# Patient Record
Sex: FEMALE | Race: BLACK OR AFRICAN AMERICAN | NOT HISPANIC OR LATINO | Employment: OTHER | URBAN - METROPOLITAN AREA
[De-identification: names, ages, dates, MRNs, and addresses within clinical notes are randomized per-mention and may not be internally consistent; named-entity substitution may affect disease eponyms.]

---

## 2017-05-05 ENCOUNTER — OFFICE VISIT (OUTPATIENT)
Dept: OPHTHALMOLOGY | Facility: CLINIC | Age: 62
End: 2017-05-05
Payer: MEDICARE

## 2017-05-05 DIAGNOSIS — E11.3592 TYPE 2 DIABETES MELLITUS WITH LEFT EYE AFFECTED BY PROLIFERATIVE RETINOPATHY WITHOUT MACULAR EDEMA, WITH LONG-TERM CURRENT USE OF INSULIN: ICD-10-CM

## 2017-05-05 DIAGNOSIS — E11.3391 TYPE 2 DIABETES MELLITUS WITH RIGHT EYE AFFECTED BY MODERATE NONPROLIFERATIVE RETINOPATHY WITHOUT MACULAR EDEMA, WITH LONG-TERM CURRENT USE OF INSULIN: Primary | ICD-10-CM

## 2017-05-05 DIAGNOSIS — Z79.4 TYPE 2 DIABETES MELLITUS WITH LEFT EYE AFFECTED BY PROLIFERATIVE RETINOPATHY WITHOUT MACULAR EDEMA, WITH LONG-TERM CURRENT USE OF INSULIN: ICD-10-CM

## 2017-05-05 DIAGNOSIS — Z79.4 TYPE 2 DIABETES MELLITUS WITH RIGHT EYE AFFECTED BY MODERATE NONPROLIFERATIVE RETINOPATHY WITHOUT MACULAR EDEMA, WITH LONG-TERM CURRENT USE OF INSULIN: Primary | ICD-10-CM

## 2017-05-05 DIAGNOSIS — H25.813 MIXED TYPE AGE-RELATED CATARACT, BOTH EYES: ICD-10-CM

## 2017-05-05 PROCEDURE — 92134 CPTRZ OPH DX IMG PST SGM RTA: CPT | Mod: PBBFAC | Performed by: OPHTHALMOLOGY

## 2017-05-05 PROCEDURE — 99212 OFFICE O/P EST SF 10 MIN: CPT | Mod: PBBFAC,25 | Performed by: OPHTHALMOLOGY

## 2017-05-05 PROCEDURE — 92226 PR SPECIAL EYE EXAM, SUBSEQUENT: CPT | Mod: 50,S$PBB,, | Performed by: OPHTHALMOLOGY

## 2017-05-05 PROCEDURE — 92226 PR SPECIAL EYE EXAM, SUBSEQUENT: CPT | Mod: 50,PBBFAC | Performed by: OPHTHALMOLOGY

## 2017-05-05 PROCEDURE — 92014 COMPRE OPH EXAM EST PT 1/>: CPT | Mod: S$PBB,,, | Performed by: OPHTHALMOLOGY

## 2017-05-05 PROCEDURE — 99999 PR PBB SHADOW E&M-EST. PATIENT-LVL II: CPT | Mod: PBBFAC,,, | Performed by: OPHTHALMOLOGY

## 2017-05-05 RX ORDER — FUROSEMIDE 20 MG/1
20 TABLET ORAL DAILY
Status: ON HOLD | COMMUNITY
End: 2018-09-11 | Stop reason: HOSPADM

## 2017-05-05 RX ORDER — ALBUTEROL SULFATE 90 UG/1
2 AEROSOL, METERED RESPIRATORY (INHALATION) EVERY 6 HOURS PRN
Status: ON HOLD | COMMUNITY
End: 2018-09-11

## 2017-05-05 NOTE — PROGRESS NOTES
[place] SDOCT:   OD: good quality, good contour, no ME  OS: good quality, good contour, no ME    Type 2 IDDM  Mod/sev NPDR OD  PDR OS s/p PRP  No ME OU  VS Cats OU- pt would like eval    Diabetic Retinopathy discussed in detail, all questions answered  Stressed importance of good BS/BP/Chol Control  RTC 4 months, sooner PRN    RTC 4 months or in post op period for reeval of DRMarsha  .

## 2017-05-05 NOTE — MR AVS SNAPSHOT
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 edema, with long-term current use of insulin    -  Primary     Type 2 diabetes mellitus with left eye affected by proliferative retinopathy without macular edema, with long-term current use of insulin         Mixed type age-related cataract, both eyes                To Do List           Future Appointments        Provider Department Dept Phone    2017 9:30 AM Wei Daugherty MD [facility] [phone]    2017 8:30 AM Katie Moe MD [facility] [phone]    2017 8:30 AM Wei Daugherty MD [facility] [phone]      Goals (5 Years of Data)     None      Follow-Up and Disposition     Return in about 4 months (around 2017), or if symptoms worsen or fail to improve, for OCT Mac.    Follow-up and Disposition History      [facility]     [facility] -  Assistance  Unless otherwise directed by your provider, please contact [facility], our nurse care line that is available for  assistance.     Registered nurses in the [facility] provide: appointment scheduling, clinical advisement, health education, and other advisory services.  Call: [phone] (toll free)               Medications           Message regarding Medications     Verify the changes and/or additions to your medication regime listed below are the same as discussed with your clinician today.  If any of these changes or additions are incorrect,  please notify your healthcare provider.             Verify that the below list of medications is an accurate representation of the medications you are currently taking.  If none reported, the list may be blank. If incorrect, please contact your healthcare provider. Carry this list with you in case of emergency.           Current Medications     albuterol (VENTOLIN HFA) 90 mcg/actuation inhaler Inhale 2 puffs into the lungs every 6 (six) hours as needed for Wheezing. Rescue    amlodipine (NORVASC) 10 MG tablet Take 10 mg by mouth once daily.    doxazosin (CARDURA XL) 8 mg 24 hr tablet Take 8 mg by mouth daily with breakfast.    furosemide (LASIX) 20 MG tablet Take 20 mg by mouth once daily.    insulin degludec 200 unit/mL (3 mL) InPn Inject 120 Units into the skin once daily.    nebivolol (BYSTOLIC) 10 MG Tab Take 10 mg by mouth once daily.    omeprazole (PRILOSEC) 20 MG capsule Take 20 mg by mouth once daily.    UMECLIDINIUM BRM/VILANTEROL TR (ANORO ELLIPTA INHL) Inhale 62.5 mcg into the lungs once daily at 6am.           Clinical Reference Information           Allergies as of 5/5/2017     No Known Allergies      Immunizations Administered on Date of Encounter - 5/5/2017     None      Orders Placed During Today's Visit      Normal Orders This Visit    OCT- Retina       Language Assistance Services     ATTENTION: Language assistance services are available, free of charge. Please call [phone].      ATENCIÓN: Si habla sujey, tiene a dumont disposición servicios gratuitos de asistencia lingüística. Llame al [phone].     [facility] Ý: N?u b?n nói Ti?ng Vi?t, có các d?ch v? h? tr? ngôn ng? mi?n phí dành cho b?n. G?i s? [phone].         [facility] complies with applicable Federal civil rights laws and does not discriminate on the basis of race, color, national origin, age, disability, or sex.

## 2017-05-24 ENCOUNTER — OFFICE VISIT (OUTPATIENT)
Dept: OPHTHALMOLOGY | Facility: CLINIC | Age: 62
End: 2017-05-24
Payer: MEDICARE

## 2017-05-24 DIAGNOSIS — H25.13 NUCLEAR SCLEROSIS, BILATERAL: Primary | ICD-10-CM

## 2017-05-24 DIAGNOSIS — E11.3593 PROLIFERATIVE DIABETIC RETINOPATHY OF BOTH EYES WITHOUT MACULAR EDEMA ASSOCIATED WITH TYPE 2 DIABETES MELLITUS: ICD-10-CM

## 2017-05-24 PROCEDURE — 99999 PR PBB SHADOW E&M-EST. PATIENT-LVL II: CPT | Mod: PBBFAC,,, | Performed by: OPHTHALMOLOGY

## 2017-05-24 PROCEDURE — 99212 OFFICE O/P EST SF 10 MIN: CPT | Mod: PBBFAC | Performed by: OPHTHALMOLOGY

## 2017-05-24 PROCEDURE — 92014 COMPRE OPH EXAM EST PT 1/>: CPT | Mod: S$PBB,,, | Performed by: OPHTHALMOLOGY

## 2017-05-24 PROCEDURE — 92136 OPHTHALMIC BIOMETRY: CPT | Mod: 50,PBBFAC | Performed by: OPHTHALMOLOGY

## 2017-05-24 RX ORDER — LIDOCAINE HYDROCHLORIDE 10 MG/ML
1 INJECTION, SOLUTION EPIDURAL; INFILTRATION; INTRACAUDAL; PERINEURAL ONCE
Status: CANCELLED | OUTPATIENT
Start: 2017-05-24 | End: 2017-05-24

## 2017-05-24 RX ORDER — MOXIFLOXACIN 5 MG/ML
1 SOLUTION/ DROPS OPHTHALMIC
Status: CANCELLED | OUTPATIENT
Start: 2017-05-24

## 2017-05-24 RX ORDER — TROPICAMIDE 10 MG/ML
1 SOLUTION/ DROPS OPHTHALMIC
Status: CANCELLED | OUTPATIENT
Start: 2017-05-24

## 2017-05-24 RX ORDER — PHENYLEPHRINE HYDROCHLORIDE 25 MG/ML
1 SOLUTION/ DROPS OPHTHALMIC
Status: CANCELLED | OUTPATIENT
Start: 2017-05-24

## 2017-05-24 RX ORDER — TETRACAINE HYDROCHLORIDE 5 MG/ML
1 SOLUTION OPHTHALMIC
Status: CANCELLED | OUTPATIENT
Start: 2017-05-24

## 2017-05-24 NOTE — PROGRESS NOTES
HPI     Referred by Dr. Daugherty    Patient here for a cataract evaluation.     Last edited by Michelle Meehan on 5/24/2017  8:29 AM. (History)            Assessment /Plan     For exam results, see Encounter Report.    Nuclear sclerosis, bilateral  -     IOL Master - OU - Both Eyes    Proliferative diabetic retinopathy of both eyes without macular edema associated with type 2 diabetes mellitus      Visually Significant Cataract: Patient reports decreased vision consistent with the clinical amount of lenticular opacity, which reaches the level of visual significance and affects activities of daily living. Risks, benefits, and alternatives to cataract surgery were discussed and the consent reviewed. IOL options were discussed, including ATIOLs and the associated side effects and additional patient cost associated with them.   IOL Selections:   Right eye  IOL: pcboo 23.5     Left eye  IOL: pcboo 23.0    Pt wishes to have right eye done first.  Hx PDR/CSME OU

## 2017-06-14 ENCOUNTER — TELEPHONE (OUTPATIENT)
Dept: OPHTHALMOLOGY | Facility: CLINIC | Age: 62
End: 2017-06-14

## 2017-06-14 DIAGNOSIS — H25.11 NUCLEAR SCLEROTIC CATARACT OF RIGHT EYE: Primary | ICD-10-CM

## 2017-08-10 ENCOUNTER — TELEPHONE (OUTPATIENT)
Dept: OPTOMETRY | Facility: CLINIC | Age: 62
End: 2017-08-10

## 2017-08-14 ENCOUNTER — HOSPITAL ENCOUNTER (OUTPATIENT)
Facility: OTHER | Age: 62
Discharge: HOME | End: 2017-08-14
Attending: OPHTHALMOLOGY | Admitting: OPHTHALMOLOGY
Payer: MEDICARE

## 2017-08-14 ENCOUNTER — SURGERY (OUTPATIENT)
Age: 62
End: 2017-08-14

## 2017-08-14 ENCOUNTER — TELEPHONE (OUTPATIENT)
Dept: OPHTHALMOLOGY | Facility: CLINIC | Age: 62
End: 2017-08-14

## 2017-08-14 ENCOUNTER — ANESTHESIA EVENT (OUTPATIENT)
Dept: SURGERY | Facility: OTHER | Age: 62
End: 2017-08-14
Payer: MEDICARE

## 2017-08-14 ENCOUNTER — ANESTHESIA (OUTPATIENT)
Dept: SURGERY | Facility: OTHER | Age: 62
End: 2017-08-14
Payer: MEDICARE

## 2017-08-14 VITALS
OXYGEN SATURATION: 100 % | RESPIRATION RATE: 18 BRPM | HEART RATE: 62 BPM | TEMPERATURE: 98 F | WEIGHT: 218 LBS | BODY MASS INDEX: 40.12 KG/M2 | SYSTOLIC BLOOD PRESSURE: 183 MMHG | HEIGHT: 62 IN | DIASTOLIC BLOOD PRESSURE: 74 MMHG

## 2017-08-14 DIAGNOSIS — H25.13 NUCLEAR SCLEROSIS, BILATERAL: ICD-10-CM

## 2017-08-14 DIAGNOSIS — H25.11 NUCLEAR SCLEROSIS, RIGHT: Primary | ICD-10-CM

## 2017-08-14 PROBLEM — H25.10 NUCLEAR SCLEROSIS: Status: ACTIVE | Noted: 2017-08-14

## 2017-08-14 LAB — POCT GLUCOSE: 155 MG/DL (ref 70–110)

## 2017-08-14 PROCEDURE — 63600175 PHARM REV CODE 636 W HCPCS: Performed by: NURSE ANESTHETIST, CERTIFIED REGISTERED

## 2017-08-14 PROCEDURE — 37000008 HC ANESTHESIA 1ST 15 MINUTES: Performed by: OPHTHALMOLOGY

## 2017-08-14 PROCEDURE — 66984 XCAPSL CTRC RMVL W/O ECP: CPT | Mod: RT,,, | Performed by: OPHTHALMOLOGY

## 2017-08-14 PROCEDURE — 25000003 PHARM REV CODE 250: Performed by: OPHTHALMOLOGY

## 2017-08-14 PROCEDURE — 36000706: Performed by: OPHTHALMOLOGY

## 2017-08-14 PROCEDURE — 37000009 HC ANESTHESIA EA ADD 15 MINS: Performed by: OPHTHALMOLOGY

## 2017-08-14 PROCEDURE — 82962 GLUCOSE BLOOD TEST: CPT | Performed by: OPHTHALMOLOGY

## 2017-08-14 PROCEDURE — 36000707: Performed by: OPHTHALMOLOGY

## 2017-08-14 PROCEDURE — 71000015 HC POSTOP RECOV 1ST HR: Performed by: OPHTHALMOLOGY

## 2017-08-14 PROCEDURE — V2632 POST CHMBR INTRAOCULAR LENS: HCPCS | Performed by: OPHTHALMOLOGY

## 2017-08-14 DEVICE — IMPLANTABLE DEVICE: Type: IMPLANTABLE DEVICE | Site: EYE | Status: FUNCTIONAL

## 2017-08-14 RX ORDER — LIDOCAINE HYDROCHLORIDE 10 MG/ML
1 INJECTION, SOLUTION EPIDURAL; INFILTRATION; INTRACAUDAL; PERINEURAL ONCE
Status: DISCONTINUED | OUTPATIENT
Start: 2017-08-14 | End: 2017-08-14 | Stop reason: HOSPADM

## 2017-08-14 RX ORDER — PHENYLEPHRINE HYDROCHLORIDE 25 MG/ML
1 SOLUTION/ DROPS OPHTHALMIC
Status: COMPLETED | OUTPATIENT
Start: 2017-08-14 | End: 2017-08-14

## 2017-08-14 RX ORDER — TETRACAINE HYDROCHLORIDE 5 MG/ML
1 SOLUTION OPHTHALMIC
Status: COMPLETED | OUTPATIENT
Start: 2017-08-14 | End: 2017-08-14

## 2017-08-14 RX ORDER — LIDOCAINE HYDROCHLORIDE 40 MG/ML
INJECTION, SOLUTION RETROBULBAR
Status: DISCONTINUED | OUTPATIENT
Start: 2017-08-14 | End: 2017-08-14 | Stop reason: HOSPADM

## 2017-08-14 RX ORDER — MIDAZOLAM HYDROCHLORIDE 1 MG/ML
INJECTION INTRAMUSCULAR; INTRAVENOUS
Status: DISCONTINUED | OUTPATIENT
Start: 2017-08-14 | End: 2017-08-14

## 2017-08-14 RX ORDER — MOXIFLOXACIN 5 MG/ML
SOLUTION/ DROPS OPHTHALMIC
Status: DISCONTINUED | OUTPATIENT
Start: 2017-08-14 | End: 2017-08-14 | Stop reason: HOSPADM

## 2017-08-14 RX ORDER — ACETAMINOPHEN 325 MG/1
650 TABLET ORAL EVERY 4 HOURS PRN
Status: DISCONTINUED | OUTPATIENT
Start: 2017-08-14 | End: 2017-08-14 | Stop reason: HOSPADM

## 2017-08-14 RX ORDER — TROPICAMIDE 10 MG/ML
1 SOLUTION/ DROPS OPHTHALMIC
Status: COMPLETED | OUTPATIENT
Start: 2017-08-14 | End: 2017-08-14

## 2017-08-14 RX ORDER — MOXIFLOXACIN 5 MG/ML
1 SOLUTION/ DROPS OPHTHALMIC
Status: COMPLETED | OUTPATIENT
Start: 2017-08-14 | End: 2017-08-14

## 2017-08-14 RX ORDER — TETRACAINE HYDROCHLORIDE 5 MG/ML
SOLUTION OPHTHALMIC
Status: DISCONTINUED | OUTPATIENT
Start: 2017-08-14 | End: 2017-08-14 | Stop reason: HOSPADM

## 2017-08-14 RX ORDER — PROPARACAINE HYDROCHLORIDE 5 MG/ML
1 SOLUTION/ DROPS OPHTHALMIC
Status: DISCONTINUED | OUTPATIENT
Start: 2017-08-14 | End: 2017-08-14 | Stop reason: HOSPADM

## 2017-08-14 RX ADMIN — MOXIFLOXACIN HYDROCHLORIDE 1 DROP: 5 SOLUTION/ DROPS OPHTHALMIC at 10:08

## 2017-08-14 RX ADMIN — MIDAZOLAM HYDROCHLORIDE 2 MG: 1 INJECTION, SOLUTION INTRAMUSCULAR; INTRAVENOUS at 11:08

## 2017-08-14 RX ADMIN — PHENYLEPHRINE HYDROCHLORIDE 1 DROP: 25 SOLUTION/ DROPS OPHTHALMIC at 09:08

## 2017-08-14 RX ADMIN — TROPICAMIDE 1 DROP: 10 SOLUTION/ DROPS OPHTHALMIC at 09:08

## 2017-08-14 RX ADMIN — MOXIFLOXACIN HYDROCHLORIDE 1 DROP: 5 SOLUTION/ DROPS OPHTHALMIC at 11:08

## 2017-08-14 RX ADMIN — TETRACAINE HYDROCHLORIDE 1 DROP: 5 SOLUTION OPHTHALMIC at 09:08

## 2017-08-14 RX ADMIN — MOXIFLOXACIN HYDROCHLORIDE 1 DROP: 5 SOLUTION/ DROPS OPHTHALMIC at 09:08

## 2017-08-14 RX ADMIN — BALANCED SALT SOLUTION ENRICHED WITH BICARBONATE, DEXTROSE, AND GLUTATHIONE 515 ML: KIT at 10:08

## 2017-08-14 RX ADMIN — TETRACAINE HYDROCHLORIDE 2 DROP: 5 SOLUTION OPHTHALMIC at 10:08

## 2017-08-14 RX ADMIN — SODIUM CHONDROITIN SULFATE / SODIUM HYALURONATE 2 ML: 0.55-0.5 INJECTION INTRAOCULAR at 10:08

## 2017-08-14 RX ADMIN — LIDOCAINE HYDROCHLORIDE 4 DROP: 40 INJECTION, SOLUTION RETROBULBAR; TOPICAL at 10:08

## 2017-08-14 NOTE — TELEPHONE ENCOUNTER
----- Message from Hussain Adame sent at 8/14/2017  3:27 PM CDT -----  Contact: Malaika Paige [[id number]]  Pt states she cannot make it to [facility] for 1 day post op,pt wants to be seen at main campus,please call back at [phone],thanks

## 2017-08-14 NOTE — DISCHARGE INSTRUCTIONS
Katie Moe MD  [facility]  Department of Ophthalmology      AFTER: Cataract Surgery:  Relax at home and DO NOT exert yourself for the rest of the day.  Plan to see Dr. Moe tomorrow at the eye clinic:   [facility]  [place] [address]    Refer to attached eye drop instruction sheet     Precautions:  DO NOT rub your eye.  You may resume moderate activity the day after surgery.  Wear protective sunglasses during the day and a shield at night for 1(one) week.  If you have pain, redness and decreased vision, call Dr. Moe (or the on-call doctor after hours) @[phone].            Home Care Instructions for Eye Surgeries    1. ACTIVITY:  Limit your activity today. Relax at home and DO NOT exert yourself for the rest of the day. Increase activity gradually. You may return to work or school as directed by your physician.    2. DIET:  Drink plenty of fluids. Resume your normal diet unless instructed otherwise.    3. PAIN:  Expect a moderate amount of pain. If a prescription for pain is not sent home with you, you may take your commonly used pain reliever as directed. If this is not sufficient, call your physician. You may resume any other prescription medication unless otherwise directed by your physician.     Discuss any problem with your physician as soon as it arises. Do not Delay.      EMERGENCY- If you are unable to contact your physician, please go to the nearest Emergency Room.       Anesthesia: Monitored Anesthesia Care (MAC)    Anesthesia Safety  · Have an adult family member or friend drive you home after the procedure.  · For the first 24 hours after your surgery:  · Do not drive or use heavy equipment.  · Do not make important decisions or sign documents.  · Avoid alcohol.  · Have someone stay with you, if possible. They can watch for problems and help keep you safe.

## 2017-08-14 NOTE — H&P
CC: Painless, progressive loss of vision  Present Illness: Nuclear sclerotic cataract  Allergies/Current Meds: see meds  Mental Status: A&O x3  Pertinent Medical History: n/a     Physical Exam  General: NAD  HEENT: Eye white/quiet  Lungs: Adequate respirations  Heart: + pulses  Abdomen: soft  Rectal/GI/: deferred     Impression: Cataract  Plan: Phacoemulsification with lens implant

## 2017-08-14 NOTE — TELEPHONE ENCOUNTER
Patient insisted there is no way she can make her I day post op appt. She states the person who brought her today will be out of town until the end of the month. She can only get a ride to  on Wednesday. RS 1 day post op

## 2017-08-14 NOTE — ANESTHESIA POSTPROCEDURE EVALUATION
"Anesthesia Post Evaluation    Patient: Malaika Paige    Procedure(s) Performed: Procedure(s) (LRB):  PHACOEMULSIFICATION-ASPIRATION-CATARACT (Right)  INSERTION-INTRAOCULAR LENS (IOL) (Right)    Final Anesthesia Type: MAC  Patient location during evaluation: [facility]  Patient participation: Yes- Able to Participate  Level of consciousness: awake and alert and oriented  Post-procedure vital signs: reviewed and stable  Pain management: adequate  Airway patency: patent  PONV status at discharge: No PONV  Anesthetic complications: no      Cardiovascular status: hemodynamically stable  Respiratory status: unassisted, spontaneous ventilation and room air  Hydration status: euvolemic  Follow-up not needed.        Visit Vitals  BP (!) 197/84 (BP Location: Left arm, Patient Position: Lying)   Pulse 66   Temp 36.6 °C (97.9 °F) (Oral)   Resp 16   Ht 5' 2" (1.575 m)   Wt 98.9 kg (218 lb)   SpO2 100%   Breastfeeding? No   BMI 39.87 kg/m²       Pain/Matt Score: Pain Assessment Performed: Yes (8/14/2017  9:16 AM)  Presence of Pain: denies (8/14/2017  9:16 AM)      "

## 2017-08-14 NOTE — ANESTHESIA PREPROCEDURE EVALUATION
08/14/2017  Malaika Paige is a 61 y.o., female.    Anesthesia Evaluation    I have reviewed the Patient Summary Reports.    I have reviewed the Nursing Notes.   I have reviewed the Medications.     Review of Systems  Anesthesia Hx:  Denies Family Hx of Anesthesia complications.   Denies Personal Hx of Anesthesia complications.   Social:  Non-Smoker    Hematology/Oncology:  Hematology Normal   Oncology Normal     EENT/Dental:EENT/Dental Normal   Cardiovascular:   Hypertension ADAN    Pulmonary:  Pulmonary Normal    Renal/:   Chronic Renal Disease, CRI    Hepatic/GI:  Hepatic/GI Normal    Musculoskeletal:  Musculoskeletal Normal    Neurological:  Neurology Normal    Endocrine:   Diabetes, type 2, using insulin    Dermatological:  Skin Normal    Psych:  Psychiatric Normal           Physical Exam  General:  Morbid Obesity      Dental:  Dental Findings: Edentulous        Mental Status:  Mental Status Findings:  Cooperative, Alert and Oriented         Anesthesia Plan  Type of Anesthesia, risks & benefits discussed:  Anesthesia Type:  MAC  Patient's Preference:   Intra-op Monitoring Plan: standard ASA monitors  Intra-op Monitoring Plan Comments:   Post Op Pain Control Plan:   Post Op Pain Control Plan Comments:   Induction:    Beta Blocker:         Informed Consent: Patient understands risks and agrees with Anesthesia plan.  Questions answered. Anesthesia consent signed with patient.  ASA Score: 3     Day of Surgery Review of History & Physical:    H&P update referred to the surgeon.         Ready For Surgery From Anesthesia Perspective.

## 2017-08-14 NOTE — PLAN OF CARE
Malaika Paige has met all discharge criteria from Phase II. Vital Signs are stable, ambulating  without difficulty. Discharge instructions given, patient verbalized understanding. Discharged from facility via wheelchair in stable condition.     Pt and family waiting in lobby for ride to arrive

## 2017-08-14 NOTE — OP NOTE
SURGEON:  Katie Moe M.D.    PREOPERATIVE DIAGNOSIS:    Nuclear Sclerotic Cataract Right Eye    POSTOPERATIVE DIAGNOSIS:    Nuclear Sclerotic Cataract Right Eye    PROCEDURES:    Phacoemulsification with  intraocular lens, Right eye (92252)    DATE OF SURGERY: 08/14/2017    IMPLANT: pcboo 23.5    ANESTHESIA:  MAC with topical Lidocaine    COMPLICATIONS:  None    ESTIMATED BLOOD LOSS: None    SPECIMENS: None    INDICATIONS:    The patient has a history of painless progressive visual loss and  difficulty with activities of daily living secondary to cataract formation.  After a thorough discussion of the risks, benefits, and alternatives to cataract surgery, including, but not limited to, the rare risks of infection, retinal detachment, hemorrhage, need for additional surgery, loss of vision, and even loss of the eye, the patient voices understanding and desires to proceed.    DESCRIPTION OF PROCEDURE:    The patients IOL calculations were reviewed, and the lens selection confirmed.   After verification and marking of the proper eye in the preop holding area, the patient was brought to the operating room in supine position where the eye was prepped and draped in standard sterile fashion with 5% Betadine and a lid speculum placed in the eye.   Topical 4% Lidocaine was used in addition to the preoperative anesthesia and the procedure was begun by the creation of a paracentesis incision through which viscoelastic was used to fill the anterior chamber.  Next, a keratome blade was used to create a triplanar temporal clear corneal incision and a cystotome and Utrata forceps used to fashion a continuous curvilinear capsulorrhexis.  Hydrodissection was carried out using the Berman hydrodissection cannula and the nucleus was found to be mobile.  Phacoemulsification of the nucleus was carried out using a quick chop technique, and all remaining epinuclear and cortical material was removed.  The eye was then reformed with  Viscoelastic and the  intraocular lens was implanted into the capsular bag.  All remaining viscoelastics were removed from the eye and at the end of the case the pupil was round, the lens was well-centered within the capsular bag and all wounds were found to be water tight.  Drops of Vigamox and Pred Forte were instilled and a shield was placed over the eye. The patient will follow up with Dr. Moe in the morning.

## 2017-08-14 NOTE — PLAN OF CARE
Patient prefers to have sister Lynnette present for discharge teaching. Please contact them @[phone].

## 2017-08-14 NOTE — DISCHARGE SUMMARY
Outcome: Successful outpatient ophthalmic surgical procedure  Preprinted Instructions given to patient.  Regular diet.  Activity: No restrictions  Meds: see Med Rec  Condition: stable  Follow up: 1 day with Dr Moe  Disposition: Home  Diagnosis: s/p eye surgery

## 2017-08-16 ENCOUNTER — OFFICE VISIT (OUTPATIENT)
Dept: OPHTHALMOLOGY | Facility: CLINIC | Age: 62
End: 2017-08-16
Attending: OPHTHALMOLOGY
Payer: MEDICARE

## 2017-08-16 DIAGNOSIS — Z98.890 POST-OPERATIVE STATE: Primary | ICD-10-CM

## 2017-08-16 DIAGNOSIS — H25.11 NUCLEAR SCLEROTIC CATARACT OF RIGHT EYE: ICD-10-CM

## 2017-08-16 PROCEDURE — 99212 OFFICE O/P EST SF 10 MIN: CPT | Mod: PBBFAC | Performed by: OPHTHALMOLOGY

## 2017-08-16 PROCEDURE — 99024 POSTOP FOLLOW-UP VISIT: CPT | Mod: ,,, | Performed by: OPHTHALMOLOGY

## 2017-08-16 PROCEDURE — 99999 PR PBB SHADOW E&M-EST. PATIENT-LVL II: CPT | Mod: PBBFAC,,, | Performed by: OPHTHALMOLOGY

## 2017-08-16 NOTE — PROGRESS NOTES
HPI     Post-op Evaluation    Additional comments: POD 2 Phaco OD           Comments   POD 2 Phaco w/IOL OD August 14, 2017    MEDS:    Pred/Gati/Nepaf TID OD    Patient states she is doing well with no complaints.       Last edited by Michelle Meehan on 8/16/2017  9:19 AM. (History)            Assessment /Plan     For exam results, see Encounter Report.    Post-operative state    Nuclear sclerotic cataract of right eye      Slit lamp exam:  L/L: nl  K: clear, wound sealed  AC: 1+ cell  Lens: IOL centered and stable    POD1 s/p Phaco/IOL  Appropriate precautions and post op medications reviewed.  Patient instructed to call or come in if symptoms of redness, decreased vision, or pain are experienced.

## 2017-08-25 ENCOUNTER — TELEPHONE (OUTPATIENT)
Dept: OPHTHALMOLOGY | Facility: CLINIC | Age: 62
End: 2017-08-25

## 2017-08-25 NOTE — TELEPHONE ENCOUNTER
----- Message from Mary Ann Hernandez sent at 8/25/2017  8:12 AM CDT -----  Contact: Malaika  Pt unable to come in for post op on today and would like to reschedule.  I have nothing until 09/06/17.  She would like to know if she can go to the [facility] office for this visit and if so who she can see.   She can be reached at [phone]

## 2018-01-09 ENCOUNTER — OFFICE VISIT (OUTPATIENT)
Dept: OPHTHALMOLOGY | Facility: CLINIC | Age: 63
End: 2018-01-09
Payer: MEDICARE

## 2018-01-09 DIAGNOSIS — H25.12 NS (NUCLEAR SCLEROSIS), LEFT: ICD-10-CM

## 2018-01-09 DIAGNOSIS — H35.411 LATTICE DEGENERATION OF RIGHT RETINA: ICD-10-CM

## 2018-01-09 PROCEDURE — 92014 COMPRE OPH EXAM EST PT 1/>: CPT | Mod: S$PBB,,, | Performed by: OPHTHALMOLOGY

## 2018-01-09 PROCEDURE — 92225 PR SPECIAL EYE EXAM, INITIAL: CPT | Mod: 50,S$PBB,, | Performed by: OPHTHALMOLOGY

## 2018-01-09 PROCEDURE — 99213 OFFICE O/P EST LOW 20 MIN: CPT | Mod: PBBFAC,25 | Performed by: OPHTHALMOLOGY

## 2018-01-09 PROCEDURE — 92134 CPTRZ OPH DX IMG PST SGM RTA: CPT | Mod: PBBFAC | Performed by: OPHTHALMOLOGY

## 2018-01-09 PROCEDURE — 99999 PR PBB SHADOW E&M-EST. PATIENT-LVL III: CPT | Mod: PBBFAC,,, | Performed by: OPHTHALMOLOGY

## 2018-01-09 PROCEDURE — 92225 PR SPECIAL EYE EXAM, INITIAL: CPT | Mod: 50,PBBFAC | Performed by: OPHTHALMOLOGY

## 2018-01-09 RX ORDER — INSULIN DEGLUDEC 200 U/ML
60 INJECTION, SOLUTION SUBCUTANEOUS DAILY
COMMUNITY

## 2018-01-09 RX ORDER — LEVOTHYROXINE SODIUM 88 UG/1
75 CAPSULE ORAL
COMMUNITY

## 2018-01-09 RX ORDER — FAMOTIDINE 20 MG/1
20 TABLET, FILM COATED ORAL 2 TIMES DAILY
Status: ON HOLD | COMMUNITY
End: 2018-09-11

## 2018-01-09 RX ORDER — ASPIRIN 81 MG/1
81 TABLET ORAL DAILY
COMMUNITY
End: 2023-11-16

## 2018-01-09 NOTE — PROGRESS NOTES
HPI     Eye Problem    Additional comments: overdue check           Comments   DLS 5/5/17 [facility]- Vision OD improved since cataract sx. She states her   BS is OK    FIU=053's yesterday      OCT - OD - paracentral ME - minimal  OS - No ME      A/P    1. DM - T2 uncontrolled on insulin  OD - Mod NPDR with ME  OS PDR s/p PRP no ME    DME OD - minimal - monitor at this point    2. PCIOL OD  Not seen since POD #1  NS OS - pt wants to see Dr. Youssef    3. Lattice OD  ASx monitor      3 months OCT/FA OD

## 2018-02-19 ENCOUNTER — INITIAL CONSULT (OUTPATIENT)
Dept: OPHTHALMOLOGY | Facility: CLINIC | Age: 63
End: 2018-02-19
Payer: MEDICARE

## 2018-02-19 DIAGNOSIS — Z98.41 STATUS POST CATARACT EXTRACTION AND INSERTION OF INTRAOCULAR LENS OF RIGHT EYE: ICD-10-CM

## 2018-02-19 DIAGNOSIS — H25.12 NS (NUCLEAR SCLEROSIS), LEFT: Primary | ICD-10-CM

## 2018-02-19 DIAGNOSIS — H35.411 LATTICE DEGENERATION OF RIGHT RETINA: ICD-10-CM

## 2018-02-19 DIAGNOSIS — Z96.1 STATUS POST CATARACT EXTRACTION AND INSERTION OF INTRAOCULAR LENS OF RIGHT EYE: ICD-10-CM

## 2018-02-19 PROCEDURE — 99212 OFFICE O/P EST SF 10 MIN: CPT | Mod: PBBFAC,25 | Performed by: OPHTHALMOLOGY

## 2018-02-19 PROCEDURE — 92136 OPHTHALMIC BIOMETRY: CPT | Mod: PBBFAC,LT | Performed by: OPHTHALMOLOGY

## 2018-02-19 PROCEDURE — 92020 GONIOSCOPY: CPT | Mod: S$PBB,,, | Performed by: OPHTHALMOLOGY

## 2018-02-19 PROCEDURE — 92020 GONIOSCOPY: CPT | Mod: PBBFAC | Performed by: OPHTHALMOLOGY

## 2018-02-19 PROCEDURE — 99999 PR PBB SHADOW E&M-EST. PATIENT-LVL II: CPT | Mod: PBBFAC,,, | Performed by: OPHTHALMOLOGY

## 2018-02-19 PROCEDURE — 92014 COMPRE OPH EXAM EST PT 1/>: CPT | Mod: S$PBB,,, | Performed by: OPHTHALMOLOGY

## 2018-02-19 NOTE — PROGRESS NOTES
HPI     DLS: 01/09/2018  Ref by Dr. Daugherty   Evaluate for cataract sx left eye-pt's sister is a pt of Dr. Youssef  PC IOL OD-Moe  Type 2 diabetes-uncontrolled  PRP OS  Lattice OD    Last edited by Cecy Kumar on 2/19/2018 10:45 AM. (History)            Assessment /Plan     For exam results, see Encounter Report.    NS (nuclear sclerosis), left  -     IOL Master - MOD 26 - OS - Left eye    Uncontrolled type 2 diabetes mellitus with right eye affected by moderate nonproliferative retinopathy and macular edema, with long-term current use of insulin    Uncontrolled type 2 diabetes mellitus with left eye affected by proliferative retinopathy without macular edema, with long-term current use of insulin    Lattice degeneration of right retina    Status post cataract extraction and insertion of intraocular lens of right eye      Lynnette Vazquez is sister and with patient      1. DM - T2 uncontrolled on insulin  OD - Mod NPDR with ME  OS PDR s/p PRP no ME    DME OD - minimal - monitor at this point    2. PCIOL OD  Not seen since POD #1  NS OS - pt wants to see Dr. Youssef    3. Lattice OD  ASx monitor      Cataract left Eyes: Discussed options including observation, glasses & surgery with patient with good understanding. Patient wishes to proceed with:  observation Glasses Surgery     Cataract Surgery Consent: Patient with a visually significant cataract with difficulties of ADLs,  Glare and care of post pole OS 2/2 poor view.  Discussed with patient options, risks and benefits, expectations of cataract surgery with questions and answers to facilitate discussion.  Discussed lens options and patient understands that glasses may be required for optimal vision for distance and/or near vision after cataract surgery.  The patient  voice good understanding and patient wishes to proceed with surgery.  The patient will likely benefit from surgery and patient signed consent for Left Eye.    IOL choice:       AL   23.64 OD        23.88 OS     PCB00  119.3          22.5 -0.23      Discussed need for fu & transportation after sx    Triple drops    Plan  RTC for CE IOL OS  FU with Dr Reed after CE IOL OS -->  OCT/FA OD  RTC sooner PRN with good understanding

## 2018-02-19 NOTE — LETTER
February 19, 2018      CHARLY Reed MD  [address]  [facility] [address]           [facility] - Ophthalmology  [address]  [facility] [address]  Phone: [phone]  Fax: [phone]          Patient: Malaika Paige   MR Number: [id number]   YOB: 1955   Date of Visit: 2/19/2018       Dear Dr. CHARLY Rede:    Thank you for referring Malaika Paige to me for evaluation. Attached you will find relevant portions of my assessment and plan of care.    If you have questions, please do not hesitate to call me. I look forward to following Malaika Paige along with you.    Sincerely,    Braxton Youssef MD    Enclosure  CC:  No Recipients    If you would like to receive this communication electronically, please contact [email or web address] or [phone] to request more information on [facility] access.    For providers and/or their staff who would like to refer a patient to [facility], please contact us through our one-stop-shop provider referral line, [facility], at [phone].    If you feel you have received this communication in error or would no longer like to receive these types of communications, please e-mail [email or web address]

## 2018-03-15 ENCOUNTER — TELEPHONE (OUTPATIENT)
Dept: OPHTHALMOLOGY | Facility: CLINIC | Age: 63
End: 2018-03-15

## 2018-03-15 DIAGNOSIS — H25.12 NUCLEAR SCLEROTIC CATARACT OF LEFT EYE: Primary | ICD-10-CM

## 2018-04-03 ENCOUNTER — TELEPHONE (OUTPATIENT)
Dept: OPHTHALMOLOGY | Facility: CLINIC | Age: 63
End: 2018-04-03

## 2018-04-03 NOTE — TELEPHONE ENCOUNTER
----- Message from Mary Ann Hernandez sent at 4/3/2018 11:56 AM CDT -----  Contact: Ember (niece)  Pt niece calling to postpone surgery because her aunt has to get a stint put in and will have another surgery.  She can be reached at [phone]  Left a message for her niece to call. [facility]

## 2018-07-23 ENCOUNTER — OFFICE VISIT (OUTPATIENT)
Dept: OPHTHALMOLOGY | Facility: CLINIC | Age: 63
End: 2018-07-23
Payer: MEDICARE

## 2018-07-23 VITALS — HEART RATE: 78 BPM | SYSTOLIC BLOOD PRESSURE: 132 MMHG | DIASTOLIC BLOOD PRESSURE: 66 MMHG

## 2018-07-23 PROCEDURE — 92226 PR SPECIAL EYE EXAM, SUBSEQUENT: CPT | Mod: PBBFAC | Performed by: OPHTHALMOLOGY

## 2018-07-23 PROCEDURE — 92014 COMPRE OPH EXAM EST PT 1/>: CPT | Mod: S$PBB,,, | Performed by: OPHTHALMOLOGY

## 2018-07-23 PROCEDURE — 92134 CPTRZ OPH DX IMG PST SGM RTA: CPT | Mod: PBBFAC | Performed by: OPHTHALMOLOGY

## 2018-07-23 PROCEDURE — 99999 PR PBB SHADOW E&M-EST. PATIENT-LVL III: CPT | Mod: PBBFAC,,, | Performed by: OPHTHALMOLOGY

## 2018-07-23 PROCEDURE — 92235 FLUORESCEIN ANGRPH MLTIFRAME: CPT | Mod: 50,PBBFAC | Performed by: OPHTHALMOLOGY

## 2018-07-23 PROCEDURE — 92226 PR SPECIAL EYE EXAM, SUBSEQUENT: CPT | Mod: 50,S$PBB,, | Performed by: OPHTHALMOLOGY

## 2018-07-23 PROCEDURE — 99213 OFFICE O/P EST LOW 20 MIN: CPT | Mod: PBBFAC,25 | Performed by: OPHTHALMOLOGY

## 2018-07-23 NOTE — PATIENT INSTRUCTIONS
Fluorescein Angiography  Fluorescein angiography is an eye test. It is done to look at the back of your eye, including:  · The blood vessels in your eye  · The layer of tissue at the back of your eye (the retina)  · The center of your retina (the macula)  · The optic nerve  This test can diagnose diseases found in these areas. It can also diagnose other conditions that affect these areas. To do this test, a dye called fluorescein is shot (injected) into your arm. The dye goes into your bloodstream and up into the blood vessels in your eyes. A special camera is then used to take images (angiograms) of your eyes.     A fluorescein angiogram of the retina   Getting ready for your test  Tell your healthcare provider if you:  · Are pregnant or think you may be pregnant  · Are breastfeeding  · Have a history of severe allergic reactions, including to X-ray dye or other medicines  · Have kidney problems  Tell your provider about any medicines you are taking. You may need to stop taking all or some of these before the test. This includes:  · All prescription medicines  · Over-the-counter medicines such as aspirin or ibuprofen  · Street drugs  · Herbs, vitamins, and other supplements  You should arrange for an adult family member or friend to drive you home after your test. Your vision will be blurry for up to 12 hours.  Follow any other instructions from your healthcare provider.  During your test  · You are given eye drops to enlarge (dilate) your pupils.  · You then sit in front of a special camera. You place your chin on the chin rest and look into the camera.  · Images are taken of your eyes, one eye at a time.  · Fluorescein dye is then injected into your arm. The lights in the room are turned off. You may have mild nausea. You may have a warm feeling in your arm or upper body. Tell your provider if your skin feels itchy or if you are having trouble breathing. If so, you could be having an allergic reaction to the  dye.  · More pictures of your eyes are taken over 15 to 30 minutes. The camera shines a bright light into your eyes. Try to keep your head still and your eyes open.  · When enough images have been taken, the test is over.  After your test  Your vision will be blurry for up to 4 to 12 hours. This is because of your dilated pupils. Your eye will be more sensitive to light for up to 12 hours. You may want to wear sunglasses during this time. Do not drive if your vision is very blurry. You may also find it uncomfortable to read. Your skin may look yellow for a few hours. This is from the dye. Your urine will be bright yellow or orange for 24 to 48 hours after the test.     Risks and possible complications  All procedures have some risks. Possible risks of fluorescein angiography include:  · Upset stomach (nausea) and vomiting  · Leaking dye around the injection site that causes pain and swelling  · Metallic taste in your mouth  · Infection at injection site  · Allergic reaction to the dye  · Dry mouth or too much saliva  · Faster heart rate  · Sweating  · Lower back pain

## 2018-07-23 NOTE — PROGRESS NOTES
HPI     DLS: 01/09/2018  Dr. Reed    Pt states that she is here for exam of eyes eyes still the same no changes. No pain, flashes,floaters or double vision.  Bright sunlight have a hard time seeing everything foggy and feel like falling.    Eye Med(s):  None    PC IOL OD-Moe  Type 2 diabetes-uncontrolled  PRP OS  Lattice OD    Last edited by Jessica Arias MA on 7/23/2018 10:04 AM. (History)        HPI     Eye Problem    Additional comments: overdue check           Comments   DLS 5/5/17 [facility]- Vision OD improved since cataract sx. She states her   BS is OK    GAG=559's yesterday      OCT - OD - paracentral ME - minimal  OS - No ME    FA - NV OD  OS - regressed NV      A/P    1. DM - T2 uncontrolled on insulin  OD - PDR OD  PLan PRP OD  OS PDR s/p PRP no ME    DME OD - minimal - monitor at this point    2. PCIOL OD  Not seen since POD #1  NS OS - ok for CE when ready    3. Lattice OD  ASx monitor      2-3 weeks PRP OD

## 2018-08-14 ENCOUNTER — OFFICE VISIT (OUTPATIENT)
Dept: OPHTHALMOLOGY | Facility: CLINIC | Age: 63
End: 2018-08-14
Payer: MEDICARE

## 2018-08-14 VITALS — SYSTOLIC BLOOD PRESSURE: 142 MMHG | DIASTOLIC BLOOD PRESSURE: 66 MMHG | HEART RATE: 71 BPM

## 2018-08-14 PROCEDURE — 99213 OFFICE O/P EST LOW 20 MIN: CPT | Mod: PBBFAC | Performed by: OPHTHALMOLOGY

## 2018-08-14 PROCEDURE — 99499 UNLISTED E&M SERVICE: CPT | Mod: S$PBB,,, | Performed by: OPHTHALMOLOGY

## 2018-08-14 PROCEDURE — 67228 TREATMENT X10SV RETINOPATHY: CPT | Mod: PBBFAC,RT | Performed by: OPHTHALMOLOGY

## 2018-08-14 PROCEDURE — 67228 TREATMENT X10SV RETINOPATHY: CPT | Mod: S$PBB,79,RT, | Performed by: OPHTHALMOLOGY

## 2018-08-14 PROCEDURE — 99999 PR PBB SHADOW E&M-EST. PATIENT-LVL III: CPT | Mod: PBBFAC,,, | Performed by: OPHTHALMOLOGY

## 2018-08-14 NOTE — PROGRESS NOTES
HPI     Eye Problem      Additional comments: laser              Comments     DLS 7/23/18- Patient here for PRP laser. No novel complaints.     A/P    1. DM - T2 uncontrolled on insulin  OD - PDR OD   PRP OD today    OS PDR s/p PRP no ME    DME OD - minimal - monitor at this point    2. PCIOL OD  Not seen since POD #1  NS OS - ok for CE when ready    3. Lattice OD  ASx monitor      2-3 weeks PRP OD #2    Risks, benefits, and alternatives to treatment discussed in detail with the patient.  The patient voiced understanding and wished to proceed with the procedure    Laser Procedure Note  Dx: PDR OD  Laser: PRP OD  Argon  Spot: 200  Power: 100-130  Dur: 0.100  #:  [id number]  Complications: None  F/U as above

## 2018-09-09 ENCOUNTER — HOSPITAL ENCOUNTER (INPATIENT)
Facility: HOSPITAL | Age: 63
LOS: 2 days | Discharge: HOME HEALTH | DRG: 291 | End: 2018-09-11
Attending: EMERGENCY MEDICINE | Admitting: EMERGENCY MEDICINE
Payer: MEDICARE

## 2018-09-09 DIAGNOSIS — N18.6 ESRD (END STAGE RENAL DISEASE): ICD-10-CM

## 2018-09-09 DIAGNOSIS — N19 RENAL FAILURE, UNSPECIFIED CHRONICITY: Primary | ICD-10-CM

## 2018-09-09 DIAGNOSIS — R06.02 SHORTNESS OF BREATH: ICD-10-CM

## 2018-09-09 DIAGNOSIS — R06.02 SOB (SHORTNESS OF BREATH): ICD-10-CM

## 2018-09-09 DIAGNOSIS — Z99.2 STAGE 5 CHRONIC KIDNEY DISEASE ON CHRONIC DIALYSIS: ICD-10-CM

## 2018-09-09 DIAGNOSIS — E87.5 HYPERKALEMIA: ICD-10-CM

## 2018-09-09 DIAGNOSIS — N18.6 STAGE 5 CHRONIC KIDNEY DISEASE ON CHRONIC DIALYSIS: ICD-10-CM

## 2018-09-09 PROBLEM — I16.1 HYPERTENSIVE EMERGENCY: Status: ACTIVE | Noted: 2018-09-09

## 2018-09-09 PROBLEM — D50.9 MICROCYTIC ANEMIA: Status: ACTIVE | Noted: 2018-09-09

## 2018-09-09 PROBLEM — E11.9 TYPE 2 DIABETES MELLITUS, WITH LONG-TERM CURRENT USE OF INSULIN: Status: ACTIVE | Noted: 2018-01-09

## 2018-09-09 PROBLEM — E87.20 METABOLIC ACIDOSIS: Status: ACTIVE | Noted: 2018-09-09

## 2018-09-09 PROBLEM — Z79.4 TYPE 2 DIABETES MELLITUS, WITH LONG-TERM CURRENT USE OF INSULIN: Status: ACTIVE | Noted: 2018-01-09

## 2018-09-09 PROBLEM — J44.9 COPD (CHRONIC OBSTRUCTIVE PULMONARY DISEASE): Status: ACTIVE | Noted: 2018-09-09

## 2018-09-09 LAB
ALBUMIN SERPL BCP-MCNC: 2.9 G/DL
ALLENS TEST: ABNORMAL
ALLENS TEST: ABNORMAL
ALP SERPL-CCNC: 69 U/L
ALT SERPL W/O P-5'-P-CCNC: 18 U/L
ANION GAP SERPL CALC-SCNC: 9 MMOL/L
AST SERPL-CCNC: 20 U/L
BASOPHILS # BLD AUTO: 0.03 K/UL
BASOPHILS NFR BLD: 0.4 %
BILIRUB SERPL-MCNC: 0.3 MG/DL
BNP SERPL-MCNC: 260 PG/ML
BUN SERPL-MCNC: 57 MG/DL
CALCIUM SERPL-MCNC: 8 MG/DL
CHLORIDE SERPL-SCNC: 118 MMOL/L
CO2 SERPL-SCNC: 11 MMOL/L
CREAT SERPL-MCNC: 4.4 MG/DL
DELSYS: ABNORMAL
DIFFERENTIAL METHOD: ABNORMAL
EOSINOPHIL # BLD AUTO: 0.2 K/UL
EOSINOPHIL NFR BLD: 3.2 %
ERYTHROCYTE [DISTWIDTH] IN BLOOD BY AUTOMATED COUNT: 22.5 %
EST. GFR  (AFRICAN AMERICAN): 11.5 ML/MIN/1.73 M^2
EST. GFR  (NON AFRICAN AMERICAN): 10 ML/MIN/1.73 M^2
ESTIMATED AVG GLUCOSE: 114 MG/DL
FERRITIN SERPL-MCNC: 58 NG/ML
FIO2: 21
FOLATE SERPL-MCNC: 6.2 NG/ML
GLUCOSE SERPL-MCNC: 199 MG/DL
HBA1C MFR BLD HPLC: 5.6 %
HCO3 UR-SCNC: 13.2 MMOL/L (ref 24–28)
HCO3 UR-SCNC: 14.1 MMOL/L (ref 24–28)
HCT VFR BLD AUTO: 24.6 %
HGB BLD-MCNC: 7.8 G/DL
IMM GRANULOCYTES # BLD AUTO: 0.13 K/UL
IMM GRANULOCYTES NFR BLD AUTO: 1.7 %
INR PPP: 1
IRON SERPL-MCNC: 43 UG/DL
LACTATE SERPL-SCNC: 1.1 MMOL/L
LYMPHOCYTES # BLD AUTO: 1.5 K/UL
LYMPHOCYTES NFR BLD: 19.6 %
MCH RBC QN AUTO: 25.3 PG
MCHC RBC AUTO-ENTMCNC: 31.7 G/DL
MCV RBC AUTO: 80 FL
MODE: ABNORMAL
MONOCYTES # BLD AUTO: 0.7 K/UL
MONOCYTES NFR BLD: 8.8 %
NEUTROPHILS # BLD AUTO: 4.9 K/UL
NEUTROPHILS NFR BLD: 66.3 %
NRBC BLD-RTO: 0 /100 WBC
PCO2 BLDA: 32.2 MMHG (ref 35–45)
PCO2 BLDA: 32.4 MMHG (ref 35–45)
PH SMN: 7.22 [PH] (ref 7.35–7.45)
PH SMN: 7.25 [PH] (ref 7.35–7.45)
PLATELET # BLD AUTO: 244 K/UL
PMV BLD AUTO: 10.1 FL
PO2 BLDA: 45 MMHG (ref 40–60)
PO2 BLDA: 54 MMHG (ref 40–60)
POC BE: -13 MMOL/L
POC BE: -15 MMOL/L
POC SATURATED O2: 73 % (ref 95–100)
POC SATURATED O2: 83 % (ref 95–100)
POC TCO2: 14 MMOL/L (ref 24–29)
POC TCO2: 15 MMOL/L (ref 24–29)
POCT GLUCOSE: 300 MG/DL (ref 70–110)
POTASSIUM SERPL-SCNC: 5.5 MMOL/L
PROT SERPL-MCNC: 8.3 G/DL
PROTHROMBIN TIME: 10.8 SEC
RBC # BLD AUTO: 3.08 M/UL
SAMPLE: ABNORMAL
SAMPLE: ABNORMAL
SATURATED IRON: 14 %
SITE: ABNORMAL
SITE: ABNORMAL
SODIUM SERPL-SCNC: 138 MMOL/L
SP02: 98
TOTAL IRON BINDING CAPACITY: 300 UG/DL
TRANSFERRIN SERPL-MCNC: 203 MG/DL
TROPONIN I SERPL DL<=0.01 NG/ML-MCNC: 0.01 NG/ML
VIT B12 SERPL-MCNC: 459 PG/ML
WBC # BLD AUTO: 7.46 K/UL

## 2018-09-09 PROCEDURE — 99900035 HC TECH TIME PER 15 MIN (STAT)

## 2018-09-09 PROCEDURE — 51702 INSERT TEMP BLADDER CATH: CPT

## 2018-09-09 PROCEDURE — 94761 N-INVAS EAR/PLS OXIMETRY MLT: CPT

## 2018-09-09 PROCEDURE — 63600175 PHARM REV CODE 636 W HCPCS: Performed by: STUDENT IN AN ORGANIZED HEALTH CARE EDUCATION/TRAINING PROGRAM

## 2018-09-09 PROCEDURE — 85610 PROTHROMBIN TIME: CPT

## 2018-09-09 PROCEDURE — 99285 EMERGENCY DEPT VISIT HI MDM: CPT | Mod: 25

## 2018-09-09 PROCEDURE — 82728 ASSAY OF FERRITIN: CPT

## 2018-09-09 PROCEDURE — 94640 AIRWAY INHALATION TREATMENT: CPT

## 2018-09-09 PROCEDURE — 83540 ASSAY OF IRON: CPT

## 2018-09-09 PROCEDURE — 83036 HEMOGLOBIN GLYCOSYLATED A1C: CPT

## 2018-09-09 PROCEDURE — 96375 TX/PRO/DX INJ NEW DRUG ADDON: CPT

## 2018-09-09 PROCEDURE — 84484 ASSAY OF TROPONIN QUANT: CPT

## 2018-09-09 PROCEDURE — 25000242 PHARM REV CODE 250 ALT 637 W/ HCPCS: Performed by: STUDENT IN AN ORGANIZED HEALTH CARE EDUCATION/TRAINING PROGRAM

## 2018-09-09 PROCEDURE — 82803 BLOOD GASES ANY COMBINATION: CPT

## 2018-09-09 PROCEDURE — 25000242 PHARM REV CODE 250 ALT 637 W/ HCPCS: Performed by: EMERGENCY MEDICINE

## 2018-09-09 PROCEDURE — 11000001 HC ACUTE MED/SURG PRIVATE ROOM

## 2018-09-09 PROCEDURE — 85025 COMPLETE CBC W/AUTO DIFF WBC: CPT

## 2018-09-09 PROCEDURE — 99285 EMERGENCY DEPT VISIT HI MDM: CPT | Mod: ,,, | Performed by: EMERGENCY MEDICINE

## 2018-09-09 PROCEDURE — 80053 COMPREHEN METABOLIC PANEL: CPT

## 2018-09-09 PROCEDURE — 25000003 PHARM REV CODE 250: Performed by: STUDENT IN AN ORGANIZED HEALTH CARE EDUCATION/TRAINING PROGRAM

## 2018-09-09 PROCEDURE — 96374 THER/PROPH/DIAG INJ IV PUSH: CPT

## 2018-09-09 PROCEDURE — 82607 VITAMIN B-12: CPT

## 2018-09-09 PROCEDURE — 96376 TX/PRO/DX INJ SAME DRUG ADON: CPT

## 2018-09-09 PROCEDURE — 82746 ASSAY OF FOLIC ACID SERUM: CPT

## 2018-09-09 PROCEDURE — 93010 ELECTROCARDIOGRAM REPORT: CPT | Mod: ,,, | Performed by: INTERNAL MEDICINE

## 2018-09-09 PROCEDURE — 63600175 PHARM REV CODE 636 W HCPCS: Performed by: EMERGENCY MEDICINE

## 2018-09-09 PROCEDURE — 99223 1ST HOSP IP/OBS HIGH 75: CPT | Mod: ,,, | Performed by: SURGERY

## 2018-09-09 PROCEDURE — 25000003 PHARM REV CODE 250: Performed by: EMERGENCY MEDICINE

## 2018-09-09 PROCEDURE — 83605 ASSAY OF LACTIC ACID: CPT

## 2018-09-09 PROCEDURE — 25000242 PHARM REV CODE 250 ALT 637 W/ HCPCS

## 2018-09-09 PROCEDURE — 83880 ASSAY OF NATRIURETIC PEPTIDE: CPT

## 2018-09-09 RX ORDER — FUROSEMIDE 10 MG/ML
160 INJECTION INTRAMUSCULAR; INTRAVENOUS ONCE
Status: COMPLETED | OUTPATIENT
Start: 2018-09-09 | End: 2018-09-09

## 2018-09-09 RX ORDER — NEBIVOLOL 10 MG/1
10 TABLET ORAL DAILY
Status: DISCONTINUED | OUTPATIENT
Start: 2018-09-10 | End: 2018-09-11

## 2018-09-09 RX ORDER — LEVOTHYROXINE SODIUM 75 UG/1
75 TABLET ORAL
Status: DISCONTINUED | OUTPATIENT
Start: 2018-09-10 | End: 2018-09-11 | Stop reason: HOSPADM

## 2018-09-09 RX ORDER — HYDRALAZINE HYDROCHLORIDE 25 MG/1
25 TABLET, FILM COATED ORAL EVERY 8 HOURS PRN
Status: DISCONTINUED | OUTPATIENT
Start: 2018-09-09 | End: 2018-09-11 | Stop reason: HOSPADM

## 2018-09-09 RX ORDER — SODIUM BICARBONATE 42 MG/ML
50 INJECTION, SOLUTION INTRAVENOUS ONCE
Status: DISCONTINUED | OUTPATIENT
Start: 2018-09-09 | End: 2018-09-09

## 2018-09-09 RX ORDER — BUMETANIDE 1 MG/1
1 TABLET ORAL DAILY
Status: ON HOLD | COMMUNITY
End: 2018-09-11 | Stop reason: SDUPTHER

## 2018-09-09 RX ORDER — DOXAZOSIN 2 MG/1
8 TABLET ORAL DAILY
Status: DISCONTINUED | OUTPATIENT
Start: 2018-09-10 | End: 2018-09-09

## 2018-09-09 RX ORDER — NEBIVOLOL 10 MG/1
10 TABLET ORAL ONCE
Status: DISCONTINUED | OUTPATIENT
Start: 2018-09-09 | End: 2018-09-09

## 2018-09-09 RX ORDER — AMLODIPINE BESYLATE 10 MG/1
10 TABLET ORAL DAILY
Status: DISCONTINUED | OUTPATIENT
Start: 2018-09-10 | End: 2018-09-11 | Stop reason: HOSPADM

## 2018-09-09 RX ORDER — AMOXICILLIN 250 MG
1 CAPSULE ORAL 2 TIMES DAILY PRN
Status: DISCONTINUED | OUTPATIENT
Start: 2018-09-09 | End: 2018-09-11 | Stop reason: HOSPADM

## 2018-09-09 RX ORDER — IPRATROPIUM BROMIDE AND ALBUTEROL SULFATE 2.5; .5 MG/3ML; MG/3ML
SOLUTION RESPIRATORY (INHALATION)
Status: COMPLETED
Start: 2018-09-09 | End: 2018-09-09

## 2018-09-09 RX ORDER — METHYLPREDNISOLONE SOD SUCC 125 MG
125 VIAL (EA) INJECTION
Status: COMPLETED | OUTPATIENT
Start: 2018-09-09 | End: 2018-09-09

## 2018-09-09 RX ORDER — IPRATROPIUM BROMIDE AND ALBUTEROL SULFATE 2.5; .5 MG/3ML; MG/3ML
3 SOLUTION RESPIRATORY (INHALATION)
Status: COMPLETED | OUTPATIENT
Start: 2018-09-09 | End: 2018-09-09

## 2018-09-09 RX ORDER — INSULIN ASPART 100 [IU]/ML
0-5 INJECTION, SOLUTION INTRAVENOUS; SUBCUTANEOUS
Status: DISCONTINUED | OUTPATIENT
Start: 2018-09-09 | End: 2018-09-11 | Stop reason: HOSPADM

## 2018-09-09 RX ORDER — RAMELTEON 8 MG/1
8 TABLET ORAL NIGHTLY PRN
Status: DISCONTINUED | OUTPATIENT
Start: 2018-09-09 | End: 2018-09-11 | Stop reason: HOSPADM

## 2018-09-09 RX ORDER — FLUTICASONE FUROATE AND VILANTEROL 100; 25 UG/1; UG/1
1 POWDER RESPIRATORY (INHALATION) DAILY
Status: DISCONTINUED | OUTPATIENT
Start: 2018-09-10 | End: 2018-09-11 | Stop reason: HOSPADM

## 2018-09-09 RX ORDER — ACETAMINOPHEN 325 MG/1
650 TABLET ORAL EVERY 4 HOURS PRN
Status: DISCONTINUED | OUTPATIENT
Start: 2018-09-09 | End: 2018-09-11 | Stop reason: HOSPADM

## 2018-09-09 RX ORDER — DOXAZOSIN 2 MG/1
8 TABLET ORAL NIGHTLY
Status: DISCONTINUED | OUTPATIENT
Start: 2018-09-09 | End: 2018-09-11

## 2018-09-09 RX ORDER — BUDESONIDE AND FORMOTEROL FUMARATE DIHYDRATE 160; 4.5 UG/1; UG/1
2 AEROSOL RESPIRATORY (INHALATION) EVERY 12 HOURS
COMMUNITY
End: 2022-05-04

## 2018-09-09 RX ORDER — GLUCAGON 1 MG
1 KIT INJECTION
Status: DISCONTINUED | OUTPATIENT
Start: 2018-09-09 | End: 2018-09-11 | Stop reason: HOSPADM

## 2018-09-09 RX ORDER — SODIUM BICARBONATE 42 MG/ML
100 INJECTION, SOLUTION INTRAVENOUS ONCE
Status: DISCONTINUED | OUTPATIENT
Start: 2018-09-09 | End: 2018-09-09

## 2018-09-09 RX ORDER — IBUPROFEN 200 MG
24 TABLET ORAL
Status: DISCONTINUED | OUTPATIENT
Start: 2018-09-09 | End: 2018-09-11 | Stop reason: HOSPADM

## 2018-09-09 RX ORDER — ISOSORBIDE MONONITRATE 60 MG/1
60 TABLET, EXTENDED RELEASE ORAL DAILY
COMMUNITY
End: 2022-05-04

## 2018-09-09 RX ORDER — ISOSORBIDE MONONITRATE 60 MG/1
60 TABLET, EXTENDED RELEASE ORAL ONCE
Status: COMPLETED | OUTPATIENT
Start: 2018-09-09 | End: 2018-09-09

## 2018-09-09 RX ORDER — FAMOTIDINE 20 MG/1
20 TABLET, FILM COATED ORAL 2 TIMES DAILY
Status: DISCONTINUED | OUTPATIENT
Start: 2018-09-09 | End: 2018-09-10

## 2018-09-09 RX ORDER — LEVOTHYROXINE SODIUM 50 UG/1
50 TABLET ORAL DAILY
Status: DISCONTINUED | OUTPATIENT
Start: 2018-09-10 | End: 2018-09-09

## 2018-09-09 RX ORDER — SODIUM CHLORIDE 0.9 % (FLUSH) 0.9 %
5 SYRINGE (ML) INJECTION
Status: DISCONTINUED | OUTPATIENT
Start: 2018-09-09 | End: 2018-09-11 | Stop reason: HOSPADM

## 2018-09-09 RX ORDER — PREDNISONE 20 MG/1
40 TABLET ORAL DAILY
Status: DISCONTINUED | OUTPATIENT
Start: 2018-09-10 | End: 2018-09-11

## 2018-09-09 RX ORDER — ISOSORBIDE MONONITRATE 30 MG/1
60 TABLET, EXTENDED RELEASE ORAL DAILY
Status: DISCONTINUED | OUTPATIENT
Start: 2018-09-10 | End: 2018-09-11 | Stop reason: HOSPADM

## 2018-09-09 RX ORDER — HYDROCODONE BITARTRATE AND ACETAMINOPHEN 5; 325 MG/1; MG/1
1 TABLET ORAL EVERY 6 HOURS PRN
COMMUNITY
End: 2022-05-04

## 2018-09-09 RX ORDER — FUROSEMIDE 10 MG/ML
80 INJECTION INTRAMUSCULAR; INTRAVENOUS
Status: COMPLETED | OUTPATIENT
Start: 2018-09-09 | End: 2018-09-09

## 2018-09-09 RX ORDER — IBUPROFEN 200 MG
16 TABLET ORAL
Status: DISCONTINUED | OUTPATIENT
Start: 2018-09-09 | End: 2018-09-11 | Stop reason: HOSPADM

## 2018-09-09 RX ORDER — SODIUM BICARBONATE 1 MEQ/ML
100 SYRINGE (ML) INTRAVENOUS ONCE
Status: DISCONTINUED | OUTPATIENT
Start: 2018-09-09 | End: 2018-09-10

## 2018-09-09 RX ORDER — IPRATROPIUM BROMIDE AND ALBUTEROL SULFATE 2.5; .5 MG/3ML; MG/3ML
3 SOLUTION RESPIRATORY (INHALATION) EVERY 4 HOURS
Status: COMPLETED | OUTPATIENT
Start: 2018-09-09 | End: 2018-09-10

## 2018-09-09 RX ADMIN — INSULIN DETEMIR 55 UNITS: 100 INJECTION, SOLUTION SUBCUTANEOUS at 10:09

## 2018-09-09 RX ADMIN — IPRATROPIUM BROMIDE AND ALBUTEROL SULFATE 3 ML: .5; 3 SOLUTION RESPIRATORY (INHALATION) at 05:09

## 2018-09-09 RX ADMIN — RAMELTEON 8 MG: 8 TABLET, FILM COATED ORAL at 10:09

## 2018-09-09 RX ADMIN — FUROSEMIDE 160 MG: 10 INJECTION, SOLUTION INTRAMUSCULAR; INTRAVENOUS at 08:09

## 2018-09-09 RX ADMIN — METHYLPREDNISOLONE SODIUM SUCCINATE 125 MG: 125 INJECTION, POWDER, FOR SOLUTION INTRAMUSCULAR; INTRAVENOUS at 05:09

## 2018-09-09 RX ADMIN — FUROSEMIDE 80 MG: 10 INJECTION, SOLUTION INTRAMUSCULAR; INTRAVENOUS at 05:09

## 2018-09-09 RX ADMIN — FAMOTIDINE 20 MG: 20 TABLET ORAL at 10:09

## 2018-09-09 RX ADMIN — IPRATROPIUM BROMIDE AND ALBUTEROL SULFATE 3 ML: .5; 3 SOLUTION RESPIRATORY (INHALATION) at 08:09

## 2018-09-09 RX ADMIN — DOXAZOSIN MESYLATE 8 MG: 2 TABLET ORAL at 08:09

## 2018-09-09 RX ADMIN — INSULIN ASPART 2 UNITS: 100 INJECTION, SOLUTION INTRAVENOUS; SUBCUTANEOUS at 10:09

## 2018-09-09 RX ADMIN — HYDRALAZINE HYDROCHLORIDE 25 MG: 25 TABLET ORAL at 07:09

## 2018-09-09 RX ADMIN — ISOSORBIDE MONONITRATE 60 MG: 60 TABLET, EXTENDED RELEASE ORAL at 06:09

## 2018-09-09 NOTE — ED NOTES
Patient resting comfortably at this time. Family at bedside. Cardiac and O2 monitor in place. No distress at this time. Will continue to monitor. Side rail up, bed in lowest position. Call light within reach

## 2018-09-09 NOTE — ED PROVIDER NOTES
Encounter Date: 9/9/2018    SCRIBE #1 NOTE: I, Majo Guzmán, am scribing for, and in the presence of,  Dr. Barnard. I have scribed the entire note.       History     Chief Complaint   Patient presents with    Shortness of Breath     hx copd and chf     Time patient was seen by the provider: 4:48 PM      The patient is a 62 y.o. female with co-morbidities including: COPD, CHF, HTN, and Diabetes Mellitus who presents to the ED with a complaint of SOB. The pt states that her SOB has been on and off but got severe  last night and she can barely walk short distances. She states that the feeling is not like COPD or CHF. The pt has been taking her medications as prescribed. The pt states she was last admitted for SOB around July and was given IV medication. She denies any N/V, HA, and abdominal pain. The patient has leg swelling and CP.         The history is provided by the patient and a relative.     Review of patient's allergies indicates:  No Known Allergies  Past Medical History:   Diagnosis Date    Cataract     Cataract     CHF (congestive heart failure)     COPD (chronic obstructive pulmonary disease)     Diabetes mellitus     Diabetic retinopathy     Hypertension      Past Surgical History:   Procedure Laterality Date    CATARACT EXTRACTION W/  INTRAOCULAR LENS IMPLANT Right 08/14/2017    Dr. Moe    INSERTION-INTRAOCULAR LENS (IOL) Right 8/14/2017    [facility] OR    PHACOEMULSIFICATION-ASPIRATION-CATARACT Right 8/14/2017    [facility] at [facility] OR     Family History   Problem Relation Age of Onset    Diabetes Sister     Cancer Brother     Amblyopia Neg Hx     Blindness Neg Hx     Cataracts Neg Hx     Glaucoma Neg Hx     Hypertension Neg Hx     Macular degeneration Neg Hx     Retinal detachment Neg Hx     Strabismus Neg Hx     Stroke Neg Hx     Thyroid disease Neg Hx      Social History     Tobacco Use    Smoking status: Never Smoker     Smokeless tobacco: Never Used   Substance Use Topics    Alcohol use: No    Drug use: Not on file     Review of Systems   Constitutional: Negative for activity change, appetite change, chills, diaphoresis and fever.   HENT: Negative for congestion, drooling, ear pain, mouth sores, rhinorrhea, sinus pain, sore throat and trouble swallowing.    Eyes: Negative for pain and discharge.   Respiratory: Positive for shortness of breath and wheezing. Negative for cough, chest tightness and stridor.    Cardiovascular: Positive for chest pain and leg swelling. Negative for palpitations.   Gastrointestinal: Negative for abdominal distention, abdominal pain, blood in stool, constipation, diarrhea, nausea and vomiting.   Genitourinary: Negative for difficulty urinating, dysuria, flank pain, frequency, hematuria and urgency.   Musculoskeletal: Negative for arthralgias, back pain and myalgias.   Skin: Negative for pallor, rash and wound.   Neurological: Negative for dizziness, syncope, weakness, light-headedness and numbness.   Hematological: Does not bruise/bleed easily.   All other systems reviewed and are negative.      Physical Exam     Initial Vitals [09/09/18 1616]   BP Pulse Resp Temp SpO2   (!) 166/67 76 20 98.1 °F (36.7 °C) 95 %      MAP       --         Physical Exam    Nursing note and vitals reviewed.  Constitutional: She appears well-developed and well-nourished. She is not diaphoretic. No distress.   HENT:   Head: Normocephalic and atraumatic.   Mouth/Throat: Oropharynx is clear and moist.   Eyes: Conjunctivae and EOM are normal.   Neck: Normal range of motion. Neck supple.   Cardiovascular: Normal rate, regular rhythm and normal heart sounds. Exam reveals no gallop and no friction rub.    No murmur heard.  Pulmonary/Chest: She is in respiratory distress (mild). She has wheezes (diffuse expiratory wheezing). She has no rhonchi. She has no rales.   Abdominal: Soft. She exhibits no mass. There is no tenderness. There  is no rebound and no guarding.   Musculoskeletal: Normal range of motion. She exhibits edema. She exhibits no tenderness.   Lower extremities 2+ edema bilaterally    Lymphadenopathy:     She has no cervical adenopathy.   Neurological: She is alert and oriented to person, place, and time. She has normal strength.   Skin: Skin is warm and dry. No rash and no abscess noted.   Psychiatric: She has a normal mood and affect.         ED Course   Procedures  Labs Reviewed   CBC W/ AUTO DIFFERENTIAL - Abnormal; Notable for the following components:       Result Value    RBC 3.08 (*)     Hemoglobin 7.8 (*)     Hematocrit 24.6 (*)     MCV 80 (*)     MCH 25.3 (*)     MCHC 31.7 (*)     RDW 22.5 (*)     Immature Granulocytes 1.7 (*)     Immature Grans (Abs) 0.13 (*)     All other components within normal limits   COMPREHENSIVE METABOLIC PANEL - Abnormal; Notable for the following components:    Potassium 5.5 (*)     Chloride 118 (*)     CO2 11 (*)     Glucose 199 (*)     BUN, Bld 57 (*)     Creatinine 4.4 (*)     Calcium 8.0 (*)     Albumin 2.9 (*)     eGFR if  11.5 (*)     eGFR if non  10.0 (*)     All other components within normal limits   B-TYPE NATRIURETIC PEPTIDE - Abnormal; Notable for the following components:     (*)     All other components within normal limits   ISTAT PROCEDURE - Abnormal; Notable for the following components:    POC PH 7.218 (*)     POC PCO2 32.4 (*)     POC HCO3 13.2 (*)     POC SATURATED O2 73 (*)     POC TCO2 14 (*)     All other components within normal limits   ISTAT PROCEDURE - Abnormal; Notable for the following components:    POC PH 7.250 (*)     POC PCO2 32.2 (*)     POC HCO3 14.1 (*)     POC SATURATED O2 83 (*)     POC TCO2 15 (*)     All other components within normal limits   TROPONIN I   PROTIME-INR   LACTIC ACID, PLASMA   HEMOGLOBIN A1C   HEMOGLOBIN A1C    Narrative:     ADD ON A1C [id number] PER DR. HAYES 09/09/2018  19:46    FERRITIN   VITAMIN B12    FOLATE   IRON AND TIBC   POCT GLUCOSE MONITORING CONTINUOUS          Imaging Results          X-Ray Chest AP Portable (Final result)  Result time 09/09/18 17:19:25    Final result by Arnav Fowler MD (09/09/18 17:19:25)                 Impression:      1. Hypoventilatory exam, may account for interstitial findings, differential would include congestive change with early edema.  There is mildly prominent vasculature at the hilar regions bilaterally.      Electronically signed by: Arnav Fowler MD  Date:    09/09/2018  Time:    17:19             Narrative:    EXAMINATION:  XR CHEST AP PORTABLE    CLINICAL HISTORY:  CHF;    TECHNIQUE:  Single frontal view of the chest was performed.    COMPARISON:  12/28/2014    FINDINGS:  Examination is limited by habitus.  The cardiomediastinal silhouette is prominent, magnified by technique..  There is no pleural effusion.  The trachea is midline.  The lungs are symmetrically expanded bilaterally with increased interstitial and parenchymal attenuation in a perihilar distribution..  No large focal consolidation seen.  There is no pneumothorax.  The osseous structures are remarkable for degenerative change..                                 Medical Decision Making:   History:   Old Medical Records: I decided to obtain old medical records.  Initial Assessment:   63 yo f, h/o CHF, COPD though all care at  [facility] so no records available in our ED, reporting baseline SOB, worse since last night, increased LE edema.  + wheezing, no relief w inhaler.  Unsure if this feels like COPD or CHF    On exam, mild tachypnea.  Lungs with diffuse wheezing  Differential Diagnosis:   CHF vs COPD exacerbation  Less likely pneumonia or PE  Clinical Tests:   Lab Tests: Ordered and Reviewed  Radiological Study: Ordered and Reviewed  ED Management:  Labs  Nebs x 3 and steroids  Lasix 80 mg IV  Reassess    6:26 PM  Labs with multiple abnormalities: anemia, renal failure with mild hyperkalemia and  acidosis on VBG, BNP 200s, CXR with mild pulmonary edema  Discussed with pt - has known CKD and just had AV shunt placed in L forearm in anticipation of HD though hasn't started yet  BP elevated - due for isosorbide dinitrite 60 mg  Will admit to medicine                      Clinical Impression:   The primary encounter diagnosis was Renal failure, unspecified chronicity. Diagnoses of SOB (shortness of breath), Shortness of breath, Stage 5 chronic kidney disease on chronic dialysis, Hyperkalemia, and ESRD (end stage renal disease) were also pertinent to this visit.         I, Dr. Idalia Barnard, personally performed the services described in this documentation. All medical record entries made by the scribe were at my direction and in my presence.  I have reviewed the chart and agree that the record reflects my personal performance and is accurate and complete. Idalia Barnard MD.  5:33 PM 09/11/2018                        Idalia Barnard MD  09/11/18 0740

## 2018-09-09 NOTE — ED TRIAGE NOTES
Patient reports to the ED today with complaints of SOB onset yesterday evening. Patients states cant walk to the bathroom and back without being out of breath. Leg swelling noted.. Hx of CHF and COPD. Patient compliant with medications. Patient reports chest pain. Patient states no HA, abdominal pain, N/V.

## 2018-09-10 PROBLEM — E83.9 CHRONIC KIDNEY DISEASE-MINERAL AND BONE DISORDER: Status: ACTIVE | Noted: 2018-09-10

## 2018-09-10 PROBLEM — D63.1 ANEMIA ASSOCIATED WITH CHRONIC RENAL FAILURE: Status: ACTIVE | Noted: 2018-09-10

## 2018-09-10 PROBLEM — N18.5 CKD (CHRONIC KIDNEY DISEASE) STAGE 5, GFR LESS THAN 15 ML/MIN: Status: ACTIVE | Noted: 2018-09-10

## 2018-09-10 PROBLEM — N18.9 CHRONIC KIDNEY DISEASE-MINERAL AND BONE DISORDER: Status: ACTIVE | Noted: 2018-09-10

## 2018-09-10 PROBLEM — M89.9 CHRONIC KIDNEY DISEASE-MINERAL AND BONE DISORDER: Status: ACTIVE | Noted: 2018-09-10

## 2018-09-10 LAB
ALBUMIN SERPL BCP-MCNC: 2.7 G/DL
ALBUMIN SERPL BCP-MCNC: 2.7 G/DL
ALBUMIN SERPL BCP-MCNC: 2.8 G/DL
ALLENS TEST: ABNORMAL
ALLENS TEST: ABNORMAL
ALP SERPL-CCNC: 70 U/L
ALT SERPL W/O P-5'-P-CCNC: 17 U/L
ANION GAP SERPL CALC-SCNC: 4 MMOL/L
ANION GAP SERPL CALC-SCNC: 7 MMOL/L
ANION GAP SERPL CALC-SCNC: 7 MMOL/L
ANION GAP SERPL CALC-SCNC: 9 MMOL/L
AST SERPL-CCNC: 12 U/L
BASOPHILS # BLD AUTO: 0.01 K/UL
BASOPHILS NFR BLD: 0.1 %
BILIRUB DIRECT SERPL-MCNC: 0.1 MG/DL
BILIRUB SERPL-MCNC: 0.3 MG/DL
BUN SERPL-MCNC: 60 MG/DL
BUN SERPL-MCNC: 61 MG/DL
BUN SERPL-MCNC: 61 MG/DL
BUN SERPL-MCNC: 63 MG/DL
CALCIUM SERPL-MCNC: 8 MG/DL
CALCIUM SERPL-MCNC: 8.1 MG/DL
CALCIUM SERPL-MCNC: 8.3 MG/DL
CALCIUM SERPL-MCNC: 8.5 MG/DL
CHLORIDE SERPL-SCNC: 113 MMOL/L
CHLORIDE SERPL-SCNC: 113 MMOL/L
CHLORIDE SERPL-SCNC: 114 MMOL/L
CHLORIDE SERPL-SCNC: 115 MMOL/L
CO2 SERPL-SCNC: 13 MMOL/L
CO2 SERPL-SCNC: 14 MMOL/L
CO2 SERPL-SCNC: 17 MMOL/L
CO2 SERPL-SCNC: 20 MMOL/L
CREAT SERPL-MCNC: 4.2 MG/DL
CREAT SERPL-MCNC: 4.3 MG/DL
CREAT SERPL-MCNC: 4.3 MG/DL
CREAT SERPL-MCNC: 4.5 MG/DL
DELSYS: ABNORMAL
DELSYS: ABNORMAL
DIASTOLIC DYSFUNCTION: YES
DIFFERENTIAL METHOD: ABNORMAL
EOSINOPHIL # BLD AUTO: 0 K/UL
EOSINOPHIL NFR BLD: 0 %
ERYTHROCYTE [DISTWIDTH] IN BLOOD BY AUTOMATED COUNT: 22.7 %
ERYTHROCYTE [SEDIMENTATION RATE] IN BLOOD BY WESTERGREN METHOD: 20 MM/H
EST. GFR  (AFRICAN AMERICAN): 11.2 ML/MIN/1.73 M^2
EST. GFR  (AFRICAN AMERICAN): 11.8 ML/MIN/1.73 M^2
EST. GFR  (AFRICAN AMERICAN): 11.8 ML/MIN/1.73 M^2
EST. GFR  (AFRICAN AMERICAN): 12.1 ML/MIN/1.73 M^2
EST. GFR  (NON AFRICAN AMERICAN): 10.2 ML/MIN/1.73 M^2
EST. GFR  (NON AFRICAN AMERICAN): 10.2 ML/MIN/1.73 M^2
EST. GFR  (NON AFRICAN AMERICAN): 10.5 ML/MIN/1.73 M^2
EST. GFR  (NON AFRICAN AMERICAN): 9.7 ML/MIN/1.73 M^2
ESTIMATED PA SYSTOLIC PRESSURE: 34.36
FIO2: 21
GLOBAL PERICARDIAL EFFUSION: ABNORMAL
GLUCOSE SERPL-MCNC: 126 MG/DL
GLUCOSE SERPL-MCNC: 216 MG/DL
GLUCOSE SERPL-MCNC: 277 MG/DL
GLUCOSE SERPL-MCNC: 316 MG/DL
HCO3 UR-SCNC: 16.3 MMOL/L (ref 24–28)
HCO3 UR-SCNC: 17 MMOL/L (ref 24–28)
HCT VFR BLD AUTO: 24.3 %
HGB BLD-MCNC: 7.4 G/DL
IMM GRANULOCYTES # BLD AUTO: 0.27 K/UL
IMM GRANULOCYTES NFR BLD AUTO: 3.9 %
LYMPHOCYTES # BLD AUTO: 0.4 K/UL
LYMPHOCYTES NFR BLD: 5.4 %
MCH RBC QN AUTO: 24.3 PG
MCHC RBC AUTO-ENTMCNC: 30.5 G/DL
MCV RBC AUTO: 80 FL
MODE: ABNORMAL
MONOCYTES # BLD AUTO: 0.1 K/UL
MONOCYTES NFR BLD: 1.2 %
NEUTROPHILS # BLD AUTO: 6.1 K/UL
NEUTROPHILS NFR BLD: 89.4 %
NRBC BLD-RTO: 0 /100 WBC
PCO2 BLDA: 22.1 MMHG (ref 35–45)
PCO2 BLDA: 28.6 MMHG (ref 35–45)
PH SMN: 7.36 [PH] (ref 7.35–7.45)
PH SMN: 7.5 [PH] (ref 7.35–7.45)
PHOSPHATE SERPL-MCNC: 3.6 MG/DL
PHOSPHATE SERPL-MCNC: 3.8 MG/DL
PHOSPHATE SERPL-MCNC: 4.2 MG/DL
PHOSPHATE SERPL-MCNC: 4.2 MG/DL
PLATELET # BLD AUTO: 261 K/UL
PMV BLD AUTO: 10 FL
PO2 BLDA: 164 MMHG (ref 40–60)
PO2 BLDA: 60 MMHG (ref 40–60)
POC BE: -6 MMOL/L
POC BE: -9 MMOL/L
POC SATURATED O2: 100 % (ref 95–100)
POC SATURATED O2: 90 % (ref 95–100)
POC TCO2: 17 MMOL/L (ref 24–29)
POC TCO2: 18 MMOL/L (ref 24–29)
POCT GLUCOSE: 116 MG/DL (ref 70–110)
POCT GLUCOSE: 223 MG/DL (ref 70–110)
POCT GLUCOSE: 231 MG/DL (ref 70–110)
POCT GLUCOSE: 235 MG/DL (ref 70–110)
POTASSIUM SERPL-SCNC: 5 MMOL/L
POTASSIUM SERPL-SCNC: 5.2 MMOL/L
PROT SERPL-MCNC: 8 G/DL
RBC # BLD AUTO: 3.05 M/UL
RETIRED EF AND QEF - SEE NOTES: 60 (ref 55–65)
SAMPLE: ABNORMAL
SAMPLE: ABNORMAL
SITE: ABNORMAL
SITE: ABNORMAL
SODIUM SERPL-SCNC: 135 MMOL/L
SODIUM SERPL-SCNC: 137 MMOL/L
SP02: 97
TRICUSPID VALVE REGURGITATION: ABNORMAL
WBC # BLD AUTO: 6.85 K/UL

## 2018-09-10 PROCEDURE — 25000003 PHARM REV CODE 250: Performed by: INTERNAL MEDICINE

## 2018-09-10 PROCEDURE — 80069 RENAL FUNCTION PANEL: CPT | Mod: 91

## 2018-09-10 PROCEDURE — 85025 COMPLETE CBC W/AUTO DIFF WBC: CPT

## 2018-09-10 PROCEDURE — 25000242 PHARM REV CODE 250 ALT 637 W/ HCPCS: Performed by: STUDENT IN AN ORGANIZED HEALTH CARE EDUCATION/TRAINING PROGRAM

## 2018-09-10 PROCEDURE — 85347 COAGULATION TIME ACTIVATED: CPT

## 2018-09-10 PROCEDURE — 25000003 PHARM REV CODE 250: Performed by: HOSPITALIST

## 2018-09-10 PROCEDURE — 63600175 PHARM REV CODE 636 W HCPCS: Performed by: STUDENT IN AN ORGANIZED HEALTH CARE EDUCATION/TRAINING PROGRAM

## 2018-09-10 PROCEDURE — 94640 AIRWAY INHALATION TREATMENT: CPT

## 2018-09-10 PROCEDURE — 36415 COLL VENOUS BLD VENIPUNCTURE: CPT

## 2018-09-10 PROCEDURE — G8979 MOBILITY GOAL STATUS: HCPCS | Mod: CI

## 2018-09-10 PROCEDURE — 94761 N-INVAS EAR/PLS OXIMETRY MLT: CPT

## 2018-09-10 PROCEDURE — 99223 1ST HOSP IP/OBS HIGH 75: CPT | Mod: ,,, | Performed by: INTERNAL MEDICINE

## 2018-09-10 PROCEDURE — G8978 MOBILITY CURRENT STATUS: HCPCS | Mod: CJ

## 2018-09-10 PROCEDURE — 11000001 HC ACUTE MED/SURG PRIVATE ROOM

## 2018-09-10 PROCEDURE — 99223 1ST HOSP IP/OBS HIGH 75: CPT | Mod: ,,, | Performed by: HOSPITALIST

## 2018-09-10 PROCEDURE — 82800 BLOOD PH: CPT

## 2018-09-10 PROCEDURE — 97161 PT EVAL LOW COMPLEX 20 MIN: CPT

## 2018-09-10 PROCEDURE — 99232 SBSQ HOSP IP/OBS MODERATE 35: CPT | Mod: ,,, | Performed by: SURGERY

## 2018-09-10 PROCEDURE — 80076 HEPATIC FUNCTION PANEL: CPT

## 2018-09-10 PROCEDURE — 93306 TTE W/DOPPLER COMPLETE: CPT | Mod: 26,,, | Performed by: INTERNAL MEDICINE

## 2018-09-10 PROCEDURE — 93990 DOPPLER FLOW TESTING: CPT | Performed by: SURGERY

## 2018-09-10 PROCEDURE — 99900035 HC TECH TIME PER 15 MIN (STAT)

## 2018-09-10 PROCEDURE — 25000003 PHARM REV CODE 250: Performed by: STUDENT IN AN ORGANIZED HEALTH CARE EDUCATION/TRAINING PROGRAM

## 2018-09-10 PROCEDURE — 80069 RENAL FUNCTION PANEL: CPT

## 2018-09-10 PROCEDURE — 93306 TTE W/DOPPLER COMPLETE: CPT

## 2018-09-10 RX ORDER — HEPARIN SODIUM 5000 [USP'U]/ML
5000 INJECTION, SOLUTION INTRAVENOUS; SUBCUTANEOUS EVERY 8 HOURS
Status: DISCONTINUED | OUTPATIENT
Start: 2018-09-10 | End: 2018-09-11 | Stop reason: HOSPADM

## 2018-09-10 RX ORDER — TIOTROPIUM BROMIDE 18 UG/1
1 CAPSULE ORAL; RESPIRATORY (INHALATION) DAILY
Status: CANCELLED | OUTPATIENT
Start: 2018-09-10

## 2018-09-10 RX ORDER — SODIUM BICARBONATE 1 MEQ/ML
100 SYRINGE (ML) INTRAVENOUS ONCE
Status: COMPLETED | OUTPATIENT
Start: 2018-09-10 | End: 2018-09-10

## 2018-09-10 RX ORDER — SODIUM BICARBONATE 650 MG/1
1300 TABLET ORAL 2 TIMES DAILY
Status: DISCONTINUED | OUTPATIENT
Start: 2018-09-10 | End: 2018-09-11 | Stop reason: HOSPADM

## 2018-09-10 RX ORDER — FAMOTIDINE 20 MG/1
20 TABLET, FILM COATED ORAL DAILY
Status: DISCONTINUED | OUTPATIENT
Start: 2018-09-11 | End: 2018-09-11 | Stop reason: HOSPADM

## 2018-09-10 RX ADMIN — HEPARIN SODIUM 5000 UNITS: 5000 INJECTION, SOLUTION INTRAVENOUS; SUBCUTANEOUS at 09:09

## 2018-09-10 RX ADMIN — PREDNISONE 40 MG: 20 TABLET ORAL at 09:09

## 2018-09-10 RX ADMIN — IPRATROPIUM BROMIDE AND ALBUTEROL SULFATE 3 ML: .5; 3 SOLUTION RESPIRATORY (INHALATION) at 12:09

## 2018-09-10 RX ADMIN — INSULIN ASPART 1 UNITS: 100 INJECTION, SOLUTION INTRAVENOUS; SUBCUTANEOUS at 09:09

## 2018-09-10 RX ADMIN — INSULIN DETEMIR 55 UNITS: 100 INJECTION, SOLUTION SUBCUTANEOUS at 09:09

## 2018-09-10 RX ADMIN — IPRATROPIUM BROMIDE AND ALBUTEROL SULFATE 3 ML: .5; 3 SOLUTION RESPIRATORY (INHALATION) at 04:09

## 2018-09-10 RX ADMIN — INSULIN ASPART 2 UNITS: 100 INJECTION, SOLUTION INTRAVENOUS; SUBCUTANEOUS at 06:09

## 2018-09-10 RX ADMIN — INSULIN ASPART 2 UNITS: 100 INJECTION, SOLUTION INTRAVENOUS; SUBCUTANEOUS at 09:09

## 2018-09-10 RX ADMIN — FAMOTIDINE 20 MG: 20 TABLET ORAL at 09:09

## 2018-09-10 RX ADMIN — HEPARIN SODIUM 5000 UNITS: 5000 INJECTION, SOLUTION INTRAVENOUS; SUBCUTANEOUS at 05:09

## 2018-09-10 RX ADMIN — NEBIVOLOL HYDROCHLORIDE 10 MG: 10 TABLET ORAL at 09:09

## 2018-09-10 RX ADMIN — ISOSORBIDE MONONITRATE 60 MG: 30 TABLET, EXTENDED RELEASE ORAL at 09:09

## 2018-09-10 RX ADMIN — FLUTICASONE FUROATE AND VILANTEROL TRIFENATATE 1 PUFF: 100; 25 POWDER RESPIRATORY (INHALATION) at 05:09

## 2018-09-10 RX ADMIN — SODIUM BICARBONATE 650 MG TABLET 1300 MG: at 12:09

## 2018-09-10 RX ADMIN — HYDRALAZINE HYDROCHLORIDE 25 MG: 25 TABLET ORAL at 12:09

## 2018-09-10 RX ADMIN — AMLODIPINE BESYLATE 10 MG: 10 TABLET ORAL at 09:09

## 2018-09-10 RX ADMIN — SODIUM BICARBONATE 650 MG TABLET 1300 MG: at 09:09

## 2018-09-10 RX ADMIN — SODIUM BICARBONATE 100 MEQ: 84 INJECTION INTRAVENOUS at 02:09

## 2018-09-10 RX ADMIN — LEVOTHYROXINE SODIUM 75 MCG: 75 TABLET ORAL at 05:09

## 2018-09-10 RX ADMIN — DOXAZOSIN MESYLATE 8 MG: 2 TABLET ORAL at 09:09

## 2018-09-10 NOTE — HPI
Grecia Dai is a 62 year old woman with CKD stage V  Which has been followed by [facility]ayan, who had a fistula placed on July 2 2018 but has not yet started dialysis. Also has HTN, T2DM insulin dependent, and COPD. She was admitted on 9/9 from the ED for renal failure. Arrived to ED for evaluation of shortness of breath, which has been ongoing for several years but has worsened over the past few days. She reports significant dyspnea on exertion,an on occasions has been with chest discomfort. Denies any sputum production, fever/chills, nausea, vomiting diarrhea, dysuria or hematuria. On arrival presented with hypertension. Labs with hyperkalemia 5.5 which she received insulin + glucose and Lasix x 2(120 mg and 80 mg), UOP has been 4.7 L yesterday. Nephrology was consulted for probable need of start dialysis.

## 2018-09-10 NOTE — ASSESSMENT & PLAN NOTE
61 yo F with multiple comorbidities including CKD who presents with acute on chronic renal failure and volume overload. Patient is s/p Left Anders RC AVF on 7/2/18 and will may need HD this hospital stay. Vascular surgery asked to assess if AVF is usable at this time. AVF has a strong palpable thrill.    - Will obtain VAS HD access doppler to assess adequacy of vessel caliber and flow rates.  - Will follow up with recommendations based on these results.   - D/w Vascular Fellow.

## 2018-09-10 NOTE — H&P
[facility] Hospital Medicine  History & Physical    Patient Name: Malaika Paige  MRN: [id number]  Admission Date: 9/9/2018  Attending Physician: Marleny Kessler MD   Primary Care Provider: Eze Greenberg MD    [facility] Medicine Team: [facility] HOSP MED 1 Lynnette Navarrete DO     Patient information was obtained from patient, past medical records and ER records.     Subjective:     Principal Problem:Shortness of breath    Chief Complaint:   Chief Complaint   Patient presents with    Shortness of Breath     hx copd and chf        HPI: Ms.Verlean Paige is a 62 year old female with CKD stage V who had a fistula placed on July 2 2018 but has not yet started dialysis, who also has HTN, DM 2 on insulin, and COPD. She was admitted on 9/9 from the ED for renal failure.     Ms. Paige presented to the ED for evaluation of shortness of breath, which has been ongoing for several years but has worsened over the past few days. She reports significant dyspnea on exertion, reporting dyspnea after walking down the gold. She also reports orthopnea and cannot lie flat to sleep. She has had a dry cough over the past month, but denies any sputum production, fever/chills, or nausea. No chest pain. She has had worsening bilateral lower extremity edema. Upon initial evaluation in the ED, Ms. Paige was found to have pH of 7.2 and bicarb of 11. She was given breathing treatments for wheezing but did not require supplemental oxygen.     Patient lives in [place] with her family. She denies any tobacco or alcohol use.     Past Medical History:   Diagnosis Date    Cataract     Cataract     CHF (congestive heart failure)     COPD (chronic obstructive pulmonary disease)     Diabetes mellitus     Diabetic retinopathy     Hypertension        Past Surgical History:   Procedure Laterality Date    CATARACT EXTRACTION W/  INTRAOCULAR LENS IMPLANT Right 08/14/2017    Dr. Moe    INSERTION-INTRAOCULAR LENS (IOL) Right  8/14/2017    [facility]formed by [facility] at [facility] OR    PHACOEMULSIFICATION-ASPIRATION-CATARACT Right 8/14/2017    Performed by [facility] at [facility] OR       Review of patient's allergies indicates:  No Known Allergies    No current facility-administered medications on file prior to encounter.      Current Outpatient Medications on File Prior to Encounter   Medication Sig    budesonide-formoterol 160-4.5 mcg (SYMBICORT) 160-4.5 mcg/actuation HFAA Inhale 2 puffs into the lungs every 12 (twelve) hours. Controller    bumetanide (BUMEX) 1 MG tablet Take 1 mg by mouth once daily.    HYDROcodone-acetaminophen (NORCO) 5-325 mg per tablet Take 1 tablet by mouth every 6 (six) hours as needed for Pain.    isosorbide mononitrate (IMDUR) 60 MG 24 hr tablet Take 60 mg by mouth once daily.    albuterol (VENTOLIN HFA) 90 mcg/actuation inhaler Inhale 2 puffs into the lungs every 6 (six) hours as needed for Wheezing. Rescue    amlodipine (NORVASC) 10 MG tablet Take 10 mg by mouth once daily.    aspirin 325 MG tablet Take 325 mg by mouth once daily.    doxazosin (CARDURA XL) 8 mg 24 hr tablet Take 8 mg by mouth daily with breakfast.    famotidine (PEPCID) 20 MG tablet Take 20 mg by mouth 2 (two) times daily.    furosemide (LASIX) 20 MG tablet Take 20 mg by mouth once daily.    insulin degludec (TRESIBA FLEXTOUCH U-200) 200 unit/mL (3 mL) InPn Inject into the skin.    insulin degludec 200 unit/mL (3 mL) InPn Inject 120 Units into the skin once daily.    levothyroxine (SYNTHROID) 50 MCG tablet Take 50 mcg by mouth once daily.    nebivolol (BYSTOLIC) 10 MG Tab Take 10 mg by mouth once daily.    omeprazole (PRILOSEC) 20 MG capsule Take 20 mg by mouth once daily.    UMECLIDINIUM BRM/VILANTEROL TR (ANORO ELLIPTA INHL) Inhale 62.5 mcg into the lungs once daily at 6am.     Family History     Problem Relation (Age of Onset)    Cancer Brother    Diabetes Sister        Tobacco Use    Smoking status: Never Smoker     Smokeless tobacco: Never Used   Substance and Sexual Activity    Alcohol use: No    Drug use: Not on file    Sexual activity: Not on file     Review of Systems   Constitutional: Negative for chills and fever.   HENT: Negative for rhinorrhea and sore throat.    Eyes: Negative for pain and redness.   Respiratory: Positive for cough and shortness of breath.    Cardiovascular: Positive for leg swelling. Negative for chest pain and palpitations.   Gastrointestinal: Negative for abdominal pain, constipation, diarrhea, nausea and vomiting.   Endocrine: Negative for polydipsia and polyuria.   Genitourinary: Negative for dysuria and hematuria.   Musculoskeletal: Negative for back pain and neck pain.   Skin: Negative for color change and rash.   Neurological: Negative for syncope, light-headedness and headaches.   Hematological: Negative for adenopathy. Does not bruise/bleed easily.   Psychiatric/Behavioral: Negative for confusion. The patient is not nervous/anxious.      Objective:     Vital Signs (Most Recent):  Temp: 98.1 °F (36.7 °C) (09/09/18 1616)  Pulse: 81 (09/09/18 1930)  Resp: 20 (09/09/18 1930)  BP: (!) 213/81(med team 1 made aware. plan to given prn bp meds. ) (09/09/18 1930)  SpO2: (!) 94 % (09/09/18 1930) Vital Signs (24h Range):  Temp:  [98.1 °F (36.7 °C)] 98.1 °F (36.7 °C)  Pulse:  [74-84] 81  Resp:  [16-22] 20  SpO2:  [94 %-100 %] 94 %  BP: (166-222)/(67-98) 213/81     Weight: 106.6 kg (235 lb)  Body mass index is 42.98 kg/m².    Physical Exam   Constitutional: She is oriented to person, place, and time. She appears well-developed and well-nourished. No distress.   HENT:   Head: Normocephalic and atraumatic.   Mouth/Throat: Oropharynx is clear and moist.   Eyes: Conjunctivae and EOM are normal. No scleral icterus.   Neck: Normal range of motion. Neck supple.   Cardiovascular: Normal rate, regular rhythm and normal heart sounds. Exam reveals no gallop and no friction rub.   No murmur  heard.  Pulmonary/Chest: Effort normal. No respiratory distress. She has wheezes (throughout). She has rales (bibasilar).   Abdominal: Soft. Bowel sounds are normal. She exhibits no distension. There is no tenderness. There is no guarding.   Musculoskeletal: Normal range of motion. She exhibits edema (2+ BLE). She exhibits no tenderness.   Neurological: She is alert and oriented to person, place, and time.   Skin: Skin is warm and dry. No rash noted. She is not diaphoretic.   Psychiatric: She has a normal mood and affect. Her behavior is normal.         CRANIAL NERVES     CN III, IV, VI   Extraocular motions are normal.        Significant Labs:   CBC:   Recent Labs   Lab  09/09/18   1726   WBC  7.46   HGB  7.8*   HCT  24.6*   PLT  244     CMP:   Recent Labs   Lab  09/09/18   1726   NA  138   K  5.5*   CL  118*   CO2  11*   GLU  199*   BUN  57*   CREATININE  4.4*   CALCIUM  8.0*   PROT  8.3   ALBUMIN  2.9*   BILITOT  0.3   ALKPHOS  69   AST  20   ALT  18   ANIONGAP  9   EGFRNONAA  10.0*       Significant Imaging: I have reviewed all pertinent imaging results/findings within the past 24 hours.    Assessment/Plan:     * Shortness of breath    ABG: pH 7.2, pCO2 32, bicarb 13  CXR: mild pulmonary edema    Suspect patient's shortness of breath is primarily secondary to worsened renal failure, however COPD exacerbation considered and treatment was initiated by ED. Patient does not have hypercapnia on ABG but does have wheezes on exam (suspect this is due to pulmonary edema and not bronchoconstriction). CHF also a possibility- no echo on file. No evidence of ACS with normal troponin and unremarkable EKG.     - 2D echo ordered  - continue home COPD medications  - duonebs q4h for now  - given 125 methylprednisolone in ED, will continue with prednisone 40mg daily  - will not start antibiotics at this time as patient's presentation is not consistent with COPD exacerbation as primary problem            Renal failure     HYPERKALEMIA  NON ANION GAP METABOLIC ACIDOSIS    Patient with baseline CKD 5 with recent placement of fistula, but has not actually been dialyzed yet.    - Nephrology consulted, appreciate their recommendations. I discussed the patient's case with the nephrology fellow on call. He recommends treating patient with 2 amps bicarb and following labs q6h overnight. No urgent indication for dialysis, but will likely dialyze in the morning.   - Vascular surgery consulted to evaluate if fistula can be used for dialysis in the morning, WILL NEED TO CALL IN THE A.M.  - will give lasix 160mg now (as recommended by nephrology fellow)  - [place] catheter overnight to closely monitor UOP (as recommended by nephrology fellow)  - renal function panel q6h  - VBG q6h  - expect hyperkalemia should resolve with lasix administration (patient already with good UOP after 60 IV lasix)  - renal diet  - strict I/O                Hypertensive emergency    HYPERTENSION    Home meds: isosorbide mononitrate 60mg qhs, amlodipine 10mg daily, doxazosin 8mg qhs, nebivolol 10mg qhs    - suspect worsened HTN due to renal failure and fluid overload  - continue home meds  - hydralazine 25mg q8h prn SBP >180          Type 2 diabetes mellitus, with long-term current use of insulin    Home meds: insulin degludec 80 units qhs    - ordered at 70% home dose- insulin detemir 55 units qhs  - SSI with POCT glucose checks  - diabetic/renal diet          COPD (chronic obstructive pulmonary disease)    Home meds: budesonide/formeterol, albuterol inhaler    - continue home meds  - duonebs q4h  - prednisone 40mg daily, may elect to d/c if patient improves after dialysis          Microcytic anemia    Hgb 7.8 with MCV 80. Suspect contributing cause of anemia of chronic renal disease but will evaluate for other factors    - iron, TIBC, ferritin ordered  - B12 and folate ordered  - would arrange for colonoscopy on discharge            VTE Risk Mitigation (From admission,  onward)        Ordered     IP VTE HIGH RISK PATIENT  Once      09/09/18 1939     Place sequential compression device  Until discontinued      09/09/18 1939             Lynnette Navarrete DO  Department of Hospital Medicine   [facility]                    09/10/2018                             STAFF PHYSICIAN NOTE                                   Attending Attestation for Rounds with Resident  I have reviewed and concur with the resident's history, physical, assessment, and plan.  I have personally interviewed and examined the patient at bedside and agree with the resident's findings.                                  ________________________________________                                     REASON FOR ADMISSION:     Patient is 62 y.o.female    Body mass index is 40.65 kg/m².,  Shortness of breath

## 2018-09-10 NOTE — ASSESSMENT & PLAN NOTE
HYPERKALEMIA  NON ANION GAP METABOLIC ACIDOSIS    Patient with baseline CKD 5 with recent placement of fistula, but has not actually been dialyzed yet.    - Nephrology consulted, appreciate their recommendations. I discussed the patient's case with the nephrology fellow on call. He recommends treating patient with 2 amps bicarb and following labs q6h overnight. No urgent indication for dialysis, but will likely dialyze in the morning.   - Vascular surgery consulted to evaluate if fistula can be used for dialysis in the morning, WILL NEED TO CALL IN THE A.M.  - will give lasix 160mg now (as recommended by nephrology fellow)  - [place] catheter overnight to closely monitor UOP (as recommended by nephrology fellow)  - renal function panel q6h  - VBG q6h  - expect hyperkalemia should resolve with lasix administration (patient already with good UOP after 60 IV lasix)  - renal diet  - strict I/O

## 2018-09-10 NOTE — ASSESSMENT & PLAN NOTE
Hgb 7.8 with MCV 80. Suspect contributing cause of anemia of chronic renal disease but will evaluate for other factors    - iron, TIBC, ferritin ordered  - B12 and folate ordered  - would arrange for colonoscopy on discharge

## 2018-09-10 NOTE — ASSESSMENT & PLAN NOTE
HYPERTENSION    Home meds: isosorbide mononitrate 60mg qhs, amlodipine 10mg daily, doxazosin 8mg qhs, nebivolol 10mg qhs    - suspect worsened HTN due to renal failure and fluid overload  - continue home meds  - hydralazine 25mg q8h prn SBP >180

## 2018-09-10 NOTE — ASSESSMENT & PLAN NOTE
ABG: pH 7.2, pCO2 32, bicarb 13  CXR: mild pulmonary edema    Suspect patient's shortness of breath is primarily secondary to worsened renal failure, however COPD exacerbation considered and treatment was initiated by ED. Patient does not have hypercapnia on ABG but does have wheezes on exam (suspect this is due to pulmonary edema and not bronchoconstriction). CHF also a possibility- no echo on file. No evidence of ACS with normal troponin and unremarkable EKG.     - 2D echo ordered  - continue home COPD medications  - duonebs q4h for now  - given 125 methylprednisolone in ED, will continue with prednisone 40mg daily  - will not start antibiotics at this time as patient's presentation is not consistent with COPD exacerbation as primary problem

## 2018-09-10 NOTE — PLAN OF CARE
Extended Emergency Contact Information  Primary Emergency Contact: Lynnette Vazquez  Address: [address] [place] [address] [place]  Home Phone: [phone]  Relation: Sister    Eez Greenberg MD  [address] [facility] / [place] [address]    Future Appointments   Date Time Provider Department Center   9/11/2018  2:00 PM CHARLY Reed MD [facility]     Payor: MEDICARE / Plan: MEDICARE PART A & B / Product Type: Government /       [facility] - [place], [place] - [address]  [address]  [place] [address]  Phone: [phone] Fax: [phone]       09/10/18 1505   Discharge Assessment   Assessment Type Discharge Planning Assessment   Assessment information obtained from? Patient;Medical Record   Expected Length of Stay (days) 3   Communicated expected length of stay with patient/caregiver yes   Prior to hospitilization cognitive status: Alert/Oriented   Prior to hospitalization functional status: Independent   Current cognitive status: Alert/Oriented   Current Functional Status: Independent   Lives With other relative(s)  (neice)   Able to Return to Prior Arrangements yes   Is patient able to care for self after discharge? Yes   Patient's perception of discharge disposition home or selfcare   Readmission Within The Last 30 Days no previous admission in last 30 days   Patient currently being followed by outpatient case management? No   Patient currently receives any other outside agency services? No   Equipment Currently Used at Home none   Do you have any problems affording any of your prescribed medications? No   Is the patient taking medications as prescribed? yes   Does the patient have transportation home? Yes   Transportation Available family or friend will provide   Does the patient receive services at the [facility]? No   Discharge Plan A Home with family   Discharge Plan B Home with family;Home Health   Patient/Family In Agreement With Plan  yes

## 2018-09-10 NOTE — ED NOTES
Med team 1 resident at , plans to place bp meds for sbp of 214. On cardiac and o2 monitor, call light within reach, will continue to monitor.

## 2018-09-10 NOTE — HPI
Ms.Verlean Paige is a 62 year old female with CKD stage V who had a fistula placed on July 2 2018 but has not yet started dialysis, who also has HTN, DM 2 on insulin, and COPD. She was admitted on 9/9 from the ED for renal failure.     Ms. Paige presented to the ED for evaluation of shortness of breath, which has been ongoing for several years but has worsened over the past few days. She reports significant dyspnea on exertion, reporting dyspnea after walking down the gold. She also reports orthopnea and cannot lie flat to sleep. She has had a dry cough over the past month, but denies any sputum production, fever/chills, or nausea. No chest pain. She has had worsening bilateral lower extremity edema. Upon initial evaluation in the ED, Ms. Paige was found to have pH of 7.2 and bicarb of 11. She was given breathing treatments for wheezing but did not require supplemental oxygen.     Patient lives in [place] with her family. She denies any tobacco or alcohol use.

## 2018-09-10 NOTE — ASSESSMENT & PLAN NOTE
Home meds: insulin degludec 80 units qhs    - ordered at 70% home dose- insulin detemir 55 units qhs  - SSI with POCT glucose checks  - diabetic/renal diet

## 2018-09-10 NOTE — ASSESSMENT & PLAN NOTE
Home meds: budesonide/formeterol, albuterol inhaler    - continue home meds  - duonebs q4h  - prednisone 40mg daily, may elect to d/c if patient improves after dialysis

## 2018-09-10 NOTE — PLAN OF CARE
Problem: Patient Care Overview  Goal: Plan of Care Review  Outcome: Ongoing (interventions implemented as appropriate)  POC reviewed with pt. AAO x4.  accu-checks, telemetry monitoring. PRN hydralazine given x 1 today. Pt remained free from falls. Questions and concerns addressed. Pt progressing towards goals. Will continue to monitor. See flow sheets for full assessment and VS

## 2018-09-10 NOTE — PLAN OF CARE
Problem: Physical Therapy Goal  Goal: Physical Therapy Goal  Outcome: Outcome(s) achieved Date Met: 09/10/18  Patient at this time is at their functional baseline and does not require skilled acute PT services at this time. Please re consult PT if pt has change in functional status.   Jimmie Contreras PT, DPT  9/10/2018  Pager: [phone]

## 2018-09-10 NOTE — PT/OT/SLP EVAL
Physical Therapy Evaluation and Discharge Note    Patient Name:  Malaika Paige   MRN:  [id number]    Recommendations:     Discharge Recommendations:  ( PT)   Discharge Equipment Recommendations: none   Barriers to discharge: None    Assessment:     Malaika Paige is a 62 y.o. female admitted with a medical diagnosis of Shortness of breath. .  At this time, patient is functioning at their prior level of function and does not require further acute PT services.     Recent Surgery: * No surgery found *      Plan:     During this hospitalization, patient does not require further acute PT services.  Please re-consult if situation changes.      Subjective     Chief Complaint: none; pt pleasant  Patient/Family Comments/goals: to go home  Pain/Comfort:  · Pain Rating 1: 0/10  · Pain Rating Post-Intervention 1: 0/10    Patients cultural, spiritual, Temple conflicts given the current situation: none stated    Living Environment:  Pt lives in a 1st floor apt with her niece with walk in shower.   Prior to admission, patients level of function was (I ) with all ADLs and ambulation. Pt primarily home ambulator but was able to walk in the community with increased rest breaks. Pt has son who drives her to appointments and to get groceries.  Equipment used at home: none.  DME owned (not currently used): none.  Upon discharge, patient will have assistance from niece and son ( does not have 24/7 support however ).    Objective:     Communicated with RN prior to session.  Patient found supine upon PT entry to room found with: (all lines intact)     General Precautions: Standard, (fall)   Orthopedic Precautions:N/A   Braces: N/A     Exams:  · Cognitive Exam:  Patient is oriented to Person, Place, Time and Situation  · Fine Motor Coordination: -       Intact  · Gross Motor Coordination:  WFL  · Postural Exam:  Patient presented with the following abnormalities: -       Rounded shoulders  · -       Forward head  · -        Posterior pelvic tilt  · Sensation: -       Intact  · -       Impaired  numbness/tingling in B feet ( pt states she has chronic neuropathy )  · Skin Integrity/Edema:  -       Skin integrity: Thin and Dry  · RLE ROM: WFL  · RLE Strength: WFL  · LLE ROM: WFL  · LLE Strength: WFL    Functional Mobility:  · Bed Mobility:  Rolling Right: modified independence  · Scooting: modified independence  · Supine to Sit: modified independence  · Transfers:  Sit to Stand:  stand by assistance with no AD  · Bed to Chair: stand by assistance with  no AD  using  Step Transfer  · Gait: x 160 feet with no AD; CGA   · Balance: dynamic balance SBA with no increase in sway or LOB    AM-PAC 6 CLICK MOBILITY  Total Score:21       Therapeutic Activities and Exercises:  · All of patients questions were answered within the scope of PT    Patient education  · Patient educated on the role of PT and POC  · Patient educated on importance  activity while in the hosptial per tolerance for improved endurance and to limit deconditioning   · Patient educated on safe transfers with nursing as appropriate  · Patient educated on energy conservation, pursed lip breathing  · Patient educated on proper transfer mechanics and safety      AM-PAC 6 CLICK MOBILITY  Total Score:21     Patient left up in chair with all lines intact, call button in reach and RN notified.    GOALS:   Multidisciplinary Problems     Physical Therapy Goals     Not on file          Multidisciplinary Problems (Resolved)        Problem: Physical Therapy Goal    Goal Priority Disciplines Outcome Goal Variances Interventions   Physical Therapy Goal   (Resolved)     PT, PT/OT Outcome(s) achieved                     History:     Past Medical History:   Diagnosis Date    Cataract     Cataract     CHF (congestive heart failure)     COPD (chronic obstructive pulmonary disease)     Diabetes mellitus     Diabetic retinopathy     Hypertension        Past Surgical History:   Procedure  Laterality Date    CATARACT EXTRACTION W/  INTRAOCULAR LENS IMPLANT Right 08/14/2017    Dr. Moe    INSERTION-INTRAOCULAR LENS (IOL) Right 8/14/2017    Performed by Katie [facility]oe [facility] at [facility] [place]    PHACOEMULSIFICATION-ASPIRATION-CATARACT Right 8/14/2017    Performed by Katie Moe MD at [facility] [place]       Clinical Decision Making:     History  Co-morbidities and personal factors that may impact the plan of care Examination  Body Structures and Functions, activity limitations and participation restrictions that may impact the plan of care Clinical Presentation   Decision Making/ Complexity Score   Co-morbidities:   [] Time since onset of injury / illness / exacerbation  [] Status of current condition  []Patient's cognitive status and safety concerns    [] Multiple Medical Problems (see med hx)  Personal Factors:   [] Patient's age  [] Prior Level of function   [] Patient's home situation (environment and family support)  [] Patient's level of motivation  [] Expected progression of patient      HISTORY:(criteria)    [] 71498 - no personal factors/history    [x] 37466 - has 1-2 personal factor/comorbidity     [] 12299 - has >3 personal factor/comorbidity     Body Regions:  [] Objective examination findings  [] Head     []  Neck  [] Trunk   [] Upper Extremity  [x] Lower Extremity    Body Systems:  [] For communication ability, affect, cognition, language, and learning style: the assessment of the ability to make needs known, consciousness, orientation (person, place, and time), expected emotional /behavioral responses, and learning preferences (eg, learning barriers, education  needs)  [] For the neuromuscular system: a general assessment of gross coordinated movement (eg, balance, gait, locomotion, transfers, and transitions) and motor function  (motor control and motor learning)  [x] For the musculoskeletal system: the assessment of gross symmetry, gross range of motion, gross strength, height, and weight  [] For the  integumentary system: the assessment of pliability(texture), presence of scar formation, skin color, and skin integrity  [] For cardiovascular/pulmonary system: the assessment of heart rate, respiratory rate, blood pressure, and edema     Activity limitations:    [] Patient's cognitive status and saf ety concerns          [x] Status of current condition      [] Weight bearing restriction  [] Cardiopulmunary Restriction    Participation Restrictions:   [] Goals and goal agreement with the patient     [] Rehab potential (prognosis) and probable outcome      Examination of Body System: (criteria)    [] 52262 - addressing 1-2 elements    [x] 50713 - addressing a total of 3 or more elements     [] 31763 -  Addressing a total of 4 or more elements         Clinical Presentation: (criteria)  Stable - 33674     On examination of body system using standardized tests and measures patient presents with 1-2 elements from any of the following: body structures and functions, activity limitations, and/or participation restrictions.  Leading to a clinical presentation that is considered stable and/or uncomplicated                              Clinical Decision Making  (Eval Complexity):  Low- 32348     Time Tracking:     PT Received On: 09/10/18  PT Start Time: 1528     PT Stop Time: 1545  PT Total Time (min): 17 min     Billable Minutes: Evaluation 15 min      Jimmie Contreras, PT  09/10/2018

## 2018-09-10 NOTE — ASSESSMENT & PLAN NOTE
Malaika Paige is a CKD stage V which most likely secondary to diabetes and blood pressure. Nephrology was consulted if probable need for dialysis inpatient.     Plan:  - serum creatinine baseline is unknown. Creatinine had decrease from 4.5-->4.  - Has a s/p Left Anders RC AVF on 7/2/18  Which pending VAS HD access doppler to assess adequacy of vessel caliber and flow rates per vascular   - Blood pressures with hypertension. Currently being treated with amlodipine. May consider starting on hydralazine or carvedilol    - Acid/base: with acidosis in which will be started on sodium bicarbonate 1,300 mg q BID   - Continue to monitor intake and output. UOP 4.7 L after lasix  - Echo with preserved EF and grade 2 diastolic dysfunction , P-HTN   - Avoid nephrotoxic medications   - Renal diet (low on sodium, low on potassium)  - No need for started on RRT

## 2018-09-10 NOTE — SUBJECTIVE & OBJECTIVE
Past Medical History:   Diagnosis Date    Cataract     Cataract     CHF (congestive heart failure)     COPD (chronic obstructive pulmonary disease)     Diabetes mellitus     Diabetic retinopathy     Hypertension        Past Surgical History:   Procedure Laterality Date    CATARACT EXTRACTION W/  INTRAOCULAR LENS IMPLANT Right 08/14/2017    Dr. Moe    INSERTION-INTRAOCULAR LENS (IOL) Right 8/14/2017    Performed by [facility] at [facility]    PHACOEMULSIFICATION-ASPIRATION-CATARACT Right 8/14/2017    Performed by [facility] at [facility]       Review of patient's allergies indicates:  No Known Allergies    No current facility-administered medications on file prior to encounter.      Current Outpatient Medications on File Prior to Encounter   Medication Sig    budesonide-formoterol 160-4.5 mcg (SYMBICORT) 160-4.5 mcg/actuation HFAA Inhale 2 puffs into the lungs every 12 (twelve) hours. Controller    bumetanide (BUMEX) 1 MG tablet Take 1 mg by mouth once daily.    HYDROcodone-acetaminophen (NORCO) 5-325 mg per tablet Take 1 tablet by mouth every 6 (six) hours as needed for Pain.    isosorbide mononitrate (IMDUR) 60 MG 24 hr tablet Take 60 mg by mouth once daily.    albuterol (VENTOLIN HFA) 90 mcg/actuation inhaler Inhale 2 puffs into the lungs every 6 (six) hours as needed for Wheezing. Rescue    amlodipine (NORVASC) 10 MG tablet Take 10 mg by mouth once daily.    aspirin 325 MG tablet Take 325 mg by mouth once daily.    doxazosin (CARDURA XL) 8 mg 24 hr tablet Take 8 mg by mouth daily with breakfast.    famotidine (PEPCID) 20 MG tablet Take 20 mg by mouth 2 (two) times daily.    furosemide (LASIX) 20 MG tablet Take 20 mg by mouth once daily.    insulin degludec (TRESIBA FLEXTOUCH U-200) 200 unit/mL (3 mL) InPn Inject into the skin.    insulin degludec 200 unit/mL (3 mL) InPn Inject 120 Units into the skin once daily.    levothyroxine (SYNTHROID) 50 MCG tablet Take 50 mcg by mouth once  daily.    nebivolol (BYSTOLIC) 10 MG Tab Take 10 mg by mouth once daily.    omeprazole (PRILOSEC) 20 MG capsule Take 20 mg by mouth once daily.    UMECLIDINIUM BRM/VILANTEROL TR (ANORO ELLIPTA INHL) Inhale 62.5 mcg into the lungs once daily at 6am.     Family History     Problem Relation (Age of Onset)    Cancer Brother    Diabetes Sister        Tobacco Use    Smoking status: Never Smoker    Smokeless tobacco: Never Used   Substance and Sexual Activity    Alcohol use: No    Drug use: Not on file    Sexual activity: Not on file     Review of Systems   Constitutional: Negative for chills and fever.   HENT: Negative for rhinorrhea and sore throat.    Eyes: Negative for pain and redness.   Respiratory: Positive for cough and shortness of breath.    Cardiovascular: Positive for leg swelling. Negative for chest pain and palpitations.   Gastrointestinal: Negative for abdominal pain, constipation, diarrhea, nausea and vomiting.   Endocrine: Negative for polydipsia and polyuria.   Genitourinary: Negative for dysuria and hematuria.   Musculoskeletal: Negative for back pain and neck pain.   Skin: Negative for color change and rash.   Neurological: Negative for syncope, light-headedness and headaches.   Hematological: Negative for adenopathy. Does not bruise/bleed easily.   Psychiatric/Behavioral: Negative for confusion. The patient is not nervous/anxious.      Objective:     Vital Signs (Most Recent):  Temp: 98.1 °F (36.7 °C) (09/09/18 1616)  Pulse: 81 (09/09/18 1930)  Resp: 20 (09/09/18 1930)  BP: (!) 213/81(med team 1 made aware. plan to given prn bp meds. ) (09/09/18 1930)  SpO2: (!) 94 % (09/09/18 1930) Vital Signs (24h Range):  Temp:  [98.1 °F (36.7 °C)] 98.1 °F (36.7 °C)  Pulse:  [74-84] 81  Resp:  [16-22] 20  SpO2:  [94 %-100 %] 94 %  BP: (166-222)/(67-98) 213/81     Weight: 106.6 kg (235 lb)  Body mass index is 42.98 kg/m².    Physical Exam   Constitutional: She is oriented to person, place, and time. She  appears well-developed and well-nourished. No distress.   HENT:   Head: Normocephalic and atraumatic.   Mouth/Throat: Oropharynx is clear and moist.   Eyes: Conjunctivae and EOM are normal. No scleral icterus.   Neck: Normal range of motion. Neck supple.   Cardiovascular: Normal rate, regular rhythm and normal heart sounds. Exam reveals no gallop and no friction rub.   No murmur heard.  Pulmonary/Chest: Effort normal. No respiratory distress. She has wheezes (throughout). She has rales (bibasilar).   Abdominal: Soft. Bowel sounds are normal. She exhibits no distension. There is no tenderness. There is no guarding.   Musculoskeletal: Normal range of motion. She exhibits edema (2+ BLE). She exhibits no tenderness.   Neurological: She is alert and oriented to person, place, and time.   Skin: Skin is warm and dry. No rash noted. She is not diaphoretic.   Psychiatric: She has a normal mood and affect. Her behavior is normal.         CRANIAL NERVES     CN III, IV, VI   Extraocular motions are normal.        Significant Labs:   CBC:   Recent Labs   Lab  09/09/18   1726   WBC  7.46   HGB  7.8*   HCT  24.6*   PLT  244     CMP:   Recent Labs   Lab  09/09/18   1726   NA  138   K  5.5*   CL  118*   CO2  11*   GLU  199*   BUN  57*   CREATININE  4.4*   CALCIUM  8.0*   PROT  8.3   ALBUMIN  2.9*   BILITOT  0.3   ALKPHOS  69   AST  20   ALT  18   ANIONGAP  9   EGFRNONAA  10.0*       Significant Imaging: I have reviewed all pertinent imaging results/findings within the past 24 hours.

## 2018-09-10 NOTE — SUBJECTIVE & OBJECTIVE
Past Medical History:   Diagnosis Date    Cataract     Cataract     CHF (congestive heart failure)     COPD (chronic obstructive pulmonary disease)     Diabetes mellitus     Diabetic retinopathy     Hypertension        Past Surgical History:   Procedure Laterality Date    CATARACT EXTRACTION W/  INTRAOCULAR LENS IMPLANT Right 08/14/2017    Dr. Moe    INSERTION-INTRAOCULAR LENS (IOL) Right 8/14/2017    [facility]formed by [facility] at [facility]    PHACOEMULSIFICATION-ASPIRATION-CATARACT Right 8/14/2017    Performed by Katie Moe MD at [facility] OR       Review of patient's allergies indicates:  No Known Allergies  Current Facility-Administered Medications   Medication Frequency    acetaminophen tablet 650 mg Q4H PRN    amLODIPine tablet 10 mg Daily    dextrose 50% injection 12.5 g PRN    dextrose 50% injection 25 g PRN    doxazosin tablet 8 mg QHS    famotidine tablet 20 mg BID    fluticasone-vilanterol 100-25 mcg/dose diskus inhaler 1 puff Daily    glucagon (human recombinant) injection 1 mg PRN    glucose chewable tablet 16 g PRN    glucose chewable tablet 24 g PRN    hydrALAZINE tablet 25 mg Q8H PRN    insulin aspart U-100 pen 0-5 Units QID (AC + HS) PRN    insulin detemir U-100 pen 55 Units QHS    isosorbide mononitrate 24 hr tablet 60 mg Daily    levothyroxine tablet 75 mcg Before breakfast    nebivolol tablet 10 mg Daily    predniSONE tablet 40 mg Daily    ramelteon tablet 8 mg Nightly PRN    senna-docusate 8.6-50 mg per tablet 1 tablet BID PRN    sodium bicarbonate tablet 1,300 mg BID    sodium chloride 0.9% flush 5 mL PRN     Family History     Problem Relation (Age of Onset)    Cancer Brother    Diabetes Sister        Tobacco Use    Smoking status: Never Smoker    Smokeless tobacco: Never Used   Substance and Sexual Activity    Alcohol use: No    Drug use: Not on file    Sexual activity: Not on file     Review of Systems   Constitutional: Negative for fatigue.   HENT:  Negative for congestion, ear pain and sore throat.    Eyes: Negative for pain.   Respiratory: Positive for cough and shortness of breath. Negative for chest tightness and stridor.    Cardiovascular: Positive for chest pain and leg swelling. Negative for palpitations.   Gastrointestinal: Negative for abdominal distention, abdominal pain, diarrhea, nausea and vomiting.   Endocrine: Negative for polydipsia and polyuria.   Genitourinary: Negative for decreased urine volume, difficulty urinating, dysuria, enuresis, hematuria and pelvic pain.   Musculoskeletal: Negative for back pain, joint swelling and neck stiffness.   Skin: Negative for color change, pallor and rash.   Neurological: Negative for syncope, weakness and light-headedness.   Psychiatric/Behavioral: Negative for agitation, confusion and hallucinations.     Objective:     Vital Signs (Most Recent):  Temp: 96.5 °F (35.8 °C) (09/10/18 1230)  Pulse: 81 (09/10/18 1230)  Resp: 20 (09/10/18 1230)  BP: (!) 181/76 (09/10/18 1230)  SpO2: 98 % (09/10/18 1230)  O2 Device (Oxygen Therapy): room air (09/10/18 1230) Vital Signs (24h Range):  Temp:  [96.5 °F (35.8 °C)-99 °F (37.2 °C)] 96.5 °F (35.8 °C)  Pulse:  [73-86] 81  Resp:  [16-22] 20  SpO2:  [91 %-100 %] 98 %  BP: (148-222)/(67-98) 181/76     Weight: 100.8 kg (222 lb 3.6 oz) (09/09/18 2200)  Body mass index is 40.65 kg/m².  Body surface area is 2.1 meters squared.    I/O last 3 completed shifts:  In: 400 [P.O.:400]  Out: 4700 [Urine:4700]    Physical Exam   Constitutional: She is oriented to person, place, and time. No distress.   HENT:   Head: Normocephalic and atraumatic.   Right Ear: External ear normal.   Left Ear: External ear normal.   Eyes: Right eye exhibits no discharge. Left eye exhibits no discharge.   Cardiovascular: Normal rate.   Bilateral lower extremity edema  LLA AVF    Pulmonary/Chest: Effort normal. She has rales.   Abdominal: Soft.   Neurological: She is alert and oriented to person, place, and  time.   Skin: Skin is warm.   Psychiatric: She has a normal mood and affect. Judgment normal.       Significant Labs:  ABGs:   Recent Labs   Lab  09/10/18   0600   PH  7.364   PCO2  28.6*   HCO3  16.3*   POCSATURATED  90*   BE  -9     BMP:   Recent Labs   Lab  09/10/18   0356   GLU  277*   CL  114*   CO2  14*   BUN  61*   CREATININE  4.2*   CALCIUM  8.1*     CBC:   Recent Labs   Lab  09/10/18   0356   WBC  6.85   RBC  3.05*   HGB  7.4*   HCT  24.3*   PLT  261   MCV  80*   MCH  24.3*   MCHC  30.5*     CMP:   Recent Labs   Lab  09/10/18   0356   GLU  277*   CALCIUM  8.1*   ALBUMIN  2.7*  2.7*   PROT  8.0   NA  137   K  5.0   CO2  14*   CL  114*   BUN  61*   CREATININE  4.2*   ALKPHOS  70   ALT  17   AST  12   BILITOT  0.3     PTH: No results for input(s): PTH in the last 168 hours.  No results for input(s): COLORU, CLARITYU, SPECGRAV, PHUR, PROTEINUA, GLUCOSEU, BILIRUBINCON, BLOODU, WBCU, RBCU, BACTERIA, MUCUS, NITRITE, LEUKOCYTESUR, UROBILINOGEN, HYALINECASTS in the last 168 hours.  All labs within the past 24 hours have been reviewed.

## 2018-09-10 NOTE — ED NOTES
Patient placed on tele box 18209. 1500mL emptied from [place]. Patient transported to floor. No distress noted.

## 2018-09-10 NOTE — CONSULTS
[facility]  Nephrology  Consult Note    Patient Name: Malaika Paige  MRN: [id number]  Admission Date: 9/9/2018  Hospital Length of Stay: 1 days  Attending Provider: Marleny Kessler MD   Primary Care Physician: Eze Greenberg MD  Principal Problem:Shortness of breath    Inpatient consult to Nephrology  Consult performed by: Abhi Coughlin MD  Consult ordered by: Lynnette Navarrete DO  Reason for consult: CKD stage V        Subjective:     HPI: Grecia Dai is a 62 year old woman with CKD stage V  Which has been followed by Narayan, who had a fistula placed on July 2 2018 but has not yet started dialysis. Also has HTN, T2DM insulin dependent, and COPD. She was admitted on 9/9 from the ED for renal failure. Arrived to ED for evaluation of shortness of breath, which has been ongoing for several years but has worsened over the past few days. She reports significant dyspnea on exertion,an on occasions has been with chest discomfort. Denies any sputum production, fever/chills, nausea, vomiting diarrhea, dysuria or hematuria. On arrival presented with hypertension. Labs with hyperkalemia 5.5 which she received insulin + glucose and Lasix x 2(120 mg and 80 mg), UOP has been 4.7 L yesterday. Nephrology was consulted for probable need of start dialysis.         Past Medical History:   Diagnosis Date    Cataract     Cataract     CHF (congestive heart failure)     COPD (chronic obstructive pulmonary disease)     Diabetes mellitus     Diabetic retinopathy     Hypertension        Past Surgical History:   Procedure Laterality Date    CATARACT EXTRACTION W/  INTRAOCULAR LENS IMPLANT Right 08/14/2017    Dr. Moe    INSERTION-INTRAOCULAR LENS (IOL) Right 8/14/2017    Performed by [facility] at [facility] OR    PHACOEMULSIFICATION-ASPIRATION-CATARACT Right 8/14/2017    Performed by [facility]oe MD at [facility] OR       Review of patient's allergies indicates:  No Known Allergies  Current  Facility-Administered Medications   Medication Frequency    acetaminophen tablet 650 mg Q4H PRN    amLODIPine tablet 10 mg Daily    dextrose 50% injection 12.5 g PRN    dextrose 50% injection 25 g PRN    doxazosin tablet 8 mg QHS    famotidine tablet 20 mg BID    fluticasone-vilanterol 100-25 mcg/dose diskus inhaler 1 puff Daily    glucagon (human recombinant) injection 1 mg PRN    glucose chewable tablet 16 g PRN    glucose chewable tablet 24 g PRN    hydrALAZINE tablet 25 mg Q8H PRN    insulin aspart U-100 pen 0-5 Units QID (AC + HS) PRN    insulin detemir U-100 pen 55 Units QHS    isosorbide mononitrate 24 hr tablet 60 mg Daily    levothyroxine tablet 75 mcg Before breakfast    nebivolol tablet 10 mg Daily    predniSONE tablet 40 mg Daily    ramelteon tablet 8 mg Nightly PRN    senna-docusate 8.6-50 mg per tablet 1 tablet BID PRN    sodium bicarbonate tablet 1,300 mg BID    sodium chloride 0.9% flush 5 mL PRN     Family History     Problem Relation (Age of Onset)    Cancer Brother    Diabetes Sister        Tobacco Use    Smoking status: Never Smoker    Smokeless tobacco: Never Used   Substance and Sexual Activity    Alcohol use: No    Drug use: Not on file    Sexual activity: Not on file     Review of Systems   Constitutional: Negative for fatigue.   HENT: Negative for congestion, ear pain and sore throat.    Eyes: Negative for pain.   Respiratory: Positive for cough and shortness of breath. Negative for chest tightness and stridor.    Cardiovascular: Positive for chest pain and leg swelling. Negative for palpitations.   Gastrointestinal: Negative for abdominal distention, abdominal pain, diarrhea, nausea and vomiting.   Endocrine: Negative for polydipsia and polyuria.   Genitourinary: Negative for decreased urine volume, difficulty urinating, dysuria, enuresis, hematuria and pelvic pain.   Musculoskeletal: Negative for back pain, joint swelling and neck stiffness.   Skin: Negative for  color change, pallor and rash.   Neurological: Negative for syncope, weakness and light-headedness.   Psychiatric/Behavioral: Negative for agitation, confusion and hallucinations.     Objective:     Vital Signs (Most Recent):  Temp: 96.5 °F (35.8 °C) (09/10/18 1230)  Pulse: 81 (09/10/18 1230)  Resp: 20 (09/10/18 1230)  BP: (!) 181/76 (09/10/18 1230)  SpO2: 98 % (09/10/18 1230)  O2 Device (Oxygen Therapy): room air (09/10/18 1230) Vital Signs (24h Range):  Temp:  [96.5 °F (35.8 °C)-99 °F (37.2 °C)] 96.5 °F (35.8 °C)  Pulse:  [73-86] 81  Resp:  [16-22] 20  SpO2:  [91 %-100 %] 98 %  BP: (148-222)/(67-98) 181/76     Weight: 100.8 kg (222 lb 3.6 oz) (09/09/18 2200)  Body mass index is 40.65 kg/m².  Body surface area is 2.1 meters squared.    I/O last 3 completed shifts:  In: 400 [P.O.:400]  Out: 4700 [Urine:4700]    Physical Exam   Constitutional: She is oriented to person, place, and time. No distress.   HENT:   Head: Normocephalic and atraumatic.   Right Ear: External ear normal.   Left Ear: External ear normal.   Eyes: Right eye exhibits no discharge. Left eye exhibits no discharge.   Cardiovascular: Normal rate.   Bilateral lower extremity edema  LLA AVF    Pulmonary/Chest: Effort normal. She has rales.   Abdominal: Soft.   Neurological: She is alert and oriented to person, place, and time.   Skin: Skin is warm.   Psychiatric: She has a normal mood and affect. Judgment normal.       Significant Labs:  ABGs:   Recent Labs   Lab  09/10/18   0600   PH  7.364   PCO2  28.6*   HCO3  16.3*   POCSATURATED  90*   BE  -9     BMP:   Recent Labs   Lab  09/10/18   0356   GLU  277*   CL  114*   CO2  14*   BUN  61*   CREATININE  4.2*   CALCIUM  8.1*     CBC:   Recent Labs   Lab  09/10/18   0356   WBC  6.85   RBC  3.05*   HGB  7.4*   HCT  24.3*   PLT  261   MCV  80*   MCH  24.3*   MCHC  30.5*     CMP:   Recent Labs   Lab  09/10/18   0356   GLU  277*   CALCIUM  8.1*   ALBUMIN  2.7*  2.7*   PROT  8.0   NA  137   K  5.0   CO2  14*    CL  114*   BUN  61*   CREATININE  4.2*   ALKPHOS  70   ALT  17   AST  12   BILITOT  0.3     PTH: No results for input(s): PTH in the last 168 hours.  No results for input(s): COLORU, CLARITYU, SPECGRAV, PHUR, PROTEINUA, GLUCOSEU, BILIRUBINCON, BLOODU, WBCU, RBCU, BACTERIA, MUCUS, NITRITE, LEUKOCYTESUR, UROBILINOGEN, HYALINECASTS in the last 168 hours.  All labs within the past 24 hours have been reviewed.      Assessment/Plan:     Anemia associated with chronic renal failure    - Hgb 7.4 (goal 10-11)  - Would benefit from IV iron infusion         Chronic kidney disease-mineral and bone disorder    - PO 4 levels at normal range no need for binders   - Ca 8.1         CKD (chronic kidney disease) stage 5, GFR less than 15 ml/min    Malaika Paige is a CKD stage V which most likely secondary to diabetes and blood pressure. Nephrology was consulted if probable need for dialysis inpatient.     Plan:  - serum creatinine baseline is unknown. Creatinine had decrease from 4.5-->4.  - Has a s/p Left Anders RC AVF on 7/2/18  Which pending VAS HD access doppler to assess adequacy of vessel caliber and flow rates per vascular   - Blood pressures with hypertension. Currently being treated with amlodipine. May consider starting on hydralazine or carvedilol    - Acid/base: with acidosis in which will be started on sodium bicarbonate 1,300 mg q BID   - Continue to monitor intake and output. UOP 4.7 L after lasix  - Echo with preserved EF and grade 2 diastolic dysfunction , P-HTN   - Avoid nephrotoxic medications   - Renal diet (low on sodium, low on potassium)  - No need for started on RRT         Hyperkalemia    - Arrived with a 5.5 which has decreased to 5.0  - Need to be on strict low potassium diet         Metabolic acidosis    - See CKD           Abhi Simmons  Nephrology  Fellow  [facility]    Pager [phone]

## 2018-09-10 NOTE — HPI
62 year old female with CKD stage V who had a fistula placed on July 2 2018 but has not yet started dialysis, also h/o HTN, DM 2 on insulin, and COPD. She was admitted on 9/9 from the ED for renal failure.     Ms. Paige presented to the ED with shortness of breath, which is a chronic issue, but has worsened over the past few days. She reports significant ADAN. She has had a dry cough over the past month; denies fever, chills, nausea. No chest pain. She reports worsening bilateral lower extremity edema.     Vascular Surgery consulted to evaluate adequacy of Left RC AVF for potential usage. Patient has not followed up with her Vascular Surgeon in about ~1 month; at that time she says she was tole the AVF was not ready.

## 2018-09-10 NOTE — SUBJECTIVE & OBJECTIVE
Medications Prior to Admission   Medication Sig Dispense Refill Last Dose    budesonide-formoterol 160-4.5 mcg (SYMBICORT) 160-4.5 mcg/actuation HFAA Inhale 2 puffs into the lungs every 12 (twelve) hours. Controller       bumetanide (BUMEX) 1 MG tablet Take 1 mg by mouth once daily.       HYDROcodone-acetaminophen (NORCO) 5-325 mg per tablet Take 1 tablet by mouth every 6 (six) hours as needed for Pain.       isosorbide mononitrate (IMDUR) 60 MG 24 hr tablet Take 60 mg by mouth once daily.       albuterol (VENTOLIN HFA) 90 mcg/actuation inhaler Inhale 2 puffs into the lungs every 6 (six) hours as needed for Wheezing. Rescue   Taking    amlodipine (NORVASC) 10 MG tablet Take 10 mg by mouth once daily.   Taking    aspirin 325 MG tablet Take 325 mg by mouth once daily.   Taking    doxazosin (CARDURA XL) 8 mg 24 hr tablet Take 8 mg by mouth daily with breakfast.   Taking    famotidine (PEPCID) 20 MG tablet Take 20 mg by mouth 2 (two) times daily.   Taking    furosemide (LASIX) 20 MG tablet Take 20 mg by mouth once daily.   Taking    insulin degludec (TRESIBA FLEXTOUCH U-200) 200 unit/mL (3 mL) InPn Inject into the skin.   Taking    insulin degludec 200 unit/mL (3 mL) InPn Inject 120 Units into the skin once daily.   Taking    levothyroxine (SYNTHROID) 50 MCG tablet Take 50 mcg by mouth once daily.   Taking    nebivolol (BYSTOLIC) 10 MG Tab Take 10 mg by mouth once daily.   Taking    omeprazole (PRILOSEC) 20 MG capsule Take 20 mg by mouth once daily.   Taking    UMECLIDINIUM BRM/VILANTEROL TR (ANORO ELLIPTA INHL) Inhale 62.5 mcg into the lungs once daily at 6am.   Taking       Review of patient's allergies indicates:  No Known Allergies    Past Medical History:   Diagnosis Date    Cataract     Cataract     CHF (congestive heart failure)     COPD (chronic obstructive pulmonary disease)     Diabetes mellitus     Diabetic retinopathy     Hypertension      Past Surgical History:   Procedure Laterality  Date    CATARACT EXTRACTION W/  INTRAOCULAR LENS IMPLANT Right 08/14/2017    Dr. Moe    INSERTION-INTRAOCULAR LENS (IOL) Right 8/14/2017    Per[facility] by [facility] OR    PHACOEMULSIFICATION-ASPIRATION-CATARACT Right 8/14/2017    Performed by Katie Moe MD at [facility] OR     Family History     Problem Relation (Age of Onset)    Cancer Brother    Diabetes Sister        Tobacco Use    Smoking status: Never Smoker    Smokeless tobacco: Never Used   Substance and Sexual Activity    Alcohol use: No    Drug use: Not on file    Sexual activity: Not on file     Review of Systems   A 10-point ROS was negative, except as outlined in the HPI.     Objective:     Vital Signs (Most Recent):  Temp: 99 °F (37.2 °C) (09/10/18 0740)  Pulse: 81 (09/10/18 0740)  Resp: 20 (09/10/18 0740)  BP: (!) 148/69 (09/10/18 0740)  SpO2: (!) 91 % (09/10/18 0740) Vital Signs (24h Range):  Temp:  [97.5 °F (36.4 °C)-99 °F (37.2 °C)] 99 °F (37.2 °C)  Pulse:  [74-86] 81  Resp:  [16-22] 20  SpO2:  [91 %-100 %] 91 %  BP: (148-222)/(67-98) 148/69     Weight: 100.8 kg (222 lb 3.6 oz)  Body mass index is 40.65 kg/m².    Physical Exam   Constitutional: She is oriented to person, place, and time. She appears well-developed and well-nourished. No distress.   HENT:   Head: Normocephalic and atraumatic.   Eyes: Conjunctivae and EOM are normal.   Neck: Normal range of motion. Neck supple.   Cardiovascular: Normal rate and regular rhythm.   Pulmonary/Chest: No respiratory distress. She has wheezes. She has no rales.   Increased WOB  Sating in high 90s on RA   Abdominal: Soft. She exhibits no distension. There is no tenderness.   Musculoskeletal:   Left Anders fistula in place with strong palpable thrill  L radial 2+  R radial 2+   Neurological: She is alert and oriented to person, place, and time.   Skin: Skin is warm and dry.   Psychiatric: She has a normal mood and affect. Her behavior is normal.       Significant Labs:  BMP:   Recent Labs    Lab  09/10/18   0356   GLU  277*   NA  137   K  5.0   CL  114*   CO2  14*   BUN  61*   CREATININE  4.2*   CALCIUM  8.1*     Cardiac markers:   Recent Labs   Lab  09/09/18   1726   TROPONINI  0.014     CBC:   Recent Labs   Lab  09/10/18   0356   WBC  6.85   RBC  3.05*   HGB  7.4*   HCT  24.3*   PLT  261   MCV  80*   MCH  24.3*   MCHC  30.5*     Coagulation:   Recent Labs   Lab  09/09/18 1726   LABPROT  10.8   INR  1.0       Significant Diagnostics:  N/A

## 2018-09-10 NOTE — NURSING
Pt arrived on unit to room 929B via stretcher form ED, pt oriented to room. Pt walked to bed with standby assist. Pt denies any chest pain of other discomfort. VS per flow sheet.

## 2018-09-11 ENCOUNTER — TELEPHONE (OUTPATIENT)
Dept: OPHTHALMOLOGY | Facility: CLINIC | Age: 63
End: 2018-09-11

## 2018-09-11 VITALS
TEMPERATURE: 98 F | RESPIRATION RATE: 18 BRPM | WEIGHT: 206.88 LBS | HEIGHT: 62 IN | SYSTOLIC BLOOD PRESSURE: 143 MMHG | DIASTOLIC BLOOD PRESSURE: 65 MMHG | BODY MASS INDEX: 38.07 KG/M2 | HEART RATE: 58 BPM | OXYGEN SATURATION: 99 %

## 2018-09-11 PROBLEM — I51.89 DIASTOLIC DYSFUNCTION: Status: ACTIVE | Noted: 2018-09-11

## 2018-09-11 LAB
ALBUMIN SERPL BCP-MCNC: 2.7 G/DL
ALLENS TEST: ABNORMAL
ALP SERPL-CCNC: 63 U/L
ALT SERPL W/O P-5'-P-CCNC: 14 U/L
ANION GAP SERPL CALC-SCNC: 7 MMOL/L
ANION GAP SERPL CALC-SCNC: 8 MMOL/L
AST SERPL-CCNC: 9 U/L
BASOPHILS # BLD AUTO: 0.01 K/UL
BASOPHILS NFR BLD: 0.1 %
BILIRUB DIRECT SERPL-MCNC: 0.1 MG/DL
BILIRUB SERPL-MCNC: 0.2 MG/DL
BUN SERPL-MCNC: 70 MG/DL
BUN SERPL-MCNC: 72 MG/DL
CALCIUM SERPL-MCNC: 8 MG/DL
CALCIUM SERPL-MCNC: 8.1 MG/DL
CHLORIDE SERPL-SCNC: 112 MMOL/L
CHLORIDE SERPL-SCNC: 116 MMOL/L
CO2 SERPL-SCNC: 16 MMOL/L
CO2 SERPL-SCNC: 17 MMOL/L
CREAT SERPL-MCNC: 4.5 MG/DL
CREAT SERPL-MCNC: 4.8 MG/DL
DELSYS: ABNORMAL
DIFFERENTIAL METHOD: ABNORMAL
EOSINOPHIL # BLD AUTO: 0 K/UL
EOSINOPHIL NFR BLD: 0 %
ERYTHROCYTE [DISTWIDTH] IN BLOOD BY AUTOMATED COUNT: 22.9 %
ERYTHROCYTE [SEDIMENTATION RATE] IN BLOOD BY WESTERGREN METHOD: 16 MM/H
EST. GFR  (AFRICAN AMERICAN): 10.3 ML/MIN/1.73 M^2
EST. GFR  (AFRICAN AMERICAN): 11.2 ML/MIN/1.73 M^2
EST. GFR  (NON AFRICAN AMERICAN): 9 ML/MIN/1.73 M^2
EST. GFR  (NON AFRICAN AMERICAN): 9.7 ML/MIN/1.73 M^2
FIO2: 21
GLUCOSE SERPL-MCNC: 186 MG/DL
GLUCOSE SERPL-MCNC: 267 MG/DL
HCO3 UR-SCNC: 17.7 MMOL/L (ref 24–28)
HCT VFR BLD AUTO: 25.9 %
HGB BLD-MCNC: 8 G/DL
IMM GRANULOCYTES # BLD AUTO: 0.26 K/UL
IMM GRANULOCYTES NFR BLD AUTO: 2 %
LYMPHOCYTES # BLD AUTO: 1.5 K/UL
LYMPHOCYTES NFR BLD: 11.4 %
MCH RBC QN AUTO: 24.6 PG
MCHC RBC AUTO-ENTMCNC: 30.9 G/DL
MCV RBC AUTO: 80 FL
MODE: ABNORMAL
MONOCYTES # BLD AUTO: 1 K/UL
MONOCYTES NFR BLD: 7.8 %
NEUTROPHILS # BLD AUTO: 10.2 K/UL
NEUTROPHILS NFR BLD: 78.7 %
NRBC BLD-RTO: 0 /100 WBC
PCO2 BLDA: 36.2 MMHG (ref 35–45)
PH SMN: 7.3 [PH] (ref 7.35–7.45)
PHOSPHATE SERPL-MCNC: 4.4 MG/DL
PHOSPHATE SERPL-MCNC: 4.6 MG/DL
PLATELET # BLD AUTO: 283 K/UL
PMV BLD AUTO: 10.4 FL
PO2 BLDA: 49 MMHG (ref 40–60)
POC BE: -9 MMOL/L
POC SATURATED O2: 81 % (ref 95–100)
POC TCO2: 19 MMOL/L (ref 24–29)
POCT GLUCOSE: 128 MG/DL (ref 70–110)
POCT GLUCOSE: 93 MG/DL (ref 70–110)
POTASSIUM SERPL-SCNC: 4.7 MMOL/L
POTASSIUM SERPL-SCNC: 5.3 MMOL/L
PROT SERPL-MCNC: 7.7 G/DL
RBC # BLD AUTO: 3.25 M/UL
SAMPLE: ABNORMAL
SITE: ABNORMAL
SODIUM SERPL-SCNC: 136 MMOL/L
SODIUM SERPL-SCNC: 140 MMOL/L
SP02: 96
WBC # BLD AUTO: 12.98 K/UL

## 2018-09-11 PROCEDURE — G8988 SELF CARE GOAL STATUS: HCPCS | Mod: CJ

## 2018-09-11 PROCEDURE — 80069 RENAL FUNCTION PANEL: CPT

## 2018-09-11 PROCEDURE — 25000242 PHARM REV CODE 250 ALT 637 W/ HCPCS: Performed by: STUDENT IN AN ORGANIZED HEALTH CARE EDUCATION/TRAINING PROGRAM

## 2018-09-11 PROCEDURE — 25000003 PHARM REV CODE 250: Performed by: STUDENT IN AN ORGANIZED HEALTH CARE EDUCATION/TRAINING PROGRAM

## 2018-09-11 PROCEDURE — 80069 RENAL FUNCTION PANEL: CPT | Mod: 91

## 2018-09-11 PROCEDURE — 94640 AIRWAY INHALATION TREATMENT: CPT

## 2018-09-11 PROCEDURE — 85025 COMPLETE CBC W/AUTO DIFF WBC: CPT

## 2018-09-11 PROCEDURE — G8987 SELF CARE CURRENT STATUS: HCPCS | Mod: CJ

## 2018-09-11 PROCEDURE — 99239 HOSP IP/OBS DSCHRG MGMT >30: CPT | Mod: GC,,, | Performed by: HOSPITALIST

## 2018-09-11 PROCEDURE — 99900035 HC TECH TIME PER 15 MIN (STAT)

## 2018-09-11 PROCEDURE — 80076 HEPATIC FUNCTION PANEL: CPT

## 2018-09-11 PROCEDURE — 99232 SBSQ HOSP IP/OBS MODERATE 35: CPT | Mod: ,,, | Performed by: SURGERY

## 2018-09-11 PROCEDURE — 97165 OT EVAL LOW COMPLEX 30 MIN: CPT

## 2018-09-11 PROCEDURE — 25000003 PHARM REV CODE 250: Performed by: INTERNAL MEDICINE

## 2018-09-11 PROCEDURE — G8989 SELF CARE D/C STATUS: HCPCS | Mod: CJ

## 2018-09-11 PROCEDURE — 63600175 PHARM REV CODE 636 W HCPCS: Performed by: STUDENT IN AN ORGANIZED HEALTH CARE EDUCATION/TRAINING PROGRAM

## 2018-09-11 PROCEDURE — 99232 SBSQ HOSP IP/OBS MODERATE 35: CPT | Mod: ,,, | Performed by: INTERNAL MEDICINE

## 2018-09-11 PROCEDURE — 36415 COLL VENOUS BLD VENIPUNCTURE: CPT

## 2018-09-11 RX ORDER — SODIUM BICARBONATE 650 MG/1
1300 TABLET ORAL 3 TIMES DAILY
Status: CANCELLED | OUTPATIENT
Start: 2018-09-11

## 2018-09-11 RX ORDER — NEBIVOLOL 10 MG/1
20 TABLET ORAL DAILY
Qty: 40 TABLET | Refills: 3 | Status: SHIPPED | OUTPATIENT
Start: 2018-09-11 | End: 2022-05-04

## 2018-09-11 RX ORDER — BUMETANIDE 1 MG/1
2 TABLET ORAL 2 TIMES DAILY
Qty: 60 TABLET | Refills: 3 | Status: SHIPPED | OUTPATIENT
Start: 2018-09-11 | End: 2022-05-04

## 2018-09-11 RX ORDER — IPRATROPIUM BROMIDE AND ALBUTEROL SULFATE 2.5; .5 MG/3ML; MG/3ML
3 SOLUTION RESPIRATORY (INHALATION) ONCE
Status: COMPLETED | OUTPATIENT
Start: 2018-09-11 | End: 2018-09-11

## 2018-09-11 RX ORDER — SODIUM BICARBONATE 650 MG/1
1300 TABLET ORAL 2 TIMES DAILY
Qty: 120 TABLET | Refills: 0 | COMMUNITY
Start: 2018-09-11 | End: 2019-02-05

## 2018-09-11 RX ORDER — LOPERAMIDE HYDROCHLORIDE 2 MG/1
2 CAPSULE ORAL 4 TIMES DAILY PRN
COMMUNITY
End: 2022-05-04

## 2018-09-11 RX ORDER — NEBIVOLOL 10 MG/1
20 TABLET ORAL DAILY
Status: DISCONTINUED | OUTPATIENT
Start: 2018-09-11 | End: 2018-09-11 | Stop reason: HOSPADM

## 2018-09-11 RX ADMIN — NEBIVOLOL HYDROCHLORIDE 20 MG: 10 TABLET ORAL at 09:09

## 2018-09-11 RX ADMIN — SODIUM BICARBONATE 650 MG TABLET 1300 MG: at 09:09

## 2018-09-11 RX ADMIN — AMLODIPINE BESYLATE 10 MG: 10 TABLET ORAL at 09:09

## 2018-09-11 RX ADMIN — HEPARIN SODIUM 5000 UNITS: 5000 INJECTION, SOLUTION INTRAVENOUS; SUBCUTANEOUS at 05:09

## 2018-09-11 RX ADMIN — FAMOTIDINE 20 MG: 20 TABLET ORAL at 09:09

## 2018-09-11 RX ADMIN — HYDRALAZINE HYDROCHLORIDE 25 MG: 25 TABLET ORAL at 05:09

## 2018-09-11 RX ADMIN — IPRATROPIUM BROMIDE AND ALBUTEROL SULFATE 3 ML: .5; 3 SOLUTION RESPIRATORY (INHALATION) at 03:09

## 2018-09-11 RX ADMIN — LEVOTHYROXINE SODIUM 75 MCG: 75 TABLET ORAL at 05:09

## 2018-09-11 RX ADMIN — ISOSORBIDE MONONITRATE 60 MG: 30 TABLET, EXTENDED RELEASE ORAL at 09:09

## 2018-09-11 NOTE — DISCHARGE INSTRUCTIONS
Please follow up with your primary Nephrologist and Vascular Surgeon; make an appointment within the next 2 weeks.

## 2018-09-11 NOTE — SUBJECTIVE & OBJECTIVE
Medications:  Continuous Infusions:  Scheduled Meds:   amLODIPine  10 mg Oral Daily    doxazosin  8 mg Oral QHS    famotidine  20 mg Oral Daily    fluticasone-vilanterol  1 puff Inhalation Daily    heparin (porcine)  5,000 Units Subcutaneous Q8H    insulin detemir U-100  55 Units Subcutaneous QHS    isosorbide mononitrate  60 mg Oral Daily    levothyroxine  75 mcg Oral Before breakfast    nebivolol  10 mg Oral Daily    predniSONE  40 mg Oral Daily    sodium bicarbonate  1,300 mg Oral BID     PRN Meds:acetaminophen, dextrose 50%, dextrose 50%, glucagon (human recombinant), glucose, glucose, hydrALAZINE, insulin aspart U-100, ramelteon, senna-docusate 8.6-50 mg, sodium chloride 0.9%     Objective:     Vital Signs (Most Recent):  Temp: 97 °F (36.1 °C) (09/11/18 0439)  Pulse: 69 (09/11/18 0708)  Resp: 20 (09/11/18 0603)  BP: (!) 170/82 (09/11/18 0623)  SpO2: 97 % (09/11/18 0439) Vital Signs (24h Range):  Temp:  [96.1 °F (35.6 °C)-99 °F (37.2 °C)] 97 °F (36.1 °C)  Pulse:  [69-82] 69  Resp:  [18-20] 20  SpO2:  [90 %-99 %] 97 %  BP: (129-189)/(59-82) 170/82          Physical Exam   Constitutional: She is oriented to person, place, and time. She appears well-developed and well-nourished. No distress.   HENT:   Head: Normocephalic and atraumatic.   Eyes: EOM are normal.   Cardiovascular: Normal rate and regular rhythm.   Pulmonary/Chest: Effort normal. No respiratory distress.   Neurological: She is alert and oriented to person, place, and time.   Psychiatric: She has a normal mood and affect. Her behavior is normal. Judgment and thought content normal.   Nursing note and vitals reviewed.      Significant Labs:  All pertinent labs from the last 24 hours have been reviewed.    Significant Diagnostics:  I have reviewed all pertinent imaging results/findings within the past 24 hours.

## 2018-09-11 NOTE — PLAN OF CARE
Problem: Patient Care Overview  Goal: Plan of Care Review  Outcome: Ongoing (interventions implemented as appropriate)  Patient AAOx4, with some confusion at night.  Patient able to change position in bed independently.  Safety precautions taken.  Vital signs stable (see flowsheets).  No major events this shift.  Will continue to monitor.

## 2018-09-11 NOTE — PT/OT/SLP EVAL
Occupational Therapy   Evaluation and Discharge Note    Name: Malaika Paige  MRN: [id number]  Admitting Diagnosis:  Shortness of breath      Recommendations:     Discharge Recommendations: home with home health  Discharge Equipment Recommendations:  none  Barriers to discharge:  None    History:     Occupational Profile:  Living Environment: Pt lives with family in a house  Previous level of function: (I)  Equipment Owned:  none  Assistance upon Discharge: available as needed    Past Medical History:   Diagnosis Date    Cataract     Cataract     CHF (congestive heart failure)     COPD (chronic obstructive pulmonary disease)     Diabetes mellitus     Diabetic retinopathy     Hypertension        Past Surgical History:   Procedure Laterality Date    CATARACT EXTRACTION W/  INTRAOCULAR LENS IMPLANT Right 08/14/2017    Dr. Moe    INSERTION-INTRAOCULAR LENS (IOL) Right 8/14/2017    Performed by [facility] at [facility] OR    PHACOEMULSIFICATION-ASPIRATION-CATARACT Right 8/14/2017    Performed by Katie Moe MD at [facility] OR       Subjective     Chief Complaint: none  Patient/Family stated goals: get better and go home  Communicated with: RN prior to session.  Pain/Comfort:  · Pain Rating 1: 0/10  · Pain Rating Post-Intervention 1: 0/10    Patients cultural, spiritual, Syn[employer] conflicts given the current situation: none stated    Objective:     Patient found with: (none)    General Precautions: Standard, fall   Orthopedic Precautions:N/A   Braces: N/A     Occupational Performance:    Bed Mobility:    · NT, found and left UIC    Functional Mobility/Transfers:  · Patient completed Sit <> Stand Transfer with independence  with  no assistive device   · Patient completed Toilet Transfer Stand Pivot technique with independence with  no AD  · Functional Mobility: (I) with no AD    Activities of Daily Living:  · Toileting: independence at toilet    Cognitive/Visual Perceptual:  Cognitive/Psychosocial Skills:     -   "     Oriented to: Person, Place, Time and Situation   -       Follows Commands/attention:Follows multistep  commands  -       Communication: clear/fluent  -       Memory: No Deficits noted  -       Safety awareness/insight to disability: intact   -       Mood/Affect/Coping skills/emotional control: Appropriate to situation  Visual/Perceptual:      -Intact      Physical Exam:  Balance:    -       Good/Fair  Postural examination/scapula alignment:    -       No postural abnormalities identified  Dominant hand:    -       R  Upper Extremity Range of Motion:     -       Right Upper Extremity: WFL    -       Left Upper Extremity: WFL      Upper Extremity Strength:    -       Right Upper Extremity: WFL  -       Left Upper Extremity: WFL     Strength:    -       Right Upper Extremity: WFL    -       Left Upper Extremity: WFL    Fine Motor Coordination:    -       Intact  Gross motor coordination:   WFL    Patient left up in chair with all lines intact and call button in reach    [facility] 6 Click:  [facility] Total Score: 22    Treatment & Education:  Pt ed re OT role and POC. Pt ed re safety.  Education:    Assessment:     Malaika Paige is a 62 y.o. female with a medical diagnosis of Shortness of breath. At this time, patient is functioning near their prior level of function and does not require further acute OT services. Pt would benefit from HHOT to ensure full return of independence and safety.    Clinical Decision Makin.  OT Low:  "Pt evaluation falls under low complexity for evaluation coding due to performance deficits noted in 1-3 areas as stated above and 0 co-morbities affecting current functional status. Data obtained from problem focused assessments. No modifications or assistance was required for completion of evaluation. Only brief occupational profile and history review completed."     Plan:     During this hospitalization, patient does not require further acute OT services.  Please re-consult if " situation changes.    · Plan of Care Reviewed with: patient    This Plan of care has been discussed with the patient who was involved in its development and understands and is in agreement with the identified goals and treatment plan    GOALS:   Multidisciplinary Problems     Occupational Therapy Goals     Not on file          Multidisciplinary Problems (Resolved)        Problem: Occupational Therapy Goal    Goal Priority Disciplines Outcome Interventions   Occupational Therapy Goal   (Resolved)     OT, PT/OT Outcome(s) achieved                    Time Tracking:     OT Date of Treatment: 09/11/18  OT Start Time: 1027  OT Stop Time: 1035  OT Total Time (min): 8 min    Billable Minutes:Evaluation 8 minutes    ROMAN aSmpson  9/11/2018  Pager: [phone]

## 2018-09-11 NOTE — DISCHARGE SUMMARY
[facility] Hospital Medicine  Discharge Summary      Patient Name: Malaika Paige  MRN: [id number]  Admission Date: 9/9/2018  Hospital Length of Stay: 2 days  Discharge Date and Time:  09/11/2018 3:39 PM  Attending Physician: Jacob Pruett, *   Discharging Provider: Carlos Diaz MD  Primary Care Provider: Eze Greenberg MD  Hospital Medicine Team: [facility] 1 Carlos Diaz MD    HPI:   Ms.Verlean Paige is a 62 year old female with CKD stage V who had a fistula placed on July 2 2018 but has not yet started dialysis, who also has HTN, DM 2 on insulin, and COPD. She was admitted on 9/9 from the ED for renal failure.     Ms. Paige presented to the ED for evaluation of shortness of breath, which has been ongoing for several years but has worsened over the past few days. She reports significant dyspnea on exertion, reporting dyspnea after walking down the gold. She also reports orthopnea and cannot lie flat to sleep. She has had a dry cough over the past month, but denies any sputum production, fever/chills, or nausea. No chest pain. She has had worsening bilateral lower extremity edema. Upon initial evaluation in the ED, Ms. Paige was found to have pH of 7.2 and bicarb of 11. She was given breathing treatments for wheezing but did not require supplemental oxygen.     Patient lives in [place] with her family. She denies any tobacco or alcohol use.     * No surgery found *      Hospital Course:   Admitted for worsening exertional dyspnea for several days. Found to be volume overloaded on exam with CHRIS, Nephrology consulted, given IV lasix with good diuresis and resolution of symptoms. 2D Echo showed LVEF 65%, grade II diastolic dysfunction. Vascular Surgery consulted to assess AVF in case of need for RRT; advised that AVF not yet ready but Nephrology stated no need for dialysis given her good response to diuretics.    Discharged 09/11 with bumetanide 2 mg BID from 1 mg daily with f/u with  her Nephrologist and Vascular Surgeon in [place].     Interval History: Feels well today - denies orthopnea, lying comfortably in bed, at baseline.     Review of Systems   Constitutional: Negative for fatigue.   HENT: Negative for congestion, ear pain and sore throat.    Eyes: Negative for pain.   Respiratory: Positive for cough. Negative for chest tightness, shortness of breath and stridor.    Cardiovascular: Negative for chest pain, palpitations and leg swelling.   Gastrointestinal: Negative for abdominal distention, abdominal pain, diarrhea, nausea and vomiting.   Endocrine: Negative for polydipsia and polyuria.   Genitourinary: Negative for decreased urine volume, difficulty urinating, dysuria, enuresis, hematuria and pelvic pain.   Musculoskeletal: Negative for back pain, joint swelling and neck stiffness.   Skin: Negative for color change, pallor and rash.   Neurological: Negative for syncope, weakness and light-headedness.   Psychiatric/Behavioral: Negative for agitation, confusion and hallucinations.      Objective:      Vital Signs (Most Recent):  Temp: 97.8 °F (36.6 °C) (09/11/18 1302)  Pulse: 61 (09/11/18 1302)  Resp: 20 (09/11/18 1302)  BP: (!) 143/65 (09/11/18 1302)  SpO2: 97 % (09/11/18 1302) Vital Signs (24h Range):  Temp:  [96.1 °F (35.6 °C)-97.9 °F (36.6 °C)] 97.8 °F (36.6 °C)  Pulse:  [61-81] 61  Resp:  [14-20] 20  SpO2:  [90 %-99 %] 97 %  BP: (116-189)/(51-82) 143/65      Weight: 93.8 kg (206 lb 14.4 oz)  Body mass index is 37.84 kg/m².     Intake/Output Summary (Last 24 hours) at 9/11/2018 1525  Last data filed at 9/11/2018 0500      Gross per 24 hour   Intake 600 ml   Output 1650 ml   Net -1050 ml      Physical Exam   Constitutional: She is oriented to person, place, and time. No distress.   HENT:   Head: Normocephalic and atraumatic.   Right Ear: External ear normal.   Left Ear: External ear normal.   Eyes: Right eye exhibits no discharge. Left eye exhibits no discharge.   Cardiovascular:  Normal rate.   Bilateral lower extremity edema  LLA AVF    Pulmonary/Chest: Effort normal. She has no rales.   Abdominal: Soft.   Neurological: She is alert and oriented to person, place, and time.   Skin: Skin is warm.   Psychiatric: She has a normal mood and affect. Judgment normal.         Significant Labs:   CBC:         Recent Labs   Lab  09/09/18   1726  09/10/18   0356  09/11/18   0434   WBC  7.46  6.85  12.98*   HGB  7.8*  7.4*  8.0*   HCT  24.6*  24.3*  25.9*   PLT  244  261  283      CMP:   Recent Labs   Lab  09/09/18   1726    09/10/18   0356    09/10/18   1734  09/11/18   0007  09/11/18   0434   NA  138   < >  137   < >  137  136  140   K  5.5*   < >  5.0   < >  5.2*  5.3*  4.7   CL  118*   < >  114*   < >  113*  112*  116*   CO2  11*   < >  14*   < >  17*  17*  16*   GLU  199*   < >  277*   < >  216*  267*  186*   BUN  57*   < >  61*   < >  63*  70*  72*   CREATININE  4.4*   < >  4.2*   < >  4.5*  4.8*  4.5*   CALCIUM  8.0*   < >  8.1*   < >  8.3*  8.0*  8.1*   PROT  8.3   --   8.0   --    --    --   7.7   ALBUMIN  2.9*   < >  2.7*  2.7*   < >  2.8*  2.7*  2.7*  2.7*   BILITOT  0.3   --   0.3   --    --    --   0.2   ALKPHOS  69   --   70   --    --    --   63   AST  20   --   12   --    --    --   9*   ALT  18   --   17   --    --    --   14   ANIONGAP  9   < >  9   < >  7*  7*  8   EGFRNONAA  10.0*   < >  10.5*   < >  9.7*  9.0*  9.7*    < > = values in this interval not displayed.         Significant Imaging: I have reviewed all pertinent imaging results/findings within the past 24 hours.      Consults:   Consults (From admission, onward)        Status Ordering Provider     Inpatient consult to Nephrology  Once     Provider:  (Not yet assigned)    Completed PACHECO BISWAS     Inpatient consult to Vascular Surgery  Once     Provider:  (Not yet assigned)    Completed ABHI GUTIERREZ          * Shortness of breath    Improved with diuretics - suspect cardiorenal syndrome (LVEF 65%; grade  II diastolid dysfunction) as pt had copious UO and resolution of dyspnea with diuresis. In the setting of worsening renal disease this more likely represents progression presenting as ADCHF    Discharged with increased dose of home bumetanide (2 mg BID from 1 mg daily); will f/u with Nephrologist, Vascular Surgery          Diastolic dysfunction    LVEF preserved, suspect pt went into heart failure in the setting of worsening renal disease. Discharged on higher dose of bumetanide (2 mg BID from 1 mg daily) and increased nebivolol          Chronic kidney disease-mineral and bone disorder              CKD (chronic kidney disease) stage 5, GFR less than 15 ml/min              COPD (chronic obstructive pulmonary disease)    Home meds: budesonide/formeterol, wrote for albuterol inhaler    - continue home meds  - duonebs q4h            Hypertensive emergency    HYPERTENSION    Home meds: isosorbide mononitrate 60mg qhs, amlodipine 10mg daily, doxazosin 8mg qhs, nebivolol 10mg qhs  Discharge meds: isosorbide mononitrate 60 mg qHS, amlodipine 10 mg daily, nebivolol 20 mg qHS - increased beta blocker in the setting of HFpEF            Final Active Diagnoses:    Diagnosis Date Noted POA    PRINCIPAL PROBLEM:  Shortness of breath [R06.02] 09/09/2018 Yes    Diastolic dysfunction [I51.9] 09/11/2018 Yes    CKD (chronic kidney disease) stage 5, GFR less than 15 ml/min [N18.5] 09/10/2018 Yes    Chronic kidney disease-mineral and bone disorder [N18.9, E83.9, M89.9] 09/10/2018 Yes    Anemia associated with chronic renal failure [D63.1] 09/10/2018 Yes    ESRD (end stage renal disease) [N18.6] 09/09/2018 No    Hypertensive emergency [I16.1] 09/09/2018 Yes    COPD (chronic obstructive pulmonary disease) [J44.9] 09/09/2018 Yes    Microcytic anemia [D50.9] 09/09/2018 Yes    Metabolic acidosis [E87.2] 09/09/2018 Yes    Hyperkalemia [E87.5] 09/09/2018 Yes    Type 2 diabetes mellitus, with long-term current use of insulin [E11.9,  Z79.4] 01/09/2018 Not Applicable      Problems Resolved During this Admission:       Discharged Condition: good    Disposition: Home-Health Care [facility]    Follow Up:  Follow-up Information     Dav Maravilla MD.    Specialty:  Nephrology  Why:  Hospital Follow-up with Nephologist  Contact information:  [address]  [employer]  [facility] [address] [phone]             [facility].    Why:  Home Health  Contact information:  [address]  [address]  [facility] [address]  [phone]             Eze Greenberg MD.    Specialty:  Internal Medicine  Contact information:  [address] [facility] [address]  [phone]                 Patient Instructions:   No discharge procedures on file.    Significant Diagnostic Studies: Labs: All labs within the past 24 hours have been reviewed    Pending Diagnostic Studies:     None         Medications:  Reconciled Home Medications:      Medication List      START taking these medications    albuterol sulfate 90 mcg/actuation Aepb  Inhale 180 mcg into the lungs every 4 (four) hours. Rescue     sodium bicarbonate 650 MG tablet  Take 2 tablets (1,300 mg total) by mouth 2 (two) times daily.        CHANGE how you take these medications    bumetanide 1 MG tablet  Commonly known as:  BUMEX  Take 2 tablets (2 mg total) by mouth 2 (two) times daily.  What changed:    · how much to take  · when to take this     nebivolol 10 MG Tab  Commonly known as:  BYSTOLIC  Take 2 tablets (20 mg total) by mouth once daily.  What changed:  how much to take        CONTINUE taking these medications    amLODIPine 10 MG tablet  Commonly known as:  NORVASC  Take 10 mg by mouth once daily.     aspirin 81 MG EC tablet  Commonly known as:  ECOTRIN  Take 81 mg by mouth once daily.     budesonide-formoterol 160-4.5 mcg 160-4.5 mcg/actuation Hfaa  Commonly known as:  SYMBICORT  Inhale 2 puffs into the lungs every 12 (twelve) hours. Controller      HYDROcodone-acetaminophen 5-325 mg per tablet  Commonly known as:  NORCO  Take 1 tablet by mouth every 6 (six) hours as needed for Pain.     isosorbide mononitrate 60 MG 24 hr tablet  Commonly known as:  IMDUR  Take 60 mg by mouth once daily.     levothyroxine 75 MCG tablet  Commonly known as:  SYNTHROID  Take 75 mcg by mouth once daily.     loperamide 2 mg capsule  Commonly known as:  IMODIUM  Take 2 mg by mouth 4 (four) times daily as needed for Diarrhea.     omeprazole 20 MG capsule  Commonly known as:  PRILOSEC  Take 20 mg by mouth once daily.     TRESIBA FLEXTOUCH U-200 200 unit/mL (3 mL) Inpn  Generic drug:  insulin degludec  Inject 80 Units into the skin once daily.        STOP taking these medications    JENNIFER-SELTZER HEARTBURN ORAL     doxazosin 8 mg 24 hr tablet  Commonly known as:  CARDURA XL     furosemide 20 MG tablet  Commonly known as:  LASIX            Indwelling Lines/Drains at time of discharge:   Lines/Drains/Airways     Airway                 Airway - Non-Surgical 08/14/17 1104 Nasal Cannula 393 days                Time spent on the discharge of patient: 60 minutes  Patient was seen and examined on the date of discharge and determined to be suitable for discharge.         Carlos Diaz MD  Department of Hospital Medicine  [facility]

## 2018-09-11 NOTE — ASSESSMENT & PLAN NOTE
61 yo F with multiple comorbidities including CKD who presents with acute on chronic renal failure and volume overload. Patient is s/p Left Anders RC AVF on 7/2/18 and will may need HD this hospital stay. Vascular surgery asked to assess if AVF is usable at this time. AVF has a strong palpable thrill.    - Based on results from VAS HD access doppler 9/10, patient's AV fistula is not mature enough for hemodialysis access at this time  - If urgent need for dialysis, consider central venous access  - Urged patient to follow-up with her primary vascular surgeon in [place] upon discharge  - Contact Vascular Surgery for any further questions or concerns

## 2018-09-11 NOTE — PROGRESS NOTES
[facility]  Nephrology  Progress Note    Patient Name: Malaika Paige  MRN: [id number]  Admission Date: 9/9/2018  Hospital Length of Stay: 2 days  Attending Provider: Jacob Pruett, *   Primary Care Physician: Eze Greenberg MD  Principal Problem:Shortness of breath    Subjective:     HPI: Grecia Dai is a 62 year old woman with CKD stage V  Which has been followed by Narayan, who had a fistula placed on July 2 2018 but has not yet started dialysis. Also has HTN, T2DM insulin dependent, and COPD. She was admitted on 9/9 from the ED for renal failure. Arrived to ED for evaluation of shortness of breath, which has been ongoing for several years but has worsened over the past few days. She reports significant dyspnea on exertion,an on occasions has been with chest discomfort. Denies any sputum production, fever/chills, nausea, vomiting diarrhea, dysuria or hematuria. On arrival presented with hypertension. Labs with hyperkalemia 5.5 which she received insulin + glucose and Lasix x 2(120 mg and 80 mg), UOP has been 4.7 L yesterday. Nephrology was consulted for probable need of start dialysis.         Interval History:   Patient evaluated at bedside, no significant event overnight. Has been making plenty of UOP 2.9 L.     Review of patient's allergies indicates:  No Known Allergies  Current Facility-Administered Medications   Medication Frequency    acetaminophen tablet 650 mg Q4H PRN    amLODIPine tablet 10 mg Daily    dextrose 50% injection 12.5 g PRN    dextrose 50% injection 25 g PRN    famotidine tablet 20 mg Daily    fluticasone-vilanterol 100-25 mcg/dose diskus inhaler 1 puff Daily    glucagon (human recombinant) injection 1 mg PRN    glucose chewable tablet 16 g PRN    glucose chewable tablet 24 g PRN    heparin (porcine) injection 5,000 Units Q8H    hydrALAZINE tablet 25 mg Q8H PRN    insulin aspart U-100 pen 0-5 Units QID (AC + HS) PRN    insulin detemir U-100 pen 55  Units QHS    isosorbide mononitrate 24 hr tablet 60 mg Daily    levothyroxine tablet 75 mcg Before breakfast    nebivolol tablet 20 mg Daily    ramelteon tablet 8 mg Nightly PRN    senna-docusate 8.6-50 mg per tablet 1 tablet BID PRN    sodium bicarbonate tablet 1,300 mg BID    sodium chloride 0.9% flush 5 mL PRN       Objective:     Vital Signs (Most Recent):  Temp: 97.8 °F (36.6 °C) (09/11/18 1302)  Pulse: 61 (09/11/18 1302)  Resp: 20 (09/11/18 1302)  BP: (!) 143/65 (09/11/18 1302)  SpO2: 97 % (09/11/18 1302)  O2 Device (Oxygen Therapy): room air (09/11/18 1302) Vital Signs (24h Range):  Temp:  [96.1 °F (35.6 °C)-97.9 °F (36.6 °C)] 97.8 °F (36.6 °C)  Pulse:  [61-81] 61  Resp:  [14-20] 20  SpO2:  [90 %-99 %] 97 %  BP: (116-189)/(51-82) 143/65     Weight: 93.8 kg (206 lb 14.4 oz) (09/11/18 0603)  Body mass index is 37.84 kg/m².  Body surface area is 2.03 meters squared.    I/O last 3 completed shifts:  In: 1000 [P.O.:1000]  Out: 7665 [Urine:7665]    Physical Exam   Constitutional: She is oriented to person, place, and time. No distress.   HENT:   Head: Normocephalic and atraumatic.   Right Ear: External ear normal.   Left Ear: External ear normal.   Eyes: Right eye exhibits no discharge. Left eye exhibits no discharge.   Cardiovascular: Normal rate.   Bilateral lower extremity edema  LLA AVF    Pulmonary/Chest: Effort normal. She has rales.   Abdominal: Soft.   Neurological: She is alert and oriented to person, place, and time.   Skin: Skin is warm.   Psychiatric: She has a normal mood and affect. Judgment normal.       Significant Labs:  ABGs:   Recent Labs   Lab  09/11/18   0016   PH  7.298*   PCO2  36.2   HCO3  17.7*   POCSATURATED  81*   BE  -9     BMP:   Recent Labs   Lab  09/11/18   0434   GLU  186*   CL  116*   CO2  16*   BUN  72*   CREATININE  4.5*   CALCIUM  8.1*     CBC:   Recent Labs   Lab  09/11/18 0434   WBC  12.98*   RBC  3.25*   HGB  8.0*   HCT  25.9*   PLT  283   MCV  80*   MCH  24.6*   MCHC   30.9*     CMP:   Recent Labs   Lab  09/11/18   0434   GLU  186*   CALCIUM  8.1*   ALBUMIN  2.7*  2.7*   PROT  7.7   NA  140   K  4.7   CO2  16*   CL  116*   BUN  72*   CREATININE  4.5*   ALKPHOS  63   ALT  14   AST  9*   BILITOT  0.2     All labs within the past 24 hours have been reviewed.       Assessment/Plan:     Anemia associated with chronic renal failure    - Hgb 8 (goal 10-11)  - Would benefit from IV iron infusion         Chronic kidney disease-mineral and bone disorder    - PO 4 levels at normal range no need for binders         CKD (chronic kidney disease) stage 5, GFR less than 15 ml/min    Malaika Paige is a CKD stage V which most likely secondary to diabetes and blood pressure. Nephrology was consulted if probable need for dialysis inpatient.     Plan:  - serum creatinine has been around 4-4.5 (baseline is unknown)  - Has a s/p Left Anders RC AVF on 7/2/18 , Per vascular still is not mature  - Blood pressures better controlled   - Acid/base: with acidosis in which will be started on sodium bicarbonate 1,300 mg q TID  - Continue to monitor intake and output. UOP 2.9 L after lasix  - For diuretics may stay with Lasix 80 mg q BID   - Echo with preserved EF and grade 2 diastolic dysfunction , P-HTN   - Avoid nephrotoxic medications   - Renal diet (low on sodium, low on potassium)  - No need for started on RRT         Hyperkalemia    - Resolved, currently with a potassium 4.7   - Need to be on strict low potassium diet         Metabolic acidosis    - See CKD             Abhi Simmons  Nephrology  Fellow  [facility]    Pager [phone]

## 2018-09-11 NOTE — SUBJECTIVE & OBJECTIVE
Interval History: Feels well today - denies orthopnea, lying comfortably in bed, at baseline.    Review of Systems   Constitutional: Negative for fatigue.   HENT: Negative for congestion, ear pain and sore throat.    Eyes: Negative for pain.   Respiratory: Positive for cough. Negative for chest tightness, shortness of breath and stridor.    Cardiovascular: Negative for chest pain, palpitations and leg swelling.   Gastrointestinal: Negative for abdominal distention, abdominal pain, diarrhea, nausea and vomiting.   Endocrine: Negative for polydipsia and polyuria.   Genitourinary: Negative for decreased urine volume, difficulty urinating, dysuria, enuresis, hematuria and pelvic pain.   Musculoskeletal: Negative for back pain, joint swelling and neck stiffness.   Skin: Negative for color change, pallor and rash.   Neurological: Negative for syncope, weakness and light-headedness.   Psychiatric/Behavioral: Negative for agitation, confusion and hallucinations.     Objective:     Vital Signs (Most Recent):  Temp: 97.8 °F (36.6 °C) (09/11/18 1302)  Pulse: 61 (09/11/18 1302)  Resp: 20 (09/11/18 1302)  BP: (!) 143/65 (09/11/18 1302)  SpO2: 97 % (09/11/18 1302) Vital Signs (24h Range):  Temp:  [96.1 °F (35.6 °C)-97.9 °F (36.6 °C)] 97.8 °F (36.6 °C)  Pulse:  [61-81] 61  Resp:  [14-20] 20  SpO2:  [90 %-99 %] 97 %  BP: (116-189)/(51-82) 143/65     Weight: 93.8 kg (206 lb 14.4 oz)  Body mass index is 37.84 kg/m².    Intake/Output Summary (Last 24 hours) at 9/11/2018 1525  Last data filed at 9/11/2018 0500  Gross per 24 hour   Intake 600 ml   Output 1650 ml   Net -1050 ml      Physical Exam   Constitutional: She is oriented to person, place, and time. No distress.   HENT:   Head: Normocephalic and atraumatic.   Right Ear: External ear normal.   Left Ear: External ear normal.   Eyes: Right eye exhibits no discharge. Left eye exhibits no discharge.   Cardiovascular: Normal rate.   Bilateral lower extremity edema  LLA AVF     Pulmonary/Chest: Effort normal. She has no rales.   Abdominal: Soft.   Neurological: She is alert and oriented to person, place, and time.   Skin: Skin is warm.   Psychiatric: She has a normal mood and affect. Judgment normal.       Significant Labs:   CBC:   Recent Labs   Lab  09/09/18 1726  09/10/18   0356  09/11/18   0434   WBC  7.46  6.85  12.98*   HGB  7.8*  7.4*  8.0*   HCT  24.6*  24.3*  25.9*   PLT  244  261  283     CMP:   Recent Labs   Lab  09/09/18   1726   09/10/18   0356   09/10/18   1734  09/11/18   0007  09/11/18   0434   NA  138   < >  137   < >  137  136  140   K  5.5*   < >  5.0   < >  5.2*  5.3*  4.7   CL  118*   < >  114*   < >  113*  112*  116*   CO2  11*   < >  14*   < >  17*  17*  16*   GLU  199*   < >  277*   < >  216*  267*  186*   BUN  57*   < >  61*   < >  63*  70*  72*   CREATININE  4.4*   < >  4.2*   < >  4.5*  4.8*  4.5*   CALCIUM  8.0*   < >  8.1*   < >  8.3*  8.0*  8.1*   PROT  8.3   --   8.0   --    --    --   7.7   ALBUMIN  2.9*   < >  2.7*  2.7*   < >  2.8*  2.7*  2.7*  2.7*   BILITOT  0.3   --   0.3   --    --    --   0.2   ALKPHOS  69   --   70   --    --    --   63   AST  20   --   12   --    --    --   9*   ALT  18   --   17   --    --    --   14   ANIONGAP  9   < >  9   < >  7*  7*  8   EGFRNONAA  10.0*   < >  10.5*   < >  9.7*  9.0*  9.7*    < > = values in this interval not displayed.       Significant Imaging: I have reviewed all pertinent imaging results/findings within the past 24 hours.

## 2018-09-11 NOTE — PHARMACY MED REC
"Admission Medication Reconciliation - Pharmacy Consult Note    The home medication history was taken by Sophia Perez, Pharmacy Technician.  Based on information gathered and subsequent review by the clinical pharmacist, the items below may need attention.     You may go to "Admission" then "Reconcile Home Medications" tabs to review and/or act upon these items.     Potentially problematic discrepancies with current MAR  o Patient IS taking the following which was not ordered upon admit  o Aspirin EC 81 mg PO daily   o Patient is taking a DIFFERENT DRUG than that ordered upon admit  o Omeprazole 20 mg PO daily (home regimen); inpatient order famotidine 20 mg PO daily     Potential issues to be addressed PRIOR TO DISCHARGE  o Therapeutic duplication: furosemide 20 mg PO daily and bumetanide 1 mg PO daily     Please address this information as you see fit.  Feel free to contact us if you have any questions or require assistance.    Melida Hill, PharmD, BCPS  [id number]     PTA Medications   Medication Sig    amlodipine (NORVASC) 10 MG tablet Take 10 mg by mouth once daily.    aspirin (ECOTRIN) 81 MG EC tablet Take 81 mg by mouth once daily.     budesonide-formoterol 160-4.5 mcg (SYMBICORT) 160-4.5 mcg/actuation HFAA Inhale 2 puffs into the lungs every 12 (twelve) hours. Controller    HYDROcodone-acetaminophen (NORCO) 5-325 mg per tablet Take 1 tablet by mouth every 6 (six) hours as needed for Pain.    insulin degludec (TRESIBA FLEXTOUCH U-200) 200 unit/mL (3 mL) InPn Inject 80 Units into the skin once daily.     isosorbide mononitrate (IMDUR) 60 MG 24 hr tablet Take 60 mg by mouth once daily.    levothyroxine (SYNTHROID) 75 MCG tablet Take 75 mcg by mouth once daily.     loperamide (IMODIUM) 2 mg capsule Take 2 mg by mouth 4 (four) times daily as needed for Diarrhea.    omeprazole (PRILOSEC) 20 MG capsule Take 20 mg by mouth once daily.    doxazosin (CARDURA XL) 8 mg 24 hr tablet Take 8 mg by mouth daily with " breakfast.    furosemide (LASIX) 20 MG tablet Take 20 mg by mouth once daily. May take additional dose if needed.    sodium bicarbonate/sod citrat (JENNIFER-JAE HEARTBURN ORAL) Take 1 tablet by mouth daily as needed (heartburn/indigestion).     .    .

## 2018-09-11 NOTE — PLAN OF CARE
[facility]    HOME HEALTH ORDERS  FACE TO FACE ENCOUNTER    Patient Name: Malaika Paige  YOB: 1955    PCP: Eze Greenberg MD   PCP Address: [address] [place] [id number]  PCP Phone Number: [phone]  PCP Fax: [phone]    Encounter Date: 09/11/2018    Admit to Home Health    Diagnoses:  Active Hospital Problems    Diagnosis  POA    *Shortness of breath [R06.02]  Unknown    CKD (chronic kidney disease) stage 5, GFR less than 15 ml/min [N18.5]  Yes    Chronic kidney disease-mineral and bone disorder [N18.9, E83.9, M89.9]  Yes    Anemia associated with chronic renal failure [D63.1]  Yes    ESRD (end stage renal disease) [N18.6]  No    Hypertensive emergency [I16.1]  Unknown    COPD (chronic obstructive pulmonary disease) [J44.9]  Unknown    Microcytic anemia [D50.9]  Unknown    Metabolic acidosis [E87.2]  Unknown    Hyperkalemia [E87.5]  Unknown    Type 2 diabetes mellitus, with long-term current use of insulin [E11.9, Z79.4]  Not Applicable      Resolved Hospital Problems   No resolved problems to display.       No future appointments.        I have seen and examined this patient face to face today. My clinical findings that support the need for the home health skilled services and home bound status are the following:  Weakness/numbness causing balance and gait disturbance due to Heart Failure and COPD Exacerbation making it taxing to leave home.  Requiring assistive device to leave home due to unsteady gait caused by  Heart Failure and COPD Exacerbation.    Allergies:Review of patient's allergies indicates:  No Known Allergies    Diet: diabetic diet: 2000 calorie    Activities: activity as tolerated    Nursing:   SN to complete comprehensive assessment including routine vital signs. Instruct on disease process and s/s of complications to report to MD. Review/verify medication list sent home with the patient at time of discharge  and instruct  patient/caregiver as needed. Frequency may be adjusted depending on start of care date.    Notify MD if SBP > 160 or < 90; DBP > 90 or < 50; HR > 120 or < 50; Temp > 101      CONSULTS:    Physical Therapy to evaluate and treat. Evaluate for home safety and equipment needs; Establish/upgrade home exercise program. Perform / instruct on therapeutic exercises, gait training, transfer training, and Range of Motion.  Occupational Therapy to evaluate and treat. Evaluate home environment for safety and equipment needs. Perform/Instruct on transfers, ADL training, ROM, and therapeutic exercises.  Aide to provide assistance with personal care, ADLs, and vital signs.    Medications: Review discharge medications with patient and family and provide education.      Current Discharge Medication List      CONTINUE these medications which have CHANGED    Details   bumetanide (BUMEX) 1 MG tablet Take 2 tablets (2 mg total) by mouth 2 (two) times daily.  Qty: 60 tablet, Refills: 3      nebivolol (BYSTOLIC) 10 MG Tab Take 2 tablets (20 mg total) by mouth once daily.  Qty: 40 tablet, Refills: 3         CONTINUE these medications which have NOT CHANGED    Details   amlodipine (NORVASC) 10 MG tablet Take 10 mg by mouth once daily.      aspirin (ECOTRIN) 81 MG EC tablet Take 81 mg by mouth once daily.       budesonide-formoterol 160-4.5 mcg (SYMBICORT) 160-4.5 mcg/actuation HFAA Inhale 2 puffs into the lungs every 12 (twelve) hours. Controller      HYDROcodone-acetaminophen (NORCO) 5-325 mg per tablet Take 1 tablet by mouth every 6 (six) hours as needed for Pain.      insulin degludec (TRESIBA FLEXTOUCH U-200) 200 unit/mL (3 mL) InPn Inject 80 Units into the skin once daily.       isosorbide mononitrate (IMDUR) 60 MG 24 hr tablet Take 60 mg by mouth once daily.      levothyroxine (SYNTHROID) 75 MCG tablet Take 75 mcg by mouth once daily.       loperamide (IMODIUM) 2 mg capsule Take 2 mg by mouth 4 (four) times daily as needed for  Diarrhea.      omeprazole (PRILOSEC) 20 MG capsule Take 20 mg by mouth once daily.      sodium bicarbonate/sod citrat (JENNIFER-SELTZER HEARTBURN ORAL) Take 1 tablet by mouth daily as needed (heartburn/indigestion).         STOP taking these medications       doxazosin (CARDURA XL) 8 mg 24 hr tablet Comments:   Reason for Stopping:         furosemide (LASIX) 20 MG tablet Comments:   Reason for Stopping:         famotidine (PEPCID) 20 MG tablet Comments:   Reason for Stopping:               I certify that this patient is confined to her home and needs intermittent skilled nursing care, physical therapy and occupational therapy.

## 2018-09-11 NOTE — SUBJECTIVE & OBJECTIVE
Interval History:   Patient evaluated at bedside, no significant event overnight. Has been making plenty of UOP 2.9 L.     Review of patient's allergies indicates:  No Known Allergies  Current Facility-Administered Medications   Medication Frequency    acetaminophen tablet 650 mg Q4H PRN    amLODIPine tablet 10 mg Daily    dextrose 50% injection 12.5 g PRN    dextrose 50% injection 25 g PRN    famotidine tablet 20 mg Daily    fluticasone-vilanterol 100-25 mcg/dose diskus inhaler 1 puff Daily    glucagon (human recombinant) injection 1 mg PRN    glucose chewable tablet 16 g PRN    glucose chewable tablet 24 g PRN    heparin (porcine) injection 5,000 Units Q8H    hydrALAZINE tablet 25 mg Q8H PRN    insulin aspart U-100 pen 0-5 Units QID (AC + HS) PRN    insulin detemir U-100 pen 55 Units QHS    isosorbide mononitrate 24 hr tablet 60 mg Daily    levothyroxine tablet 75 mcg Before breakfast    nebivolol tablet 20 mg Daily    ramelteon tablet 8 mg Nightly PRN    senna-docusate 8.6-50 mg per tablet 1 tablet BID PRN    sodium bicarbonate tablet 1,300 mg BID    sodium chloride 0.9% flush 5 mL PRN       Objective:     Vital Signs (Most Recent):  Temp: 97.8 °F (36.6 °C) (09/11/18 1302)  Pulse: 61 (09/11/18 1302)  Resp: 20 (09/11/18 1302)  BP: (!) 143/65 (09/11/18 1302)  SpO2: 97 % (09/11/18 1302)  O2 Device (Oxygen Therapy): room air (09/11/18 1302) Vital Signs (24h Range):  Temp:  [96.1 °F (35.6 °C)-97.9 °F (36.6 °C)] 97.8 °F (36.6 °C)  Pulse:  [61-81] 61  Resp:  [14-20] 20  SpO2:  [90 %-99 %] 97 %  BP: (116-189)/(51-82) 143/65     Weight: 93.8 kg (206 lb 14.4 oz) (09/11/18 0603)  Body mass index is 37.84 kg/m².  Body surface area is 2.03 meters squared.    I/O last 3 completed shifts:  In: 1000 [P.O.:1000]  Out: 7665 [Urine:7665]    Physical Exam   Constitutional: She is oriented to person, place, and time. No distress.   HENT:   Head: Normocephalic and atraumatic.   Right Ear: External ear normal.    Left Ear: External ear normal.   Eyes: Right eye exhibits no discharge. Left eye exhibits no discharge.   Cardiovascular: Normal rate.   Bilateral lower extremity edema  LLA AVF    Pulmonary/Chest: Effort normal. She has rales.   Abdominal: Soft.   Neurological: She is alert and oriented to person, place, and time.   Skin: Skin is warm.   Psychiatric: She has a normal mood and affect. Judgment normal.       Significant Labs:  ABGs:   Recent Labs   Lab  09/11/18   0016   PH  7.298*   PCO2  36.2   HCO3  17.7*   POCSATURATED  81*   BE  -9     BMP:   Recent Labs   Lab  09/11/18   0434   GLU  186*   CL  116*   CO2  16*   BUN  72*   CREATININE  4.5*   CALCIUM  8.1*     CBC:   Recent Labs   Lab  09/11/18 0434   WBC  12.98*   RBC  3.25*   HGB  8.0*   HCT  25.9*   PLT  283   MCV  80*   MCH  24.6*   MCHC  30.9*     CMP:   Recent Labs   Lab  09/11/18 0434   GLU  186*   CALCIUM  8.1*   ALBUMIN  2.7*  2.7*   PROT  7.7   NA  140   K  4.7   CO2  16*   CL  116*   BUN  72*   CREATININE  4.5*   ALKPHOS  63   ALT  14   AST  9*   BILITOT  0.2     All labs within the past 24 hours have been reviewed.

## 2018-09-11 NOTE — HOSPITAL COURSE
Admitted for worsening exertional dyspnea for several days. Found to be volume overloaded on exam with CHRIS, Nephrology consulted, given IV lasix with good diuresis and resolution of symptoms. 2D Echo showed LVEF 65%, grade II diastolic dysfunction. Vascular Surgery consulted to assess AVF in case of need for RRT; advised that AVF not yet ready but Nephrology stated no need for dialysis given her good response to diuretics.    Discharged 09/11 with bumetanide 2 mg BID from 1 mg daily with f/u with her Nephrologist and Vascular Surgeon in [place].

## 2018-09-11 NOTE — ASSESSMENT & PLAN NOTE
HYPERTENSION    Home meds: isosorbide mononitrate 60mg qhs, amlodipine 10mg daily, doxazosin 8mg qhs, nebivolol 10mg qhs  Discharge meds: isosorbide mononitrate 60 mg qHS, amlodipine 10 mg daily, nebivolol 20 mg qHS - increased beta blocker in the setting of HFpEF

## 2018-09-11 NOTE — ASSESSMENT & PLAN NOTE
LVEF preserved, suspect pt went into heart failure in the setting of worsening renal disease. Discharged on higher dose of bumetanide (2 mg BID from 1 mg daily) and increased nebivolol

## 2018-09-11 NOTE — ASSESSMENT & PLAN NOTE
Improved with diuretics - suspect cardiorenal syndrome (LVEF 65%; grade II diastolid dysfunction) as pt had copious UO and resolution of dyspnea with diuresis. In the setting of worsening renal disease this more likely represents progression presenting as ADCHF    Discharged with increased dose of home bumetanide (2 mg BID from 1 mg daily); will f/u with Nephrologist, Vascular Surgery

## 2018-09-11 NOTE — ASSESSMENT & PLAN NOTE
Home meds: budesonide/formeterol, wrote for albuterol inhaler    - continue home meds  - [place] q4h

## 2018-09-11 NOTE — NURSING
Discharge instructions given to patient and family.  Patient verbalized understanding and had no further questions.  Patient's Iv removed and vital signs within normal limits.  Patient awaiting [place] catheter removal and breathing treatment before discharge.  Will continue to follow up.

## 2018-09-11 NOTE — PLAN OF CARE
Problem: Occupational Therapy Goal  Goal: Occupational Therapy Goal  Outcome: Outcome(s) achieved Date Met: 09/11/18  Pt is (I). OT to d/c.    ROMAN Sampson  9/11/2018  Pager: [phone]

## 2018-09-11 NOTE — PROGRESS NOTES
[facility]  Vascular Surgery  Progress Note    Patient Name: Malaika Paige  MRN: [id number]  Admission Date: 9/9/2018  Primary Care Provider: Eze Greenberg MD    Subjective:     Interval History: No acute events overnight. Excellent urine output overnight.    Post-Op Info:  * No surgery found *           Medications:  Continuous Infusions:  Scheduled Meds:   amLODIPine  10 mg Oral Daily    doxazosin  8 mg Oral QHS    famotidine  20 mg Oral Daily    fluticasone-vilanterol  1 puff Inhalation Daily    heparin (porcine)  5,000 Units Subcutaneous Q8H    insulin detemir U-100  55 Units Subcutaneous QHS    isosorbide mononitrate  60 mg Oral Daily    levothyroxine  75 mcg Oral Before breakfast    nebivolol  10 mg Oral Daily    predniSONE  40 mg Oral Daily    sodium bicarbonate  1,300 mg Oral BID     PRN Meds:acetaminophen, dextrose 50%, dextrose 50%, glucagon (human recombinant), glucose, glucose, hydrALAZINE, insulin aspart U-100, ramelteon, senna-docusate 8.6-50 mg, sodium chloride 0.9%     Objective:     Vital Signs (Most Recent):  Temp: 97 °F (36.1 °C) (09/11/18 0439)  Pulse: 69 (09/11/18 0708)  Resp: 20 (09/11/18 0603)  BP: (!) 170/82 (09/11/18 0623)  SpO2: 97 % (09/11/18 0439) Vital Signs (24h Range):  Temp:  [96.1 °F (35.6 °C)-99 °F (37.2 °C)] 97 °F (36.1 °C)  Pulse:  [69-82] 69  Resp:  [18-20] 20  SpO2:  [90 %-99 %] 97 %  BP: (129-189)/(59-82) 170/82          Physical Exam   Constitutional: She is oriented to person, place, and time. She appears well-developed and well-nourished. No distress.   HENT:   Head: Normocephalic and atraumatic.   Eyes: EOM are normal.   Cardiovascular: Normal rate and regular rhythm.   Pulmonary/Chest: Effort normal. No respiratory distress.   Neurological: She is alert and oriented to person, place, and time.   Psychiatric: She has a normal mood and affect. Her behavior is normal. Judgment and thought content normal.   Nursing note and vitals  reviewed.      Significant Labs:  All pertinent labs from the last 24 hours have been reviewed.    Significant Diagnostics:  I have reviewed all pertinent imaging results/findings within the past 24 hours.    Assessment/Plan:     ESRD (end stage renal disease)    61 yo F with multiple comorbidities including CKD who presents with acute on chronic renal failure and volume overload. Patient is s/p Left Anders RC AVF on 7/2/18 and will may need HD this hospital stay. Vascular surgery asked to assess if AVF is usable at this time. AVF has a strong palpable thrill.    - Based on results from VAS HD access doppler 9/10, patient's AV fistula is not mature enough for hemodialysis access at this time  - If urgent need for dialysis, consider central venous access  - Urged patient to follow-up with her primary vascular surgeon in [place] upon discharge  - Contact Vascular Surgery for any further questions or concerns            PAUL Slater MD  Vascular Surgery  [facility]    Vascular Attending  Agree with above    Rhett Núñez MD FACS

## 2018-09-11 NOTE — ASSESSMENT & PLAN NOTE
Malaika Paige is a CKD stage V which most likely secondary to diabetes and blood pressure. Nephrology was consulted if probable need for dialysis inpatient.     Plan:  - serum creatinine has been around 4-4.5 (baseline is unknown)  - Has a s/p Left Anders RC AVF on 7/2/18 , Per vascular still is not mature  - Blood pressures better controlled   - Acid/base: with acidosis in which will be started on sodium bicarbonate 1,300 mg q TID  - Continue to monitor intake and output. UOP 2.9 L after lasix  - For diuretics may stay with Lasix 80 mg q BID   - Echo with preserved EF and grade 2 diastolic dysfunction , P-HTN   - Avoid nephrotoxic medications   - Renal diet (low on sodium, low on potassium)  - No need for started on RRT

## 2019-02-05 ENCOUNTER — OFFICE VISIT (OUTPATIENT)
Dept: OPHTHALMOLOGY | Facility: CLINIC | Age: 64
End: 2019-02-05
Payer: MEDICARE

## 2019-02-05 PROCEDURE — 67228 PR TREATMENT EXTENSIVE RETINOPATHY, PHOTOCOAGULATION: ICD-10-PCS | Mod: S$PBB,RT,, | Performed by: OPHTHALMOLOGY

## 2019-02-05 PROCEDURE — 99999 PR PBB SHADOW E&M-EST. PATIENT-LVL III: ICD-10-PCS | Mod: PBBFAC,,, | Performed by: OPHTHALMOLOGY

## 2019-02-05 PROCEDURE — 99499 UNLISTED E&M SERVICE: CPT | Mod: S$PBB,,, | Performed by: OPHTHALMOLOGY

## 2019-02-05 PROCEDURE — 67228 TREATMENT X10SV RETINOPATHY: CPT | Mod: S$PBB,RT,, | Performed by: OPHTHALMOLOGY

## 2019-02-05 PROCEDURE — 67228 TREATMENT X10SV RETINOPATHY: CPT | Mod: PBBFAC,RT | Performed by: OPHTHALMOLOGY

## 2019-02-05 PROCEDURE — 99499 NO LOS: ICD-10-PCS | Mod: S$PBB,,, | Performed by: OPHTHALMOLOGY

## 2019-02-05 PROCEDURE — 99213 OFFICE O/P EST LOW 20 MIN: CPT | Mod: PBBFAC,25 | Performed by: OPHTHALMOLOGY

## 2019-02-05 PROCEDURE — 99999 PR PBB SHADOW E&M-EST. PATIENT-LVL III: CPT | Mod: PBBFAC,,, | Performed by: OPHTHALMOLOGY

## 2019-02-05 RX ORDER — METOLAZONE 2.5 MG/1
2.5 TABLET ORAL DAILY
COMMUNITY
End: 2022-05-04

## 2019-02-05 RX ORDER — DOXAZOSIN 4 MG/1
8 TABLET ORAL NIGHTLY
COMMUNITY
End: 2022-05-04

## 2019-02-05 RX ORDER — CALCITRIOL 0.25 UG/1
0.25 CAPSULE ORAL DAILY
COMMUNITY
End: 2022-05-04

## 2019-02-05 NOTE — PROGRESS NOTES
HPI     PRP OD [place]  DLS-08/14/2018 Dr. Reed    Pt sts vision about the same since last visit   Denies pain   No gtt use         A/P    1. DM - T2 uncontrolled on insulin  OD - PDR OD   Complete PRP OD today    OS PDR s/p PRP no ME    DME OD - minimal - monitor at this point    2. PCIOL OD  Not seen since POD #1  NS OS - ok for CE when ready    3. Lattice OD  ASx monitor      3 months OCT    Risks, benefits, and alternatives to treatment discussed in detail with the patient.  The patient voiced understanding and wished to proceed with the procedure    Laser Procedure Note  Dx: PDR OD  Laser: PRP OD  Argon  Spot: 200  Power: 100-130  Dur: 0.100  #:  [id number]  Complications: None  F/U as above

## 2019-05-06 ENCOUNTER — OFFICE VISIT (OUTPATIENT)
Dept: OPHTHALMOLOGY | Facility: CLINIC | Age: 64
End: 2019-05-06
Payer: MEDICARE

## 2019-05-06 ENCOUNTER — TELEPHONE (OUTPATIENT)
Dept: OPHTHALMOLOGY | Facility: CLINIC | Age: 64
End: 2019-05-06

## 2019-05-06 DIAGNOSIS — Z98.41 STATUS POST CATARACT EXTRACTION AND INSERTION OF INTRAOCULAR LENS OF RIGHT EYE: ICD-10-CM

## 2019-05-06 DIAGNOSIS — Z96.1 STATUS POST CATARACT EXTRACTION AND INSERTION OF INTRAOCULAR LENS OF RIGHT EYE: ICD-10-CM

## 2019-05-06 DIAGNOSIS — E11.3593 PROLIFERATIVE DIABETIC RETINOPATHY OF BOTH EYES WITHOUT MACULAR EDEMA ASSOCIATED WITH TYPE 2 DIABETES MELLITUS: ICD-10-CM

## 2019-05-06 DIAGNOSIS — H25.12 NS (NUCLEAR SCLEROSIS), LEFT: Primary | ICD-10-CM

## 2019-05-06 PROCEDURE — 92020 GONIOSCOPY: CPT | Mod: PBBFAC | Performed by: OPHTHALMOLOGY

## 2019-05-06 PROCEDURE — 92134 CPTRZ OPH DX IMG PST SGM RTA: CPT | Mod: PBBFAC | Performed by: OPHTHALMOLOGY

## 2019-05-06 PROCEDURE — 92020 PR SPECIAL EYE EVAL,GONIOSCOPY: ICD-10-PCS | Mod: S$PBB,,, | Performed by: OPHTHALMOLOGY

## 2019-05-06 PROCEDURE — 92020 GONIOSCOPY: CPT | Mod: S$PBB,,, | Performed by: OPHTHALMOLOGY

## 2019-05-06 PROCEDURE — 99999 PR PBB SHADOW E&M-EST. PATIENT-LVL II: ICD-10-PCS | Mod: PBBFAC,,, | Performed by: OPHTHALMOLOGY

## 2019-05-06 PROCEDURE — 92014 COMPRE OPH EXAM EST PT 1/>: CPT | Mod: S$PBB,,, | Performed by: OPHTHALMOLOGY

## 2019-05-06 PROCEDURE — 92134 POSTERIOR SEGMENT OCT RETINA (OCULAR COHERENCE TOMOGRAPHY)-BOTH EYES: ICD-10-PCS | Mod: 26,S$PBB,, | Performed by: OPHTHALMOLOGY

## 2019-05-06 PROCEDURE — 92014 PR EYE EXAM, EST PATIENT,COMPREHESV: ICD-10-PCS | Mod: S$PBB,,, | Performed by: OPHTHALMOLOGY

## 2019-05-06 PROCEDURE — 99212 OFFICE O/P EST SF 10 MIN: CPT | Mod: PBBFAC,25 | Performed by: OPHTHALMOLOGY

## 2019-05-06 PROCEDURE — 99999 PR PBB SHADOW E&M-EST. PATIENT-LVL II: CPT | Mod: PBBFAC,,, | Performed by: OPHTHALMOLOGY

## 2019-05-06 NOTE — PROGRESS NOTES
Assessment /Plan     For exam results, see Encounter Report.    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 PCIOL OD  Not seen since POD #1  NS OS - pt wants to see Dr. Youssef    3. Lattice OD  ASx monitor      Cataract left Eyes: Discussed options including observation, glasses & surgery with patient with good understanding. Patient wishes to proceed with:  observation Glasses Surgery     Cataract Surgery Re-Consent: Patient with a visually significant cataract with difficulties of ADLs,  Glare and care of post pole OS 2/2 poor view.  Discussed with patient options, risks and benefits, expectations of cataract surgery with questions and answers to facilitate discussion.  Discussed lens options and patient understands that glasses may be required for optimal vision for distance and/or near vision after cataract surgery.  The patient  voice good understanding and patient wishes to proceed with surgery.  The patient will likely benefit from surgery and patient signed consent for Left Eye.    IOL choice:       AL   23.64 OD       23.88 OS     PCB00  119.3          22.5 -0.23    Dense cataract OS  Consider Trypan Blue      Re-Discussed need for fu & transportation after sx  --> going to [place] --> will do CE IOL OS on return    Triple drops      Plan  RTC for CE IOL OS p Return from [place]  FU with Dr Reed after CE IOL OS  RTC sooner PRN with good understanding

## 2019-05-07 ENCOUNTER — OFFICE VISIT (OUTPATIENT)
Dept: OPHTHALMOLOGY | Facility: CLINIC | Age: 64
End: 2019-05-07
Payer: MEDICARE

## 2019-05-07 VITALS — DIASTOLIC BLOOD PRESSURE: 81 MMHG | SYSTOLIC BLOOD PRESSURE: 159 MMHG

## 2019-05-07 DIAGNOSIS — H25.12 NS (NUCLEAR SCLEROSIS), LEFT: ICD-10-CM

## 2019-05-07 PROCEDURE — 92226 PR SPECIAL EYE EXAM, SUBSEQUENT: CPT | Mod: 50,PBBFAC | Performed by: OPHTHALMOLOGY

## 2019-05-07 PROCEDURE — 92014 COMPRE OPH EXAM EST PT 1/>: CPT | Mod: S$PBB,,, | Performed by: OPHTHALMOLOGY

## 2019-05-07 PROCEDURE — 92226 PR SPECIAL EYE EXAM, SUBSEQUENT: ICD-10-PCS | Mod: 50,S$PBB,, | Performed by: OPHTHALMOLOGY

## 2019-05-07 PROCEDURE — 99999 PR PBB SHADOW E&M-EST. PATIENT-LVL III: ICD-10-PCS | Mod: PBBFAC,,, | Performed by: OPHTHALMOLOGY

## 2019-05-07 PROCEDURE — 92134 POSTERIOR SEGMENT OCT RETINA (OCULAR COHERENCE TOMOGRAPHY)-BOTH EYES: ICD-10-PCS | Mod: 26,S$PBB,, | Performed by: OPHTHALMOLOGY

## 2019-05-07 PROCEDURE — 92014 PR EYE EXAM, EST PATIENT,COMPREHESV: ICD-10-PCS | Mod: S$PBB,,, | Performed by: OPHTHALMOLOGY

## 2019-05-07 PROCEDURE — 92134 CPTRZ OPH DX IMG PST SGM RTA: CPT | Mod: PBBFAC | Performed by: OPHTHALMOLOGY

## 2019-05-07 PROCEDURE — 99999 PR PBB SHADOW E&M-EST. PATIENT-LVL III: CPT | Mod: PBBFAC,,, | Performed by: OPHTHALMOLOGY

## 2019-05-07 PROCEDURE — 92226 PR SPECIAL EYE EXAM, SUBSEQUENT: CPT | Mod: 50,S$PBB,, | Performed by: OPHTHALMOLOGY

## 2019-05-07 PROCEDURE — 99213 OFFICE O/P EST LOW 20 MIN: CPT | Mod: PBBFAC,25 | Performed by: OPHTHALMOLOGY

## 2019-05-07 NOTE — PROGRESS NOTES
HPI     3 month follow up  Pt here for Diabetic eye exam.  Pt feels vision in OD has improved since last visit, No changes in OS.    Last edited by Ignacio Moctezuma on 5/7/2019 10:41 AM. (History)        OCT - non central DME OU  VMA component      A/P    1. DM - T2 uncontrolled on insulin  PDR OU  S/p PRP OU    DME OD - minimal - monitor at this point    2. PCIOL OD  Not seen since POD #1  NS OS - ok for CE when ready    3. Lattice OD  ASx monitor      6 months OCT

## 2019-10-20 ENCOUNTER — HOSPITAL ENCOUNTER (EMERGENCY)
Facility: HOSPITAL | Age: 64
Discharge: HOME | End: 2019-10-20
Attending: FAMILY MEDICINE
Payer: MEDICARE

## 2019-10-20 VITALS
RESPIRATION RATE: 24 BRPM | HEIGHT: 62 IN | DIASTOLIC BLOOD PRESSURE: 89 MMHG | OXYGEN SATURATION: 95 % | HEART RATE: 80 BPM | BODY MASS INDEX: 43.27 KG/M2 | SYSTOLIC BLOOD PRESSURE: 203 MMHG | WEIGHT: 235.13 LBS

## 2019-10-20 DIAGNOSIS — N18.30 STAGE 3 CHRONIC KIDNEY DISEASE: ICD-10-CM

## 2019-10-20 DIAGNOSIS — R06.02 SOB (SHORTNESS OF BREATH): Primary | ICD-10-CM

## 2019-10-20 LAB
ACANTHOCYTES BLD QL SMEAR: PRESENT
ALBUMIN SERPL BCP-MCNC: 2.9 G/DL (ref 3.5–5.2)
ALP SERPL-CCNC: 50 U/L (ref 55–135)
ALT SERPL W/O P-5'-P-CCNC: 16 U/L (ref 10–44)
ANION GAP SERPL CALC-SCNC: 7 MMOL/L (ref 8–16)
ANISOCYTOSIS BLD QL SMEAR: SLIGHT
AST SERPL-CCNC: 20 U/L (ref 10–40)
BACTERIA #/AREA URNS HPF: ABNORMAL /HPF
BASOPHILS # BLD AUTO: 0.02 K/UL (ref 0–0.2)
BASOPHILS NFR BLD: 0.3 % (ref 0–1.9)
BILIRUB SERPL-MCNC: 0.3 MG/DL (ref 0.1–1)
BILIRUB UR QL STRIP: NEGATIVE
BNP SERPL-MCNC: 104 PG/ML (ref 0–99)
BUN SERPL-MCNC: 58 MG/DL (ref 8–23)
CALCIUM SERPL-MCNC: 7.7 MG/DL (ref 8.7–10.5)
CHLORIDE SERPL-SCNC: 116 MMOL/L (ref 95–110)
CLARITY UR: CLEAR
CO2 SERPL-SCNC: 15 MMOL/L (ref 23–29)
COLOR UR: YELLOW
CREAT SERPL-MCNC: 5 MG/DL (ref 0.5–1.4)
DACRYOCYTES BLD QL SMEAR: ABNORMAL
DIFFERENTIAL METHOD: ABNORMAL
EOSINOPHIL # BLD AUTO: 0.2 K/UL (ref 0–0.5)
EOSINOPHIL NFR BLD: 2.9 % (ref 0–8)
ERYTHROCYTE [DISTWIDTH] IN BLOOD BY AUTOMATED COUNT: 19.8 % (ref 11.5–14.5)
EST. GFR  (AFRICAN AMERICAN): 10 ML/MIN/1.73 M^2
EST. GFR  (NON AFRICAN AMERICAN): 8 ML/MIN/1.73 M^2
GLUCOSE SERPL-MCNC: 133 MG/DL (ref 70–110)
GLUCOSE UR QL STRIP: ABNORMAL
HCT VFR BLD AUTO: 23.9 % (ref 37–48.5)
HCV AB SERPL QL IA: NEGATIVE
HGB BLD-MCNC: 7.5 G/DL (ref 12–16)
HGB UR QL STRIP: ABNORMAL
HYALINE CASTS #/AREA URNS LPF: 6 /LPF
HYPOCHROMIA BLD QL SMEAR: ABNORMAL
IMM GRANULOCYTES # BLD AUTO: 0.12 K/UL (ref 0–0.04)
IMM GRANULOCYTES NFR BLD AUTO: 2 % (ref 0–0.5)
KETONES UR QL STRIP: NEGATIVE
LEUKOCYTE ESTERASE UR QL STRIP: NEGATIVE
LYMPHOCYTES # BLD AUTO: 1.3 K/UL (ref 1–4.8)
LYMPHOCYTES NFR BLD: 21.8 % (ref 18–48)
MCH RBC QN AUTO: 27 PG (ref 27–31)
MCHC RBC AUTO-ENTMCNC: 31.4 G/DL (ref 32–36)
MCV RBC AUTO: 86 FL (ref 82–98)
MICROSCOPIC COMMENT: ABNORMAL
MONOCYTES # BLD AUTO: 0.6 K/UL (ref 0.3–1)
MONOCYTES NFR BLD: 9 % (ref 4–15)
NEUTROPHILS # BLD AUTO: 3.9 K/UL (ref 1.8–7.7)
NEUTROPHILS NFR BLD: 64 % (ref 38–73)
NITRITE UR QL STRIP: NEGATIVE
NRBC BLD-RTO: 0 /100 WBC
OVALOCYTES BLD QL SMEAR: ABNORMAL
PH UR STRIP: 6 [PH] (ref 5–8)
PLATELET # BLD AUTO: 228 K/UL (ref 150–350)
PLATELET BLD QL SMEAR: ABNORMAL
PMV BLD AUTO: 10.1 FL (ref 9.2–12.9)
POIKILOCYTOSIS BLD QL SMEAR: SLIGHT
POTASSIUM SERPL-SCNC: 4.7 MMOL/L (ref 3.5–5.1)
POTASSIUM SERPL-SCNC: 5.7 MMOL/L (ref 3.5–5.1)
PROT SERPL-MCNC: 8 G/DL (ref 6–8.4)
PROT UR QL STRIP: ABNORMAL
RBC # BLD AUTO: 2.78 M/UL (ref 4–5.4)
RBC #/AREA URNS HPF: 3 /HPF (ref 0–4)
SCHISTOCYTES BLD QL SMEAR: PRESENT
SODIUM SERPL-SCNC: 138 MMOL/L (ref 136–145)
SP GR UR STRIP: 1.02 (ref 1–1.03)
TARGETS BLD QL SMEAR: ABNORMAL
TROPONIN I SERPL DL<=0.01 NG/ML-MCNC: 0.02 NG/ML (ref 0–0.03)
URN SPEC COLLECT METH UR: ABNORMAL
UROBILINOGEN UR STRIP-ACNC: NEGATIVE EU/DL
WBC # BLD AUTO: 6.11 K/UL (ref 3.9–12.7)
WBC #/AREA URNS HPF: 0 /HPF (ref 0–5)

## 2019-10-20 PROCEDURE — 25000003 PHARM REV CODE 250: Performed by: FAMILY MEDICINE

## 2019-10-20 PROCEDURE — 84132 ASSAY OF SERUM POTASSIUM: CPT

## 2019-10-20 PROCEDURE — 25000003 PHARM REV CODE 250: Performed by: EMERGENCY MEDICINE

## 2019-10-20 PROCEDURE — 85025 COMPLETE CBC W/AUTO DIFF WBC: CPT

## 2019-10-20 PROCEDURE — 80053 COMPREHEN METABOLIC PANEL: CPT

## 2019-10-20 PROCEDURE — 81000 URINALYSIS NONAUTO W/SCOPE: CPT

## 2019-10-20 PROCEDURE — 94640 AIRWAY INHALATION TREATMENT: CPT

## 2019-10-20 PROCEDURE — 93005 ELECTROCARDIOGRAM TRACING: CPT

## 2019-10-20 PROCEDURE — 63600175 PHARM REV CODE 636 W HCPCS: Performed by: FAMILY MEDICINE

## 2019-10-20 PROCEDURE — 83880 ASSAY OF NATRIURETIC PEPTIDE: CPT

## 2019-10-20 PROCEDURE — 99285 EMERGENCY DEPT VISIT HI MDM: CPT | Mod: 25

## 2019-10-20 PROCEDURE — 25000242 PHARM REV CODE 250 ALT 637 W/ HCPCS: Performed by: FAMILY MEDICINE

## 2019-10-20 PROCEDURE — 86803 HEPATITIS C AB TEST: CPT

## 2019-10-20 PROCEDURE — 93010 EKG 12-LEAD: ICD-10-PCS | Mod: ,,, | Performed by: INTERNAL MEDICINE

## 2019-10-20 PROCEDURE — 93010 ELECTROCARDIOGRAM REPORT: CPT | Mod: ,,, | Performed by: INTERNAL MEDICINE

## 2019-10-20 PROCEDURE — 84484 ASSAY OF TROPONIN QUANT: CPT

## 2019-10-20 PROCEDURE — 96374 THER/PROPH/DIAG INJ IV PUSH: CPT

## 2019-10-20 RX ORDER — CLONIDINE HYDROCHLORIDE 0.1 MG/1
0.1 TABLET ORAL
Status: COMPLETED | OUTPATIENT
Start: 2019-10-20 | End: 2019-10-20

## 2019-10-20 RX ORDER — IPRATROPIUM BROMIDE AND ALBUTEROL SULFATE 2.5; .5 MG/3ML; MG/3ML
3 SOLUTION RESPIRATORY (INHALATION) ONCE
Status: COMPLETED | OUTPATIENT
Start: 2019-10-20 | End: 2019-10-20

## 2019-10-20 RX ORDER — SODIUM BICARBONATE 650 MG/1
1300 TABLET ORAL ONCE
Status: COMPLETED | OUTPATIENT
Start: 2019-10-20 | End: 2019-10-20

## 2019-10-20 RX ORDER — IPRATROPIUM BROMIDE AND ALBUTEROL SULFATE 2.5; .5 MG/3ML; MG/3ML
3 SOLUTION RESPIRATORY (INHALATION)
Status: COMPLETED | OUTPATIENT
Start: 2019-10-20 | End: 2019-10-20

## 2019-10-20 RX ORDER — SODIUM BICARBONATE 650 MG/1
650 TABLET ORAL 3 TIMES DAILY
Qty: 90 TABLET | Refills: 11 | Status: SHIPPED | OUTPATIENT
Start: 2019-10-20 | End: 2022-05-04

## 2019-10-20 RX ORDER — ALBUTEROL SULFATE 90 UG/1
1-2 AEROSOL, METERED RESPIRATORY (INHALATION) EVERY 6 HOURS PRN
Qty: 1 INHALER | Refills: 0 | Status: SHIPPED | OUTPATIENT
Start: 2019-10-20 | End: 2023-02-10

## 2019-10-20 RX ORDER — FLUDROCORTISONE ACETATE 0.1 MG/1
200 TABLET ORAL DAILY
Status: DISCONTINUED | OUTPATIENT
Start: 2019-10-20 | End: 2019-10-20 | Stop reason: HOSPADM

## 2019-10-20 RX ORDER — FUROSEMIDE 10 MG/ML
80 INJECTION INTRAMUSCULAR; INTRAVENOUS
Status: COMPLETED | OUTPATIENT
Start: 2019-10-20 | End: 2019-10-20

## 2019-10-20 RX ADMIN — IPRATROPIUM BROMIDE AND ALBUTEROL SULFATE 3 ML: .5; 3 SOLUTION RESPIRATORY (INHALATION) at 05:10

## 2019-10-20 RX ADMIN — FUROSEMIDE 80 MG: 10 INJECTION, SOLUTION INTRAMUSCULAR; INTRAVENOUS at 06:10

## 2019-10-20 RX ADMIN — FLUDROCORTISONE ACETATE 200 MCG: 0.1 TABLET ORAL at 06:10

## 2019-10-20 RX ADMIN — IPRATROPIUM BROMIDE AND ALBUTEROL SULFATE 3 ML: .5; 3 SOLUTION RESPIRATORY (INHALATION) at 04:10

## 2019-10-20 RX ADMIN — SODIUM BICARBONATE 650 MG TABLET 1300 MG: at 06:10

## 2019-10-20 RX ADMIN — CLONIDINE HYDROCHLORIDE 0.1 MG: 0.1 TABLET ORAL at 08:10

## 2019-10-20 NOTE — ED NOTES
Patient identifiers verified and correct for Verlean Cage Veal.    LOC: The patient is awake, alert and aware of environment with an appropriate affect, the patient is oriented x 3 and speaking appropriately.  APPEARANCE: Patient resting comfortably and in no acute distress, patient is clean and well groomed, patient's clothing is properly fastened.  SKIN: The skin is warm and dry, color consistent with ethnicity, patient has normal skin turgor and moist mucus membranes, skin intact, no breakdown or bruising noted.  MUSCULOSKELETAL: Patient moving all extremities spontaneously, no obvious swelling or deformities noted.  RESPIRATORY: Airway is open and patent, respirations are spontaneous, patient has a normal effort and rate, no accessory muscle use noted, bilateral breath sounds B wheezes. Pt reports SOB with exertion  CARDIAC: Patient has a normal rate and regular rhythm,+2 BLEperiphreal edema noted, capillary refill < 3 seconds.  ABDOMEN: Soft and non tender to palpation, no distention noted, normoactive bowel sounds present in all four quadrants.  NEUROLOGIC: PERRL, **mm bilaterally, eyes open spontaneously, behavior appropriate to situation, follows commands, facial expression symmetrical, bilateral hand grasp equal and even, purposeful motor response noted, normal sensation in all extremities when touched with a finger.

## 2020-02-07 ENCOUNTER — OFFICE VISIT (OUTPATIENT)
Dept: OPHTHALMOLOGY | Facility: CLINIC | Age: 65
End: 2020-02-07
Payer: MEDICARE

## 2020-02-07 DIAGNOSIS — H25.12 NS (NUCLEAR SCLEROSIS), LEFT: ICD-10-CM

## 2020-02-07 DIAGNOSIS — H35.411 LATTICE DEGENERATION OF RIGHT RETINA: ICD-10-CM

## 2020-02-07 PROCEDURE — 92014 PR EYE EXAM, EST PATIENT,COMPREHESV: ICD-10-PCS | Mod: S$PBB,,, | Performed by: OPHTHALMOLOGY

## 2020-02-07 PROCEDURE — 92134 CPTRZ OPH DX IMG PST SGM RTA: CPT | Mod: PBBFAC | Performed by: OPHTHALMOLOGY

## 2020-02-07 PROCEDURE — 92014 COMPRE OPH EXAM EST PT 1/>: CPT | Mod: S$PBB,,, | Performed by: OPHTHALMOLOGY

## 2020-02-07 PROCEDURE — 99999 PR PBB SHADOW E&M-EST. PATIENT-LVL III: CPT | Mod: PBBFAC,,, | Performed by: OPHTHALMOLOGY

## 2020-02-07 PROCEDURE — 92202 PR OPHTHALMOSCOPY, EXT, W/DRAW OPTIC NERVE/MACULA, I&R, UNI/BI: ICD-10-PCS | Mod: ,,, | Performed by: OPHTHALMOLOGY

## 2020-02-07 PROCEDURE — 92202 OPSCPY EXTND ON/MAC DRAW: CPT | Mod: ,,, | Performed by: OPHTHALMOLOGY

## 2020-02-07 PROCEDURE — 99213 OFFICE O/P EST LOW 20 MIN: CPT | Mod: PBBFAC | Performed by: OPHTHALMOLOGY

## 2020-02-07 PROCEDURE — 92134 POSTERIOR SEGMENT OCT RETINA (OCULAR COHERENCE TOMOGRAPHY)-BOTH EYES: ICD-10-PCS | Mod: 26,S$PBB,, | Performed by: OPHTHALMOLOGY

## 2020-02-07 PROCEDURE — 99999 PR PBB SHADOW E&M-EST. PATIENT-LVL III: ICD-10-PCS | Mod: PBBFAC,,, | Performed by: OPHTHALMOLOGY

## 2020-02-07 NOTE — PROGRESS NOTES
HPI     6 mo OCT  DLS-05/07/2019 Dr. Reed    Pt sts no change in va noticed just tearing OU   (-)Flashes (-)Floaters  (-)Photophobia  (-)Glare    No gtts            OCT - non central DME OU  VMA component dehisced OD      A/P    1. DM - T2 uncontrolled on insulin  PDR OU  S/p PRP OU    DME OD - minimal - monitor at this point    2. PCIOL OD  Not seen since POD #1  NS OS - ok for CE when ready    3. Lattice OD  ASx monitor      12 months OCT

## 2020-02-07 NOTE — LETTER
February 7, 2020      Eze Greenberg MD  [address] [facility] [address] [facility] [address]           [facility]  [address]  [place] [address]  Phone: [phone]  Fax: [phone]          Patient: Malaika Paige   MR Number: [id number]   YOB: 1955   Date of Visit: 2/7/2020       Dear Dr. Eze Greenberg:    Thank you for referring Malaika Paige to me for evaluation. Attached you will find relevant portions of my assessment and plan of care.    If you have questions, please do not hesitate to call me. I look forward to following Malaika Paige along with you.    Sincerely,    CHARLY Reed MD    Enclosure  CC:  No Recipients    If you would like to receive this communication electronically, please contact [email or web address] or [phone] to request more information on [employer] access.    For providers and/or their staff who would like to refer a patient to [facility], please contact us through our one-stop-shop provider referral line, [facility], at [phone].    If you feel you have received this communication in error or would no longer like to receive these types of communications, please e-mail [email or web address]

## 2020-05-18 NOTE — ED PROVIDER NOTES
SCRIBE #1 NOTE: I, Elizabeth Avila, am scribing for, and in the presence of, Lianne Douglass MD. I have scribed the entire note.      History      Chief Complaint   Patient presents with    Shortness of Breath     SOB since this morning. Previous smoker.        Review of patient's allergies indicates:  No Known Allergies     HPI   HPI    10/20/2019, 3:58 PM   History obtained from the patient      History of Present Illness: Malaika Paige is a 64 y.o. female patient with PMHx of CHF, COPD, DM and HTN who presents to the Emergency Department for SOB which onset gradually a couple days ago. Symptoms are constant and moderate in severity. No mitigating or exacerbating factors reported. Associated sxs include dry cough, wheezing, and BLE edema. Patient denies any fever, chills, diaphoresis, chest tightness, choking, stridor, CP, abd pain, n/v/d, dysuria, dizziness, HA, lightheadedness, and extremity weakness/numbness, and all other sxs at this time. No prior txs reported. Pt reports she uses an inhaler daily. Pt states she is waiting to be placed on dialysis. Pt is not on home O2. No further complaints or concerns at this time.         Arrival mode: Personal vehicle     PCP: Eze Greenberg MD       Past Medical History:  Past Medical History:   Diagnosis Date    Cataract     Cataract     CHF (congestive heart failure)     COPD (chronic obstructive pulmonary disease)     Diabetes mellitus     Diabetic retinopathy     Hypertension        Past Surgical History:  Past Surgical History:   Procedure Laterality Date    CATARACT EXTRACTION W/  INTRAOCULAR LENS IMPLANT Right 08/14/2017    Dr. Moe         Family History:  Family History   Problem Relation Age of Onset    Diabetes Sister     Cancer Brother     Amblyopia Neg Hx     Blindness Neg Hx     Cataracts Neg Hx     Glaucoma Neg Hx     Hypertension Neg Hx     Macular degeneration Neg Hx     Retinal detachment Neg Hx     Strabismus Neg Hx      Stroke Neg Hx     Thyroid disease Neg Hx        Social History:  Social History     Tobacco Use    Smoking status: Never Smoker    Smokeless tobacco: Never Used   Substance and Sexual Activity    Alcohol use: No    Drug use: Unknown    Sexual activity: Unknown       ROS   Review of Systems   Constitutional: Negative for chills, diaphoresis and fever.   HENT: Negative for sore throat.    Respiratory: Positive for cough (dry), shortness of breath and wheezing. Negative for choking, chest tightness and stridor.    Cardiovascular: Positive for leg swelling (BLE). Negative for chest pain and palpitations.   Gastrointestinal: Negative for abdominal pain, diarrhea, nausea and vomiting.   Genitourinary: Negative for dysuria.   Musculoskeletal: Negative for back pain.   Skin: Negative for rash.   Neurological: Negative for dizziness, weakness, light-headedness, numbness and headaches.   Hematological: Does not bruise/bleed easily.   All other systems reviewed and are negative.    Physical Exam      Initial Vitals [10/20/19 1543]   BP Pulse Resp Temp SpO2   (!) 209/70 79 (!) 23 -- 99 %      MAP       --          Physical Exam  Nursing Notes and Vital Signs Reviewed.  Constitutional: Patient is in no acute distress. Well-developed and well-nourished.  Head: Atraumatic. Normocephalic.  Eyes: PERRL. EOM intact. Conjunctivae are not pale. No scleral icterus.  ENT: Mucous membranes are moist. Oropharynx is clear and symmetric.    Neck: Supple. Full ROM. No lymphadenopathy.  Cardiovascular: Regular rate. Regular rhythm. No murmurs, rubs, or gallops. Distal pulses are 2+ and symmetric. Thrill in L arm AV shunt.  Pulmonary/Chest: No respiratory distress. Inspiratory and expiratory wheezing at bases of bilateral lungs. No wheezing or rales.  Abdominal: Soft and non-distended.  There is no tenderness.  No rebound, guarding, or rigidity. Good bowel sounds.  Genitourinary: No CVA tenderness  Musculoskeletal: Moves all extremities.  "No obvious deformities. 2+ edema to the BLE. No calf tenderness.  Skin: Warm and dry.  Neurological:  Alert, awake, and appropriate.  Normal speech.  No acute focal neurological deficits are appreciated.  Psychiatric: Normal affect. Good eye contact. Appropriate in content.    ED Course    Procedures  ED Vital Signs:  Vitals:    10/20/19 1541 10/20/19 1543 10/20/19 1557 10/20/19 1608   BP:  (!) 209/70 (!) 144/90    Pulse:  79 75 69   Resp:  (!) 23 18 19   TempSrc:  Oral     SpO2:  99% 97% 98%   Weight: 106.7 kg (235 lb 1.9 oz)      Height: 5' 2" (1.575 m)       10/20/19 1631 10/20/19 1640 10/20/19 1647 10/20/19 1726   BP: (!) 198/82      Pulse: 67 66 71 79   Resp: 18 20 (!) 21 (!) 23   TempSrc:       SpO2: 96% 98% 99% 97%   Weight:       Height:        10/20/19 1802 10/20/19 2027   BP: (!) 185/74 (!) 203/89   Pulse: 88 80   Resp: 16 (!) 24   TempSrc:     SpO2: 97% 95%   Weight:     Height:         Abnormal Lab Results:  Labs Reviewed   CBC W/ AUTO DIFFERENTIAL - Abnormal; Notable for the following components:       Result Value    RBC 2.78 (*)     Hemoglobin 7.5 (*)     Hematocrit 23.9 (*)     Mean Corpuscular Hemoglobin Conc 31.4 (*)     RDW 19.8 (*)     Immature Granulocytes 2.0 (*)     Immature Grans (Abs) 0.12 (*)     All other components within normal limits   COMPREHENSIVE METABOLIC PANEL - Abnormal; Notable for the following components:    Potassium 5.7 (*)     Chloride 116 (*)     CO2 15 (*)     Glucose 133 (*)     BUN, Bld 58 (*)     Creatinine 5.0 (*)     Calcium 7.7 (*)     Albumin 2.9 (*)     Alkaline Phosphatase 50 (*)     Anion Gap 7 (*)     eGFR if  10 (*)     eGFR if non  8 (*)     All other components within normal limits   URINALYSIS - Abnormal; Notable for the following components:    Protein, UA 2+ (*)     Glucose, UA 1+ (*)     Occult Blood UA 2+ (*)     All other components within normal limits   B-TYPE NATRIURETIC PEPTIDE - Abnormal; Notable for the following " components:     (*)     All other components within normal limits   URINALYSIS MICROSCOPIC - Abnormal; Notable for the following components:    Hyaline Casts, UA 6 (*)     All other components within normal limits   HEPATITIS C ANTIBODY   TROPONIN I   POTASSIUM        All Lab Results:  Results for orders placed or performed during the hospital encounter of 10/20/19   Hepatitis C antibody   Result Value Ref Range    Hepatitis C Ab Negative Negative   CBC auto differential   Result Value Ref Range    WBC 6.11 3.90 - 12.70 K/uL    RBC 2.78 (L) 4.00 - 5.40 M/uL    Hemoglobin 7.5 (L) 12.0 - 16.0 g/dL    Hematocrit 23.9 (L) 37.0 - 48.5 %    Mean Corpuscular Volume 86 82 - 98 fL    Mean Corpuscular Hemoglobin 27.0 27.0 - 31.0 pg    Mean Corpuscular Hemoglobin Conc 31.4 (L) 32.0 - 36.0 g/dL    RDW 19.8 (H) 11.5 - 14.5 %    Platelets 228 150 - 350 K/uL    MPV 10.1 9.2 - 12.9 fL    Immature Granulocytes 2.0 (H) 0.0 - 0.5 %    Gran # (ANC) 3.9 1.8 - 7.7 K/uL    Immature Grans (Abs) 0.12 (H) 0.00 - 0.04 K/uL    Lymph # 1.3 1.0 - 4.8 K/uL    Mono # 0.6 0.3 - 1.0 K/uL    Eos # 0.2 0.0 - 0.5 K/uL    Baso # 0.02 0.00 - 0.20 K/uL    nRBC 0 0 /100 WBC    Gran% 64.0 38.0 - 73.0 %    Lymph% 21.8 18.0 - 48.0 %    Mono% 9.0 4.0 - 15.0 %    Eosinophil% 2.9 0.0 - 8.0 %    Basophil% 0.3 0.0 - 1.9 %    Platelet Estimate Appears normal     Aniso Slight     Poik Slight     Hypo Occasional     Ovalocytes Occasional     Target Cells Occasional     Tear Drop Cells Occasional     Acanthocytes Present     Schistocytes Present     Differential Method Automated    Comprehensive metabolic panel   Result Value Ref Range    Sodium 138 136 - 145 mmol/L    Potassium 5.7 (H) 3.5 - 5.1 mmol/L    Chloride 116 (H) 95 - 110 mmol/L    CO2 15 (L) 23 - 29 mmol/L    Glucose 133 (H) 70 - 110 mg/dL    BUN, Bld 58 (H) 8 - 23 mg/dL    Creatinine 5.0 (H) 0.5 - 1.4 mg/dL    Calcium 7.7 (L) 8.7 - 10.5 mg/dL    Total Protein 8.0 6.0 - 8.4 g/dL    Albumin 2.9 (L)  3.5 - 5.2 g/dL    Total Bilirubin 0.3 0.1 - 1.0 mg/dL    Alkaline Phosphatase 50 (L) 55 - 135 U/L    AST 20 10 - 40 U/L    ALT 16 10 - 44 U/L    Anion Gap 7 (L) 8 - 16 mmol/L    eGFR if African American 10 (A) >60 mL/min/1.73 m^2    eGFR if non African American 8 (A) >60 mL/min/1.73 m^2   Urinalysis - Clean Catch   Result Value Ref Range    Specimen UA Urine, Clean Catch     Color, UA Yellow Yellow, Straw, Michelle    Appearance, UA Clear Clear    pH, UA 6.0 5.0 - 8.0    Specific Gravity, UA 1.020 1.005 - 1.030    Protein, UA 2+ (A) Negative    Glucose, UA 1+ (A) Negative    Ketones, UA Negative Negative    Bilirubin (UA) Negative Negative    Occult Blood UA 2+ (A) Negative    Nitrite, UA Negative Negative    Urobilinogen, UA Negative <2.0 EU/dL    Leukocytes, UA Negative Negative   Troponin I   Result Value Ref Range    Troponin I 0.024 0.000 - 0.026 ng/mL   B-Type natriuretic peptide (BNP)   Result Value Ref Range     (H) 0 - 99 pg/mL   Urinalysis Microscopic   Result Value Ref Range    RBC, UA 3 0 - 4 /hpf    WBC, UA 0 0 - 5 /hpf    Bacteria Occasional None-Occ /hpf    Hyaline Casts, UA 6 (A) 0-1/lpf /lpf    Microscopic Comment SEE COMMENT    Potassium   Result Value Ref Range    Potassium 4.7 3.5 - 5.1 mmol/L         Imaging Results:  Imaging Results          X-Ray Chest AP Portable (Final result)  Result time 10/20/19 16:43:24    Final result by Manjit Strong MD (10/20/19 16:43:24)                 Impression:      Cardiomegaly.      Electronically signed by: Manjit Strong MD  Date:    10/20/2019  Time:    16:43             Narrative:    EXAMINATION:  XR CHEST AP PORTABLE    CLINICAL HISTORY:  Acute chest pain, Chest Pain;    COMPARISON:  09/09/2018    FINDINGS:  Mild cardiac silhouette enlargement with mild aortic atherosclerosis.  Central vasculature is mildly prominent.  However no overt failure is present.    No evidence of pleural effusion.                                    The EKG was  ordered, reviewed, and independently interpreted by the ED provider.  Interpretation time: 1545  Rate: 73 BPM  Rhythm: normal sinus rhythm  Interpretation: Incomplete RBBB. Left anterior fascicular block. Minimal voltage criteria for LVH, may be normal variant. Septal infarct. No STEMI.      The Emergency Provider reviewed the vital signs and test results, which are outlined above.    ED Discussion       6:01 PM: Discussed pt's case with Dr. Wise (Nephrology) who recommends  Giving the pt 80 mg of lasix IV, starting sodium bicarbonate tablets 1300 mg tid and fludrocortisone 200 mg oral.    7:30 PM: Re-evaluated pt. Pt has urinated and states breathing is better. Will recheck potassium.    7:35 PM: Reassessed pt at this time. Discussed with pt all pertinent ED information and results. Discussed pt dx and plan of tx. Gave pt all f/u and return to the ED instructions. All questions and concerns were addressed at this time. Pt expresses understanding of information and instructions, and is comfortable with plan to discharge. Pt is stable for discharge.      I discussed with patient and/or family/caretaker that evaluation in the ED does not suggest any emergent or life threatening medical conditions requiring immediate intervention beyond what was provided in the ED, and I believe patient is safe for discharge.  Regardless, an unremarkable evaluation in the ED does not preclude the development or presence of a serious of life threatening condition. As such, patient was instructed to return immediately for any worsening or change in current symptoms.      ED Medication(s):  Medications   albuterol-ipratropium 2.5 mg-0.5 mg/3 mL nebulizer solution 3 mL (3 mLs Nebulization Given 10/20/19 1640)   albuterol-ipratropium 2.5 mg-0.5 mg/3 mL nebulizer solution 3 mL (3 mLs Nebulization Given 10/20/19 1647)   albuterol-ipratropium 2.5 mg-0.5 mg/3 mL nebulizer solution 3 mL (3 mLs Nebulization Given 10/20/19 1726)   furosemide  injection 80 mg (80 mg Intravenous Given 10/20/19 1807)   sodium bicarbonate tablet 1,300 mg (1,300 mg Oral Given 10/20/19 1808)   cloNIDine tablet 0.1 mg (0.1 mg Oral Given 10/20/19 2032)       Follow-up Information     Dr. Maravilla [facility]. Schedule an appointment as soon as possible for a visit in 1 day.    Contact information:  [facility]                Discharge Medication List as of 10/20/2019  7:54 PM      START taking these medications    Details   albuterol (PROVENTIL/VENTOLIN HFA) 90 mcg/actuation inhaler Inhale 1-2 puffs into the lungs every 6 (six) hours as needed for Wheezing. Rescue, Starting Sun 10/20/2019, Normal      sodium bicarbonate 650 MG tablet Take 1 tablet (650 mg total) by mouth 3 (three) times daily., Starting Sun 10/20/2019, Until Mon 10/19/2020, Print                 Medical Decision Making    Medical Decision Making:   Clinical Tests:   Lab Tests: Ordered and Reviewed  Radiological Study: Ordered and Reviewed  Medical Tests: Ordered and Reviewed           Scribe Attestation:   Scribe #1: I performed the above scribed service and the documentation accurately describes the services I performed. I attest to the accuracy of the note.    Attending:   Physician Attestation Statement for Scribe #1: I, Lianne Douglass MD, personally performed the services described in this documentation, as scribed by Elizabeth Avila, in my presence, and it is both accurate and complete.          Clinical Impression       ICD-10-CM ICD-9-CM   1. SOB (shortness of breath) R06.02 786.05   2. Stage 3 chronic kidney disease N18.3 585.3       Disposition:   Disposition: Discharged  Condition: Stable         Lianne Douglass MD  10/22/19 0646     Prednisone Pregnancy And Lactation Text: This medication is Pregnancy Category C and it isn't know if it is safe during pregnancy. This medication is excreted in breast milk.

## 2021-09-22 ENCOUNTER — OFFICE VISIT (OUTPATIENT)
Dept: PODIATRY | Facility: CLINIC | Age: 66
End: 2021-09-22
Payer: MEDICARE

## 2021-09-22 VITALS — WEIGHT: 235.25 LBS | BODY MASS INDEX: 43.29 KG/M2 | HEIGHT: 62 IN

## 2021-09-22 DIAGNOSIS — B35.1 DERMATOPHYTOSIS OF NAIL: ICD-10-CM

## 2021-09-22 DIAGNOSIS — E11.42 DM TYPE 2 WITH DIABETIC PERIPHERAL NEUROPATHY: Primary | ICD-10-CM

## 2021-09-22 PROCEDURE — 99999 PR PBB SHADOW E&M-EST. PATIENT-LVL III: CPT | Mod: PBBFAC,,, | Performed by: PODIATRIST

## 2021-09-22 PROCEDURE — 11721 DEBRIDE NAIL 6 OR MORE: CPT | Mod: Q9,PBBFAC | Performed by: PODIATRIST

## 2021-09-22 PROCEDURE — 11721 DEBRIDE NAIL 6 OR MORE: CPT | Mod: Q9,S$GLB,, | Performed by: PODIATRIST

## 2021-09-22 PROCEDURE — 99203 OFFICE O/P NEW LOW 30 MIN: CPT | Mod: 25,S$GLB,, | Performed by: PODIATRIST

## 2021-09-22 PROCEDURE — 99213 OFFICE O/P EST LOW 20 MIN: CPT | Mod: PBBFAC | Performed by: PODIATRIST

## 2021-09-22 PROCEDURE — 11721 PR DEBRIDEMENT OF NAILS, 6 OR MORE: ICD-10-PCS | Mod: Q9,S$GLB,, | Performed by: PODIATRIST

## 2021-09-22 PROCEDURE — 99203 PR OFFICE/OUTPT VISIT, NEW, LEVL III, 30-44 MIN: ICD-10-PCS | Mod: 25,S$GLB,, | Performed by: PODIATRIST

## 2021-09-22 PROCEDURE — 99999 PR PBB SHADOW E&M-EST. PATIENT-LVL III: ICD-10-PCS | Mod: PBBFAC,,, | Performed by: PODIATRIST

## 2022-05-02 ENCOUNTER — TELEPHONE (OUTPATIENT)
Dept: OBSTETRICS AND GYNECOLOGY | Facility: CLINIC | Age: 67
End: 2022-05-02
Payer: MEDICARE

## 2022-05-02 NOTE — TELEPHONE ENCOUNTER
Spoke to patient's niece, Radhika Paige, and advised no mammo available on Wednesday.  After breast exam advised niece provider may order mammo or other testing.  Niece verbalized understanding.

## 2022-05-02 NOTE — TELEPHONE ENCOUNTER
----- Message from Catherinedanielle Howard sent at 5/2/2022  1:51 PM CDT -----  She has an appt on Wednesday. She would like to schedule a mammogram also. States she has a lump in her LT breast. Please call pt [phone]. Thank you

## 2022-05-04 ENCOUNTER — OFFICE VISIT (OUTPATIENT)
Dept: OBSTETRICS AND GYNECOLOGY | Facility: CLINIC | Age: 67
End: 2022-05-04
Payer: MEDICARE

## 2022-05-04 VITALS
BODY MASS INDEX: 37.53 KG/M2 | SYSTOLIC BLOOD PRESSURE: 120 MMHG | WEIGHT: 203.94 LBS | HEIGHT: 62 IN | DIASTOLIC BLOOD PRESSURE: 70 MMHG

## 2022-05-04 DIAGNOSIS — N63.21 UNSPECIFIED LUMP IN THE LEFT BREAST, UPPER OUTER QUADRANT: ICD-10-CM

## 2022-05-04 DIAGNOSIS — Z12.31 ENCOUNTER FOR SCREENING MAMMOGRAM FOR MALIGNANT NEOPLASM OF BREAST: ICD-10-CM

## 2022-05-04 DIAGNOSIS — N63.20 LEFT BREAST MASS: Primary | ICD-10-CM

## 2022-05-04 PROCEDURE — 3008F PR BODY MASS INDEX (BMI) DOCUMENTED: ICD-10-PCS | Mod: CPTII,S$GLB,, | Performed by: NURSE PRACTITIONER

## 2022-05-04 PROCEDURE — 99203 OFFICE O/P NEW LOW 30 MIN: CPT | Mod: S$GLB,,, | Performed by: NURSE PRACTITIONER

## 2022-05-04 PROCEDURE — 99999 PR PBB SHADOW E&M-EST. PATIENT-LVL III: CPT | Mod: PBBFAC,,, | Performed by: NURSE PRACTITIONER

## 2022-05-04 PROCEDURE — 1159F PR MEDICATION LIST DOCUMENTED IN MEDICAL RECORD: ICD-10-PCS | Mod: CPTII,S$GLB,, | Performed by: NURSE PRACTITIONER

## 2022-05-04 PROCEDURE — 1160F PR REVIEW ALL MEDS BY PRESCRIBER/CLIN PHARMACIST DOCUMENTED: ICD-10-PCS | Mod: CPTII,S$GLB,, | Performed by: NURSE PRACTITIONER

## 2022-05-04 PROCEDURE — 3074F SYST BP LT 130 MM HG: CPT | Mod: CPTII,S$GLB,, | Performed by: NURSE PRACTITIONER

## 2022-05-04 PROCEDURE — 3288F FALL RISK ASSESSMENT DOCD: CPT | Mod: CPTII,S$GLB,, | Performed by: NURSE PRACTITIONER

## 2022-05-04 PROCEDURE — 1159F MED LIST DOCD IN RCRD: CPT | Mod: CPTII,S$GLB,, | Performed by: NURSE PRACTITIONER

## 2022-05-04 PROCEDURE — 4010F PR ACE/ARB THEARPY RXD/TAKEN: ICD-10-PCS | Mod: CPTII,S$GLB,, | Performed by: NURSE PRACTITIONER

## 2022-05-04 PROCEDURE — 3078F PR MOST RECENT DIASTOLIC BLOOD PRESSURE < 80 MM HG: ICD-10-PCS | Mod: CPTII,S$GLB,, | Performed by: NURSE PRACTITIONER

## 2022-05-04 PROCEDURE — 1126F AMNT PAIN NOTED NONE PRSNT: CPT | Mod: CPTII,S$GLB,, | Performed by: NURSE PRACTITIONER

## 2022-05-04 PROCEDURE — 3078F DIAST BP <80 MM HG: CPT | Mod: CPTII,S$GLB,, | Performed by: NURSE PRACTITIONER

## 2022-05-04 PROCEDURE — 99999 PR PBB SHADOW E&M-EST. PATIENT-LVL III: ICD-10-PCS | Mod: PBBFAC,,, | Performed by: NURSE PRACTITIONER

## 2022-05-04 PROCEDURE — 3288F PR FALLS RISK ASSESSMENT DOCUMENTED: ICD-10-PCS | Mod: CPTII,S$GLB,, | Performed by: NURSE PRACTITIONER

## 2022-05-04 PROCEDURE — 1160F RVW MEDS BY RX/DR IN RCRD: CPT | Mod: CPTII,S$GLB,, | Performed by: NURSE PRACTITIONER

## 2022-05-04 PROCEDURE — 1101F PT FALLS ASSESS-DOCD LE1/YR: CPT | Mod: CPTII,S$GLB,, | Performed by: NURSE PRACTITIONER

## 2022-05-04 PROCEDURE — 3008F BODY MASS INDEX DOCD: CPT | Mod: CPTII,S$GLB,, | Performed by: NURSE PRACTITIONER

## 2022-05-04 PROCEDURE — 3074F PR MOST RECENT SYSTOLIC BLOOD PRESSURE < 130 MM HG: ICD-10-PCS | Mod: CPTII,S$GLB,, | Performed by: NURSE PRACTITIONER

## 2022-05-04 PROCEDURE — 4010F ACE/ARB THERAPY RXD/TAKEN: CPT | Mod: CPTII,S$GLB,, | Performed by: NURSE PRACTITIONER

## 2022-05-04 PROCEDURE — 99203 PR OFFICE/OUTPT VISIT, NEW, LEVL III, 30-44 MIN: ICD-10-PCS | Mod: S$GLB,,, | Performed by: NURSE PRACTITIONER

## 2022-05-04 PROCEDURE — 1101F PR PT FALLS ASSESS DOC 0-1 FALLS W/OUT INJ PAST YR: ICD-10-PCS | Mod: CPTII,S$GLB,, | Performed by: NURSE PRACTITIONER

## 2022-05-04 PROCEDURE — 1126F PR PAIN SEVERITY QUANTIFIED, NO PAIN PRESENT: ICD-10-PCS | Mod: CPTII,S$GLB,, | Performed by: NURSE PRACTITIONER

## 2022-05-04 RX ORDER — SEVELAMER CARBONATE 800 MG/1
TABLET, FILM COATED ORAL
COMMUNITY
Start: 2022-02-08 | End: 2023-02-10

## 2022-05-04 RX ORDER — AMIODARONE HYDROCHLORIDE 100 MG/1
100 TABLET ORAL DAILY
Status: ON HOLD | COMMUNITY
End: 2023-03-02 | Stop reason: HOSPADM

## 2022-05-04 RX ORDER — ACETAMINOPHEN 325 MG/1
325 TABLET ORAL EVERY 6 HOURS PRN
COMMUNITY
End: 2022-12-10 | Stop reason: SDUPTHER

## 2022-05-04 RX ORDER — LOSARTAN POTASSIUM 100 MG/1
100 TABLET ORAL DAILY
COMMUNITY
Start: 2022-04-21 | End: 2023-02-10

## 2022-05-04 NOTE — PROGRESS NOTES
Patient Malaika Paige, MRN [id number], was dependent on dialysis (ICD10 Z99.2) at the time of this visit on 5/4/22. This addendum is made to the medical record on 05/04/2022.

## 2022-05-04 NOTE — PROGRESS NOTES
Chief Complaint   Patient presents with    Annual Exam        Malaika Paige is a 66 y.o. female No obstetric history on file.   Presents today for left breast lump times one month       Past Medical History:   Diagnosis Date    Cataract     Cataract     CHF (congestive heart failure)     COPD (chronic obstructive pulmonary disease)     Diabetes mellitus     Diabetic retinopathy     Hypertension      Past Surgical History:   Procedure Laterality Date    CATARACT EXTRACTION W/  INTRAOCULAR LENS IMPLANT Right 08/14/2017    Dr. Moe     Social History     Tobacco Use    Smoking status: Never Smoker    Smokeless tobacco: Never Used   Substance Use Topics    Alcohol use: No     Family History   Problem Relation Age of Onset    Diabetes Sister     Cancer Brother     Amblyopia Neg Hx     Blindness Neg Hx     Cataracts Neg Hx     Glaucoma Neg Hx     Hypertension Neg Hx     Macular degeneration Neg Hx     Retinal detachment Neg Hx     Strabismus Neg Hx     Stroke Neg Hx     Thyroid disease Neg Hx      OB History   No obstetric history on file.       Medication List with Changes/Refills   Current Medications    ACETAMINOPHEN (TYLENOL) 325 MG TABLET    Take 325 mg by mouth every 6 (six) hours as needed for Pain.    ALBUTEROL (PROVENTIL/VENTOLIN HFA) 90 MCG/ACTUATION INHALER    Inhale 1-2 puffs into the lungs every 6 (six) hours as needed for Wheezing. Rescue    ALBUTEROL SULFATE (PROAIR RESPICLICK) 90 MCG/ACTUATION AEPB    Inhale 180 mcg into the lungs every 4 (four) hours. Rescue    AMIODARONE (PACERONE) 100 MG TAB    Take 100 mg by mouth once daily.    AMLODIPINE (NORVASC) 10 MG TABLET    Take 10 mg by mouth once daily.    APIXABAN (ELIQUIS) 2.5 MG TAB    Take 2.5 mg by mouth 2 (two) times daily.    ASPIRIN (ECOTRIN) 81 MG EC TABLET    Take 81 mg by mouth once daily.     BUDESONIDE-FORMOTEROL 160-4.5 MCG (SYMBICORT) 160-4.5 MCG/ACTUATION HFAA    Inhale 2 puffs into the lungs every 12 (twelve)  "hours. Controller    BUMETANIDE (BUMEX) 1 MG TABLET    Take 2 tablets (2 mg total) by mouth 2 (two) times daily.    CALCITRIOL (ROCALTROL) 0.25 MCG CAP    Take 0.25 mcg by mouth once daily.    DOXAZOSIN (CARDURA) 4 MG TABLET    Take 8 mg by mouth every evening.    HYDROCODONE-ACETAMINOPHEN (NORCO) 5-325 MG PER TABLET    Take 1 tablet by mouth every 6 (six) hours as needed for Pain.    INSULIN DEGLUDEC (TRESIBA FLEXTOUCH U-200) 200 UNIT/ML (3 ML) INSULIN PEN    Inject 80 Units into the skin once daily.     INV METOPROLOL TARTRATE 25 MG ORAL TABLET (LOPRESSOR) 25 MG TAB    Take by mouth 2 (two) times daily. FOR INVESTIGATIONAL USE ONLY    ISOSORBIDE MONONITRATE (IMDUR) 60 MG 24 HR TABLET    Take 60 mg by mouth once daily.    LEVOTHYROXINE (SYNTHROID) 75 MCG TABLET    Take 75 mcg by mouth once daily.     LOPERAMIDE (IMODIUM) 2 MG CAPSULE    Take 2 mg by mouth 4 (four) times daily as needed for Diarrhea.    LOSARTAN (COZAAR) 100 MG TABLET    Take 100 mg by mouth once daily.    METOLAZONE (ZAROXOLYN) 2.5 MG TABLET    Take 2.5 mg by mouth once daily.    NEBIVOLOL (BYSTOLIC) 10 MG TAB    Take 2 tablets (20 mg total) by mouth once daily.    OMEPRAZOLE (PRILOSEC) 20 MG CAPSULE    Take 20 mg by mouth once daily.    RENVELA 800 MG TAB    SMARTSI Tablet(s) By Mouth 5 Times Daily    SODIUM BICARBONATE 650 MG TABLET    Take 1 tablet (650 mg total) by mouth 3 (three) times daily.      /70   Ht 5' 2" (1.575 m)   Wt 92.5 kg (203 lb 14.8 oz)   LMP  (LMP Unknown)   BMI 37.30 kg/m²     ROS:  Review of Systems      PHYSICAL EXAM:  Physical Exam  Chest:                 ASSESSMENT and PLAN:    ICD-10-CM ICD-9-CM    1. Left breast mass  N63.20 611.72 Mammo Digital Diagnostic Left with Alfredo      US Breast Left Complete   2. Encounter for screening mammogram for malignant neoplasm of breast  Z12.31 V76.12 Mammo Digital Screening Bilat w/ Alfredo   3. Unspecified lump in the left breast, upper outer quadrant   N63.21 611.72 Mammo " Digital Diagnostic Left with Alfredo Tavera, NP

## 2022-05-06 ENCOUNTER — TELEPHONE (OUTPATIENT)
Dept: OBSTETRICS AND GYNECOLOGY | Facility: CLINIC | Age: 67
End: 2022-05-06
Payer: MEDICARE

## 2022-05-06 NOTE — TELEPHONE ENCOUNTER
----- Message from Bunny Dominique sent at 5/6/2022 10:53 AM CDT -----  Contact: kendrick Pa is calling in regards to cold sweats that pt has in the middle of the night. Please call her back at [phone].          Thanks  DD

## 2022-05-12 ENCOUNTER — HOSPITAL ENCOUNTER (OUTPATIENT)
Dept: RADIOLOGY | Facility: HOSPITAL | Age: 67
Discharge: HOME | End: 2022-05-12
Attending: NURSE PRACTITIONER
Payer: MEDICARE

## 2022-05-12 DIAGNOSIS — N63.21 UNSPECIFIED LUMP IN THE LEFT BREAST, UPPER OUTER QUADRANT: ICD-10-CM

## 2022-05-12 DIAGNOSIS — N63.20 LEFT BREAST MASS: ICD-10-CM

## 2022-05-12 PROCEDURE — 77062 BREAST TOMOSYNTHESIS BI: CPT | Mod: TC

## 2022-05-12 PROCEDURE — 77066 DX MAMMO INCL CAD BI: CPT | Mod: 26,,, | Performed by: RADIOLOGY

## 2022-05-12 PROCEDURE — 76642 US BREAST LEFT LIMITED: ICD-10-PCS | Mod: 26,LT,, | Performed by: RADIOLOGY

## 2022-05-12 PROCEDURE — 77066 MAMMO DIGITAL DIAGNOSTIC BILAT WITH TOMO: ICD-10-PCS | Mod: 26,,, | Performed by: RADIOLOGY

## 2022-05-12 PROCEDURE — 76642 ULTRASOUND BREAST LIMITED: CPT | Mod: TC,LT

## 2022-05-12 PROCEDURE — 77062 BREAST TOMOSYNTHESIS BI: CPT | Mod: 26,,, | Performed by: RADIOLOGY

## 2022-05-12 PROCEDURE — 77062 MAMMO DIGITAL DIAGNOSTIC BILAT WITH TOMO: ICD-10-PCS | Mod: 26,,, | Performed by: RADIOLOGY

## 2022-05-12 PROCEDURE — 76642 ULTRASOUND BREAST LIMITED: CPT | Mod: 26,LT,, | Performed by: RADIOLOGY

## 2022-05-13 ENCOUNTER — OFFICE VISIT (OUTPATIENT)
Dept: PODIATRY | Facility: CLINIC | Age: 67
End: 2022-05-13
Payer: MEDICARE

## 2022-05-13 ENCOUNTER — TELEPHONE (OUTPATIENT)
Dept: RADIOLOGY | Facility: HOSPITAL | Age: 67
End: 2022-05-13
Payer: MEDICARE

## 2022-05-13 VITALS
WEIGHT: 203.94 LBS | DIASTOLIC BLOOD PRESSURE: 85 MMHG | BODY MASS INDEX: 37.53 KG/M2 | HEIGHT: 62 IN | HEART RATE: 58 BPM | SYSTOLIC BLOOD PRESSURE: 156 MMHG

## 2022-05-13 DIAGNOSIS — E11.42 DM TYPE 2 WITH DIABETIC PERIPHERAL NEUROPATHY: Primary | ICD-10-CM

## 2022-05-13 DIAGNOSIS — B35.1 DERMATOPHYTOSIS OF NAIL: ICD-10-CM

## 2022-05-13 PROCEDURE — 3008F BODY MASS INDEX DOCD: CPT | Mod: CPTII,S$GLB,, | Performed by: PODIATRIST

## 2022-05-13 PROCEDURE — 1159F MED LIST DOCD IN RCRD: CPT | Mod: CPTII,S$GLB,, | Performed by: PODIATRIST

## 2022-05-13 PROCEDURE — 11721 PR DEBRIDEMENT OF NAILS, 6 OR MORE: ICD-10-PCS | Mod: Q9,S$GLB,, | Performed by: PODIATRIST

## 2022-05-13 PROCEDURE — 11721 DEBRIDE NAIL 6 OR MORE: CPT | Mod: Q9,S$GLB,, | Performed by: PODIATRIST

## 2022-05-13 PROCEDURE — 1159F PR MEDICATION LIST DOCUMENTED IN MEDICAL RECORD: ICD-10-PCS | Mod: CPTII,S$GLB,, | Performed by: PODIATRIST

## 2022-05-13 PROCEDURE — 3079F DIAST BP 80-89 MM HG: CPT | Mod: CPTII,S$GLB,, | Performed by: PODIATRIST

## 2022-05-13 PROCEDURE — 3077F PR MOST RECENT SYSTOLIC BLOOD PRESSURE >= 140 MM HG: ICD-10-PCS | Mod: CPTII,S$GLB,, | Performed by: PODIATRIST

## 2022-05-13 PROCEDURE — 3079F PR MOST RECENT DIASTOLIC BLOOD PRESSURE 80-89 MM HG: ICD-10-PCS | Mod: CPTII,S$GLB,, | Performed by: PODIATRIST

## 2022-05-13 PROCEDURE — 1160F PR REVIEW ALL MEDS BY PRESCRIBER/CLIN PHARMACIST DOCUMENTED: ICD-10-PCS | Mod: CPTII,S$GLB,, | Performed by: PODIATRIST

## 2022-05-13 PROCEDURE — 99999 PR PBB SHADOW E&M-EST. PATIENT-LVL III: CPT | Mod: PBBFAC,,, | Performed by: PODIATRIST

## 2022-05-13 PROCEDURE — 4010F ACE/ARB THERAPY RXD/TAKEN: CPT | Mod: CPTII,S$GLB,, | Performed by: PODIATRIST

## 2022-05-13 PROCEDURE — 3008F PR BODY MASS INDEX (BMI) DOCUMENTED: ICD-10-PCS | Mod: CPTII,S$GLB,, | Performed by: PODIATRIST

## 2022-05-13 PROCEDURE — 1160F RVW MEDS BY RX/DR IN RCRD: CPT | Mod: CPTII,S$GLB,, | Performed by: PODIATRIST

## 2022-05-13 PROCEDURE — 99213 OFFICE O/P EST LOW 20 MIN: CPT | Mod: 25,S$GLB,, | Performed by: PODIATRIST

## 2022-05-13 PROCEDURE — 3288F PR FALLS RISK ASSESSMENT DOCUMENTED: ICD-10-PCS | Mod: CPTII,S$GLB,, | Performed by: PODIATRIST

## 2022-05-13 PROCEDURE — 3077F SYST BP >= 140 MM HG: CPT | Mod: CPTII,S$GLB,, | Performed by: PODIATRIST

## 2022-05-13 PROCEDURE — 99213 PR OFFICE/OUTPT VISIT, EST, LEVL III, 20-29 MIN: ICD-10-PCS | Mod: 25,S$GLB,, | Performed by: PODIATRIST

## 2022-05-13 PROCEDURE — 4010F PR ACE/ARB THEARPY RXD/TAKEN: ICD-10-PCS | Mod: CPTII,S$GLB,, | Performed by: PODIATRIST

## 2022-05-13 PROCEDURE — 3288F FALL RISK ASSESSMENT DOCD: CPT | Mod: CPTII,S$GLB,, | Performed by: PODIATRIST

## 2022-05-13 PROCEDURE — 99213 OFFICE O/P EST LOW 20 MIN: CPT | Mod: PBBFAC | Performed by: PODIATRIST

## 2022-05-13 PROCEDURE — 1101F PR PT FALLS ASSESS DOC 0-1 FALLS W/OUT INJ PAST YR: ICD-10-PCS | Mod: CPTII,S$GLB,, | Performed by: PODIATRIST

## 2022-05-13 PROCEDURE — 99999 PR PBB SHADOW E&M-EST. PATIENT-LVL III: ICD-10-PCS | Mod: PBBFAC,,, | Performed by: PODIATRIST

## 2022-05-13 PROCEDURE — 1101F PT FALLS ASSESS-DOCD LE1/YR: CPT | Mod: CPTII,S$GLB,, | Performed by: PODIATRIST

## 2022-05-13 NOTE — PROGRESS NOTES
Subjective:     Patient ID: Malaika Paige is a 66 y.o. female.    Chief Complaint: Diabetic Foot Exam (Pt c/o left side of foot callous, Diabetic pt wears tennis shoes w/ socks, PCP Dr. Scales last seen 3/2022), Nail Care, and Callouses    Malaika is a 66 y.o. female who presents to the clinic for evaluation and treatment of high risk feet. Malaika has a past medical history of Cataract, Cataract, CHF (congestive heart failure), COPD (chronic obstructive pulmonary disease), Diabetes mellitus, Diabetic retinopathy, and Hypertension. The patient's chief complaint is painful left foot callus. This patient has documented high risk feet requiring routine maintenance secondary to diabetes mellitis and those secondary complications of diabetes, as mentioned..    PCP: Travis Scales MD    Date Last Seen by PCP: 03/03/2022    Current shoe gear:  Affected Foot: Tennis shoes     Unaffected Foot: Tennis shoes    Hemoglobin A1C   Date Value Ref Range Status   09/09/2018 5.6 4.0 - 5.6 % Final     Comment:     ADA Screening Guidelines:  5.7-6.4%  Consistent with prediabetes  >or=6.5%  Consistent with diabetes  High levels of fetal hemoglobin interfere with the HbA1C  assay. Heterozygous hemoglobin variants (HbS, HgC, etc)do  not significantly interfere with this assay.   However, presence of multiple variants may affect accuracy.         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    Lattice degeneration of right retina    Uncontrolled diabetes mellitus with both eyes affected by proliferative retinopathy and macular edema, with long-term current use of insulin    NS (nuclear sclerosis), left    Type 2 diabetes mellitus, with long-term current use of insulin    ESRD (end stage renal disease)    Hypertensive emergency    COPD (chronic obstructive pulmonary disease)    Shortness of breath     Microcytic anemia    Metabolic acidosis    Hyperkalemia    CKD (chronic kidney disease) stage 5, GFR less than 15 ml/min    Chronic kidney disease-mineral and bone disorder    Anemia associated with chronic renal failure    Diastolic dysfunction    Status post cataract extraction and insertion of intraocular lens of right eye       Medication List with Changes/Refills   Current Medications    ACETAMINOPHEN (TYLENOL) 325 MG TABLET    Take 325 mg by mouth every 6 (six) hours as needed for Pain.    ALBUTEROL (PROVENTIL/VENTOLIN HFA) 90 MCG/ACTUATION INHALER    Inhale 1-2 puffs into the lungs every 6 (six) hours as needed for Wheezing. Rescue    ALBUTEROL SULFATE (PROAIR RESPICLICK) 90 MCG/ACTUATION AEPB    Inhale 180 mcg into the lungs every 4 (four) hours. Rescue    AMIODARONE (PACERONE) 100 MG TAB    Take 100 mg by mouth once daily.    APIXABAN (ELIQUIS) 2.5 MG TAB    Take 2.5 mg by mouth 2 (two) times daily.    ASPIRIN (ECOTRIN) 81 MG EC TABLET    Take 81 mg by mouth once daily.     INSULIN DEGLUDEC (TRESIBA FLEXTOUCH U-200) 200 UNIT/ML (3 ML) INSULIN PEN    Inject 80 Units into the skin once daily.     INV METOPROLOL TARTRATE 25 MG ORAL TABLET (LOPRESSOR) 25 MG TAB    Take by mouth 2 (two) times daily. FOR INVESTIGATIONAL USE ONLY    LEVOTHYROXINE (SYNTHROID) 75 MCG TABLET    Take 75 mcg by mouth once daily.     LOSARTAN (COZAAR) 100 MG TABLET    Take 100 mg by mouth once daily.    OMEPRAZOLE (PRILOSEC) 20 MG CAPSULE    Take 20 mg by mouth once daily.    RENVELA 800 MG TAB    SMARTSI Tablet(s) By Mouth 5 Times Daily       Review of patient's allergies indicates:  No Known Allergies    Past Surgical History:   Procedure Laterality Date    BREAST BIOPSY Right     benign    CATARACT EXTRACTION W/  INTRAOCULAR LENS IMPLANT Right 2017    Dr. Moe       Family History   Problem Relation Age of Onset    Breast cancer Sister     Diabetes Sister     Cancer Brother     Amblyopia Neg Hx     Blindness  Neg Hx     Cataracts Neg Hx     Glaucoma Neg Hx     Hypertension Neg Hx     Macular degeneration Neg Hx     Retinal detachment Neg Hx     Strabismus Neg Hx     Stroke Neg Hx     Thyroid disease Neg Hx        Social History     Socioeconomic History    Marital status:    Tobacco Use    Smoking status: Never Smoker    Smokeless tobacco: Never Used   Substance and Sexual Activity    Alcohol use: No       There were no vitals filed for this visit.    Hemoglobin A1C   Date Value Ref Range Status   09/09/2018 5.6 4.0 - 5.6 % Final     Comment:     ADA Screening Guidelines:  5.7-6.4%  Consistent with prediabetes  >or=6.5%  Consistent with diabetes  High levels of fetal hemoglobin interfere with the HbA1C  assay. Heterozygous hemoglobin variants (HbS, HgC, etc)do  not significantly interfere with this assay.   However, presence of multiple variants may affect accuracy.         Review of Systems   Constitutional: Negative for chills and fever.   Respiratory: Negative for shortness of breath.    Cardiovascular: Negative for chest pain, palpitations, orthopnea, claudication and leg swelling.   Gastrointestinal: Negative for diarrhea, nausea and vomiting.   Musculoskeletal: Negative for joint pain.   Skin: Negative for rash.   Neurological: Positive for tingling and sensory change.   Psychiatric/Behavioral: Negative.              Objective:      PHYSICAL EXAM: Apperance: Alert and orient in no distress,well developed, and with good attention to grooming and body habits  Patient presents ambulating in tennis shoes.   LOWER EXTREMITY EXAM:  VASCULAR: Dorsalis pedis pulses 2/4 bilateral and Posterior Tibial pulses 2/4 bilateral. Capillary fill time <4 seconds bilateral. Mild edema observed bilateral. Varicosities absent bilateral. Skin temperature of the lower extremities is warm to warm, proximal to distal. Hair growth dim bilateral.  DERMATOLOGICAL: No skin rashes, subcutaneous nodules, lesions, or ulcers  observed bilateral. Nails 1,2,3,4,5 bilateral elongated, thickened, and discolored with subungual debris. Webspaces 1,2,3,4 bilateral clean, dry and without evidence of break in skin integrity.   NEUROLOGICAL: Light touch, sharp-dull, proprioception all present and equal bilaterally.  Vibratory sensation diminished at bilateral hallux and navicular. Protective sensation absent at 6/10 sites as tested with a [place] 5.07 monofilament.   MUSCULOSKELETAL: Muscle strength is 5/5 for foot inverters, everters, plantarflexors, and dorsiflexors. Muscle tone is normal.         Assessment:       ICD-10-CM ICD-9-CM   1. DM type 2 with diabetic peripheral neuropathy  E11.42 250.60     357.2   2. Dermatophytosis of nail  B35.1 110.1       Plan:   DM type 2 with diabetic peripheral neuropathy    Dermatophytosis of nail      I counseled the patient on her conditions, regarding findings of my examination, my impressions, and usual treatment plan.   Greater than 50% of this visit spent on counseling and coordination of care.  Greater than 15 minutes of a 20 minute appointment spent on education about the diabetic foot, neuropathy, and prevention of limb loss.  Shoe inspection. Diabetic Foot Education. Patient reminded of the importance of good nutrition and blood sugar control to help prevent podiatric complications of diabetes. Patient instructed on proper foot hygeine. We discussed wearing proper shoe gear, daily foot inspections, never walking without protective shoe gear, never putting sharp instruments to feet.    With patient's permission, nails 1-5 bilateral were debrided/trimmed in length and thickness aggressively to their soft tissue attachment mechanically and with electric , removing all offending nail and debris. Patient relates relief following the procedure.  Patient  will continue to monitor the areas daily, inspect feet, wear protective shoe gear when ambulatory, moisturizer to maintain skin  integrity. Patient reminded of the importance of good nutrition and blood sugar control to help prevent podiatric complications of diabetes.  Patient to return 6 months or sooner if needed.                Winnie Helm DPM  [facility]

## 2022-05-16 ENCOUNTER — OFFICE VISIT (OUTPATIENT)
Dept: SURGERY | Facility: CLINIC | Age: 67
End: 2022-05-16
Payer: MEDICARE

## 2022-05-16 VITALS
BODY MASS INDEX: 38.06 KG/M2 | WEIGHT: 208.13 LBS | SYSTOLIC BLOOD PRESSURE: 168 MMHG | DIASTOLIC BLOOD PRESSURE: 66 MMHG | TEMPERATURE: 98 F | HEART RATE: 51 BPM

## 2022-05-16 DIAGNOSIS — R92.8 ABNORMAL MAMMOGRAM OF LEFT BREAST: Primary | ICD-10-CM

## 2022-05-16 PROCEDURE — 4010F ACE/ARB THERAPY RXD/TAKEN: CPT | Mod: CPTII,S$GLB,, | Performed by: SURGERY

## 2022-05-16 PROCEDURE — 1160F PR REVIEW ALL MEDS BY PRESCRIBER/CLIN PHARMACIST DOCUMENTED: ICD-10-PCS | Mod: CPTII,S$GLB,, | Performed by: SURGERY

## 2022-05-16 PROCEDURE — 1126F AMNT PAIN NOTED NONE PRSNT: CPT | Mod: CPTII,S$GLB,, | Performed by: SURGERY

## 2022-05-16 PROCEDURE — 3008F PR BODY MASS INDEX (BMI) DOCUMENTED: ICD-10-PCS | Mod: CPTII,S$GLB,, | Performed by: SURGERY

## 2022-05-16 PROCEDURE — 3078F DIAST BP <80 MM HG: CPT | Mod: CPTII,S$GLB,, | Performed by: SURGERY

## 2022-05-16 PROCEDURE — 3077F PR MOST RECENT SYSTOLIC BLOOD PRESSURE >= 140 MM HG: ICD-10-PCS | Mod: CPTII,S$GLB,, | Performed by: SURGERY

## 2022-05-16 PROCEDURE — 99204 PR OFFICE/OUTPT VISIT, NEW, LEVL IV, 45-59 MIN: ICD-10-PCS | Mod: S$GLB,,, | Performed by: SURGERY

## 2022-05-16 PROCEDURE — 1160F RVW MEDS BY RX/DR IN RCRD: CPT | Mod: CPTII,S$GLB,, | Performed by: SURGERY

## 2022-05-16 PROCEDURE — 99999 PR PBB SHADOW E&M-EST. PATIENT-LVL V: CPT | Mod: PBBFAC,,, | Performed by: SURGERY

## 2022-05-16 PROCEDURE — 1159F PR MEDICATION LIST DOCUMENTED IN MEDICAL RECORD: ICD-10-PCS | Mod: CPTII,S$GLB,, | Performed by: SURGERY

## 2022-05-16 PROCEDURE — 1126F PR PAIN SEVERITY QUANTIFIED, NO PAIN PRESENT: ICD-10-PCS | Mod: CPTII,S$GLB,, | Performed by: SURGERY

## 2022-05-16 PROCEDURE — 99999 PR PBB SHADOW E&M-EST. PATIENT-LVL V: ICD-10-PCS | Mod: PBBFAC,,, | Performed by: SURGERY

## 2022-05-16 PROCEDURE — 3008F BODY MASS INDEX DOCD: CPT | Mod: CPTII,S$GLB,, | Performed by: SURGERY

## 2022-05-16 PROCEDURE — 99204 OFFICE O/P NEW MOD 45 MIN: CPT | Mod: S$GLB,,, | Performed by: SURGERY

## 2022-05-16 PROCEDURE — 4010F PR ACE/ARB THEARPY RXD/TAKEN: ICD-10-PCS | Mod: CPTII,S$GLB,, | Performed by: SURGERY

## 2022-05-16 PROCEDURE — 1159F MED LIST DOCD IN RCRD: CPT | Mod: CPTII,S$GLB,, | Performed by: SURGERY

## 2022-05-16 PROCEDURE — 3077F SYST BP >= 140 MM HG: CPT | Mod: CPTII,S$GLB,, | Performed by: SURGERY

## 2022-05-16 PROCEDURE — 3078F PR MOST RECENT DIASTOLIC BLOOD PRESSURE < 80 MM HG: ICD-10-PCS | Mod: CPTII,S$GLB,, | Performed by: SURGERY

## 2022-05-21 NOTE — PROGRESS NOTES
History & Physical    SUBJECTIVE:     History of Present Illness:  Patient is a 66 y.o. female referred for abnormal mammogram of the left breast. She denies any breast mass, nipple discharge, breast pain or other changes. No prior breast surgery. Prior right breast biopsy benign. Family history of breast cancer in her sister. Menarche age 12,  1st at 16, menopause at 48. No HRT.     Chief Complaint   Patient presents with    Consult     Abnormal mammogram       Review of patient's allergies indicates:  No Known Allergies    Current Outpatient Medications   Medication Sig Dispense Refill    acetaminophen (TYLENOL) 325 MG tablet Take 325 mg by mouth every 6 (six) hours as needed for Pain.      albuterol (PROVENTIL/VENTOLIN HFA) 90 mcg/actuation inhaler Inhale 1-2 puffs into the lungs every 6 (six) hours as needed for Wheezing. Rescue 1 Inhaler 0    albuterol sulfate (PROAIR RESPICLICK) 90 mcg/actuation AePB Inhale 180 mcg into the lungs every 4 (four) hours. Rescue 1 each 3    amiodarone (PACERONE) 100 MG Tab Take 100 mg by mouth once daily.      apixaban (ELIQUIS) 2.5 mg Tab Take 2.5 mg by mouth 2 (two) times daily.      aspirin (ECOTRIN) 81 MG EC tablet Take 81 mg by mouth once daily.       insulin degludec (TRESIBA FLEXTOUCH U-200) 200 unit/mL (3 mL) insulin pen Inject 80 Units into the skin once daily.       INV metoprolol tartrate 25 mg oral tablet (LOPRESSOR) 25 MG Tab Take by mouth 2 (two) times daily. FOR INVESTIGATIONAL USE ONLY      levothyroxine (SYNTHROID) 75 MCG tablet Take 75 mcg by mouth once daily.       losartan (COZAAR) 100 MG tablet Take 100 mg by mouth once daily.      omeprazole (PRILOSEC) 20 MG capsule Take 20 mg by mouth once daily.      RENVELA 800 mg Tab SMARTSI Tablet(s) By Mouth 5 Times Daily       No current facility-administered medications for this visit.       Past Medical History:   Diagnosis Date    Cataract     Cataract     CHF (congestive heart failure)      COPD (chronic obstructive pulmonary disease)     Diabetes mellitus     Diabetic retinopathy     Hypertension      Past Surgical History:   Procedure Laterality Date    BREAST BIOPSY Right     benign    CATARACT EXTRACTION W/  INTRAOCULAR LENS IMPLANT Right 08/14/2017    Dr. Moe     Family History   Problem Relation Age of Onset    Breast cancer Sister     Diabetes Sister     Cancer Brother     Amblyopia Neg Hx     Blindness Neg Hx     Cataracts Neg Hx     Glaucoma Neg Hx     Hypertension Neg Hx     Macular degeneration Neg Hx     Retinal detachment Neg Hx     Strabismus Neg Hx     Stroke Neg Hx     Thyroid disease Neg Hx      Social History     Tobacco Use    Smoking status: Never Smoker    Smokeless tobacco: Never Used   Substance Use Topics    Alcohol use: No        Review of Systems:  Review of Systems   Constitutional: Negative for activity change, appetite change, chills, diaphoresis, fatigue, fever and unexpected weight change.   HENT: Negative for congestion, dental problem, hearing loss, rhinorrhea, sore throat and trouble swallowing.    Eyes: Positive for visual disturbance. Negative for discharge.   Respiratory: Positive for wheezing. Negative for cough, chest tightness and shortness of breath.    Cardiovascular: Positive for palpitations. Negative for chest pain and leg swelling.   Gastrointestinal: Positive for diarrhea. Negative for abdominal distention, abdominal pain, blood in stool, constipation, nausea and vomiting.   Endocrine: Negative for cold intolerance, heat intolerance, polydipsia, polyphagia and polyuria.   Genitourinary: Negative for difficulty urinating, dysuria, frequency, hematuria, menstrual problem and urgency.   Musculoskeletal: Positive for arthralgias. Negative for gait problem, joint swelling, myalgias and neck pain.   Skin: Negative for color change, pallor, rash and wound.   Neurological: Positive for weakness. Negative for dizziness, syncope,  light-headedness, numbness and headaches.   Hematological: Negative for adenopathy. Does not bruise/bleed easily.   Psychiatric/Behavioral: Negative for confusion, decreased concentration, dysphoric mood and sleep disturbance. The patient is not nervous/anxious.        OBJECTIVE:     Vital Signs (Most Recent)  Temp: 97.7 °F (36.5 °C) (05/16/22 0938)  Pulse: (!) 51 (05/16/22 0938)  BP: (!) 168/66 (05/16/22 0938)     94.4 kg (208 lb 1.8 oz)     Physical Exam:  Physical Exam  Vitals reviewed.   Constitutional:       General: She is not in acute distress.     Appearance: She is well-developed. She is not diaphoretic.   HENT:      Head: Normocephalic and atraumatic.      Right Ear: External ear normal.      Left Ear: External ear normal.   Eyes:      General: No scleral icterus.     Conjunctiva/sclera: Conjunctivae normal.      Pupils: Pupils are equal, round, and reactive to light.   Neck:      Thyroid: No thyromegaly.      Trachea: No tracheal deviation.   Cardiovascular:      Rate and Rhythm: Normal rate and regular rhythm.      Heart sounds: Normal heart sounds. No murmur heard.    No friction rub. No gallop.   Pulmonary:      Effort: Pulmonary effort is normal. No respiratory distress.      Breath sounds: Normal breath sounds. No wheezing or rales.   Chest:      Chest wall: No tenderness.   Breasts:      Right: No inverted nipple, mass, nipple discharge, skin change or tenderness.      Left: Mass present. No inverted nipple, nipple discharge, skin change or tenderness.         Abdominal:      General: Bowel sounds are normal. There is no distension.      Palpations: Abdomen is soft.      Tenderness: There is no abdominal tenderness.      Hernia: No hernia is present.   Musculoskeletal:         General: No tenderness or deformity. Normal range of motion.      Cervical back: Normal range of motion and neck supple.   Lymphadenopathy:      Cervical: No cervical adenopathy.   Skin:     General: Skin is warm and dry.       Coloration: Skin is not pale.      Findings: No erythema or rash.   Neurological:      Mental Status: She is alert and oriented to person, place, and time.   Psychiatric:         Behavior: Behavior normal.         Thought Content: Thought content normal.         Judgment: Judgment normal.         Diagnostic Results:  Result:   Mammo Digital Diagnostic Bilat with Alfredo  US Breast Left Limited     History:  Patient is 66 y.o. and is seen for diagnostic imaging.     Films Compared:  Prior images (if available) were compared.     Findings:  This procedure was performed using tomosynthesis. Computer-aided detection was utilized in the interpretation of this examination.  The breasts have scattered areas of fibroglandular density.      Mammo Digital Diagnostic Bilat with Alfredo  Left  Mass: There is a 4.9 cm irregularly shaped mass with spiculated margins seen in the upper outer quadrant of the left breast in the anterior depth. The mass correlates with the palpable mass reported by the patient. Associated features include skin retraction. There are also associated calcifications.   Lymph Node: There are multiple similar lymph nodes seen in the left axilla.      Right  There is no evidence of suspicious masses, calcifications, or other abnormal findings in the right breast.     US Breast Left Limited  Left  Mass: There is a 5.6 cm x 4 cm x 4.3 cm irregularly shaped, non-parallel, hypoechoic mass with spiculated margins seen in the left breast at 2 o'clock, 3 cm from the nipple.   Lymph Node: There are multiple similar lymph nodes seen in the left axilla. Largest node is 3.4 cm x 1.6 cm x 2.0 cm.      Impression:  Left  Mass: Left breast 5.6 cm x 4 cm x 4.3 cm mass at the 2 o'clock position. Assessment: 5 - Highly suggestive of malignancy. Ultrasound-guided biopsy is recommended.   Lymph Node: Left axilla lymph node (multiple findings). Assessment: 5 - Highly suggestive of malignancy. Ultrasound-guided biopsy is recommended.       Right  There is no mammographic or sonographic evidence of malignancy in the right breast.     BI-RADS Category:   Overall: 5 - Highly Suggestive of Malignancy     Recommendation:  Ultrasound-guided biopsy is recommended.  Ultrasound-guided biopsy is recommended.     Your estimated lifetime risk of breast cancer (to age 85) based on Tyrer-Cuzick risk assessment model is Tyrer-Cuzick: 6.49 %. According to the American Cancer Society, patients with a lifetime breast cancer risk of 20% or higher might benefit from supplemental screening tests.    ASSESSMENT/PLAN:     65 y/o female with abnormal mammogram left breast/mass and axilla (birads 5)    PLAN:Plan     U/S guided core biopsy left breast and axilla  Follow up in clinic to discuss biopsy results

## 2022-06-07 ENCOUNTER — HOSPITAL ENCOUNTER (OUTPATIENT)
Dept: RADIOLOGY | Facility: HOSPITAL | Age: 67
Discharge: HOME | End: 2022-06-07
Attending: SURGERY
Payer: MEDICARE

## 2022-06-07 DIAGNOSIS — R92.8 ABNORMAL MAMMOGRAM OF LEFT BREAST: ICD-10-CM

## 2022-06-07 PROCEDURE — 88341 PR IHC OR ICC EACH ADD'L SINGLE ANTIBODY  STAINPR: ICD-10-PCS | Mod: 26,,, | Performed by: STUDENT IN AN ORGANIZED HEALTH CARE EDUCATION/TRAINING PROGRAM

## 2022-06-07 PROCEDURE — 38505 NEEDLE BIOPSY LYMPH NODES: CPT | Mod: LT

## 2022-06-07 PROCEDURE — 88305 TISSUE EXAM BY PATHOLOGIST: CPT | Mod: 26,,, | Performed by: STUDENT IN AN ORGANIZED HEALTH CARE EDUCATION/TRAINING PROGRAM

## 2022-06-07 PROCEDURE — 38505 NEEDLE BIOPSY LYMPH NODES: CPT | Mod: 59,LT,, | Performed by: RADIOLOGY

## 2022-06-07 PROCEDURE — A4550 SURGICAL TRAYS: HCPCS

## 2022-06-07 PROCEDURE — 88305 TISSUE EXAM BY PATHOLOGIST: ICD-10-PCS | Mod: 26,,, | Performed by: STUDENT IN AN ORGANIZED HEALTH CARE EDUCATION/TRAINING PROGRAM

## 2022-06-07 PROCEDURE — 88341 IMHCHEM/IMCYTCHM EA ADD ANTB: CPT | Performed by: STUDENT IN AN ORGANIZED HEALTH CARE EDUCATION/TRAINING PROGRAM

## 2022-06-07 PROCEDURE — 88377 M/PHMTRC ALYS ISHQUANT/SEMIQ: CPT | Performed by: STUDENT IN AN ORGANIZED HEALTH CARE EDUCATION/TRAINING PROGRAM

## 2022-06-07 PROCEDURE — 77065 DX MAMMO INCL CAD UNI: CPT | Mod: 26,LT,, | Performed by: RADIOLOGY

## 2022-06-07 PROCEDURE — 19083 BX BREAST 1ST LESION US IMAG: CPT | Mod: LT,,, | Performed by: RADIOLOGY

## 2022-06-07 PROCEDURE — 77065 DX MAMMO INCL CAD UNI: CPT | Mod: TC,LT

## 2022-06-07 PROCEDURE — 88305 TISSUE EXAM BY PATHOLOGIST: CPT | Performed by: STUDENT IN AN ORGANIZED HEALTH CARE EDUCATION/TRAINING PROGRAM

## 2022-06-07 PROCEDURE — 88342 IMHCHEM/IMCYTCHM 1ST ANTB: CPT | Mod: 26,,, | Performed by: STUDENT IN AN ORGANIZED HEALTH CARE EDUCATION/TRAINING PROGRAM

## 2022-06-07 PROCEDURE — 19083 US BREAST BIOPSY WITH IMAGING 1ST SITE LEFT: ICD-10-PCS | Mod: LT,,, | Performed by: RADIOLOGY

## 2022-06-07 PROCEDURE — 38505 US BIOPSY LYMPH NODE AXILLA: ICD-10-PCS | Mod: 59,LT,, | Performed by: RADIOLOGY

## 2022-06-07 PROCEDURE — 77065 MAMMO DIGITAL DIAGNOSTIC LEFT: ICD-10-PCS | Mod: 26,LT,, | Performed by: RADIOLOGY

## 2022-06-07 PROCEDURE — 88342 CHG IMMUNOCYTOCHEMISTRY: ICD-10-PCS | Mod: 26,,, | Performed by: STUDENT IN AN ORGANIZED HEALTH CARE EDUCATION/TRAINING PROGRAM

## 2022-06-07 PROCEDURE — 88342 IMHCHEM/IMCYTCHM 1ST ANTB: CPT | Performed by: STUDENT IN AN ORGANIZED HEALTH CARE EDUCATION/TRAINING PROGRAM

## 2022-06-07 PROCEDURE — 88341 IMHCHEM/IMCYTCHM EA ADD ANTB: CPT | Mod: 26,,, | Performed by: STUDENT IN AN ORGANIZED HEALTH CARE EDUCATION/TRAINING PROGRAM

## 2022-06-07 NOTE — NURSING
Pressure held on left breast and left axilla biopsy site for 10 mins, hemostasis was achieved, steri strips were applied, and wound was covered with 4x4 guaze and a tegaderm.  Dressing clean, dry and intact with no drainage noted.  Discharge instructions given verbally and in writing, patient voiced understandings.  Patient discharged and accompanied by family member.

## 2022-06-13 ENCOUNTER — TELEPHONE (OUTPATIENT)
Dept: HEMATOLOGY/ONCOLOGY | Facility: CLINIC | Age: 67
End: 2022-06-13
Payer: MEDICARE

## 2022-06-13 ENCOUNTER — PATIENT MESSAGE (OUTPATIENT)
Dept: SURGERY | Facility: CLINIC | Age: 67
End: 2022-06-13
Payer: MEDICARE

## 2022-06-13 DIAGNOSIS — Z17.0 MALIGNANT NEOPLASM OF LEFT BREAST IN FEMALE, ESTROGEN RECEPTOR POSITIVE, UNSPECIFIED SITE OF BREAST: Primary | ICD-10-CM

## 2022-06-13 DIAGNOSIS — C50.912 MALIGNANT NEOPLASM OF LEFT BREAST IN FEMALE, ESTROGEN RECEPTOR POSITIVE, UNSPECIFIED SITE OF BREAST: Primary | ICD-10-CM

## 2022-06-13 NOTE — TELEPHONE ENCOUNTER
Received referral from Dr. Arana to have pt set up for Multi D breast appts.  I have spoken to the pt niece Ember today over the phone. She is who takes care of pt appts.  Together we reviewed the multi D approach to the pt care and set appts for pt to see Julio العلي and Dr. Moran on 6/21/22 at the [facility] and Dr. Arana on 6/22 at the [facility]. Pt niece in agreement with appt details. She has my direct contact number in case she needs anything further in the meantime.   A mult D zoom meeting has been set for 6/20/22 and all pertinent parties have been invited.

## 2022-06-13 NOTE — TELEPHONE ENCOUNTER
Results discussed with patient and family:  Final Pathologic Diagnosis Part 1   Left axilla, ultrasound-guided core biopsy:   Positive for metastatic carcinoma   Metastasis measures 10 mm in greatest dimension   Part 2   Left breast, 2:00, ultrasound-guided core biopsy:   Invasive ductal carcinoma   Bubba grade 2:  Tubule formation -3, nuclear pleomorphism-2 and mitotic   count-2   Invasive carcinoma measures 17 mm in greatest dimension   No carcinoma in Situ or lymphovascular invasion identified   Tumor biomarkers   Estrogen receptor (ER):  Positive, greater than 95%, strong   Progesterone receptor (PGR):  Positive, 80%, strong   HER2 (IHC):  Equivocal, 2+   Ki-67:  60%   Additional IHC:   P63:  Negative throughout, supporting the diagnosis of invasive carcinoma   This case was reviewed by Dr. GABBY Cannon who concurs with the above diagnosis.      Keep scheduled follow up in clinic and referrals for heme/onc and rad onc placed

## 2022-06-20 ENCOUNTER — TUMOR BOARD CONFERENCE (OUTPATIENT)
Dept: HEMATOLOGY/ONCOLOGY | Facility: CLINIC | Age: 67
End: 2022-06-20
Payer: MEDICARE

## 2022-06-20 DIAGNOSIS — Z17.0 MALIGNANT NEOPLASM OF UPPER-OUTER QUADRANT OF LEFT BREAST IN FEMALE, ESTROGEN RECEPTOR POSITIVE: ICD-10-CM

## 2022-06-20 DIAGNOSIS — C50.412 MALIGNANT NEOPLASM OF UPPER-OUTER QUADRANT OF LEFT BREAST IN FEMALE, ESTROGEN RECEPTOR POSITIVE: ICD-10-CM

## 2022-06-20 DIAGNOSIS — C50.612 MALIGNANT NEOPLASM OF AXILLARY TAIL OF LEFT BREAST IN FEMALE, ESTROGEN RECEPTOR POSITIVE: Primary | ICD-10-CM

## 2022-06-20 DIAGNOSIS — Z17.0 MALIGNANT NEOPLASM OF AXILLARY TAIL OF LEFT BREAST IN FEMALE, ESTROGEN RECEPTOR POSITIVE: Primary | ICD-10-CM

## 2022-06-20 NOTE — PROGRESS NOTES
Interdisciplinary Breast Cancer Conference    Malaika Paige    Female    Date Presented to Tumor Board: 06/20/22    Presenting Hospital / Clinic: [facility]    Tumor Laterality: Left         Presenter: Dr. Jayjay Arana    Reason for Consultation: Initial Presentation    Specialties Present: Medical Oncology;Hematology;Radiation Oncology;Surgical Oncology;Navigation;Genetics    Patient Status: a current patient    Treatment to Date: None         ER: Positive    ID: Positive         Cancer Staging  Malignant neoplasm of upper-outer quadrant of left breast in female, estrogen receptor positive  Staging form: Breast, AJCC 8th Edition  - Clinical stage from 6/20/2022: Stage IIIA (cT3, cN2a, cM0, G3, ER+, ID+, HER2: Equivocal) - Signed by Toney العلي MD on 6/20/2022      Recommended Plan: Imaging;Radiation;Surgery    Await FISH results/Obtain PET to r/o metastatic disease/Obtain genetic testing/Lumpectomy vs Mastectomy pending Her2 and PET results/Offer full moni XRT    Presentation at Cancer Conference: Prospective

## 2022-06-21 ENCOUNTER — HOSPITAL ENCOUNTER (OUTPATIENT)
Dept: RADIOLOGY | Facility: HOSPITAL | Age: 67
Discharge: HOME | End: 2022-06-21
Attending: INTERNAL MEDICINE
Payer: MEDICARE

## 2022-06-21 ENCOUNTER — OFFICE VISIT (OUTPATIENT)
Dept: HEMATOLOGY/ONCOLOGY | Facility: CLINIC | Age: 67
End: 2022-06-21
Payer: MEDICARE

## 2022-06-21 ENCOUNTER — OFFICE VISIT (OUTPATIENT)
Dept: RADIATION ONCOLOGY | Facility: CLINIC | Age: 67
End: 2022-06-21
Payer: MEDICARE

## 2022-06-21 VITALS
WEIGHT: 206.38 LBS | HEIGHT: 62 IN | SYSTOLIC BLOOD PRESSURE: 130 MMHG | DIASTOLIC BLOOD PRESSURE: 67 MMHG | OXYGEN SATURATION: 98 % | HEART RATE: 67 BPM | BODY MASS INDEX: 37.98 KG/M2 | TEMPERATURE: 97 F

## 2022-06-21 VITALS
DIASTOLIC BLOOD PRESSURE: 67 MMHG | OXYGEN SATURATION: 98 % | SYSTOLIC BLOOD PRESSURE: 130 MMHG | HEIGHT: 62 IN | RESPIRATION RATE: 18 BRPM | WEIGHT: 206.38 LBS | HEART RATE: 67 BPM | TEMPERATURE: 97 F | BODY MASS INDEX: 37.98 KG/M2

## 2022-06-21 DIAGNOSIS — Z17.0 MALIGNANT NEOPLASM OF LEFT BREAST IN FEMALE, ESTROGEN RECEPTOR POSITIVE, UNSPECIFIED SITE OF BREAST: Primary | ICD-10-CM

## 2022-06-21 DIAGNOSIS — C50.912 MALIGNANT NEOPLASM OF LEFT BREAST IN FEMALE, ESTROGEN RECEPTOR POSITIVE, UNSPECIFIED SITE OF BREAST: Primary | ICD-10-CM

## 2022-06-21 DIAGNOSIS — E66.01 SEVERE OBESITY (BMI 35.0-39.9) WITH COMORBIDITY: ICD-10-CM

## 2022-06-21 DIAGNOSIS — R94.6 ABNORMAL RESULTS OF THYROID FUNCTION STUDIES: ICD-10-CM

## 2022-06-21 DIAGNOSIS — N18.5 CKD (CHRONIC KIDNEY DISEASE) STAGE 5, GFR LESS THAN 15 ML/MIN: ICD-10-CM

## 2022-06-21 DIAGNOSIS — J43.1 PANLOBULAR EMPHYSEMA: ICD-10-CM

## 2022-06-21 LAB
FINAL PATHOLOGIC DIAGNOSIS: NORMAL
GROSS: NORMAL
Lab: NORMAL
SUPPLEMENTAL DIAGNOSIS: NORMAL

## 2022-06-21 PROCEDURE — 99205 PR OFFICE/OUTPT VISIT, NEW, LEVL V, 60-74 MIN: ICD-10-PCS | Mod: S$GLB,,, | Performed by: INTERNAL MEDICINE

## 2022-06-21 PROCEDURE — 99999 PR PBB SHADOW E&M-EST. PATIENT-LVL V: ICD-10-PCS | Mod: PBBFAC,,, | Performed by: INTERNAL MEDICINE

## 2022-06-21 PROCEDURE — 4010F ACE/ARB THERAPY RXD/TAKEN: CPT | Mod: CPTII,S$GLB,, | Performed by: INTERNAL MEDICINE

## 2022-06-21 PROCEDURE — 99999 PR PBB SHADOW E&M-EST. PATIENT-LVL IV: ICD-10-PCS | Mod: PBBFAC,,, | Performed by: RADIOLOGY

## 2022-06-21 PROCEDURE — 1159F PR MEDICATION LIST DOCUMENTED IN MEDICAL RECORD: ICD-10-PCS | Mod: CPTII,S$GLB,, | Performed by: INTERNAL MEDICINE

## 2022-06-21 PROCEDURE — 3078F DIAST BP <80 MM HG: CPT | Mod: CPTII,S$GLB,, | Performed by: RADIOLOGY

## 2022-06-21 PROCEDURE — 3288F PR FALLS RISK ASSESSMENT DOCUMENTED: ICD-10-PCS | Mod: CPTII,S$GLB,, | Performed by: INTERNAL MEDICINE

## 2022-06-21 PROCEDURE — 1100F PTFALLS ASSESS-DOCD GE2>/YR: CPT | Mod: CPTII,S$GLB,, | Performed by: RADIOLOGY

## 2022-06-21 PROCEDURE — 1100F PTFALLS ASSESS-DOCD GE2>/YR: CPT | Mod: CPTII,S$GLB,, | Performed by: INTERNAL MEDICINE

## 2022-06-21 PROCEDURE — 3075F SYST BP GE 130 - 139MM HG: CPT | Mod: CPTII,S$GLB,, | Performed by: RADIOLOGY

## 2022-06-21 PROCEDURE — 99205 OFFICE O/P NEW HI 60 MIN: CPT | Mod: S$GLB,,, | Performed by: RADIOLOGY

## 2022-06-21 PROCEDURE — 3075F SYST BP GE 130 - 139MM HG: CPT | Mod: CPTII,S$GLB,, | Performed by: INTERNAL MEDICINE

## 2022-06-21 PROCEDURE — 4010F PR ACE/ARB THEARPY RXD/TAKEN: ICD-10-PCS | Mod: CPTII,S$GLB,, | Performed by: RADIOLOGY

## 2022-06-21 PROCEDURE — 3078F DIAST BP <80 MM HG: CPT | Mod: CPTII,S$GLB,, | Performed by: INTERNAL MEDICINE

## 2022-06-21 PROCEDURE — 71046 X-RAY EXAM CHEST 2 VIEWS: CPT | Mod: TC

## 2022-06-21 PROCEDURE — 99999 PR PBB SHADOW E&M-EST. PATIENT-LVL V: CPT | Mod: PBBFAC,,, | Performed by: INTERNAL MEDICINE

## 2022-06-21 PROCEDURE — 1159F PR MEDICATION LIST DOCUMENTED IN MEDICAL RECORD: ICD-10-PCS | Mod: CPTII,S$GLB,, | Performed by: RADIOLOGY

## 2022-06-21 PROCEDURE — 4010F PR ACE/ARB THEARPY RXD/TAKEN: ICD-10-PCS | Mod: CPTII,S$GLB,, | Performed by: INTERNAL MEDICINE

## 2022-06-21 PROCEDURE — 3008F BODY MASS INDEX DOCD: CPT | Mod: CPTII,S$GLB,, | Performed by: RADIOLOGY

## 2022-06-21 PROCEDURE — 3078F PR MOST RECENT DIASTOLIC BLOOD PRESSURE < 80 MM HG: ICD-10-PCS | Mod: CPTII,S$GLB,, | Performed by: RADIOLOGY

## 2022-06-21 PROCEDURE — 4010F ACE/ARB THERAPY RXD/TAKEN: CPT | Mod: CPTII,S$GLB,, | Performed by: RADIOLOGY

## 2022-06-21 PROCEDURE — 1126F AMNT PAIN NOTED NONE PRSNT: CPT | Mod: CPTII,S$GLB,, | Performed by: RADIOLOGY

## 2022-06-21 PROCEDURE — 1160F PR REVIEW ALL MEDS BY PRESCRIBER/CLIN PHARMACIST DOCUMENTED: ICD-10-PCS | Mod: CPTII,S$GLB,, | Performed by: INTERNAL MEDICINE

## 2022-06-21 PROCEDURE — 3075F PR MOST RECENT SYSTOLIC BLOOD PRESS GE 130-139MM HG: ICD-10-PCS | Mod: CPTII,S$GLB,, | Performed by: RADIOLOGY

## 2022-06-21 PROCEDURE — 99999 PR PBB SHADOW E&M-EST. PATIENT-LVL IV: CPT | Mod: PBBFAC,,, | Performed by: RADIOLOGY

## 2022-06-21 PROCEDURE — 3008F BODY MASS INDEX DOCD: CPT | Mod: CPTII,S$GLB,, | Performed by: INTERNAL MEDICINE

## 2022-06-21 PROCEDURE — 1126F PR PAIN SEVERITY QUANTIFIED, NO PAIN PRESENT: ICD-10-PCS | Mod: CPTII,S$GLB,, | Performed by: RADIOLOGY

## 2022-06-21 PROCEDURE — 99205 OFFICE O/P NEW HI 60 MIN: CPT | Mod: S$GLB,,, | Performed by: INTERNAL MEDICINE

## 2022-06-21 PROCEDURE — 3288F FALL RISK ASSESSMENT DOCD: CPT | Mod: CPTII,S$GLB,, | Performed by: RADIOLOGY

## 2022-06-21 PROCEDURE — 3075F PR MOST RECENT SYSTOLIC BLOOD PRESS GE 130-139MM HG: ICD-10-PCS | Mod: CPTII,S$GLB,, | Performed by: INTERNAL MEDICINE

## 2022-06-21 PROCEDURE — 99205 PR OFFICE/OUTPT VISIT, NEW, LEVL V, 60-74 MIN: ICD-10-PCS | Mod: S$GLB,,, | Performed by: RADIOLOGY

## 2022-06-21 PROCEDURE — 3008F PR BODY MASS INDEX (BMI) DOCUMENTED: ICD-10-PCS | Mod: CPTII,S$GLB,, | Performed by: INTERNAL MEDICINE

## 2022-06-21 PROCEDURE — 1126F PR PAIN SEVERITY QUANTIFIED, NO PAIN PRESENT: ICD-10-PCS | Mod: CPTII,S$GLB,, | Performed by: INTERNAL MEDICINE

## 2022-06-21 PROCEDURE — 1100F PR PT FALLS ASSESS DOC 2+ FALLS/FALL W/INJURY/YR: ICD-10-PCS | Mod: CPTII,S$GLB,, | Performed by: RADIOLOGY

## 2022-06-21 PROCEDURE — 1159F MED LIST DOCD IN RCRD: CPT | Mod: CPTII,S$GLB,, | Performed by: RADIOLOGY

## 2022-06-21 PROCEDURE — 1126F AMNT PAIN NOTED NONE PRSNT: CPT | Mod: CPTII,S$GLB,, | Performed by: INTERNAL MEDICINE

## 2022-06-21 PROCEDURE — 3288F PR FALLS RISK ASSESSMENT DOCUMENTED: ICD-10-PCS | Mod: CPTII,S$GLB,, | Performed by: RADIOLOGY

## 2022-06-21 PROCEDURE — 1160F RVW MEDS BY RX/DR IN RCRD: CPT | Mod: CPTII,S$GLB,, | Performed by: INTERNAL MEDICINE

## 2022-06-21 PROCEDURE — 3008F PR BODY MASS INDEX (BMI) DOCUMENTED: ICD-10-PCS | Mod: CPTII,S$GLB,, | Performed by: RADIOLOGY

## 2022-06-21 PROCEDURE — 3078F PR MOST RECENT DIASTOLIC BLOOD PRESSURE < 80 MM HG: ICD-10-PCS | Mod: CPTII,S$GLB,, | Performed by: INTERNAL MEDICINE

## 2022-06-21 PROCEDURE — 1159F MED LIST DOCD IN RCRD: CPT | Mod: CPTII,S$GLB,, | Performed by: INTERNAL MEDICINE

## 2022-06-21 PROCEDURE — 1100F PR PT FALLS ASSESS DOC 2+ FALLS/FALL W/INJURY/YR: ICD-10-PCS | Mod: CPTII,S$GLB,, | Performed by: INTERNAL MEDICINE

## 2022-06-21 PROCEDURE — 3288F FALL RISK ASSESSMENT DOCD: CPT | Mod: CPTII,S$GLB,, | Performed by: INTERNAL MEDICINE

## 2022-06-21 RX ORDER — DEXTROMETHORPHAN HBR AND PYRILAMINE MALEATE 7.5; 7.5 MG/5ML; MG/5ML
LIQUID ORAL
COMMUNITY
Start: 2022-06-18 | End: 2023-02-10

## 2022-06-21 RX ORDER — DICLOFENAC SODIUM 10 MG/G
GEL TOPICAL
COMMUNITY
Start: 2022-03-10 | End: 2023-02-10

## 2022-06-21 RX ORDER — PREDNISONE 10 MG/1
TABLET ORAL
COMMUNITY
Start: 2022-06-18 | End: 2023-02-10

## 2022-06-21 NOTE — PROGRESS NOTES
Subjective:       Patient ID: Malaika Paige is a 66 y.o. female.    Chief Complaint: Results and Breast Cancer    HPI 66-year-old female referred for locally advanced breast carcinoma of the left breast.  Patient has a palpable mass movable in left breast.  Ultrasound with positive lymph node presence of malignancy.  Patient is ERPR HER2 Nelly +2 fish panel pending ECOG status 2    Past Medical History:   Diagnosis Date    Cataract     Cataract     CHF (congestive heart failure)     COPD (chronic obstructive pulmonary disease)     Diabetes mellitus     Diabetic retinopathy     Hypertension     Malignant neoplasm of upper-outer quadrant of left breast in female, estrogen receptor positive 6/20/2022     Family History   Problem Relation Age of Onset    Breast cancer Sister     Diabetes Sister     Cancer Brother     Amblyopia Neg Hx     Blindness Neg Hx     Cataracts Neg Hx     Glaucoma Neg Hx     Hypertension Neg Hx     Macular degeneration Neg Hx     Retinal detachment Neg Hx     Strabismus Neg Hx     Stroke Neg Hx     Thyroid disease Neg Hx      Social History     Socioeconomic History    Marital status:    Tobacco Use    Smoking status: Never Smoker    Smokeless tobacco: Never Used   Substance and Sexual Activity    Alcohol use: No     Past Surgical History:   Procedure Laterality Date    BREAST BIOPSY Right     benign    CATARACT EXTRACTION W/  INTRAOCULAR LENS IMPLANT Right 08/14/2017    Dr. Moe       Labs:  Lab Results   Component Value Date    WBC 6.11 10/20/2019    HGB 7.5 (L) 10/20/2019    HCT 23.9 (L) 10/20/2019    MCV 86 10/20/2019     10/20/2019     BMP  Lab Results   Component Value Date     10/20/2019    K 4.7 10/20/2019     (H) 10/20/2019    CO2 15 (L) 10/20/2019    BUN 58 (H) 10/20/2019    CREATININE 5.0 (H) 10/20/2019    CALCIUM 7.7 (L) 10/20/2019    ANIONGAP 7 (L) 10/20/2019    ESTGFRAFRICA 10 (A) 10/20/2019    EGFRNONAA 8 (A) 10/20/2019     Lab  Results   Component Value Date    ALT 16 10/20/2019    AST 20 10/20/2019    ALKPHOS 50 (L) 10/20/2019    BILITOT 0.3 10/20/2019       Lab Results   Component Value Date    IRON 43 09/09/2018    TIBC 300 09/09/2018    FERRITIN 58 09/09/2018     Lab Results   Component Value Date    KBCKCAGX47 459 09/09/2018     Lab Results   Component Value Date    FOLATE 6.2 09/09/2018     No results found for: TSH      Review of Systems   Constitutional: Positive for activity change and fatigue. Negative for appetite change, chills, diaphoresis, fever and unexpected weight change.   HENT: Negative for congestion, dental problem, drooling, ear discharge, ear pain, facial swelling, hearing loss, mouth sores, nosebleeds, postnasal drip, rhinorrhea, sinus pressure, sneezing, sore throat, tinnitus, trouble swallowing and voice change.    Eyes: Negative for photophobia, pain, discharge, redness, itching and visual disturbance.   Respiratory: Positive for cough, shortness of breath and wheezing. Negative for choking, chest tightness and stridor.    Cardiovascular: Negative for chest pain, palpitations and leg swelling.   Gastrointestinal: Negative for abdominal distention, abdominal pain, anal bleeding, blood in stool, constipation, diarrhea, nausea, rectal pain and vomiting.   Endocrine: Negative for cold intolerance, heat intolerance, polydipsia, polyphagia and polyuria.   Genitourinary: Negative for decreased urine volume, difficulty urinating, dyspareunia, dysuria, enuresis, flank pain, frequency, genital sores, hematuria, menstrual problem, pelvic pain, urgency, vaginal bleeding, vaginal discharge and vaginal pain.   Musculoskeletal: Positive for gait problem. Negative for arthralgias, back pain, joint swelling, myalgias, neck pain and neck stiffness.   Skin: Negative for color change, pallor and rash.   Allergic/Immunologic: Negative for environmental allergies, food allergies and immunocompromised state.   Neurological: Positive  for weakness. Negative for dizziness, tremors, seizures, syncope, facial asymmetry, speech difficulty, light-headedness, numbness and headaches.   Hematological: Negative for adenopathy. Does not bruise/bleed easily.   Psychiatric/Behavioral: Positive for dysphoric mood. Negative for agitation, behavioral problems, confusion, decreased concentration, hallucinations, self-injury, sleep disturbance and suicidal ideas. The patient is nervous/anxious. The patient is not hyperactive.        Objective:      Physical Exam  Vitals reviewed.   Constitutional:       General: She is not in acute distress.     Appearance: She is well-developed. She is obese. She is not diaphoretic.   HENT:      Head: Normocephalic and atraumatic.      Right Ear: External ear normal.      Left Ear: External ear normal.      Nose: Nose normal.      Right Sinus: No maxillary sinus tenderness or frontal sinus tenderness.      Left Sinus: No maxillary sinus tenderness or frontal sinus tenderness.      Mouth/Throat:      Pharynx: No oropharyngeal exudate.   Eyes:      General: Lids are normal. No scleral icterus.        Right eye: No discharge.         Left eye: No discharge.      Conjunctiva/sclera: Conjunctivae normal.      Right eye: Right conjunctiva is not injected. No hemorrhage.     Left eye: Left conjunctiva is not injected. No hemorrhage.     Pupils: Pupils are equal, round, and reactive to light.   Neck:      Thyroid: No thyromegaly.      Vascular: No JVD.      Trachea: No tracheal deviation.   Cardiovascular:      Rate and Rhythm: Normal rate and regular rhythm.      Heart sounds: Normal heart sounds.   Pulmonary:      Effort: Pulmonary effort is normal. No respiratory distress.      Breath sounds: No stridor. Wheezing and rhonchi present.   Chest:      Chest wall: No tenderness.   Breasts:      Right: No supraclavicular adenopathy.      Left: No supraclavicular adenopathy.         Abdominal:      General: Bowel sounds are normal. There is  no distension.      Palpations: Abdomen is soft. There is no hepatomegaly, splenomegaly or mass.      Tenderness: There is no abdominal tenderness. There is no rebound.   Musculoskeletal:         General: No tenderness. Normal range of motion.      Cervical back: Normal range of motion and neck supple.   Lymphadenopathy:      Cervical: No cervical adenopathy.      Upper Body:      Right upper body: No supraclavicular adenopathy.      Left upper body: No supraclavicular adenopathy.   Skin:     General: Skin is dry.      Findings: No erythema or rash.   Neurological:      Mental Status: She is alert and oriented to person, place, and time.      Cranial Nerves: No cranial nerve deficit.      Coordination: Coordination normal.   Psychiatric:         Behavior: Behavior normal.         Thought Content: Thought content normal.         Judgment: Judgment normal.             Assessment:      1. Malignant neoplasm of left breast in female, estrogen receptor positive, unspecified site of breast    2. Abnormal results of thyroid function studies     3. Severe obesity (BMI 35.0-39.9) with comorbidity    4. CKD (chronic kidney disease) stage 5, GFR less than 15 ml/min    5. Panlobular emphysema           Plan:     Locally advanced breast carcinoma ER positive HER2 Nelly +2 fish panel pending.  Ext severe shortness of breath and wheezing.  At this time will proceed with laboratory evaluation chest x-ray full set pulmonary function studies and Pulmonary consult.  Strongly recommend PET scan be done with locally advanced disease high Ki-67 score.  To lung metastatic disease before initiation of local therapy discussed implications answered questions with her germ line testing with 4 family members with malignancy        Toney العلي Jr, MD FACP

## 2022-06-21 NOTE — PROGRESS NOTES
[facility]  RADIATION ONCOLOGY CONSULTATION    Name: Malaika Paige  : 1955      Patient Referred To Radiation Oncology By:  Dr. Jayjay Arana MD  [id number] [facility]  [address] [place] [address]    DIAGNOSIS: L breast invasive ductal carcinoma, grade 2, cT3N1(fn)Mx, ER 95%, CT 80%, Her2 pending FISH    HISTORY OF PRESENT ILLNESS:  Malaika Paige is a 66 y.o. female who presents for consultation for the above diagnosis.   Noted L breast mass  MMG showed 5.6cm mass in UOQ L breast with skin retractions, multiple lymph nodes in L axilla.     L breast biopsy pathology showed invasive ductal carcinoma, grade 2, ER 95%, CT 80%, Her2 2+ equivocal, Ki67 60%.  L axillary node positive for carcinoma.     FISH pending  PET pending    Today, she is doing well overall.  She denies breast pain, skin changes, nipple changes, new axillary/supraclavicular masses, discharge, induration, or erythema.     REVIEW OF SYSTEMS: (Positive findings bold, otherwise negative)   Constitutional: fever, fatigue, weight change  Eyes: blurred vision in the past 3 months, double vision   ENT: ear pain, new mouth lesions, jaw pain, difficulty swallowing, sore throat  Cardiovascular: chest pain on exertion, reflux, leg swelling  Respiratory: shortness of breath, dyspnea, cough, hemoptysis.   GI: abdominal pain, diarrhea, constipation, blood in stool, painful bowel movements  : painful or burning urination, blood in urine  Musculoskeletal: new bone or joint pains  Neurologic: headache, seizure, focal numbness or tingling, balance changes, speech changes  Lymph: new or enlarged lymph nodes  Psychiatric: depression, anxiety    PRIOR RADIATION HISTORY: none    PAST MEDICAL HISTORY:  Past Medical History:   Diagnosis Date    Cataract     Cataract     CHF (congestive heart failure)     COPD (chronic obstructive pulmonary disease)     Diabetes mellitus     Diabetic retinopathy     Hypertension      Malignant neoplasm of upper-outer quadrant of left breast in female, estrogen receptor positive 6/20/2022       PAST SURGICAL HISTORY:  Past Surgical History:   Procedure Laterality Date    BREAST BIOPSY Right     benign    CATARACT EXTRACTION W/  INTRAOCULAR LENS IMPLANT Right 08/14/2017    Dr. Moe       ALLERGIES:   Review of patient's allergies indicates:  No Known Allergies    MEDICATIONS:    Current Outpatient Medications:     acetaminophen (TYLENOL) 325 MG tablet, Take 325 mg by mouth every 6 (six) hours as needed for Pain., Disp: , Rfl:     albuterol (PROVENTIL/VENTOLIN HFA) 90 mcg/actuation inhaler, Inhale 1-2 puffs into the lungs every 6 (six) hours as needed for Wheezing. Rescue, Disp: 1 Inhaler, Rfl: 0    albuterol sulfate (PROAIR RESPICLICK) 90 mcg/actuation AePB, Inhale 180 mcg into the lungs every 4 (four) hours. Rescue, Disp: 1 each, Rfl: 3    amiodarone (PACERONE) 100 MG Tab, Take 100 mg by mouth once daily., Disp: , Rfl:     apixaban (ELIQUIS) 2.5 mg Tab, Take 2.5 mg by mouth 2 (two) times daily., Disp: , Rfl:     aspirin (ECOTRIN) 81 MG EC tablet, Take 81 mg by mouth once daily. , Disp: , Rfl:     CAPRON DM 7.5-7.5 mg/5 mL Liqd, TAKE 10-20 ML BY MOUTH EVERY 6-8 HOURS AS NEEDED COUGH, Disp: , Rfl:     diclofenac sodium (VOLTAREN) 1 % Gel, APPLY TOPICALLY 2 GM TO THE AFFECTED AREA 2 TIMES DAILY, Disp: , Rfl:     insulin degludec (TRESIBA FLEXTOUCH U-200) 200 unit/mL (3 mL) insulin pen, Inject 80 Units into the skin once daily. , Disp: , Rfl:     INV metoprolol tartrate 25 mg oral tablet (LOPRESSOR) 25 MG Tab, Take by mouth 2 (two) times daily. FOR INVESTIGATIONAL USE ONLY, Disp: , Rfl:     levothyroxine (SYNTHROID) 75 MCG tablet, Take 75 mcg by mouth once daily. , Disp: , Rfl:     losartan (COZAAR) 100 MG tablet, Take 100 mg by mouth once daily., Disp: , Rfl:     omeprazole (PRILOSEC) 20 MG capsule, Take 20 mg by mouth once daily., Disp: , Rfl:     predniSONE (DELTASONE) 10 MG  "tablet, Take by mouth., Disp: , Rfl:     RENVELA 800 mg Tab, SMARTSI Tablet(s) By Mouth 5 Times Daily, Disp: , Rfl:     SOCIAL HISTORY:  Social History     Socioeconomic History    Marital status:    Tobacco Use    Smoking status: Former Smoker     Quit date: 2012     Years since quitting: 10.0    Smokeless tobacco: Never Used   Substance and Sexual Activity    Alcohol use: No     [place]ives in [place]    FAMILY HISTORY:  Family History   Problem Relation Age of Onset    Breast cancer Sister     Diabetes Sister     Cancer Brother     Amblyopia Neg Hx     Blindness Neg Hx     Cataracts Neg Hx     Glaucoma Neg Hx     Hypertension Neg Hx     Macular degeneration Neg Hx     Retinal detachment Neg Hx     Strabismus Neg Hx     Stroke Neg Hx     Thyroid disease Neg Hx        PHYSICAL EXAMINATION:  Constitutional: well appearing, no acute distress, ECOG 0 - Fully Active  Vitals:    /67   Pulse 67   Temp 97.4 °F (36.3 °C)   Resp 18   Ht 5' 2" (1.575 m)   Wt 93.6 kg (206 lb 5.6 oz)   LMP  (LMP Unknown)   SpO2 98%   BMI 37.74 kg/m²   Eyes: sclera anicteric, EOMI, pupils equal, round and reactive to light  ENT: oral cavity without lesions, moist mucous membranes  Neck: trachea midline, neck supple  Lymphatic: no cervical, supraclavicular or axillary adenopathy  Breast: L breast 2:00 mass ~5cm, no skin retractions or peau d'orange, faintly palpable axillary node on L  Cardiovascular: regular rate, no edema of the upper or lower extremities, radial pulse 2+  Respiratory: unlabored effort, clear to auscultation, no wheezes  Abdomen: soft, non-tender, no rigidity, no masses, no hepatomegaly  Neuro: Cranial nerves III-XII intact, speech not slurred, gait non-ataxic, no dysdiadochokinesia, strength 5/5 upper and lower extremities  Spine: non-tender to percussion cervical, thoracic and lumbosacral spine    IMAGING AND LABORATORY FINDINGS: As per HPI; images reviewed personally.    ASSESSMENT: 66 " y.o. female with L breast invasive ductal carcinoma, grade 2, cT3N1(fn)Mx, ER 95%, MO 80%, Her2 pending FISH and staging    PLAN: Today we discussed the risks, benefits, and potential acute/late toxicities of radiation in the breast conservation and post-mastectomy setting.   She is pending FISH and PET which will dictate treatment.   If she is found to only have localized disease, I would recommend adjuvant radiation following surgery - either lumpectomy or mastectomy. She will likely have systemic therapy either before or after surgery, preferably before to downsize tumor evaluate response and potentially allow for breast conservation.  I will give 50Gy/25fx to L breast/chest wall and regional moni basins, using DIBH for heart/lung sparing.    Potential acute toxicities include fatigue, skin irritation, and breast edema. Potential late toxicities include breast edema, lymphedema, hyperpigmentation, poor cosmetic outcome, radiation pneumonitis, or very low risk of damage to heart.     We will have her return in ~4 weeks after surgery to allow for adequate healing and surgical pathology results to confirm our final plan. We can likely perform CT simulation at that time.    We discussed the techniques, toxicities and indications of radiation and I answered the patient's questions to their apparent satisfaction.    I spent approximately 60 minutes reviewing the available records and evaluating the patient, out of which over 50% of the time was spent face to face with the patient in counseling and coordinating this patient's care.    Wilver Moran III, M.D.  Radiation Oncology  [facility] [address] [place] [address]  Ph: [phone]  [email or web address]

## 2022-06-22 ENCOUNTER — OFFICE VISIT (OUTPATIENT)
Dept: SURGERY | Facility: CLINIC | Age: 67
End: 2022-06-22
Payer: MEDICARE

## 2022-06-22 ENCOUNTER — TELEPHONE (OUTPATIENT)
Dept: HEMATOLOGY/ONCOLOGY | Facility: CLINIC | Age: 67
End: 2022-06-22
Payer: MEDICARE

## 2022-06-22 VITALS
WEIGHT: 207.69 LBS | SYSTOLIC BLOOD PRESSURE: 151 MMHG | DIASTOLIC BLOOD PRESSURE: 68 MMHG | BODY MASS INDEX: 37.98 KG/M2 | TEMPERATURE: 98 F | HEART RATE: 69 BPM

## 2022-06-22 DIAGNOSIS — C50.912 MALIGNANT NEOPLASM OF LEFT BREAST IN FEMALE, ESTROGEN RECEPTOR POSITIVE, UNSPECIFIED SITE OF BREAST: Primary | ICD-10-CM

## 2022-06-22 DIAGNOSIS — Z17.0 MALIGNANT NEOPLASM OF LEFT BREAST IN FEMALE, ESTROGEN RECEPTOR POSITIVE, UNSPECIFIED SITE OF BREAST: Primary | ICD-10-CM

## 2022-06-22 PROCEDURE — 3008F PR BODY MASS INDEX (BMI) DOCUMENTED: ICD-10-PCS | Mod: CPTII,S$GLB,, | Performed by: SURGERY

## 2022-06-22 PROCEDURE — 1159F PR MEDICATION LIST DOCUMENTED IN MEDICAL RECORD: ICD-10-PCS | Mod: CPTII,S$GLB,, | Performed by: SURGERY

## 2022-06-22 PROCEDURE — 1159F MED LIST DOCD IN RCRD: CPT | Mod: CPTII,S$GLB,, | Performed by: SURGERY

## 2022-06-22 PROCEDURE — 1160F PR REVIEW ALL MEDS BY PRESCRIBER/CLIN PHARMACIST DOCUMENTED: ICD-10-PCS | Mod: CPTII,S$GLB,, | Performed by: SURGERY

## 2022-06-22 PROCEDURE — 1160F RVW MEDS BY RX/DR IN RCRD: CPT | Mod: CPTII,S$GLB,, | Performed by: SURGERY

## 2022-06-22 PROCEDURE — 3077F PR MOST RECENT SYSTOLIC BLOOD PRESSURE >= 140 MM HG: ICD-10-PCS | Mod: CPTII,S$GLB,, | Performed by: SURGERY

## 2022-06-22 PROCEDURE — 99215 PR OFFICE/OUTPT VISIT, EST, LEVL V, 40-54 MIN: ICD-10-PCS | Mod: S$GLB,,, | Performed by: SURGERY

## 2022-06-22 PROCEDURE — 99215 OFFICE O/P EST HI 40 MIN: CPT | Mod: S$GLB,,, | Performed by: SURGERY

## 2022-06-22 PROCEDURE — 3077F SYST BP >= 140 MM HG: CPT | Mod: CPTII,S$GLB,, | Performed by: SURGERY

## 2022-06-22 PROCEDURE — 3078F DIAST BP <80 MM HG: CPT | Mod: CPTII,S$GLB,, | Performed by: SURGERY

## 2022-06-22 PROCEDURE — 99999 PR PBB SHADOW E&M-EST. PATIENT-LVL III: CPT | Mod: PBBFAC,,, | Performed by: SURGERY

## 2022-06-22 PROCEDURE — 4010F PR ACE/ARB THEARPY RXD/TAKEN: ICD-10-PCS | Mod: CPTII,S$GLB,, | Performed by: SURGERY

## 2022-06-22 PROCEDURE — 3008F BODY MASS INDEX DOCD: CPT | Mod: CPTII,S$GLB,, | Performed by: SURGERY

## 2022-06-22 PROCEDURE — 99999 PR PBB SHADOW E&M-EST. PATIENT-LVL III: ICD-10-PCS | Mod: PBBFAC,,, | Performed by: SURGERY

## 2022-06-22 PROCEDURE — 4010F ACE/ARB THERAPY RXD/TAKEN: CPT | Mod: CPTII,S$GLB,, | Performed by: SURGERY

## 2022-06-22 PROCEDURE — 3078F PR MOST RECENT DIASTOLIC BLOOD PRESSURE < 80 MM HG: ICD-10-PCS | Mod: CPTII,S$GLB,, | Performed by: SURGERY

## 2022-06-22 NOTE — TELEPHONE ENCOUNTER
Spoke with pt carisa villagran today over the phone and reviewed appts for Pet scan and Dr. العلي follow up for 6/29/22 at the cancer center. Pt carisa stated agreement with appt details.

## 2022-06-22 NOTE — PROGRESS NOTES
History & Physical    SUBJECTIVE:     History of Present Illness:  Patient is a 66 y.o. female presents to discuss left breast biopsy.  Her left breast and axillary biopsy were showed invasive ductal carcinoma ER+/AR+ Her2+ with metastatic disease to the lymph node.    Initially referred for abnormal mammogram of the left breast. She denies any breast mass, nipple discharge, breast pain or other changes. No prior breast surgery. Prior right breast biopsy benign. Family history of breast cancer in her sister. Menarche age 12,  1st at 16, menopause at 48. No HRT.     No chief complaint on file.      Review of patient's allergies indicates:  No Known Allergies    Current Outpatient Medications   Medication Sig Dispense Refill    acetaminophen (TYLENOL) 325 MG tablet Take 325 mg by mouth every 6 (six) hours as needed for Pain.      albuterol (PROVENTIL/VENTOLIN HFA) 90 mcg/actuation inhaler Inhale 1-2 puffs into the lungs every 6 (six) hours as needed for Wheezing. Rescue 1 Inhaler 0    albuterol sulfate (PROAIR RESPICLICK) 90 mcg/actuation AePB Inhale 180 mcg into the lungs every 4 (four) hours. Rescue 1 each 3    amiodarone (PACERONE) 100 MG Tab Take 100 mg by mouth once daily.      apixaban (ELIQUIS) 2.5 mg Tab Take 2.5 mg by mouth 2 (two) times daily.      aspirin (ECOTRIN) 81 MG EC tablet Take 81 mg by mouth once daily.       CAPRON DM 7.5-7.5 mg/5 mL Liqd TAKE 10-20 ML BY MOUTH EVERY 6-8 HOURS AS NEEDED COUGH      diclofenac sodium (VOLTAREN) 1 % Gel APPLY TOPICALLY 2 GM TO THE AFFECTED AREA 2 TIMES DAILY      insulin degludec (TRESIBA FLEXTOUCH U-200) 200 unit/mL (3 mL) insulin pen Inject 80 Units into the skin once daily.       INV metoprolol tartrate 25 mg oral tablet (LOPRESSOR) 25 MG Tab Take by mouth 2 (two) times daily. FOR INVESTIGATIONAL USE ONLY      levothyroxine (SYNTHROID) 75 MCG tablet Take 75 mcg by mouth once daily.       losartan (COZAAR) 100 MG tablet Take 100 mg by mouth once  daily.      omeprazole (PRILOSEC) 20 MG capsule Take 20 mg by mouth once daily.      predniSONE (DELTASONE) 10 MG tablet Take by mouth.      RENVELA 800 mg Tab SMARTSI Tablet(s) By Mouth 5 Times Daily       No current facility-administered medications for this visit.       Past Medical History:   Diagnosis Date    Cataract     Cataract     CHF (congestive heart failure)     COPD (chronic obstructive pulmonary disease)     Diabetes mellitus     Diabetic retinopathy     Hypertension     Malignant neoplasm of upper-outer quadrant of left breast in female, estrogen receptor positive 2022     Past Surgical History:   Procedure Laterality Date    BREAST BIOPSY Right     benign    CATARACT EXTRACTION W/  INTRAOCULAR LENS IMPLANT Right 2017    Dr. Moe     Family History   Problem Relation Age of Onset    Breast cancer Sister     Diabetes Sister     Cancer Brother     Amblyopia Neg Hx     Blindness Neg Hx     Cataracts Neg Hx     Glaucoma Neg Hx     Hypertension Neg Hx     Macular degeneration Neg Hx     Retinal detachment Neg Hx     Strabismus Neg Hx     Stroke Neg Hx     Thyroid disease Neg Hx      Social History     Tobacco Use    Smoking status: Former Smoker     Quit date: 2012     Years since quitting: 10.0    Smokeless tobacco: Never Used   Substance Use Topics    Alcohol use: No        Review of Systems:  Review of Systems   Constitutional: Negative for activity change, appetite change, chills, diaphoresis, fatigue, fever and unexpected weight change.   HENT: Negative for congestion, dental problem, hearing loss, rhinorrhea, sore throat and trouble swallowing.    Eyes: Positive for visual disturbance. Negative for discharge.   Respiratory: Positive for wheezing. Negative for cough, chest tightness and shortness of breath.    Cardiovascular: Positive for palpitations. Negative for chest pain and leg swelling.   Gastrointestinal: Positive for diarrhea. Negative for  abdominal distention, abdominal pain, blood in stool, constipation, nausea and vomiting.   Endocrine: Negative for cold intolerance, heat intolerance, polydipsia, polyphagia and polyuria.   Genitourinary: Negative for difficulty urinating, dysuria, frequency, hematuria, menstrual problem and urgency.   Musculoskeletal: Positive for arthralgias. Negative for gait problem, joint swelling, myalgias and neck pain.   Skin: Negative for color change, pallor, rash and wound.   Neurological: Positive for weakness. Negative for dizziness, syncope, light-headedness, numbness and headaches.   Hematological: Negative for adenopathy. Does not bruise/bleed easily.   Psychiatric/Behavioral: Negative for confusion, decreased concentration, dysphoric mood and sleep disturbance. The patient is not nervous/anxious.        OBJECTIVE:     Vital Signs (Most Recent)  Temp: 97.8 °F (36.6 °C) (06/22/22 0909)  Pulse: 69 (06/22/22 0909)  BP: (!) 151/68 (06/22/22 0909)     94.2 kg (207 lb 10.8 oz)     Physical Exam:  Physical Exam  Vitals reviewed.   Constitutional:       General: She is not in acute distress.     Appearance: She is well-developed. She is not diaphoretic.   HENT:      Head: Normocephalic and atraumatic.      Right Ear: External ear normal.      Left Ear: External ear normal.   Eyes:      General: No scleral icterus.     Conjunctiva/sclera: Conjunctivae normal.      Pupils: Pupils are equal, round, and reactive to light.   Neck:      Thyroid: No thyromegaly.      Trachea: No tracheal deviation.   Cardiovascular:      Rate and Rhythm: Normal rate and regular rhythm.      Heart sounds: Normal heart sounds. No murmur heard.    No friction rub. No gallop.   Pulmonary:      Effort: Pulmonary effort is normal. No respiratory distress.      Breath sounds: Normal breath sounds. No wheezing or rales.   Chest:      Chest wall: No tenderness.   Breasts:      Right: No inverted nipple, mass, nipple discharge, skin change or tenderness.       Left: Mass present. No inverted nipple, nipple discharge, skin change or tenderness.         Abdominal:      General: Bowel sounds are normal. There is no distension.      Palpations: Abdomen is soft.      Tenderness: There is no abdominal tenderness.      Hernia: No hernia is present.   Musculoskeletal:         General: No tenderness or deformity. Normal range of motion.      Cervical back: Normal range of motion and neck supple.   Lymphadenopathy:      Cervical: No cervical adenopathy.   Skin:     General: Skin is warm and dry.      Coloration: Skin is not pale.      Findings: No erythema or rash.   Neurological:      Mental Status: She is alert and oriented to person, place, and time.   Psychiatric:         Behavior: Behavior normal.         Thought Content: Thought content normal.         Judgment: Judgment normal.         Diagnostic Results:  Result:   Mammo Digital Diagnostic Bilat with Alfredo  US Breast Left Limited     History:  Patient is 66 y.o. and is seen for diagnostic imaging.     Films Compared:  Prior images (if available) were compared.     Findings:  This procedure was performed using tomosynthesis. Computer-aided detection was utilized in the interpretation of this examination.  The breasts have scattered areas of fibroglandular density.      Mammo Digital Diagnostic Bilat with Alfredo  Left  Mass: There is a 4.9 cm irregularly shaped mass with spiculated margins seen in the upper outer quadrant of the left breast in the anterior depth. The mass correlates with the palpable mass reported by the patient. Associated features include skin retraction. There are also associated calcifications.   Lymph Node: There are multiple similar lymph nodes seen in the left axilla.      Right  There is no evidence of suspicious masses, calcifications, or other abnormal findings in the right breast.     US Breast Left Limited  Left  Mass: There is a 5.6 cm x 4 cm x 4.3 cm irregularly shaped, non-parallel, hypoechoic  mass with spiculated margins seen in the left breast at 2 o'clock, 3 cm from the nipple.   Lymph Node: There are multiple similar lymph nodes seen in the left axilla. Largest node is 3.4 cm x 1.6 cm x 2.0 cm.      Impression:  Left  Mass: Left breast 5.6 cm x 4 cm x 4.3 cm mass at the 2 o'clock position. Assessment: 5 - Highly suggestive of malignancy. Ultrasound-guided biopsy is recommended.   Lymph Node: Left axilla lymph node (multiple findings). Assessment: 5 - Highly suggestive of malignancy. Ultrasound-guided biopsy is recommended.      Right  There is no mammographic or sonographic evidence of malignancy in the right breast.     BI-RADS Category:   Overall: 5 - Highly Suggestive of Malignancy     Recommendation:  Ultrasound-guided biopsy is recommended.  Ultrasound-guided biopsy is recommended.     Your estimated lifetime risk of breast cancer (to age 85) based on Tyrer-Cuzick risk assessment model is Tyrer-Cuzick: 6.49 %. According to the American Cancer Society, patients with a lifetime breast cancer risk of 20% or higher might benefit from supplemental screening tests.    Final Pathologic Diagnosis Part 1   Left axilla, ultrasound-guided core biopsy:   Positive for metastatic carcinoma   Metastasis measures 10 mm in greatest dimension   Part 2   Left breast, 2:00, ultrasound-guided core biopsy:   Invasive ductal carcinoma   [place] grade 2:  Tubule formation -3, nuclear pleomorphism-2 and mitotic   count-2   Invasive carcinoma measures 17 mm in greatest dimension   No carcinoma in Situ or lymphovascular invasion identified   Tumor biomarkers   Estrogen receptor (ER):  Positive, greater than 95%, strong   Progesterone receptor (PGR):  Positive, 80%, strong   HER2 (IHC):  Equivocal, 2+   Ki-67:  60%   Additional IHC:   P63:  Negative throughout, supporting the diagnosis of invasive carcinoma   This case was reviewed by Dr. GABBY Cannon who concurs with the above diagnosis.  VC      Comment: Interp By Bette  PIERRE Pitts, Signed on 06/21/2022 at 12:02   Supplemental Diagnosis HER2, Breast Tumor, FISH, Tissue   Result Summary   POSITIVE   Interpretation   This tumor sample is positive for HER2 (ERBB2) gene amplification.   Result   nuc erwin([id number],HER2x5¿8)   HER2/D17Z1 ratio: 2.03   Average HER2 signals per cell: 6.3   Average D17Z1 signals per cell: 3.1           ASSESSMENT/PLAN:     65 y/o female with left breast invasive ductal carcinoma ER+/NE+ Her2+ with metastatic disease to the lymph node    PLAN:Plan     Pathology discussed with patient  Discussed options for surgical treatment of breast cancer at length however certainly concerned about possibility of metastatic disease.  She will be undergoing a PET scan in genetic testing.  Also discussed situations in which patients may receive neoadjuvant therapy which is a possibility as she has a ER/+/NE+ Her2 +.  Will follow-up with patient following completion of workup for further discussion (this was discussed with patient and family by phone)  45 minutes of total time spent on the encounter, which includes face to face time and non-face to face time preparing to see the patient (eg, review of tests), Obtaining and/or reviewing separately obtained history, Documenting clinical information in the electronic or other health record, Independently interpreting results (not separately reported) and communicating results to the patient/family/caregiver, or Care coordination (not separately reported).

## 2022-06-28 ENCOUNTER — TELEPHONE (OUTPATIENT)
Dept: HEMATOLOGY/ONCOLOGY | Facility: CLINIC | Age: 67
End: 2022-06-28
Payer: MEDICARE

## 2022-06-28 NOTE — TELEPHONE ENCOUNTER
Noted that PET scan has been rescheduled to 7/5/22  Spoke with pt over the phone to reschedule Dr. العلي appt from 6/29 to 7/11 at 1000am at the [place] location. Pt in agreement with plan

## 2022-07-05 ENCOUNTER — HOSPITAL ENCOUNTER (OUTPATIENT)
Dept: RADIOLOGY | Facility: HOSPITAL | Age: 67
Discharge: HOME | End: 2022-07-05
Attending: INTERNAL MEDICINE
Payer: MEDICARE

## 2022-07-05 DIAGNOSIS — C50.612 MALIGNANT NEOPLASM OF AXILLARY TAIL OF LEFT BREAST IN FEMALE, ESTROGEN RECEPTOR POSITIVE: ICD-10-CM

## 2022-07-05 DIAGNOSIS — Z17.0 MALIGNANT NEOPLASM OF AXILLARY TAIL OF LEFT BREAST IN FEMALE, ESTROGEN RECEPTOR POSITIVE: ICD-10-CM

## 2022-07-05 PROCEDURE — 78815 PET IMAGE W/CT SKULL-THIGH: CPT | Mod: 26,PS,, | Performed by: RADIOLOGY

## 2022-07-05 PROCEDURE — A9552 F18 FDG: HCPCS

## 2022-07-05 PROCEDURE — 78815 NM PET CT ROUTINE: ICD-10-PCS | Mod: 26,PS,, | Performed by: RADIOLOGY

## 2022-07-05 PROCEDURE — 78815 PET IMAGE W/CT SKULL-THIGH: CPT | Mod: TC

## 2022-07-10 ENCOUNTER — TELEPHONE (OUTPATIENT)
Dept: HEMATOLOGY/ONCOLOGY | Facility: CLINIC | Age: 67
End: 2022-07-10
Payer: MEDICARE

## 2022-07-10 NOTE — TELEPHONE ENCOUNTER
My risk and BRCA ! And BRCA 2 report received from [facility] , scanned to media , oncology history updated. Pt has appt with dr العلي tomorrow

## 2022-07-11 ENCOUNTER — OFFICE VISIT (OUTPATIENT)
Dept: HEMATOLOGY/ONCOLOGY | Facility: CLINIC | Age: 67
End: 2022-07-11
Payer: MEDICARE

## 2022-07-11 VITALS
TEMPERATURE: 98 F | OXYGEN SATURATION: 97 % | BODY MASS INDEX: 37.45 KG/M2 | HEIGHT: 62 IN | RESPIRATION RATE: 20 BRPM | SYSTOLIC BLOOD PRESSURE: 194 MMHG | DIASTOLIC BLOOD PRESSURE: 80 MMHG | WEIGHT: 203.5 LBS | HEART RATE: 65 BPM

## 2022-07-11 DIAGNOSIS — Z51.81 ENCOUNTER FOR MONITORING CARDIOTOXIC DRUG THERAPY: Primary | ICD-10-CM

## 2022-07-11 DIAGNOSIS — E11.22 TYPE 2 DIABETES MELLITUS WITH CHRONIC KIDNEY DISEASE ON CHRONIC DIALYSIS, WITH LONG-TERM CURRENT USE OF INSULIN: ICD-10-CM

## 2022-07-11 DIAGNOSIS — Z17.0 MALIGNANT NEOPLASM OF LEFT BREAST IN FEMALE, ESTROGEN RECEPTOR POSITIVE, UNSPECIFIED SITE OF BREAST: Primary | ICD-10-CM

## 2022-07-11 DIAGNOSIS — C50.412 MALIGNANT NEOPLASM OF UPPER-OUTER QUADRANT OF LEFT BREAST IN FEMALE, ESTROGEN RECEPTOR POSITIVE: ICD-10-CM

## 2022-07-11 DIAGNOSIS — J43.1 PANLOBULAR EMPHYSEMA: ICD-10-CM

## 2022-07-11 DIAGNOSIS — N18.6 TYPE 2 DIABETES MELLITUS WITH CHRONIC KIDNEY DISEASE ON CHRONIC DIALYSIS, WITH LONG-TERM CURRENT USE OF INSULIN: ICD-10-CM

## 2022-07-11 DIAGNOSIS — Z79.899 IMMUNODEFICIENCY DUE TO CHEMOTHERAPY: ICD-10-CM

## 2022-07-11 DIAGNOSIS — Z17.0 MALIGNANT NEOPLASM OF UPPER-OUTER QUADRANT OF LEFT BREAST IN FEMALE, ESTROGEN RECEPTOR POSITIVE: ICD-10-CM

## 2022-07-11 DIAGNOSIS — N18.6 ESRD (END STAGE RENAL DISEASE): ICD-10-CM

## 2022-07-11 DIAGNOSIS — C50.912 MALIGNANT NEOPLASM OF LEFT BREAST IN FEMALE, ESTROGEN RECEPTOR POSITIVE, UNSPECIFIED SITE OF BREAST: Primary | ICD-10-CM

## 2022-07-11 DIAGNOSIS — T45.1X5A IMMUNODEFICIENCY DUE TO CHEMOTHERAPY: ICD-10-CM

## 2022-07-11 DIAGNOSIS — Z79.899 ENCOUNTER FOR MONITORING CARDIOTOXIC DRUG THERAPY: Primary | ICD-10-CM

## 2022-07-11 DIAGNOSIS — D84.821 IMMUNODEFICIENCY DUE TO CHEMOTHERAPY: ICD-10-CM

## 2022-07-11 DIAGNOSIS — Z79.4 TYPE 2 DIABETES MELLITUS WITH CHRONIC KIDNEY DISEASE ON CHRONIC DIALYSIS, WITH LONG-TERM CURRENT USE OF INSULIN: ICD-10-CM

## 2022-07-11 DIAGNOSIS — Z99.2 TYPE 2 DIABETES MELLITUS WITH CHRONIC KIDNEY DISEASE ON CHRONIC DIALYSIS, WITH LONG-TERM CURRENT USE OF INSULIN: ICD-10-CM

## 2022-07-11 DIAGNOSIS — E66.01 SEVERE OBESITY (BMI 35.0-39.9) WITH COMORBIDITY: ICD-10-CM

## 2022-07-11 PROCEDURE — 1101F PT FALLS ASSESS-DOCD LE1/YR: CPT | Mod: CPTII,S$GLB,, | Performed by: INTERNAL MEDICINE

## 2022-07-11 PROCEDURE — 99999 PR PBB SHADOW E&M-EST. PATIENT-LVL V: CPT | Mod: PBBFAC,,, | Performed by: INTERNAL MEDICINE

## 2022-07-11 PROCEDURE — 3008F BODY MASS INDEX DOCD: CPT | Mod: CPTII,S$GLB,, | Performed by: INTERNAL MEDICINE

## 2022-07-11 PROCEDURE — 1160F PR REVIEW ALL MEDS BY PRESCRIBER/CLIN PHARMACIST DOCUMENTED: ICD-10-PCS | Mod: CPTII,S$GLB,, | Performed by: INTERNAL MEDICINE

## 2022-07-11 PROCEDURE — 3008F PR BODY MASS INDEX (BMI) DOCUMENTED: ICD-10-PCS | Mod: CPTII,S$GLB,, | Performed by: INTERNAL MEDICINE

## 2022-07-11 PROCEDURE — 1101F PR PT FALLS ASSESS DOC 0-1 FALLS W/OUT INJ PAST YR: ICD-10-PCS | Mod: CPTII,S$GLB,, | Performed by: INTERNAL MEDICINE

## 2022-07-11 PROCEDURE — 99215 PR OFFICE/OUTPT VISIT, EST, LEVL V, 40-54 MIN: ICD-10-PCS | Mod: S$GLB,,, | Performed by: INTERNAL MEDICINE

## 2022-07-11 PROCEDURE — 1159F MED LIST DOCD IN RCRD: CPT | Mod: CPTII,S$GLB,, | Performed by: INTERNAL MEDICINE

## 2022-07-11 PROCEDURE — 3079F DIAST BP 80-89 MM HG: CPT | Mod: CPTII,S$GLB,, | Performed by: INTERNAL MEDICINE

## 2022-07-11 PROCEDURE — 99999 PR PBB SHADOW E&M-EST. PATIENT-LVL V: ICD-10-PCS | Mod: PBBFAC,,, | Performed by: INTERNAL MEDICINE

## 2022-07-11 PROCEDURE — 4010F ACE/ARB THERAPY RXD/TAKEN: CPT | Mod: CPTII,S$GLB,, | Performed by: INTERNAL MEDICINE

## 2022-07-11 PROCEDURE — 3288F FALL RISK ASSESSMENT DOCD: CPT | Mod: CPTII,S$GLB,, | Performed by: INTERNAL MEDICINE

## 2022-07-11 PROCEDURE — 3079F PR MOST RECENT DIASTOLIC BLOOD PRESSURE 80-89 MM HG: ICD-10-PCS | Mod: CPTII,S$GLB,, | Performed by: INTERNAL MEDICINE

## 2022-07-11 PROCEDURE — 4010F PR ACE/ARB THEARPY RXD/TAKEN: ICD-10-PCS | Mod: CPTII,S$GLB,, | Performed by: INTERNAL MEDICINE

## 2022-07-11 PROCEDURE — 1126F AMNT PAIN NOTED NONE PRSNT: CPT | Mod: CPTII,S$GLB,, | Performed by: INTERNAL MEDICINE

## 2022-07-11 PROCEDURE — 99215 OFFICE O/P EST HI 40 MIN: CPT | Mod: S$GLB,,, | Performed by: INTERNAL MEDICINE

## 2022-07-11 PROCEDURE — 3288F PR FALLS RISK ASSESSMENT DOCUMENTED: ICD-10-PCS | Mod: CPTII,S$GLB,, | Performed by: INTERNAL MEDICINE

## 2022-07-11 PROCEDURE — 1160F RVW MEDS BY RX/DR IN RCRD: CPT | Mod: CPTII,S$GLB,, | Performed by: INTERNAL MEDICINE

## 2022-07-11 PROCEDURE — 1159F PR MEDICATION LIST DOCUMENTED IN MEDICAL RECORD: ICD-10-PCS | Mod: CPTII,S$GLB,, | Performed by: INTERNAL MEDICINE

## 2022-07-11 PROCEDURE — 3077F SYST BP >= 140 MM HG: CPT | Mod: CPTII,S$GLB,, | Performed by: INTERNAL MEDICINE

## 2022-07-11 PROCEDURE — 3077F PR MOST RECENT SYSTOLIC BLOOD PRESSURE >= 140 MM HG: ICD-10-PCS | Mod: CPTII,S$GLB,, | Performed by: INTERNAL MEDICINE

## 2022-07-11 PROCEDURE — 1126F PR PAIN SEVERITY QUANTIFIED, NO PAIN PRESENT: ICD-10-PCS | Mod: CPTII,S$GLB,, | Performed by: INTERNAL MEDICINE

## 2022-07-11 RX ORDER — SODIUM CHLORIDE 0.9 % (FLUSH) 0.9 %
10 SYRINGE (ML) INJECTION
Status: CANCELLED | OUTPATIENT
Start: 2022-07-28

## 2022-07-11 RX ORDER — FAMOTIDINE 10 MG/ML
20 INJECTION INTRAVENOUS
Status: CANCELLED | OUTPATIENT
Start: 2022-08-10

## 2022-07-11 RX ORDER — SODIUM CHLORIDE 0.9 % (FLUSH) 0.9 %
10 SYRINGE (ML) INJECTION
Status: CANCELLED | OUTPATIENT
Start: 2022-08-10

## 2022-07-11 RX ORDER — SODIUM CHLORIDE 0.9 % (FLUSH) 0.9 %
10 SYRINGE (ML) INJECTION
Status: CANCELLED | OUTPATIENT
Start: 2022-07-11

## 2022-07-11 RX ORDER — HEPARIN 100 UNIT/ML
500 SYRINGE INTRAVENOUS
Status: CANCELLED | OUTPATIENT
Start: 2022-08-10

## 2022-07-11 RX ORDER — FAMOTIDINE 10 MG/ML
20 INJECTION INTRAVENOUS
Status: CANCELLED | OUTPATIENT
Start: 2022-07-11

## 2022-07-11 RX ORDER — EPINEPHRINE 0.3 MG/.3ML
0.3 INJECTION SUBCUTANEOUS ONCE AS NEEDED
Status: CANCELLED | OUTPATIENT
Start: 2022-07-28

## 2022-07-11 RX ORDER — FAMOTIDINE 10 MG/ML
20 INJECTION INTRAVENOUS
Status: CANCELLED | OUTPATIENT
Start: 2022-07-28

## 2022-07-11 RX ORDER — DIPHENHYDRAMINE HYDROCHLORIDE 50 MG/ML
50 INJECTION INTRAMUSCULAR; INTRAVENOUS ONCE AS NEEDED
Status: CANCELLED | OUTPATIENT
Start: 2022-07-11

## 2022-07-11 RX ORDER — DIPHENHYDRAMINE HYDROCHLORIDE 50 MG/ML
50 INJECTION INTRAMUSCULAR; INTRAVENOUS ONCE AS NEEDED
Status: CANCELLED | OUTPATIENT
Start: 2022-08-10

## 2022-07-11 RX ORDER — EPINEPHRINE 0.3 MG/.3ML
0.3 INJECTION SUBCUTANEOUS ONCE AS NEEDED
Status: CANCELLED | OUTPATIENT
Start: 2022-08-10

## 2022-07-11 RX ORDER — DIPHENHYDRAMINE HYDROCHLORIDE 50 MG/ML
50 INJECTION INTRAMUSCULAR; INTRAVENOUS ONCE AS NEEDED
Status: CANCELLED | OUTPATIENT
Start: 2022-07-28

## 2022-07-11 RX ORDER — EPINEPHRINE 0.3 MG/.3ML
0.3 INJECTION SUBCUTANEOUS ONCE AS NEEDED
Status: CANCELLED | OUTPATIENT
Start: 2022-07-11

## 2022-07-11 RX ORDER — HEPARIN 100 UNIT/ML
500 SYRINGE INTRAVENOUS
Status: CANCELLED | OUTPATIENT
Start: 2022-07-11

## 2022-07-11 RX ORDER — HEPARIN 100 UNIT/ML
500 SYRINGE INTRAVENOUS
Status: CANCELLED | OUTPATIENT
Start: 2022-07-28

## 2022-07-11 NOTE — PLAN OF CARE
START ON PATHWAY REGIMEN - Breast    [id number]        Pertuzumab (Perjeta)       Pertuzumab (Perjeta)       Trastuzumab-xxxx       Trastuzumab-xxxx       Carboplatin       Docetaxel (Taxotere)           Additional Orders: Premedicate with dexamethasone 8 mg PO bid for three   days beginning 1 day prior to docetaxel therapy.  Recommended monitoring: LVEF   baseline and q3-4 months or with the development of cardiac symptoms and regimen   changes for metastatic treatment.  Tano et al. Marli Oncol. 2013   Sep;24(9):0515-84    **Always confirm dose/schedule in your pharmacy ordering system**    Patient Characteristics:  Preoperative or Nonsurgical Candidate (Clinical Staging), Neoadjuvant Therapy   followed by Surgery, Invasive Disease, Chemotherapy, HER2 Positive, ER Positive  Therapeutic Status: Preoperative or Nonsurgical Candidate (Clinical Staging)  AJCC M Category: cM0  AJCC Grade: G3  Breast Surgical Plan: Neoadjuvant Therapy followed by Surgery  ER Status: Positive (+)  AJCC 8 Stage Grouping: IIB  HER2 Status: Positive (+)  AJCC T Category: cT3  AJCC N Category: cN2a  LA Status: Positive (+)  Intent of Therapy:  Curative Intent, Not Discussed with Patient

## 2022-07-11 NOTE — PROGRESS NOTES
Subjective:       Patient ID: Malaika Paige is a 66 y.o. female.    Chief Complaint: Results, Chemotherapy, and Breast Cancer    HPI 66-year-old female with locally advanced HER2 positive breast cancer.  The patient is now presents for initiation of systemic therapy after PET scan reveals locally advanced disease.  ECOG status 2 on end-stage renal disease and dialysis germ line testing negative for clinically significant mutation    Past Medical History:   Diagnosis Date    Cataract     Cataract     CHF (congestive heart failure)     COPD (chronic obstructive pulmonary disease)     Diabetes mellitus     Diabetic retinopathy     Hypertension     Malignant neoplasm of upper-outer quadrant of left breast in female, estrogen receptor positive 6/20/2022     Family History   Problem Relation Age of Onset    Breast cancer Sister     Diabetes Sister     Cancer Brother     Amblyopia Neg Hx     Blindness Neg Hx     Cataracts Neg Hx     Glaucoma Neg Hx     Hypertension Neg Hx     Macular degeneration Neg Hx     Retinal detachment Neg Hx     Strabismus Neg Hx     Stroke Neg Hx     Thyroid disease Neg Hx      Social History     Socioeconomic History    Marital status:    Tobacco Use    Smoking status: Former Smoker     Quit date: 6/21/2012     Years since quitting: 10.0    Smokeless tobacco: Never Used   Substance and Sexual Activity    Alcohol use: No     Past Surgical History:   Procedure Laterality Date    BREAST BIOPSY Right     benign    CATARACT EXTRACTION W/  INTRAOCULAR LENS IMPLANT Right 08/14/2017    Dr. Moe       Labs:  Lab Results   Component Value Date    WBC 12.63 06/21/2022    HGB 10.1 (L) 06/21/2022    HCT 31.4 (L) 06/21/2022    MCV 93 06/21/2022     06/21/2022     BMP  Lab Results   Component Value Date     06/21/2022    K 4.0 06/21/2022     06/21/2022    CO2 28 06/21/2022    BUN 48 (H) 06/21/2022    CREATININE 5.9 (H) 06/21/2022    CALCIUM 8.2 (L)  06/21/2022    ANIONGAP 8 06/21/2022    ESTGFRAFRICA 8 (A) 06/21/2022    EGFRNONAA 7 (A) 06/21/2022     Lab Results   Component Value Date    ALT 25 06/21/2022    AST 23 06/21/2022    ALKPHOS 63 06/21/2022    BILITOT 0.3 06/21/2022       Lab Results   Component Value Date    IRON 43 09/09/2018    TIBC 300 09/09/2018    FERRITIN 1,038 (H) 06/21/2022     Lab Results   Component Value Date    RJJKEPFD25 459 09/09/2018     Lab Results   Component Value Date    FOLATE 6.2 09/09/2018     Lab Results   Component Value Date    TSH 3.243 06/21/2022         Review of Systems   Constitutional: Positive for activity change, appetite change and fatigue. Negative for chills, diaphoresis, fever and unexpected weight change.   HENT: Negative for congestion, dental problem, drooling, ear discharge, ear pain, facial swelling, hearing loss, mouth sores, nosebleeds, postnasal drip, rhinorrhea, sinus pressure, sneezing, sore throat, tinnitus, trouble swallowing and voice change.    Eyes: Negative for photophobia, pain, discharge, redness, itching and visual disturbance.   Respiratory: Negative for cough, choking, chest tightness, shortness of breath, wheezing and stridor.    Cardiovascular: Negative for chest pain, palpitations and leg swelling.   Gastrointestinal: Negative for abdominal distention, abdominal pain, anal bleeding, blood in stool, constipation, diarrhea, nausea, rectal pain and vomiting.   Endocrine: Negative for cold intolerance, heat intolerance, polydipsia, polyphagia and polyuria.   Genitourinary: Negative for decreased urine volume, difficulty urinating, dyspareunia, dysuria, enuresis, flank pain, frequency, genital sores, hematuria, menstrual problem, pelvic pain, urgency, vaginal bleeding, vaginal discharge and vaginal pain.   Musculoskeletal: Negative for arthralgias, back pain, gait problem, joint swelling, myalgias, neck pain and neck stiffness.   Skin: Negative for color change, pallor and rash.    Allergic/Immunologic: Negative for environmental allergies, food allergies and immunocompromised state.   Neurological: Positive for weakness. Negative for dizziness, tremors, seizures, syncope, facial asymmetry, speech difficulty, light-headedness, numbness and headaches.   Hematological: Negative for adenopathy. Does not bruise/bleed easily.   Psychiatric/Behavioral: Positive for dysphoric mood. Negative for agitation, behavioral problems, confusion, decreased concentration, hallucinations, self-injury, sleep disturbance and suicidal ideas. The patient is nervous/anxious. The patient is not hyperactive.        Objective:      Physical Exam  Vitals reviewed.   Constitutional:       General: She is not in acute distress.     Appearance: She is well-developed. She is obese. She is ill-appearing. She is not diaphoretic.   HENT:      Head: Normocephalic and atraumatic.      Right Ear: External ear normal.      Left Ear: External ear normal.      Nose: Nose normal.      Right Sinus: No maxillary sinus tenderness or frontal sinus tenderness.      Left Sinus: No maxillary sinus tenderness or frontal sinus tenderness.      Mouth/Throat:      Pharynx: No oropharyngeal exudate.   Eyes:      General: Lids are normal. No scleral icterus.        Right eye: No discharge.         Left eye: No discharge.      Conjunctiva/sclera: Conjunctivae normal.      Right eye: Right conjunctiva is not injected. No hemorrhage.     Left eye: Left conjunctiva is not injected. No hemorrhage.     Pupils: Pupils are equal, round, and reactive to light.   Neck:      Thyroid: No thyromegaly.      Vascular: No JVD.      Trachea: No tracheal deviation.   Cardiovascular:      Rate and Rhythm: Normal rate.   Pulmonary:      Effort: Pulmonary effort is normal. No respiratory distress.      Breath sounds: No stridor.   Chest:      Chest wall: No tenderness.   Breasts:      Right: No supraclavicular adenopathy.      Left: No supraclavicular adenopathy.        Abdominal:      General: Bowel sounds are normal. There is no distension.      Palpations: Abdomen is soft. There is no hepatomegaly, splenomegaly or mass.      Tenderness: There is no abdominal tenderness. There is no rebound.   Musculoskeletal:         General: No tenderness. Normal range of motion.      Cervical back: Normal range of motion and neck supple.   Lymphadenopathy:      Cervical: No cervical adenopathy.      Upper Body:      Right upper body: No supraclavicular adenopathy.      Left upper body: No supraclavicular adenopathy.   Skin:     General: Skin is dry.      Findings: No erythema or rash.   Neurological:      Mental Status: She is alert and oriented to person, place, and time.      Cranial Nerves: No cranial nerve deficit.      Coordination: Coordination normal.   Psychiatric:         Behavior: Behavior normal.         Thought Content: Thought content normal.         Judgment: Judgment normal.             Assessment:      1. Malignant neoplasm of left breast in female, estrogen receptor positive, unspecified site of breast    2. Malignant neoplasm of upper-outer quadrant of left breast in female, estrogen receptor positive    3. ESRD (end stage renal disease)    4. Panlobular emphysema    5. Immunodeficiency due to chemotherapy    6. 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 Type 2 diabetes mellitus with chronic kidney disease on chronic dialysis, with long-term current use of insulin    8. Severe obesity (BMI 35.0-39.9) with comorbidity           Plan:     Extensive review of imaging studies demonstrates locally advanced disease.  Question of abnormality in left neck.  At this time patient is on end-stage renal disease would not be able to use carboplatin what would attempt to use Taxol Herceptin and Perjeta.  Discussed need for [employer] orders placed I would like to present patient at  multidisciplinary tumor conference for concurrence with medically complicated issues with locally advanced HER2 positive ER positive breast carcinoma discussed implications answered questions.  Total time 40 minutes consent for chemotherapy signed        Toney العلي Jr, MD FACP

## 2022-07-11 NOTE — H&P (VIEW-ONLY)
Subjective:       Patient ID: Malaika Paige is a 66 y.o. female.    Chief Complaint: Results, Chemotherapy, and Breast Cancer    HPI 66-year-old female with locally advanced HER2 positive breast cancer.  The patient is now presents for initiation of systemic therapy after PET scan reveals locally advanced disease.  ECOG status 2 on end-stage renal disease and dialysis germ line testing negative for clinically significant mutation    Past Medical History:   Diagnosis Date    Cataract     Cataract     CHF (congestive heart failure)     COPD (chronic obstructive pulmonary disease)     Diabetes mellitus     Diabetic retinopathy     Hypertension     Malignant neoplasm of upper-outer quadrant of left breast in female, estrogen receptor positive 6/20/2022     Family History   Problem Relation Age of Onset    Breast cancer Sister     Diabetes Sister     Cancer Brother     Amblyopia Neg Hx     Blindness Neg Hx     Cataracts Neg Hx     Glaucoma Neg Hx     Hypertension Neg Hx     Macular degeneration Neg Hx     Retinal detachment Neg Hx     Strabismus Neg Hx     Stroke Neg Hx     Thyroid disease Neg Hx      Social History     Socioeconomic History    Marital status:    Tobacco Use    Smoking status: Former Smoker     Quit date: 6/21/2012     Years since quitting: 10.0    Smokeless tobacco: Never Used   Substance and Sexual Activity    Alcohol use: No     Past Surgical History:   Procedure Laterality Date    BREAST BIOPSY Right     benign    CATARACT EXTRACTION W/  INTRAOCULAR LENS IMPLANT Right 08/14/2017    Dr. Moe       Labs:  Lab Results   Component Value Date    WBC 12.63 06/21/2022    HGB 10.1 (L) 06/21/2022    HCT 31.4 (L) 06/21/2022    MCV 93 06/21/2022     06/21/2022     BMP  Lab Results   Component Value Date     06/21/2022    K 4.0 06/21/2022     06/21/2022    CO2 28 06/21/2022    BUN 48 (H) 06/21/2022    CREATININE 5.9 (H) 06/21/2022    CALCIUM 8.2 (L)  06/21/2022    ANIONGAP 8 06/21/2022    ESTGFRAFRICA 8 (A) 06/21/2022    EGFRNONAA 7 (A) 06/21/2022     Lab Results   Component Value Date    ALT 25 06/21/2022    AST 23 06/21/2022    ALKPHOS 63 06/21/2022    BILITOT 0.3 06/21/2022       Lab Results   Component Value Date    IRON 43 09/09/2018    TIBC 300 09/09/2018    FERRITIN 1,038 (H) 06/21/2022     Lab Results   Component Value Date    EDBMESJB43 459 09/09/2018     Lab Results   Component Value Date    FOLATE 6.2 09/09/2018     Lab Results   Component Value Date    TSH 3.243 06/21/2022         Review of Systems   Constitutional: Positive for activity change, appetite change and fatigue. Negative for chills, diaphoresis, fever and unexpected weight change.   HENT: Negative for congestion, dental problem, drooling, ear discharge, ear pain, facial swelling, hearing loss, mouth sores, nosebleeds, postnasal drip, rhinorrhea, sinus pressure, sneezing, sore throat, tinnitus, trouble swallowing and voice change.    Eyes: Negative for photophobia, pain, discharge, redness, itching and visual disturbance.   Respiratory: Negative for cough, choking, chest tightness, shortness of breath, wheezing and stridor.    Cardiovascular: Negative for chest pain, palpitations and leg swelling.   Gastrointestinal: Negative for abdominal distention, abdominal pain, anal bleeding, blood in stool, constipation, diarrhea, nausea, rectal pain and vomiting.   Endocrine: Negative for cold intolerance, heat intolerance, polydipsia, polyphagia and polyuria.   Genitourinary: Negative for decreased urine volume, difficulty urinating, dyspareunia, dysuria, enuresis, flank pain, frequency, genital sores, hematuria, menstrual problem, pelvic pain, urgency, vaginal bleeding, vaginal discharge and vaginal pain.   Musculoskeletal: Negative for arthralgias, back pain, gait problem, joint swelling, myalgias, neck pain and neck stiffness.   Skin: Negative for color change, pallor and rash.    Allergic/Immunologic: Negative for environmental allergies, food allergies and immunocompromised state.   Neurological: Positive for weakness. Negative for dizziness, tremors, seizures, syncope, facial asymmetry, speech difficulty, light-headedness, numbness and headaches.   Hematological: Negative for adenopathy. Does not bruise/bleed easily.   Psychiatric/Behavioral: Positive for dysphoric mood. Negative for agitation, behavioral problems, confusion, decreased concentration, hallucinations, self-injury, sleep disturbance and suicidal ideas. The patient is nervous/anxious. The patient is not hyperactive.        Objective:      Physical Exam  Vitals reviewed.   Constitutional:       General: She is not in acute distress.     Appearance: She is well-developed. She is obese. She is ill-appearing. She is not diaphoretic.   HENT:      Head: Normocephalic and atraumatic.      Right Ear: External ear normal.      Left Ear: External ear normal.      Nose: Nose normal.      Right Sinus: No maxillary sinus tenderness or frontal sinus tenderness.      Left Sinus: No maxillary sinus tenderness or frontal sinus tenderness.      Mouth/Throat:      Pharynx: No oropharyngeal exudate.   Eyes:      General: Lids are normal. No scleral icterus.        Right eye: No discharge.         Left eye: No discharge.      Conjunctiva/sclera: Conjunctivae normal.      Right eye: Right conjunctiva is not injected. No hemorrhage.     Left eye: Left conjunctiva is not injected. No hemorrhage.     Pupils: Pupils are equal, round, and reactive to light.   Neck:      Thyroid: No thyromegaly.      Vascular: No JVD.      Trachea: No tracheal deviation.   Cardiovascular:      Rate and Rhythm: Normal rate.   Pulmonary:      Effort: Pulmonary effort is normal. No respiratory distress.      Breath sounds: No stridor.   Chest:      Chest wall: No tenderness.   Breasts:      Right: No supraclavicular adenopathy.      Left: No supraclavicular adenopathy.        Abdominal:      General: Bowel sounds are normal. There is no distension.      Palpations: Abdomen is soft. There is no hepatomegaly, splenomegaly or mass.      Tenderness: There is no abdominal tenderness. There is no rebound.   Musculoskeletal:         General: No tenderness. Normal range of motion.      Cervical back: Normal range of motion and neck supple.   Lymphadenopathy:      Cervical: No cervical adenopathy.      Upper Body:      Right upper body: No supraclavicular adenopathy.      Left upper body: No supraclavicular adenopathy.   Skin:     General: Skin is dry.      Findings: No erythema or rash.   Neurological:      Mental Status: She is alert and oriented to person, place, and time.      Cranial Nerves: No cranial nerve deficit.      Coordination: Coordination normal.   Psychiatric:         Behavior: Behavior normal.         Thought Content: Thought content normal.         Judgment: Judgment normal.             Assessment:      1. Malignant neoplasm of left breast in female, estrogen receptor positive, unspecified site of breast    2. Malignant neoplasm of upper-outer quadrant of left breast in female, estrogen receptor positive    3. ESRD (end stage renal disease)    4. Panlobular emphysema    5. Immunodeficiency due to chemotherapy    6. 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 Type 2 diabetes mellitus with chronic kidney disease on chronic dialysis, with long-term current use of insulin    8. Severe obesity (BMI 35.0-39.9) with comorbidity           Plan:     Extensive review of imaging studies demonstrates locally advanced disease.  Question of abnormality in left neck.  At this time patient is on end-stage renal disease would not be able to use carboplatin what would attempt to use Taxol Herceptin and Perjeta.  Discussed need for MediPort orders placed I would like to present patient at  multidisciplinary tumor conference for concurrence with medically complicated issues with locally advanced HER2 positive ER positive breast carcinoma discussed implications answered questions.  Total time 40 minutes consent for chemotherapy signed        Toney العلي Jr, MD FACP

## 2022-07-12 ENCOUNTER — TELEPHONE (OUTPATIENT)
Dept: RADIOLOGY | Facility: HOSPITAL | Age: 67
End: 2022-07-12
Payer: MEDICARE

## 2022-07-12 ENCOUNTER — DOCUMENTATION ONLY (OUTPATIENT)
Dept: HEMATOLOGY/ONCOLOGY | Facility: CLINIC | Age: 67
End: 2022-07-12
Payer: MEDICARE

## 2022-07-12 ENCOUNTER — TELEPHONE (OUTPATIENT)
Dept: SURGERY | Facility: CLINIC | Age: 67
End: 2022-07-12
Payer: MEDICARE

## 2022-07-12 DIAGNOSIS — C50.912 MALIGNANT NEOPLASM OF LEFT BREAST IN FEMALE, ESTROGEN RECEPTOR POSITIVE, UNSPECIFIED SITE OF BREAST: Primary | ICD-10-CM

## 2022-07-12 DIAGNOSIS — Z17.0 MALIGNANT NEOPLASM OF LEFT BREAST IN FEMALE, ESTROGEN RECEPTOR POSITIVE, UNSPECIFIED SITE OF BREAST: Primary | ICD-10-CM

## 2022-07-12 NOTE — NURSING
"Spoke with pt carisa Pa over the phone today . She takes care of pt and all her appt needs. We reviewed my role as nurse navigator and she has my direct contact information . She knows to call with any further needs  Pt will have medi port placed by IR (dr. Arana booked) 22 at [facility]  Pt [place] location for appts. She is a dialysis pt Tuesday, Thursday and   Message sent to echo scheduling pool for set up   Will notify pt of date for echo and then set up chemotherapy education appt as as well as lab, MD and chemo infusion #1  Pt niamurice stated pt will need some sort of counseling as she is very angry about situation and somewhat depressed.  She stated dr cobb gave them a card for the "counseling" dept at the office visit this week.  I have notified social work team of this need and pt carisa aware.   I will also notify  of new pt start . Pt and niece aware of plans and are in agreement. I will follow  Oncology Navigation   Intake  Cancer Type: Breast  Initial Nurse Navigator Contact: 2022  Date Worked: 2022  Multiple appointments: Yes  Reason for Treatment Delay: Further work-up     Treatment  Current Status: Active  Date Presented to Tumor Board: 2022       Medical Oncologist: reza  Chemotherapy: Planned  Chemotherapy Regimen: THP       Procedures: Echo; Port / PICC  Port / PICC Schedule Date: 2022    General Referrals: Social work  Social Work Referral Date: 2022    ER: Positive  NV: Positive  Her2: Postive       Support Systems: Family members  Barriers of Care: Comorbidities  Comorbidities: dialysis pt  Sat  Concerns: pt is dialysis pt  tiw  guille takes care of all appts and support     Acuity  Systemic Treatment - predicted or initiated: Chemotherapy regimen with multiple drugs (+1)  Treatment Tolerability: Has not started treatment yet/treatment fully completed and side effects resolved  ECO  Comorbidities in Medical History: " 2  Other Medical Factors (+2 each): -- (dialysis)   Needed: 0  Support: 0  Verbalizes Financial Concerns: 1  Transportation: 0  Psychological Factors (+1 each): Emotional during conversation  Verbalizes the need for more education: 1  Navigation Acuity: 10     Follow Up  No follow-ups on file.

## 2022-07-12 NOTE — TELEPHONE ENCOUNTER
Interventional Radiology:    Spoke with pts family member to schedule medi port placement for 7/25 at 1pm.  Instructions were given for pt not to have anything to eat or drink past 4am the morning of the procedure, denies that pt takes any blood thinners, aspirin or fish oil, and verbalized understanding of needing to arrive for 11:30am

## 2022-07-12 NOTE — TELEPHONE ENCOUNTER
----- Message from Rosa Bonilla RN sent at 7/12/2022  2:29 PM CDT -----  Ok, we wait for port date , then set up chemo date.  Thanks!      ----- Message -----  From: Nia Bartholomew MA  Sent: 7/12/2022   2:29 PM CDT  To: Rosa Bonilla RN    I am going to call patient and get her scheduled in IR for port placement. When does she start Chemo?  ----- Message -----  From: Akilah Estrada PA-C  Sent: 7/12/2022   2:18 PM CDT  To: Nia Bartholomew MA, Emilia Davis MA    I am going to put in a case request for IR to do it because he does not have an availability within a reasonable amount of time. I already talked with him about it.   ----- Message -----  From: Nia Bartholomew MA  Sent: 7/12/2022  12:17 PM CDT  To: Akilah Estrada PA-C    Ok Thanks. Can you schedule or does he need to?  ----- Message -----  From: Akilah Estrada PA-C  Sent: 7/12/2022  12:12 PM CDT  To: Nia Bartholomew MA, Emilia Davis MA    He usually just schedules them if he has seen them before.   ----- Message -----  From: Nia Bartholomew MA  Sent: 7/12/2022  11:37 AM CDT  To: Jayjay Arana MD, Akilah Estrada PA-C, #    Does patient need to be seen before scheduling Mediport? Please advise  ----- Message -----  From: Rosa Bonilla RN  Sent: 7/12/2022   7:48 AM CDT  To: Jayjay Arana MD, Sumit Ro Staff    HI  Dr. Arana has seen this patient for breast cancer and she is now in need of a medi port.for chemo.     Do you all need to see her again in clinic or can you just schedule port?      Please advise and contact pt      Thank you!  Rosa

## 2022-07-12 NOTE — TELEPHONE ENCOUNTER
Called patient to advise that we are canceling her appointment with Dr Arana on 7/20 to discuss Mediport placement. Since there is no surgery availability until late August or Septmenber her Port will be placed by IR. Understanding verbalized. IR was called and states that they will call back with a procedure date after getting approval.

## 2022-07-13 ENCOUNTER — TELEPHONE (OUTPATIENT)
Dept: HEMATOLOGY/ONCOLOGY | Facility: CLINIC | Age: 67
End: 2022-07-13
Payer: MEDICARE

## 2022-07-13 ENCOUNTER — PATIENT MESSAGE (OUTPATIENT)
Dept: HEMATOLOGY/ONCOLOGY | Facility: CLINIC | Age: 67
End: 2022-07-13
Payer: MEDICARE

## 2022-07-13 ENCOUNTER — SOCIAL WORK (OUTPATIENT)
Dept: HEMATOLOGY/ONCOLOGY | Facility: CLINIC | Age: 67
End: 2022-07-13
Payer: MEDICARE

## 2022-07-13 DIAGNOSIS — C50.412 MALIGNANT NEOPLASM OF UPPER-OUTER QUADRANT OF LEFT BREAST IN FEMALE, ESTROGEN RECEPTOR POSITIVE: Primary | ICD-10-CM

## 2022-07-13 DIAGNOSIS — Z17.0 MALIGNANT NEOPLASM OF UPPER-OUTER QUADRANT OF LEFT BREAST IN FEMALE, ESTROGEN RECEPTOR POSITIVE: Primary | ICD-10-CM

## 2022-07-13 NOTE — TELEPHONE ENCOUNTER
Spoke with pt carisa Pa over the phone today to review pt appts. Pt in approval.  Pending outcome of tumor board discussion 7/15/22 (pt and niece aware)  Echo and chemo education set for 7/18/22 at [facility] and [facility] respectively  Medi port insertion by IR 7/25/22  MD Car and first chemo infusion 7/27/22 at the [facility].   I will notify  of new pt start   Pt niece states social work team member reached out to the today'  They are also aware that insurance approval remains pending and that I will follow.  Will follow up after tumor board discuss if anything changes. Pt and nimaurice are good with plan and know to contact me with any further needs

## 2022-07-13 NOTE — TELEPHONE ENCOUNTER
Swer was requested to speak to pt. regarding counseling assistance. Swer called pt. and spoke with pt. and pt.'s niece. Pt.'s niece reported pt. experiencing anger and depression surrounding diagnosis. Pt. expressed some hesitation with expressing current feelings around diagnosis however, pt. verbalized interest in meeting with a counselor. Swer to send in referral pending provider signature. Swer will send message to pt. via [employer] detailing sw contact information per pt. request. Swer will remain available.

## 2022-07-14 DIAGNOSIS — C50.412 MALIGNANT NEOPLASM OF UPPER-OUTER QUADRANT OF LEFT BREAST IN FEMALE, ESTROGEN RECEPTOR POSITIVE: Primary | ICD-10-CM

## 2022-07-14 DIAGNOSIS — Z17.0 MALIGNANT NEOPLASM OF UPPER-OUTER QUADRANT OF LEFT BREAST IN FEMALE, ESTROGEN RECEPTOR POSITIVE: Primary | ICD-10-CM

## 2022-07-14 RX ORDER — ONDANSETRON 4 MG/1
4 TABLET, FILM COATED ORAL EVERY 8 HOURS PRN
Qty: 20 TABLET | Refills: 11 | Status: SHIPPED | OUTPATIENT
Start: 2022-07-14 | End: 2023-02-10

## 2022-07-14 RX ORDER — PROCHLORPERAZINE MALEATE 5 MG
5 TABLET ORAL 4 TIMES DAILY PRN
Qty: 20 TABLET | Refills: 11 | Status: SHIPPED | OUTPATIENT
Start: 2022-07-14 | End: 2022-10-13

## 2022-07-15 ENCOUNTER — PATIENT MESSAGE (OUTPATIENT)
Dept: HEMATOLOGY/ONCOLOGY | Facility: CLINIC | Age: 67
End: 2022-07-15
Payer: MEDICARE

## 2022-07-15 ENCOUNTER — DOCUMENTATION ONLY (OUTPATIENT)
Dept: PALLIATIVE MEDICINE | Facility: CLINIC | Age: 67
End: 2022-07-15
Payer: MEDICARE

## 2022-07-15 ENCOUNTER — TUMOR BOARD CONFERENCE (OUTPATIENT)
Dept: HEMATOLOGY/ONCOLOGY | Facility: CLINIC | Age: 67
End: 2022-07-15
Payer: MEDICARE

## 2022-07-15 ENCOUNTER — TELEPHONE (OUTPATIENT)
Dept: HEMATOLOGY/ONCOLOGY | Facility: CLINIC | Age: 67
End: 2022-07-15
Payer: MEDICARE

## 2022-07-15 NOTE — TELEPHONE ENCOUNTER
Spoke with pt carisa Pa this am to verify that pt treatment plan has been agreed upon per tumor board recommendations this am. Pt will attend all appts as set and they know to contact me with any further concerns meanwhile

## 2022-07-15 NOTE — PROGRESS NOTES
Palliative Referral Nurse Note    Nurse reached out to pt regarding her palliative referral, family member stated pt was not home and will call office back.

## 2022-07-15 NOTE — PROGRESS NOTES
Tumor Board Documentation      Malaika Paige was presented by Toney العلي MD at our Tumor Board on 7/15/2022, which included representatives from (P) Medical Oncology, Hematology, Surgical Oncology, Pathology, Navigation, Genetics, Plastic Surgery, Radiology, Gastrointestinal, Urology, Pulmonology.    Malaika currently presents as (P) a current patient with (P) Breast cancer, with history of the following treatments: (P) None.    Additionally, we reviewed previous medical and familial history, history of present illness, and recent lab results along with all available histopathologic and imaging studies. The tumor board considered available treatment options and made the following recommendations:     (P) Chemotherapy  Proceed with chemotherapy including Taxotere, Herceptin , Perjeta.  Monitor closely for toxicities/ renal tolerance/ Considering dropping Perjeta from treatment plan if toxicities occur. (Dialysis patient with additional co-morbidities )    The following procedures/referrals were also placed: No orders of the defined types were placed in this encounter.      Clinical Trial Status: (P) Not discussed     National site-specific guidelines were discussed with respect to the case.    Tumor board is a meeting of clinicians from various specialty areas who evaluate and discuss patients for whom a multidisciplinary approach is being considered. Final determinations in the plan of care are those of the provider(s). The responsibility for follow up of recommendations given during tumor board is that of the provider.     Toney العلي MD

## 2022-07-18 ENCOUNTER — CLINICAL SUPPORT (OUTPATIENT)
Dept: HEMATOLOGY/ONCOLOGY | Facility: CLINIC | Age: 67
End: 2022-07-18
Payer: MEDICARE

## 2022-07-18 ENCOUNTER — HOSPITAL ENCOUNTER (OUTPATIENT)
Dept: CARDIOLOGY | Facility: HOSPITAL | Age: 67
Discharge: HOME | End: 2022-07-18
Attending: INTERNAL MEDICINE
Payer: MEDICARE

## 2022-07-18 VITALS
WEIGHT: 203 LBS | DIASTOLIC BLOOD PRESSURE: 80 MMHG | HEIGHT: 62 IN | SYSTOLIC BLOOD PRESSURE: 194 MMHG | BODY MASS INDEX: 37.36 KG/M2

## 2022-07-18 DIAGNOSIS — Z79.899 ENCOUNTER FOR MONITORING CARDIOTOXIC DRUG THERAPY: ICD-10-CM

## 2022-07-18 DIAGNOSIS — Z51.81 ENCOUNTER FOR MONITORING CARDIOTOXIC DRUG THERAPY: ICD-10-CM

## 2022-07-18 PROCEDURE — 93356 MYOCRD STRAIN IMG SPCKL TRCK: CPT | Mod: ,,, | Performed by: INTERNAL MEDICINE

## 2022-07-18 PROCEDURE — 93306 ECHO (CUPID ONLY): ICD-10-PCS | Mod: 26,,, | Performed by: INTERNAL MEDICINE

## 2022-07-18 PROCEDURE — 93306 TTE W/DOPPLER COMPLETE: CPT

## 2022-07-18 PROCEDURE — 93306 TTE W/DOPPLER COMPLETE: CPT | Mod: 26,,, | Performed by: INTERNAL MEDICINE

## 2022-07-18 PROCEDURE — 93356 ECHO (CUPID ONLY): ICD-10-PCS | Mod: ,,, | Performed by: INTERNAL MEDICINE

## 2022-07-18 NOTE — PROGRESS NOTES
Met with patient and niece in person____) to discuss upcoming systemic therapy initiation. Discussed patient diagnosis including staging information. Also discussed that therapy regimen prescribed involves the following drugs: _Perjeta, Herceptin, and Taxol_, and timeline of therapy administration. Went over what to expect on first day of treatment, including what to bring with you, visitor policy, and infusion suite guidelines. Also discussed supportive and shared services available to patient, including social work, financial counseling, oncology nutrition, and oncology psychology.      Covered with patient potential side effects and symptom management. Reviewed supportive medications that will be given before, during, and after treatment and provided written instructions for all.  Also stressed that other side effects are possible outside of those listed. Spent additional time on signs of infection, infection prevention, and proper nutrition/hydration.    Education provided to patient via [facility] specific drug information and chemotherapy education binder___). Also provided contact information for clinic and information related to [facility] communication. Discussed communication process for after-hours needs and some common scenarios in which patient should call provider for guidance vs. immediately report to the emergency room.     Finally, patient was given my contact information. Encouraged patient to call with any questions, concerns, or needs. Patient verbalized understanding of all above information.

## 2022-07-19 LAB
AORTIC ROOT ANNULUS: 2.67 CM
ASCENDING AORTA: 2.77 CM
AV INDEX (PROSTH): 0.73
AV MEAN GRADIENT: 4 MMHG
AV PEAK GRADIENT: 8 MMHG
AV VALVE AREA: 2.24 CM2
AV VELOCITY RATIO: 0.7
BSA FOR ECHO PROCEDURE: 2.01 M2
CV ECHO LV RWT: 0.53 CM
DOP CALC AO PEAK VEL: 1.44 M/S
DOP CALC AO VTI: 38.1 CM
DOP CALC LVOT AREA: 3.1 CM2
DOP CALC LVOT DIAMETER: 1.98 CM
DOP CALC LVOT PEAK VEL: 1.01 M/S
DOP CALC LVOT STROKE VOLUME: 85.25 CM3
DOP CALC RVOT PEAK VEL: 0.89 M/S
DOP CALC RVOT VTI: 22.1 CM
DOP CALCLVOT PEAK VEL VTI: 27.7 CM
E WAVE DECELERATION TIME: 209.51 MSEC
E/A RATIO: 0.89
E/E' RATIO: 13.47 M/S
ECHO LV POSTERIOR WALL: 1.17 CM (ref 0.6–1.1)
EJECTION FRACTION: 60 %
FRACTIONAL SHORTENING: 35 % (ref 28–44)
INTERVENTRICULAR SEPTUM: 1.18 CM (ref 0.6–1.1)
IVC DIAMETER: 1.48 CM
IVRT: 114.18 MSEC
LA MAJOR: 5.81 CM
LA MINOR: 5.33 CM
LA WIDTH: 4.9 CM
LEFT ATRIUM SIZE: 4.21 CM
LEFT ATRIUM VOLUME INDEX MOD: 42.5 ML/M2
LEFT ATRIUM VOLUME INDEX: 50.8 ML/M2
LEFT ATRIUM VOLUME MOD: 81.65 CM3
LEFT ATRIUM VOLUME: 97.49 CM3
LEFT INTERNAL DIMENSION IN SYSTOLE: 2.85 CM (ref 2.1–4)
LEFT VENTRICLE DIASTOLIC VOLUME INDEX: 45.79 ML/M2
LEFT VENTRICLE DIASTOLIC VOLUME: 87.91 ML
LEFT VENTRICLE MASS INDEX: 97 G/M2
LEFT VENTRICLE SYSTOLIC VOLUME INDEX: 16.1 ML/M2
LEFT VENTRICLE SYSTOLIC VOLUME: 30.94 ML
LEFT VENTRICULAR INTERNAL DIMENSION IN DIASTOLE: 4.4 CM (ref 3.5–6)
LEFT VENTRICULAR MASS: 185.61 G
LV LATERAL E/E' RATIO: 12.63 M/S
LV SEPTAL E/E' RATIO: 14.43 M/S
LVOT MG: 2.29 MMHG
LVOT MV: 0.72 CM/S
MV PEAK A VEL: 1.13 M/S
MV PEAK E VEL: 1.01 M/S
MV STENOSIS PRESSURE HALF TIME: 60.76 MS
MV VALVE AREA P 1/2 METHOD: 3.62 CM2
PISA TR MAX VEL: 3.24 M/S
PULM VEIN S/D RATIO: 1.49
PV MEAN GRADIENT: 1.77 MMHG
PV MV: 0.66 M/S
PV PEAK D VEL: 0.41 M/S
PV PEAK S VEL: 0.61 M/S
PV PEAK VELOCITY: 1.04 CM/S
QEF: 59 %
RA MAJOR: 5.02 CM
RA WIDTH: 3.22 CM
RIGHT VENTRICULAR END-DIASTOLIC DIMENSION: 2.87 CM
SINUS: 2.57 CM
STJ: 2.13 CM
TDI LATERAL: 0.08 M/S
TDI SEPTAL: 0.07 M/S
TDI: 0.08 M/S
TR MAX PG: 42 MMHG
TRICUSPID ANNULAR PLANE SYSTOLIC EXCURSION: 1.3 CM

## 2022-07-22 ENCOUNTER — TELEPHONE (OUTPATIENT)
Dept: HEMATOLOGY/ONCOLOGY | Facility: CLINIC | Age: 67
End: 2022-07-22
Payer: MEDICARE

## 2022-07-22 DIAGNOSIS — D68.9 COAGULATION DEFECT: ICD-10-CM

## 2022-07-22 DIAGNOSIS — C50.612 MALIGNANT NEOPLASM OF AXILLARY TAIL OF LEFT BREAST IN FEMALE, ESTROGEN RECEPTOR POSITIVE: Primary | ICD-10-CM

## 2022-07-22 DIAGNOSIS — Z17.0 MALIGNANT NEOPLASM OF AXILLARY TAIL OF LEFT BREAST IN FEMALE, ESTROGEN RECEPTOR POSITIVE: Primary | ICD-10-CM

## 2022-07-22 NOTE — TELEPHONE ENCOUNTER
Pt carisa called in stating that no one from the [facility] port placement dept has reached out to them with appt instructions yet.  I explained that since this procedure  Is scheduled for 7/25/22 at [facility] someone will either call her this afternoon or on Sunday evening.  They usually call the evening before the procedure , but since this is for a Monday , they may call her later today. I explained that pt would need to arrive fasting and have a  stay with her and also bring her home. We also reviewed the location of the procedure to be at [facility] off of [address].  She stated understanding. We also reviewed appt details for 7/27/22 appts.  She is in agreement with those as well. She knows to call back with any further concerns meanwhile.

## 2022-07-25 ENCOUNTER — HOSPITAL ENCOUNTER (OUTPATIENT)
Facility: HOSPITAL | Age: 67
Discharge: HOME | End: 2022-07-25
Attending: RADIOLOGY | Admitting: RADIOLOGY
Payer: MEDICARE

## 2022-07-25 ENCOUNTER — HOSPITAL ENCOUNTER (OUTPATIENT)
Dept: RADIOLOGY | Facility: HOSPITAL | Age: 67
Discharge: HOME | End: 2022-07-25
Payer: MEDICARE

## 2022-07-25 ENCOUNTER — TELEPHONE (OUTPATIENT)
Dept: HEMATOLOGY/ONCOLOGY | Facility: CLINIC | Age: 67
End: 2022-07-25
Payer: MEDICARE

## 2022-07-25 DIAGNOSIS — Z17.0 MALIGNANT NEOPLASM OF LEFT BREAST IN FEMALE, ESTROGEN RECEPTOR POSITIVE, UNSPECIFIED SITE OF BREAST: ICD-10-CM

## 2022-07-25 DIAGNOSIS — C50.912 MALIGNANT NEOPLASM OF LEFT BREAST IN FEMALE, ESTROGEN RECEPTOR POSITIVE, UNSPECIFIED SITE OF BREAST: ICD-10-CM

## 2022-07-25 PROCEDURE — 99152 MOD SED SAME PHYS/QHP 5/>YRS: CPT | Performed by: RADIOLOGY

## 2022-07-25 PROCEDURE — C1788 PORT, INDWELLING, IMP: HCPCS | Performed by: RADIOLOGY

## 2022-07-25 PROCEDURE — 99153 MOD SED SAME PHYS/QHP EA: CPT | Performed by: RADIOLOGY

## 2022-07-25 PROCEDURE — 76937 US GUIDE VASCULAR ACCESS: CPT | Mod: TC | Performed by: RADIOLOGY

## 2022-07-25 PROCEDURE — 63600175 PHARM REV CODE 636 W HCPCS: Performed by: RADIOLOGY

## 2022-07-25 PROCEDURE — 77001 FLUOROGUIDE FOR VEIN DEVICE: CPT | Mod: TC | Performed by: RADIOLOGY

## 2022-07-25 PROCEDURE — C1894 INTRO/SHEATH, NON-LASER: HCPCS | Performed by: RADIOLOGY

## 2022-07-25 PROCEDURE — 36561 INSERT TUNNELED CV CATH: CPT | Performed by: RADIOLOGY

## 2022-07-25 PROCEDURE — 25000003 PHARM REV CODE 250: Performed by: RADIOLOGY

## 2022-07-25 DEVICE — POWERPORT CLEARVUE IMPLANTABLE PORT WITH ATTACHABLE 8F POLYURETHANE OPEN-ENDED SINGLE-LUMEN VENOUS CATHETER INTERMEDIATE KIT
Type: IMPLANTABLE DEVICE | Site: CHEST | Status: FUNCTIONAL
Brand: POWERPORT CLEARVUE

## 2022-07-25 RX ORDER — METOPROLOL TARTRATE 25 MG/1
25 TABLET, FILM COATED ORAL 2 TIMES DAILY
Status: DISCONTINUED | OUTPATIENT
Start: 2022-07-25 | End: 2022-07-25

## 2022-07-25 RX ORDER — FENTANYL CITRATE 50 UG/ML
INJECTION, SOLUTION INTRAMUSCULAR; INTRAVENOUS
Status: DISCONTINUED | OUTPATIENT
Start: 2022-07-25 | End: 2022-07-25 | Stop reason: HOSPADM

## 2022-07-25 RX ORDER — CEFAZOLIN SODIUM 1 G/3ML
INJECTION, POWDER, FOR SOLUTION INTRAMUSCULAR; INTRAVENOUS
Status: DISCONTINUED | OUTPATIENT
Start: 2022-07-25 | End: 2022-07-25 | Stop reason: HOSPADM

## 2022-07-25 RX ORDER — MIDAZOLAM HYDROCHLORIDE 1 MG/ML
INJECTION, SOLUTION INTRAMUSCULAR; INTRAVENOUS
Status: DISCONTINUED | OUTPATIENT
Start: 2022-07-25 | End: 2022-07-25 | Stop reason: HOSPADM

## 2022-07-25 RX ORDER — LOSARTAN POTASSIUM 50 MG/1
100 TABLET ORAL ONCE
Status: COMPLETED | OUTPATIENT
Start: 2022-07-25 | End: 2022-07-25

## 2022-07-25 RX ORDER — LIDOCAINE HYDROCHLORIDE AND EPINEPHRINE 10; 10 MG/ML; UG/ML
INJECTION, SOLUTION INFILTRATION; PERINEURAL
Status: DISCONTINUED | OUTPATIENT
Start: 2022-07-25 | End: 2022-07-25 | Stop reason: HOSPADM

## 2022-07-25 RX ORDER — METOPROLOL TARTRATE 25 MG/1
25 TABLET, FILM COATED ORAL 2 TIMES DAILY
Status: COMPLETED | OUTPATIENT
Start: 2022-07-25 | End: 2022-07-25

## 2022-07-25 RX ORDER — HEPARIN SODIUM 1000 [USP'U]/ML
INJECTION, SOLUTION INTRAVENOUS; SUBCUTANEOUS
Status: DISCONTINUED | OUTPATIENT
Start: 2022-07-25 | End: 2022-07-25 | Stop reason: HOSPADM

## 2022-07-25 RX ORDER — LIDOCAINE HYDROCHLORIDE 20 MG/ML
INJECTION, SOLUTION EPIDURAL; INFILTRATION; INTRACAUDAL; PERINEURAL
Status: DISCONTINUED | OUTPATIENT
Start: 2022-07-25 | End: 2022-07-25 | Stop reason: HOSPADM

## 2022-07-25 RX ADMIN — METOPROLOL TARTRATE 25 MG: 25 TABLET, FILM COATED ORAL at 03:07

## 2022-07-25 RX ADMIN — LOSARTAN POTASSIUM 100 MG: 50 TABLET, FILM COATED ORAL at 03:07

## 2022-07-25 NOTE — PLAN OF CARE
Pt being discharged Home in stable condition. IV removed, catheter intact, pt tolerated well. R chest and R neck dressings CDI at time of discharge. PT able to ambulate, eat and drink without complication at time of discharge. VSS. Discharge instructions, including NOZIN instructions, given to pt, pt verbalized understanding.

## 2022-07-25 NOTE — Clinical Note
dry, intact, no bleeding and no hematoma. Suture dermabond and steri strips under aqua seal dressing on chest.  Steri strip and dermabond under tegaderm for right neck

## 2022-07-25 NOTE — TELEPHONE ENCOUNTER
Pt daughter Ember called in to clarify how much time the pt would need a family member with her today for port placement appt. We reviewed the procedure timing and recovery and pt will have family member with her.

## 2022-07-25 NOTE — DISCHARGE INSTRUCTIONS
IR Wound Check Appointment scheduled for 8/8/22 @ 12pm at the [facility]. Check in for appointment the same way you checked in for your procedure.    Port Placement Discharge Instructions    Due to sedation, do not drive for 24 hrs. Have a responsible adult assist you over 8 hrs post sedation.  You may start taking showers 48 hours after your procedure, no tub baths for 1 week.  No heavy lifting pushing or pulling for 2 months.  Wound care: The dressing over your port site may be removed after 3-4 days unless instructed otherwise. You may gently wash area daily with mild soap and water. Pat dry. Cover with sterile dressing or bandaid for 7 to 10 days or until site is healed.  If a skin adhesive was used, do not use antibiotic ointment as it can break down the adhesive that is serving as a bandage to hold incision closed.  Observe for and report any signs or symptoms of infection including:        fever greater than 100.5                                   redness, swellling or drainage at the site                     red streaking or pain at port site    Nozin Instructions  Goal: the goal of Nozin is to reduce the risk of post-procedural infections by bacteria in the nasal cavity. Think of it as hand  for your nose.    How to use:    1. Shake Nozin bottle well    2. Take a cotton swab and apply 4 drops to one tip    3. Insert cotton tip into one nostril, being sure not to go deeper into nose than tip of the swab.    4. Swab front pocket of nostril 6 times counterclockwise and 6 times clockwise.   5. Take swab out and apply 2 drops to the same cotton tip. Repeat steps 3 and 4 in the other nostril.        Do steps 1-5 twice a day for 7 days.

## 2022-07-25 NOTE — PROCEDURES
Radiology Post-Procedure Note    Pre Op Diagnosis: breast ca    Post Op Diagnosis: Same    Procedure: PORT placement    Procedure performed by: Cole Perdomo MD    Written Informed Consent Obtained: Yes    Specimen Removed: No    Estimated Blood Loss: Minimal    Findings:   Using realtime U/S guidance a 8 Fr port catheter was placed into the right jugular vein with tip of the catheter in the SVC.    Port is ready for use.     Cole Perdomo MD  Staff Radiologist  Department of Radiology  Pager: [phone]

## 2022-07-25 NOTE — DISCHARGE SUMMARY
Radiology Discharge Summary      Hospital Course: No complications    Admit Date: 7/25/2022  Discharge Date: 07/25/2022     Instructions Given to Patient: Yes  Diet: regular diet and Resume prior diet  Activity: no heavy lifting, pushing, pulling with the implant side for 2 months    Description of Condition on Discharge: Stable  Vital Signs (Most Recent):      Discharge Disposition: Home    Discharge Diagnosis: breast ca     Follow-up: follow up with infusion center as scheduled.  Follow up with ir in 2 weeks for site check    Cole Perdomo MD  Staff Radiologist  Department of Radiology  Pager: [phone]

## 2022-07-26 NOTE — PROGRESS NOTES
Subjective:       Patient ID: Malaika Paige is a 66 y.o. female.    Chief Complaint: Breast Cancer and Chemotherapy    Primary Oncologist/Hematologist: Dr. العلي    HPI: Ms. Paige is a 66 year old female who is following up for her locally advanced HER2 positive ER positive breast cancer. She is also on dialysis for ESRD. She is here for cycle 1 day 1 of Taxotere, Perjecta and Herceptin. She will get all 3 agents q 3 weeks, and taxol weekly.      Today: patient presents with her daughter. She states her appetite is good, staying hydrated. She denies any pain, fevers, illnesses. She states she is a little nervous. She has a cough but chronic due to copd. She receives dialysis on Tuesday, Thursday, Saturdays.      Social History     Socioeconomic History    Marital status:    Tobacco Use    Smoking status: Former Smoker     Quit date: 6/21/2012     Years since quitting: 10.1    Smokeless tobacco: Never Used   Substance and Sexual Activity    Alcohol use: No       Past Medical History:   Diagnosis Date    Cataract     Cataract     CHF (congestive heart failure)     COPD (chronic obstructive pulmonary disease)     Diabetes mellitus     Diabetic retinopathy     Hypertension     Malignant neoplasm of upper-outer quadrant of left breast in female, estrogen receptor positive 6/20/2022       Family History   Problem Relation Age of Onset    Breast cancer Sister     Diabetes Sister     Cancer Brother     Amblyopia Neg Hx     Blindness Neg Hx     Cataracts Neg Hx     Glaucoma Neg Hx     Hypertension Neg Hx     Macular degeneration Neg Hx     Retinal detachment Neg Hx     Strabismus Neg Hx     Stroke Neg Hx     Thyroid disease Neg Hx        Past Surgical History:   Procedure Laterality Date    BREAST BIOPSY Right     benign    CATARACT EXTRACTION W/  INTRAOCULAR LENS IMPLANT Right 08/14/2017    Dr. Moe    CORONARY ARTERY BYPASS GRAFT      FLUOROSCOPY N/A 7/25/2022    Procedure:  FLUOROSCOPY/mediport placement;  Surgeon: Cole Perdomo MD;  Location: [facility] CATH LAB;  Service: General;  Laterality: N/A;       Review of Systems   Constitutional: Positive for fatigue. Negative for activity change, appetite change, chills, diaphoresis, fever and unexpected weight change.   HENT: Negative for congestion, nosebleeds and rhinorrhea.    Respiratory: Positive for cough. Negative for shortness of breath.    Cardiovascular: Negative for chest pain.   Gastrointestinal: Negative for abdominal pain, blood in stool, constipation, diarrhea, nausea and vomiting.   Genitourinary: Negative for hematuria.   Musculoskeletal: Negative for arthralgias.   Skin: Negative for color change and pallor.   Allergic/Immunologic: Positive for immunocompromised state.   Neurological: Negative for dizziness, weakness, light-headedness, numbness and headaches.   Psychiatric/Behavioral: The patient is nervous/anxious.          Medication List with Changes/Refills   Current Medications    ACETAMINOPHEN (TYLENOL) 325 MG TABLET    Take 325 mg by mouth every 6 (six) hours as needed for Pain.    ALBUTEROL (PROVENTIL/VENTOLIN HFA) 90 MCG/ACTUATION INHALER    Inhale 1-2 puffs into the lungs every 6 (six) hours as needed for Wheezing. Rescue    ALBUTEROL SULFATE (PROAIR RESPICLICK) 90 MCG/ACTUATION AEPB    Inhale 180 mcg into the lungs every 4 (four) hours. Rescue    AMIODARONE (PACERONE) 100 MG TAB    Take 100 mg by mouth once daily.    AMLODIPINE (NORVASC) 5 MG TABLET    Take 5 mg by mouth nightly.    APIXABAN (ELIQUIS) 2.5 MG TAB    Take 2.5 mg by mouth 2 (two) times daily.    ASPIRIN (ECOTRIN) 81 MG EC TABLET    Take 81 mg by mouth once daily.     CAPRON DM 7.5-7.5 MG/5 ML LIQD    TAKE 10-20 ML BY MOUTH EVERY 6-8 HOURS AS NEEDED COUGH    DICLOFENAC SODIUM (VOLTAREN) 1 % GEL    APPLY TOPICALLY 2 GM TO THE AFFECTED AREA 2 TIMES DAILY    INSULIN DEGLUDEC (TRESIBA FLEXTOUCH U-200) 200 UNIT/ML (3 ML) INSULIN PEN    Inject 80 Units  into the skin once daily.     INV METOPROLOL TARTRATE 25 MG ORAL TABLET (LOPRESSOR) 25 MG TAB    Take by mouth 2 (two) times daily. FOR INVESTIGATIONAL USE ONLY    LEVOTHYROXINE (SYNTHROID) 75 MCG TABLET    Take 75 mcg by mouth once daily.     LOSARTAN (COZAAR) 100 MG TABLET    Take 100 mg by mouth once daily.    LOSARTAN (COZAAR) 25 MG TABLET        OMEPRAZOLE (PRILOSEC) 20 MG CAPSULE    Take 20 mg by mouth once daily.    ONDANSETRON (ZOFRAN) 4 MG TABLET    Take 1 tablet (4 mg total) by mouth every 8 (eight) hours as needed for Nausea.    PREDNISONE (DELTASONE) 10 MG TABLET    Take by mouth.    PROCHLORPERAZINE (COMPAZINE) 5 MG TABLET    Take 1 tablet (5 mg total) by mouth 4 (four) times daily as needed for Nausea.    RENVELA 800 MG TAB    SMARTSI Tablet(s) By Mouth 5 Times Daily    SEVELAMER HCL ORAL    Take by mouth.     Objective:     Vitals:    22 0806   BP: (!) 193/85   Pulse: 81   Temp: 97.2 °F (36.2 °C)       Physical Exam  Vitals reviewed.   Constitutional:       General: She is not in acute distress.     Appearance: She is not ill-appearing, toxic-appearing or diaphoretic.   HENT:      Head: Normocephalic and atraumatic.   Eyes:      General: Scleral icterus present.      Conjunctiva/sclera: Conjunctivae normal.   Cardiovascular:      Rate and Rhythm: Normal rate.      Pulses: Normal pulses.   Pulmonary:      Effort: Pulmonary effort is normal.   Abdominal:      General: Bowel sounds are normal.   Skin:     General: Skin is warm and dry.      Coloration: Skin is not jaundiced or pale.      Findings: No bruising or erythema.   Neurological:      Mental Status: She is alert.   Psychiatric:         Mood and Affect: Mood normal.         Behavior: Behavior normal.         Thought Content: Thought content normal.            Labs/Results:  Lab Results   Component Value Date    WBC 5.66 2022    RBC 3.95 (L) 2022    HGB 11.7 (L) 2022    HCT 36.8 (L) 2022    MCV 93 2022    MCH  29.6 07/27/2022    MCHC 31.8 (L) 07/27/2022    RDW 16.8 (H) 07/27/2022     07/27/2022    MPV 9.7 07/27/2022    GRAN 2.6 07/27/2022    GRAN 45.8 07/27/2022    LYMPH 1.8 07/27/2022    LYMPH 32.0 07/27/2022    MONO 0.9 07/27/2022    MONO 15.2 (H) 07/27/2022    EOS 0.3 07/27/2022    BASO 0.04 07/27/2022    EOSINOPHIL 5.8 07/27/2022    BASOPHIL 0.7 07/27/2022       Pet scan 7/5/22  Impression:  1. Highly FDG avid mass in the left breast, correlating with known biopsy proven malignancy.  2. Multiple suspicious highly FDG avid left axillary lymph nodes including several interpectoral lymph nodes.  3. Nonspecific low level tracer uptake is seen within multiple bilateral intraparotid, bilateral upper cervical, right axillary, and bilateral inguinal lymph nodes which are potentially reactive in nature.  4. Otherwise, no definitive evidence of distant metastatic disease.    Assessment:     Problem List Items Addressed This Visit        Immunology/Multi System    Immunodeficiency due to chemotherapy       Oncology    Anemia associated with chronic renal failure    Malignant neoplasm of upper-outer quadrant of left breast in female, estrogen receptor positive - Primary        Plan:     Malignant neoplasm of upper-outer quadrant of left breast in female, estrogen receptor positive  --locally advanced disease  --HER2 positive, ER positive   --echo on 7/18/22: EF is 60%- next due 10/18/22  --germline negative  --cycle 1 day 1 of Taxotere, Herceptin and Perjeta today   --watch for worsening renal dysfunction. If so, consider d/c Prejeta.  --ANC:2.6, plts:242, tbili:0.3, AST:26, ALT:11 Alk phos:72      Anemia associated with chronic renal failure  --continue with dialysis Tues/Thurs/Sat      Follow-Up: 1 week with cbc cmp prior for cycle 1 day 8 for taxol-standing orders in    Jelena Pruitt PA-C  Hematology Oncology

## 2022-07-27 ENCOUNTER — INFUSION (OUTPATIENT)
Dept: INFUSION THERAPY | Facility: HOSPITAL | Age: 67
End: 2022-07-27
Attending: INTERNAL MEDICINE
Payer: MEDICARE

## 2022-07-27 ENCOUNTER — OFFICE VISIT (OUTPATIENT)
Dept: HEMATOLOGY/ONCOLOGY | Facility: CLINIC | Age: 67
End: 2022-07-27
Payer: MEDICARE

## 2022-07-27 ENCOUNTER — TELEPHONE (OUTPATIENT)
Dept: HEMATOLOGY/ONCOLOGY | Facility: CLINIC | Age: 67
End: 2022-07-27

## 2022-07-27 VITALS
OXYGEN SATURATION: 96 % | TEMPERATURE: 97 F | HEART RATE: 81 BPM | DIASTOLIC BLOOD PRESSURE: 85 MMHG | WEIGHT: 202.63 LBS | SYSTOLIC BLOOD PRESSURE: 193 MMHG | HEIGHT: 62 IN | BODY MASS INDEX: 37.29 KG/M2

## 2022-07-27 VITALS
RESPIRATION RATE: 18 BRPM | HEART RATE: 73 BPM | DIASTOLIC BLOOD PRESSURE: 83 MMHG | SYSTOLIC BLOOD PRESSURE: 147 MMHG | OXYGEN SATURATION: 93 % | TEMPERATURE: 98 F

## 2022-07-27 DIAGNOSIS — Z17.0 MALIGNANT NEOPLASM OF UPPER-OUTER QUADRANT OF LEFT BREAST IN FEMALE, ESTROGEN RECEPTOR POSITIVE: Primary | ICD-10-CM

## 2022-07-27 DIAGNOSIS — D84.821 IMMUNODEFICIENCY DUE TO CHEMOTHERAPY: ICD-10-CM

## 2022-07-27 DIAGNOSIS — C50.412 MALIGNANT NEOPLASM OF UPPER-OUTER QUADRANT OF LEFT BREAST IN FEMALE, ESTROGEN RECEPTOR POSITIVE: Primary | ICD-10-CM

## 2022-07-27 DIAGNOSIS — Z79.899 IMMUNODEFICIENCY DUE TO CHEMOTHERAPY: ICD-10-CM

## 2022-07-27 DIAGNOSIS — T45.1X5A IMMUNODEFICIENCY DUE TO CHEMOTHERAPY: ICD-10-CM

## 2022-07-27 DIAGNOSIS — D63.1 ANEMIA ASSOCIATED WITH CHRONIC RENAL FAILURE: ICD-10-CM

## 2022-07-27 DIAGNOSIS — N18.9 ANEMIA ASSOCIATED WITH CHRONIC RENAL FAILURE: ICD-10-CM

## 2022-07-27 PROCEDURE — 63600175 PHARM REV CODE 636 W HCPCS: Performed by: INTERNAL MEDICINE

## 2022-07-27 PROCEDURE — 3288F PR FALLS RISK ASSESSMENT DOCUMENTED: ICD-10-PCS | Mod: CPTII,S$GLB,ICN,

## 2022-07-27 PROCEDURE — 96367 TX/PROPH/DG ADDL SEQ IV INF: CPT

## 2022-07-27 PROCEDURE — 63600175 PHARM REV CODE 636 W HCPCS

## 2022-07-27 PROCEDURE — 3008F PR BODY MASS INDEX (BMI) DOCUMENTED: ICD-10-PCS | Mod: CPTII,S$GLB,ICN,

## 2022-07-27 PROCEDURE — 1100F PTFALLS ASSESS-DOCD GE2>/YR: CPT | Mod: CPTII,S$GLB,ICN,

## 2022-07-27 PROCEDURE — 3077F SYST BP >= 140 MM HG: CPT | Mod: CPTII,S$GLB,ICN,

## 2022-07-27 PROCEDURE — 96415 CHEMO IV INFUSION ADDL HR: CPT

## 2022-07-27 PROCEDURE — 99999 PR PBB SHADOW E&M-EST. PATIENT-LVL III: ICD-10-PCS | Mod: PBBFAC,,,

## 2022-07-27 PROCEDURE — 25000003 PHARM REV CODE 250: Performed by: INTERNAL MEDICINE

## 2022-07-27 PROCEDURE — 99999 PR PBB SHADOW E&M-EST. PATIENT-LVL III: CPT | Mod: PBBFAC,,,

## 2022-07-27 PROCEDURE — 1126F PR PAIN SEVERITY QUANTIFIED, NO PAIN PRESENT: ICD-10-PCS | Mod: CPTII,S$GLB,ICN,

## 2022-07-27 PROCEDURE — 4010F ACE/ARB THERAPY RXD/TAKEN: CPT | Mod: CPTII,S$GLB,ICN,

## 2022-07-27 PROCEDURE — 3077F PR MOST RECENT SYSTOLIC BLOOD PRESSURE >= 140 MM HG: ICD-10-PCS | Mod: CPTII,S$GLB,ICN,

## 2022-07-27 PROCEDURE — 96375 TX/PRO/DX INJ NEW DRUG ADDON: CPT

## 2022-07-27 PROCEDURE — 99215 PR OFFICE/OUTPT VISIT, EST, LEVL V, 40-54 MIN: ICD-10-PCS | Mod: S$GLB,ICN,,

## 2022-07-27 PROCEDURE — 3008F BODY MASS INDEX DOCD: CPT | Mod: CPTII,S$GLB,ICN,

## 2022-07-27 PROCEDURE — 4010F PR ACE/ARB THEARPY RXD/TAKEN: ICD-10-PCS | Mod: CPTII,S$GLB,ICN,

## 2022-07-27 PROCEDURE — 3079F PR MOST RECENT DIASTOLIC BLOOD PRESSURE 80-89 MM HG: ICD-10-PCS | Mod: CPTII,S$GLB,ICN,

## 2022-07-27 PROCEDURE — 96417 CHEMO IV INFUS EACH ADDL SEQ: CPT

## 2022-07-27 PROCEDURE — 1100F PR PT FALLS ASSESS DOC 2+ FALLS/FALL W/INJURY/YR: ICD-10-PCS | Mod: CPTII,S$GLB,ICN,

## 2022-07-27 PROCEDURE — 1126F AMNT PAIN NOTED NONE PRSNT: CPT | Mod: CPTII,S$GLB,ICN,

## 2022-07-27 PROCEDURE — 3288F FALL RISK ASSESSMENT DOCD: CPT | Mod: CPTII,S$GLB,ICN,

## 2022-07-27 PROCEDURE — 99215 OFFICE O/P EST HI 40 MIN: CPT | Mod: S$GLB,ICN,,

## 2022-07-27 PROCEDURE — 96413 CHEMO IV INFUSION 1 HR: CPT

## 2022-07-27 PROCEDURE — 25000003 PHARM REV CODE 250

## 2022-07-27 PROCEDURE — 3079F DIAST BP 80-89 MM HG: CPT | Mod: CPTII,S$GLB,ICN,

## 2022-07-27 PROCEDURE — 99213 OFFICE O/P EST LOW 20 MIN: CPT | Mod: PBBFAC,25

## 2022-07-27 RX ORDER — HEPARIN 100 UNIT/ML
500 SYRINGE INTRAVENOUS
Status: DISCONTINUED | OUTPATIENT
Start: 2022-07-27 | End: 2022-07-27 | Stop reason: HOSPADM

## 2022-07-27 RX ORDER — CLONIDINE HYDROCHLORIDE 0.1 MG/1
0.1 TABLET ORAL ONCE
Status: COMPLETED | OUTPATIENT
Start: 2022-07-27 | End: 2022-07-27

## 2022-07-27 RX ORDER — FAMOTIDINE 10 MG/ML
20 INJECTION INTRAVENOUS
Status: COMPLETED | OUTPATIENT
Start: 2022-07-27 | End: 2022-07-27

## 2022-07-27 RX ORDER — ONDANSETRON 2 MG/ML
4 INJECTION INTRAMUSCULAR; INTRAVENOUS ONCE
Status: COMPLETED | OUTPATIENT
Start: 2022-07-27 | End: 2022-07-27

## 2022-07-27 RX ORDER — CLONIDINE HYDROCHLORIDE 0.1 MG/1
0.1 TABLET ORAL ONCE
Status: CANCELLED
Start: 2022-07-27 | End: 2022-07-27

## 2022-07-27 RX ORDER — AMLODIPINE BESYLATE 5 MG/1
5 TABLET ORAL NIGHTLY
COMMUNITY
Start: 2022-07-19 | End: 2022-08-31

## 2022-07-27 RX ORDER — LOSARTAN POTASSIUM 25 MG/1
25 TABLET ORAL DAILY
COMMUNITY
Start: 2022-07-20 | End: 2023-05-22

## 2022-07-27 RX ORDER — DIPHENHYDRAMINE HYDROCHLORIDE 50 MG/ML
50 INJECTION INTRAMUSCULAR; INTRAVENOUS ONCE AS NEEDED
Status: DISCONTINUED | OUTPATIENT
Start: 2022-07-27 | End: 2022-07-27 | Stop reason: HOSPADM

## 2022-07-27 RX ORDER — ONDANSETRON 2 MG/ML
4 INJECTION INTRAMUSCULAR; INTRAVENOUS ONCE
Status: CANCELLED
Start: 2022-07-27 | End: 2022-07-27

## 2022-07-27 RX ORDER — SEVELAMER HYDROCHLORIDE 800 MG/1
3 TABLET, FILM COATED ORAL 3 TIMES DAILY
COMMUNITY
End: 2023-11-16 | Stop reason: SDUPTHER

## 2022-07-27 RX ORDER — EPINEPHRINE 1 MG/ML
0.3 INJECTION, SOLUTION INTRACARDIAC; INTRAMUSCULAR; INTRAVENOUS; SUBCUTANEOUS ONCE AS NEEDED
Status: DISCONTINUED | OUTPATIENT
Start: 2022-07-27 | End: 2022-07-27 | Stop reason: HOSPADM

## 2022-07-27 RX ORDER — SODIUM CHLORIDE 0.9 % (FLUSH) 0.9 %
10 SYRINGE (ML) INJECTION
Status: DISCONTINUED | OUTPATIENT
Start: 2022-07-27 | End: 2022-07-27 | Stop reason: HOSPADM

## 2022-07-27 RX ADMIN — CLONIDINE HYDROCHLORIDE 0.1 MG: 0.1 TABLET ORAL at 02:07

## 2022-07-27 RX ADMIN — HEPARIN 500 UNITS: 100 SYRINGE at 04:07

## 2022-07-27 RX ADMIN — TRASTUZUMAB-ANNS 738 MG: 150 INJECTION, POWDER, LYOPHILIZED, FOR SOLUTION INTRAVENOUS at 12:07

## 2022-07-27 RX ADMIN — DIPHENHYDRAMINE HYDROCHLORIDE 50 MG: 50 INJECTION INTRAMUSCULAR; INTRAVENOUS at 02:07

## 2022-07-27 RX ADMIN — DEXAMETHASONE SODIUM PHOSPHATE 10 MG: 4 INJECTION, SOLUTION INTRA-ARTICULAR; INTRALESIONAL; INTRAMUSCULAR; INTRAVENOUS; SOFT TISSUE at 02:07

## 2022-07-27 RX ADMIN — CLONIDINE HYDROCHLORIDE 0.1 MG: 0.1 TABLET ORAL at 09:07

## 2022-07-27 RX ADMIN — PERTUZUMAB 840 MG: 30 INJECTION, SOLUTION, CONCENTRATE INTRAVENOUS at 10:07

## 2022-07-27 RX ADMIN — ONDANSETRON HYDROCHLORIDE 4 MG: 2 SOLUTION INTRAMUSCULAR; INTRAVENOUS at 02:07

## 2022-07-27 RX ADMIN — SODIUM CHLORIDE: 0.9 INJECTION, SOLUTION INTRAVENOUS at 09:07

## 2022-07-27 RX ADMIN — FAMOTIDINE 20 MG: 10 INJECTION INTRAVENOUS at 02:07

## 2022-07-27 RX ADMIN — PACLITAXEL 162 MG: 6 INJECTION, SOLUTION INTRAVENOUS at 03:07

## 2022-07-27 NOTE — NURSING
Jelena CAMARGO notified of elevated BP and slight chest pressure after Kanjinti. Pt states she gets pressure like this in her chest sometimes when her BP is high. Orders received for a second dose of Clonidine.  Will continue with premedications and recheck BP prior to Taxol.    1423- Pt begins vomitting, Debo to chairside to evaluate pt.  BP remains high.  Sats 99%  Pt states she is feeling better and chest pressure has gone away.  Orders received for Zofran and premedications continued.

## 2022-07-27 NOTE — TELEPHONE ENCOUNTER
Met with pt and her niece in person today during pt first chemo appt. In infusion room  Pt states she is doing well today with no complaints at this time. She is aware that I will be leaving [facility] and that my navigation team mates will be available to her for future concerns. She knows the number to call . Pt states appreciation for visit and will contact the navigation dept with any further needs.

## 2022-07-27 NOTE — NURSING
BP elevated, Jelena CAMARGO notified.  Pt did not take her BP meds this am- encouraged her to take all am meds prior to treatment in the future.  Pt states they have to give her Clonidine at dialysis sometimes for BP.  Order received for Clonidine now- given.  Will continue to monitor BP.

## 2022-07-27 NOTE — PLAN OF CARE
Problem: Adult Inpatient Plan of Care  Goal: Plan of Care Review  Outcome: Ongoing, Progressing  Flowsheets (Taken 7/27/2022 1125)  Plan of Care Reviewed With:   patient   family  Goal: Patient-Specific Goal (Individualized)  Outcome: Ongoing, Progressing  Flowsheets (Taken 7/27/2022 1125)  Anxieties, Fears or Concerns: nervous about starting chemo  Individualized Care Needs: feet elevated, warm blanket pillow and snack provided  Patient-Specific Goals (Include Timeframe): tolerate chemo today  Goal: Optimal Comfort and Wellbeing  Outcome: Ongoing, Progressing  Intervention: Provide Person-Centered Care  Flowsheets (Taken 7/27/2022 1125)  Trust Relationship/Rapport:   care explained   questions answered   choices provided   questions encouraged   emotional support provided   reassurance provided   empathic listening provided   thoughts/feelings acknowledged     Problem: Anemia (Chemotherapy Effects)  Goal: Anemia Symptom Improvement  Outcome: Ongoing, Progressing  Intervention: Monitor and Manage Anemia  Flowsheets (Taken 7/27/2022 1125)  Safety Promotion/Fall Prevention:   assistive device/personal item within reach   Fall Risk reviewed with patient/family   in recliner, wheels locked   medications reviewed   instructed to call staff for mobility  Fatigue Management: frequent rest breaks encouraged     Problem: Nausea and Vomiting (Chemotherapy Effects)  Goal: Fluid and Electrolyte Balance  Outcome: Ongoing, Progressing  Intervention: Prevent and Manage Nausea and Vomiting  Flowsheets (Taken 7/27/2022 1125)  Nausea/Vomiting Interventions: (premedications given) other (see comments)  Environmental Support: calm environment promoted     Problem: Neurotoxicity (Chemotherapy Effects)  Goal: Neurotoxicity Symptom Control  Outcome: Ongoing, Progressing  Intervention: Prevent or Manage Neurotoxicity Effects  Flowsheets (Taken 7/27/2022 1125)  Sensation Impairment Protection: insensate areas closely monitored      Problem: Neutropenia (Chemotherapy Effects)  Goal: Absence of Infection  Outcome: Ongoing, Progressing  Intervention: Prevent Infection and Maximize Resistance  Flowsheets (Taken 7/27/2022 1125)  Infection Prevention:   environmental surveillance performed   equipment surfaces disinfected   personal protective equipment utilized   hand hygiene promoted     Problem: Oral Mucositis (Chemotherapy Effects)  Goal: Improved Oral Mucous Membrane Integrity  Outcome: Ongoing, Progressing     Problem: Thrombocytopenia Bleeding Risk (Chemotherapy Effects)  Goal: Absence of Bleeding  Outcome: Ongoing, Progressing  Intervention: Minimize Bleeding Risk  Flowsheets (Taken 7/27/2022 1125)  Bleeding Precautions:   blood pressure closely monitored   monitored for signs of bleeding     Problem: Fall Injury Risk  Goal: Absence of Fall and Fall-Related Injury  Outcome: Ongoing, Progressing  Intervention: Identify and Manage Contributors  Flowsheets (Taken 7/27/2022 1125)  Self-Care Promotion:   independence encouraged   safe use of adaptive equipment encouraged  Medication Review/Management: medications reviewed  Intervention: Promote Injury-Free Environment  Flowsheets (Taken 7/27/2022 1125)  Safety Promotion/Fall Prevention:   assistive device/personal item within reach   Fall Risk reviewed with patient/family   in recliner, wheels locked   medications reviewed   instructed to call staff for mobility

## 2022-07-28 ENCOUNTER — TELEPHONE (OUTPATIENT)
Dept: INFUSION THERAPY | Facility: HOSPITAL | Age: 67
End: 2022-07-28
Payer: MEDICARE

## 2022-07-28 VITALS
OXYGEN SATURATION: 99 % | DIASTOLIC BLOOD PRESSURE: 55 MMHG | SYSTOLIC BLOOD PRESSURE: 164 MMHG | HEART RATE: 65 BPM | WEIGHT: 203.06 LBS | HEIGHT: 62 IN | TEMPERATURE: 98 F | RESPIRATION RATE: 17 BRPM | BODY MASS INDEX: 37.37 KG/M2

## 2022-07-28 NOTE — TELEPHONE ENCOUNTER
Called pt to check on her after she received her first chemo treatment yesterday.  Spoke with her niece, Ember, who was with her yesterday.  She says she is feeling well today and was pleased with the care we gave her aunt yesterday.  Told her to call/message should she need anything before she comes back.  Verbalized understanding.

## 2022-08-03 ENCOUNTER — TELEPHONE (OUTPATIENT)
Dept: HEMATOLOGY/ONCOLOGY | Facility: CLINIC | Age: 67
End: 2022-08-03
Payer: MEDICAID

## 2022-08-03 NOTE — TELEPHONE ENCOUNTER
Patient could not make it to her appointments today due to transportation issues patient would mylene to come on Friday. Message sent to infusion regarding rescheduling appintment.

## 2022-08-03 NOTE — TELEPHONE ENCOUNTER
----- Message from Shashank Delong sent at 8/3/2022 12:36 PM CDT -----  Contact: kala Paige would like a call back at [phone], in regards to rescheduling her appointments. She states that there was an issue with transportation.

## 2022-08-05 ENCOUNTER — TELEPHONE (OUTPATIENT)
Dept: RADIOLOGY | Facility: HOSPITAL | Age: 67
End: 2022-08-05

## 2022-08-05 NOTE — PROGRESS NOTES
Subjective:       Patient ID: Malaika Paige is a 66 y.o. female.    Chief Complaint:   1. Malignant neoplasm of upper-outer quadrant of left breast in female, estrogen receptor positive  Stage IIB (cT3, cN2a(f), cM0, G3, ER+, WV+, HER2+)   2. Anemia associated with chronic renal failure     3. ESRD (end stage renal disease)       Current Treatment:   OP BREAST PACLITAXEL WEEKLY WITH TRASTUZUMAB (Kanjinti) PERTUZUMAB Q3W (THP)     Treatment History:    HPI: This is a 66 year old woman with medical history significant for cataracts, CHF, COPD, HTN, diabetic retinopathy, ESRD on dialysis, and type 2 DM who was referred to Hem/Onc in 6/2022 for locally advanced HER2 positive breast cancer. She presented to GYN in 5/2022 with a palpable breast lump present for one month. Diagnostic MMG revealed a left breast 5.6 cm x 4 cm x 4.3 cm mass at the 2 o'clock position. Biopsied proved invasive ductal carcinoma ER >95%, WV 80%, Plq8rex 2+, Ki-67 60%. Left axillary LN biopsy was positive. PET revealed no evidence of distance mets.     Baseline Echo revealed LVEF 59%.    In light of her dialysis, she was started on Taxol/Herceptin/Perjeta every 3 weeks.     Her primary Hematologist/Oncologist is Dr. العلي.    Interval History: Patient presents for follow up on Taxol/Herceptin/Perjeta; She is scheduled to receive C1D8 tomorrow. She presents with her niece. Treatment was delayed one week due to unreliable transportation. Her niece states she will be bringing her for treatment from now on. The patient has no complaints today; she denies n/v/d/c, abdominal pain, and taste changes. She reports a good appetite and normal BMs. She does, however, have neuropathy but states it was present prior to starting chemotherapy.     Reviewed labs with patient:   CBC:   Recent Labs   Lab 08/08/22  1419   WBC 4.77   RBC 3.68 L   Hemoglobin 11.1 L   Hematocrit 34.4 L   Platelets 324   MCV 94   MCH 30.2   MCHC 32.3     CMP:  Recent Labs   Lab  08/08/22  1419   Glucose 67 L   Calcium 8.7   Albumin 3.1 L   Total Protein 8.7 H   Sodium 142   Potassium 5.0   CO2 25   Chloride 109   BUN 42 H   Creatinine 6.0 H   Alkaline Phosphatase 63   ALT 18   AST 25   Total Bilirubin 0.4       Social History     Socioeconomic History    Marital status:    Tobacco Use    Smoking status: Former Smoker     Quit date: 6/21/2012     Years since quitting: 10.1    Smokeless tobacco: Never Used   Substance and Sexual Activity    Alcohol use: No     Past Medical History:   Diagnosis Date    Cataract     Cataract     CHF (congestive heart failure)     COPD (chronic obstructive pulmonary disease)     Diabetes mellitus     Diabetic retinopathy     Hypertension     Malignant neoplasm of upper-outer quadrant of left breast in female, estrogen receptor positive 6/20/2022     Family History   Problem Relation Age of Onset    Breast cancer Sister     Diabetes Sister     Cancer Brother     Amblyopia Neg Hx     Blindness Neg Hx     Cataracts Neg Hx     Glaucoma Neg Hx     Hypertension Neg Hx     Macular degeneration Neg Hx     Retinal detachment Neg Hx     Strabismus Neg Hx     Stroke Neg Hx     Thyroid disease Neg Hx      Past Surgical History:   Procedure Laterality Date    BREAST BIOPSY Right     benign    CATARACT EXTRACTION W/  INTRAOCULAR LENS IMPLANT Right 08/14/2017    Dr. Moe    CORONARY ARTERY BYPASS GRAFT      FLUOROSCOPY N/A 7/25/2022    Procedure: FLUOROSCOPY/mediport placement;  Surgeon: Cole Perdomo MD;  Location: [facility] CATH LAB;  Service: General;  Laterality: N/A;     Review of Systems   Constitutional: Negative for appetite change and fatigue.   HENT: Negative for mouth sores, rhinorrhea and sore throat.    Eyes: Negative.    Respiratory: Negative.    Cardiovascular: Negative.    Gastrointestinal: Negative for constipation, diarrhea, nausea and vomiting.   Endocrine: Negative.    Genitourinary: Negative.    Musculoskeletal: Negative.     Integumentary:  Negative.   Allergic/Immunologic: Negative.    Neurological: Positive for numbness (bilateral hands and feet). Negative for weakness.   Hematological: Negative.    Psychiatric/Behavioral: Negative.        Medication List with Changes/Refills   Current Medications    ACETAMINOPHEN (TYLENOL) 325 MG TABLET    Take 325 mg by mouth every 6 (six) hours as needed for Pain.    ALBUTEROL (PROVENTIL/VENTOLIN HFA) 90 MCG/ACTUATION INHALER    Inhale 1-2 puffs into the lungs every 6 (six) hours as needed for Wheezing. Rescue    ALBUTEROL SULFATE (PROAIR RESPICLICK) 90 MCG/ACTUATION AEPB    Inhale 180 mcg into the lungs every 4 (four) hours. Rescue    AMIODARONE (PACERONE) 100 MG TAB    Take 100 mg by mouth once daily.    AMLODIPINE (NORVASC) 5 MG TABLET    Take 5 mg by mouth nightly.    APIXABAN (ELIQUIS) 2.5 MG TAB    Take 2.5 mg by mouth 2 (two) times daily.    ASPIRIN (ECOTRIN) 81 MG EC TABLET    Take 81 mg by mouth once daily.     CAPRON DM 7.5-7.5 MG/5 ML LIQD    TAKE 10-20 ML BY MOUTH EVERY 6-8 HOURS AS NEEDED COUGH    DICLOFENAC SODIUM (VOLTAREN) 1 % GEL    APPLY TOPICALLY 2 GM TO THE AFFECTED AREA 2 TIMES DAILY    INSULIN DEGLUDEC (TRESIBA FLEXTOUCH U-200) 200 UNIT/ML (3 ML) INSULIN PEN    Inject 80 Units into the skin once daily.     INV METOPROLOL TARTRATE 25 MG ORAL TABLET (LOPRESSOR) 25 MG TAB    Take by mouth 2 (two) times daily. FOR INVESTIGATIONAL USE ONLY    LEVOTHYROXINE (SYNTHROID) 75 MCG TABLET    Take 75 mcg by mouth once daily.     LOSARTAN (COZAAR) 100 MG TABLET    Take 100 mg by mouth once daily.    LOSARTAN (COZAAR) 25 MG TABLET        OMEPRAZOLE (PRILOSEC) 20 MG CAPSULE    Take 20 mg by mouth once daily.    ONDANSETRON (ZOFRAN) 4 MG TABLET    Take 1 tablet (4 mg total) by mouth every 8 (eight) hours as needed for Nausea.    PREDNISONE (DELTASONE) 10 MG TABLET    Take by mouth.    PROCHLORPERAZINE (COMPAZINE) 5 MG TABLET    Take 1 tablet (5 mg total) by mouth 4 (four) times daily as  needed for Nausea.    RENVELA 800 MG TAB    SMARTSI Tablet(s) By Mouth 5 Times Daily    SEVELAMER HCL ORAL    Take by mouth.     Objective:     Vitals:    22 1447   BP: (!) 156/80   Pulse: 62   Temp: 97 °F (36.1 °C)     Physical Exam  Vitals reviewed.   Constitutional:       Appearance: Normal appearance.   HENT:      Head: Normocephalic.      Mouth/Throat:      Comments: Wearing mask    Eyes:      Extraocular Movements: Extraocular movements intact.      Pupils: Pupils are equal, round, and reactive to light.   Cardiovascular:      Rate and Rhythm: Normal rate and regular rhythm.      Heart sounds: Normal heart sounds.   Pulmonary:      Effort: Pulmonary effort is normal.      Breath sounds: Normal breath sounds.   Chest:       Abdominal:      General: Bowel sounds are normal.      Palpations: Abdomen is soft.      Comments: rounded     Genitourinary:     Comments: deferred    Musculoskeletal:         General: Normal range of motion.      Cervical back: Normal range of motion and neck supple.   Skin:     General: Skin is warm and dry.   Neurological:      Mental Status: She is alert and oriented to person, place, and time.   Psychiatric:         Behavior: Behavior normal.         Thought Content: Thought content normal.          (1) Restricted in physically strenuous activity, ambulatory and able to do work of light nature  Assessment:     Problem List Items Addressed This Visit        Renal/    ESRD (end stage renal disease)     On dialysis TThSat.               Oncology    Anemia associated with chronic renal failure     ESRD on dialysis           Malignant neoplasm of upper-outer quadrant of left breast in female, estrogen receptor positive - Primary     Stage IIB (cT3, cN2a(f), cM0, G3, ER+, AL+, HER2+). Currently on Taxol/Herceptin/Perjeta.                Plan:     Malignant neoplasm of upper-outer quadrant of left breast in female, estrogen receptor positive    Anemia associated with chronic renal  failure    ESRD (end stage renal disease)    Labs reviewed.   Ok to proceed with C1D8 of Taxol/Herceptin/Perjeta tomorrow.  Follow up in 1 week (Wednesday) with Dr. العلي/[place] with Mg, CBC and Comprehensive Metabolic Panel prior to C1D15.    I will review assessment/plan with collaborating physician Dr. Ramirez.      VILMA Brody

## 2022-08-05 NOTE — TELEPHONE ENCOUNTER
INTERVENTIONAL RADIOLOGY    ATTEMPTED TO CONFIRMED MEDIPORT PLACEMENT CHECK.  NOT ANSWER.  MESSAGE LEFT.

## 2022-08-08 ENCOUNTER — OFFICE VISIT (OUTPATIENT)
Dept: HEMATOLOGY/ONCOLOGY | Facility: CLINIC | Age: 67
End: 2022-08-08
Payer: MEDICARE

## 2022-08-08 ENCOUNTER — HOSPITAL ENCOUNTER (OUTPATIENT)
Dept: RADIOLOGY | Facility: HOSPITAL | Age: 67
Discharge: HOME | End: 2022-08-08
Attending: INTERNAL MEDICINE
Payer: MEDICARE

## 2022-08-08 VITALS
HEART RATE: 62 BPM | BODY MASS INDEX: 37.69 KG/M2 | DIASTOLIC BLOOD PRESSURE: 80 MMHG | WEIGHT: 204.81 LBS | TEMPERATURE: 97 F | HEIGHT: 62 IN | SYSTOLIC BLOOD PRESSURE: 156 MMHG

## 2022-08-08 DIAGNOSIS — C50.412 MALIGNANT NEOPLASM OF UPPER-OUTER QUADRANT OF LEFT BREAST IN FEMALE, ESTROGEN RECEPTOR POSITIVE: Primary | ICD-10-CM

## 2022-08-08 DIAGNOSIS — Z17.0 MALIGNANT NEOPLASM OF UPPER-OUTER QUADRANT OF LEFT BREAST IN FEMALE, ESTROGEN RECEPTOR POSITIVE: Primary | ICD-10-CM

## 2022-08-08 DIAGNOSIS — N18.6 ESRD (END STAGE RENAL DISEASE): ICD-10-CM

## 2022-08-08 DIAGNOSIS — D63.1 ANEMIA ASSOCIATED WITH CHRONIC RENAL FAILURE: ICD-10-CM

## 2022-08-08 DIAGNOSIS — N18.9 ANEMIA ASSOCIATED WITH CHRONIC RENAL FAILURE: ICD-10-CM

## 2022-08-08 PROCEDURE — 99999 PR PBB SHADOW E&M-EST. PATIENT-LVL IV: CPT | Mod: PBBFAC,,, | Performed by: NURSE PRACTITIONER

## 2022-08-08 PROCEDURE — 99214 OFFICE O/P EST MOD 30 MIN: CPT | Mod: S$PBB,,, | Performed by: NURSE PRACTITIONER

## 2022-08-08 PROCEDURE — 99999 PR PBB SHADOW E&M-EST. PATIENT-LVL IV: ICD-10-PCS | Mod: PBBFAC,,, | Performed by: NURSE PRACTITIONER

## 2022-08-08 PROCEDURE — 99214 OFFICE O/P EST MOD 30 MIN: CPT | Mod: PBBFAC | Performed by: NURSE PRACTITIONER

## 2022-08-08 PROCEDURE — 99214 PR OFFICE/OUTPT VISIT, EST, LEVL IV, 30-39 MIN: ICD-10-PCS | Mod: S$PBB,,, | Performed by: NURSE PRACTITIONER

## 2022-08-08 NOTE — NURSING
Patient arrives for wound check, area noted to have bandaid and steri strips in place.  Bandaid removed as well as one steristrip that was loose from the area of incision site.  wound cleaned with ETOH, no redness, drainage or swelling noted, patient denies fever.  Steri strip replaced.  Pateint to have labs drawn now and follow up appointment at the Cancer center at 3pm today.  Patient agrees and states understanding.

## 2022-08-09 ENCOUNTER — INFUSION (OUTPATIENT)
Dept: INFUSION THERAPY | Facility: HOSPITAL | Age: 67
End: 2022-08-09
Attending: INTERNAL MEDICINE
Payer: MEDICARE

## 2022-08-09 ENCOUNTER — OFFICE VISIT (OUTPATIENT)
Dept: PALLIATIVE MEDICINE | Facility: CLINIC | Age: 67
End: 2022-08-09
Payer: MEDICARE

## 2022-08-09 VITALS
SYSTOLIC BLOOD PRESSURE: 165 MMHG | TEMPERATURE: 98 F | HEART RATE: 65 BPM | RESPIRATION RATE: 18 BRPM | WEIGHT: 202.19 LBS | HEIGHT: 62 IN | DIASTOLIC BLOOD PRESSURE: 86 MMHG | OXYGEN SATURATION: 98 % | BODY MASS INDEX: 37.21 KG/M2

## 2022-08-09 VITALS
SYSTOLIC BLOOD PRESSURE: 166 MMHG | BODY MASS INDEX: 37.21 KG/M2 | RESPIRATION RATE: 18 BRPM | HEIGHT: 62 IN | HEART RATE: 63 BPM | DIASTOLIC BLOOD PRESSURE: 80 MMHG | TEMPERATURE: 98 F | OXYGEN SATURATION: 98 % | WEIGHT: 202.19 LBS

## 2022-08-09 DIAGNOSIS — Z17.0 MALIGNANT NEOPLASM OF LEFT BREAST IN FEMALE, ESTROGEN RECEPTOR POSITIVE, UNSPECIFIED SITE OF BREAST: ICD-10-CM

## 2022-08-09 DIAGNOSIS — Z17.0 MALIGNANT NEOPLASM OF UPPER-OUTER QUADRANT OF LEFT BREAST IN FEMALE, ESTROGEN RECEPTOR POSITIVE: Primary | ICD-10-CM

## 2022-08-09 DIAGNOSIS — Z51.5 PALLIATIVE CARE ENCOUNTER: Primary | ICD-10-CM

## 2022-08-09 DIAGNOSIS — N18.6 ESRD (END STAGE RENAL DISEASE): ICD-10-CM

## 2022-08-09 DIAGNOSIS — C50.912 MALIGNANT NEOPLASM OF LEFT BREAST IN FEMALE, ESTROGEN RECEPTOR POSITIVE, UNSPECIFIED SITE OF BREAST: ICD-10-CM

## 2022-08-09 DIAGNOSIS — Z17.0 MALIGNANT NEOPLASM OF UPPER-OUTER QUADRANT OF LEFT BREAST IN FEMALE, ESTROGEN RECEPTOR POSITIVE: ICD-10-CM

## 2022-08-09 DIAGNOSIS — C50.412 MALIGNANT NEOPLASM OF UPPER-OUTER QUADRANT OF LEFT BREAST IN FEMALE, ESTROGEN RECEPTOR POSITIVE: ICD-10-CM

## 2022-08-09 DIAGNOSIS — C50.412 MALIGNANT NEOPLASM OF UPPER-OUTER QUADRANT OF LEFT BREAST IN FEMALE, ESTROGEN RECEPTOR POSITIVE: Primary | ICD-10-CM

## 2022-08-09 PROCEDURE — 99203 OFFICE O/P NEW LOW 30 MIN: CPT | Mod: S$PBB,,, | Performed by: PHYSICIAN ASSISTANT

## 2022-08-09 PROCEDURE — 99497 ADVNCD CARE PLAN 30 MIN: CPT | Mod: 59,PBBFAC | Performed by: PHYSICIAN ASSISTANT

## 2022-08-09 PROCEDURE — 99999 PR PBB SHADOW E&M-EST. PATIENT-LVL V: ICD-10-PCS | Mod: PBBFAC,,, | Performed by: PHYSICIAN ASSISTANT

## 2022-08-09 PROCEDURE — 25000003 PHARM REV CODE 250: Performed by: INTERNAL MEDICINE

## 2022-08-09 PROCEDURE — 99203 PR OFFICE/OUTPT VISIT, NEW, LEVL III, 30-44 MIN: ICD-10-PCS | Mod: S$PBB,,, | Performed by: PHYSICIAN ASSISTANT

## 2022-08-09 PROCEDURE — 96375 TX/PRO/DX INJ NEW DRUG ADDON: CPT

## 2022-08-09 PROCEDURE — 99215 OFFICE O/P EST HI 40 MIN: CPT | Mod: PBBFAC,25 | Performed by: PHYSICIAN ASSISTANT

## 2022-08-09 PROCEDURE — 63600175 PHARM REV CODE 636 W HCPCS: Performed by: INTERNAL MEDICINE

## 2022-08-09 PROCEDURE — 96413 CHEMO IV INFUSION 1 HR: CPT

## 2022-08-09 PROCEDURE — 99497 ADVNCD CARE PLAN 30 MIN: CPT | Mod: S$PBB,,, | Performed by: PHYSICIAN ASSISTANT

## 2022-08-09 PROCEDURE — 96367 TX/PROPH/DG ADDL SEQ IV INF: CPT

## 2022-08-09 PROCEDURE — 99497 PR ADVNCD CARE PLAN 30 MIN: ICD-10-PCS | Mod: S$PBB,,, | Performed by: PHYSICIAN ASSISTANT

## 2022-08-09 PROCEDURE — 99999 PR PBB SHADOW E&M-EST. PATIENT-LVL V: CPT | Mod: PBBFAC,,, | Performed by: PHYSICIAN ASSISTANT

## 2022-08-09 RX ORDER — FAMOTIDINE 10 MG/ML
20 INJECTION INTRAVENOUS
Status: COMPLETED | OUTPATIENT
Start: 2022-08-09 | End: 2022-08-09

## 2022-08-09 RX ORDER — SODIUM CHLORIDE 0.9 % (FLUSH) 0.9 %
10 SYRINGE (ML) INJECTION
Status: DISCONTINUED | OUTPATIENT
Start: 2022-08-09 | End: 2022-08-09 | Stop reason: HOSPADM

## 2022-08-09 RX ORDER — HEPARIN 100 UNIT/ML
500 SYRINGE INTRAVENOUS
Status: DISCONTINUED | OUTPATIENT
Start: 2022-08-09 | End: 2022-08-09 | Stop reason: HOSPADM

## 2022-08-09 RX ADMIN — DEXAMETHASONE SODIUM PHOSPHATE 10 MG: 4 INJECTION, SOLUTION INTRA-ARTICULAR; INTRALESIONAL; INTRAMUSCULAR; INTRAVENOUS; SOFT TISSUE at 03:08

## 2022-08-09 RX ADMIN — SODIUM CHLORIDE: 0.9 INJECTION, SOLUTION INTRAVENOUS at 03:08

## 2022-08-09 RX ADMIN — DIPHENHYDRAMINE HYDROCHLORIDE 50 MG: 50 INJECTION, SOLUTION INTRAMUSCULAR; INTRAVENOUS at 03:08

## 2022-08-09 RX ADMIN — HEPARIN 500 UNITS: 100 SYRINGE at 05:08

## 2022-08-09 RX ADMIN — PACLITAXEL 162 MG: 6 INJECTION, SOLUTION INTRAVENOUS at 04:08

## 2022-08-09 RX ADMIN — FAMOTIDINE 20 MG: 10 INJECTION INTRAVENOUS at 03:08

## 2022-08-09 NOTE — NURSING
Blood pressure slightly elevated at 15 min vitals with taxol, spoke with obed Barrios to continue to Max rate titration at 250ml/hr.

## 2022-08-09 NOTE — PLAN OF CARE
Discussed plan of care with pt. Addressed any and ongoing concerns. Pt denies   Problem: Adult Inpatient Plan of Care  Goal: Plan of Care Review  Outcome: Ongoing, Progressing  Goal: Patient-Specific Goal (Individualized)  Outcome: Ongoing, Progressing  Flowsheets (Taken 8/9/2022 1642)  Anxieties, Fears or Concerns: none  Individualized Care Needs: feet elevated , warm blanket and snack provided  Patient-Specific Goals (Include Timeframe): will tolerate chemo treatment today  Goal: Absence of Hospital-Acquired Illness or Injury  Outcome: Ongoing, Progressing  Intervention: Identify and Manage Fall Risk  Flowsheets (Taken 8/9/2022 1642)  Safety Promotion/Fall Prevention:   in recliner, wheels locked   high risk medications identified  Intervention: Prevent Infection  Flowsheets (Taken 8/9/2022 1642)  Infection Prevention:   equipment surfaces disinfected   hand hygiene promoted   personal protective equipment utilized  Goal: Optimal Comfort and Wellbeing  Outcome: Ongoing, Progressing  Intervention: Provide Person-Centered Care  Flowsheets (Taken 8/9/2022 1642)  Trust Relationship/Rapport:   care explained   choices provided   empathic listening provided   emotional support provided   questions answered   questions encouraged   reassurance provided   thoughts/feelings acknowledged

## 2022-08-09 NOTE — PROGRESS NOTES
Palliative Medicine  Consult  Consult Requested By: Dr. Toney العلي  Reason for Consult: GOC      SUBJECTIVE:     History of Present Illness:  Patient is a 66 y.o. year old female presenting with stage IIb breast cancer who was referred to PM clinic for GOC/ ACP. She is hard to understand due to mumbled and slurred speech but reports is baseline. She is tolerating therapy and has no issue with chronic HD. She completed HCPOA listing her son Prosper and 2 nieces. She would like to remain a FULL code. She has no concerns nor adverse symptoms to report. I will follow up PRN.      LA  reviewed and summarized: nothing filled in calendar year      Past Medical History:   Diagnosis Date    Cataract     Cataract     CHF (congestive heart failure)     COPD (chronic obstructive pulmonary disease)     Diabetes mellitus     Diabetic retinopathy     Hypertension     Malignant neoplasm of upper-outer quadrant of left breast in female, estrogen receptor positive 6/20/2022     Past Surgical History:   Procedure Laterality Date    BREAST BIOPSY Right     benign    CATARACT EXTRACTION W/  INTRAOCULAR LENS IMPLANT Right 08/14/2017    Dr. Moe    CORONARY ARTERY BYPASS GRAFT      FLUOROSCOPY N/A 7/25/2022    Procedure: FLUOROSCOPY/mediport placement;  Surgeon: Cole Perdomo MD;  Location: [facility] CATH LAB;  Service: General;  Laterality: N/A;     Family History   Problem Relation Age of Onset    Breast cancer Sister     Diabetes Sister     Cancer Brother     Amblyopia Neg Hx     Blindness Neg Hx     Cataracts Neg Hx     Glaucoma Neg Hx     Hypertension Neg Hx     Macular degeneration Neg Hx     Retinal detachment Neg Hx     Strabismus Neg Hx     Stroke Neg Hx     Thyroid disease Neg Hx      Review of patient's allergies indicates:  No Known Allergies    Medications:    Current Outpatient Medications:     acetaminophen (TYLENOL) 325 MG tablet, Take 325 mg by mouth every 6 (six) hours as needed for Pain.,  Disp: , Rfl:     albuterol (PROVENTIL/VENTOLIN HFA) 90 mcg/actuation inhaler, Inhale 1-2 puffs into the lungs every 6 (six) hours as needed for Wheezing. Rescue, Disp: 1 Inhaler, Rfl: 0    albuterol sulfate (PROAIR RESPICLICK) 90 mcg/actuation AePB, Inhale 180 mcg into the lungs every 4 (four) hours. Rescue, Disp: 1 each, Rfl: 3    amiodarone (PACERONE) 100 MG Tab, Take 100 mg by mouth once daily., Disp: , Rfl:     amLODIPine (NORVASC) 5 MG tablet, Take 5 mg by mouth nightly., Disp: , Rfl:     apixaban (ELIQUIS) 2.5 mg Tab, Take 2.5 mg by mouth 2 (two) times daily., Disp: , Rfl:     aspirin (ECOTRIN) 81 MG EC tablet, Take 81 mg by mouth once daily. , Disp: , Rfl:     CAPRON DM 7.5-7.5 mg/5 mL Liqd, TAKE 10-20 ML BY MOUTH EVERY 6-8 HOURS AS NEEDED COUGH, Disp: , Rfl:     diclofenac sodium (VOLTAREN) 1 % Gel, APPLY TOPICALLY 2 GM TO THE AFFECTED AREA 2 TIMES DAILY, Disp: , Rfl:     insulin degludec (TRESIBA FLEXTOUCH U-200) 200 unit/mL (3 mL) insulin pen, Inject 80 Units into the skin once daily. , Disp: , Rfl:     INV metoprolol tartrate 25 mg oral tablet (LOPRESSOR) 25 MG Tab, Take by mouth 2 (two) times daily. FOR INVESTIGATIONAL USE ONLY, Disp: , Rfl:     levothyroxine (SYNTHROID) 75 MCG tablet, Take 75 mcg by mouth once daily. , Disp: , Rfl:     losartan (COZAAR) 100 MG tablet, Take 100 mg by mouth once daily., Disp: , Rfl:     losartan (COZAAR) 25 MG tablet, , Disp: , Rfl:     omeprazole (PRILOSEC) 20 MG capsule, Take 20 mg by mouth once daily., Disp: , Rfl:     ondansetron (ZOFRAN) 4 MG tablet, Take 1 tablet (4 mg total) by mouth every 8 (eight) hours as needed for Nausea., Disp: 20 tablet, Rfl: 11    predniSONE (DELTASONE) 10 MG tablet, Take by mouth., Disp: , Rfl:     prochlorperazine (COMPAZINE) 5 MG tablet, Take 1 tablet (5 mg total) by mouth 4 (four) times daily as needed for Nausea., Disp: 20 tablet, Rfl: 11    RENVELA 800 mg Tab, SMARTSI Tablet(s) By Mouth 5 Times Daily, Disp: , Rfl:      SEVELAMER HCL ORAL, Take by mouth., Disp: , Rfl:   No current facility-administered medications for this visit.    Facility-Administered Medications Ordered in Other Visits:     dexamethasone (DECADRON) 10 mg in sodium chloride 0.9% 50 mL IVPB, 10 mg, Intravenous, 1 time in Clinic/HOD, Toney العلي MD    diphenhydramine (BENADRYL) 50 mg in NS 50 mL IVPB, 50 mg, Intravenous, 1 time in Clinic/HOD, Toney العلي MD    famotidine (PF) injection 20 mg, 20 mg, Intravenous, 1 time in Clinic/HOD, Toney العلي MD    heparin, porcine (PF) 100 unit/mL injection flush 500 Units, 500 Units, Intravenous, PRN, Toney العلي MD    PACLitaxeL (TAXOL) 80 mg/m2 = 162 mg in sodium chloride 0.9% 250 mL chemo infusion, 80 mg/m2 (Treatment Plan Recorded), Intravenous, 1 time in Clinic/HOD, Toney العلي MD    sodium chloride 0.9% 250 mL flush bag, , Intravenous, 1 time in Clinic/HOD, Toney العلي MD    sodium chloride 0.9% flush 10 mL, 10 mL, Intravenous, PRN, Toney العلي MD    OBJECTIVE:     ROS:  Review of Systems   Constitutional: Negative for appetite change, chills and fever.   HENT: Negative for mouth sores, sore throat and trouble swallowing.    Respiratory: Negative for cough and shortness of breath.    Gastrointestinal: Negative for abdominal pain, constipation, nausea and vomiting.   Genitourinary: Negative for difficulty urinating and dysuria.   Musculoskeletal: Negative for back pain and myalgias.   Skin: Negative for rash and wound.   Allergic/Immunologic: Positive for immunocompromised state.   Neurological: Negative for weakness and headaches.   Psychiatric/Behavioral: Negative for confusion and sleep disturbance.       Review of Symptoms    Symptom Assessment (ESAS 0-10 Scale)  Pain:  0  Dyspnea:  6  Anxiety:  5  Nausea:  0  Depression:  0  Anorexia:  0  Fatigue:  5  Insomnia:  0  Restlessness:  0  Agitation:  0     CAM / Delirium:  Negative    Constipation:  No constipation    ECOG  Performance Status ndGndrndanddndend:nd nd2nd Psychosocial/Cultural: 2 sons,       Advance Care Planning   Advance Directives:   Do Not Resuscitate Status: No    Medical Power of : Yes    Agent's Name:  Son Prosper Paige [phone], 2: carisa Paige [phone], 3: carisa Vazquez [phone]    Decision Making:  Patient answered questions            Physical Exam:  Vitals: Temp: 97.9 °F (36.6 °C) (08/09/22 1417)  Pulse: 63 (08/09/22 1417)  Resp: 18 (08/09/22 1417)  BP: (!) 166/80 (08/09/22 1417)  SpO2: 98 % (08/09/22 1417)  Physical Exam  Vitals and nursing note reviewed.   Constitutional:       General: She is not in acute distress.     Appearance: Normal appearance. She is not ill-appearing.   HENT:      Head: Normocephalic and atraumatic.   Eyes:      Pupils: Pupils are equal, round, and reactive to light.   Cardiovascular:      Rate and Rhythm: Normal rate and regular rhythm.      Pulses: Normal pulses.      Heart sounds: Normal heart sounds. No murmur heard.  Pulmonary:      Effort: Pulmonary effort is normal. No respiratory distress.      Breath sounds: Normal breath sounds.   Abdominal:      General: Bowel sounds are normal. There is no distension.      Palpations: Abdomen is soft.      Tenderness: There is no abdominal tenderness.   Musculoskeletal:         General: Normal range of motion.      Cervical back: Normal range of motion.      Right lower leg: No edema.      Left lower leg: No edema.   Skin:     General: Skin is warm and dry.   Neurological:      Mental Status: She is alert and oriented to person, place, and time. Mental status is at baseline.      Cranial Nerves: No cranial nerve deficit.      Comments: Mumbles and slurred speech which she reports is baseline   Psychiatric:         Mood and Affect: Mood normal.         Behavior: Behavior normal.         Thought Content: Thought content normal.         Judgment: Judgment normal.         Labs and radiology data reviewed    ASSESSMENT/PLAN:      Problem List Items Addressed This Visit        Renal/    ESRD (end stage renal disease)    Current Assessment & Plan     HD T, Th, Sa              Oncology    Malignant neoplasm of upper-outer quadrant of left breast in female, estrogen receptor positive    Current Assessment & Plan     Under the care of Dr. العلي    Current therapy: Taxol/Herceptin/Perjeta with curative intent              Palliative Care    Palliative care encounter - Primary    Overview     HCPOA: son Prosper Paige [phone], 2: carisa Paige [phone], 3: carisa Vazquez [phone]    Desires to be FULL code             Other Visit Diagnoses     Malignant neoplasm of left breast in female, estrogen receptor positive, unspecified site of breast                 Follow up: PRN      46 minutes total visit  30 minutes of total time spent on the encounter, which includes face to face time and non-face to face time preparing to see the patient (eg, review of tests), Obtaining and/or reviewing separately obtained history, Documenting clinical information in the electronic or other health record, Independently interpreting results (not separately reported) and communicating results to the patient/family/caregiver, or Care coordination (not separately reported).  16 minutes spent in discussing ACP    Signature: Lazara Rahman PA-C

## 2022-08-31 ENCOUNTER — OFFICE VISIT (OUTPATIENT)
Dept: HEMATOLOGY/ONCOLOGY | Facility: CLINIC | Age: 67
End: 2022-08-31
Payer: MEDICARE

## 2022-08-31 ENCOUNTER — INFUSION (OUTPATIENT)
Dept: INFUSION THERAPY | Facility: HOSPITAL | Age: 67
End: 2022-08-31
Attending: INTERNAL MEDICINE
Payer: MEDICARE

## 2022-08-31 VITALS
DIASTOLIC BLOOD PRESSURE: 70 MMHG | WEIGHT: 206.13 LBS | HEART RATE: 64 BPM | TEMPERATURE: 97 F | SYSTOLIC BLOOD PRESSURE: 132 MMHG | BODY MASS INDEX: 37.93 KG/M2 | OXYGEN SATURATION: 98 % | HEIGHT: 62 IN

## 2022-08-31 VITALS
RESPIRATION RATE: 16 BRPM | SYSTOLIC BLOOD PRESSURE: 139 MMHG | TEMPERATURE: 98 F | OXYGEN SATURATION: 98 % | HEART RATE: 63 BPM | DIASTOLIC BLOOD PRESSURE: 63 MMHG

## 2022-08-31 DIAGNOSIS — T45.1X5A IMMUNODEFICIENCY DUE TO CHEMOTHERAPY: ICD-10-CM

## 2022-08-31 DIAGNOSIS — C50.412 MALIGNANT NEOPLASM OF UPPER-OUTER QUADRANT OF LEFT BREAST IN FEMALE, ESTROGEN RECEPTOR POSITIVE: Primary | ICD-10-CM

## 2022-08-31 DIAGNOSIS — Z79.899 IMMUNODEFICIENCY DUE TO CHEMOTHERAPY: ICD-10-CM

## 2022-08-31 DIAGNOSIS — N18.6 ESRD (END STAGE RENAL DISEASE): ICD-10-CM

## 2022-08-31 DIAGNOSIS — Z17.0 MALIGNANT NEOPLASM OF UPPER-OUTER QUADRANT OF LEFT BREAST IN FEMALE, ESTROGEN RECEPTOR POSITIVE: Primary | ICD-10-CM

## 2022-08-31 DIAGNOSIS — I51.89 DIASTOLIC DYSFUNCTION: ICD-10-CM

## 2022-08-31 DIAGNOSIS — D84.821 IMMUNODEFICIENCY DUE TO CHEMOTHERAPY: ICD-10-CM

## 2022-08-31 DIAGNOSIS — N18.5 CKD (CHRONIC KIDNEY DISEASE) STAGE 5, GFR LESS THAN 15 ML/MIN: ICD-10-CM

## 2022-08-31 PROCEDURE — 96367 TX/PROPH/DG ADDL SEQ IV INF: CPT

## 2022-08-31 PROCEDURE — 25000003 PHARM REV CODE 250: Performed by: INTERNAL MEDICINE

## 2022-08-31 PROCEDURE — 99215 PR OFFICE/OUTPT VISIT, EST, LEVL V, 40-54 MIN: ICD-10-PCS | Mod: 25,S$PBB,, | Performed by: INTERNAL MEDICINE

## 2022-08-31 PROCEDURE — 99214 OFFICE O/P EST MOD 30 MIN: CPT | Mod: PBBFAC | Performed by: INTERNAL MEDICINE

## 2022-08-31 PROCEDURE — 99999 PR PBB SHADOW E&M-EST. PATIENT-LVL IV: ICD-10-PCS | Mod: PBBFAC,,, | Performed by: INTERNAL MEDICINE

## 2022-08-31 PROCEDURE — 96413 CHEMO IV INFUSION 1 HR: CPT

## 2022-08-31 PROCEDURE — 96375 TX/PRO/DX INJ NEW DRUG ADDON: CPT

## 2022-08-31 PROCEDURE — 99215 OFFICE O/P EST HI 40 MIN: CPT | Mod: 25,S$PBB,, | Performed by: INTERNAL MEDICINE

## 2022-08-31 PROCEDURE — 63600175 PHARM REV CODE 636 W HCPCS: Performed by: INTERNAL MEDICINE

## 2022-08-31 PROCEDURE — 99999 PR PBB SHADOW E&M-EST. PATIENT-LVL IV: CPT | Mod: PBBFAC,,, | Performed by: INTERNAL MEDICINE

## 2022-08-31 RX ORDER — FAMOTIDINE 10 MG/ML
20 INJECTION INTRAVENOUS
Status: COMPLETED | OUTPATIENT
Start: 2022-08-31 | End: 2022-08-31

## 2022-08-31 RX ORDER — AMLODIPINE BESYLATE 10 MG/1
10 TABLET ORAL DAILY
COMMUNITY
Start: 2022-08-30

## 2022-08-31 RX ORDER — ONDANSETRON 2 MG/ML
4 INJECTION INTRAMUSCULAR; INTRAVENOUS ONCE
Status: CANCELLED
Start: 2022-08-31 | End: 2022-08-31

## 2022-08-31 RX ORDER — HEPARIN 100 UNIT/ML
500 SYRINGE INTRAVENOUS
Status: DISCONTINUED | OUTPATIENT
Start: 2022-08-31 | End: 2022-08-31 | Stop reason: HOSPADM

## 2022-08-31 RX ADMIN — PACLITAXEL 162 MG: 6 INJECTION, SOLUTION INTRAVENOUS at 09:08

## 2022-08-31 RX ADMIN — FAMOTIDINE 20 MG: 10 INJECTION INTRAVENOUS at 08:08

## 2022-08-31 RX ADMIN — DIPHENHYDRAMINE HYDROCHLORIDE 50 MG: 50 INJECTION, SOLUTION INTRAMUSCULAR; INTRAVENOUS at 08:08

## 2022-08-31 RX ADMIN — SODIUM CHLORIDE: 9 INJECTION, SOLUTION INTRAVENOUS at 08:08

## 2022-08-31 RX ADMIN — DEXAMETHASONE SODIUM PHOSPHATE 10 MG: 4 INJECTION, SOLUTION INTRA-ARTICULAR; INTRALESIONAL; INTRAMUSCULAR; INTRAVENOUS; SOFT TISSUE at 09:08

## 2022-08-31 RX ADMIN — HEPARIN 500 UNITS: 100 SYRINGE at 10:08

## 2022-08-31 NOTE — PROGRESS NOTES
Subjective:       Patient ID: Malaika Paige is a 66 y.o. female.    Chief Complaint: Results, Chemotherapy, and Breast Cancer    HPI  66-year-old female cycle 1 day 15 OP BREAST PACLITAXEL WEEKLY WITH TRASTUZUMAB (Kanjinti) PERTUZUMAB Q3W (THP)   the patient has missed a week and is now returning for day 15 of therapy ECOG status 1    Past Medical History:   Diagnosis Date    Cataract     Cataract     CHF (congestive heart failure)     COPD (chronic obstructive pulmonary disease)     Diabetes mellitus     Diabetic retinopathy     Hypertension     Malignant neoplasm of upper-outer quadrant of left breast in female, estrogen receptor positive 6/20/2022     Family History   Problem Relation Age of Onset    Breast cancer Sister     Diabetes Sister     Cancer Brother     Amblyopia Neg Hx     Blindness Neg Hx     Cataracts Neg Hx     Glaucoma Neg Hx     Hypertension Neg Hx     Macular degeneration Neg Hx     Retinal detachment Neg Hx     Strabismus Neg Hx     Stroke Neg Hx     Thyroid disease Neg Hx      Social History     Socioeconomic History    Marital status:    Tobacco Use    Smoking status: Former     Types: Cigarettes     Quit date: 6/21/2012     Years since quitting: 10.2    Smokeless tobacco: Never   Substance and Sexual Activity    Alcohol use: No     Past Surgical History:   Procedure Laterality Date    BREAST BIOPSY Right     benign    CATARACT EXTRACTION W/  INTRAOCULAR LENS IMPLANT Right 08/14/2017    Dr. Moe    CORONARY ARTERY BYPASS GRAFT      FLUOROSCOPY N/A 7/25/2022    Procedure: FLUOROSCOPY/mediport placement;  Surgeon: Coel Perdomo MD;  Location: [facility] CATH LAB;  Service: General;  Laterality: N/A;       Labs:  Lab Results   Component Value Date    WBC 5.91 08/31/2022    HGB 12.0 08/31/2022    HCT 37.6 08/31/2022    MCV 91 08/31/2022     08/31/2022     BMP  Lab Results   Component Value Date     08/31/2022    K 4.6 08/31/2022      08/31/2022    CO2 25 08/31/2022    BUN 40 (H) 08/31/2022    CREATININE 5.2 (H) 08/31/2022    CALCIUM 8.2 (L) 08/31/2022    ANIONGAP 11 08/31/2022    ESTGFRAFRICA 8 (A) 07/27/2022    EGFRNONAA 7 (A) 07/27/2022     Lab Results   Component Value Date    ALT 24 08/31/2022    AST 25 08/31/2022    ALKPHOS 66 08/31/2022    BILITOT 0.4 08/31/2022       Lab Results   Component Value Date    IRON 43 09/09/2018    TIBC 300 09/09/2018    FERRITIN 1,038 (H) 06/21/2022     Lab Results   Component Value Date    WKEEHYLC68 459 09/09/2018     Lab Results   Component Value Date    FOLATE 6.2 09/09/2018     Lab Results   Component Value Date    TSH 3.243 06/21/2022         Review of Systems   Constitutional:  Positive for activity change, appetite change and fatigue. Negative for chills, diaphoresis, fever and unexpected weight change.   HENT:  Negative for congestion, dental problem, drooling, ear discharge, ear pain, facial swelling, hearing loss, mouth sores, nosebleeds, postnasal drip, rhinorrhea, sinus pressure, sneezing, sore throat, tinnitus, trouble swallowing and voice change.    Eyes:  Negative for photophobia, pain, discharge, redness, itching and visual disturbance.   Respiratory:  Negative for cough, choking, chest tightness, shortness of breath, wheezing and stridor.    Cardiovascular:  Negative for chest pain, palpitations and leg swelling.   Gastrointestinal:  Negative for abdominal distention, abdominal pain, anal bleeding, blood in stool, constipation, diarrhea, nausea, rectal pain and vomiting.   Endocrine: Negative for cold intolerance, heat intolerance, polydipsia, polyphagia and polyuria.   Genitourinary:  Negative for decreased urine volume, difficulty urinating, dyspareunia, dysuria, enuresis, flank pain, frequency, genital sores, hematuria, menstrual problem, pelvic pain, urgency, vaginal bleeding, vaginal discharge and vaginal pain.   Musculoskeletal:  Negative for arthralgias, back pain, gait problem, joint  swelling, myalgias, neck pain and neck stiffness.   Skin:  Negative for color change, pallor and rash.   Allergic/Immunologic: Negative for environmental allergies, food allergies and immunocompromised state.   Neurological:  Positive for weakness. Negative for dizziness, tremors, seizures, syncope, facial asymmetry, speech difficulty, light-headedness, numbness and headaches.   Hematological:  Negative for adenopathy. Does not bruise/bleed easily.   Psychiatric/Behavioral:  Positive for dysphoric mood. Negative for agitation, behavioral problems, confusion, decreased concentration, hallucinations, self-injury, sleep disturbance and suicidal ideas. The patient is nervous/anxious. The patient is not hyperactive.      Objective:      Physical Exam  Vitals reviewed.   Constitutional:       General: She is not in acute distress.     Appearance: She is well-developed. She is obese. She is ill-appearing. She is not diaphoretic.   HENT:      Head: Normocephalic and atraumatic.      Right Ear: External ear normal.      Left Ear: External ear normal.      Nose: Nose normal.      Right Sinus: No maxillary sinus tenderness or frontal sinus tenderness.      Left Sinus: No maxillary sinus tenderness or frontal sinus tenderness.      Mouth/Throat:      Pharynx: No oropharyngeal exudate.   Eyes:      General: Lids are normal. No scleral icterus.        Right eye: No discharge.         Left eye: No discharge.      Conjunctiva/sclera: Conjunctivae normal.      Right eye: Right conjunctiva is not injected. No hemorrhage.     Left eye: Left conjunctiva is not injected. No hemorrhage.     Pupils: Pupils are equal, round, and reactive to light.   Neck:      Thyroid: No thyromegaly.      Vascular: No JVD.      Trachea: No tracheal deviation.   Cardiovascular:      Rate and Rhythm: Normal rate.   Pulmonary:      Effort: Pulmonary effort is normal. No respiratory distress.      Breath sounds: No stridor.   Chest:      Chest wall: No  tenderness.   Abdominal:      General: Bowel sounds are normal. There is no distension.      Palpations: Abdomen is soft. There is no hepatomegaly, splenomegaly or mass.      Tenderness: There is no abdominal tenderness. There is no rebound.   Musculoskeletal:         General: No tenderness. Normal range of motion.      Cervical back: Normal range of motion and neck supple.   Lymphadenopathy:      Cervical: No cervical adenopathy.      Upper Body:      Right upper body: No supraclavicular adenopathy.      Left upper body: No supraclavicular adenopathy.   Skin:     General: Skin is dry.      Findings: No erythema or rash.   Neurological:      Mental Status: She is alert and oriented to person, place, and time.      Cranial Nerves: No cranial nerve deficit.      Motor: Weakness present.      Coordination: Coordination normal.      Gait: Gait abnormal.   Psychiatric:         Behavior: Behavior normal.         Thought Content: Thought content normal.         Judgment: Judgment normal.           Assessment:      1. Malignant neoplasm of upper-outer quadrant of left breast in female, estrogen receptor positive    2. Immunodeficiency due to chemotherapy    3. CKD (chronic kidney disease) stage 5, GFR less than 15 ml/min    4. ESRD (end stage renal disease)    5. Diastolic dysfunction           Plan:     Patient presents for cycle 1 day 15 of therapy.  There is been a week delay.  Will proceed with treatment today and patient will return in  2 weeks for return to cycle 2 day 1 of therapy.  Orders written reviewed discussed with nursing staff proceed with treatment as outlined      Tnoey العلي Jr, MD FACP

## 2022-09-07 ENCOUNTER — OFFICE VISIT (OUTPATIENT)
Dept: HEMATOLOGY/ONCOLOGY | Facility: CLINIC | Age: 67
End: 2022-09-07
Payer: MEDICARE

## 2022-09-07 ENCOUNTER — INFUSION (OUTPATIENT)
Dept: INFUSION THERAPY | Facility: HOSPITAL | Age: 67
End: 2022-09-07
Attending: INTERNAL MEDICINE
Payer: MEDICARE

## 2022-09-07 VITALS
DIASTOLIC BLOOD PRESSURE: 75 MMHG | WEIGHT: 209.88 LBS | OXYGEN SATURATION: 99 % | SYSTOLIC BLOOD PRESSURE: 143 MMHG | HEIGHT: 62 IN | TEMPERATURE: 98 F | HEART RATE: 80 BPM | BODY MASS INDEX: 38.62 KG/M2

## 2022-09-07 VITALS
DIASTOLIC BLOOD PRESSURE: 76 MMHG | SYSTOLIC BLOOD PRESSURE: 158 MMHG | RESPIRATION RATE: 18 BRPM | TEMPERATURE: 98 F | OXYGEN SATURATION: 96 % | HEART RATE: 77 BPM

## 2022-09-07 DIAGNOSIS — Z99.2 TYPE 2 DIABETES MELLITUS WITH CHRONIC KIDNEY DISEASE ON CHRONIC DIALYSIS, WITH LONG-TERM CURRENT USE OF INSULIN: ICD-10-CM

## 2022-09-07 DIAGNOSIS — C50.412 MALIGNANT NEOPLASM OF UPPER-OUTER QUADRANT OF LEFT BREAST IN FEMALE, ESTROGEN RECEPTOR POSITIVE: Primary | ICD-10-CM

## 2022-09-07 DIAGNOSIS — N18.6 TYPE 2 DIABETES MELLITUS WITH CHRONIC KIDNEY DISEASE ON CHRONIC DIALYSIS, WITH LONG-TERM CURRENT USE OF INSULIN: ICD-10-CM

## 2022-09-07 DIAGNOSIS — E11.22 TYPE 2 DIABETES MELLITUS WITH CHRONIC KIDNEY DISEASE ON CHRONIC DIALYSIS, WITH LONG-TERM CURRENT USE OF INSULIN: ICD-10-CM

## 2022-09-07 DIAGNOSIS — D63.1 ANEMIA ASSOCIATED WITH CHRONIC RENAL FAILURE: ICD-10-CM

## 2022-09-07 DIAGNOSIS — E66.01 SEVERE OBESITY (BMI 35.0-39.9) WITH COMORBIDITY: ICD-10-CM

## 2022-09-07 DIAGNOSIS — Z51.81 ENCOUNTER FOR MONITORING CARDIOTOXIC DRUG THERAPY: ICD-10-CM

## 2022-09-07 DIAGNOSIS — Z17.0 MALIGNANT NEOPLASM OF UPPER-OUTER QUADRANT OF LEFT BREAST IN FEMALE, ESTROGEN RECEPTOR POSITIVE: Primary | ICD-10-CM

## 2022-09-07 DIAGNOSIS — Z79.899 ENCOUNTER FOR MONITORING CARDIOTOXIC DRUG THERAPY: ICD-10-CM

## 2022-09-07 DIAGNOSIS — Z79.4 TYPE 2 DIABETES MELLITUS WITH CHRONIC KIDNEY DISEASE ON CHRONIC DIALYSIS, WITH LONG-TERM CURRENT USE OF INSULIN: ICD-10-CM

## 2022-09-07 DIAGNOSIS — N18.9 ANEMIA ASSOCIATED WITH CHRONIC RENAL FAILURE: ICD-10-CM

## 2022-09-07 PROCEDURE — 99999 PR PBB SHADOW E&M-EST. PATIENT-LVL IV: CPT | Mod: PBBFAC,,, | Performed by: INTERNAL MEDICINE

## 2022-09-07 PROCEDURE — 99214 OFFICE O/P EST MOD 30 MIN: CPT | Mod: PBBFAC,25 | Performed by: INTERNAL MEDICINE

## 2022-09-07 PROCEDURE — 99215 PR OFFICE/OUTPT VISIT, EST, LEVL V, 40-54 MIN: ICD-10-PCS | Mod: 25,S$GLB,, | Performed by: INTERNAL MEDICINE

## 2022-09-07 PROCEDURE — 96375 TX/PRO/DX INJ NEW DRUG ADDON: CPT

## 2022-09-07 PROCEDURE — 96417 CHEMO IV INFUS EACH ADDL SEQ: CPT

## 2022-09-07 PROCEDURE — 99215 OFFICE O/P EST HI 40 MIN: CPT | Mod: 25,S$GLB,, | Performed by: INTERNAL MEDICINE

## 2022-09-07 PROCEDURE — 99999 PR PBB SHADOW E&M-EST. PATIENT-LVL IV: ICD-10-PCS | Mod: PBBFAC,,, | Performed by: INTERNAL MEDICINE

## 2022-09-07 PROCEDURE — 96413 CHEMO IV INFUSION 1 HR: CPT

## 2022-09-07 PROCEDURE — 25000003 PHARM REV CODE 250: Performed by: INTERNAL MEDICINE

## 2022-09-07 PROCEDURE — 63600175 PHARM REV CODE 636 W HCPCS: Performed by: INTERNAL MEDICINE

## 2022-09-07 PROCEDURE — 96367 TX/PROPH/DG ADDL SEQ IV INF: CPT

## 2022-09-07 RX ORDER — HEPARIN 100 UNIT/ML
500 SYRINGE INTRAVENOUS
Status: CANCELLED | OUTPATIENT
Start: 2022-09-15

## 2022-09-07 RX ORDER — FAMOTIDINE 10 MG/ML
20 INJECTION INTRAVENOUS
Status: COMPLETED | OUTPATIENT
Start: 2022-09-07 | End: 2022-09-07

## 2022-09-07 RX ORDER — HEPARIN 100 UNIT/ML
500 SYRINGE INTRAVENOUS
Status: CANCELLED | OUTPATIENT
Start: 2022-10-06

## 2022-09-07 RX ORDER — HEPARIN 100 UNIT/ML
500 SYRINGE INTRAVENOUS
Status: CANCELLED | OUTPATIENT
Start: 2022-09-22

## 2022-09-07 RX ORDER — SODIUM CHLORIDE 0.9 % (FLUSH) 0.9 %
10 SYRINGE (ML) INJECTION
Status: CANCELLED | OUTPATIENT
Start: 2022-09-15

## 2022-09-07 RX ORDER — EPINEPHRINE 0.3 MG/.3ML
0.3 INJECTION SUBCUTANEOUS ONCE AS NEEDED
Status: CANCELLED | OUTPATIENT
Start: 2022-09-15

## 2022-09-07 RX ORDER — FAMOTIDINE 10 MG/ML
20 INJECTION INTRAVENOUS
Status: CANCELLED | OUTPATIENT
Start: 2022-09-22

## 2022-09-07 RX ORDER — DIPHENHYDRAMINE HYDROCHLORIDE 50 MG/ML
50 INJECTION INTRAMUSCULAR; INTRAVENOUS ONCE AS NEEDED
Status: CANCELLED | OUTPATIENT
Start: 2022-09-22

## 2022-09-07 RX ORDER — ONDANSETRON 2 MG/ML
4 INJECTION INTRAMUSCULAR; INTRAVENOUS ONCE
Status: CANCELLED
Start: 2022-10-06 | End: 2022-09-29

## 2022-09-07 RX ORDER — SODIUM CHLORIDE 0.9 % (FLUSH) 0.9 %
10 SYRINGE (ML) INJECTION
Status: CANCELLED | OUTPATIENT
Start: 2022-09-22

## 2022-09-07 RX ORDER — SODIUM CHLORIDE 0.9 % (FLUSH) 0.9 %
10 SYRINGE (ML) INJECTION
Status: CANCELLED | OUTPATIENT
Start: 2022-10-06

## 2022-09-07 RX ORDER — EPINEPHRINE 0.3 MG/.3ML
0.3 INJECTION SUBCUTANEOUS ONCE AS NEEDED
Status: CANCELLED | OUTPATIENT
Start: 2022-09-22

## 2022-09-07 RX ORDER — ONDANSETRON 2 MG/ML
4 INJECTION INTRAMUSCULAR; INTRAVENOUS ONCE
Status: CANCELLED
Start: 2022-09-22 | End: 2022-09-22

## 2022-09-07 RX ORDER — HEPARIN 100 UNIT/ML
500 SYRINGE INTRAVENOUS
Status: DISCONTINUED | OUTPATIENT
Start: 2022-09-07 | End: 2022-09-07 | Stop reason: HOSPADM

## 2022-09-07 RX ORDER — ONDANSETRON 2 MG/ML
4 INJECTION INTRAMUSCULAR; INTRAVENOUS ONCE
Status: CANCELLED
Start: 2022-09-15 | End: 2022-09-15

## 2022-09-07 RX ORDER — ONDANSETRON 2 MG/ML
4 INJECTION INTRAMUSCULAR; INTRAVENOUS ONCE
Status: COMPLETED | OUTPATIENT
Start: 2022-09-07 | End: 2022-09-07

## 2022-09-07 RX ORDER — FAMOTIDINE 10 MG/ML
20 INJECTION INTRAVENOUS
Status: CANCELLED | OUTPATIENT
Start: 2022-10-06

## 2022-09-07 RX ORDER — SODIUM CHLORIDE 0.9 % (FLUSH) 0.9 %
10 SYRINGE (ML) INJECTION
Status: DISCONTINUED | OUTPATIENT
Start: 2022-09-07 | End: 2022-09-07 | Stop reason: HOSPADM

## 2022-09-07 RX ORDER — EPINEPHRINE 0.3 MG/.3ML
0.3 INJECTION SUBCUTANEOUS ONCE AS NEEDED
Status: CANCELLED | OUTPATIENT
Start: 2022-10-06

## 2022-09-07 RX ORDER — DIPHENHYDRAMINE HYDROCHLORIDE 50 MG/ML
50 INJECTION INTRAMUSCULAR; INTRAVENOUS ONCE AS NEEDED
Status: CANCELLED | OUTPATIENT
Start: 2022-10-06

## 2022-09-07 RX ORDER — FAMOTIDINE 10 MG/ML
20 INJECTION INTRAVENOUS
Status: CANCELLED | OUTPATIENT
Start: 2022-09-15

## 2022-09-07 RX ORDER — DIPHENHYDRAMINE HYDROCHLORIDE 50 MG/ML
50 INJECTION INTRAMUSCULAR; INTRAVENOUS ONCE AS NEEDED
Status: CANCELLED | OUTPATIENT
Start: 2022-09-15

## 2022-09-07 RX ADMIN — PERTUZUMAB 420 MG: 30 INJECTION, SOLUTION, CONCENTRATE INTRAVENOUS at 10:09

## 2022-09-07 RX ADMIN — PACLITAXEL 162 MG: 6 INJECTION, SOLUTION, CONCENTRATE INTRAVENOUS at 01:09

## 2022-09-07 RX ADMIN — TRASTUZUMAB-ANNS 554 MG: 150 INJECTION, POWDER, LYOPHILIZED, FOR SOLUTION INTRAVENOUS at 12:09

## 2022-09-07 RX ADMIN — FAMOTIDINE 20 MG: 10 INJECTION INTRAVENOUS at 12:09

## 2022-09-07 RX ADMIN — DEXAMETHASONE SODIUM PHOSPHATE 10 MG: 4 INJECTION, SOLUTION INTRA-ARTICULAR; INTRALESIONAL; INTRAMUSCULAR; INTRAVENOUS; SOFT TISSUE at 11:09

## 2022-09-07 RX ADMIN — SODIUM CHLORIDE: 0.9 INJECTION, SOLUTION INTRAVENOUS at 11:09

## 2022-09-07 RX ADMIN — HEPARIN 500 UNITS: 100 SYRINGE at 02:09

## 2022-09-07 RX ADMIN — DIPHENHYDRAMINE HYDROCHLORIDE 50 MG: 50 INJECTION, SOLUTION INTRAMUSCULAR; INTRAVENOUS at 12:09

## 2022-09-07 RX ADMIN — ONDANSETRON 4 MG: 2 INJECTION INTRAMUSCULAR; INTRAVENOUS at 10:09

## 2022-09-07 NOTE — PROGRESS NOTES
Subjective:       Patient ID: Malaika Paredes is a 66 y.o. female.    Chief Complaint: Results, Chemotherapy, and Breast Cancer    HPI 66-year-old female begin cycle 2 day 1 Taxol plus Herceptin Perjeta for Stage IIB (cT3, cN2a(f), cM0, G3, ER+, AL+, HER2+) patient tolerating therapy well ECOG status 1 denies nausea vomiting fevers chills night sweats      Past Medical History:   Diagnosis Date    Cataract     Cataract     CHF (congestive heart failure)     COPD (chronic obstructive pulmonary disease)     Diabetes mellitus     Diabetic retinopathy     Hypertension     Malignant neoplasm of upper-outer quadrant of left breast in female, estrogen receptor positive 6/20/2022     Family History   Problem Relation Age of Onset    Breast cancer Sister     Diabetes Sister     Cancer Brother     Amblyopia Neg Hx     Blindness Neg Hx     Cataracts Neg Hx     Glaucoma Neg Hx     Hypertension Neg Hx     Macular degeneration Neg Hx     Retinal detachment Neg Hx     Strabismus Neg Hx     Stroke Neg Hx     Thyroid disease Neg Hx      Social History     Socioeconomic History    Marital status:    Tobacco Use    Smoking status: Former     Types: Cigarettes     Quit date: 6/21/2012     Years since quitting: 10.2    Smokeless tobacco: Never   Substance and Sexual Activity    Alcohol use: No     Past Surgical History:   Procedure Laterality Date    BREAST BIOPSY Right     benign    CATARACT EXTRACTION W/  INTRAOCULAR LENS IMPLANT Right 08/14/2017    Dr. Moe    CORONARY ARTERY BYPASS GRAFT      FLUOROSCOPY N/A 7/25/2022    Procedure: FLUOROSCOPY/mediport placement;  Surgeon: Cole Perdomo MD;  Location: [facility] CATH LAB;  Service: General;  Laterality: N/A;       Labs:  Lab Results   Component Value Date    WBC 4.21 09/07/2022    HGB 10.9 (L) 09/07/2022    HCT 34.1 (L) 09/07/2022    MCV 93 09/07/2022     09/07/2022     BMP  Lab Results   Component Value Date     09/07/2022    K 4.6  09/07/2022     09/07/2022    CO2 21 (L) 09/07/2022    BUN 36 (H) 09/07/2022    CREATININE 5.5 (H) 09/07/2022    CALCIUM 8.4 (L) 09/07/2022    ANIONGAP 12 09/07/2022    ESTGFRAFRICA 8 (A) 07/27/2022    EGFRNONAA 7 (A) 07/27/2022     Lab Results   Component Value Date    ALT 17 09/07/2022    AST 19 09/07/2022    ALKPHOS 62 09/07/2022    BILITOT 0.3 09/07/2022       Lab Results   Component Value Date    IRON 43 09/09/2018    TIBC 300 09/09/2018    FERRITIN 1,038 (H) 06/21/2022     Lab Results   Component Value Date    SFHLTOSO03 459 09/09/2018     Lab Results   Component Value Date    FOLATE 6.2 09/09/2018     Lab Results   Component Value Date    TSH 3.243 06/21/2022         Review of Systems   Constitutional:  Negative for activity change, appetite change, chills, diaphoresis, fatigue, fever and unexpected weight change.   HENT:  Negative for congestion, dental problem, drooling, ear discharge, ear pain, facial swelling, hearing loss, mouth sores, nosebleeds, postnasal drip, rhinorrhea, sinus pressure, sneezing, sore throat, tinnitus, trouble swallowing and voice change.    Eyes:  Negative for photophobia, pain, discharge, redness, itching and visual disturbance.   Respiratory:  Negative for cough, choking, chest tightness, shortness of breath, wheezing and stridor.    Cardiovascular:  Negative for chest pain, palpitations and leg swelling.   Gastrointestinal:  Negative for abdominal distention, abdominal pain, anal bleeding, blood in stool, constipation, diarrhea, nausea, rectal pain and vomiting.   Endocrine: Negative for cold intolerance, heat intolerance, polydipsia, polyphagia and polyuria.   Genitourinary:  Negative for decreased urine volume, difficulty urinating, dyspareunia, dysuria, enuresis, flank pain, frequency, genital sores, hematuria, menstrual problem, pelvic pain, urgency, vaginal bleeding, vaginal discharge and vaginal pain.   Musculoskeletal:  Positive for gait problem. Negative for  arthralgias, back pain, joint swelling, myalgias, neck pain and neck stiffness.   Skin:  Negative for color change, pallor and rash.   Allergic/Immunologic: Negative for environmental allergies, food allergies and immunocompromised state.   Neurological:  Negative for dizziness, tremors, seizures, syncope, facial asymmetry, speech difficulty, weakness, light-headedness, numbness and headaches.   Hematological:  Negative for adenopathy. Does not bruise/bleed easily.   Psychiatric/Behavioral:  Positive for dysphoric mood. Negative for agitation, behavioral problems, confusion, decreased concentration, hallucinations, self-injury, sleep disturbance and suicidal ideas. The patient is nervous/anxious. The patient is not hyperactive.      Objective:      Physical Exam  Vitals reviewed.   Constitutional:       General: She is not in acute distress.     Appearance: She is well-developed. She is not diaphoretic.   HENT:      Head: Normocephalic and atraumatic.      Right Ear: External ear normal.      Left Ear: External ear normal.      Nose: Nose normal.      Right Sinus: No maxillary sinus tenderness or frontal sinus tenderness.      Left Sinus: No maxillary sinus tenderness or frontal sinus tenderness.      Mouth/Throat:      Pharynx: No oropharyngeal exudate.   Eyes:      General: Lids are normal. No scleral icterus.        Right eye: No discharge.         Left eye: No discharge.      Conjunctiva/sclera: Conjunctivae normal.      Right eye: Right conjunctiva is not injected. No hemorrhage.     Left eye: Left conjunctiva is not injected. No hemorrhage.     Pupils: Pupils are equal, round, and reactive to light.   Neck:      Thyroid: No thyromegaly.      Vascular: No JVD.      Trachea: No tracheal deviation.   Cardiovascular:      Rate and Rhythm: Normal rate.   Pulmonary:      Effort: Pulmonary effort is normal. No respiratory distress.      Breath sounds: No stridor.   Chest:      Chest wall: No tenderness.   Abdominal:       General: Bowel sounds are normal. There is no distension.      Palpations: Abdomen is soft. There is no hepatomegaly, splenomegaly or mass.      Tenderness: There is no abdominal tenderness. There is no rebound.   Musculoskeletal:         General: No tenderness. Normal range of motion.      Cervical back: Normal range of motion and neck supple.   Lymphadenopathy:      Cervical: No cervical adenopathy.      Upper Body:      Right upper body: No supraclavicular adenopathy.      Left upper body: No supraclavicular adenopathy.   Skin:     General: Skin is dry.      Findings: No erythema or rash.   Neurological:      Mental Status: She is alert and oriented to person, place, and time.      Cranial Nerves: No cranial nerve deficit.      Coordination: Coordination normal.   Psychiatric:         Behavior: Behavior normal.         Thought Content: Thought content normal.         Judgment: Judgment normal.           Assessment:      1. Malignant neoplasm of upper-outer quadrant of left breast in female, estrogen receptor positive    2. Anemia associated with chronic renal failure    3. Severe obesity (BMI 35.0-39.9) with comorbidity    4. Type 2 diabetes mellitus with chronic kidney disease on chronic dialysis, with long-term current use of insulin    5. 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  Will proceed with cycle 2 day 1 can be seen back by nurse practitioner DAI P for day 8 and 15 will see for cycle 3 day 1 standing labs placed in addition standing orders for 2D echo at 3 month follow-up.  Discussed implications answered questions orders written and reviewed        Toney العلي Jr, MD FACP

## 2022-09-07 NOTE — NURSING
Dr. العلي notified of elevated BP after Kanjinti and prior to Taxol.  Ok to proceed with Taxol per Dr. العلي.

## 2022-09-13 NOTE — PROGRESS NOTES
Subjective:       Patient ID: Malaika Paredes is a 67 y.o. female.    Chief Complaint:   1. Malignant neoplasm of upper-outer quadrant of left breast in female, estrogen receptor positive  Stage IIB (cT3, cN2a(f), cM0, G3, ER+, OK+, HER2+)   2. Anemia associated with chronic renal failure     3. ESRD (end stage renal disease)       Current Treatment:   OP BREAST PACLITAXEL WEEKLY WITH TRASTUZUMAB (Kanjinti) PERTUZUMAB Q3W (THP)     Treatment History:    HPI: This is a 66 year old woman with medical history significant for cataracts, CHF, COPD, HTN, diabetic retinopathy, ESRD on dialysis, and type 2 DM who was referred to Hem/Onc in 6/2022 for locally advanced HER2 positive breast cancer. She presented to GYN in 5/2022 with a palpable breast lump present for one month. Diagnostic MMG revealed a left breast 5.6 cm x 4 cm x 4.3 cm mass at the 2 o'clock position. Biopsied proved invasive ductal carcinoma ER >95%, OK 80%, Ajr2jnt 2+, Ki-67 60%. Left axillary LN biopsy was positive. PET revealed no evidence of distance mets.     Baseline Echo revealed LVEF 59%.    In light of her dialysis, she was started on Taxol/Herceptin/Perjeta every 3 weeks.     Her primary Hematologist/Oncologist is Dr. العلي.    Interval History: Patient presents for follow up on Taxol/Herceptin/Perjeta; She is scheduled to receive C2D8 today. She presents alone today and reports a good appetite with no taste changes. She reports occasional constipation relieved by stool softeners and nausea relieved by antiemetics; she denies diarrhea and vomiting. She has neuropathy that began prior to starting chemotherapy; however, chemotherapy did worsen her neuropathy. She is willing to start gabapentin.     Reviewed labs with patient:   CBC:   Recent Labs   Lab 09/14/22  0822   WBC 3.53 L   RBC 3.66 L   Hemoglobin 10.7 L   Hematocrit 33.2 L   Platelets 300   MCV 91   MCH 29.2   MCHC 32.2     CMP:  Recent Labs   Lab 09/14/22  0822   Glucose 135 H    Calcium 8.3 L   Albumin 3.1 L   Total Protein 8.3   Sodium 138   Potassium 6.4 HH   CO2 19 L   Chloride 113 H   BUN 61 H   Creatinine 6.8 H   Alkaline Phosphatase 67   ALT 20   AST 20   Total Bilirubin 0.4     She has baseline kidney dysfunction and receives dialysis TThSat but missed Tuesday's treatment; she states the bus did not show up to take her. K critically elevated at 6.4; will prescribe Kayexalate.     Social History     Socioeconomic History    Marital status:    Tobacco Use    Smoking status: Former     Types: Cigarettes     Quit date: 6/21/2012     Years since quitting: 10.2    Smokeless tobacco: Never   Substance and Sexual Activity    Alcohol use: No     Past Medical History:   Diagnosis Date    Cataract     Cataract     CHF (congestive heart failure)     COPD (chronic obstructive pulmonary disease)     Diabetes mellitus     Diabetic retinopathy     Hypertension     Malignant neoplasm of upper-outer quadrant of left breast in female, estrogen receptor positive 6/20/2022     Family History   Problem Relation Age of Onset    Breast cancer Sister     Diabetes Sister     Cancer Brother     Amblyopia Neg Hx     Blindness Neg Hx     Cataracts Neg Hx     Glaucoma Neg Hx     Hypertension Neg Hx     Macular degeneration Neg Hx     Retinal detachment Neg Hx     Strabismus Neg Hx     Stroke Neg Hx     Thyroid disease Neg Hx      Past Surgical History:   Procedure Laterality Date    BREAST BIOPSY Right     benign    CATARACT EXTRACTION W/  INTRAOCULAR LENS IMPLANT Right 08/14/2017    Dr. Moe    CORONARY ARTERY BYPASS GRAFT      FLUOROSCOPY N/A 7/25/2022    Procedure: FLUOROSCOPY/mediport placement;  Surgeon: Cole Perdomo MD;  Location: [facility] CATH LAB;  Service: General;  Laterality: N/A;     Review of Systems   Constitutional:  Negative for appetite change and fatigue.   HENT:  Negative for mouth sores, rhinorrhea and sore throat.    Eyes: Negative.    Respiratory: Negative.     Cardiovascular:  Negative.    Gastrointestinal:  Positive for constipation (relieved by stool softener) and nausea (relieved by antiemetic). Negative for diarrhea and vomiting.   Endocrine: Negative.    Genitourinary: Negative.    Musculoskeletal: Negative.    Integumentary:  Negative.   Allergic/Immunologic: Negative.    Neurological:  Positive for numbness (bilateral hands and feet; worse since starting chemotherapy). Negative for weakness.   Hematological: Negative.    Psychiatric/Behavioral: Negative.         Medication List with Changes/Refills   New Medications    GABAPENTIN (NEURONTIN) 300 MG CAPSULE    Take 1 capsule (300 mg total) by mouth 3 (three) times daily.   Current Medications    ACETAMINOPHEN (TYLENOL) 325 MG TABLET    Take 325 mg by mouth every 6 (six) hours as needed for Pain.    ALBUTEROL (PROVENTIL/VENTOLIN HFA) 90 MCG/ACTUATION INHALER    Inhale 1-2 puffs into the lungs every 6 (six) hours as needed for Wheezing. Rescue    ALBUTEROL SULFATE (PROAIR RESPICLICK) 90 MCG/ACTUATION AEPB    Inhale 180 mcg into the lungs every 4 (four) hours. Rescue    AMIODARONE (PACERONE) 100 MG TAB    Take 100 mg by mouth once daily.    AMLODIPINE (NORVASC) 10 MG TABLET    Take 10 mg by mouth once daily.    APIXABAN (ELIQUIS) 2.5 MG TAB    Take 2.5 mg by mouth 2 (two) times daily.    ASPIRIN (ECOTRIN) 81 MG EC TABLET    Take 81 mg by mouth once daily.     CAPRON DM 7.5-7.5 MG/5 ML LIQD    TAKE 10-20 ML BY MOUTH EVERY 6-8 HOURS AS NEEDED COUGH    DICLOFENAC SODIUM (VOLTAREN) 1 % GEL    APPLY TOPICALLY 2 GM TO THE AFFECTED AREA 2 TIMES DAILY    INSULIN DEGLUDEC (TRESIBA FLEXTOUCH U-200) 200 UNIT/ML (3 ML) INSULIN PEN    Inject 80 Units into the skin once daily.     INV METOPROLOL TARTRATE 25 MG ORAL TABLET (LOPRESSOR) 25 MG TAB    Take by mouth 2 (two) times daily. FOR INVESTIGATIONAL USE ONLY    LEVOTHYROXINE (SYNTHROID) 75 MCG TABLET    Take 75 mcg by mouth once daily.     LOSARTAN (COZAAR) 100 MG TABLET    Take 100 mg by mouth  once daily.    LOSARTAN (COZAAR) 25 MG TABLET        OMEPRAZOLE (PRILOSEC) 20 MG CAPSULE    Take 20 mg by mouth once daily.    ONDANSETRON (ZOFRAN) 4 MG TABLET    Take 1 tablet (4 mg total) by mouth every 8 (eight) hours as needed for Nausea.    PREDNISONE (DELTASONE) 10 MG TABLET    Take by mouth.    PROCHLORPERAZINE (COMPAZINE) 5 MG TABLET    Take 1 tablet (5 mg total) by mouth 4 (four) times daily as needed for Nausea.    RENVELA 800 MG TAB    SMARTSI Tablet(s) By Mouth 5 Times Daily    SEVELAMER HCL ORAL    Take by mouth.     Objective:     Vitals:    22 0825   BP: (!) 153/75   Pulse: 63   Temp: 98.3 °F (36.8 °C)     Physical Exam  Vitals reviewed.   Constitutional:       Appearance: Normal appearance.   HENT:      Head: Normocephalic.      Mouth/Throat:      Comments: Wearing mask    Eyes:      Extraocular Movements: Extraocular movements intact.      Pupils: Pupils are equal, round, and reactive to light.   Cardiovascular:      Rate and Rhythm: Normal rate and regular rhythm.      Heart sounds: Normal heart sounds.   Pulmonary:      Effort: Pulmonary effort is normal.      Breath sounds: Normal breath sounds.   Chest:       Abdominal:      General: Bowel sounds are normal.      Palpations: Abdomen is soft.      Comments: rounded     Genitourinary:     Comments: deferred    Musculoskeletal:         General: Normal range of motion.      Cervical back: Normal range of motion and neck supple.   Skin:     General: Skin is warm and dry.   Neurological:      Mental Status: She is alert and oriented to person, place, and time.   Psychiatric:         Behavior: Behavior normal.         Thought Content: Thought content normal.        (1) Restricted in physically strenuous activity, ambulatory and able to do work of light nature  Assessment:     Problem List Items Addressed This Visit          Neuro    Chemotherapy-induced peripheral neuropathy    Relevant Medications    gabapentin (NEURONTIN) 300 MG capsule        Renal/    ESRD (end stage renal disease)     On dialysis TThSat.          Relevant Orders    CBC Oncology    Comprehensive Metabolic Panel    Magnesium    Hyperkalemia       Immunology/Multi System    Immunodeficiency due to chemotherapy       Oncology    Anemia associated with chronic renal failure     ESRD on dialysis         Relevant Orders    CBC Oncology    Comprehensive Metabolic Panel    Magnesium    Malignant neoplasm of upper-outer quadrant of left breast in female, estrogen receptor positive - Primary     Stage IIB (cT3, cN2a(f), cM0, G3, ER+, HI+, HER2+). Currently on Taxol/Herceptin/Perjeta.          Relevant Orders    CBC Oncology    Comprehensive Metabolic Panel    Magnesium     Plan:     Malignant neoplasm of upper-outer quadrant of left breast in female, estrogen receptor positive  -     CBC Oncology; Future; Expected date: 09/20/2022  -     Comprehensive Metabolic Panel; Future; Expected date: 09/20/2022  -     Magnesium; Future; Expected date: 09/20/2022    Anemia associated with chronic renal failure  -     CBC Oncology; Future; Expected date: 09/20/2022  -     Comprehensive Metabolic Panel; Future; Expected date: 09/20/2022  -     Magnesium; Future; Expected date: 09/20/2022    ESRD (end stage renal disease)  -     CBC Oncology; Future; Expected date: 09/20/2022  -     Comprehensive Metabolic Panel; Future; Expected date: 09/20/2022  -     Magnesium; Future; Expected date: 09/20/2022    Immunodeficiency due to chemotherapy    Chemotherapy-induced peripheral neuropathy  -     gabapentin (NEURONTIN) 300 MG capsule; Take 1 capsule (300 mg total) by mouth 3 (three) times daily.  Dispense: 90 capsule; Refill: 2    Hyperkalemia    Labs reviewed.   Ok to proceed with C2D8 of Taxol/Herceptin/Perjeta today.  No dose adjustments for renal impairment.   Patient receives dialysis TThSat; missed yesterday.   Kayexalate for K+ 6.4.  Follow up in 1 week (Wednesday) with Dr. العلي/[place] with  Mg, CBC and  Comprehensive Metabolic Panel prior to C2D15.  Echo due 10/2022.    I will review assessment/plan with collaborating physician Dr. Ramirez.      VILMA Brody

## 2022-09-14 ENCOUNTER — INFUSION (OUTPATIENT)
Dept: INFUSION THERAPY | Facility: HOSPITAL | Age: 67
End: 2022-09-14
Attending: INTERNAL MEDICINE
Payer: MEDICARE

## 2022-09-14 ENCOUNTER — TELEPHONE (OUTPATIENT)
Dept: HEMATOLOGY/ONCOLOGY | Facility: CLINIC | Age: 67
End: 2022-09-14
Payer: MEDICARE

## 2022-09-14 ENCOUNTER — HOSPITAL ENCOUNTER (EMERGENCY)
Facility: HOSPITAL | Age: 67
Discharge: HOME | End: 2022-09-14
Attending: EMERGENCY MEDICINE
Payer: MEDICARE

## 2022-09-14 ENCOUNTER — OFFICE VISIT (OUTPATIENT)
Dept: HEMATOLOGY/ONCOLOGY | Facility: CLINIC | Age: 67
End: 2022-09-14
Payer: MEDICARE

## 2022-09-14 VITALS
HEART RATE: 59 BPM | SYSTOLIC BLOOD PRESSURE: 154 MMHG | DIASTOLIC BLOOD PRESSURE: 69 MMHG | OXYGEN SATURATION: 98 % | TEMPERATURE: 97 F

## 2022-09-14 VITALS
HEART RATE: 60 BPM | TEMPERATURE: 98 F | OXYGEN SATURATION: 99 % | WEIGHT: 208.88 LBS | BODY MASS INDEX: 38.21 KG/M2 | SYSTOLIC BLOOD PRESSURE: 205 MMHG | RESPIRATION RATE: 18 BRPM | DIASTOLIC BLOOD PRESSURE: 89 MMHG

## 2022-09-14 VITALS
SYSTOLIC BLOOD PRESSURE: 153 MMHG | DIASTOLIC BLOOD PRESSURE: 75 MMHG | HEART RATE: 63 BPM | TEMPERATURE: 98 F | OXYGEN SATURATION: 100 % | HEIGHT: 62 IN | WEIGHT: 209 LBS | BODY MASS INDEX: 38.46 KG/M2

## 2022-09-14 DIAGNOSIS — G62.0 CHEMOTHERAPY-INDUCED PERIPHERAL NEUROPATHY: ICD-10-CM

## 2022-09-14 DIAGNOSIS — D84.821 IMMUNODEFICIENCY DUE TO CHEMOTHERAPY: ICD-10-CM

## 2022-09-14 DIAGNOSIS — Z17.0 MALIGNANT NEOPLASM OF UPPER-OUTER QUADRANT OF LEFT BREAST IN FEMALE, ESTROGEN RECEPTOR POSITIVE: Primary | ICD-10-CM

## 2022-09-14 DIAGNOSIS — I10 HYPERTENSION, UNSPECIFIED TYPE: Primary | ICD-10-CM

## 2022-09-14 DIAGNOSIS — E87.5 HYPERKALEMIA: Primary | ICD-10-CM

## 2022-09-14 DIAGNOSIS — C50.412 MALIGNANT NEOPLASM OF UPPER-OUTER QUADRANT OF LEFT BREAST IN FEMALE, ESTROGEN RECEPTOR POSITIVE: Primary | ICD-10-CM

## 2022-09-14 DIAGNOSIS — E87.5 HYPERKALEMIA: ICD-10-CM

## 2022-09-14 DIAGNOSIS — N18.6 ESRD (END STAGE RENAL DISEASE): ICD-10-CM

## 2022-09-14 DIAGNOSIS — Z79.899 IMMUNODEFICIENCY DUE TO CHEMOTHERAPY: ICD-10-CM

## 2022-09-14 DIAGNOSIS — T45.1X5A CHEMOTHERAPY-INDUCED PERIPHERAL NEUROPATHY: ICD-10-CM

## 2022-09-14 DIAGNOSIS — D63.1 ANEMIA ASSOCIATED WITH CHRONIC RENAL FAILURE: ICD-10-CM

## 2022-09-14 DIAGNOSIS — N18.9 ANEMIA ASSOCIATED WITH CHRONIC RENAL FAILURE: ICD-10-CM

## 2022-09-14 DIAGNOSIS — T45.1X5A IMMUNODEFICIENCY DUE TO CHEMOTHERAPY: ICD-10-CM

## 2022-09-14 LAB
ALBUMIN SERPL BCP-MCNC: 3.1 G/DL (ref 3.5–5.2)
ALP SERPL-CCNC: 66 U/L (ref 55–135)
ALT SERPL W/O P-5'-P-CCNC: 23 U/L (ref 10–44)
ANION GAP SERPL CALC-SCNC: 6 MMOL/L (ref 8–16)
AST SERPL-CCNC: 28 U/L (ref 10–40)
BACTERIA #/AREA URNS HPF: ABNORMAL /HPF
BASOPHILS # BLD AUTO: 0.03 K/UL (ref 0–0.2)
BASOPHILS NFR BLD: 0.9 % (ref 0–1.9)
BILIRUB SERPL-MCNC: 0.4 MG/DL (ref 0.1–1)
BILIRUB UR QL STRIP: NEGATIVE
BUN SERPL-MCNC: 60 MG/DL (ref 8–23)
CALCIUM SERPL-MCNC: 8.2 MG/DL (ref 8.7–10.5)
CHLORIDE SERPL-SCNC: 114 MMOL/L (ref 95–110)
CLARITY UR: CLEAR
CO2 SERPL-SCNC: 17 MMOL/L (ref 23–29)
COLOR UR: YELLOW
CREAT SERPL-MCNC: 6.6 MG/DL (ref 0.5–1.4)
DIFFERENTIAL METHOD: ABNORMAL
EOSINOPHIL # BLD AUTO: 0.2 K/UL (ref 0–0.5)
EOSINOPHIL NFR BLD: 7 % (ref 0–8)
ERYTHROCYTE [DISTWIDTH] IN BLOOD BY AUTOMATED COUNT: 17.9 % (ref 11.5–14.5)
EST. GFR  (NO RACE VARIABLE): 6 ML/MIN/1.73 M^2
GLUCOSE SERPL-MCNC: 115 MG/DL (ref 70–110)
GLUCOSE UR QL STRIP: ABNORMAL
HCT VFR BLD AUTO: 32.8 % (ref 37–48.5)
HGB BLD-MCNC: 10.6 G/DL (ref 12–16)
HGB UR QL STRIP: ABNORMAL
HYALINE CASTS #/AREA URNS LPF: 0 /LPF
IMM GRANULOCYTES # BLD AUTO: 0.03 K/UL (ref 0–0.04)
IMM GRANULOCYTES NFR BLD AUTO: 0.9 % (ref 0–0.5)
KETONES UR QL STRIP: NEGATIVE
LEUKOCYTE ESTERASE UR QL STRIP: ABNORMAL
LYMPHOCYTES # BLD AUTO: 1.4 K/UL (ref 1–4.8)
LYMPHOCYTES NFR BLD: 41.1 % (ref 18–48)
MAGNESIUM SERPL-MCNC: 2.4 MG/DL (ref 1.6–2.6)
MCH RBC QN AUTO: 29.5 PG (ref 27–31)
MCHC RBC AUTO-ENTMCNC: 32.3 G/DL (ref 32–36)
MCV RBC AUTO: 91 FL (ref 82–98)
MICROSCOPIC COMMENT: ABNORMAL
MONOCYTES # BLD AUTO: 0.3 K/UL (ref 0.3–1)
MONOCYTES NFR BLD: 7.9 % (ref 4–15)
NEUTROPHILS # BLD AUTO: 1.4 K/UL (ref 1.8–7.7)
NEUTROPHILS NFR BLD: 42.2 % (ref 38–73)
NITRITE UR QL STRIP: NEGATIVE
NRBC BLD-RTO: 0 /100 WBC
PH UR STRIP: 8 [PH] (ref 5–8)
PHOSPHATE SERPL-MCNC: 4.3 MG/DL (ref 2.7–4.5)
PLATELET # BLD AUTO: 266 K/UL (ref 150–450)
PMV BLD AUTO: 9.7 FL (ref 9.2–12.9)
POCT GLUCOSE: 79 MG/DL (ref 70–110)
POTASSIUM SERPL-SCNC: 2.8 MMOL/L (ref 3.5–5.1)
POTASSIUM SERPL-SCNC: 7 MMOL/L (ref 3.5–5.1)
PROT SERPL-MCNC: 8.9 G/DL (ref 6–8.4)
PROT UR QL STRIP: ABNORMAL
RBC # BLD AUTO: 3.59 M/UL (ref 4–5.4)
RBC #/AREA URNS HPF: 0 /HPF (ref 0–4)
SODIUM SERPL-SCNC: 137 MMOL/L (ref 136–145)
SP GR UR STRIP: 1.01 (ref 1–1.03)
SQUAMOUS #/AREA URNS HPF: 4 /HPF
UNIDENT CRYS URNS QL MICRO: ABNORMAL
URN SPEC COLLECT METH UR: ABNORMAL
UROBILINOGEN UR STRIP-ACNC: NEGATIVE EU/DL
WBC # BLD AUTO: 3.41 K/UL (ref 3.9–12.7)
WBC #/AREA URNS HPF: 7 /HPF (ref 0–5)
WBC CLUMPS URNS QL MICRO: ABNORMAL

## 2022-09-14 PROCEDURE — 3288F PR FALLS RISK ASSESSMENT DOCUMENTED: ICD-10-PCS | Mod: CPTII,S$GLB,, | Performed by: NURSE PRACTITIONER

## 2022-09-14 PROCEDURE — 96365 THER/PROPH/DIAG IV INF INIT: CPT | Mod: 59

## 2022-09-14 PROCEDURE — 3078F PR MOST RECENT DIASTOLIC BLOOD PRESSURE < 80 MM HG: ICD-10-PCS | Mod: CPTII,S$GLB,, | Performed by: NURSE PRACTITIONER

## 2022-09-14 PROCEDURE — 84132 ASSAY OF SERUM POTASSIUM: CPT | Mod: 91 | Performed by: EMERGENCY MEDICINE

## 2022-09-14 PROCEDURE — 96375 TX/PRO/DX INJ NEW DRUG ADDON: CPT

## 2022-09-14 PROCEDURE — 25000003 PHARM REV CODE 250: Performed by: INTERNAL MEDICINE

## 2022-09-14 PROCEDURE — 63600175 PHARM REV CODE 636 W HCPCS: Performed by: INTERNAL MEDICINE

## 2022-09-14 PROCEDURE — 4010F PR ACE/ARB THEARPY RXD/TAKEN: ICD-10-PCS | Mod: CPTII,S$GLB,, | Performed by: NURSE PRACTITIONER

## 2022-09-14 PROCEDURE — 3077F SYST BP >= 140 MM HG: CPT | Mod: CPTII,S$GLB,, | Performed by: NURSE PRACTITIONER

## 2022-09-14 PROCEDURE — 25000003 PHARM REV CODE 250: Performed by: EMERGENCY MEDICINE

## 2022-09-14 PROCEDURE — 1126F PR PAIN SEVERITY QUANTIFIED, NO PAIN PRESENT: ICD-10-PCS | Mod: CPTII,S$GLB,, | Performed by: NURSE PRACTITIONER

## 2022-09-14 PROCEDURE — 3078F DIAST BP <80 MM HG: CPT | Mod: CPTII,S$GLB,, | Performed by: NURSE PRACTITIONER

## 2022-09-14 PROCEDURE — 83735 ASSAY OF MAGNESIUM: CPT | Performed by: EMERGENCY MEDICINE

## 2022-09-14 PROCEDURE — 1101F PT FALLS ASSESS-DOCD LE1/YR: CPT | Mod: CPTII,S$GLB,, | Performed by: NURSE PRACTITIONER

## 2022-09-14 PROCEDURE — 4010F ACE/ARB THERAPY RXD/TAKEN: CPT | Mod: CPTII,S$GLB,, | Performed by: NURSE PRACTITIONER

## 2022-09-14 PROCEDURE — 1160F PR REVIEW ALL MEDS BY PRESCRIBER/CLIN PHARMACIST DOCUMENTED: ICD-10-PCS | Mod: CPTII,S$GLB,, | Performed by: NURSE PRACTITIONER

## 2022-09-14 PROCEDURE — 99999 PR PBB SHADOW E&M-EST. PATIENT-LVL IV: ICD-10-PCS | Mod: PBBFAC,,, | Performed by: NURSE PRACTITIONER

## 2022-09-14 PROCEDURE — 96361 HYDRATE IV INFUSION ADD-ON: CPT

## 2022-09-14 PROCEDURE — 93010 EKG 12-LEAD: ICD-10-PCS | Mod: ,,, | Performed by: INTERNAL MEDICINE

## 2022-09-14 PROCEDURE — 99214 PR OFFICE/OUTPT VISIT, EST, LEVL IV, 30-39 MIN: ICD-10-PCS | Mod: 25,S$GLB,, | Performed by: NURSE PRACTITIONER

## 2022-09-14 PROCEDURE — 93010 ELECTROCARDIOGRAM REPORT: CPT | Mod: ,,, | Performed by: INTERNAL MEDICINE

## 2022-09-14 PROCEDURE — 80100016 HC MAINTENANCE HEMODIALYSIS

## 2022-09-14 PROCEDURE — 1126F AMNT PAIN NOTED NONE PRSNT: CPT | Mod: CPTII,S$GLB,, | Performed by: NURSE PRACTITIONER

## 2022-09-14 PROCEDURE — 3008F PR BODY MASS INDEX (BMI) DOCUMENTED: ICD-10-PCS | Mod: CPTII,S$GLB,, | Performed by: NURSE PRACTITIONER

## 2022-09-14 PROCEDURE — 1159F PR MEDICATION LIST DOCUMENTED IN MEDICAL RECORD: ICD-10-PCS | Mod: CPTII,S$GLB,, | Performed by: NURSE PRACTITIONER

## 2022-09-14 PROCEDURE — 99284 EMERGENCY DEPT VISIT MOD MDM: CPT | Mod: 25

## 2022-09-14 PROCEDURE — 1160F RVW MEDS BY RX/DR IN RCRD: CPT | Mod: CPTII,S$GLB,, | Performed by: NURSE PRACTITIONER

## 2022-09-14 PROCEDURE — 1101F PR PT FALLS ASSESS DOC 0-1 FALLS W/OUT INJ PAST YR: ICD-10-PCS | Mod: CPTII,S$GLB,, | Performed by: NURSE PRACTITIONER

## 2022-09-14 PROCEDURE — 99285 PR EMERGENCY DEPT VISIT,LEVEL V: ICD-10-PCS | Mod: ,,, | Performed by: INTERNAL MEDICINE

## 2022-09-14 PROCEDURE — 1157F PR ADVANCE CARE PLAN OR EQUIV PRESENT IN MEDICAL RECORD: ICD-10-PCS | Mod: CPTII,S$GLB,, | Performed by: NURSE PRACTITIONER

## 2022-09-14 PROCEDURE — 63600175 PHARM REV CODE 636 W HCPCS: Performed by: EMERGENCY MEDICINE

## 2022-09-14 PROCEDURE — 93005 ELECTROCARDIOGRAM TRACING: CPT

## 2022-09-14 PROCEDURE — 81000 URINALYSIS NONAUTO W/SCOPE: CPT | Performed by: NURSE PRACTITIONER

## 2022-09-14 PROCEDURE — 1157F ADVNC CARE PLAN IN RCRD: CPT | Mod: CPTII,S$GLB,, | Performed by: NURSE PRACTITIONER

## 2022-09-14 PROCEDURE — 3077F PR MOST RECENT SYSTOLIC BLOOD PRESSURE >= 140 MM HG: ICD-10-PCS | Mod: CPTII,S$GLB,, | Performed by: NURSE PRACTITIONER

## 2022-09-14 PROCEDURE — 99214 OFFICE O/P EST MOD 30 MIN: CPT | Mod: 25,S$GLB,, | Performed by: NURSE PRACTITIONER

## 2022-09-14 PROCEDURE — 84100 ASSAY OF PHOSPHORUS: CPT | Performed by: EMERGENCY MEDICINE

## 2022-09-14 PROCEDURE — G0257 UNSCHED DIALYSIS ESRD PT HOS: HCPCS

## 2022-09-14 PROCEDURE — 99999 PR PBB SHADOW E&M-EST. PATIENT-LVL IV: CPT | Mod: PBBFAC,,, | Performed by: NURSE PRACTITIONER

## 2022-09-14 PROCEDURE — 85025 COMPLETE CBC W/AUTO DIFF WBC: CPT | Mod: 91 | Performed by: NURSE PRACTITIONER

## 2022-09-14 PROCEDURE — 99285 EMERGENCY DEPT VISIT HI MDM: CPT | Mod: ,,, | Performed by: INTERNAL MEDICINE

## 2022-09-14 PROCEDURE — 3008F BODY MASS INDEX DOCD: CPT | Mod: CPTII,S$GLB,, | Performed by: NURSE PRACTITIONER

## 2022-09-14 PROCEDURE — 3288F FALL RISK ASSESSMENT DOCD: CPT | Mod: CPTII,S$GLB,, | Performed by: NURSE PRACTITIONER

## 2022-09-14 PROCEDURE — 80053 COMPREHEN METABOLIC PANEL: CPT | Mod: 91 | Performed by: NURSE PRACTITIONER

## 2022-09-14 PROCEDURE — 1159F MED LIST DOCD IN RCRD: CPT | Mod: CPTII,S$GLB,, | Performed by: NURSE PRACTITIONER

## 2022-09-14 RX ORDER — FAMOTIDINE 10 MG/ML
20 INJECTION INTRAVENOUS
Status: COMPLETED | OUTPATIENT
Start: 2022-09-14 | End: 2022-09-14

## 2022-09-14 RX ORDER — CALCIUM GLUCONATE 20 MG/ML
1 INJECTION, SOLUTION INTRAVENOUS EVERY 10 MIN PRN
Status: DISCONTINUED | OUTPATIENT
Start: 2022-09-14 | End: 2022-09-14 | Stop reason: HOSPADM

## 2022-09-14 RX ORDER — GABAPENTIN 300 MG/1
300 CAPSULE ORAL 3 TIMES DAILY
Qty: 90 CAPSULE | Refills: 2 | Status: SHIPPED | OUTPATIENT
Start: 2022-09-14 | End: 2023-02-10

## 2022-09-14 RX ORDER — AMLODIPINE BESYLATE 5 MG/1
10 TABLET ORAL ONCE
Status: DISCONTINUED | OUTPATIENT
Start: 2022-09-14 | End: 2022-09-14

## 2022-09-14 RX ORDER — CLONIDINE HYDROCHLORIDE 0.1 MG/1
0.1 TABLET ORAL ONCE
Status: DISCONTINUED | OUTPATIENT
Start: 2022-09-14 | End: 2022-09-14

## 2022-09-14 RX ORDER — HEPARIN 100 UNIT/ML
5 SYRINGE INTRAVENOUS
Status: COMPLETED | OUTPATIENT
Start: 2022-09-14 | End: 2022-09-14

## 2022-09-14 RX ORDER — HEPARIN 100 UNIT/ML
500 SYRINGE INTRAVENOUS
Status: DISCONTINUED | OUTPATIENT
Start: 2022-09-14 | End: 2022-09-14 | Stop reason: HOSPADM

## 2022-09-14 RX ORDER — CALCIUM GLUCONATE 20 MG/ML
1 INJECTION, SOLUTION INTRAVENOUS
Status: COMPLETED | OUTPATIENT
Start: 2022-09-14 | End: 2022-09-14

## 2022-09-14 RX ORDER — ONDANSETRON 2 MG/ML
4 INJECTION INTRAMUSCULAR; INTRAVENOUS ONCE
Status: COMPLETED | OUTPATIENT
Start: 2022-09-14 | End: 2022-09-14

## 2022-09-14 RX ADMIN — HEPARIN 500 UNITS: 100 SYRINGE at 06:09

## 2022-09-14 RX ADMIN — SODIUM ZIRCONIUM CYCLOSILICATE 10 G: 5 POWDER, FOR SUSPENSION ORAL at 01:09

## 2022-09-14 RX ADMIN — DEXTROSE 250 ML: 10 SOLUTION INTRAVENOUS at 02:09

## 2022-09-14 RX ADMIN — ONDANSETRON HYDROCHLORIDE 4 MG: 2 SOLUTION INTRAMUSCULAR; INTRAVENOUS at 09:09

## 2022-09-14 RX ADMIN — CALCIUM GLUCONATE 1 G: 20 INJECTION, SOLUTION INTRAVENOUS at 01:09

## 2022-09-14 RX ADMIN — FAMOTIDINE 20 MG: 10 INJECTION INTRAVENOUS at 09:09

## 2022-09-14 RX ADMIN — INSULIN HUMAN 10 UNITS: 100 INJECTION, SOLUTION PARENTERAL at 02:09

## 2022-09-14 NOTE — PROGRESS NOTES
Renal addendum note:  Pt was revisited during HD. Stable, doing well, no c/o's  BP is noted to have improved to 130/80  Previously ordered amlodipine and clonidine were cancelled.  Continue HD.    Rick Galvan MD

## 2022-09-14 NOTE — NURSING
Component Potassium   Latest Ref Rng & Units 3.5 - 5.1 mmol/L   9/14/2022 6.4 ()       MD notified. Treatment being held. Pt brought to ED. Report called. Patient left accessed.

## 2022-09-14 NOTE — SUBJECTIVE & OBJECTIVE
Past Medical History:   Diagnosis Date    Cataract     Cataract     CHF (congestive heart failure)     COPD (chronic obstructive pulmonary disease)     Diabetes mellitus     Diabetic retinopathy     Hypertension     Malignant neoplasm of upper-outer quadrant of left breast in female, estrogen receptor positive 6/20/2022       Past Surgical History:   Procedure Laterality Date    BREAST BIOPSY Right     benign    CATARACT EXTRACTION W/  INTRAOCULAR LENS IMPLANT Right 08/14/2017    Dr. Moe    CORONARY ARTERY BYPASS GRAFT      FLUOROSCOPY N/A 7/25/2022    Procedure: FLUOROSCOPY/mediport placement;  Surgeon: Cole Perdomo MD;  Location: [facility] CATH LAB;  Service: General;  Laterality: N/A;       Review of patient's allergies indicates:  No Known Allergies  Current Facility-Administered Medications   Medication Frequency    calcium gluconate 1 g in NS IVPB (premixed) Q10 Min PRN    dextrose 10% bolus 125 mL PRN    dextrose 10% bolus 250 mL PRN    dextrose 10% bolus 250 mL PRN    sodium polystyrene 15 gram/60 mL suspension 15 g 1 time in Clinic/HOD     Current Outpatient Medications   Medication    acetaminophen (TYLENOL) 325 MG tablet    albuterol (PROVENTIL/VENTOLIN HFA) 90 mcg/actuation inhaler    albuterol sulfate (PROAIR RESPICLICK) 90 mcg/actuation AePB    amiodarone (PACERONE) 100 MG Tab    amLODIPine (NORVASC) 10 MG tablet    apixaban (ELIQUIS) 2.5 mg Tab    aspirin (ECOTRIN) 81 MG EC tablet    CAPRON DM 7.5-7.5 mg/5 mL Liqd    diclofenac sodium (VOLTAREN) 1 % Gel    gabapentin (NEURONTIN) 300 MG capsule    insulin degludec (TRESIBA FLEXTOUCH U-200) 200 unit/mL (3 mL) insulin pen    INV metoprolol tartrate 25 mg oral tablet (LOPRESSOR) 25 MG Tab    levothyroxine (SYNTHROID) 75 MCG tablet    losartan (COZAAR) 100 MG tablet    losartan (COZAAR) 25 MG tablet    omeprazole (PRILOSEC) 20 MG capsule    ondansetron (ZOFRAN) 4 MG tablet    predniSONE (DELTASONE) 10 MG tablet    prochlorperazine (COMPAZINE) 5 MG tablet     RENVELA 800 mg Tab    SEVELAMER HCL ORAL     Family History       Problem Relation (Age of Onset)    Breast cancer Sister    Cancer Father, Brother    Diabetes Sister    Hypertension Mother          Tobacco Use    Smoking status: Former     Types: Cigarettes     Quit date: 6/21/2012     Years since quitting: 10.2    Smokeless tobacco: Never   Substance and Sexual Activity    Alcohol use: No    Drug use: Never    Sexual activity: Not on file     Review of Systems   Constitutional: Negative.    Eyes: Negative.    Respiratory: Negative.     Cardiovascular: Negative.    Gastrointestinal: Negative.    Genitourinary: Negative.    Neurological: Negative.    Psychiatric/Behavioral: Negative.     Objective:     Vital Signs (Most Recent):  Temp: 98 °F (36.7 °C) (09/14/22 1051)  Pulse: (!) 57 (09/14/22 1400)  Resp: 20 (09/14/22 1400)  BP: (!) 207/91 (09/14/22 1400)  SpO2: 99 % (09/14/22 1400)  O2 Device (Oxygen Therapy): room air (09/14/22 1051)   Vital Signs (24h Range):  Temp:  [97.4 °F (36.3 °C)-98.3 °F (36.8 °C)] 98 °F (36.7 °C)  Pulse:  [57-63] 57  Resp:  [15-20] 20  SpO2:  [96 %-100 %] 99 %  BP: (153-207)/(57-91) 207/91     Weight: 94.7 kg (208 lb 14.2 oz) (09/14/22 1051)  Body mass index is 38.21 kg/m².  Body surface area is 2.04 meters squared.    No intake/output data recorded.    Physical Exam  Vitals and nursing note reviewed.   Constitutional:       General: She is not in acute distress.     Appearance: Normal appearance. She is not ill-appearing.   HENT:      Head: Normocephalic and atraumatic.   Cardiovascular:      Rate and Rhythm: Normal rate and regular rhythm.      Pulses: Normal pulses.      Heart sounds: Normal heart sounds.   Pulmonary:      Effort: Pulmonary effort is normal.      Breath sounds: Normal breath sounds.   Abdominal:      General: Abdomen is flat.      Tenderness: There is no abdominal tenderness.   Musculoskeletal:      Right lower leg: No edema.      Left lower leg: No edema.    Neurological:      Mental Status: She is alert.   Psychiatric:         Behavior: Behavior normal.       Significant Labs: reviewed  BMP  Lab Results   Component Value Date     09/14/2022    K 7.0 (HH) 09/14/2022     (H) 09/14/2022    CO2 17 (L) 09/14/2022    BUN 60 (H) 09/14/2022    CREATININE 6.6 (H) 09/14/2022    CALCIUM 8.2 (L) 09/14/2022    ANIONGAP 6 (L) 09/14/2022    ESTGFRAFRICA 8 (A) 07/27/2022    EGFRNONAA 7 (A) 07/27/2022     Lab Results   Component Value Date    WBC 3.41 (L) 09/14/2022    HGB 10.6 (L) 09/14/2022    HCT 32.8 (L) 09/14/2022    MCV 91 09/14/2022     09/14/2022         Significant Imaging: reviewed

## 2022-09-14 NOTE — HPI
Pt was seen and examined. Chart, Labs and meds reviewed. Discussed with other providers. Pt is a 66 y/o female with ESRD on chronic HD q TTS at [facility], on HD x 2 years, h/o of DM, HTN, CHF, and breast cancer who was sent to ER by hem/onc today for K 6.4, pt did not go to her HD unit yesterday. Repeat K is 7.0. Pt was seen by me in ER today, and was subsequently revisited during HD. Pt is feeling OK, no SOB, no CP, no discomfort, pt says that transportation did not show up yesterday to take her to the HD unit.

## 2022-09-14 NOTE — PLAN OF CARE
Problem: Adult Inpatient Plan of Care  Goal: Plan of Care Review  Outcome: Ongoing, Progressing  Goal: Patient-Specific Goal (Individualized)  Outcome: Ongoing, Progressing  Goal: Optimal Comfort and Wellbeing  Outcome: Ongoing, Progressing  Intervention: Provide Person-Centered Care  Flowsheets (Taken 9/14/2022 0952)  Trust Relationship/Rapport:   care explained   choices provided   emotional support provided   empathic listening provided   questions answered   questions encouraged   reassurance provided   thoughts/feelings acknowledged

## 2022-09-14 NOTE — ASSESSMENT & PLAN NOTE
BP not controlled  outpt meds reviewed  Expect BP will improve with HD  Will give the outpt meds  Will monitor

## 2022-09-14 NOTE — ED PROVIDER NOTES
SCRIBE #1 NOTE: I, Jens Poole, am scribing for, and in the presence of, Charly Dick MD. I have scribed the entire note.      History      Chief Complaint   Patient presents with    abnormal labs     Sent from cancer center due to hyperkalemia. Patient currently denies any complaints. Pt missed dialysis yesterday.        Review of patient's allergies indicates:  No Known Allergies     HPI   HPI    9/14/2022, 12:48 PM   History obtained from the patient      History of Present Illness: Malaika Paredes is a 67 y.o. female patient with a PMHx of CHF, COPD, ESRD, HTN, DM, breast cancer who presents to the Emergency Department for abnormal lab. Pt was referred to the ED by Dr. العلي (Hem/Onc) after labs drawn earlier today showed elevated potassium of 6.4. Pt states that she missed dialysis yesterday (Tuesday) due to transportation issues. Symptoms are constant and moderate in severity. No mitigating or exacerbating factors reported. No associated sxs reported. Patient denies any fever, chills, n/v/d, SOB, CP, weakness, numbness, dizziness, headache, and all other sxs at this time. Pt is currently on chemotherapy. No further complaints or concerns at this time.     Arrival mode: Personal vehicle    PCP: Travis Scales MD       Past Medical History:  Past Medical History:   Diagnosis Date    Cataract     Cataract     CHF (congestive heart failure)     COPD (chronic obstructive pulmonary disease)     Diabetes mellitus     Diabetic retinopathy     Hypertension     Malignant neoplasm of upper-outer quadrant of left breast in female, estrogen receptor positive 6/20/2022       Past Surgical History:  Past Surgical History:   Procedure Laterality Date    BREAST BIOPSY Right     benign    CATARACT EXTRACTION W/  INTRAOCULAR LENS IMPLANT Right 08/14/2017    Dr. Moe    CORONARY ARTERY BYPASS GRAFT      FLUOROSCOPY N/A 7/25/2022    Procedure: FLUOROSCOPY/mediport placement;  Surgeon: Cole Perdomo MD;  Location:  [facility] CATH LAB;  Service: General;  Laterality: N/A;         Family History:  Family History   Problem Relation Age of Onset    Hypertension Mother     Cancer Father     Breast cancer Sister     Diabetes Sister     Cancer Brother     Amblyopia Neg Hx     Blindness Neg Hx     Cataracts Neg Hx     Glaucoma Neg Hx     Macular degeneration Neg Hx     Retinal detachment Neg Hx     Strabismus Neg Hx     Stroke Neg Hx     Thyroid disease Neg Hx        Social History:  Social History     Tobacco Use    Smoking status: Former     Types: Cigarettes     Quit date: 6/21/2012     Years since quitting: 10.2    Smokeless tobacco: Never   Substance and Sexual Activity    Alcohol use: No    Drug use: Never    Sexual activity: Not on file       ROS   Review of Systems   Constitutional:  Negative for chills and fever.   HENT:  Negative for sore throat.    Respiratory:  Negative for shortness of breath.    Cardiovascular:  Negative for chest pain.   Gastrointestinal:  Negative for diarrhea, nausea and vomiting.   Genitourinary:  Negative for dysuria.   Musculoskeletal:  Negative for back pain.   Skin:  Negative for rash.   Neurological:  Negative for dizziness, weakness, light-headedness, numbness and headaches.   Hematological:  Does not bruise/bleed easily.   All other systems reviewed and are negative.    Physical Exam      Initial Vitals [09/14/22 1051]   BP Pulse Resp Temp SpO2   (!) 177/64 60 15 98 °F (36.7 °C) 96 %      MAP       --          Physical Exam  Nursing Notes and Vital Signs Reviewed.  Constitutional: Patient is in no acute distress. Well-developed and well-nourished.  Head: Atraumatic. Normocephalic.  Eyes: PERRL. EOM intact. Conjunctivae are not pale. No scleral icterus.  ENT: Mucous membranes are moist. Oropharynx is clear and symmetric.    Neck: Supple. Full ROM.   Cardiovascular: Regular rate. Regular rhythm. No murmurs, rubs, or gallops. Distal pulses are 2+ and symmetric.  Pulmonary/Chest: No respiratory  distress. Clear to auscultation bilaterally. No wheezing or rales.  Abdominal: Soft and non-distended.  There is no tenderness.  No rebound, guarding, or rigidity.  Musculoskeletal: Moves all extremities. No obvious deformities. Dialysis fistula to LUE with good palpable thrill.  Skin: Warm and dry.  Neurological:  Alert, awake, and appropriate.  Normal speech.  No acute focal neurological deficits are appreciated.  Psychiatric: Normal affect. Good eye contact. Appropriate in content.    ED Course    Procedures  ED Vital Signs:  Vitals:    09/14/22 1051 09/14/22 1245 09/14/22 1400   BP: (!) 177/64 (!) 166/57 (!) 207/91   Pulse: 60 (!) 58 (!) 57   Resp: 15 20 20   Temp: 98 °F (36.7 °C)     TempSrc: Oral     SpO2: 96% 98% 99%   Weight: 94.7 kg (208 lb 14.2 oz)         Abnormal Lab Results:  Labs Reviewed   CBC W/ AUTO DIFFERENTIAL - Abnormal; Notable for the following components:       Result Value    WBC 3.41 (*)     RBC 3.59 (*)     Hemoglobin 10.6 (*)     Hematocrit 32.8 (*)     RDW 17.9 (*)     Immature Granulocytes 0.9 (*)     Gran # (ANC) 1.4 (*)     All other components within normal limits   COMPREHENSIVE METABOLIC PANEL - Abnormal; Notable for the following components:    Potassium 7.0 (*)     Chloride 114 (*)     CO2 17 (*)     Glucose 115 (*)     BUN 60 (*)     Creatinine 6.6 (*)     Calcium 8.2 (*)     Total Protein 8.9 (*)     Albumin 3.1 (*)     Anion Gap 6 (*)     eGFR 6 (*)     All other components within normal limits    Narrative:       K critical result(s) called and verbal readback obtained from   CHIQUI LOUIE RN  by LINO 09/14/2022 13:18   URINALYSIS, REFLEX TO URINE CULTURE - Abnormal; Notable for the following components:    Protein, UA 2+ (*)     Glucose, UA 1+ (*)     Occult Blood UA Trace (*)     Leukocytes, UA Trace (*)     All other components within normal limits    Narrative:     Specimen Source->Urine   URINALYSIS MICROSCOPIC - Abnormal; Notable for the following components:    WBC, UA  7 (*)     WBC Clumps, UA Occasional (*)     All other components within normal limits    Narrative:     Specimen Source->Urine   POTASSIUM - Abnormal; Notable for the following components:    Potassium 2.8 (*)     All other components within normal limits    Narrative:     Please send in the final hour of HD   MAGNESIUM   PHOSPHORUS   POCT GLUCOSE   POCT GLUCOSE MONITORING CONTINUOUS        All Lab Results:  Results for orders placed or performed during the hospital encounter of 09/14/22   CBC auto differential   Result Value Ref Range    WBC 3.41 (L) 3.90 - 12.70 K/uL    RBC 3.59 (L) 4.00 - 5.40 M/uL    Hemoglobin 10.6 (L) 12.0 - 16.0 g/dL    Hematocrit 32.8 (L) 37.0 - 48.5 %    MCV 91 82 - 98 fL    MCH 29.5 27.0 - 31.0 pg    MCHC 32.3 32.0 - 36.0 g/dL    RDW 17.9 (H) 11.5 - 14.5 %    Platelets 266 150 - 450 K/uL    MPV 9.7 9.2 - 12.9 fL    Immature Granulocytes 0.9 (H) 0.0 - 0.5 %    Gran # (ANC) 1.4 (L) 1.8 - 7.7 K/uL    Immature Grans (Abs) 0.03 0.00 - 0.04 K/uL    Lymph # 1.4 1.0 - 4.8 K/uL    Mono # 0.3 0.3 - 1.0 K/uL    Eos # 0.2 0.0 - 0.5 K/uL    Baso # 0.03 0.00 - 0.20 K/uL    nRBC 0 0 /100 WBC    Gran % 42.2 38.0 - 73.0 %    Lymph % 41.1 18.0 - 48.0 %    Mono % 7.9 4.0 - 15.0 %    Eosinophil % 7.0 0.0 - 8.0 %    Basophil % 0.9 0.0 - 1.9 %    Differential Method Automated    Comprehensive metabolic panel   Result Value Ref Range    Sodium 137 136 - 145 mmol/L    Potassium 7.0 (HH) 3.5 - 5.1 mmol/L    Chloride 114 (H) 95 - 110 mmol/L    CO2 17 (L) 23 - 29 mmol/L    Glucose 115 (H) 70 - 110 mg/dL    BUN 60 (H) 8 - 23 mg/dL    Creatinine 6.6 (H) 0.5 - 1.4 mg/dL    Calcium 8.2 (L) 8.7 - 10.5 mg/dL    Total Protein 8.9 (H) 6.0 - 8.4 g/dL    Albumin 3.1 (L) 3.5 - 5.2 g/dL    Total Bilirubin 0.4 0.1 - 1.0 mg/dL    Alkaline Phosphatase 66 55 - 135 U/L    AST 28 10 - 40 U/L    ALT 23 10 - 44 U/L    Anion Gap 6 (L) 8 - 16 mmol/L    eGFR 6 (A) >60 mL/min/1.73 m^2   Urinalysis, Reflex to Urine Culture Urine, Clean  Catch    Specimen: Urine   Result Value Ref Range    Specimen UA Urine, Clean Catch     Color, UA Yellow Yellow, Straw, Michelle    Appearance, UA Clear Clear    pH, UA 8.0 5.0 - 8.0    Specific Gravity, UA 1.010 1.005 - 1.030    Protein, UA 2+ (A) Negative    Glucose, UA 1+ (A) Negative    Ketones, UA Negative Negative    Bilirubin (UA) Negative Negative    Occult Blood UA Trace (A) Negative    Nitrite, UA Negative Negative    Urobilinogen, UA Negative <2.0 EU/dL    Leukocytes, UA Trace (A) Negative   Magnesium   Result Value Ref Range    Magnesium 2.4 1.6 - 2.6 mg/dL   Phosphorus   Result Value Ref Range    Phosphorus 4.3 2.7 - 4.5 mg/dL   Urinalysis Microscopic   Result Value Ref Range    RBC, UA 0 0 - 4 /hpf    WBC, UA 7 (H) 0 - 5 /hpf    WBC Clumps, UA Occasional (A) None-Rare    Bacteria Occasional None-Occ /hpf    Squam Epithel, UA 4 /hpf    Hyaline Casts, UA 0 0-1/lpf /lpf    Unclass Celeste UA Rare None-Moderate    Microscopic Comment SEE COMMENT    Potassium   Result Value Ref Range    Potassium 2.8 (L) 3.5 - 5.1 mmol/L   POCT glucose   Result Value Ref Range    POCT Glucose 79 70 - 110 mg/dL       Imaging Results:  Imaging Results    None        The EKG was ordered, reviewed, and independently interpreted by the ED provider.  Interpretation time: 11:51  Rate: 56 BPM  Rhythm: sinus bradycardia  Interpretation: Left anterior fascicular block. LVH. Nonspecific ST and T wave abnormality. No STEMI.           The Emergency Provider reviewed the vital signs and test results, which are outlined above.    ED Discussion     12:56 PM: Discussed pt's case with Dr. Galvan (Nephrology), who will arrange for dialysis.    3:44 PM: Reassessed pt at this time. Discussed with pt all pertinent ED information and results. Discussed pt dx and plan of tx. Gave pt all f/u and return to the ED instructions. All questions and concerns were addressed at this time. Pt expresses understanding of information and instructions, and is  comfortable with plan to discharge. Pt is stable for discharge.    I discussed with patient and/or family/caretaker that evaluation in the ED does not suggest any emergent or life threatening medical conditions requiring immediate intervention beyond what was provided in the ED, and I believe patient is safe for discharge.  Regardless, an unremarkable evaluation in the ED does not preclude the development or presence of a serious of life threatening condition. As such, patient was instructed to return immediately for any worsening or change in current symptoms.     6:41 PM  Patient is return from dialysis.  She is stable without complaint.  She is safe for discharge my opinion.    ED Medication(s):  Medications   calcium gluconate 1 g in NS IVPB (premixed) (0 g Intravenous Stopped 9/14/22 1353)     And   calcium gluconate 1 g in NS IVPB (premixed) (has no administration in time range)   dextrose 10% bolus 250 mL (250 mLs Intravenous New Bag 9/14/22 1401)     And   dextrose 10% bolus 250 mL (has no administration in time range)     And   insulin regular injection 10 Units 0.1 mL (10 Units Intravenous Given 9/14/22 1428)   dextrose 10% bolus 125 mL (has no administration in time range)   dextrose 10% bolus 250 mL (has no administration in time range)   heparin, porcine (PF) 100 unit/mL injection flush 500 Units (has no administration in time range)   sodium zirconium cyclosilicate packet 10 g (10 g Oral Given 9/14/22 1332)     New Prescriptions    No medications on file      Follow-up Information       Travis Scales MD. Schedule an appointment as soon as possible for a visit in 2 days.    Specialty: Cardiology  Contact information:  [address]  [address]  [facility] [address] [phone]               Your primary nephrologist. Schedule an appointment as soon as possible for a visit in 2 days.                               Medical Decision Making    Medical Decision Making:   Clinical Tests:   Lab  Tests: Ordered and Reviewed  Medical Tests: Ordered and Reviewed         Scribe Attestation:   Scribe #1: I performed the above scribed service and the documentation accurately describes the services I performed. I attest to the accuracy of the note.    Attending:   Physician Attestation Statement for Scribe #1: I, Charly Dick MD, personally performed the services described in this documentation, as scribed by Jens Poole, in my presence, and it is both accurate and complete.          Clinical Impression       ICD-10-CM ICD-9-CM   1. Hypertension, unspecified type  I10 401.9   2. Hyperkalemia  E87.5 276.7       Disposition:   Disposition: Discharged  Condition: Stable       Roberto Pearson Jr., MD  09/14/22 7799

## 2022-09-14 NOTE — DISCHARGE INSTRUCTIONS
Follow-up with your primary care doctor the next 2-3 days for recheck.  Return to the emergency department for any worsening signs or symptoms.  Get your outpatient dialysis as scheduled

## 2022-09-14 NOTE — FIRST PROVIDER EVALUATION
Medical screening examination initiated.  I have conducted a focused provider triage encounter, findings are as follows:    Brief history of present illness:  Patient [facility] by the [facility] for elevated potassium.    There were no vitals filed for this visit.    Pertinent physical exam:      Brief workup plan:      Preliminary workup initiated; this workup will be continued and followed by the physician or advanced practice provider that is assigned to the patient when roomed.

## 2022-09-14 NOTE — CONSULTS
[facility] - Emergency Dept.  Nephrology  Consult Note      Patient Name: Malaika Paredes  MRN: [id number]  Admission Date: 9/14/2022  Hospital Length of Stay: 0 days  Attending Provider: Charly Dick MD   Primary Care Physician: Travis Scales MD  Principal Problem: hyperkalemia    Reason for consult: ESRD, hyperkalemia    Consults  Subjective:     HPI: Pt was seen and examined. Chart, Labs and meds reviewed. Discussed with other providers. Pt is a 68 y/o female with [facility], on [facility] x 2 [facility], h/o of DM, HTN, CHF, and breast cancer who was sent to ER by hem/onc today for K 6.4, pt did not go to her HD unit yesterday. Repeat K is 7.0. Pt was seen by me in ER today, and was subsequently revisited during HD. Pt is feeling OK, no SOB, no CP, no discomfort, pt says that transportation did not show up yesterday to take her to the HD unit.      Past Medical History:   Diagnosis Date    Cataract     Cataract     CHF (congestive heart failure)     COPD (chronic obstructive pulmonary disease)     Diabetes mellitus     Diabetic retinopathy     Hypertension     Malignant neoplasm of upper-outer quadrant of left breast in female, estrogen receptor positive 6/20/2022       Past Surgical History:   Procedure Laterality Date    BREAST BIOPSY Right     benign    CATARACT EXTRACTION W/  INTRAOCULAR LENS IMPLANT Right 08/14/2017    Dr. Moe    CORONARY ARTERY BYPASS GRAFT      FLUOROSCOPY N/A 7/25/2022    Procedure: FLUOROSCOPY/mediport placement;  Surgeon: Cole Perdomo MD;  Location: [facility] CATH LAB;  Service: General;  Laterality: N/A;       Review of patient's allergies indicates:  No Known Allergies  Current Facility-Administered Medications   Medication Frequency    calcium gluconate 1 g in NS IVPB (premixed) Q10 Min PRN    dextrose 10% bolus 125 mL PRN    dextrose 10% bolus 250 mL PRN    dextrose 10% bolus 250 mL PRN    sodium polystyrene 15 gram/60 mL suspension 15 g 1  time in Clinic/HOD     Current Outpatient Medications   Medication    acetaminophen (TYLENOL) 325 MG tablet    albuterol (PROVENTIL/VENTOLIN HFA) 90 mcg/actuation inhaler    albuterol sulfate (PROAIR RESPICLICK) 90 mcg/actuation AePB    amiodarone (PACERONE) 100 MG Tab    amLODIPine (NORVASC) 10 MG tablet    apixaban (ELIQUIS) 2.5 mg Tab    aspirin (ECOTRIN) 81 MG EC tablet    CAPRON DM 7.5-7.5 mg/5 mL Liqd    diclofenac sodium (VOLTAREN) 1 % Gel    gabapentin (NEURONTIN) 300 MG capsule    insulin degludec (TRESIBA FLEXTOUCH U-200) 200 unit/mL (3 mL) insulin pen    INV metoprolol tartrate 25 mg oral tablet (LOPRESSOR) 25 MG Tab    levothyroxine (SYNTHROID) 75 MCG tablet    losartan (COZAAR) 100 MG tablet    losartan (COZAAR) 25 MG tablet    omeprazole (PRILOSEC) 20 MG capsule    ondansetron (ZOFRAN) 4 MG tablet    predniSONE (DELTASONE) 10 MG tablet    prochlorperazine (COMPAZINE) 5 MG tablet    RENVELA 800 mg Tab    SEVELAMER HCL ORAL     Family History       Problem Relation (Age of Onset)    Breast cancer Sister    Cancer Father, Brother    Diabetes Sister    Hypertension Mother          Tobacco Use    Smoking status: Former     Types: Cigarettes     Quit date: 6/21/2012     Years since quitting: 10.2    Smokeless tobacco: Never   Substance and Sexual Activity    Alcohol use: No    Drug use: Never    Sexual activity: Not on file     Review of Systems   Constitutional: Negative.    Eyes: Negative.    Respiratory: Negative.     Cardiovascular: Negative.    Gastrointestinal: Negative.    Genitourinary: Negative.    Neurological: Negative.    Psychiatric/Behavioral: Negative.     Objective:     Vital Signs (Most Recent):  Temp: 98 °F (36.7 °C) (09/14/22 1051)  Pulse: (!) 57 (09/14/22 1400)  Resp: 20 (09/14/22 1400)  BP: (!) 207/91 (09/14/22 1400)  SpO2: 99 % (09/14/22 1400)  O2 Device (Oxygen Therapy): room air (09/14/22 1051)   Vital Signs (24h Range):  Temp:  [97.4 °F (36.3 °C)-98.3 °F  (36.8 °C)] 98 °F (36.7 °C)  Pulse:  [57-63] 57  Resp:  [15-20] 20  SpO2:  [96 %-100 %] 99 %  BP: (153-207)/(57-91) 207/91     Weight: 94.7 kg (208 lb 14.2 oz) (09/14/22 1051)  Body mass index is 38.21 kg/m².  Body surface area is 2.04 meters squared.    No intake/output data recorded.    Physical Exam  Vitals and nursing note reviewed.   Constitutional:       General: She is not in acute distress.     Appearance: Normal appearance. She is not ill-appearing.   HENT:      Head: Normocephalic and atraumatic.   Cardiovascular:      Rate and Rhythm: Normal rate and regular rhythm.      Pulses: Normal pulses.      Heart sounds: Normal heart sounds.   Pulmonary:      Effort: Pulmonary effort is normal.      Breath sounds: Normal breath sounds.   Abdominal:      General: Abdomen is flat.      Tenderness: There is no abdominal tenderness.   Musculoskeletal:      Right lower leg: No edema.      Left lower leg: No edema.   Neurological:      Mental Status: She is alert.   Psychiatric:         Behavior: Behavior normal.       Significant Labs: reviewed  BMP  Lab Results   Component Value Date     09/14/2022    K 7.0 (HH) 09/14/2022     (H) 09/14/2022    CO2 17 (L) 09/14/2022    BUN 60 (H) 09/14/2022    CREATININE 6.6 (H) 09/14/2022    CALCIUM 8.2 (L) 09/14/2022    ANIONGAP 6 (L) 09/14/2022    ESTGFRAFRICA 8 (A) 07/27/2022    EGFRNONAA 7 (A) 07/27/2022     Lab Results   Component Value Date    WBC 3.41 (L) 09/14/2022    HGB 10.6 (L) 09/14/2022    HCT 32.8 (L) 09/14/2022    MCV 91 09/14/2022     09/14/2022         Significant Imaging: reviewed    Assessment/Plan:     68 y/o female with ESRD on chronic HD was sent to ER by hem/onc for high K:    ESRD (end stage renal disease)  ESRD pt on chronic HD x 2 years, currently at [facility] TTS  Did not go to HD yesterday  Non-compliance with HD  Presented with moderate to severe hyperkalemia, potentially life threatening  Being dialyzed at this time on 1 K bath,    Tolerating HD well except for some cramps taht she had earlier, UF was sreduced, was revisited, doing better, continue HD  Will repeat K towards end of HD  [facility] d[facility]  Advised pt not to miss HD    Diastolic dysfunction  Stable, compensated    Type 2 diabetes mellitus, with long-term current use of insulin  Reviewed the chart  No active current issues    Primary hypertension  BP not controlled  outpt meds reviewed  Expect BP will improve with HD  Will give the outpt meds  Will monitor    Malignant neoplasm of upper-outer quadrant of left breast in female, estrogen receptor positive  Reviewed the chart      Plans and recommendations: as discussed above  Total time spent 70 minutes including time needed to review the records, the   patient evaluation, documentation, face-to-face discussion with the patient,   more than 50% of the time was spent on coordination of care and counseling.    Level V visit.  Multiple medical issues were addressed, as documented. Medical care provided was in addition to providing dialysis. Pt received multiple visits and evaluations.      Rick Galvan MD   Nephrology  [facility] - Emergency Dept.

## 2022-09-14 NOTE — ASSESSMENT & PLAN NOTE
ESRD pt on chronic HD x 2 years, currently at [facility]  Did not go to HD yesterday  Non-compliance with HD  Presented with moderate to severe hyperkalemia, potentially life threatening  Being dialyzed at this time on 1 K bath,   Tolerating HD well except for some cramps taht she had earlier, UF was sreduced, was revisited, doing better, continue HD  Will repeat K towards end of HD  [facility]  Advised pt not to miss HD

## 2022-09-20 NOTE — PROGRESS NOTES
Subjective:       Patient ID: Malaika Paredes is a 67 y.o. female.    Chief Complaint: Breast Cancer and Chemotherapy    Primary Oncologist/Hematologist: Dr. العلي     HPI: Ms. Paige is a 67 year old female who is following up for her locally advanced HER2 positive ER positive breast cancer. She is also on dialysis for ESRD, TThSat. She is here for cycle 2 day 8 of Taxotere, Perjecta and Herceptin. She will get all 3 agents q 3 weeks, and taxol weekly.   Last week patient was seen but had increased potassium and given Kayexalate. and went to the ER.   Cancer Hx: She presented to GYN in 5/2022 with a palpable breast lump present for one month. Diagnostic MMG revealed a left breast 5.6 cm x 4 cm x 4.3 cm mass at the 2 o'clock position. Biopsied proved invasive ductal carcinoma ER >95%, NM 80%, Gdc8iyg 2+, Ki-67 60%. Left axillary LN biopsy was positive. PET revealed no evidence of distance mets.     Today: Patient recently started gabapentin for her nerve pain. She states it has really been helping. He denies any pain, weight loss, fevers. She states her appetite is good and she has renan staying hydrated. She states dialysis is going well.     Social History     Socioeconomic History    Marital status:    Tobacco Use    Smoking status: Former     Types: Cigarettes     Quit date: 6/21/2012     Years since quitting: 10.2    Smokeless tobacco: Never   Substance and Sexual Activity    Alcohol use: No    Drug use: Never       Past Medical History:   Diagnosis Date    Cataract     Cataract     CHF (congestive heart failure)     COPD (chronic obstructive pulmonary disease)     Diabetes mellitus     Diabetic retinopathy     Hypertension     Malignant neoplasm of upper-outer quadrant of left breast in female, estrogen receptor positive 6/20/2022       Family History   Problem Relation Age of Onset    Hypertension Mother     Cancer Father     Breast cancer Sister     Diabetes Sister     Cancer Brother     Amblyopia Neg  Hx     Blindness Neg Hx     Cataracts Neg Hx     Glaucoma Neg Hx     Macular degeneration Neg Hx     Retinal detachment Neg Hx     Strabismus Neg Hx     Stroke Neg Hx     Thyroid disease Neg Hx        Past Surgical History:   Procedure Laterality Date    BREAST BIOPSY Right     benign    CATARACT EXTRACTION W/  INTRAOCULAR LENS IMPLANT Right 08/14/2017    Dr. Moe    CORONARY ARTERY BYPASS GRAFT      FLUOROSCOPY N/A 7/25/2022    Procedure: FLUOROSCOPY/mediport placement;  Surgeon: Cole Perdomo MD;  Location: [facility] CATH LAB;  Service: General;  Laterality: N/A;       Review of Systems   Constitutional:  Positive for fatigue. Negative for activity change, appetite change, chills, diaphoresis, fever and unexpected weight change.   HENT:  Negative for congestion.    Respiratory:  Negative for cough and shortness of breath.    Cardiovascular:  Negative for chest pain and leg swelling.   Gastrointestinal:  Negative for abdominal pain, blood in stool, constipation, diarrhea, nausea and vomiting.   Genitourinary:  Negative for hematuria.   Musculoskeletal:  Negative for arthralgias and gait problem.   Skin:  Negative for color change and pallor.   Allergic/Immunologic: Positive for immunocompromised state.   Neurological:  Positive for numbness. Negative for dizziness, weakness, light-headedness and headaches.       Medication List with Changes/Refills   Current Medications    ACETAMINOPHEN (TYLENOL) 325 MG TABLET    Take 325 mg by mouth every 6 (six) hours as needed for Pain.    ALBUTEROL (PROVENTIL/VENTOLIN HFA) 90 MCG/ACTUATION INHALER    Inhale 1-2 puffs into the lungs every 6 (six) hours as needed for Wheezing. Rescue    ALBUTEROL SULFATE (PROAIR RESPICLICK) 90 MCG/ACTUATION AEPB    Inhale 180 mcg into the lungs every 4 (four) hours. Rescue    AMIODARONE (PACERONE) 100 MG TAB    Take 100 mg by mouth once daily.    AMLODIPINE (NORVASC) 10 MG TABLET    Take 10 mg by mouth once daily.    APIXABAN (ELIQUIS) 2.5 MG TAB     Take 2.5 mg by mouth 2 (two) times daily.    ASPIRIN (ECOTRIN) 81 MG EC TABLET    Take 81 mg by mouth once daily.     CAPRON DM 7.5-7.5 MG/5 ML LIQD    TAKE 10-20 ML BY MOUTH EVERY 6-8 HOURS AS NEEDED COUGH    DICLOFENAC SODIUM (VOLTAREN) 1 % GEL    APPLY TOPICALLY 2 GM TO THE AFFECTED AREA 2 TIMES DAILY    GABAPENTIN (NEURONTIN) 300 MG CAPSULE    Take 1 capsule (300 mg total) by mouth 3 (three) times daily.    INSULIN DEGLUDEC (TRESIBA FLEXTOUCH U-200) 200 UNIT/ML (3 ML) INSULIN PEN    Inject 80 Units into the skin once daily.     INV METOPROLOL TARTRATE 25 MG ORAL TABLET (LOPRESSOR) 25 MG TAB    Take by mouth 2 (two) times daily. FOR INVESTIGATIONAL USE ONLY    LEVOTHYROXINE (SYNTHROID) 75 MCG TABLET    Take 75 mcg by mouth once daily.     LOSARTAN (COZAAR) 100 MG TABLET    Take 100 mg by mouth once daily.    LOSARTAN (COZAAR) 25 MG TABLET        OMEPRAZOLE (PRILOSEC) 20 MG CAPSULE    Take 20 mg by mouth once daily.    ONDANSETRON (ZOFRAN) 4 MG TABLET    Take 1 tablet (4 mg total) by mouth every 8 (eight) hours as needed for Nausea.    PREDNISONE (DELTASONE) 10 MG TABLET    Take by mouth.    PROCHLORPERAZINE (COMPAZINE) 5 MG TABLET    Take 1 tablet (5 mg total) by mouth 4 (four) times daily as needed for Nausea.    RENVELA 800 MG TAB    SMARTSI Tablet(s) By Mouth 5 Times Daily    SEVELAMER HCL ORAL    Take by mouth.     Objective:     Vitals:    22 0756   BP: (!) 141/71   Pulse: 65   Temp: 97.9 °F (36.6 °C)       Physical Exam  Vitals reviewed.   Constitutional:       General: She is not in acute distress.     Appearance: She is not ill-appearing, toxic-appearing or diaphoretic.   HENT:      Head: Normocephalic and atraumatic.   Eyes:      Conjunctiva/sclera: Conjunctivae normal.   Cardiovascular:      Rate and Rhythm: Normal rate.      Pulses: Normal pulses.   Abdominal:      General: Bowel sounds are normal.   Musculoskeletal:      Right lower leg: No edema.      Left lower leg: No edema.   Skin:      General: Skin is warm.      Coloration: Skin is not jaundiced or pale.      Findings: No bruising, erythema, lesion or rash.   Neurological:      Mental Status: She is alert.      Motor: No weakness.      Gait: Gait normal.   Psychiatric:         Mood and Affect: Mood normal.         Behavior: Behavior normal.         Thought Content: Thought content normal.          Labs/Results:  Lab Results   Component Value Date    WBC 5.17 09/21/2022    RBC 3.67 (L) 09/21/2022    HGB 10.6 (L) 09/21/2022    HCT 34.0 (L) 09/21/2022    MCV 93 09/21/2022    MCH 28.9 09/21/2022    MCHC 31.2 (L) 09/21/2022    RDW 18.3 (H) 09/21/2022     09/21/2022    MPV 10.6 09/21/2022    GRAN 2.3 09/21/2022    LYMPH 1.4 09/14/2022    LYMPH 41.1 09/14/2022    MONO 0.3 09/14/2022    MONO 7.9 09/14/2022    EOS 0.2 09/14/2022    BASO 0.03 09/14/2022    EOSINOPHIL 7.0 09/14/2022    BASOPHIL 0.9 09/14/2022     CMP  Sodium   Date Value Ref Range Status   09/21/2022 140 136 - 145 mmol/L Final     Potassium   Date Value Ref Range Status   09/21/2022 4.9 3.5 - 5.1 mmol/L Final     Chloride   Date Value Ref Range Status   09/21/2022 105 95 - 110 mmol/L Final     CO2   Date Value Ref Range Status   09/21/2022 25 23 - 29 mmol/L Final     Glucose   Date Value Ref Range Status   09/21/2022 134 (H) 70 - 110 mg/dL Final     BUN   Date Value Ref Range Status   09/21/2022 30 (H) 8 - 23 mg/dL Final     Creatinine   Date Value Ref Range Status   09/21/2022 5.5 (H) 0.5 - 1.4 mg/dL Final     Calcium   Date Value Ref Range Status   09/21/2022 8.6 (L) 8.7 - 10.5 mg/dL Final     Total Protein   Date Value Ref Range Status   09/21/2022 8.4 6.0 - 8.4 g/dL Final     Albumin   Date Value Ref Range Status   09/21/2022 3.1 (L) 3.5 - 5.2 g/dL Final     Total Bilirubin   Date Value Ref Range Status   09/21/2022 0.3 0.1 - 1.0 mg/dL Final     Comment:     For infants and newborns, interpretation of results should be based  on gestational age, weight and in agreement with  clinical  observations.    Premature Infant recommended reference ranges:  Up to 24 hours.............<8.0 mg/dL  Up to 48 hours............<12.0 mg/dL  3-5 days..................<15.0 mg/dL  6-29 days.................<15.0 mg/dL       Alkaline Phosphatase   Date Value Ref Range Status   09/21/2022 73 55 - 135 U/L Final     AST   Date Value Ref Range Status   09/21/2022 30 10 - 40 U/L Final     ALT   Date Value Ref Range Status   09/21/2022 23 10 - 44 U/L Final     Anion Gap   Date Value Ref Range Status   09/21/2022 10 8 - 16 mmol/L Final     eGFR if    Date Value Ref Range Status   07/27/2022 8 (A) >60 mL/min/1.73 m^2 Final     eGFR if non    Date Value Ref Range Status   07/27/2022 7 (A) >60 mL/min/1.73 m^2 Final     Comment:     Calculation used to obtain the estimated glomerular filtration  rate (eGFR) is the CKD-EPI equation.        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 If so, consider d/c Prejeta.  --ANC:2.3, plts:276, tbili:0.3, AST:30 ALT:23 Alk phos:73     Anemia associated with chronic renal failure  --continue with dialysis Tues/Thurs/Sat    Follow-Up: one week with cbc cmp prior for taxol (cycle 2 day 15) then one week for  cycle 3 day 1 with labs prior    Jelena Pruitt PA-C  Hematology Oncology

## 2022-09-21 ENCOUNTER — INFUSION (OUTPATIENT)
Dept: INFUSION THERAPY | Facility: HOSPITAL | Age: 67
End: 2022-09-21
Attending: INTERNAL MEDICINE
Payer: MEDICARE

## 2022-09-21 ENCOUNTER — OFFICE VISIT (OUTPATIENT)
Dept: HEMATOLOGY/ONCOLOGY | Facility: CLINIC | Age: 67
End: 2022-09-21
Payer: MEDICARE

## 2022-09-21 ENCOUNTER — LAB VISIT (OUTPATIENT)
Dept: LAB | Facility: HOSPITAL | Age: 67
End: 2022-09-21
Attending: INTERNAL MEDICINE
Payer: MEDICARE

## 2022-09-21 VITALS
HEIGHT: 62 IN | DIASTOLIC BLOOD PRESSURE: 71 MMHG | WEIGHT: 206.38 LBS | BODY MASS INDEX: 37.98 KG/M2 | OXYGEN SATURATION: 98 % | HEART RATE: 65 BPM | SYSTOLIC BLOOD PRESSURE: 141 MMHG | TEMPERATURE: 98 F

## 2022-09-21 VITALS
DIASTOLIC BLOOD PRESSURE: 70 MMHG | TEMPERATURE: 98 F | OXYGEN SATURATION: 98 % | RESPIRATION RATE: 16 BRPM | HEART RATE: 70 BPM | SYSTOLIC BLOOD PRESSURE: 129 MMHG

## 2022-09-21 DIAGNOSIS — N18.9 ANEMIA ASSOCIATED WITH CHRONIC RENAL FAILURE: ICD-10-CM

## 2022-09-21 DIAGNOSIS — Z17.0 MALIGNANT NEOPLASM OF UPPER-OUTER QUADRANT OF LEFT BREAST IN FEMALE, ESTROGEN RECEPTOR POSITIVE: Primary | ICD-10-CM

## 2022-09-21 DIAGNOSIS — C50.412 MALIGNANT NEOPLASM OF UPPER-OUTER QUADRANT OF LEFT BREAST IN FEMALE, ESTROGEN RECEPTOR POSITIVE: Primary | ICD-10-CM

## 2022-09-21 DIAGNOSIS — Z79.899 IMMUNODEFICIENCY DUE TO CHEMOTHERAPY: Primary | ICD-10-CM

## 2022-09-21 DIAGNOSIS — C50.412 MALIGNANT NEOPLASM OF UPPER-OUTER QUADRANT OF LEFT BREAST IN FEMALE, ESTROGEN RECEPTOR POSITIVE: ICD-10-CM

## 2022-09-21 DIAGNOSIS — D84.821 IMMUNODEFICIENCY DUE TO CHEMOTHERAPY: Primary | ICD-10-CM

## 2022-09-21 DIAGNOSIS — N18.6 ESRD (END STAGE RENAL DISEASE): ICD-10-CM

## 2022-09-21 DIAGNOSIS — Z17.0 MALIGNANT NEOPLASM OF UPPER-OUTER QUADRANT OF LEFT BREAST IN FEMALE, ESTROGEN RECEPTOR POSITIVE: ICD-10-CM

## 2022-09-21 DIAGNOSIS — D63.1 ANEMIA ASSOCIATED WITH CHRONIC RENAL FAILURE: ICD-10-CM

## 2022-09-21 DIAGNOSIS — T45.1X5A IMMUNODEFICIENCY DUE TO CHEMOTHERAPY: Primary | ICD-10-CM

## 2022-09-21 LAB
ALBUMIN SERPL BCP-MCNC: 3.1 G/DL (ref 3.5–5.2)
ALP SERPL-CCNC: 73 U/L (ref 55–135)
ALT SERPL W/O P-5'-P-CCNC: 23 U/L (ref 10–44)
ANION GAP SERPL CALC-SCNC: 10 MMOL/L (ref 8–16)
AST SERPL-CCNC: 30 U/L (ref 10–40)
BILIRUB SERPL-MCNC: 0.3 MG/DL (ref 0.1–1)
BUN SERPL-MCNC: 30 MG/DL (ref 8–23)
CALCIUM SERPL-MCNC: 8.6 MG/DL (ref 8.7–10.5)
CHLORIDE SERPL-SCNC: 105 MMOL/L (ref 95–110)
CO2 SERPL-SCNC: 25 MMOL/L (ref 23–29)
CREAT SERPL-MCNC: 5.5 MG/DL (ref 0.5–1.4)
ERYTHROCYTE [DISTWIDTH] IN BLOOD BY AUTOMATED COUNT: 18.3 % (ref 11.5–14.5)
EST. GFR  (NO RACE VARIABLE): 8 ML/MIN/1.73 M^2
GLUCOSE SERPL-MCNC: 134 MG/DL (ref 70–110)
HCT VFR BLD AUTO: 34 % (ref 37–48.5)
HGB BLD-MCNC: 10.6 G/DL (ref 12–16)
IMM GRANULOCYTES # BLD AUTO: 0.07 K/UL (ref 0–0.04)
MAGNESIUM SERPL-MCNC: 1.9 MG/DL (ref 1.6–2.6)
MCH RBC QN AUTO: 28.9 PG (ref 27–31)
MCHC RBC AUTO-ENTMCNC: 31.2 G/DL (ref 32–36)
MCV RBC AUTO: 93 FL (ref 82–98)
NEUTROPHILS # BLD AUTO: 2.3 K/UL (ref 1.8–7.7)
PLATELET # BLD AUTO: 276 K/UL (ref 150–450)
PMV BLD AUTO: 10.6 FL (ref 9.2–12.9)
POTASSIUM SERPL-SCNC: 4.9 MMOL/L (ref 3.5–5.1)
PROT SERPL-MCNC: 8.4 G/DL (ref 6–8.4)
RBC # BLD AUTO: 3.67 M/UL (ref 4–5.4)
SODIUM SERPL-SCNC: 140 MMOL/L (ref 136–145)
WBC # BLD AUTO: 5.17 K/UL (ref 3.9–12.7)

## 2022-09-21 PROCEDURE — 1125F PR PAIN SEVERITY QUANTIFIED, PAIN PRESENT: ICD-10-PCS | Mod: CPTII,S$GLB,,

## 2022-09-21 PROCEDURE — 99215 OFFICE O/P EST HI 40 MIN: CPT | Mod: S$GLB,,,

## 2022-09-21 PROCEDURE — 36415 COLL VENOUS BLD VENIPUNCTURE: CPT | Performed by: NURSE PRACTITIONER

## 2022-09-21 PROCEDURE — 99215 PR OFFICE/OUTPT VISIT, EST, LEVL V, 40-54 MIN: ICD-10-PCS | Mod: S$GLB,,,

## 2022-09-21 PROCEDURE — 4010F PR ACE/ARB THEARPY RXD/TAKEN: ICD-10-PCS | Mod: CPTII,S$GLB,,

## 2022-09-21 PROCEDURE — 63600175 PHARM REV CODE 636 W HCPCS

## 2022-09-21 PROCEDURE — 80053 COMPREHEN METABOLIC PANEL: CPT | Performed by: NURSE PRACTITIONER

## 2022-09-21 PROCEDURE — 99999 PR PBB SHADOW E&M-EST. PATIENT-LVL IV: CPT | Mod: PBBFAC,,,

## 2022-09-21 PROCEDURE — 3078F DIAST BP <80 MM HG: CPT | Mod: CPTII,S$GLB,,

## 2022-09-21 PROCEDURE — 85027 COMPLETE CBC AUTOMATED: CPT | Performed by: NURSE PRACTITIONER

## 2022-09-21 PROCEDURE — 3077F SYST BP >= 140 MM HG: CPT | Mod: CPTII,S$GLB,,

## 2022-09-21 PROCEDURE — 3008F PR BODY MASS INDEX (BMI) DOCUMENTED: ICD-10-PCS | Mod: CPTII,S$GLB,,

## 2022-09-21 PROCEDURE — 1157F PR ADVANCE CARE PLAN OR EQUIV PRESENT IN MEDICAL RECORD: ICD-10-PCS | Mod: CPTII,S$GLB,,

## 2022-09-21 PROCEDURE — 1157F ADVNC CARE PLAN IN RCRD: CPT | Mod: CPTII,S$GLB,,

## 2022-09-21 PROCEDURE — 1101F PR PT FALLS ASSESS DOC 0-1 FALLS W/OUT INJ PAST YR: ICD-10-PCS | Mod: CPTII,S$GLB,,

## 2022-09-21 PROCEDURE — 1125F AMNT PAIN NOTED PAIN PRSNT: CPT | Mod: CPTII,S$GLB,,

## 2022-09-21 PROCEDURE — 25000003 PHARM REV CODE 250

## 2022-09-21 PROCEDURE — 3077F PR MOST RECENT SYSTOLIC BLOOD PRESSURE >= 140 MM HG: ICD-10-PCS | Mod: CPTII,S$GLB,,

## 2022-09-21 PROCEDURE — 3078F PR MOST RECENT DIASTOLIC BLOOD PRESSURE < 80 MM HG: ICD-10-PCS | Mod: CPTII,S$GLB,,

## 2022-09-21 PROCEDURE — 96367 TX/PROPH/DG ADDL SEQ IV INF: CPT

## 2022-09-21 PROCEDURE — 99999 PR PBB SHADOW E&M-EST. PATIENT-LVL IV: ICD-10-PCS | Mod: PBBFAC,,,

## 2022-09-21 PROCEDURE — 3008F BODY MASS INDEX DOCD: CPT | Mod: CPTII,S$GLB,,

## 2022-09-21 PROCEDURE — 3288F PR FALLS RISK ASSESSMENT DOCUMENTED: ICD-10-PCS | Mod: CPTII,S$GLB,,

## 2022-09-21 PROCEDURE — 4010F ACE/ARB THERAPY RXD/TAKEN: CPT | Mod: CPTII,S$GLB,,

## 2022-09-21 PROCEDURE — 96375 TX/PRO/DX INJ NEW DRUG ADDON: CPT

## 2022-09-21 PROCEDURE — 1101F PT FALLS ASSESS-DOCD LE1/YR: CPT | Mod: CPTII,S$GLB,,

## 2022-09-21 PROCEDURE — 3288F FALL RISK ASSESSMENT DOCD: CPT | Mod: CPTII,S$GLB,,

## 2022-09-21 PROCEDURE — 1159F MED LIST DOCD IN RCRD: CPT | Mod: CPTII,S$GLB,,

## 2022-09-21 PROCEDURE — 83735 ASSAY OF MAGNESIUM: CPT | Performed by: NURSE PRACTITIONER

## 2022-09-21 PROCEDURE — 96413 CHEMO IV INFUSION 1 HR: CPT

## 2022-09-21 PROCEDURE — 1159F PR MEDICATION LIST DOCUMENTED IN MEDICAL RECORD: ICD-10-PCS | Mod: CPTII,S$GLB,,

## 2022-09-21 RX ORDER — SODIUM CHLORIDE 0.9 % (FLUSH) 0.9 %
10 SYRINGE (ML) INJECTION
Status: CANCELLED | OUTPATIENT
Start: 2022-09-21

## 2022-09-21 RX ORDER — FAMOTIDINE 10 MG/ML
20 INJECTION INTRAVENOUS
Status: CANCELLED | OUTPATIENT
Start: 2022-09-21

## 2022-09-21 RX ORDER — ONDANSETRON 2 MG/ML
4 INJECTION INTRAMUSCULAR; INTRAVENOUS ONCE
Status: COMPLETED | OUTPATIENT
Start: 2022-09-21 | End: 2022-09-21

## 2022-09-21 RX ORDER — HEPARIN 100 UNIT/ML
500 SYRINGE INTRAVENOUS
Status: DISCONTINUED | OUTPATIENT
Start: 2022-09-21 | End: 2022-09-21 | Stop reason: HOSPADM

## 2022-09-21 RX ORDER — HEPARIN 100 UNIT/ML
500 SYRINGE INTRAVENOUS
Status: CANCELLED | OUTPATIENT
Start: 2022-09-21

## 2022-09-21 RX ORDER — EPINEPHRINE 0.3 MG/.3ML
0.3 INJECTION SUBCUTANEOUS ONCE AS NEEDED
Status: CANCELLED | OUTPATIENT
Start: 2022-09-21

## 2022-09-21 RX ORDER — DIPHENHYDRAMINE HYDROCHLORIDE 50 MG/ML
50 INJECTION INTRAMUSCULAR; INTRAVENOUS ONCE AS NEEDED
Status: CANCELLED | OUTPATIENT
Start: 2022-09-21

## 2022-09-21 RX ORDER — ONDANSETRON 2 MG/ML
4 INJECTION INTRAMUSCULAR; INTRAVENOUS ONCE
Status: CANCELLED | OUTPATIENT
Start: 2022-09-21 | End: 2022-09-21

## 2022-09-21 RX ORDER — FAMOTIDINE 10 MG/ML
20 INJECTION INTRAVENOUS
Status: COMPLETED | OUTPATIENT
Start: 2022-09-21 | End: 2022-09-21

## 2022-09-21 RX ADMIN — DIPHENHYDRAMINE HYDROCHLORIDE 50 MG: 50 INJECTION, SOLUTION INTRAMUSCULAR; INTRAVENOUS at 08:09

## 2022-09-21 RX ADMIN — HEPARIN 500 UNITS: 100 SYRINGE at 11:09

## 2022-09-21 RX ADMIN — SODIUM CHLORIDE: 0.9 INJECTION, SOLUTION INTRAVENOUS at 08:09

## 2022-09-21 RX ADMIN — PACLITAXEL 162 MG: 6 INJECTION, SOLUTION INTRAVENOUS at 10:09

## 2022-09-21 RX ADMIN — ONDANSETRON HYDROCHLORIDE 4 MG: 2 SOLUTION INTRAMUSCULAR; INTRAVENOUS at 08:09

## 2022-09-21 RX ADMIN — DEXAMETHASONE SODIUM PHOSPHATE 10 MG: 4 INJECTION, SOLUTION INTRA-ARTICULAR; INTRALESIONAL; INTRAMUSCULAR; INTRAVENOUS; SOFT TISSUE at 09:09

## 2022-09-21 RX ADMIN — FAMOTIDINE 20 MG: 10 INJECTION INTRAVENOUS at 08:09

## 2022-09-21 NOTE — DISCHARGE INSTRUCTIONS
.[facility]  [address] [address]  [address] [address]  [phone] phone     [phone] fax  Hours of Operation: Monday- Friday 8:00am- 5:00pm  After hours phone  [phone]  Hematology / Oncology Physicians on call    Dr. Severiano Argueta      Nurse Practitioners:    Alexa Ospina, SMITH Carcamo, SMITH Contreras, SMITH Lizarraga, SMITH Bello, SMITH Pruitt, PA      Please don't hesitate to call if you have any concerns.    .FALL PREVENTION   Falls often occur due to slipping, tripping or losing your balance. Here are ways to reduce your risk of falling again.   Was there anything that caused your fall that can be fixed, removed or replaced?   Make your home safe by keeping walkways clear of objects you may trip over.   Use non-slip pads under rugs.   Do not walk in poorly lit areas.   Do not stand on chairs or wobbly ladders.   Use caution when reaching overhead or looking upward. This position can cause a loss of balance.   Be sure your shoes fit properly, have non-slip bottoms and are in good condition.   Be cautious when going up and down stairs, curbs, and when walking on uneven sidewalks.   If your balance is poor, consider using a cane or walker.   If your fall was related to alcohol use, stop or limit alcohol intake.   If your fall was related to use of sleeping medicines, talk to your doctor about this. You may need to reduce your dosage at bedtime if you awaken during the night to go to the bathroom.   To reduce the need for nighttime bathroom trips:   Avoid drinking fluids for several hours before going to bed   Empty your bladder before going to bed   Men can keep a urinal at the bedside   © 1557-8020 [employer], [address] [place] [address], [place] [address]. All rights reserved. This information is not intended as a substitute for professional medical care. Always follow your healthcare  professional's instructions.  .WAYS TO HELP PREVENT INFECTION        WASH YOUR HANDS OFTEN DURING THE DAY, ESPECIALLY BEFORE YOU EAT, AFTER USING THE BATHROOM, AND AFTER TOUCHING ANIMALS    STAY AWAY FROM PEOPLE WHO HAVE ILLNESSES YOU CAN CATCH; SUCH AS COLDS, FLU, CHICKEN POX    TRY TO AVOID CROWDS    STAY AWAY FROM CHILDREN WHO RECENTLY HAVE RECEIVED LIVE VIRUS VACCINES    MAINTAIN GOOD MOUTH CARE    DO NOT SQUEEZE OR SCRATCH PIMPLES    CLEAN CUTS & SCRAPES RIGHT AWAY AND DAILY UNTIL HEALED WITH WARM WATER, SOAP & AN ANTISEPTIC    AVOID CONTACT WITH LITTER BOXES, BIRD CAGES, & FISH TANKS    AVOID STANDING WATER, IE., BIRD BATHS, FLOWER POTS/VASES, OR HUMIDIFIERS    WEAR GLOVES WHEN GARDENING OR CLEANING UP AFTER OTHERS, ESPECIALLY BABIES & SMALL CHILDREN    DO NOT EAT RAW FISH, SEAFOOD, MEAT, OR EGGS

## 2022-09-21 NOTE — PLAN OF CARE
Patient stated she felt pretty good today, no complaints expressed.       Problem: Adult Inpatient Plan of Care  Goal: Plan of Care Review  Outcome: Ongoing, Progressing  Flowsheets (Taken 9/21/2022 0925)  Plan of Care Reviewed With: patient  Goal: Patient-Specific Goal (Individualized)  Outcome: Ongoing, Progressing  Flowsheets (Taken 9/21/2022 0925)  Anxieties, Fears or Concerns: none expressed  Individualized Care Needs: Warm blankets, pillow, feet elevated, and sprite  Patient-Specific Goals (Include Timeframe): To tolerate treatment today.  Goal: Optimal Comfort and Wellbeing  Outcome: Ongoing, Progressing  Intervention: Provide Person-Centered Care  Flowsheets (Taken 9/21/2022 0925)  Trust Relationship/Rapport:   care explained   emotional support provided   questions answered   empathic listening provided   questions encouraged     Problem: Anemia (Chemotherapy Effects)  Goal: Anemia Symptom Improvement  Outcome: Ongoing, Progressing  Intervention: Monitor and Manage Anemia  Flowsheets (Taken 9/21/2022 0925)  Safety Promotion/Fall Prevention:   in recliner, wheels locked   lighting adjusted   medications reviewed   high risk medications identified  Fatigue Management:   activity assistance provided   frequent rest breaks encouraged     Problem: Nausea and Vomiting (Chemotherapy Effects)  Goal: Fluid and Electrolyte Balance  Outcome: Ongoing, Progressing  Intervention: Prevent and Manage Nausea and Vomiting  Flowsheets (Taken 9/21/2022 0925)  Environmental Support: calm environment promoted     Problem: Neutropenia (Chemotherapy Effects)  Goal: Absence of Infection  Outcome: Ongoing, Progressing  Intervention: Prevent Infection and Maximize Resistance  Flowsheets (Taken 9/21/2022 0925)  Infection Prevention:   equipment surfaces disinfected   hand hygiene promoted   personal protective equipment utilized

## 2022-09-28 ENCOUNTER — LAB VISIT (OUTPATIENT)
Dept: LAB | Facility: HOSPITAL | Age: 67
End: 2022-09-28
Payer: MEDICARE

## 2022-09-28 ENCOUNTER — INFUSION (OUTPATIENT)
Dept: INFUSION THERAPY | Facility: HOSPITAL | Age: 67
End: 2022-09-28
Payer: MEDICARE

## 2022-09-28 ENCOUNTER — OFFICE VISIT (OUTPATIENT)
Dept: HEMATOLOGY/ONCOLOGY | Facility: CLINIC | Age: 67
End: 2022-09-28
Payer: MEDICARE

## 2022-09-28 VITALS
SYSTOLIC BLOOD PRESSURE: 131 MMHG | HEART RATE: 64 BPM | HEIGHT: 62 IN | WEIGHT: 206.13 LBS | BODY MASS INDEX: 37.93 KG/M2 | DIASTOLIC BLOOD PRESSURE: 67 MMHG | TEMPERATURE: 97 F

## 2022-09-28 VITALS
RESPIRATION RATE: 16 BRPM | SYSTOLIC BLOOD PRESSURE: 128 MMHG | DIASTOLIC BLOOD PRESSURE: 62 MMHG | TEMPERATURE: 97 F | OXYGEN SATURATION: 97 % | HEART RATE: 69 BPM

## 2022-09-28 DIAGNOSIS — C50.412 MALIGNANT NEOPLASM OF UPPER-OUTER QUADRANT OF LEFT BREAST IN FEMALE, ESTROGEN RECEPTOR POSITIVE: ICD-10-CM

## 2022-09-28 DIAGNOSIS — C50.412 MALIGNANT NEOPLASM OF UPPER-OUTER QUADRANT OF LEFT BREAST IN FEMALE, ESTROGEN RECEPTOR POSITIVE: Primary | ICD-10-CM

## 2022-09-28 DIAGNOSIS — D84.821 IMMUNODEFICIENCY DUE TO CHEMOTHERAPY: ICD-10-CM

## 2022-09-28 DIAGNOSIS — Z17.0 MALIGNANT NEOPLASM OF UPPER-OUTER QUADRANT OF LEFT BREAST IN FEMALE, ESTROGEN RECEPTOR POSITIVE: Primary | ICD-10-CM

## 2022-09-28 DIAGNOSIS — N18.9 ANEMIA ASSOCIATED WITH CHRONIC RENAL FAILURE: ICD-10-CM

## 2022-09-28 DIAGNOSIS — T45.1X5A IMMUNODEFICIENCY DUE TO CHEMOTHERAPY: ICD-10-CM

## 2022-09-28 DIAGNOSIS — D63.1 ANEMIA ASSOCIATED WITH CHRONIC RENAL FAILURE: ICD-10-CM

## 2022-09-28 DIAGNOSIS — Z17.0 MALIGNANT NEOPLASM OF UPPER-OUTER QUADRANT OF LEFT BREAST IN FEMALE, ESTROGEN RECEPTOR POSITIVE: ICD-10-CM

## 2022-09-28 DIAGNOSIS — Z79.899 IMMUNODEFICIENCY DUE TO CHEMOTHERAPY: ICD-10-CM

## 2022-09-28 DIAGNOSIS — N18.6 ESRD (END STAGE RENAL DISEASE): ICD-10-CM

## 2022-09-28 LAB
ALBUMIN SERPL BCP-MCNC: 3.1 G/DL (ref 3.5–5.2)
ALP SERPL-CCNC: 67 U/L (ref 55–135)
ALT SERPL W/O P-5'-P-CCNC: 20 U/L (ref 10–44)
ANION GAP SERPL CALC-SCNC: 10 MMOL/L (ref 8–16)
AST SERPL-CCNC: 18 U/L (ref 10–40)
BASOPHILS # BLD AUTO: 0.04 K/UL (ref 0–0.2)
BASOPHILS NFR BLD: 0.7 % (ref 0–1.9)
BILIRUB SERPL-MCNC: 0.3 MG/DL (ref 0.1–1)
BUN SERPL-MCNC: 35 MG/DL (ref 8–23)
CALCIUM SERPL-MCNC: 8.5 MG/DL (ref 8.7–10.5)
CHLORIDE SERPL-SCNC: 102 MMOL/L (ref 95–110)
CO2 SERPL-SCNC: 26 MMOL/L (ref 23–29)
CREAT SERPL-MCNC: 6 MG/DL (ref 0.5–1.4)
DIFFERENTIAL METHOD: ABNORMAL
EOSINOPHIL # BLD AUTO: 0.3 K/UL (ref 0–0.5)
EOSINOPHIL NFR BLD: 4.4 % (ref 0–8)
ERYTHROCYTE [DISTWIDTH] IN BLOOD BY AUTOMATED COUNT: 17.7 % (ref 11.5–14.5)
EST. GFR  (NO RACE VARIABLE): 7 ML/MIN/1.73 M^2
GLUCOSE SERPL-MCNC: 147 MG/DL (ref 70–110)
HCT VFR BLD AUTO: 30.8 % (ref 37–48.5)
HGB BLD-MCNC: 10 G/DL (ref 12–16)
IMM GRANULOCYTES # BLD AUTO: 0.23 K/UL (ref 0–0.04)
IMM GRANULOCYTES NFR BLD AUTO: 4 % (ref 0–0.5)
LYMPHOCYTES # BLD AUTO: 1.5 K/UL (ref 1–4.8)
LYMPHOCYTES NFR BLD: 26.5 % (ref 18–48)
MCH RBC QN AUTO: 29.2 PG (ref 27–31)
MCHC RBC AUTO-ENTMCNC: 32.5 G/DL (ref 32–36)
MCV RBC AUTO: 90 FL (ref 82–98)
MONOCYTES # BLD AUTO: 0.5 K/UL (ref 0.3–1)
MONOCYTES NFR BLD: 8.7 % (ref 4–15)
NEUTROPHILS # BLD AUTO: 3.2 K/UL (ref 1.8–7.7)
NEUTROPHILS NFR BLD: 55.7 % (ref 38–73)
NRBC BLD-RTO: 0 /100 WBC
PLATELET # BLD AUTO: 290 K/UL (ref 150–450)
PMV BLD AUTO: 10 FL (ref 9.2–12.9)
POTASSIUM SERPL-SCNC: 4.4 MMOL/L (ref 3.5–5.1)
PROT SERPL-MCNC: 7.9 G/DL (ref 6–8.4)
RBC # BLD AUTO: 3.42 M/UL (ref 4–5.4)
SODIUM SERPL-SCNC: 138 MMOL/L (ref 136–145)
WBC # BLD AUTO: 5.74 K/UL (ref 3.9–12.7)

## 2022-09-28 PROCEDURE — 96367 TX/PROPH/DG ADDL SEQ IV INF: CPT

## 2022-09-28 PROCEDURE — 3078F DIAST BP <80 MM HG: CPT | Mod: CPTII,S$GLB,, | Performed by: NURSE PRACTITIONER

## 2022-09-28 PROCEDURE — 80053 COMPREHEN METABOLIC PANEL: CPT

## 2022-09-28 PROCEDURE — 1101F PT FALLS ASSESS-DOCD LE1/YR: CPT | Mod: CPTII,S$GLB,, | Performed by: NURSE PRACTITIONER

## 2022-09-28 PROCEDURE — 63600175 PHARM REV CODE 636 W HCPCS: Performed by: INTERNAL MEDICINE

## 2022-09-28 PROCEDURE — 99999 PR PBB SHADOW E&M-EST. PATIENT-LVL III: CPT | Mod: PBBFAC,,, | Performed by: NURSE PRACTITIONER

## 2022-09-28 PROCEDURE — 99999 PR PBB SHADOW E&M-EST. PATIENT-LVL III: ICD-10-PCS | Mod: PBBFAC,,, | Performed by: NURSE PRACTITIONER

## 2022-09-28 PROCEDURE — 99214 PR OFFICE/OUTPT VISIT, EST, LEVL IV, 30-39 MIN: ICD-10-PCS | Mod: 25,S$GLB,, | Performed by: NURSE PRACTITIONER

## 2022-09-28 PROCEDURE — 3008F PR BODY MASS INDEX (BMI) DOCUMENTED: ICD-10-PCS | Mod: CPTII,S$GLB,, | Performed by: NURSE PRACTITIONER

## 2022-09-28 PROCEDURE — 1159F PR MEDICATION LIST DOCUMENTED IN MEDICAL RECORD: ICD-10-PCS | Mod: CPTII,S$GLB,, | Performed by: NURSE PRACTITIONER

## 2022-09-28 PROCEDURE — 96375 TX/PRO/DX INJ NEW DRUG ADDON: CPT

## 2022-09-28 PROCEDURE — 1126F AMNT PAIN NOTED NONE PRSNT: CPT | Mod: CPTII,S$GLB,, | Performed by: NURSE PRACTITIONER

## 2022-09-28 PROCEDURE — 3075F SYST BP GE 130 - 139MM HG: CPT | Mod: CPTII,S$GLB,, | Performed by: NURSE PRACTITIONER

## 2022-09-28 PROCEDURE — 1159F MED LIST DOCD IN RCRD: CPT | Mod: CPTII,S$GLB,, | Performed by: NURSE PRACTITIONER

## 2022-09-28 PROCEDURE — 4010F PR ACE/ARB THEARPY RXD/TAKEN: ICD-10-PCS | Mod: CPTII,S$GLB,, | Performed by: NURSE PRACTITIONER

## 2022-09-28 PROCEDURE — 99214 OFFICE O/P EST MOD 30 MIN: CPT | Mod: 25,S$GLB,, | Performed by: NURSE PRACTITIONER

## 2022-09-28 PROCEDURE — 36415 COLL VENOUS BLD VENIPUNCTURE: CPT

## 2022-09-28 PROCEDURE — 1157F ADVNC CARE PLAN IN RCRD: CPT | Mod: CPTII,S$GLB,, | Performed by: NURSE PRACTITIONER

## 2022-09-28 PROCEDURE — 85025 COMPLETE CBC W/AUTO DIFF WBC: CPT

## 2022-09-28 PROCEDURE — 3288F FALL RISK ASSESSMENT DOCD: CPT | Mod: CPTII,S$GLB,, | Performed by: NURSE PRACTITIONER

## 2022-09-28 PROCEDURE — 3078F PR MOST RECENT DIASTOLIC BLOOD PRESSURE < 80 MM HG: ICD-10-PCS | Mod: CPTII,S$GLB,, | Performed by: NURSE PRACTITIONER

## 2022-09-28 PROCEDURE — 1126F PR PAIN SEVERITY QUANTIFIED, NO PAIN PRESENT: ICD-10-PCS | Mod: CPTII,S$GLB,, | Performed by: NURSE PRACTITIONER

## 2022-09-28 PROCEDURE — 1101F PR PT FALLS ASSESS DOC 0-1 FALLS W/OUT INJ PAST YR: ICD-10-PCS | Mod: CPTII,S$GLB,, | Performed by: NURSE PRACTITIONER

## 2022-09-28 PROCEDURE — 1157F PR ADVANCE CARE PLAN OR EQUIV PRESENT IN MEDICAL RECORD: ICD-10-PCS | Mod: CPTII,S$GLB,, | Performed by: NURSE PRACTITIONER

## 2022-09-28 PROCEDURE — 1160F PR REVIEW ALL MEDS BY PRESCRIBER/CLIN PHARMACIST DOCUMENTED: ICD-10-PCS | Mod: CPTII,S$GLB,, | Performed by: NURSE PRACTITIONER

## 2022-09-28 PROCEDURE — 4010F ACE/ARB THERAPY RXD/TAKEN: CPT | Mod: CPTII,S$GLB,, | Performed by: NURSE PRACTITIONER

## 2022-09-28 PROCEDURE — 3288F PR FALLS RISK ASSESSMENT DOCUMENTED: ICD-10-PCS | Mod: CPTII,S$GLB,, | Performed by: NURSE PRACTITIONER

## 2022-09-28 PROCEDURE — 3008F BODY MASS INDEX DOCD: CPT | Mod: CPTII,S$GLB,, | Performed by: NURSE PRACTITIONER

## 2022-09-28 PROCEDURE — 1160F RVW MEDS BY RX/DR IN RCRD: CPT | Mod: CPTII,S$GLB,, | Performed by: NURSE PRACTITIONER

## 2022-09-28 PROCEDURE — 3075F PR MOST RECENT SYSTOLIC BLOOD PRESS GE 130-139MM HG: ICD-10-PCS | Mod: CPTII,S$GLB,, | Performed by: NURSE PRACTITIONER

## 2022-09-28 PROCEDURE — 96413 CHEMO IV INFUSION 1 HR: CPT

## 2022-09-28 PROCEDURE — 25000003 PHARM REV CODE 250: Performed by: INTERNAL MEDICINE

## 2022-09-28 RX ORDER — FAMOTIDINE 10 MG/ML
20 INJECTION INTRAVENOUS
Status: COMPLETED | OUTPATIENT
Start: 2022-09-28 | End: 2022-09-28

## 2022-09-28 RX ORDER — NEOMYCIN SULFATE, POLYMYXIN B SULFATE AND DEXAMETHASONE 3.5; 10000; 1 MG/ML; [USP'U]/ML; MG/ML
1 SUSPENSION/ DROPS OPHTHALMIC 2 TIMES DAILY
COMMUNITY
Start: 2022-09-26 | End: 2023-02-10

## 2022-09-28 RX ORDER — ONDANSETRON 2 MG/ML
4 INJECTION INTRAMUSCULAR; INTRAVENOUS ONCE
Status: COMPLETED | OUTPATIENT
Start: 2022-09-28 | End: 2022-09-28

## 2022-09-28 RX ORDER — HEPARIN 100 UNIT/ML
500 SYRINGE INTRAVENOUS
Status: DISCONTINUED | OUTPATIENT
Start: 2022-09-28 | End: 2022-09-28 | Stop reason: HOSPADM

## 2022-09-28 RX ADMIN — DEXAMETHASONE SODIUM PHOSPHATE 10 MG: 4 INJECTION, SOLUTION INTRA-ARTICULAR; INTRALESIONAL; INTRAMUSCULAR; INTRAVENOUS; SOFT TISSUE at 02:09

## 2022-09-28 RX ADMIN — FAMOTIDINE 20 MG: 10 INJECTION INTRAVENOUS at 01:09

## 2022-09-28 RX ADMIN — HEPARIN 500 UNITS: 100 SYRINGE at 03:09

## 2022-09-28 RX ADMIN — ONDANSETRON HYDROCHLORIDE 4 MG: 2 SOLUTION INTRAMUSCULAR; INTRAVENOUS at 01:09

## 2022-09-28 RX ADMIN — DIPHENHYDRAMINE HYDROCHLORIDE 50 MG: 50 INJECTION, SOLUTION INTRAMUSCULAR; INTRAVENOUS at 01:09

## 2022-09-28 RX ADMIN — PACLITAXEL 162 MG: 6 INJECTION, SOLUTION INTRAVENOUS at 02:09

## 2022-09-28 NOTE — PLAN OF CARE
Problem: Adult Inpatient Plan of Care  Goal: Plan of Care Review  Outcome: Ongoing, Progressing  Goal: Patient-Specific Goal (Individualized)  Outcome: Ongoing, Progressing  Goal: Optimal Comfort and Wellbeing  Outcome: Ongoing, Progressing  Intervention: Provide Person-Centered Care  Flowsheets (Taken 9/28/2022 4577)  Trust Relationship/Rapport:   care explained   choices provided   emotional support provided   empathic listening provided   questions answered   questions encouraged   reassurance provided   thoughts/feelings acknowledged

## 2022-09-28 NOTE — PROGRESS NOTES
Subjective:       Patient ID: Malaika Paredes is a 67 y.o. female.    Chief Complaint: Review labs. Chemo     Current tx: Taxol weekly. Herceptin/Perjeta Q 3 weeks    HPI: 67 y.o female with locally advanced HER2 positive ER positive breast cancer. She is also on dialysis for ESRD, TThSat. Patient presenting today for consideration of cycle 2 day 15 Taxol. She notes feeling well and is without complaints. She reports tolerating chemotherapy well without any significant side effects.       Social History     Socioeconomic History    Marital status:    Tobacco Use    Smoking status: Former     Types: Cigarettes     Quit date: 6/21/2012     Years since quitting: 10.2    Smokeless tobacco: Never   Substance and Sexual Activity    Alcohol use: No    Drug use: Never       Past Medical History:   Diagnosis Date    Cataract     Cataract     CHF (congestive heart failure)     COPD (chronic obstructive pulmonary disease)     Diabetes mellitus     Diabetic retinopathy     Hypertension     Malignant neoplasm of upper-outer quadrant of left breast in female, estrogen receptor positive 6/20/2022       Family History   Problem Relation Age of Onset    Hypertension Mother     Cancer Father     Breast cancer Sister     Diabetes Sister     Cancer Brother     Amblyopia Neg Hx     Blindness Neg Hx     Cataracts Neg Hx     Glaucoma Neg Hx     Macular degeneration Neg Hx     Retinal detachment Neg Hx     Strabismus Neg Hx     Stroke Neg Hx     Thyroid disease Neg Hx        Past Surgical History:   Procedure Laterality Date    BREAST BIOPSY Right     benign    CATARACT EXTRACTION W/  INTRAOCULAR LENS IMPLANT Right 08/14/2017    Dr. Moe    CORONARY ARTERY BYPASS GRAFT      FLUOROSCOPY N/A 7/25/2022    Procedure: FLUOROSCOPY/mediport placement;  Surgeon: Cole Perdomo MD;  Location: [facility] CATH LAB;  Service: General;  Laterality: N/A;       Review of Systems   Constitutional:  Negative for activity change, appetite change,  chills, fatigue, fever and unexpected weight change.   HENT:  Negative for congestion, mouth sores, nosebleeds, sore throat, trouble swallowing and voice change.    Eyes:  Negative for visual disturbance.   Respiratory:  Negative for cough, chest tightness, shortness of breath and wheezing.    Cardiovascular:  Negative for chest pain and leg swelling.   Gastrointestinal:  Negative for abdominal distention, abdominal pain, blood in stool, constipation, diarrhea, nausea and vomiting.   Genitourinary:  Negative for difficulty urinating, dysuria and hematuria.   Musculoskeletal:  Negative for arthralgias, back pain and myalgias.   Skin:  Negative for pallor, rash and wound.   Neurological:  Negative for dizziness, syncope, weakness and headaches.   Hematological:  Negative for adenopathy. Does not bruise/bleed easily.   Psychiatric/Behavioral:  The patient is not nervous/anxious.        Medication List with Changes/Refills   Current Medications    ACETAMINOPHEN (TYLENOL) 325 MG TABLET    Take 325 mg by mouth every 6 (six) hours as needed for Pain.    ALBUTEROL (PROVENTIL/VENTOLIN HFA) 90 MCG/ACTUATION INHALER    Inhale 1-2 puffs into the lungs every 6 (six) hours as needed for Wheezing. Rescue    ALBUTEROL SULFATE (PROAIR RESPICLICK) 90 MCG/ACTUATION AEPB    Inhale 180 mcg into the lungs every 4 (four) hours. Rescue    AMIODARONE (PACERONE) 100 MG TAB    Take 100 mg by mouth once daily.    AMLODIPINE (NORVASC) 10 MG TABLET    Take 10 mg by mouth once daily.    APIXABAN (ELIQUIS) 2.5 MG TAB    Take 2.5 mg by mouth 2 (two) times daily.    ASPIRIN (ECOTRIN) 81 MG EC TABLET    Take 81 mg by mouth once daily.     CAPRON DM 7.5-7.5 MG/5 ML LIQD    TAKE 10-20 ML BY MOUTH EVERY 6-8 HOURS AS NEEDED COUGH    DICLOFENAC SODIUM (VOLTAREN) 1 % GEL    APPLY TOPICALLY 2 GM TO THE AFFECTED AREA 2 TIMES DAILY    GABAPENTIN (NEURONTIN) 300 MG CAPSULE    Take 1 capsule (300 mg total) by mouth 3 (three) times daily.    INSULIN DEGLUDEC  (TRESIBA FLEXTOUCH U-200) 200 UNIT/ML (3 ML) INSULIN PEN    Inject 80 Units into the skin once daily.     INV METOPROLOL TARTRATE 25 MG ORAL TABLET (LOPRESSOR) 25 MG TAB    Take by mouth 2 (two) times daily. FOR INVESTIGATIONAL USE ONLY    LEVOTHYROXINE (SYNTHROID) 75 MCG TABLET    Take 75 mcg by mouth once daily.     LOSARTAN (COZAAR) 100 MG TABLET    Take 100 mg by mouth once daily.    LOSARTAN (COZAAR) 25 MG TABLET        NEOMYCIN-POLYMYXIN-DEXAMETHASONE (MAXITROL) 3.5MG/ML-10,000 UNIT/ML-0.1 % DRPS    Place 1 drop into both eyes 2 (two) times daily.    OMEPRAZOLE (PRILOSEC) 20 MG CAPSULE    Take 20 mg by mouth once daily.    ONDANSETRON (ZOFRAN) 4 MG TABLET    Take 1 tablet (4 mg total) by mouth every 8 (eight) hours as needed for Nausea.    PREDNISONE (DELTASONE) 10 MG TABLET    Take by mouth.    PROCHLORPERAZINE (COMPAZINE) 5 MG TABLET    Take 1 tablet (5 mg total) by mouth 4 (four) times daily as needed for Nausea.    RENVELA 800 MG TAB    SMARTSI Tablet(s) By Mouth 5 Times Daily    SEVELAMER HCL ORAL    Take by mouth.     Objective:     Vitals:    22 1318   BP: 131/67   Pulse: 64   Temp: 97.2 °F (36.2 °C)     Lab Results   Component Value Date    WBC 5.74 2022    HGB 10.0 (L) 2022    HCT 30.8 (L) 2022    MCV 90 2022     2022       BMP  Lab Results   Component Value Date     2022    K 4.4 2022     2022    CO2 26 2022    BUN 35 (H) 2022    CREATININE 6.0 (H) 2022    CALCIUM 8.5 (L) 2022    ANIONGAP 10 2022    ESTGFRAFRICA 8 (A) 2022    EGFRNONAA 7 (A) 2022     Lab Results   Component Value Date    ALT 20 2022    AST 18 2022    ALKPHOS 67 2022    BILITOT 0.3 2022         Physical Exam  Vitals reviewed.   Constitutional:       Appearance: She is well-developed.   HENT:      Head: Normocephalic.      Right Ear: Hearing and external ear normal. No drainage.      Left Ear:  Hearing and external ear normal. No drainage.      Nose: Nose normal.   Eyes:      General: Lids are normal. No scleral icterus.        Right eye: No discharge.         Left eye: No discharge.      Conjunctiva/sclera: Conjunctivae normal.   Neck:      Thyroid: No thyroid mass.   Cardiovascular:      Rate and Rhythm: Normal rate and regular rhythm.      Heart sounds: Normal heart sounds.   Pulmonary:      Effort: Pulmonary effort is normal. No respiratory distress.      Breath sounds: Normal breath sounds. No wheezing or rales.   Abdominal:      General: Bowel sounds are normal. There is no distension.      Palpations: Abdomen is soft.      Tenderness: There is no abdominal tenderness.   Genitourinary:     Comments: deferred  Musculoskeletal:         General: Normal range of motion.      Cervical back: Normal range of motion.   Skin:     General: Skin is warm and dry.   Neurological:      Mental Status: She is alert and oriented to person, place, and time.   Psychiatric:         Speech: Speech normal.         Behavior: Behavior normal. Behavior is cooperative.         Thought Content: Thought content normal.        Assessment:     Problem List Items Addressed This Visit          Renal/    ESRD (end stage renal disease)     Continue dialysis per Nephrology             Oncology    Malignant neoplasm of upper-outer quadrant of left breast in female, estrogen receptor positive - Primary     Labs reviewed and stable    Okay to proceed with cycle 2 day 15 Taxol only. F/u 1 week with labs prior for consideration of cycle 3 day 1 Taxol/herceptin/Perjeta. Discussed S&S to report sooner         Relevant Orders    Echo         Plan:     Malignant neoplasm of upper-outer quadrant of left breast in female, estrogen receptor positive  -     Echo; Future    ESRD (end stage renal disease)            PHOEBE Hussein

## 2022-09-28 NOTE — ASSESSMENT & PLAN NOTE
Labs reviewed and stable    Okay to proceed with cycle 2 day 15 Taxol only. F/u 1 week with labs prior for consideration of cycle 3 day 1 Taxol/herceptin/Perjeta. Discussed S&S to report sooner

## 2022-10-05 ENCOUNTER — OFFICE VISIT (OUTPATIENT)
Dept: HEMATOLOGY/ONCOLOGY | Facility: CLINIC | Age: 67
End: 2022-10-05
Payer: MEDICARE

## 2022-10-05 ENCOUNTER — INFUSION (OUTPATIENT)
Dept: INFUSION THERAPY | Facility: HOSPITAL | Age: 67
End: 2022-10-05
Attending: INTERNAL MEDICINE
Payer: MEDICARE

## 2022-10-05 ENCOUNTER — LAB VISIT (OUTPATIENT)
Dept: LAB | Facility: HOSPITAL | Age: 67
End: 2022-10-05
Attending: INTERNAL MEDICINE
Payer: MEDICARE

## 2022-10-05 VITALS
TEMPERATURE: 98 F | RESPIRATION RATE: 18 BRPM | HEART RATE: 75 BPM | DIASTOLIC BLOOD PRESSURE: 58 MMHG | SYSTOLIC BLOOD PRESSURE: 126 MMHG | OXYGEN SATURATION: 95 %

## 2022-10-05 VITALS
SYSTOLIC BLOOD PRESSURE: 111 MMHG | BODY MASS INDEX: 38.14 KG/M2 | TEMPERATURE: 98 F | WEIGHT: 207.25 LBS | OXYGEN SATURATION: 97 % | HEIGHT: 62 IN | HEART RATE: 72 BPM | DIASTOLIC BLOOD PRESSURE: 61 MMHG

## 2022-10-05 DIAGNOSIS — Z79.899 IMMUNODEFICIENCY DUE TO CHEMOTHERAPY: ICD-10-CM

## 2022-10-05 DIAGNOSIS — C50.412 MALIGNANT NEOPLASM OF UPPER-OUTER QUADRANT OF LEFT BREAST IN FEMALE, ESTROGEN RECEPTOR POSITIVE: Primary | ICD-10-CM

## 2022-10-05 DIAGNOSIS — D84.821 IMMUNODEFICIENCY DUE TO CHEMOTHERAPY: ICD-10-CM

## 2022-10-05 DIAGNOSIS — C50.412 MALIGNANT NEOPLASM OF UPPER-OUTER QUADRANT OF LEFT BREAST IN FEMALE, ESTROGEN RECEPTOR POSITIVE: ICD-10-CM

## 2022-10-05 DIAGNOSIS — Z17.0 MALIGNANT NEOPLASM OF UPPER-OUTER QUADRANT OF LEFT BREAST IN FEMALE, ESTROGEN RECEPTOR POSITIVE: ICD-10-CM

## 2022-10-05 DIAGNOSIS — T45.1X5A IMMUNODEFICIENCY DUE TO CHEMOTHERAPY: ICD-10-CM

## 2022-10-05 DIAGNOSIS — N18.6 ESRD (END STAGE RENAL DISEASE): ICD-10-CM

## 2022-10-05 DIAGNOSIS — Z17.0 MALIGNANT NEOPLASM OF UPPER-OUTER QUADRANT OF LEFT BREAST IN FEMALE, ESTROGEN RECEPTOR POSITIVE: Primary | ICD-10-CM

## 2022-10-05 DIAGNOSIS — N18.5 CKD (CHRONIC KIDNEY DISEASE) STAGE 5, GFR LESS THAN 15 ML/MIN: ICD-10-CM

## 2022-10-05 DIAGNOSIS — Z17.0 MALIGNANT NEOPLASM OF LEFT BREAST IN FEMALE, ESTROGEN RECEPTOR POSITIVE, UNSPECIFIED SITE OF BREAST: ICD-10-CM

## 2022-10-05 DIAGNOSIS — N18.9 ANEMIA ASSOCIATED WITH CHRONIC RENAL FAILURE: ICD-10-CM

## 2022-10-05 DIAGNOSIS — R94.6 ABNORMAL RESULTS OF THYROID FUNCTION STUDIES: ICD-10-CM

## 2022-10-05 DIAGNOSIS — C50.912 MALIGNANT NEOPLASM OF LEFT BREAST IN FEMALE, ESTROGEN RECEPTOR POSITIVE, UNSPECIFIED SITE OF BREAST: ICD-10-CM

## 2022-10-05 DIAGNOSIS — D63.1 ANEMIA ASSOCIATED WITH CHRONIC RENAL FAILURE: ICD-10-CM

## 2022-10-05 LAB
ALBUMIN SERPL BCP-MCNC: 3.2 G/DL (ref 3.5–5.2)
ALP SERPL-CCNC: 57 U/L (ref 55–135)
ALT SERPL W/O P-5'-P-CCNC: 18 U/L (ref 10–44)
ANION GAP SERPL CALC-SCNC: 9 MMOL/L (ref 8–16)
AST SERPL-CCNC: 18 U/L (ref 10–40)
BASOPHILS # BLD AUTO: 0.03 K/UL (ref 0–0.2)
BASOPHILS NFR BLD: 0.8 % (ref 0–1.9)
BILIRUB SERPL-MCNC: 0.4 MG/DL (ref 0.1–1)
BUN SERPL-MCNC: 33 MG/DL (ref 8–23)
CALCIUM SERPL-MCNC: 8 MG/DL (ref 8.7–10.5)
CHLORIDE SERPL-SCNC: 104 MMOL/L (ref 95–110)
CO2 SERPL-SCNC: 28 MMOL/L (ref 23–29)
CREAT SERPL-MCNC: 5.7 MG/DL (ref 0.5–1.4)
DIFFERENTIAL METHOD: ABNORMAL
EOSINOPHIL # BLD AUTO: 0.3 K/UL (ref 0–0.5)
EOSINOPHIL NFR BLD: 7.6 % (ref 0–8)
ERYTHROCYTE [DISTWIDTH] IN BLOOD BY AUTOMATED COUNT: 18 % (ref 11.5–14.5)
EST. GFR  (NO RACE VARIABLE): 8 ML/MIN/1.73 M^2
GLUCOSE SERPL-MCNC: 117 MG/DL (ref 70–110)
HCT VFR BLD AUTO: 30.6 % (ref 37–48.5)
HGB BLD-MCNC: 10 G/DL (ref 12–16)
IMM GRANULOCYTES # BLD AUTO: 0.05 K/UL (ref 0–0.04)
IMM GRANULOCYTES NFR BLD AUTO: 1.4 % (ref 0–0.5)
LYMPHOCYTES # BLD AUTO: 1.1 K/UL (ref 1–4.8)
LYMPHOCYTES NFR BLD: 32 % (ref 18–48)
MAGNESIUM SERPL-MCNC: 1.9 MG/DL (ref 1.6–2.6)
MCH RBC QN AUTO: 29.7 PG (ref 27–31)
MCHC RBC AUTO-ENTMCNC: 32.7 G/DL (ref 32–36)
MCV RBC AUTO: 91 FL (ref 82–98)
MONOCYTES # BLD AUTO: 0.3 K/UL (ref 0.3–1)
MONOCYTES NFR BLD: 8.1 % (ref 4–15)
NEUTROPHILS # BLD AUTO: 1.8 K/UL (ref 1.8–7.7)
NEUTROPHILS NFR BLD: 50.1 % (ref 38–73)
NRBC BLD-RTO: 1 /100 WBC
PLATELET # BLD AUTO: 300 K/UL (ref 150–450)
PMV BLD AUTO: 11.3 FL (ref 9.2–12.9)
POTASSIUM SERPL-SCNC: 4.9 MMOL/L (ref 3.5–5.1)
PROT SERPL-MCNC: 8.3 G/DL (ref 6–8.4)
RBC # BLD AUTO: 3.37 M/UL (ref 4–5.4)
SODIUM SERPL-SCNC: 141 MMOL/L (ref 136–145)
TSH SERPL DL<=0.005 MIU/L-ACNC: 2.81 UIU/ML (ref 0.4–4)
WBC # BLD AUTO: 3.56 K/UL (ref 3.9–12.7)

## 2022-10-05 PROCEDURE — 3074F SYST BP LT 130 MM HG: CPT | Mod: CPTII,S$GLB,, | Performed by: INTERNAL MEDICINE

## 2022-10-05 PROCEDURE — 96375 TX/PRO/DX INJ NEW DRUG ADDON: CPT

## 2022-10-05 PROCEDURE — 63600175 PHARM REV CODE 636 W HCPCS: Performed by: INTERNAL MEDICINE

## 2022-10-05 PROCEDURE — 80053 COMPREHEN METABOLIC PANEL: CPT | Performed by: INTERNAL MEDICINE

## 2022-10-05 PROCEDURE — 1101F PT FALLS ASSESS-DOCD LE1/YR: CPT | Mod: CPTII,S$GLB,, | Performed by: INTERNAL MEDICINE

## 2022-10-05 PROCEDURE — 3078F PR MOST RECENT DIASTOLIC BLOOD PRESSURE < 80 MM HG: ICD-10-PCS | Mod: CPTII,S$GLB,, | Performed by: INTERNAL MEDICINE

## 2022-10-05 PROCEDURE — 3288F PR FALLS RISK ASSESSMENT DOCUMENTED: ICD-10-PCS | Mod: CPTII,S$GLB,, | Performed by: INTERNAL MEDICINE

## 2022-10-05 PROCEDURE — 4010F ACE/ARB THERAPY RXD/TAKEN: CPT | Mod: CPTII,S$GLB,, | Performed by: INTERNAL MEDICINE

## 2022-10-05 PROCEDURE — 3008F BODY MASS INDEX DOCD: CPT | Mod: CPTII,S$GLB,, | Performed by: INTERNAL MEDICINE

## 2022-10-05 PROCEDURE — 3288F FALL RISK ASSESSMENT DOCD: CPT | Mod: CPTII,S$GLB,, | Performed by: INTERNAL MEDICINE

## 2022-10-05 PROCEDURE — 99999 PR PBB SHADOW E&M-EST. PATIENT-LVL V: ICD-10-PCS | Mod: PBBFAC,,, | Performed by: INTERNAL MEDICINE

## 2022-10-05 PROCEDURE — 99999 PR PBB SHADOW E&M-EST. PATIENT-LVL V: CPT | Mod: PBBFAC,,, | Performed by: INTERNAL MEDICINE

## 2022-10-05 PROCEDURE — 36415 COLL VENOUS BLD VENIPUNCTURE: CPT | Performed by: INTERNAL MEDICINE

## 2022-10-05 PROCEDURE — 25000003 PHARM REV CODE 250: Performed by: INTERNAL MEDICINE

## 2022-10-05 PROCEDURE — 96417 CHEMO IV INFUS EACH ADDL SEQ: CPT

## 2022-10-05 PROCEDURE — 99215 OFFICE O/P EST HI 40 MIN: CPT | Mod: 25,S$GLB,, | Performed by: INTERNAL MEDICINE

## 2022-10-05 PROCEDURE — 96413 CHEMO IV INFUSION 1 HR: CPT

## 2022-10-05 PROCEDURE — 1126F AMNT PAIN NOTED NONE PRSNT: CPT | Mod: CPTII,S$GLB,, | Performed by: INTERNAL MEDICINE

## 2022-10-05 PROCEDURE — 84443 ASSAY THYROID STIM HORMONE: CPT | Performed by: INTERNAL MEDICINE

## 2022-10-05 PROCEDURE — 1101F PR PT FALLS ASSESS DOC 0-1 FALLS W/OUT INJ PAST YR: ICD-10-PCS | Mod: CPTII,S$GLB,, | Performed by: INTERNAL MEDICINE

## 2022-10-05 PROCEDURE — 1157F PR ADVANCE CARE PLAN OR EQUIV PRESENT IN MEDICAL RECORD: ICD-10-PCS | Mod: CPTII,S$GLB,, | Performed by: INTERNAL MEDICINE

## 2022-10-05 PROCEDURE — 1157F ADVNC CARE PLAN IN RCRD: CPT | Mod: CPTII,S$GLB,, | Performed by: INTERNAL MEDICINE

## 2022-10-05 PROCEDURE — 3078F DIAST BP <80 MM HG: CPT | Mod: CPTII,S$GLB,, | Performed by: INTERNAL MEDICINE

## 2022-10-05 PROCEDURE — 96367 TX/PROPH/DG ADDL SEQ IV INF: CPT

## 2022-10-05 PROCEDURE — 4010F PR ACE/ARB THEARPY RXD/TAKEN: ICD-10-PCS | Mod: CPTII,S$GLB,, | Performed by: INTERNAL MEDICINE

## 2022-10-05 PROCEDURE — 3008F PR BODY MASS INDEX (BMI) DOCUMENTED: ICD-10-PCS | Mod: CPTII,S$GLB,, | Performed by: INTERNAL MEDICINE

## 2022-10-05 PROCEDURE — 99215 PR OFFICE/OUTPT VISIT, EST, LEVL V, 40-54 MIN: ICD-10-PCS | Mod: 25,S$GLB,, | Performed by: INTERNAL MEDICINE

## 2022-10-05 PROCEDURE — 83735 ASSAY OF MAGNESIUM: CPT

## 2022-10-05 PROCEDURE — 85025 COMPLETE CBC W/AUTO DIFF WBC: CPT | Performed by: INTERNAL MEDICINE

## 2022-10-05 PROCEDURE — 1126F PR PAIN SEVERITY QUANTIFIED, NO PAIN PRESENT: ICD-10-PCS | Mod: CPTII,S$GLB,, | Performed by: INTERNAL MEDICINE

## 2022-10-05 PROCEDURE — 3074F PR MOST RECENT SYSTOLIC BLOOD PRESSURE < 130 MM HG: ICD-10-PCS | Mod: CPTII,S$GLB,, | Performed by: INTERNAL MEDICINE

## 2022-10-05 RX ORDER — PREDNISOLONE ACETATE 10 MG/ML
1 SUSPENSION/ DROPS OPHTHALMIC 4 TIMES DAILY
COMMUNITY
Start: 2022-10-03 | End: 2023-11-16

## 2022-10-05 RX ORDER — FAMOTIDINE 10 MG/ML
20 INJECTION INTRAVENOUS
Status: CANCELLED | OUTPATIENT
Start: 2022-10-19

## 2022-10-05 RX ORDER — FAMOTIDINE 10 MG/ML
20 INJECTION INTRAVENOUS
Status: COMPLETED | OUTPATIENT
Start: 2022-10-05 | End: 2022-10-05

## 2022-10-05 RX ORDER — EPINEPHRINE 0.3 MG/.3ML
0.3 INJECTION SUBCUTANEOUS ONCE AS NEEDED
Status: CANCELLED | OUTPATIENT
Start: 2022-10-12

## 2022-10-05 RX ORDER — ONDANSETRON 2 MG/ML
4 INJECTION INTRAMUSCULAR; INTRAVENOUS ONCE
Status: COMPLETED | OUTPATIENT
Start: 2022-10-05 | End: 2022-10-05

## 2022-10-05 RX ORDER — HEPARIN 100 UNIT/ML
500 SYRINGE INTRAVENOUS
Status: CANCELLED | OUTPATIENT
Start: 2022-10-12

## 2022-10-05 RX ORDER — SODIUM CHLORIDE 0.9 % (FLUSH) 0.9 %
10 SYRINGE (ML) INJECTION
Status: CANCELLED | OUTPATIENT
Start: 2022-10-19

## 2022-10-05 RX ORDER — ONDANSETRON 2 MG/ML
4 INJECTION INTRAMUSCULAR; INTRAVENOUS ONCE
Status: CANCELLED
Start: 2022-10-12 | End: 2022-10-12

## 2022-10-05 RX ORDER — ONDANSETRON 2 MG/ML
4 INJECTION INTRAMUSCULAR; INTRAVENOUS ONCE
Status: CANCELLED
Start: 2022-10-19 | End: 2022-10-19

## 2022-10-05 RX ORDER — ONDANSETRON 2 MG/ML
4 INJECTION INTRAMUSCULAR; INTRAVENOUS ONCE
Status: CANCELLED
Start: 2022-10-05 | End: 2022-10-05

## 2022-10-05 RX ORDER — HEPARIN 100 UNIT/ML
500 SYRINGE INTRAVENOUS
Status: CANCELLED | OUTPATIENT
Start: 2022-10-19

## 2022-10-05 RX ORDER — HEPARIN 100 UNIT/ML
500 SYRINGE INTRAVENOUS
Status: DISCONTINUED | OUTPATIENT
Start: 2022-10-05 | End: 2022-10-05 | Stop reason: HOSPADM

## 2022-10-05 RX ORDER — HEPARIN 100 UNIT/ML
500 SYRINGE INTRAVENOUS
Status: CANCELLED | OUTPATIENT
Start: 2022-10-05

## 2022-10-05 RX ORDER — SODIUM CHLORIDE 0.9 % (FLUSH) 0.9 %
10 SYRINGE (ML) INJECTION
Status: CANCELLED | OUTPATIENT
Start: 2022-10-12

## 2022-10-05 RX ORDER — FAMOTIDINE 10 MG/ML
20 INJECTION INTRAVENOUS
Status: CANCELLED | OUTPATIENT
Start: 2022-10-05

## 2022-10-05 RX ORDER — DIPHENHYDRAMINE HYDROCHLORIDE 50 MG/ML
50 INJECTION INTRAMUSCULAR; INTRAVENOUS ONCE AS NEEDED
Status: CANCELLED | OUTPATIENT
Start: 2022-10-19

## 2022-10-05 RX ORDER — EPINEPHRINE 0.3 MG/.3ML
0.3 INJECTION SUBCUTANEOUS ONCE AS NEEDED
Status: CANCELLED | OUTPATIENT
Start: 2022-10-19

## 2022-10-05 RX ORDER — DIPHENHYDRAMINE HYDROCHLORIDE 50 MG/ML
50 INJECTION INTRAMUSCULAR; INTRAVENOUS ONCE AS NEEDED
Status: CANCELLED | OUTPATIENT
Start: 2022-10-12

## 2022-10-05 RX ORDER — EPINEPHRINE 0.3 MG/.3ML
0.3 INJECTION SUBCUTANEOUS ONCE AS NEEDED
Status: CANCELLED | OUTPATIENT
Start: 2022-10-05

## 2022-10-05 RX ORDER — DIPHENHYDRAMINE HYDROCHLORIDE 50 MG/ML
50 INJECTION INTRAMUSCULAR; INTRAVENOUS ONCE AS NEEDED
Status: DISCONTINUED | OUTPATIENT
Start: 2022-10-05 | End: 2022-10-05 | Stop reason: HOSPADM

## 2022-10-05 RX ORDER — KETOROLAC TROMETHAMINE 5 MG/ML
1 SOLUTION OPHTHALMIC 4 TIMES DAILY
COMMUNITY
Start: 2022-10-03 | End: 2023-02-10

## 2022-10-05 RX ORDER — DIPHENHYDRAMINE HYDROCHLORIDE 50 MG/ML
50 INJECTION INTRAMUSCULAR; INTRAVENOUS ONCE AS NEEDED
Status: CANCELLED | OUTPATIENT
Start: 2022-10-05

## 2022-10-05 RX ORDER — FAMOTIDINE 10 MG/ML
20 INJECTION INTRAVENOUS
Status: CANCELLED | OUTPATIENT
Start: 2022-10-12

## 2022-10-05 RX ORDER — EPINEPHRINE 1 MG/ML
0.3 INJECTION, SOLUTION INTRACARDIAC; INTRAMUSCULAR; INTRAVENOUS; SUBCUTANEOUS ONCE AS NEEDED
Status: DISCONTINUED | OUTPATIENT
Start: 2022-10-05 | End: 2022-10-05 | Stop reason: HOSPADM

## 2022-10-05 RX ORDER — SODIUM CHLORIDE 0.9 % (FLUSH) 0.9 %
10 SYRINGE (ML) INJECTION
Status: CANCELLED | OUTPATIENT
Start: 2022-10-05

## 2022-10-05 RX ADMIN — DEXAMETHASONE SODIUM PHOSPHATE 10 MG: 4 INJECTION, SOLUTION INTRA-ARTICULAR; INTRALESIONAL; INTRAMUSCULAR; INTRAVENOUS; SOFT TISSUE at 11:10

## 2022-10-05 RX ADMIN — TRASTUZUMAB-ANNS 554 MG: 150 INJECTION, POWDER, LYOPHILIZED, FOR SOLUTION INTRAVENOUS at 11:10

## 2022-10-05 RX ADMIN — DIPHENHYDRAMINE HYDROCHLORIDE 50 MG: 50 INJECTION, SOLUTION INTRAMUSCULAR; INTRAVENOUS at 11:10

## 2022-10-05 RX ADMIN — FAMOTIDINE 20 MG: 10 INJECTION INTRAVENOUS at 12:10

## 2022-10-05 RX ADMIN — PACLITAXEL 162 MG: 6 INJECTION, SOLUTION INTRAVENOUS at 02:10

## 2022-10-05 RX ADMIN — ONDANSETRON HYDROCHLORIDE 4 MG: 2 SOLUTION INTRAMUSCULAR; INTRAVENOUS at 09:10

## 2022-10-05 RX ADMIN — PERTUZUMAB 420 MG: 30 INJECTION, SOLUTION, CONCENTRATE INTRAVENOUS at 09:10

## 2022-10-05 RX ADMIN — HEPARIN 500 UNITS: 100 SYRINGE at 03:10

## 2022-10-05 NOTE — PROGRESS NOTES
Subjective:       Patient ID: Malaika Paredes is a 67 y.o. female.    Chief Complaint: Results, Breast Cancer, and Chemotherapy    HPI:  67-year-old female history of stage IIB ERPR positive HER2 positive breast carcinoma currently presents for cycle 3 day 1 TH P.  Tolerating therapy well denies nausea vomiting fevers chills night sweats ECOG status 1 continues on dialysis    Past Medical History:   Diagnosis Date    Cataract     Cataract     CHF (congestive heart failure)     COPD (chronic obstructive pulmonary disease)     Diabetes mellitus     Diabetic retinopathy     Hypertension     Malignant neoplasm of upper-outer quadrant of left breast in female, estrogen receptor positive 6/20/2022     Family History   Problem Relation Age of Onset    Hypertension Mother     Cancer Father     Breast cancer Sister     Diabetes Sister     Cancer Brother     Amblyopia Neg Hx     Blindness Neg Hx     Cataracts Neg Hx     Glaucoma Neg Hx     Macular degeneration Neg Hx     Retinal detachment Neg Hx     Strabismus Neg Hx     Stroke Neg Hx     Thyroid disease Neg Hx      Social History     Socioeconomic History    Marital status:    Tobacco Use    Smoking status: Former     Types: Cigarettes     Quit date: 6/21/2012     Years since quitting: 10.2    Smokeless tobacco: Never   Substance and Sexual Activity    Alcohol use: No    Drug use: Never     Past Surgical History:   Procedure Laterality Date    BREAST BIOPSY Right     benign    CATARACT EXTRACTION W/  INTRAOCULAR LENS IMPLANT Right 08/14/2017    Dr. Moe    CORONARY ARTERY BYPASS GRAFT      FLUOROSCOPY N/A 7/25/2022    Procedure: FLUOROSCOPY/mediport placement;  Surgeon: Cole Perdomo MD;  Location: [facility] CATH LAB;  Service: General;  Laterality: N/A;       Labs:  Lab Results   Component Value Date    WBC 3.56 (L) 10/05/2022    HGB 10.0 (L) 10/05/2022    HCT 30.6 (L) 10/05/2022    MCV 91 10/05/2022     10/05/2022     BMP  Lab Results   Component Value Date      09/28/2022    K 4.4 09/28/2022     09/28/2022    CO2 26 09/28/2022    BUN 35 (H) 09/28/2022    CREATININE 6.0 (H) 09/28/2022    CALCIUM 8.5 (L) 09/28/2022    ANIONGAP 10 09/28/2022    ESTGFRAFRICA 8 (A) 07/27/2022    EGFRNONAA 7 (A) 07/27/2022     Lab Results   Component Value Date    ALT 20 09/28/2022    AST 18 09/28/2022    ALKPHOS 67 09/28/2022    BILITOT 0.3 09/28/2022       Lab Results   Component Value Date    IRON 43 09/09/2018    TIBC 300 09/09/2018    FERRITIN 1,038 (H) 06/21/2022     Lab Results   Component Value Date    KIRLNBUC93 459 09/09/2018     Lab Results   Component Value Date    FOLATE 6.2 09/09/2018     Lab Results   Component Value Date    TSH 3.243 06/21/2022         Review of Systems   Constitutional:  Negative for activity change, appetite change, chills, diaphoresis, fatigue, fever and unexpected weight change.   HENT:  Negative for congestion, dental problem, drooling, ear discharge, ear pain, facial swelling, hearing loss, mouth sores, nosebleeds, postnasal drip, rhinorrhea, sinus pressure, sneezing, sore throat, tinnitus, trouble swallowing and voice change.    Eyes:  Negative for photophobia, pain, discharge, redness, itching and visual disturbance.   Respiratory:  Negative for cough, choking, chest tightness, shortness of breath, wheezing and stridor.    Cardiovascular:  Negative for chest pain, palpitations and leg swelling.   Gastrointestinal:  Negative for abdominal distention, abdominal pain, anal bleeding, blood in stool, constipation, diarrhea, nausea, rectal pain and vomiting.   Endocrine: Negative for cold intolerance, heat intolerance, polydipsia, polyphagia and polyuria.   Genitourinary:  Negative for decreased urine volume, difficulty urinating, dyspareunia, dysuria, enuresis, flank pain, frequency, genital sores, hematuria, menstrual problem, pelvic pain, urgency, vaginal bleeding, vaginal discharge and vaginal pain.   Musculoskeletal:  Negative for  arthralgias, back pain, gait problem, joint swelling, myalgias, neck pain and neck stiffness.   Skin:  Negative for color change, pallor and rash.   Allergic/Immunologic: Negative for environmental allergies, food allergies and immunocompromised state.   Neurological:  Negative for dizziness, tremors, seizures, syncope, facial asymmetry, speech difficulty, weakness, light-headedness, numbness and headaches.   Hematological:  Negative for adenopathy. Does not bruise/bleed easily.   Psychiatric/Behavioral:  Negative for agitation, behavioral problems, confusion, decreased concentration, dysphoric mood, hallucinations, self-injury, sleep disturbance and suicidal ideas. The patient is not nervous/anxious and is not hyperactive.      Objective:      Physical Exam  Vitals reviewed.   Constitutional:       General: She is not in acute distress.     Appearance: She is well-developed. She is obese. She is not diaphoretic.   HENT:      Head: Normocephalic and atraumatic.      Right Ear: External ear normal.      Left Ear: External ear normal.      Nose: Nose normal.      Right Sinus: No maxillary sinus tenderness or frontal sinus tenderness.      Left Sinus: No maxillary sinus tenderness or frontal sinus tenderness.      Mouth/Throat:      Pharynx: No oropharyngeal exudate.   Eyes:      General: Lids are normal. No scleral icterus.        Right eye: No discharge.         Left eye: No discharge.      Conjunctiva/sclera: Conjunctivae normal.      Right eye: Right conjunctiva is not injected. No hemorrhage.     Left eye: Left conjunctiva is not injected. No hemorrhage.     Pupils: Pupils are equal, round, and reactive to light.   Neck:      Thyroid: No thyromegaly.      Vascular: No JVD.      Trachea: No tracheal deviation.   Cardiovascular:      Rate and Rhythm: Normal rate.   Pulmonary:      Effort: Pulmonary effort is normal. No respiratory distress.      Breath sounds: No stridor.   Chest:      Chest wall: No tenderness.    Abdominal:      General: Bowel sounds are normal. There is no distension.      Palpations: Abdomen is soft. There is no hepatomegaly, splenomegaly or mass.      Tenderness: There is no abdominal tenderness. There is no rebound.   Musculoskeletal:         General: No tenderness. Normal range of motion.      Cervical back: Normal range of motion and neck supple.   Lymphadenopathy:      Cervical: No cervical adenopathy.      Upper Body:      Right upper body: No supraclavicular adenopathy.      Left upper body: No supraclavicular adenopathy.   Skin:     General: Skin is dry.      Findings: No erythema or rash.   Neurological:      Mental Status: She is alert and oriented to person, place, and time.      Cranial Nerves: No cranial nerve deficit.      Coordination: Coordination normal.   Psychiatric:         Behavior: Behavior normal.         Thought Content: Thought content normal.         Judgment: Judgment normal.           Assessment:      1. Malignant neoplasm of upper-outer quadrant of left breast in female, estrogen receptor positive    2. Immunodeficiency due to chemotherapy    3. CKD (chronic kidney disease) stage 5, GFR less than 15 ml/min    4. ESRD (end stage renal disease)           Plan:     Reviewed information with patient will continue to proceed with current treatment as outlined.  Last 2D echo performed yesterday.  Will be seen back by nurse practitioner for next 2 cycles and I will see for cycle 6.  After which will reassess with imaging studies as well as reassessment by surgery for primary surgical intervention.  Discussed implications and answered questions with patient orders written reviewed standing labs placed        Toney العلي Jr, MD FACP

## 2022-10-07 ENCOUNTER — HOSPITAL ENCOUNTER (OUTPATIENT)
Dept: CARDIOLOGY | Facility: HOSPITAL | Age: 67
Discharge: HOME | End: 2022-10-07
Attending: INTERNAL MEDICINE
Payer: MEDICARE

## 2022-10-07 VITALS
BODY MASS INDEX: 38.09 KG/M2 | HEIGHT: 62 IN | WEIGHT: 207 LBS | DIASTOLIC BLOOD PRESSURE: 58 MMHG | SYSTOLIC BLOOD PRESSURE: 126 MMHG

## 2022-10-07 DIAGNOSIS — Z51.81 ENCOUNTER FOR MONITORING CARDIOTOXIC DRUG THERAPY: ICD-10-CM

## 2022-10-07 DIAGNOSIS — Z79.899 ENCOUNTER FOR MONITORING CARDIOTOXIC DRUG THERAPY: ICD-10-CM

## 2022-10-07 PROCEDURE — 93306 ECHO (CUPID ONLY): ICD-10-PCS | Mod: 26,,, | Performed by: INTERNAL MEDICINE

## 2022-10-07 PROCEDURE — 93356 MYOCRD STRAIN IMG SPCKL TRCK: CPT | Mod: ,,, | Performed by: INTERNAL MEDICINE

## 2022-10-07 PROCEDURE — 93356 ECHO (CUPID ONLY): ICD-10-PCS | Mod: ,,, | Performed by: INTERNAL MEDICINE

## 2022-10-07 PROCEDURE — 93306 TTE W/DOPPLER COMPLETE: CPT | Mod: 26,,, | Performed by: INTERNAL MEDICINE

## 2022-10-07 PROCEDURE — 93306 TTE W/DOPPLER COMPLETE: CPT

## 2022-10-08 LAB
AORTIC ROOT ANNULUS: 2.62 CM
ASCENDING AORTA: 2.96 CM
AV INDEX (PROSTH): 0.72
AV MEAN GRADIENT: 7 MMHG
AV PEAK GRADIENT: 13 MMHG
AV VALVE AREA: 1.98 CM2
AV VELOCITY RATIO: 0.68
BSA FOR ECHO PROCEDURE: 2.03 M2
CV ECHO LV RWT: 0.46 CM
DOP CALC AO PEAK VEL: 1.78 M/S
DOP CALC AO VTI: 41.4 CM
DOP CALC LVOT AREA: 2.7 CM2
DOP CALC LVOT DIAMETER: 1.87 CM
DOP CALC LVOT PEAK VEL: 1.21 M/S
DOP CALC LVOT STROKE VOLUME: 81.8 CM3
DOP CALC RVOT PEAK VEL: 0.82 M/S
DOP CALC RVOT VTI: 18.7 CM
DOP CALCLVOT PEAK VEL VTI: 29.8 CM
E WAVE DECELERATION TIME: 191.26 MSEC
E/A RATIO: 1.03
E/E' RATIO: 13.56 M/S
ECHO LV POSTERIOR WALL: 1.01 CM (ref 0.6–1.1)
EJECTION FRACTION: 65 %
FRACTIONAL SHORTENING: 32 % (ref 28–44)
INTERVENTRICULAR SEPTUM: 1 CM (ref 0.6–1.1)
IVC DIAMETER: 1.75 CM
IVRT: 68.51 MSEC
LA MAJOR: 5.36 CM
LA MINOR: 5.33 CM
LA WIDTH: 4.3 CM
LEFT ATRIUM SIZE: 4.15 CM
LEFT ATRIUM VOLUME INDEX MOD: 30 ML/M2
LEFT ATRIUM VOLUME INDEX: 41.8 ML/M2
LEFT ATRIUM VOLUME MOD: 58.11 CM3
LEFT ATRIUM VOLUME: 81.07 CM3
LEFT INTERNAL DIMENSION IN SYSTOLE: 2.99 CM (ref 2.1–4)
LEFT VENTRICLE DIASTOLIC VOLUME INDEX: 45.63 ML/M2
LEFT VENTRICLE DIASTOLIC VOLUME: 88.53 ML
LEFT VENTRICLE MASS INDEX: 77 G/M2
LEFT VENTRICLE SYSTOLIC VOLUME INDEX: 17.9 ML/M2
LEFT VENTRICLE SYSTOLIC VOLUME: 34.75 ML
LEFT VENTRICULAR INTERNAL DIMENSION IN DIASTOLE: 4.42 CM (ref 3.5–6)
LEFT VENTRICULAR MASS: 149.94 G
LV LATERAL E/E' RATIO: 11.09 M/S
LV SEPTAL E/E' RATIO: 17.43 M/S
LVOT MG: 3.77 MMHG
LVOT MV: 0.93 CM/S
MV PEAK A VEL: 1.18 M/S
MV PEAK E VEL: 1.22 M/S
MV STENOSIS PRESSURE HALF TIME: 55.46 MS
MV VALVE AREA P 1/2 METHOD: 3.97 CM2
PISA TR MAX VEL: 3.32 M/S
PULM VEIN S/D RATIO: 0.81
PV MEAN GRADIENT: 1.44 MMHG
PV PEAK D VEL: 0.73 M/S
PV PEAK S VEL: 0.59 M/S
PV PEAK VELOCITY: 1.4 CM/S
QEF: 63 %
RA MAJOR: 4.74 CM
RA PRESSURE: 8 MMHG
RA WIDTH: 4.05 CM
RIGHT VENTRICULAR END-DIASTOLIC DIMENSION: 2.86 CM
SINUS: 2.66 CM
STJ: 2.33 CM
TDI LATERAL: 0.11 M/S
TDI SEPTAL: 0.07 M/S
TDI: 0.09 M/S
TR MAX PG: 44 MMHG
TRICUSPID ANNULAR PLANE SYSTOLIC EXCURSION: 2.14 CM
TV REST PULMONARY ARTERY PRESSURE: 52 MMHG

## 2022-10-11 NOTE — PROGRESS NOTES
Subjective:       Patient ID: Malaika Paredes is a 67 y.o. female.    Chief Complaint: Chemotherapy and Breast Cancer    Primary Oncologist/Hematologist: Dr. العلي     HPI: Ms. Paige is a 67 year old female who is following up for her locally advanced HER2 positive ER positive breast cancer. She is also on dialysis for ESRD, TThSat. She is here for cycle 3 day 8 of Taxotere, Perjecta and Herceptin. She will get all 3 agents q 3 weeks, and taxol weekly.     Cancer Hx: She presented to GYN in 5/2022 with a palpable breast lump present for one month. Diagnostic MMG revealed a left breast 5.6 cm x 4 cm x 4.3 cm mass at the 2 o'clock position. Biopsied proved invasive ductal carcinoma ER >95%, SC 80%, Fve5kph 2+, Ki-67 60%. Left axillary LN biopsy was positive. PET revealed no evidence of distance mets.    Today:Patient states she has had a little cold for about a week. She states she has some cough, congestion, but denies any post nasal drip, sore throat, sob, chest pain. She has not taken anything for it. She has some diarrhea, takes imodium with relief. She denies any fevers, n/v/c, fatigue. She states her appetite is good and she has been staying hydrated. She is taking gabapentin for her neuropathy which seems to be helping.     Social History     Socioeconomic History    Marital status:    Tobacco Use    Smoking status: Former     Types: Cigarettes     Quit date: 6/21/2012     Years since quitting: 10.3    Smokeless tobacco: Never   Substance and Sexual Activity    Alcohol use: No    Drug use: Never       Past Medical History:   Diagnosis Date    Cataract     Cataract     CHF (congestive heart failure)     COPD (chronic obstructive pulmonary disease)     Diabetes mellitus     Diabetic retinopathy     Hypertension     Malignant neoplasm of upper-outer quadrant of left breast in female, estrogen receptor positive 6/20/2022       Family History   Problem Relation Age of Onset    Hypertension Mother      Cancer Father     Breast cancer Sister     Diabetes Sister     Cancer Brother     Amblyopia Neg Hx     Blindness Neg Hx     Cataracts Neg Hx     Glaucoma Neg Hx     Macular degeneration Neg Hx     Retinal detachment Neg Hx     Strabismus Neg Hx     Stroke Neg Hx     Thyroid disease Neg Hx        Past Surgical History:   Procedure Laterality Date    BREAST BIOPSY Right     benign    CATARACT EXTRACTION W/  INTRAOCULAR LENS IMPLANT Right 08/14/2017    Dr. Moe    CORONARY ARTERY BYPASS GRAFT      FLUOROSCOPY N/A 7/25/2022    Procedure: FLUOROSCOPY/mediport placement;  Surgeon: Cole Perdomo MD;  Location: [facility] CATH LAB;  Service: General;  Laterality: N/A;       Review of Systems   Constitutional:  Negative for activity change, appetite change, chills, diaphoresis, fatigue, fever and unexpected weight change.   HENT:  Positive for congestion and rhinorrhea. Negative for mouth sores, nosebleeds, postnasal drip, sinus pressure, sinus pain, sneezing, sore throat and voice change.    Respiratory:  Positive for cough. Negative for shortness of breath.    Cardiovascular:  Negative for chest pain, palpitations and leg swelling.   Gastrointestinal:  Positive for diarrhea. Negative for abdominal pain, blood in stool, constipation, nausea and vomiting.   Genitourinary:  Negative for hematuria.   Musculoskeletal:  Positive for arthralgias. Negative for myalgias.   Skin:  Negative for color change and pallor.   Allergic/Immunologic: Positive for immunocompromised state.   Neurological:  Positive for numbness. Negative for dizziness, weakness, light-headedness and headaches.       Medication List with Changes/Refills   Current Medications    ACETAMINOPHEN (TYLENOL) 325 MG TABLET    Take 325 mg by mouth every 6 (six) hours as needed for Pain.    ALBUTEROL (PROVENTIL/VENTOLIN HFA) 90 MCG/ACTUATION INHALER    Inhale 1-2 puffs into the lungs every 6 (six) hours as needed for Wheezing. Rescue    ALBUTEROL SULFATE (PROAIR RESPICLICK)  90 MCG/ACTUATION AEPB    Inhale 180 mcg into the lungs every 4 (four) hours. Rescue    AMIODARONE (PACERONE) 100 MG TAB    Take 100 mg by mouth once daily.    AMLODIPINE (NORVASC) 10 MG TABLET    Take 10 mg by mouth once daily.    APIXABAN (ELIQUIS) 2.5 MG TAB    Take 2.5 mg by mouth 2 (two) times daily.    ASPIRIN (ECOTRIN) 81 MG EC TABLET    Take 81 mg by mouth once daily.     CAPRON DM 7.5-7.5 MG/5 ML LIQD    TAKE 10-20 ML BY MOUTH EVERY 6-8 HOURS AS NEEDED COUGH    DICLOFENAC SODIUM (VOLTAREN) 1 % GEL    APPLY TOPICALLY 2 GM TO THE AFFECTED AREA 2 TIMES DAILY    GABAPENTIN (NEURONTIN) 300 MG CAPSULE    Take 1 capsule (300 mg total) by mouth 3 (three) times daily.    INSULIN DEGLUDEC (TRESIBA FLEXTOUCH U-200) 200 UNIT/ML (3 ML) INSULIN PEN    Inject 80 Units into the skin once daily.     INV METOPROLOL TARTRATE 25 MG ORAL TABLET (LOPRESSOR) 25 MG TAB    Take by mouth 2 (two) times daily. FOR INVESTIGATIONAL USE ONLY    KETOROLAC 0.5% (ACULAR) 0.5 % DROP    Place 1 drop into both eyes 4 (four) times daily.    LEVOTHYROXINE (SYNTHROID) 75 MCG TABLET    Take 75 mcg by mouth once daily.     LOSARTAN (COZAAR) 100 MG TABLET    Take 100 mg by mouth once daily.    LOSARTAN (COZAAR) 25 MG TABLET        NEOMYCIN-POLYMYXIN-DEXAMETHASONE (MAXITROL) 3.5MG/ML-10,000 UNIT/ML-0.1 % DRPS    Place 1 drop into both eyes 2 (two) times daily.    OMEPRAZOLE (PRILOSEC) 20 MG CAPSULE    Take 20 mg by mouth once daily.    ONDANSETRON (ZOFRAN) 4 MG TABLET    Take 1 tablet (4 mg total) by mouth every 8 (eight) hours as needed for Nausea.    PREDNISOLONE ACETATE (PRED FORTE) 1 % DRPS    Place 1 drop into both eyes 4 (four) times daily.    PREDNISONE (DELTASONE) 10 MG TABLET    Take by mouth.    PROCHLORPERAZINE (COMPAZINE) 5 MG TABLET    Take 1 tablet (5 mg total) by mouth 4 (four) times daily as needed for Nausea.    RENVELA 800 MG TAB    SMARTSI Tablet(s) By Mouth 5 Times Daily    SEVELAMER HCL ORAL    Take by mouth.     Objective:      Vitals:    10/12/22 0835   BP: 125/71   Pulse: 79   Temp: 98.5 °F (36.9 °C)       Physical Exam  Vitals reviewed.   Constitutional:       General: She is not in acute distress.     Appearance: She is not ill-appearing, toxic-appearing or diaphoretic.   HENT:      Head: Normocephalic and atraumatic.   Eyes:      Conjunctiva/sclera: Conjunctivae normal.   Cardiovascular:      Rate and Rhythm: Normal rate.   Pulmonary:      Effort: Pulmonary effort is normal.   Musculoskeletal:      Right lower leg: No edema.      Left lower leg: No edema.   Skin:     General: Skin is warm.      Coloration: Skin is not jaundiced or pale.      Findings: No bruising, erythema, lesion or rash.   Neurological:      Mental Status: She is alert.      Motor: No weakness.      Gait: Gait normal.   Psychiatric:         Mood and Affect: Mood normal.         Behavior: Behavior normal.         Thought Content: Thought content normal.          Labs/Results:  Lab Results   Component Value Date    WBC 5.33 10/12/2022    RBC 3.13 (L) 10/12/2022    HGB 9.5 (L) 10/12/2022    HCT 29.1 (L) 10/12/2022    MCV 93 10/12/2022    MCH 30.4 10/12/2022    MCHC 32.6 10/12/2022    RDW 19.0 (H) 10/12/2022     10/12/2022    MPV 10.0 10/12/2022    GRAN 1.8 10/05/2022    GRAN 50.1 10/05/2022    LYMPH 1.1 10/05/2022    LYMPH 32.0 10/05/2022    MONO 0.3 10/05/2022    MONO 8.1 10/05/2022    EOS 0.3 10/05/2022    BASO 0.03 10/05/2022    EOSINOPHIL 7.6 10/05/2022    BASOPHIL 0.8 10/05/2022     CMP  Sodium   Date Value Ref Range Status   10/12/2022 139 136 - 145 mmol/L Final     Potassium   Date Value Ref Range Status   10/12/2022 4.5 3.5 - 5.1 mmol/L Final     Chloride   Date Value Ref Range Status   10/12/2022 104 95 - 110 mmol/L Final     CO2   Date Value Ref Range Status   10/12/2022 26 23 - 29 mmol/L Final     Glucose   Date Value Ref Range Status   10/12/2022 181 (H) 70 - 110 mg/dL Final     BUN   Date Value Ref Range Status   10/12/2022 24 (H) 8 - 23 mg/dL  Final     Creatinine   Date Value Ref Range Status   10/12/2022 5.2 (H) 0.5 - 1.4 mg/dL Final     Calcium   Date Value Ref Range Status   10/12/2022 8.6 (L) 8.7 - 10.5 mg/dL Final     Total Protein   Date Value Ref Range Status   10/12/2022 8.2 6.0 - 8.4 g/dL Final     Albumin   Date Value Ref Range Status   10/12/2022 3.2 (L) 3.5 - 5.2 g/dL Final     Total Bilirubin   Date Value Ref Range Status   10/12/2022 0.3 0.1 - 1.0 mg/dL Final     Comment:     For infants and newborns, interpretation of results should be based  on gestational age, weight and in agreement with clinical  observations.    Premature Infant recommended reference ranges:  Up to 24 hours.............<8.0 mg/dL  Up to 48 hours............<12.0 mg/dL  3-5 days..................<15.0 mg/dL  6-29 days.................<15.0 mg/dL       Alkaline Phosphatase   Date Value Ref Range Status   10/12/2022 59 55 - 135 U/L Final     AST   Date Value Ref Range Status   10/12/2022 22 10 - 40 U/L Final     ALT   Date Value Ref Range Status   10/12/2022 15 10 - 44 U/L Final     Anion Gap   Date Value Ref Range Status   10/12/2022 9 8 - 16 mmol/L Final     eGFR if    Date Value Ref Range Status   07/27/2022 8 (A) >60 mL/min/1.73 m^2 Final     eGFR if non    Date Value Ref Range Status   07/27/2022 7 (A) >60 mL/min/1.73 m^2 Final     Comment:     Calculation used to obtain the estimated glomerular filtration  rate (eGFR) is the CKD-EPI equation.          Pet scan 7/5/22  Impression:  1. Highly FDG avid mass in the left breast, correlating with known biopsy proven malignancy.  2. Multiple suspicious highly FDG avid left axillary lymph nodes including several interpectoral lymph nodes.  3. Nonspecific low level tracer uptake is seen within multiple bilateral intraparotid, bilateral upper cervical, right axillary, and bilateral inguinal lymph nodes which are potentially reactive in nature.  4. Otherwise, no definitive evidence of distant  metastatic disease.    Assessment:     Problem List Items Addressed This Visit          Neuro    Chemotherapy-induced peripheral neuropathy       Renal/    ESRD (end stage renal disease)       Immunology/Multi System    Immunodeficiency due to chemotherapy       Oncology    Microcytic anemia    Anemia associated with chronic renal failure    Malignant neoplasm of upper-outer quadrant of left breast in female, estrogen receptor positive - Primary     Plan:     Malignant neoplasm of upper-outer quadrant of left breast in female, estrogen receptor positive  --locally advanced disease  --HER2 positive, ER positive   --echo on 10/7/22-E 65%  --germline negative  --cycle 3 day 8 of Taxotere, Herceptin and Perjeta today --taxol only today  --neoadjuvant chemotherapy.  --watch for worsening renal dysfunction. If so, consider d/c Prejeta.  --WBC: 5.33, plts:,374 tbili:0.3, AST:22 ALT:15 Alk phos:59  --after cycle 6 will reassess with imaging and have her see surg onc for possible intervention.     Cold  --dayquil or cold and flu for cold  --fever precautions given  --encourage to stay hydrated  -- mucinex if needed for congestion  --possibly give cough suppressant if needed in future.   --continue to monitor     Anemia associated with chronic renal failure  --continue with dialysis Tues/Thurs/Sat    Follow-Up: one week with cbc cmp prior cycle 3 day 15-standing orders in    Jelena Pruitt PA-C  Hematology Oncology

## 2022-10-12 ENCOUNTER — LAB VISIT (OUTPATIENT)
Dept: LAB | Facility: HOSPITAL | Age: 67
End: 2022-10-12
Attending: INTERNAL MEDICINE
Payer: MEDICARE

## 2022-10-12 ENCOUNTER — OFFICE VISIT (OUTPATIENT)
Dept: HEMATOLOGY/ONCOLOGY | Facility: CLINIC | Age: 67
End: 2022-10-12
Payer: MEDICARE

## 2022-10-12 ENCOUNTER — INFUSION (OUTPATIENT)
Dept: INFUSION THERAPY | Facility: HOSPITAL | Age: 67
End: 2022-10-12
Attending: INTERNAL MEDICINE
Payer: MEDICARE

## 2022-10-12 VITALS
HEIGHT: 62 IN | WEIGHT: 206.56 LBS | OXYGEN SATURATION: 96 % | RESPIRATION RATE: 18 BRPM | HEART RATE: 76 BPM | BODY MASS INDEX: 38.01 KG/M2 | TEMPERATURE: 98 F | DIASTOLIC BLOOD PRESSURE: 62 MMHG | SYSTOLIC BLOOD PRESSURE: 123 MMHG

## 2022-10-12 VITALS
DIASTOLIC BLOOD PRESSURE: 71 MMHG | HEART RATE: 79 BPM | HEIGHT: 62 IN | SYSTOLIC BLOOD PRESSURE: 125 MMHG | WEIGHT: 206.56 LBS | OXYGEN SATURATION: 99 % | TEMPERATURE: 99 F | BODY MASS INDEX: 38.01 KG/M2

## 2022-10-12 DIAGNOSIS — C50.412 MALIGNANT NEOPLASM OF UPPER-OUTER QUADRANT OF LEFT BREAST IN FEMALE, ESTROGEN RECEPTOR POSITIVE: Primary | ICD-10-CM

## 2022-10-12 DIAGNOSIS — D63.1 ANEMIA ASSOCIATED WITH CHRONIC RENAL FAILURE: ICD-10-CM

## 2022-10-12 DIAGNOSIS — N18.6 ESRD (END STAGE RENAL DISEASE): ICD-10-CM

## 2022-10-12 DIAGNOSIS — D50.9 MICROCYTIC ANEMIA: ICD-10-CM

## 2022-10-12 DIAGNOSIS — Z17.0 MALIGNANT NEOPLASM OF UPPER-OUTER QUADRANT OF LEFT BREAST IN FEMALE, ESTROGEN RECEPTOR POSITIVE: Primary | ICD-10-CM

## 2022-10-12 DIAGNOSIS — C50.912 MALIGNANT NEOPLASM OF LEFT BREAST IN FEMALE, ESTROGEN RECEPTOR POSITIVE, UNSPECIFIED SITE OF BREAST: ICD-10-CM

## 2022-10-12 DIAGNOSIS — G62.0 CHEMOTHERAPY-INDUCED PERIPHERAL NEUROPATHY: ICD-10-CM

## 2022-10-12 DIAGNOSIS — T45.1X5A IMMUNODEFICIENCY DUE TO CHEMOTHERAPY: ICD-10-CM

## 2022-10-12 DIAGNOSIS — N18.9 ANEMIA ASSOCIATED WITH CHRONIC RENAL FAILURE: ICD-10-CM

## 2022-10-12 DIAGNOSIS — Z17.0 MALIGNANT NEOPLASM OF LEFT BREAST IN FEMALE, ESTROGEN RECEPTOR POSITIVE, UNSPECIFIED SITE OF BREAST: ICD-10-CM

## 2022-10-12 DIAGNOSIS — Z79.899 IMMUNODEFICIENCY DUE TO CHEMOTHERAPY: ICD-10-CM

## 2022-10-12 DIAGNOSIS — T45.1X5A CHEMOTHERAPY-INDUCED PERIPHERAL NEUROPATHY: ICD-10-CM

## 2022-10-12 DIAGNOSIS — D84.821 IMMUNODEFICIENCY DUE TO CHEMOTHERAPY: ICD-10-CM

## 2022-10-12 LAB
ALBUMIN SERPL BCP-MCNC: 3.2 G/DL (ref 3.5–5.2)
ALP SERPL-CCNC: 59 U/L (ref 55–135)
ALT SERPL W/O P-5'-P-CCNC: 15 U/L (ref 10–44)
ANION GAP SERPL CALC-SCNC: 9 MMOL/L (ref 8–16)
ANISOCYTOSIS BLD QL SMEAR: ABNORMAL
AST SERPL-CCNC: 22 U/L (ref 10–40)
BASO STIPL BLD QL SMEAR: ABNORMAL
BASOPHILS NFR BLD: 0 % (ref 0–1.9)
BILIRUB SERPL-MCNC: 0.3 MG/DL (ref 0.1–1)
BUN SERPL-MCNC: 24 MG/DL (ref 8–23)
CALCIUM SERPL-MCNC: 8.6 MG/DL (ref 8.7–10.5)
CHLORIDE SERPL-SCNC: 104 MMOL/L (ref 95–110)
CO2 SERPL-SCNC: 26 MMOL/L (ref 23–29)
CREAT SERPL-MCNC: 5.2 MG/DL (ref 0.5–1.4)
DIFFERENTIAL METHOD: ABNORMAL
EOSINOPHIL NFR BLD: 3 % (ref 0–8)
ERYTHROCYTE [DISTWIDTH] IN BLOOD BY AUTOMATED COUNT: 19 % (ref 11.5–14.5)
EST. GFR  (NO RACE VARIABLE): 8 ML/MIN/1.73 M^2
GIANT PLATELETS BLD QL SMEAR: PRESENT
GLUCOSE SERPL-MCNC: 181 MG/DL (ref 70–110)
HCT VFR BLD AUTO: 29.1 % (ref 37–48.5)
HGB BLD-MCNC: 9.5 G/DL (ref 12–16)
IMM GRANULOCYTES # BLD AUTO: ABNORMAL K/UL (ref 0–0.04)
IMM GRANULOCYTES NFR BLD AUTO: ABNORMAL % (ref 0–0.5)
LYMPHOCYTES NFR BLD: 24 % (ref 18–48)
MCH RBC QN AUTO: 30.4 PG (ref 27–31)
MCHC RBC AUTO-ENTMCNC: 32.6 G/DL (ref 32–36)
MCV RBC AUTO: 93 FL (ref 82–98)
MONOCYTES NFR BLD: 7 % (ref 4–15)
NEUTROPHILS NFR BLD: 58 % (ref 38–73)
NEUTS BAND NFR BLD MANUAL: 8 %
NRBC BLD-RTO: 0 /100 WBC
PLATELET # BLD AUTO: 374 K/UL (ref 150–450)
PLATELET BLD QL SMEAR: ABNORMAL
PMV BLD AUTO: 10 FL (ref 9.2–12.9)
POLYCHROMASIA BLD QL SMEAR: ABNORMAL
POTASSIUM SERPL-SCNC: 4.5 MMOL/L (ref 3.5–5.1)
PROT SERPL-MCNC: 8.2 G/DL (ref 6–8.4)
RBC # BLD AUTO: 3.13 M/UL (ref 4–5.4)
SMUDGE CELLS BLD QL SMEAR: PRESENT
SODIUM SERPL-SCNC: 139 MMOL/L (ref 136–145)
WBC # BLD AUTO: 5.33 K/UL (ref 3.9–12.7)

## 2022-10-12 PROCEDURE — 99215 PR OFFICE/OUTPT VISIT, EST, LEVL V, 40-54 MIN: ICD-10-PCS | Mod: S$GLB,,,

## 2022-10-12 PROCEDURE — 80053 COMPREHEN METABOLIC PANEL: CPT | Performed by: INTERNAL MEDICINE

## 2022-10-12 PROCEDURE — 1157F ADVNC CARE PLAN IN RCRD: CPT | Mod: CPTII,S$GLB,,

## 2022-10-12 PROCEDURE — 1126F PR PAIN SEVERITY QUANTIFIED, NO PAIN PRESENT: ICD-10-PCS | Mod: CPTII,S$GLB,,

## 2022-10-12 PROCEDURE — 3078F PR MOST RECENT DIASTOLIC BLOOD PRESSURE < 80 MM HG: ICD-10-PCS | Mod: CPTII,S$GLB,,

## 2022-10-12 PROCEDURE — 36415 COLL VENOUS BLD VENIPUNCTURE: CPT | Performed by: INTERNAL MEDICINE

## 2022-10-12 PROCEDURE — 4010F PR ACE/ARB THEARPY RXD/TAKEN: ICD-10-PCS | Mod: CPTII,S$GLB,,

## 2022-10-12 PROCEDURE — 3074F PR MOST RECENT SYSTOLIC BLOOD PRESSURE < 130 MM HG: ICD-10-PCS | Mod: CPTII,S$GLB,,

## 2022-10-12 PROCEDURE — 3288F FALL RISK ASSESSMENT DOCD: CPT | Mod: CPTII,S$GLB,,

## 2022-10-12 PROCEDURE — 4010F ACE/ARB THERAPY RXD/TAKEN: CPT | Mod: CPTII,S$GLB,,

## 2022-10-12 PROCEDURE — 3008F BODY MASS INDEX DOCD: CPT | Mod: CPTII,S$GLB,,

## 2022-10-12 PROCEDURE — 1101F PR PT FALLS ASSESS DOC 0-1 FALLS W/OUT INJ PAST YR: ICD-10-PCS | Mod: CPTII,S$GLB,,

## 2022-10-12 PROCEDURE — 1157F PR ADVANCE CARE PLAN OR EQUIV PRESENT IN MEDICAL RECORD: ICD-10-PCS | Mod: CPTII,S$GLB,,

## 2022-10-12 PROCEDURE — 3008F PR BODY MASS INDEX (BMI) DOCUMENTED: ICD-10-PCS | Mod: CPTII,S$GLB,,

## 2022-10-12 PROCEDURE — 3078F DIAST BP <80 MM HG: CPT | Mod: CPTII,S$GLB,,

## 2022-10-12 PROCEDURE — 25000003 PHARM REV CODE 250: Performed by: INTERNAL MEDICINE

## 2022-10-12 PROCEDURE — 1101F PT FALLS ASSESS-DOCD LE1/YR: CPT | Mod: CPTII,S$GLB,,

## 2022-10-12 PROCEDURE — 99215 OFFICE O/P EST HI 40 MIN: CPT | Mod: S$GLB,,,

## 2022-10-12 PROCEDURE — 85027 COMPLETE CBC AUTOMATED: CPT | Performed by: INTERNAL MEDICINE

## 2022-10-12 PROCEDURE — 3288F PR FALLS RISK ASSESSMENT DOCUMENTED: ICD-10-PCS | Mod: CPTII,S$GLB,,

## 2022-10-12 PROCEDURE — 99999 PR PBB SHADOW E&M-EST. PATIENT-LVL IV: CPT | Mod: PBBFAC,,,

## 2022-10-12 PROCEDURE — 3074F SYST BP LT 130 MM HG: CPT | Mod: CPTII,S$GLB,,

## 2022-10-12 PROCEDURE — 96375 TX/PRO/DX INJ NEW DRUG ADDON: CPT

## 2022-10-12 PROCEDURE — 85007 BL SMEAR W/DIFF WBC COUNT: CPT | Performed by: INTERNAL MEDICINE

## 2022-10-12 PROCEDURE — 99999 PR PBB SHADOW E&M-EST. PATIENT-LVL IV: ICD-10-PCS | Mod: PBBFAC,,,

## 2022-10-12 PROCEDURE — 96367 TX/PROPH/DG ADDL SEQ IV INF: CPT

## 2022-10-12 PROCEDURE — 63600175 PHARM REV CODE 636 W HCPCS: Performed by: INTERNAL MEDICINE

## 2022-10-12 PROCEDURE — 96413 CHEMO IV INFUSION 1 HR: CPT

## 2022-10-12 PROCEDURE — 1126F AMNT PAIN NOTED NONE PRSNT: CPT | Mod: CPTII,S$GLB,,

## 2022-10-12 RX ORDER — ONDANSETRON 2 MG/ML
4 INJECTION INTRAMUSCULAR; INTRAVENOUS ONCE
Status: COMPLETED | OUTPATIENT
Start: 2022-10-12 | End: 2022-10-12

## 2022-10-12 RX ORDER — HEPARIN 100 UNIT/ML
500 SYRINGE INTRAVENOUS
Status: DISCONTINUED | OUTPATIENT
Start: 2022-10-12 | End: 2022-10-12 | Stop reason: HOSPADM

## 2022-10-12 RX ORDER — FAMOTIDINE 10 MG/ML
20 INJECTION INTRAVENOUS
Status: COMPLETED | OUTPATIENT
Start: 2022-10-12 | End: 2022-10-12

## 2022-10-12 RX ADMIN — PACLITAXEL 162 MG: 6 INJECTION, SOLUTION INTRAVENOUS at 10:10

## 2022-10-12 RX ADMIN — ONDANSETRON 4 MG: 2 INJECTION, SOLUTION INTRAMUSCULAR; INTRAVENOUS at 09:10

## 2022-10-12 RX ADMIN — DIPHENHYDRAMINE HYDROCHLORIDE 50 MG: 50 INJECTION, SOLUTION INTRAMUSCULAR; INTRAVENOUS at 09:10

## 2022-10-12 RX ADMIN — HEPARIN 500 UNITS: 100 SYRINGE at 11:10

## 2022-10-12 RX ADMIN — FAMOTIDINE 20 MG: 10 INJECTION INTRAVENOUS at 09:10

## 2022-10-12 RX ADMIN — SODIUM CHLORIDE: 0.9 INJECTION, SOLUTION INTRAVENOUS at 09:10

## 2022-10-12 RX ADMIN — DEXAMETHASONE SODIUM PHOSPHATE 10 MG: 4 INJECTION, SOLUTION INTRA-ARTICULAR; INTRALESIONAL; INTRAMUSCULAR; INTRAVENOUS; SOFT TISSUE at 09:10

## 2022-10-12 NOTE — PLAN OF CARE
Problem: Adult Inpatient Plan of Care  Goal: Plan of Care Review  Outcome: Ongoing, Progressing  Flowsheets (Taken 10/12/2022 0942)  Plan of Care Reviewed With: patient  Goal: Patient-Specific Goal (Individualized)  Outcome: Ongoing, Progressing  Flowsheets (Taken 10/12/2022 0942)  Anxieties, Fears or Concerns: No concerns at this time  Individualized Care Needs: Legs elevated, warm blanket/pillow, diet coke and chips provided  Patient-Specific Goals (Include Timeframe): Pt tolerate taxol chemo without side effects  Goal: Optimal Comfort and Wellbeing  Outcome: Ongoing, Progressing     Problem: Anemia (Chemotherapy Effects)  Goal: Anemia Symptom Improvement  Outcome: Ongoing, Progressing     Problem: Nausea and Vomiting (Chemotherapy Effects)  Goal: Fluid and Electrolyte Balance  Outcome: Ongoing, Progressing     Problem: Neutropenia (Chemotherapy Effects)  Goal: Absence of Infection  Outcome: Ongoing, Progressing     Problem: Thrombocytopenia Bleeding Risk (Chemotherapy Effects)  Goal: Absence of Bleeding  Outcome: Ongoing, Progressing

## 2022-10-12 NOTE — DISCHARGE INSTRUCTIONS
.[facility]  [address] [address]  [address]  [phone] phone     [phone] fax  Hours of Operation: Monday- Friday 8:00am- 5:00pm  After hours phone  [phone]  Hematology / Oncology Physicians on call    Dr. Severiano Mas      Nurse Practitioners:    Alexa Ospina, SMITH Carcamo, SMITH Contreras, SMITH Lizarraga, SMITH Bello, SMITH Pruitt, PA      Please don't hesitate to call if you have any concerns.    .FALL PREVENTION   Falls often occur due to slipping, tripping or losing your balance. Here are ways to reduce your risk of falling again.   Was there anything that caused your fall that can be fixed, removed or replaced?   Make your home safe by keeping walkways clear of objects you may trip over.   Use non-slip pads under rugs.   Do not walk in poorly lit areas.   Do not stand on chairs or wobbly ladders.   Use caution when reaching overhead or looking upward. This position can cause a loss of balance.   Be sure your shoes fit properly, have non-slip bottoms and are in good condition.   Be cautious when going up and down stairs, curbs, and when walking on uneven sidewalks.   If your balance is poor, consider using a cane or walker.   If your fall was related to alcohol use, stop or limit alcohol intake.   If your fall was related to use of sleeping medicines, talk to your doctor about this. You may need to reduce your dosage at bedtime if you awaken during the night to go to the bathroom.   To reduce the need for nighttime bathroom trips:   Avoid drinking fluids for several hours before going to bed   Empty your bladder before going to bed   Men can keep a urinal at the bedside   © 0680-3462 [employer], [address] [place] [address], [place] [address]. All rights reserved. This information is not intended as a substitute for professional medical care. Always follow your healthcare  professional's instructions.  .WAYS TO HELP PREVENT INFECTION        WASH YOUR HANDS OFTEN DURING THE DAY, ESPECIALLY BEFORE YOU EAT, AFTER USING THE BATHROOM, AND AFTER TOUCHING ANIMALS    STAY AWAY FROM PEOPLE WHO HAVE ILLNESSES YOU CAN CATCH; SUCH AS COLDS, FLU, CHICKEN POX    TRY TO AVOID CROWDS    STAY AWAY FROM CHILDREN WHO RECENTLY HAVE RECEIVED LIVE VIRUS VACCINES    MAINTAIN GOOD MOUTH CARE    DO NOT SQUEEZE OR SCRATCH PIMPLES    CLEAN CUTS & SCRAPES RIGHT AWAY AND DAILY UNTIL HEALED WITH WARM WATER, SOAP & AN ANTISEPTIC    AVOID CONTACT WITH LITTER BOXES, BIRD CAGES, & FISH TANKS    AVOID STANDING WATER, IE., BIRD BATHS, FLOWER POTS/VASES, OR HUMIDIFIERS    WEAR GLOVES WHEN GARDENING OR CLEANING UP AFTER OTHERS, ESPECIALLY BABIES & SMALL CHILDREN    DO NOT EAT RAW FISH, SEAFOOD, MEAT, OR EGGS

## 2022-10-14 ENCOUNTER — PATIENT MESSAGE (OUTPATIENT)
Dept: HEMATOLOGY/ONCOLOGY | Facility: CLINIC | Age: 67
End: 2022-10-14
Payer: MEDICARE

## 2022-10-25 NOTE — PROGRESS NOTES
Subjective:       Patient ID: Malaika Paredes is a 67 y.o. female.    Chief Complaint: Chemotherapy and Breast Cancer    Primary Oncologist/Hematologist: Dr. العلي     HPI: Ms. Paige is a 67 year old female who is following up for her locally advanced HER2 positive ER positive breast cancer. She is also on dialysis for ESRD, TThSat. She is here for cycle 3 day 15 of Taxotere, Perjecta and Herceptin. She will get all 3 agents q 3 weeks, and taxol weekly. Due to insurance we are week behind because they would not approve until this week.      Cancer Hx: She presented to GYN in 5/2022 with a palpable breast lump present for one month. Diagnostic MMG revealed a left breast 5.6 cm x 4 cm x 4.3 cm mass at the 2 o'clock position. Biopsied proved invasive ductal carcinoma ER >95%, NY 80%, Ggu9idd 2+, Ki-67 60%. Left axillary LN biopsy was positive. PET revealed no evidence of distance mets    Today: She has some diarrhea, takes imodium with relief- all better now. She states her appetite is good and she has been staying hydrated. She is taking gabapentin for her neuropathy which seems to be helping. She still has a little cold, with congestion and cough. She has taken mucinex sometimes. She denies ani fevers, n/v/d/c, new issues this week, sob, chest pain. She states dialysis is going well.      Social History     Socioeconomic History    Marital status:    Tobacco Use    Smoking status: Former     Types: Cigarettes     Quit date: 6/21/2012     Years since quitting: 10.3    Smokeless tobacco: Never   Substance and Sexual Activity    Alcohol use: No    Drug use: Never       Past Medical History:   Diagnosis Date    Cataract     Cataract     CHF (congestive heart failure)     COPD (chronic obstructive pulmonary disease)     Diabetes mellitus     Diabetic retinopathy     Hypertension     Malignant neoplasm of upper-outer quadrant of left breast in female, estrogen receptor positive 6/20/2022       Family History    Problem Relation Age of Onset    Hypertension Mother     Cancer Father     Breast cancer Sister     Diabetes Sister     Cancer Brother     Amblyopia Neg Hx     Blindness Neg Hx     Cataracts Neg Hx     Glaucoma Neg Hx     Macular degeneration Neg Hx     Retinal detachment Neg Hx     Strabismus Neg Hx     Stroke Neg Hx     Thyroid disease Neg Hx        Past Surgical History:   Procedure Laterality Date    BREAST BIOPSY Right     benign    CATARACT EXTRACTION W/  INTRAOCULAR LENS IMPLANT Right 08/14/2017    Dr. Moe    CORONARY ARTERY BYPASS GRAFT      FLUOROSCOPY N/A 7/25/2022    Procedure: FLUOROSCOPY/mediport placement;  Surgeon: Cole Perdomo MD;  Location: [facility] CATH [facility];  Service: General;  Laterality: N/A;       Review of Systems   Constitutional:  Negative for activity change, appetite change, chills, diaphoresis, fatigue, fever and unexpected weight change.   HENT:  Positive for congestion. Negative for mouth sores, nosebleeds, postnasal drip, rhinorrhea, sinus pressure, sinus pain, sneezing, sore throat and voice change.    Respiratory:  Positive for cough. Negative for shortness of breath.    Cardiovascular:  Negative for chest pain, palpitations and leg swelling.   Gastrointestinal:  Positive for diarrhea. Negative for abdominal pain, blood in stool, constipation, nausea and vomiting.   Genitourinary:  Negative for hematuria.   Musculoskeletal:  Positive for arthralgias. Negative for myalgias.   Skin:  Negative for color change and pallor.   Allergic/Immunologic: Positive for immunocompromised state.   Neurological:  Positive for numbness. Negative for dizziness, weakness, light-headedness and headaches.       Medication List with Changes/Refills   New Medications    BENZONATATE (TESSALON) 100 MG CAPSULE    Take 1 capsule (100 mg total) by mouth 3 (three) times daily as needed for Cough.   Current Medications    ACETAMINOPHEN (TYLENOL) 325 MG TABLET    Take 325 mg by mouth every 6 (six) hours as needed  for Pain.    ALBUTEROL (PROVENTIL/VENTOLIN HFA) 90 MCG/ACTUATION INHALER    Inhale 1-2 puffs into the lungs every 6 (six) hours as needed for Wheezing. Rescue    ALBUTEROL SULFATE (PROAIR RESPICLICK) 90 MCG/ACTUATION AEPB    Inhale 180 mcg into the lungs every 4 (four) hours. Rescue    AMIODARONE (PACERONE) 100 MG TAB    Take 100 mg by mouth once daily.    AMLODIPINE (NORVASC) 10 MG TABLET    Take 10 mg by mouth once daily.    APIXABAN (ELIQUIS) 2.5 MG TAB    Take 2.5 mg by mouth 2 (two) times daily.    ASPIRIN (ECOTRIN) 81 MG EC TABLET    Take 81 mg by mouth once daily.     CAPRON DM 7.5-7.5 MG/5 ML LIQD    TAKE 10-20 ML BY MOUTH EVERY 6-8 HOURS AS NEEDED COUGH    DICLOFENAC SODIUM (VOLTAREN) 1 % GEL    APPLY TOPICALLY 2 GM TO THE AFFECTED AREA 2 TIMES DAILY    GABAPENTIN (NEURONTIN) 300 MG CAPSULE    Take 1 capsule (300 mg total) by mouth 3 (three) times daily.    INSULIN DEGLUDEC (TRESIBA FLEXTOUCH U-200) 200 UNIT/ML (3 ML) INSULIN PEN    Inject 80 Units into the skin once daily.     INV METOPROLOL TARTRATE 25 MG ORAL TABLET (LOPRESSOR) 25 MG TAB    Take by mouth 2 (two) times daily. FOR INVESTIGATIONAL USE ONLY    KETOROLAC 0.5% (ACULAR) 0.5 % DROP    Place 1 drop into both eyes 4 (four) times daily.    LEVOTHYROXINE (SYNTHROID) 75 MCG TABLET    Take 75 mcg by mouth once daily.     LOSARTAN (COZAAR) 100 MG TABLET    Take 100 mg by mouth once daily.    LOSARTAN (COZAAR) 25 MG TABLET        NEOMYCIN-POLYMYXIN-DEXAMETHASONE (MAXITROL) 3.5MG/ML-10,000 UNIT/ML-0.1 % DRPS    Place 1 drop into both eyes 2 (two) times daily.    OMEPRAZOLE (PRILOSEC) 20 MG CAPSULE    Take 20 mg by mouth once daily.    ONDANSETRON (ZOFRAN) 4 MG TABLET    Take 1 tablet (4 mg total) by mouth every 8 (eight) hours as needed for Nausea.    PREDNISOLONE ACETATE (PRED FORTE) 1 % DRPS    Place 1 drop into both eyes 4 (four) times daily.    PREDNISONE (DELTASONE) 10 MG TABLET    Take by mouth.    PROCHLORPERAZINE (COMPAZINE) 5 MG TABLET    TAKE 1  TABLET (5 MG TOTAL) BY MOUTH 4 (FOUR) TIMES DAILY AS NEEDED FOR NAUSEA.    RENVELA 800 MG TAB    SMARTSI Tablet(s) By Mouth 5 Times Daily    SEVELAMER HCL ORAL    Take by mouth.     Objective:     Vitals:    10/26/22 0732   BP: 137/71   Pulse: 71   Temp: 97.3 °F (36.3 °C)       Physical Exam  Vitals reviewed.   Constitutional:       General: She is not in acute distress.     Appearance: She is not ill-appearing, toxic-appearing or diaphoretic.   HENT:      Head: Normocephalic and atraumatic.   Eyes:      Conjunctiva/sclera: Conjunctivae normal.   Cardiovascular:      Rate and Rhythm: Normal rate.   Pulmonary:      Effort: Pulmonary effort is normal.   Musculoskeletal:      Right lower leg: No edema.      Left lower leg: No edema.   Skin:     General: Skin is warm.      Coloration: Skin is not jaundiced or pale.      Findings: No bruising, erythema, lesion or rash.   Neurological:      Mental Status: She is alert.      Motor: No weakness.      Gait: Gait normal.   Psychiatric:         Mood and Affect: Mood normal.         Behavior: Behavior normal.         Thought Content: Thought content normal.          Labs/Results:  Lab Results   Component Value Date    WBC 6.70 10/26/2022    RBC 3.00 (L) 10/26/2022    HGB 9.3 (L) 10/26/2022    HCT 28.7 (L) 10/26/2022    MCV 96 10/26/2022    MCH 31.0 10/26/2022    MCHC 32.4 10/26/2022    RDW 21.3 (H) 10/26/2022     10/26/2022    MPV 10.1 10/26/2022    GRAN 4.0 10/26/2022    GRAN 60.1 10/26/2022    LYMPH 1.6 10/26/2022    LYMPH 24.0 10/26/2022    MONO 0.7 10/26/2022    MONO 10.6 10/26/2022    EOS 0.3 10/26/2022    BASO 0.03 10/26/2022    EOSINOPHIL 3.9 10/26/2022    BASOPHIL 0.4 10/26/2022     CMP  Sodium   Date Value Ref Range Status   10/26/2022 137 136 - 145 mmol/L Final     Potassium   Date Value Ref Range Status   10/26/2022 6.0 (H) 3.5 - 5.1 mmol/L Final     Chloride   Date Value Ref Range Status   10/26/2022 114 (H) 95 - 110 mmol/L Final     CO2   Date Value Ref  Range Status   10/26/2022 17 (L) 23 - 29 mmol/L Final     Glucose   Date Value Ref Range Status   10/26/2022 139 (H) 70 - 110 mg/dL Final     BUN   Date Value Ref Range Status   10/26/2022 46 (H) 8 - 23 mg/dL Final     Creatinine   Date Value Ref Range Status   10/26/2022 6.1 (H) 0.5 - 1.4 mg/dL Final     Calcium   Date Value Ref Range Status   10/26/2022 8.5 (L) 8.7 - 10.5 mg/dL Final     Total Protein   Date Value Ref Range Status   10/26/2022 8.0 6.0 - 8.4 g/dL Final     Albumin   Date Value Ref Range Status   10/26/2022 3.3 (L) 3.5 - 5.2 g/dL Final     Total Bilirubin   Date Value Ref Range Status   10/26/2022 0.4 0.1 - 1.0 mg/dL Final     Comment:     For infants and newborns, interpretation of results should be based  on gestational age, weight and in agreement with clinical  observations.    Premature Infant recommended reference ranges:  Up to 24 hours.............<8.0 mg/dL  Up to 48 hours............<12.0 mg/dL  3-5 days..................<15.0 mg/dL  6-29 days.................<15.0 mg/dL       Alkaline Phosphatase   Date Value Ref Range Status   10/26/2022 64 55 - 135 U/L Final     AST   Date Value Ref Range Status   10/26/2022 16 10 - 40 U/L Final     ALT   Date Value Ref Range Status   10/26/2022 16 10 - 44 U/L Final     Anion Gap   Date Value Ref Range Status   10/26/2022 6 (L) 8 - 16 mmol/L Final     eGFR if    Date Value Ref Range Status   07/27/2022 8 (A) >60 mL/min/1.73 m^2 Final     eGFR if non    Date Value Ref Range Status   07/27/2022 7 (A) >60 mL/min/1.73 m^2 Final     Comment:     Calculation used to obtain the estimated glomerular filtration  rate (eGFR) is the CKD-EPI equation.        Pet scan 7/5/22  Impression:  1. Highly FDG avid mass in the left breast, correlating with known biopsy proven malignancy.  2. Multiple suspicious highly FDG avid left axillary lymph nodes including several interpectoral lymph nodes.  3. Nonspecific low level tracer uptake is  seen within multiple bilateral intraparotid, bilateral upper cervical, right axillary, and bilateral inguinal lymph nodes which are potentially reactive in nature.  4. Otherwise, no definitive evidence of distant metastatic disease    Assessment:     Problem List Items Addressed This Visit          Renal/    ESRD (end stage renal disease) - Primary       Immunology/Multi System    Immunodeficiency due to chemotherapy       Oncology    Anemia associated with chronic renal failure    Malignant neoplasm of upper-outer quadrant of left breast in female, estrogen receptor positive     Other Visit Diagnoses       Cough, unspecified type        Relevant Medications    benzonatate (TESSALON) 100 MG capsule          Plan:     Malignant neoplasm of upper-outer quadrant of left breast in female, estrogen receptor positive  --locally advanced disease  --HER2 positive, ER positive   --echo on 10/7/22-E 65%  --germline negative  --cycle 3 day 15 of Taxotere, Herceptin and Perjeta today --taxol only today  --neoadjuvant chemotherapy.  --watch for worsening renal dysfunction. If so, consider d/c Prejeta.  --WBC: 6.70, plts:374, tbili:0.4, AST:16, ALT:16, Alk phos:64  --k: 6.0-she will receive dialysis tomorrow  --after cycle 6 will reassess with imaging and have her see surg onc for possible intervention.      Cold  --fever precautions given  --encourage to stay hydrated  -- mucinex if needed for congestion  --possibly give cough suppressant if needed in future.   --continue to monitor     Anemia associated with chronic renal failure  --continue with dialysis Tues/Thurs/Sat    Follow-Up: 1 week with cbc cmp-standing orders in    Jelena Pruitt PA-C  Hematology Oncology

## 2022-10-26 ENCOUNTER — LAB VISIT (OUTPATIENT)
Dept: LAB | Facility: HOSPITAL | Age: 67
End: 2022-10-26
Attending: INTERNAL MEDICINE
Payer: MEDICARE

## 2022-10-26 ENCOUNTER — OFFICE VISIT (OUTPATIENT)
Dept: HEMATOLOGY/ONCOLOGY | Facility: CLINIC | Age: 67
End: 2022-10-26
Payer: MEDICARE

## 2022-10-26 ENCOUNTER — INFUSION (OUTPATIENT)
Dept: INFUSION THERAPY | Facility: HOSPITAL | Age: 67
End: 2022-10-26
Attending: INTERNAL MEDICINE
Payer: MEDICARE

## 2022-10-26 VITALS
OXYGEN SATURATION: 98 % | RESPIRATION RATE: 16 BRPM | HEART RATE: 68 BPM | TEMPERATURE: 98 F | SYSTOLIC BLOOD PRESSURE: 146 MMHG | DIASTOLIC BLOOD PRESSURE: 59 MMHG

## 2022-10-26 VITALS
HEART RATE: 71 BPM | TEMPERATURE: 97 F | HEIGHT: 62 IN | DIASTOLIC BLOOD PRESSURE: 71 MMHG | WEIGHT: 207.69 LBS | BODY MASS INDEX: 38.22 KG/M2 | OXYGEN SATURATION: 98 % | SYSTOLIC BLOOD PRESSURE: 137 MMHG

## 2022-10-26 DIAGNOSIS — R05.9 COUGH, UNSPECIFIED TYPE: ICD-10-CM

## 2022-10-26 DIAGNOSIS — T45.1X5A IMMUNODEFICIENCY DUE TO CHEMOTHERAPY: ICD-10-CM

## 2022-10-26 DIAGNOSIS — Z17.0 MALIGNANT NEOPLASM OF UPPER-OUTER QUADRANT OF LEFT BREAST IN FEMALE, ESTROGEN RECEPTOR POSITIVE: ICD-10-CM

## 2022-10-26 DIAGNOSIS — Z17.0 MALIGNANT NEOPLASM OF UPPER-OUTER QUADRANT OF LEFT BREAST IN FEMALE, ESTROGEN RECEPTOR POSITIVE: Primary | ICD-10-CM

## 2022-10-26 DIAGNOSIS — Z17.0 MALIGNANT NEOPLASM OF LEFT BREAST IN FEMALE, ESTROGEN RECEPTOR POSITIVE, UNSPECIFIED SITE OF BREAST: ICD-10-CM

## 2022-10-26 DIAGNOSIS — N18.9 ANEMIA ASSOCIATED WITH CHRONIC RENAL FAILURE: ICD-10-CM

## 2022-10-26 DIAGNOSIS — C50.912 MALIGNANT NEOPLASM OF LEFT BREAST IN FEMALE, ESTROGEN RECEPTOR POSITIVE, UNSPECIFIED SITE OF BREAST: ICD-10-CM

## 2022-10-26 DIAGNOSIS — C50.412 MALIGNANT NEOPLASM OF UPPER-OUTER QUADRANT OF LEFT BREAST IN FEMALE, ESTROGEN RECEPTOR POSITIVE: Primary | ICD-10-CM

## 2022-10-26 DIAGNOSIS — D84.821 IMMUNODEFICIENCY DUE TO CHEMOTHERAPY: ICD-10-CM

## 2022-10-26 DIAGNOSIS — D63.1 ANEMIA ASSOCIATED WITH CHRONIC RENAL FAILURE: ICD-10-CM

## 2022-10-26 DIAGNOSIS — N18.6 ESRD (END STAGE RENAL DISEASE): Primary | ICD-10-CM

## 2022-10-26 DIAGNOSIS — Z79.899 IMMUNODEFICIENCY DUE TO CHEMOTHERAPY: ICD-10-CM

## 2022-10-26 DIAGNOSIS — C50.412 MALIGNANT NEOPLASM OF UPPER-OUTER QUADRANT OF LEFT BREAST IN FEMALE, ESTROGEN RECEPTOR POSITIVE: ICD-10-CM

## 2022-10-26 LAB
ALBUMIN SERPL BCP-MCNC: 3.3 G/DL (ref 3.5–5.2)
ALP SERPL-CCNC: 64 U/L (ref 55–135)
ALT SERPL W/O P-5'-P-CCNC: 16 U/L (ref 10–44)
ANION GAP SERPL CALC-SCNC: 6 MMOL/L (ref 8–16)
AST SERPL-CCNC: 16 U/L (ref 10–40)
BASOPHILS # BLD AUTO: 0.03 K/UL (ref 0–0.2)
BASOPHILS NFR BLD: 0.4 % (ref 0–1.9)
BILIRUB SERPL-MCNC: 0.4 MG/DL (ref 0.1–1)
BUN SERPL-MCNC: 46 MG/DL (ref 8–23)
CALCIUM SERPL-MCNC: 8.5 MG/DL (ref 8.7–10.5)
CHLORIDE SERPL-SCNC: 114 MMOL/L (ref 95–110)
CO2 SERPL-SCNC: 17 MMOL/L (ref 23–29)
CREAT SERPL-MCNC: 6.1 MG/DL (ref 0.5–1.4)
DIFFERENTIAL METHOD: ABNORMAL
EOSINOPHIL # BLD AUTO: 0.3 K/UL (ref 0–0.5)
EOSINOPHIL NFR BLD: 3.9 % (ref 0–8)
ERYTHROCYTE [DISTWIDTH] IN BLOOD BY AUTOMATED COUNT: 21.3 % (ref 11.5–14.5)
EST. GFR  (NO RACE VARIABLE): 7 ML/MIN/1.73 M^2
GLUCOSE SERPL-MCNC: 139 MG/DL (ref 70–110)
HCT VFR BLD AUTO: 28.7 % (ref 37–48.5)
HGB BLD-MCNC: 9.3 G/DL (ref 12–16)
IMM GRANULOCYTES # BLD AUTO: 0.07 K/UL (ref 0–0.04)
IMM GRANULOCYTES NFR BLD AUTO: 1 % (ref 0–0.5)
LYMPHOCYTES # BLD AUTO: 1.6 K/UL (ref 1–4.8)
LYMPHOCYTES NFR BLD: 24 % (ref 18–48)
MCH RBC QN AUTO: 31 PG (ref 27–31)
MCHC RBC AUTO-ENTMCNC: 32.4 G/DL (ref 32–36)
MCV RBC AUTO: 96 FL (ref 82–98)
MONOCYTES # BLD AUTO: 0.7 K/UL (ref 0.3–1)
MONOCYTES NFR BLD: 10.6 % (ref 4–15)
NEUTROPHILS # BLD AUTO: 4 K/UL (ref 1.8–7.7)
NEUTROPHILS NFR BLD: 60.1 % (ref 38–73)
NRBC BLD-RTO: 0 /100 WBC
PLATELET # BLD AUTO: 374 K/UL (ref 150–450)
PMV BLD AUTO: 10.1 FL (ref 9.2–12.9)
POTASSIUM SERPL-SCNC: 6 MMOL/L (ref 3.5–5.1)
PROT SERPL-MCNC: 8 G/DL (ref 6–8.4)
RBC # BLD AUTO: 3 M/UL (ref 4–5.4)
SODIUM SERPL-SCNC: 137 MMOL/L (ref 136–145)
WBC # BLD AUTO: 6.7 K/UL (ref 3.9–12.7)

## 2022-10-26 PROCEDURE — 1126F PR PAIN SEVERITY QUANTIFIED, NO PAIN PRESENT: ICD-10-PCS | Mod: CPTII,S$GLB,,

## 2022-10-26 PROCEDURE — 99999 PR PBB SHADOW E&M-EST. PATIENT-LVL IV: CPT | Mod: PBBFAC,,,

## 2022-10-26 PROCEDURE — 85025 COMPLETE CBC W/AUTO DIFF WBC: CPT | Performed by: INTERNAL MEDICINE

## 2022-10-26 PROCEDURE — 3078F PR MOST RECENT DIASTOLIC BLOOD PRESSURE < 80 MM HG: ICD-10-PCS | Mod: CPTII,S$GLB,,

## 2022-10-26 PROCEDURE — 96413 CHEMO IV INFUSION 1 HR: CPT

## 2022-10-26 PROCEDURE — 1159F MED LIST DOCD IN RCRD: CPT | Mod: CPTII,S$GLB,,

## 2022-10-26 PROCEDURE — 3078F DIAST BP <80 MM HG: CPT | Mod: CPTII,S$GLB,,

## 2022-10-26 PROCEDURE — 25000003 PHARM REV CODE 250: Performed by: INTERNAL MEDICINE

## 2022-10-26 PROCEDURE — 99999 PR PBB SHADOW E&M-EST. PATIENT-LVL IV: ICD-10-PCS | Mod: PBBFAC,,,

## 2022-10-26 PROCEDURE — 96375 TX/PRO/DX INJ NEW DRUG ADDON: CPT

## 2022-10-26 PROCEDURE — 1126F AMNT PAIN NOTED NONE PRSNT: CPT | Mod: CPTII,S$GLB,,

## 2022-10-26 PROCEDURE — 1157F PR ADVANCE CARE PLAN OR EQUIV PRESENT IN MEDICAL RECORD: ICD-10-PCS | Mod: CPTII,S$GLB,,

## 2022-10-26 PROCEDURE — 1101F PT FALLS ASSESS-DOCD LE1/YR: CPT | Mod: CPTII,S$GLB,,

## 2022-10-26 PROCEDURE — 99215 PR OFFICE/OUTPT VISIT, EST, LEVL V, 40-54 MIN: ICD-10-PCS | Mod: S$GLB,,,

## 2022-10-26 PROCEDURE — 99215 OFFICE O/P EST HI 40 MIN: CPT | Mod: S$GLB,,,

## 2022-10-26 PROCEDURE — 3288F PR FALLS RISK ASSESSMENT DOCUMENTED: ICD-10-PCS | Mod: CPTII,S$GLB,,

## 2022-10-26 PROCEDURE — 3075F SYST BP GE 130 - 139MM HG: CPT | Mod: CPTII,S$GLB,,

## 2022-10-26 PROCEDURE — 63600175 PHARM REV CODE 636 W HCPCS: Performed by: INTERNAL MEDICINE

## 2022-10-26 PROCEDURE — 96367 TX/PROPH/DG ADDL SEQ IV INF: CPT

## 2022-10-26 PROCEDURE — 4010F ACE/ARB THERAPY RXD/TAKEN: CPT | Mod: CPTII,S$GLB,,

## 2022-10-26 PROCEDURE — 4010F PR ACE/ARB THEARPY RXD/TAKEN: ICD-10-PCS | Mod: CPTII,S$GLB,,

## 2022-10-26 PROCEDURE — 3075F PR MOST RECENT SYSTOLIC BLOOD PRESS GE 130-139MM HG: ICD-10-PCS | Mod: CPTII,S$GLB,,

## 2022-10-26 PROCEDURE — 36415 COLL VENOUS BLD VENIPUNCTURE: CPT | Performed by: INTERNAL MEDICINE

## 2022-10-26 PROCEDURE — 80053 COMPREHEN METABOLIC PANEL: CPT | Performed by: INTERNAL MEDICINE

## 2022-10-26 PROCEDURE — 3288F FALL RISK ASSESSMENT DOCD: CPT | Mod: CPTII,S$GLB,,

## 2022-10-26 PROCEDURE — 1101F PR PT FALLS ASSESS DOC 0-1 FALLS W/OUT INJ PAST YR: ICD-10-PCS | Mod: CPTII,S$GLB,,

## 2022-10-26 PROCEDURE — 1157F ADVNC CARE PLAN IN RCRD: CPT | Mod: CPTII,S$GLB,,

## 2022-10-26 PROCEDURE — 1159F PR MEDICATION LIST DOCUMENTED IN MEDICAL RECORD: ICD-10-PCS | Mod: CPTII,S$GLB,,

## 2022-10-26 RX ORDER — BENZONATATE 100 MG/1
100 CAPSULE ORAL 3 TIMES DAILY PRN
Qty: 21 CAPSULE | Refills: 0 | Status: SHIPPED | OUTPATIENT
Start: 2022-10-26 | End: 2022-11-05

## 2022-10-26 RX ORDER — ONDANSETRON 2 MG/ML
4 INJECTION INTRAMUSCULAR; INTRAVENOUS ONCE
Status: COMPLETED | OUTPATIENT
Start: 2022-10-26 | End: 2022-10-26

## 2022-10-26 RX ORDER — FAMOTIDINE 10 MG/ML
20 INJECTION INTRAVENOUS
Status: COMPLETED | OUTPATIENT
Start: 2022-10-26 | End: 2022-10-26

## 2022-10-26 RX ORDER — HEPARIN 100 UNIT/ML
500 SYRINGE INTRAVENOUS
Status: DISCONTINUED | OUTPATIENT
Start: 2022-10-26 | End: 2022-10-26 | Stop reason: HOSPADM

## 2022-10-26 RX ADMIN — DEXAMETHASONE SODIUM PHOSPHATE 10 MG: 4 INJECTION, SOLUTION INTRA-ARTICULAR; INTRALESIONAL; INTRAMUSCULAR; INTRAVENOUS; SOFT TISSUE at 08:10

## 2022-10-26 RX ADMIN — DIPHENHYDRAMINE HYDROCHLORIDE 50 MG: 50 INJECTION, SOLUTION INTRAMUSCULAR; INTRAVENOUS at 09:10

## 2022-10-26 RX ADMIN — FAMOTIDINE 20 MG: 10 INJECTION INTRAVENOUS at 08:10

## 2022-10-26 RX ADMIN — HEPARIN 500 UNITS: 100 SYRINGE at 11:10

## 2022-10-26 RX ADMIN — PACLITAXEL 162 MG: 6 INJECTION, SOLUTION INTRAVENOUS at 09:10

## 2022-10-26 RX ADMIN — SODIUM CHLORIDE: 0.9 INJECTION, SOLUTION INTRAVENOUS at 08:10

## 2022-10-26 RX ADMIN — ONDANSETRON 4 MG: 2 INJECTION, SOLUTION INTRAMUSCULAR; INTRAVENOUS at 08:10

## 2022-10-26 NOTE — DISCHARGE INSTRUCTIONS
.[facility]  [address] [facility]  [address]  [phone] phone     [phone] fax  Hours of Operation: Monday- Friday 8:00am- 5:00pm  After hours phone  [phone]  Hematology / Oncology Physicians on call    Dr. Severiano Mas      Nurse Practitioners:    Alexa Ospina, NP  Leah Carcamo, SMITH Contreras, SMITH Lizarraga, SMITH Bello, SMITH Pruitt, PA      Please don't hesitate to call if you have any concerns.    .WAYS TO HELP PREVENT INFECTION        WASH YOUR HANDS OFTEN DURING THE DAY, ESPECIALLY BEFORE YOU EAT, AFTER USING THE BATHROOM, AND AFTER TOUCHING ANIMALS    STAY AWAY FROM PEOPLE WHO HAVE ILLNESSES YOU CAN CATCH; SUCH AS COLDS, FLU, CHICKEN POX    TRY TO AVOID CROWDS    STAY AWAY FROM CHILDREN WHO RECENTLY HAVE RECEIVED LIVE VIRUS VACCINES    MAINTAIN GOOD MOUTH CARE    DO NOT SQUEEZE OR SCRATCH PIMPLES    CLEAN CUTS & SCRAPES RIGHT AWAY AND DAILY UNTIL HEALED WITH WARM WATER, SOAP & AN ANTISEPTIC    AVOID CONTACT WITH LITTER BOXES, BIRD CAGES, & FISH TANKS    AVOID STANDING WATER, IE., BIRD BATHS, FLOWER POTS/VASES, OR HUMIDIFIERS    WEAR GLOVES WHEN GARDENING OR CLEANING UP AFTER OTHERS, ESPECIALLY BABIES & SMALL CHILDREN    DO NOT EAT RAW FISH, SEAFOOD, MEAT, OR EGGS     FALL PREVENTION   Falls often occur due to slipping, tripping or losing your balance. Here are ways to reduce your risk of falling again.   Was there anything that caused your fall that can be fixed, removed or replaced?   Make your home safe by keeping walkways clear of objects you may trip over.   Use non-slip pads under rugs.   Do not walk in poorly lit areas.   Do not stand on chairs or wobbly ladders.   Use caution when reaching overhead or looking upward. This position can cause a loss of balance.   Be sure your shoes fit properly, have non-slip bottoms and are in good condition.   Be cautious when going  up and down stairs, curbs, and when walking on uneven sidewalks.   If your balance is poor, consider using a cane or walker.   If your fall was related to alcohol use, stop or limit alcohol intake.   If your fall was related to use of sleeping medicines, talk to your doctor about this. You may need to reduce your dosage at bedtime if you awaken during the night to go to the bathroom.   To reduce the need for nighttime bathroom trips:   Avoid drinking fluids for several hours before going to bed   Empty your bladder before going to bed   Men can keep a urinal at the bedside   © 9975-9535 [facility], [address] [place] [address], [place] [address]. All rights reserved. This information is not intended as a substitute for professional medical care. Always follow your healthcare professional's instructions.

## 2022-10-26 NOTE — NURSING
RAMAN Dietz notified patient missed dialysis yesterday and potassium level is 6.0.   Okay to treat and instructed patient to make dialysis appointment tomorrow.

## 2022-10-26 NOTE — PLAN OF CARE
Patient reports feeling weak and tired. Tolerated chemo infusion well today with no adverse reactions. Patient to return to clinic in 1 week for next chemo treatment.

## 2022-11-01 NOTE — PROGRESS NOTES
Subjective:       Patient ID: Malaika Paredes is a 67 y.o. female.    Chief Complaint: Breast Cancer and Chemotherapy    Primary Oncologist/Hematologist: Dr. العلي     HPI: Ms. Paige is a 67 year old female who is following up for her locally advanced HER2 positive ER positive breast cancer. She is also on dialysis for ESRD, TThSat. She is here for cycle 4 day 1 of Taxotere, Perjecta and Herceptin. She will get all 3 agents q 3 weeks, and taxol weekly. Today she will get all 3 agents. Due to insurance we were a week behind because they would not approve.   Cancer Hx: She presented to GYN in 5/2022 with a palpable breast lump present for one month. Diagnostic MMG revealed a left breast 5.6 cm x 4 cm x 4.3 cm mass at the 2 o'clock position. Biopsied proved invasive ductal carcinoma ER >95%, NY 80%, [id number] 2+, Ki-67 60%. Left axillary LN biopsy was positive. PET revealed no evidence of distance mets    Today: She is taking gabapentin for her neuropathy which seems to be helping. Patient states her cold is much improved. She still has a little cough but denies congestion, fevers, sore throat. N/v/d/c. She has not had any missed dialysis appts. She states her appetite is good and she is staying hydrated.     Social History     Socioeconomic History    Marital status:    Tobacco Use    Smoking status: Former     Types: Cigarettes     Quit date: 6/21/2012     Years since quitting: 10.3    Smokeless tobacco: Never   Substance and Sexual Activity    Alcohol use: No    Drug use: Never       Past Medical History:   Diagnosis Date    Cataract     Cataract     CHF (congestive heart failure)     COPD (chronic obstructive pulmonary disease)     Diabetes mellitus     Diabetic retinopathy     Hypertension     Malignant neoplasm of upper-outer quadrant of left breast in female, estrogen receptor positive 6/20/2022       Family History   Problem Relation Age of Onset    Hypertension Mother     Cancer Father     Breast  cancer Sister     Diabetes Sister     Cancer Brother     Amblyopia Neg Hx     Blindness Neg Hx     Cataracts Neg Hx     Glaucoma Neg Hx     Macular degeneration Neg Hx     Retinal detachment Neg Hx     Strabismus Neg Hx     Stroke Neg Hx     Thyroid disease Neg Hx        Past Surgical History:   Procedure Laterality Date    BREAST BIOPSY Right     benign    CATARACT EXTRACTION W/  INTRAOCULAR LENS IMPLANT Right 08/14/2017    Dr. Moe    CORONARY ARTERY BYPASS GRAFT      FLUOROSCOPY N/A 7/25/2022    Procedure: FLUOROSCOPY/mediport placement;  Surgeon: Cole Perdomo MD;  Location: [facility] CATH LAB;  Service: General;  Laterality: N/A;       Review of Systems   Constitutional:  Negative for activity change, appetite change, chills, diaphoresis, fatigue, fever and unexpected weight change.   HENT:  Negative for congestion, mouth sores, nosebleeds, postnasal drip, rhinorrhea, sinus pressure, sinus pain, sneezing, sore throat and voice change.    Respiratory:  Positive for cough. Negative for shortness of breath.    Cardiovascular:  Negative for chest pain, palpitations and leg swelling.   Gastrointestinal:  Negative for abdominal pain, blood in stool, constipation, diarrhea, nausea and vomiting.   Genitourinary:  Negative for hematuria.   Musculoskeletal:  Positive for arthralgias. Negative for myalgias.   Skin:  Negative for color change and pallor.   Allergic/Immunologic: Positive for immunocompromised state.   Neurological:  Positive for numbness. Negative for dizziness, weakness, light-headedness and headaches.       Medication List with Changes/Refills   Current Medications    ACETAMINOPHEN (TYLENOL) 325 MG TABLET    Take 325 mg by mouth every 6 (six) hours as needed for Pain.    ALBUTEROL (PROVENTIL/VENTOLIN HFA) 90 MCG/ACTUATION INHALER    Inhale 1-2 puffs into the lungs every 6 (six) hours as needed for Wheezing. Rescue    ALBUTEROL SULFATE (PROAIR RESPICLICK) 90 MCG/ACTUATION AEPB    Inhale 180 mcg into the  lungs every 4 (four) hours. Rescue    AMIODARONE (PACERONE) 100 MG TAB    Take 100 mg by mouth once daily.    AMLODIPINE (NORVASC) 10 MG TABLET    Take 10 mg by mouth once daily.    APIXABAN (ELIQUIS) 2.5 MG TAB    Take 2.5 mg by mouth 2 (two) times daily.    ASPIRIN (ECOTRIN) 81 MG EC TABLET    Take 81 mg by mouth once daily.     BENZONATATE (TESSALON) 100 MG CAPSULE    Take 1 capsule (100 mg total) by mouth 3 (three) times daily as needed for Cough.    CAPRON DM 7.5-7.5 MG/5 ML LIQD    TAKE 10-20 ML BY MOUTH EVERY 6-8 HOURS AS NEEDED COUGH    DICLOFENAC SODIUM (VOLTAREN) 1 % GEL    APPLY TOPICALLY 2 GM TO THE AFFECTED AREA 2 TIMES DAILY    GABAPENTIN (NEURONTIN) 300 MG CAPSULE    Take 1 capsule (300 mg total) by mouth 3 (three) times daily.    INSULIN DEGLUDEC (TRESIBA FLEXTOUCH U-200) 200 UNIT/ML (3 ML) INSULIN PEN    Inject 80 Units into the skin once daily.     INV METOPROLOL TARTRATE 25 MG ORAL TABLET (LOPRESSOR) 25 MG TAB    Take by mouth 2 (two) times daily. FOR INVESTIGATIONAL USE ONLY    KETOROLAC 0.5% (ACULAR) 0.5 % DROP    Place 1 drop into both eyes 4 (four) times daily.    LEVOTHYROXINE (SYNTHROID) 75 MCG TABLET    Take 75 mcg by mouth once daily.     LOSARTAN (COZAAR) 100 MG TABLET    Take 100 mg by mouth once daily.    LOSARTAN (COZAAR) 25 MG TABLET        NEOMYCIN-POLYMYXIN-DEXAMETHASONE (MAXITROL) 3.5MG/ML-10,000 UNIT/ML-0.1 % DRPS    Place 1 drop into both eyes 2 (two) times daily.    OMEPRAZOLE (PRILOSEC) 20 MG CAPSULE    Take 20 mg by mouth once daily.    ONDANSETRON (ZOFRAN) 4 MG TABLET    Take 1 tablet (4 mg total) by mouth every 8 (eight) hours as needed for Nausea.    PREDNISOLONE ACETATE (PRED FORTE) 1 % DRPS    Place 1 drop into both eyes 4 (four) times daily.    PREDNISONE (DELTASONE) 10 MG TABLET    Take by mouth.    PROCHLORPERAZINE (COMPAZINE) 5 MG TABLET    TAKE 1 TABLET (5 MG TOTAL) BY MOUTH 4 (FOUR) TIMES DAILY AS NEEDED FOR NAUSEA.    RENVELA 800 MG TAB    SMARTSI Tablet(s) By  Mouth 5 Times Daily    SEVELAMER HCL ORAL    Take by mouth.     Objective:     Vitals:    11/02/22 0739   BP: 137/70   Pulse: 76   Temp: 97 °F (36.1 °C)       Physical Exam  Vitals reviewed.   Constitutional:       General: She is not in acute distress.     Appearance: She is not ill-appearing, toxic-appearing or diaphoretic.   HENT:      Head: Normocephalic and atraumatic.   Eyes:      Conjunctiva/sclera: Conjunctivae normal.   Cardiovascular:      Rate and Rhythm: Normal rate.   Pulmonary:      Effort: Pulmonary effort is normal.   Musculoskeletal:      Right lower leg: No edema.      Left lower leg: No edema.   Skin:     General: Skin is warm.      Coloration: Skin is not jaundiced or pale.      Findings: No bruising, erythema, lesion or rash.   Neurological:      Mental Status: She is alert.      Motor: No weakness.      Gait: Gait normal.   Psychiatric:         Mood and Affect: Mood normal.         Behavior: Behavior normal.         Thought Content: Thought content normal.          Labs/Results:  Lab Results   Component Value Date    WBC 5.96 11/02/2022    RBC 3.09 (L) 11/02/2022    HGB 9.2 (L) 11/02/2022    HCT 28.8 (L) 11/02/2022    MCV 93 11/02/2022    MCH 29.8 11/02/2022    MCHC 31.9 (L) 11/02/2022    RDW 21.0 (H) 11/02/2022     11/02/2022    MPV 11.3 11/02/2022    GRAN 3.0 11/02/2022    GRAN 50.4 11/02/2022    LYMPH 1.5 11/02/2022    LYMPH 25.5 11/02/2022    MONO 0.8 11/02/2022    MONO 13.6 11/02/2022    EOS 0.4 11/02/2022    BASO 0.05 11/02/2022    EOSINOPHIL 7.2 11/02/2022    BASOPHIL 0.8 11/02/2022     CMP  Sodium   Date Value Ref Range Status   11/02/2022 142 136 - 145 mmol/L Final     Potassium   Date Value Ref Range Status   11/02/2022 4.5 3.5 - 5.1 mmol/L Final     Chloride   Date Value Ref Range Status   11/02/2022 105 95 - 110 mmol/L Final     CO2   Date Value Ref Range Status   11/02/2022 31 (H) 23 - 29 mmol/L Final     Glucose   Date Value Ref Range Status   11/02/2022 91 70 - 110 mg/dL  Final     BUN   Date Value Ref Range Status   11/02/2022 21 8 - 23 mg/dL Final     Creatinine   Date Value Ref Range Status   11/02/2022 4.7 (H) 0.5 - 1.4 mg/dL Final     Calcium   Date Value Ref Range Status   11/02/2022 8.6 (L) 8.7 - 10.5 mg/dL Final     Total Protein   Date Value Ref Range Status   11/02/2022 8.0 6.0 - 8.4 g/dL Final     Albumin   Date Value Ref Range Status   11/02/2022 3.2 (L) 3.5 - 5.2 g/dL Final     Total Bilirubin   Date Value Ref Range Status   11/02/2022 0.4 0.1 - 1.0 mg/dL Final     Comment:     For infants and newborns, interpretation of results should be based  on gestational age, weight and in agreement with clinical  observations.    Premature Infant recommended reference ranges:  Up to 24 hours.............<8.0 mg/dL  Up to 48 hours............<12.0 mg/dL  3-5 days..................<15.0 mg/dL  6-29 days.................<15.0 mg/dL       Alkaline Phosphatase   Date Value Ref Range Status   11/02/2022 62 55 - 135 U/L Final     AST   Date Value Ref Range Status   11/02/2022 18 10 - 40 U/L Final     ALT   Date Value Ref Range Status   11/02/2022 14 10 - 44 U/L Final     Anion Gap   Date Value Ref Range Status   11/02/2022 6 (L) 8 - 16 mmol/L Final     eGFR if    Date Value Ref Range Status   07/27/2022 8 (A) >60 mL/min/1.73 m^2 Final     eGFR if non    Date Value Ref Range Status   07/27/2022 7 (A) >60 mL/min/1.73 m^2 Final     Comment:     Calculation used to obtain the estimated glomerular filtration  rate (eGFR) is the CKD-EPI equation.        Pet scan 7/5/22  Impression:  1. Highly FDG avid mass in the left breast, correlating with known biopsy proven malignancy.  2. Multiple suspicious highly FDG avid left axillary lymph nodes including several interpectoral lymph nodes.  3. Nonspecific low level tracer uptake is seen within multiple bilateral intraparotid, bilateral upper cervical, right axillary, and bilateral inguinal lymph nodes which are  potentially reactive in nature.  4. Otherwise, no definitive evidence of distant metastatic disease     Assessment:     Problem List Items Addressed This Visit          Oncology    Microcytic anemia    Anemia associated with chronic renal failure    Malignant neoplasm of upper-outer quadrant of left breast in female, estrogen receptor positive - Primary     Plan:     Malignant neoplasm of upper-outer quadrant of left breast in female, estrogen receptor positive  --locally advanced disease  --HER2 positive, ER positive   --echo on 10/7/22-E 65%  --germline negative  --cycle 4 day 1 of Taxotere, Herceptin and Perjeta today --all 3 agents today  --neoadjuvant chemotherapy.  --watch for worsening renal dysfunction. If so, consider d/c Prejeta.  --WBC: 5.96, ANC:3.0, plts:402, tbili:0.4, AST:18, ALT: 14, Alk phos:62  --after cycle 6 will reassess with imaging and have her see surg onc for possible intervention.      Anemia associated with chronic renal failure  --continue with dialysis Tues/Thurs/Sat    Follow-Up: 1 week with cbc cmp prior for cycle 4 day 8-standing orders in    Jelena Pruitt PA-C  Hematology Oncology

## 2022-11-02 ENCOUNTER — OFFICE VISIT (OUTPATIENT)
Dept: HEMATOLOGY/ONCOLOGY | Facility: CLINIC | Age: 67
End: 2022-11-02
Payer: MEDICARE

## 2022-11-02 ENCOUNTER — LAB VISIT (OUTPATIENT)
Dept: LAB | Facility: HOSPITAL | Age: 67
End: 2022-11-02
Attending: INTERNAL MEDICINE
Payer: MEDICARE

## 2022-11-02 ENCOUNTER — INFUSION (OUTPATIENT)
Dept: INFUSION THERAPY | Facility: HOSPITAL | Age: 67
End: 2022-11-02
Attending: INTERNAL MEDICINE
Payer: MEDICARE

## 2022-11-02 VITALS
DIASTOLIC BLOOD PRESSURE: 65 MMHG | WEIGHT: 202.81 LBS | SYSTOLIC BLOOD PRESSURE: 142 MMHG | RESPIRATION RATE: 18 BRPM | TEMPERATURE: 99 F | BODY MASS INDEX: 37.32 KG/M2 | OXYGEN SATURATION: 98 % | HEIGHT: 62 IN | HEART RATE: 79 BPM

## 2022-11-02 VITALS
HEIGHT: 62 IN | HEART RATE: 76 BPM | TEMPERATURE: 97 F | SYSTOLIC BLOOD PRESSURE: 137 MMHG | BODY MASS INDEX: 37.32 KG/M2 | WEIGHT: 202.81 LBS | DIASTOLIC BLOOD PRESSURE: 70 MMHG | OXYGEN SATURATION: 98 %

## 2022-11-02 DIAGNOSIS — D50.9 MICROCYTIC ANEMIA: ICD-10-CM

## 2022-11-02 DIAGNOSIS — Z17.0 MALIGNANT NEOPLASM OF UPPER-OUTER QUADRANT OF LEFT BREAST IN FEMALE, ESTROGEN RECEPTOR POSITIVE: Primary | ICD-10-CM

## 2022-11-02 DIAGNOSIS — C50.412 MALIGNANT NEOPLASM OF UPPER-OUTER QUADRANT OF LEFT BREAST IN FEMALE, ESTROGEN RECEPTOR POSITIVE: Primary | ICD-10-CM

## 2022-11-02 DIAGNOSIS — D63.1 ANEMIA ASSOCIATED WITH CHRONIC RENAL FAILURE: ICD-10-CM

## 2022-11-02 DIAGNOSIS — Z17.0 MALIGNANT NEOPLASM OF LEFT BREAST IN FEMALE, ESTROGEN RECEPTOR POSITIVE, UNSPECIFIED SITE OF BREAST: ICD-10-CM

## 2022-11-02 DIAGNOSIS — N18.9 ANEMIA ASSOCIATED WITH CHRONIC RENAL FAILURE: ICD-10-CM

## 2022-11-02 DIAGNOSIS — C50.912 MALIGNANT NEOPLASM OF LEFT BREAST IN FEMALE, ESTROGEN RECEPTOR POSITIVE, UNSPECIFIED SITE OF BREAST: ICD-10-CM

## 2022-11-02 LAB
ALBUMIN SERPL BCP-MCNC: 3.2 G/DL (ref 3.5–5.2)
ALP SERPL-CCNC: 62 U/L (ref 55–135)
ALT SERPL W/O P-5'-P-CCNC: 14 U/L (ref 10–44)
ANION GAP SERPL CALC-SCNC: 6 MMOL/L (ref 8–16)
AST SERPL-CCNC: 18 U/L (ref 10–40)
BASOPHILS # BLD AUTO: 0.05 K/UL (ref 0–0.2)
BASOPHILS NFR BLD: 0.8 % (ref 0–1.9)
BILIRUB SERPL-MCNC: 0.4 MG/DL (ref 0.1–1)
BUN SERPL-MCNC: 21 MG/DL (ref 8–23)
CALCIUM SERPL-MCNC: 8.6 MG/DL (ref 8.7–10.5)
CHLORIDE SERPL-SCNC: 105 MMOL/L (ref 95–110)
CO2 SERPL-SCNC: 31 MMOL/L (ref 23–29)
CREAT SERPL-MCNC: 4.7 MG/DL (ref 0.5–1.4)
DIFFERENTIAL METHOD: ABNORMAL
EOSINOPHIL # BLD AUTO: 0.4 K/UL (ref 0–0.5)
EOSINOPHIL NFR BLD: 7.2 % (ref 0–8)
ERYTHROCYTE [DISTWIDTH] IN BLOOD BY AUTOMATED COUNT: 21 % (ref 11.5–14.5)
EST. GFR  (NO RACE VARIABLE): 10 ML/MIN/1.73 M^2
GLUCOSE SERPL-MCNC: 91 MG/DL (ref 70–110)
HCT VFR BLD AUTO: 28.8 % (ref 37–48.5)
HGB BLD-MCNC: 9.2 G/DL (ref 12–16)
IMM GRANULOCYTES # BLD AUTO: 0.15 K/UL (ref 0–0.04)
IMM GRANULOCYTES NFR BLD AUTO: 2.5 % (ref 0–0.5)
LYMPHOCYTES # BLD AUTO: 1.5 K/UL (ref 1–4.8)
LYMPHOCYTES NFR BLD: 25.5 % (ref 18–48)
MCH RBC QN AUTO: 29.8 PG (ref 27–31)
MCHC RBC AUTO-ENTMCNC: 31.9 G/DL (ref 32–36)
MCV RBC AUTO: 93 FL (ref 82–98)
MONOCYTES # BLD AUTO: 0.8 K/UL (ref 0.3–1)
MONOCYTES NFR BLD: 13.6 % (ref 4–15)
NEUTROPHILS # BLD AUTO: 3 K/UL (ref 1.8–7.7)
NEUTROPHILS NFR BLD: 50.4 % (ref 38–73)
NRBC BLD-RTO: 0 /100 WBC
PLATELET # BLD AUTO: 402 K/UL (ref 150–450)
PMV BLD AUTO: 11.3 FL (ref 9.2–12.9)
POTASSIUM SERPL-SCNC: 4.5 MMOL/L (ref 3.5–5.1)
PROT SERPL-MCNC: 8 G/DL (ref 6–8.4)
RBC # BLD AUTO: 3.09 M/UL (ref 4–5.4)
SODIUM SERPL-SCNC: 142 MMOL/L (ref 136–145)
WBC # BLD AUTO: 5.96 K/UL (ref 3.9–12.7)

## 2022-11-02 PROCEDURE — 1159F PR MEDICATION LIST DOCUMENTED IN MEDICAL RECORD: ICD-10-PCS | Mod: CPTII,S$GLB,,

## 2022-11-02 PROCEDURE — 99999 PR PBB SHADOW E&M-EST. PATIENT-LVL IV: CPT | Mod: PBBFAC,,,

## 2022-11-02 PROCEDURE — 1157F ADVNC CARE PLAN IN RCRD: CPT | Mod: CPTII,S$GLB,,

## 2022-11-02 PROCEDURE — 96367 TX/PROPH/DG ADDL SEQ IV INF: CPT

## 2022-11-02 PROCEDURE — 1101F PR PT FALLS ASSESS DOC 0-1 FALLS W/OUT INJ PAST YR: ICD-10-PCS | Mod: CPTII,S$GLB,,

## 2022-11-02 PROCEDURE — 3008F BODY MASS INDEX DOCD: CPT | Mod: CPTII,S$GLB,,

## 2022-11-02 PROCEDURE — 3075F SYST BP GE 130 - 139MM HG: CPT | Mod: CPTII,S$GLB,,

## 2022-11-02 PROCEDURE — 4010F ACE/ARB THERAPY RXD/TAKEN: CPT | Mod: CPTII,S$GLB,,

## 2022-11-02 PROCEDURE — 3078F DIAST BP <80 MM HG: CPT | Mod: CPTII,S$GLB,,

## 2022-11-02 PROCEDURE — 99215 OFFICE O/P EST HI 40 MIN: CPT | Mod: 25,S$GLB,,

## 2022-11-02 PROCEDURE — 25000003 PHARM REV CODE 250

## 2022-11-02 PROCEDURE — 3075F PR MOST RECENT SYSTOLIC BLOOD PRESS GE 130-139MM HG: ICD-10-PCS | Mod: CPTII,S$GLB,,

## 2022-11-02 PROCEDURE — 1126F PR PAIN SEVERITY QUANTIFIED, NO PAIN PRESENT: ICD-10-PCS | Mod: CPTII,S$GLB,,

## 2022-11-02 PROCEDURE — 1101F PT FALLS ASSESS-DOCD LE1/YR: CPT | Mod: CPTII,S$GLB,,

## 2022-11-02 PROCEDURE — 99999 PR PBB SHADOW E&M-EST. PATIENT-LVL IV: ICD-10-PCS | Mod: PBBFAC,,,

## 2022-11-02 PROCEDURE — 85025 COMPLETE CBC W/AUTO DIFF WBC: CPT | Performed by: INTERNAL MEDICINE

## 2022-11-02 PROCEDURE — 1157F PR ADVANCE CARE PLAN OR EQUIV PRESENT IN MEDICAL RECORD: ICD-10-PCS | Mod: CPTII,S$GLB,,

## 2022-11-02 PROCEDURE — 1126F AMNT PAIN NOTED NONE PRSNT: CPT | Mod: CPTII,S$GLB,,

## 2022-11-02 PROCEDURE — 63600175 PHARM REV CODE 636 W HCPCS

## 2022-11-02 PROCEDURE — 3078F PR MOST RECENT DIASTOLIC BLOOD PRESSURE < 80 MM HG: ICD-10-PCS | Mod: CPTII,S$GLB,,

## 2022-11-02 PROCEDURE — 3288F FALL RISK ASSESSMENT DOCD: CPT | Mod: CPTII,S$GLB,,

## 2022-11-02 PROCEDURE — 3008F PR BODY MASS INDEX (BMI) DOCUMENTED: ICD-10-PCS | Mod: CPTII,S$GLB,,

## 2022-11-02 PROCEDURE — 4010F PR ACE/ARB THEARPY RXD/TAKEN: ICD-10-PCS | Mod: CPTII,S$GLB,,

## 2022-11-02 PROCEDURE — 96375 TX/PRO/DX INJ NEW DRUG ADDON: CPT

## 2022-11-02 PROCEDURE — 80053 COMPREHEN METABOLIC PANEL: CPT | Performed by: INTERNAL MEDICINE

## 2022-11-02 PROCEDURE — 36415 COLL VENOUS BLD VENIPUNCTURE: CPT | Performed by: INTERNAL MEDICINE

## 2022-11-02 PROCEDURE — 1159F MED LIST DOCD IN RCRD: CPT | Mod: CPTII,S$GLB,,

## 2022-11-02 PROCEDURE — 99215 PR OFFICE/OUTPT VISIT, EST, LEVL V, 40-54 MIN: ICD-10-PCS | Mod: 25,S$GLB,,

## 2022-11-02 PROCEDURE — 96417 CHEMO IV INFUS EACH ADDL SEQ: CPT

## 2022-11-02 PROCEDURE — 96413 CHEMO IV INFUSION 1 HR: CPT

## 2022-11-02 PROCEDURE — 3288F PR FALLS RISK ASSESSMENT DOCUMENTED: ICD-10-PCS | Mod: CPTII,S$GLB,,

## 2022-11-02 RX ORDER — SODIUM CHLORIDE 0.9 % (FLUSH) 0.9 %
10 SYRINGE (ML) INJECTION
Status: DISCONTINUED | OUTPATIENT
Start: 2022-11-02 | End: 2022-11-02 | Stop reason: HOSPADM

## 2022-11-02 RX ORDER — SODIUM CHLORIDE 0.9 % (FLUSH) 0.9 %
10 SYRINGE (ML) INJECTION
Status: CANCELLED | OUTPATIENT
Start: 2022-11-02

## 2022-11-02 RX ORDER — HEPARIN 100 UNIT/ML
500 SYRINGE INTRAVENOUS
Status: DISCONTINUED | OUTPATIENT
Start: 2022-11-02 | End: 2022-11-02 | Stop reason: HOSPADM

## 2022-11-02 RX ORDER — EPINEPHRINE 0.3 MG/.3ML
0.3 INJECTION SUBCUTANEOUS ONCE AS NEEDED
Status: CANCELLED | OUTPATIENT
Start: 2022-11-02

## 2022-11-02 RX ORDER — DIPHENHYDRAMINE HYDROCHLORIDE 50 MG/ML
50 INJECTION INTRAMUSCULAR; INTRAVENOUS ONCE AS NEEDED
Status: CANCELLED | OUTPATIENT
Start: 2022-11-02

## 2022-11-02 RX ORDER — FAMOTIDINE 10 MG/ML
20 INJECTION INTRAVENOUS
Status: COMPLETED | OUTPATIENT
Start: 2022-11-02 | End: 2022-11-02

## 2022-11-02 RX ORDER — ONDANSETRON 2 MG/ML
4 INJECTION INTRAMUSCULAR; INTRAVENOUS ONCE
Status: COMPLETED | OUTPATIENT
Start: 2022-11-02 | End: 2022-11-02

## 2022-11-02 RX ORDER — ONDANSETRON 2 MG/ML
4 INJECTION INTRAMUSCULAR; INTRAVENOUS ONCE
Status: CANCELLED
Start: 2022-11-02 | End: 2022-11-02

## 2022-11-02 RX ORDER — FAMOTIDINE 10 MG/ML
20 INJECTION INTRAVENOUS
Status: CANCELLED | OUTPATIENT
Start: 2022-11-02

## 2022-11-02 RX ORDER — HEPARIN 100 UNIT/ML
500 SYRINGE INTRAVENOUS
Status: CANCELLED | OUTPATIENT
Start: 2022-11-02

## 2022-11-02 RX ADMIN — PERTUZUMAB 420 MG: 30 INJECTION, SOLUTION, CONCENTRATE INTRAVENOUS at 09:11

## 2022-11-02 RX ADMIN — ONDANSETRON 4 MG: 2 INJECTION, SOLUTION INTRAMUSCULAR; INTRAVENOUS at 09:11

## 2022-11-02 RX ADMIN — HEPARIN 500 UNITS: 100 SYRINGE at 02:11

## 2022-11-02 RX ADMIN — FAMOTIDINE 20 MG: 10 INJECTION INTRAVENOUS at 12:11

## 2022-11-02 RX ADMIN — DEXAMETHASONE SODIUM PHOSPHATE 10 MG: 4 INJECTION, SOLUTION INTRA-ARTICULAR; INTRALESIONAL; INTRAMUSCULAR; INTRAVENOUS; SOFT TISSUE at 11:11

## 2022-11-02 RX ADMIN — PACLITAXEL 162 MG: 6 INJECTION, SOLUTION INTRAVENOUS at 12:11

## 2022-11-02 RX ADMIN — TRASTUZUMAB-ANNS 554 MG: 150 INJECTION, POWDER, LYOPHILIZED, FOR SOLUTION INTRAVENOUS at 11:11

## 2022-11-02 RX ADMIN — SODIUM CHLORIDE: 0.9 INJECTION, SOLUTION INTRAVENOUS at 08:11

## 2022-11-02 RX ADMIN — DIPHENHYDRAMINE HYDROCHLORIDE 50 MG: 50 INJECTION, SOLUTION INTRAMUSCULAR; INTRAVENOUS at 12:11

## 2022-11-02 NOTE — DISCHARGE INSTRUCTIONS
.THANKS FOR ALLOWING ME TO CARE FOR YOU!!!! ~Lydia          THANKS FOR CHOOSING [facility]!!!        [facility]  [address] [facility].  or  [id number] [place] [address] Drive  [phone] phone     [phone] fax  Hours of Operation: Monday- Friday 8:00am- 5:00pm  After hours phone  [phone]  Hematology / Oncology Physicians on call      Dr. Severiano Ospina, SMITH Nguyen, SMITH      Please call with any concerns regarding your appointment today.      .WAYS TO HELP PREVENT INFECTION        WASH YOUR HANDS OFTEN DURING THE DAY, ESPECIALLY BEFORE YOU EAT, AFTER USING THE BATHROOM, AND AFTER TOUCHING ANIMALS    STAY AWAY FROM PEOPLE WHO HAVE ILLNESSES YOU CAN CATCH; SUCH AS COLDS, FLU, CHICKEN POX    TRY TO AVOID CROWDS    STAY AWAY FROM CHILDREN WHO RECENTLY HAVE RECEIVED LIVE VIRUS VACCINES    MAINTAIN GOOD MOUTH CARE    DO NOT SQUEEZE OR SCRATCH PIMPLES    CLEAN CUTS & SCRAPES RIGHT AWAY AND DAILY UNTIL HEALED WITH WARM WATER, SOAP & AN ANTISEPTIC    AVOID CONTACT WITH LITTER BOXES, BIRD CAGES, & FISH TANKS    AVOID STANDING WATER, IE., BIRD BATHS, FLOWER POTS/VASES, OR HUMIDIFIERS    WEAR GLOVES WHEN GARDENING OR CLEANING UP AFTER OTHERS, ESPECIALLY BABIES & SMALL CHILDREN    DO NOT EAT RAW FISH, SEAFOOD, MEAT, OR EGGS    .YOU HAVE STARTED ON CHEMOTHERAPY. IF YOU EXPERIENCE ANY OF THE FOLLOWING PROBLEMS, CALL THE OFFICE IMMEDIATELY.    *FEVER .0 OR GREATER    *CHILLS, ESPECIALLY SHAKING CHILLS, OR SWEATING    *A SEVERE COUGH OR SORE THROAT, OR SINUS PAIN/     PRESSURE    *REDNESS, SWELLING, OR TENDERNESS AROUND A WOUND,     SORE, PIMPLE, RECTAL AREA, OR IV SITE    *SORES OR ULCERS IN THE MOUTH    *BLISTERS ON THE LIPS OR SKIN    *FREQUENT URGENCY TO URINATE OR A BURNING FEELING   WHEN YOU URINATE    *BLOOD IN THE URINE OR STOOL    *ANY UNEXPLAINED BRUISING OR PROLONGED BLEEDING,     (NOSEBLEEDS OR BLEEDING GUMS)    *LOOSE BOWEL MOVEMENTS  THAT DO NOT RESPOND TO     IMODIUM OR MORE THAN THREE TIMES A DAY    *VOMITING UNRESPONSIVE TO ANTINAUSEA MEDICINE    *ANY UNUSUAL PHYSICAL SYMPTOMS THAT BEGAN AFTER     CHEMOTHERAPY    DURING WEEKDAYS, CALL AND ASK TO SPEAK DIRECTLY TO A NURSE.  AT OTHER TIMES, CALL THE OFFICE PHONE NUMBER; THE ANSWERING SERVICE WILL CONTACT THE ON-CALL PHYSICIAN.  SOMEONE IS AVAILABLE 24 HOURS A DAY, SEVEN DAYS A WEEK.      .FALL PREVENTION   Falls often occur due to slipping, tripping or losing your balance. Here are ways to reduce your risk of falling again.   Was there anything that caused your fall that can be fixed, removed or replaced?   Make your home safe by keeping walkways clear of objects you may trip over.   Use non-slip pads under rugs.   Do not walk in poorly lit areas.   Do not stand on chairs or wobbly ladders.   Use caution when reaching overhead or looking upward. This position can cause a loss of balance.   Be sure your shoes fit properly, have non-slip bottoms and are in good condition.   Be cautious when going up and down stairs, curbs, and when walking on uneven sidewalks.   If your balance is poor, consider using a cane or walker.   If your fall was related to alcohol use, stop or limit alcohol intake.   If your fall was related to use of sleeping medicines, talk to your doctor about this. You may need to reduce your dosage at bedtime if you awaken during the night to go to the bathroom.   To reduce the need for nighttime bathroom trips:   Avoid drinking fluids for several hours before going to bed   Empty your bladder before going to bed   Men can keep a urinal at the bedside   © 4006-0445 [facility], [address] [place] [address], [place] [address]. All rights reserved. This information is not intended as a substitute for professional medical care. Always follow your healthcare professional's instructions.

## 2022-11-02 NOTE — PLAN OF CARE
Problem: Anemia (Chemotherapy Effects)  Goal: Anemia Symptom Improvement  Outcome: Ongoing, Progressing     Problem: Nausea and Vomiting (Chemotherapy Effects)  Goal: Fluid and Electrolyte Balance  Outcome: Ongoing, Progressing  Intervention: Prevent and Manage Nausea and Vomiting  Flowsheets (Taken 11/2/2022 0922)  Nausea/Vomiting Interventions: (premeds given see MAR for details) other (see comments)     Problem: Neutropenia (Chemotherapy Effects)  Goal: Absence of Infection  Outcome: Ongoing, Progressing  Intervention: Prevent Infection and Maximize Resistance  Flowsheets (Taken 11/2/2022 0922)  Infection Prevention:   equipment surfaces disinfected   personal protective equipment utilized   hand hygiene promoted     Problem: Thrombocytopenia Bleeding Risk (Chemotherapy Effects)  Goal: Absence of Bleeding  Outcome: Ongoing, Progressing     Problem: Adult Inpatient Plan of Care  Goal: Plan of Care Review  Description: Patient likes feet elevated, pillow, blanket, snack, and juice  Outcome: Ongoing, Progressing  Flowsheets (Taken 11/2/2022 0922)  Plan of Care Reviewed With: patient  Goal: Patient-Specific Goal (Individualized)  Description: Patient tolerated chemo infusion well today with no adverse reactions.   Outcome: Ongoing, Progressing  Flowsheets (Taken 11/2/2022 0922)  Anxieties, Fears or Concerns: None  Individualized Care Needs: Feet elevated warm blanket/pillow, chips, ginger ale  Patient-Specific Goals (Include Timeframe): Patient will tolerate chemo without events.  Goal: Optimal Comfort and Wellbeing  Outcome: Ongoing, Progressing  Intervention: Provide Person-Centered Care  Flowsheets (Taken 11/2/2022 0922)  Trust Relationship/Rapport:   care explained   choices provided   emotional support provided   empathic listening provided   thoughts/feelings acknowledged   reassurance provided   questions encouraged   questions answered     Problem: Fall Injury Risk  Goal: Absence of Fall and Fall-Related  Injury  Outcome: Ongoing, Progressing  Intervention: Promote Injury-Free Environment  Flowsheets (Taken 11/2/2022 0122)  Safety Promotion/Fall Prevention:   assistive device/personal item within reach   high risk medications identified   medications reviewed   in recliner, wheels locked

## 2022-11-09 ENCOUNTER — INFUSION (OUTPATIENT)
Dept: INFUSION THERAPY | Facility: HOSPITAL | Age: 67
End: 2022-11-09
Attending: INTERNAL MEDICINE
Payer: MEDICARE

## 2022-11-09 ENCOUNTER — OFFICE VISIT (OUTPATIENT)
Dept: HEMATOLOGY/ONCOLOGY | Facility: CLINIC | Age: 67
End: 2022-11-09
Payer: MEDICARE

## 2022-11-09 ENCOUNTER — LAB VISIT (OUTPATIENT)
Dept: LAB | Facility: HOSPITAL | Age: 67
End: 2022-11-09
Attending: INTERNAL MEDICINE
Payer: MEDICARE

## 2022-11-09 VITALS
BODY MASS INDEX: 37.26 KG/M2 | HEIGHT: 62 IN | OXYGEN SATURATION: 96 % | DIASTOLIC BLOOD PRESSURE: 67 MMHG | RESPIRATION RATE: 18 BRPM | SYSTOLIC BLOOD PRESSURE: 147 MMHG | WEIGHT: 202.5 LBS | TEMPERATURE: 97 F | HEART RATE: 77 BPM

## 2022-11-09 VITALS
OXYGEN SATURATION: 100 % | BODY MASS INDEX: 37.24 KG/M2 | WEIGHT: 202.38 LBS | TEMPERATURE: 98 F | HEIGHT: 62 IN | SYSTOLIC BLOOD PRESSURE: 172 MMHG | HEART RATE: 72 BPM | DIASTOLIC BLOOD PRESSURE: 79 MMHG

## 2022-11-09 DIAGNOSIS — D59.2 DRUG-INDUCED NONAUTOIMMUNE HEMOLYTIC ANEMIA: ICD-10-CM

## 2022-11-09 DIAGNOSIS — Z17.0 MALIGNANT NEOPLASM OF UPPER-OUTER QUADRANT OF LEFT BREAST IN FEMALE, ESTROGEN RECEPTOR POSITIVE: Primary | ICD-10-CM

## 2022-11-09 DIAGNOSIS — C50.412 MALIGNANT NEOPLASM OF UPPER-OUTER QUADRANT OF LEFT BREAST IN FEMALE, ESTROGEN RECEPTOR POSITIVE: Primary | ICD-10-CM

## 2022-11-09 DIAGNOSIS — E21.3 HYPERPARATHYROIDISM, UNSPECIFIED: ICD-10-CM

## 2022-11-09 DIAGNOSIS — Z17.0 MALIGNANT NEOPLASM OF LEFT BREAST IN FEMALE, ESTROGEN RECEPTOR POSITIVE, UNSPECIFIED SITE OF BREAST: ICD-10-CM

## 2022-11-09 DIAGNOSIS — C50.912 MALIGNANT NEOPLASM OF LEFT BREAST IN FEMALE, ESTROGEN RECEPTOR POSITIVE, UNSPECIFIED SITE OF BREAST: ICD-10-CM

## 2022-11-09 LAB
ALBUMIN SERPL BCP-MCNC: 3.2 G/DL (ref 3.5–5.2)
ALP SERPL-CCNC: 57 U/L (ref 55–135)
ALT SERPL W/O P-5'-P-CCNC: 14 U/L (ref 10–44)
ANION GAP SERPL CALC-SCNC: 9 MMOL/L (ref 8–16)
AST SERPL-CCNC: 15 U/L (ref 10–40)
BASOPHILS # BLD AUTO: 0.04 K/UL (ref 0–0.2)
BASOPHILS NFR BLD: 0.8 % (ref 0–1.9)
BILIRUB SERPL-MCNC: 0.5 MG/DL (ref 0.1–1)
BUN SERPL-MCNC: 28 MG/DL (ref 8–23)
CALCIUM SERPL-MCNC: 8.7 MG/DL (ref 8.7–10.5)
CHLORIDE SERPL-SCNC: 104 MMOL/L (ref 95–110)
CO2 SERPL-SCNC: 25 MMOL/L (ref 23–29)
CREAT SERPL-MCNC: 4.5 MG/DL (ref 0.5–1.4)
DIFFERENTIAL METHOD: ABNORMAL
EOSINOPHIL # BLD AUTO: 0.3 K/UL (ref 0–0.5)
EOSINOPHIL NFR BLD: 6.4 % (ref 0–8)
ERYTHROCYTE [DISTWIDTH] IN BLOOD BY AUTOMATED COUNT: 20.7 % (ref 11.5–14.5)
EST. GFR  (NO RACE VARIABLE): 10 ML/MIN/1.73 M^2
GLUCOSE SERPL-MCNC: 90 MG/DL (ref 70–110)
HCT VFR BLD AUTO: 28.7 % (ref 37–48.5)
HGB BLD-MCNC: 9.4 G/DL (ref 12–16)
IMM GRANULOCYTES # BLD AUTO: 0.11 K/UL (ref 0–0.04)
IMM GRANULOCYTES NFR BLD AUTO: 2.2 % (ref 0–0.5)
LYMPHOCYTES # BLD AUTO: 1.2 K/UL (ref 1–4.8)
LYMPHOCYTES NFR BLD: 24 % (ref 18–48)
MCH RBC QN AUTO: 30 PG (ref 27–31)
MCHC RBC AUTO-ENTMCNC: 32.8 G/DL (ref 32–36)
MCV RBC AUTO: 92 FL (ref 82–98)
MONOCYTES # BLD AUTO: 0.5 K/UL (ref 0.3–1)
MONOCYTES NFR BLD: 9.2 % (ref 4–15)
NEUTROPHILS # BLD AUTO: 2.9 K/UL (ref 1.8–7.7)
NEUTROPHILS NFR BLD: 57.4 % (ref 38–73)
NRBC BLD-RTO: 0 /100 WBC
PLATELET # BLD AUTO: 315 K/UL (ref 150–450)
PMV BLD AUTO: 11 FL (ref 9.2–12.9)
POTASSIUM SERPL-SCNC: 5.1 MMOL/L (ref 3.5–5.1)
PROT SERPL-MCNC: 8.3 G/DL (ref 6–8.4)
RBC # BLD AUTO: 3.13 M/UL (ref 4–5.4)
SODIUM SERPL-SCNC: 138 MMOL/L (ref 136–145)
WBC # BLD AUTO: 4.99 K/UL (ref 3.9–12.7)

## 2022-11-09 PROCEDURE — 99214 PR OFFICE/OUTPT VISIT, EST, LEVL IV, 30-39 MIN: ICD-10-PCS | Mod: S$GLB,,, | Performed by: INTERNAL MEDICINE

## 2022-11-09 PROCEDURE — 1101F PT FALLS ASSESS-DOCD LE1/YR: CPT | Mod: CPTII,S$GLB,, | Performed by: INTERNAL MEDICINE

## 2022-11-09 PROCEDURE — 3008F BODY MASS INDEX DOCD: CPT | Mod: CPTII,S$GLB,, | Performed by: INTERNAL MEDICINE

## 2022-11-09 PROCEDURE — 96367 TX/PROPH/DG ADDL SEQ IV INF: CPT

## 2022-11-09 PROCEDURE — 1126F AMNT PAIN NOTED NONE PRSNT: CPT | Mod: CPTII,S$GLB,, | Performed by: INTERNAL MEDICINE

## 2022-11-09 PROCEDURE — 36415 COLL VENOUS BLD VENIPUNCTURE: CPT | Performed by: INTERNAL MEDICINE

## 2022-11-09 PROCEDURE — 96375 TX/PRO/DX INJ NEW DRUG ADDON: CPT

## 2022-11-09 PROCEDURE — 1157F PR ADVANCE CARE PLAN OR EQUIV PRESENT IN MEDICAL RECORD: ICD-10-PCS | Mod: CPTII,S$GLB,, | Performed by: INTERNAL MEDICINE

## 2022-11-09 PROCEDURE — 25000003 PHARM REV CODE 250: Performed by: INTERNAL MEDICINE

## 2022-11-09 PROCEDURE — 4010F ACE/ARB THERAPY RXD/TAKEN: CPT | Mod: CPTII,S$GLB,, | Performed by: INTERNAL MEDICINE

## 2022-11-09 PROCEDURE — 63600175 PHARM REV CODE 636 W HCPCS: Performed by: INTERNAL MEDICINE

## 2022-11-09 PROCEDURE — 1101F PR PT FALLS ASSESS DOC 0-1 FALLS W/OUT INJ PAST YR: ICD-10-PCS | Mod: CPTII,S$GLB,, | Performed by: INTERNAL MEDICINE

## 2022-11-09 PROCEDURE — 80053 COMPREHEN METABOLIC PANEL: CPT | Performed by: INTERNAL MEDICINE

## 2022-11-09 PROCEDURE — 3288F PR FALLS RISK ASSESSMENT DOCUMENTED: ICD-10-PCS | Mod: CPTII,S$GLB,, | Performed by: INTERNAL MEDICINE

## 2022-11-09 PROCEDURE — 99214 OFFICE O/P EST MOD 30 MIN: CPT | Mod: S$GLB,,, | Performed by: INTERNAL MEDICINE

## 2022-11-09 PROCEDURE — 3077F SYST BP >= 140 MM HG: CPT | Mod: CPTII,S$GLB,, | Performed by: INTERNAL MEDICINE

## 2022-11-09 PROCEDURE — 3008F PR BODY MASS INDEX (BMI) DOCUMENTED: ICD-10-PCS | Mod: CPTII,S$GLB,, | Performed by: INTERNAL MEDICINE

## 2022-11-09 PROCEDURE — 4010F PR ACE/ARB THEARPY RXD/TAKEN: ICD-10-PCS | Mod: CPTII,S$GLB,, | Performed by: INTERNAL MEDICINE

## 2022-11-09 PROCEDURE — 1159F MED LIST DOCD IN RCRD: CPT | Mod: CPTII,S$GLB,, | Performed by: INTERNAL MEDICINE

## 2022-11-09 PROCEDURE — 1159F PR MEDICATION LIST DOCUMENTED IN MEDICAL RECORD: ICD-10-PCS | Mod: CPTII,S$GLB,, | Performed by: INTERNAL MEDICINE

## 2022-11-09 PROCEDURE — 96413 CHEMO IV INFUSION 1 HR: CPT

## 2022-11-09 PROCEDURE — 1126F PR PAIN SEVERITY QUANTIFIED, NO PAIN PRESENT: ICD-10-PCS | Mod: CPTII,S$GLB,, | Performed by: INTERNAL MEDICINE

## 2022-11-09 PROCEDURE — 99999 PR PBB SHADOW E&M-EST. PATIENT-LVL V: ICD-10-PCS | Mod: PBBFAC,,, | Performed by: INTERNAL MEDICINE

## 2022-11-09 PROCEDURE — 3078F PR MOST RECENT DIASTOLIC BLOOD PRESSURE < 80 MM HG: ICD-10-PCS | Mod: CPTII,S$GLB,, | Performed by: INTERNAL MEDICINE

## 2022-11-09 PROCEDURE — 3077F PR MOST RECENT SYSTOLIC BLOOD PRESSURE >= 140 MM HG: ICD-10-PCS | Mod: CPTII,S$GLB,, | Performed by: INTERNAL MEDICINE

## 2022-11-09 PROCEDURE — 3288F FALL RISK ASSESSMENT DOCD: CPT | Mod: CPTII,S$GLB,, | Performed by: INTERNAL MEDICINE

## 2022-11-09 PROCEDURE — 1160F PR REVIEW ALL MEDS BY PRESCRIBER/CLIN PHARMACIST DOCUMENTED: ICD-10-PCS | Mod: CPTII,S$GLB,, | Performed by: INTERNAL MEDICINE

## 2022-11-09 PROCEDURE — 1157F ADVNC CARE PLAN IN RCRD: CPT | Mod: CPTII,S$GLB,, | Performed by: INTERNAL MEDICINE

## 2022-11-09 PROCEDURE — 99999 PR PBB SHADOW E&M-EST. PATIENT-LVL V: CPT | Mod: PBBFAC,,, | Performed by: INTERNAL MEDICINE

## 2022-11-09 PROCEDURE — 3078F DIAST BP <80 MM HG: CPT | Mod: CPTII,S$GLB,, | Performed by: INTERNAL MEDICINE

## 2022-11-09 PROCEDURE — 85025 COMPLETE CBC W/AUTO DIFF WBC: CPT | Performed by: INTERNAL MEDICINE

## 2022-11-09 PROCEDURE — 1160F RVW MEDS BY RX/DR IN RCRD: CPT | Mod: CPTII,S$GLB,, | Performed by: INTERNAL MEDICINE

## 2022-11-09 RX ORDER — ONDANSETRON 2 MG/ML
4 INJECTION INTRAMUSCULAR; INTRAVENOUS ONCE
Status: CANCELLED
Start: 2022-11-16 | End: 2022-11-10

## 2022-11-09 RX ORDER — SODIUM CHLORIDE 0.9 % (FLUSH) 0.9 %
10 SYRINGE (ML) INJECTION
Status: CANCELLED | OUTPATIENT
Start: 2022-11-16

## 2022-11-09 RX ORDER — EPINEPHRINE 0.3 MG/.3ML
0.3 INJECTION SUBCUTANEOUS ONCE AS NEEDED
Status: CANCELLED | OUTPATIENT
Start: 2022-11-16

## 2022-11-09 RX ORDER — DIPHENHYDRAMINE HYDROCHLORIDE 50 MG/ML
50 INJECTION INTRAMUSCULAR; INTRAVENOUS ONCE AS NEEDED
Status: CANCELLED | OUTPATIENT
Start: 2022-11-09

## 2022-11-09 RX ORDER — HEPARIN 100 UNIT/ML
500 SYRINGE INTRAVENOUS
Status: CANCELLED | OUTPATIENT
Start: 2022-11-16

## 2022-11-09 RX ORDER — EPINEPHRINE 0.3 MG/.3ML
0.3 INJECTION SUBCUTANEOUS ONCE AS NEEDED
Status: CANCELLED | OUTPATIENT
Start: 2022-11-09

## 2022-11-09 RX ORDER — FAMOTIDINE 10 MG/ML
20 INJECTION INTRAVENOUS
Status: CANCELLED | OUTPATIENT
Start: 2022-11-16

## 2022-11-09 RX ORDER — SODIUM CHLORIDE 0.9 % (FLUSH) 0.9 %
10 SYRINGE (ML) INJECTION
Status: DISCONTINUED | OUTPATIENT
Start: 2022-11-09 | End: 2022-11-09 | Stop reason: HOSPADM

## 2022-11-09 RX ORDER — HEPARIN 100 UNIT/ML
500 SYRINGE INTRAVENOUS
Status: DISCONTINUED | OUTPATIENT
Start: 2022-11-09 | End: 2022-11-09 | Stop reason: HOSPADM

## 2022-11-09 RX ORDER — ONDANSETRON 2 MG/ML
4 INJECTION INTRAMUSCULAR; INTRAVENOUS ONCE
Status: COMPLETED | OUTPATIENT
Start: 2022-11-09 | End: 2022-11-09

## 2022-11-09 RX ORDER — ONDANSETRON 2 MG/ML
4 INJECTION INTRAMUSCULAR; INTRAVENOUS ONCE
Status: CANCELLED
Start: 2022-11-09 | End: 2022-11-09

## 2022-11-09 RX ORDER — DIPHENHYDRAMINE HYDROCHLORIDE 50 MG/ML
50 INJECTION INTRAMUSCULAR; INTRAVENOUS ONCE AS NEEDED
Status: CANCELLED | OUTPATIENT
Start: 2022-11-16

## 2022-11-09 RX ORDER — FAMOTIDINE 10 MG/ML
20 INJECTION INTRAVENOUS
Status: CANCELLED | OUTPATIENT
Start: 2022-11-09

## 2022-11-09 RX ORDER — FAMOTIDINE 10 MG/ML
20 INJECTION INTRAVENOUS
Status: COMPLETED | OUTPATIENT
Start: 2022-11-09 | End: 2022-11-09

## 2022-11-09 RX ORDER — SODIUM CHLORIDE 0.9 % (FLUSH) 0.9 %
10 SYRINGE (ML) INJECTION
Status: CANCELLED | OUTPATIENT
Start: 2022-11-09

## 2022-11-09 RX ORDER — HEPARIN 100 UNIT/ML
500 SYRINGE INTRAVENOUS
Status: CANCELLED | OUTPATIENT
Start: 2022-11-09

## 2022-11-09 RX ADMIN — FAMOTIDINE 20 MG: 10 INJECTION INTRAVENOUS at 11:11

## 2022-11-09 RX ADMIN — DIPHENHYDRAMINE HYDROCHLORIDE 50 MG: 50 INJECTION, SOLUTION INTRAMUSCULAR; INTRAVENOUS at 11:11

## 2022-11-09 RX ADMIN — ONDANSETRON 4 MG: 2 INJECTION, SOLUTION INTRAMUSCULAR; INTRAVENOUS at 11:11

## 2022-11-09 RX ADMIN — DEXAMETHASONE SODIUM PHOSPHATE 10 MG: 4 INJECTION, SOLUTION INTRA-ARTICULAR; INTRALESIONAL; INTRAMUSCULAR; INTRAVENOUS; SOFT TISSUE at 11:11

## 2022-11-09 RX ADMIN — SODIUM CHLORIDE: 0.9 INJECTION, SOLUTION INTRAVENOUS at 11:11

## 2022-11-09 RX ADMIN — HEPARIN 500 UNITS: 100 SYRINGE at 01:11

## 2022-11-09 RX ADMIN — PACLITAXEL 162 MG: 6 INJECTION, SOLUTION, CONCENTRATE INTRAVENOUS at 12:11

## 2022-11-09 NOTE — DISCHARGE INSTRUCTIONS
.THANKS FOR ALLOWING ME TO CARE FOR YOU!!!! ~Lydia          THANKS FOR CHOOSING [facility]!!!        [facility]  [address] [facility].  or  [id number] [place] [address] Drive  [phone] phone     [phone] fax  Hours of Operation: Monday- Friday 8:00am- 5:00pm  After hours phone  [phone]  Hematology / Oncology Physicians on call      Dr. Severiano Ospina, SMITH Contreras, SMITH Lizarraga, SMITH Bello, ARIP      Please call with any concerns regarding your appointment today.    .WAYS TO HELP PREVENT INFECTION        WASH YOUR HANDS OFTEN DURING THE DAY, ESPECIALLY BEFORE YOU EAT, AFTER USING THE BATHROOM, AND AFTER TOUCHING ANIMALS    STAY AWAY FROM PEOPLE WHO HAVE ILLNESSES YOU CAN CATCH; SUCH AS COLDS, FLU, CHICKEN POX    TRY TO AVOID CROWDS    STAY AWAY FROM CHILDREN WHO RECENTLY HAVE RECEIVED LIVE VIRUS VACCINES    MAINTAIN GOOD MOUTH CARE    DO NOT SQUEEZE OR SCRATCH PIMPLES    CLEAN CUTS & SCRAPES RIGHT AWAY AND DAILY UNTIL HEALED WITH WARM WATER, SOAP & AN ANTISEPTIC    AVOID CONTACT WITH LITTER BOXES, BIRD CAGES, & FISH TANKS    AVOID STANDING WATER, IE., BIRD BATHS, FLOWER POTS/VASES, OR HUMIDIFIERS    WEAR GLOVES WHEN GARDENING OR CLEANING UP AFTER OTHERS, ESPECIALLY BABIES & SMALL CHILDREN    DO NOT EAT RAW FISH, SEAFOOD, MEAT, OR EGGS    .YOU HAVE STARTED ON CHEMOTHERAPY. IF YOU EXPERIENCE ANY OF THE FOLLOWING PROBLEMS, CALL THE OFFICE IMMEDIATELY.    *FEVER .0 OR GREATER    *CHILLS, ESPECIALLY SHAKING CHILLS, OR SWEATING    *A SEVERE COUGH OR SORE THROAT, OR SINUS PAIN/     PRESSURE    *REDNESS, SWELLING, OR TENDERNESS AROUND A WOUND,     SORE, PIMPLE, RECTAL AREA, OR IV SITE    *SORES OR ULCERS IN THE MOUTH    *BLISTERS ON THE LIPS OR SKIN    *FREQUENT URGENCY TO URINATE OR A BURNING FEELING   WHEN YOU URINATE    *BLOOD IN THE URINE OR STOOL    *ANY UNEXPLAINED BRUISING OR PROLONGED BLEEDING,     (NOSEBLEEDS OR BLEEDING GUMS)    *LOOSE  BOWEL MOVEMENTS THAT DO NOT RESPOND TO     IMODIUM OR MORE THAN THREE TIMES A DAY    *VOMITING UNRESPONSIVE TO ANTINAUSEA MEDICINE    *ANY UNUSUAL PHYSICAL SYMPTOMS THAT BEGAN AFTER     CHEMOTHERAPY    DURING WEEKDAYS, CALL AND ASK TO SPEAK DIRECTLY TO A NURSE.  AT OTHER TIMES, CALL THE OFFICE PHONE NUMBER; THE ANSWERING SERVICE WILL CONTACT THE ON-CALL PHYSICIAN.  SOMEONE IS AVAILABLE 24 HOURS A DAY, SEVEN DAYS A WEEK.    .FALL PREVENTION   Falls often occur due to slipping, tripping or losing your balance. Here are ways to reduce your risk of falling again.   Was there anything that caused your fall that can be fixed, removed or replaced?   Make your home safe by keeping walkways clear of objects you may trip over.   Use non-slip pads under rugs.   Do not walk in poorly lit areas.   Do not stand on chairs or wobbly ladders.   Use caution when reaching overhead or looking upward. This position can cause a loss of balance.   Be sure your shoes fit properly, have non-slip bottoms and are in good condition.   Be cautious when going up and down stairs, curbs, and when walking on uneven sidewalks.   If your balance is poor, consider using a cane or walker.   If your fall was related to alcohol use, stop or limit alcohol intake.   If your fall was related to use of sleeping medicines, talk to your doctor about this. You may need to reduce your dosage at bedtime if you awaken during the night to go to the bathroom.   To reduce the need for nighttime bathroom trips:   Avoid drinking fluids for several hours before going to bed   Empty your bladder before going to bed   Men can keep a urinal at the bedside   © 6585-9894 [facility], [address] [place] [address], [place] [address]. All rights reserved. This information is not intended as a substitute for professional medical care. Always follow your healthcare professional's instructions.

## 2022-11-09 NOTE — PLAN OF CARE
Problem: Anemia (Chemotherapy Effects)  Goal: Anemia Symptom Improvement  Outcome: Ongoing, Progressing  Intervention: Monitor and Manage Anemia  Flowsheets (Taken 11/9/2022 0922)  Safety Promotion/Fall Prevention:   assistive device/personal item within reach   high risk medications identified   in recliner, wheels locked   medications reviewed  Fatigue Management:   frequent rest breaks encouraged   paced activity encouraged     Problem: Nausea and Vomiting (Chemotherapy Effects)  Goal: Fluid and Electrolyte Balance  Outcome: Ongoing, Progressing  Intervention: Prevent and Manage Nausea and Vomiting  Flowsheets (Taken 11/9/2022 0922)  Environmental Support: (Premeds for nausea) calm environment promoted     Problem: Neutropenia (Chemotherapy Effects)  Goal: Absence of Infection  Outcome: Ongoing, Progressing  Intervention: Prevent Infection and Maximize Resistance  Flowsheets (Taken 11/9/2022 0922)  Infection Prevention:   equipment surfaces disinfected   personal protective equipment utilized   hand hygiene promoted     Problem: Thrombocytopenia Bleeding Risk (Chemotherapy Effects)  Goal: Absence of Bleeding  Outcome: Ongoing, Progressing     Problem: Adult Inpatient Plan of Care  Goal: Plan of Care Review  Description: Patient likes feet elevated, pillow, blanket, snack, and juice  Outcome: Ongoing, Progressing  Flowsheets (Taken 11/9/2022 0922)  Plan of Care Reviewed With: patient  Goal: Patient-Specific Goal (Individualized)  Description: Patient tolerated chemo infusion well today with no adverse reactions.   Outcome: Ongoing, Progressing  Flowsheets (Taken 11/9/2022 0922)  Anxieties, Fears or Concerns: None  Individualized Care Needs: Feet elevate, warm blanket/pillow  Patient-Specific Goals (Include Timeframe): Patient will tolerate chemo without events.  Goal: Optimal Comfort and Wellbeing  Outcome: Ongoing, Progressing  Intervention: Provide Person-Centered Care  Flowsheets (Taken 11/9/2022 0922)  Trust  Relationship/Rapport:   care explained   choices provided   emotional support provided   empathic listening provided   thoughts/feelings acknowledged   reassurance provided   questions encouraged   questions answered     Problem: Fall Injury Risk  Goal: Absence of Fall and Fall-Related Injury  Outcome: Ongoing, Progressing  Intervention: Promote Injury-Free Environment  Flowsheets (Taken 11/9/2022 0922)  Safety Promotion/Fall Prevention:   assistive device/personal item within reach   high risk medications identified   in recliner, wheels locked   medications reviewed

## 2022-11-09 NOTE — NURSING
"0950 Patient to unit today for Taxol. Presents with a nonproductive cough. Noticed patient stated "8" on distress scale. Asked patient if she would like to speak with a . Patient stated that she is OK at this time. Messaged provider, Dr. Garvin RE: to clarify whether or not it is OK to treat patient with elevated BP of  169/73 and to have orders signed. Awaiting response. Will keep patient posted on progress of treatment plan being signed. Notified patient. Understanding verbalized.    1050 Orders signed. Instructed OK to proceed with BP elevated at 169/73. Treatment plan released. Kept patient posted on progress.    2808 Patient tolerated Taxol well. Discharged without distress or complaints.  "

## 2022-11-09 NOTE — PROGRESS NOTES
Subjective:      Patient ID: Malaika Paredes is a 67 y.o. female.    Chief Complaint: No chief complaint on file.      HPI: This is a 67 year old woman with HER 2 positive/ER positive locally advanced left breast cancer on neoadjuvant chemotherapy with weekly Taxol + Trastuzumab/Pertuzumab Q3 weeks cycle.  She is scheduled for cycle 4 day 8 weekly Taxol today. She has ESRD on dialysis - Tue/Thur/Sat.    Review of Systems   Constitutional:  Negative for chills and fever.   HENT:  Negative for mouth sores, rhinorrhea and sore throat.    Eyes:  Negative for pain and itching.   Respiratory:  Negative for cough, shortness of breath and wheezing.    Cardiovascular:  Negative for chest pain, palpitations and leg swelling.   Gastrointestinal:  Negative for abdominal distention, abdominal pain and blood in stool.   Genitourinary:  Negative for dysuria and hematuria.   Musculoskeletal:  Negative for leg pain.   Neurological:  Negative for dizziness and syncope.   Hematological:  Negative for adenopathy. Does not bruise/bleed easily.   Psychiatric/Behavioral:  Negative for agitation and confusion.      Objective:     Physical Exam  Constitutional:       Appearance: Normal appearance.   HENT:      Head: Normocephalic and atraumatic.      Mouth/Throat:      Mouth: Mucous membranes are moist.      Pharynx: Oropharynx is clear. No oropharyngeal exudate or posterior oropharyngeal erythema.   Eyes:      Pupils: Pupils are equal, round, and reactive to light.   Cardiovascular:      Rate and Rhythm: Normal rate.      Heart sounds: Normal heart sounds.     No friction rub. No gallop.   Pulmonary:      Effort: Pulmonary effort is normal.      Breath sounds: Normal breath sounds. No wheezing or rales.   Abdominal:      General: Abdomen is flat. There is no distension.      Palpations: Abdomen is soft.      Tenderness: There is no guarding or rebound.   Musculoskeletal:      Cervical back: Neck supple. No tenderness.      Right  lower leg: No edema.      Left lower leg: No edema.   Neurological:      General: No focal deficit present.      Mental Status: She is alert and oriented to person, place, and time.   Psychiatric:         Mood and Affect: Mood normal.         Behavior: Behavior normal.         Thought Content: Thought content normal.         Judgment: Judgment normal.       Assessment:     Problem List Items Addressed This Visit    None       HER 2 positive/ER positive locally advanced left breast cancer.  On Neoadjuvant chemotherapy - weekly Taxol + Trastuzumab/Pertuzumab Q 3 weeks cycle.  2. ESRD on dialysis.     Plan:     Patient is scheduled today - (11/9/2022) for cycle 4 day 8 weekly Taxol.  She has ESRD and gets dialysis on Tue/Thur/Sat.  RTC; 1 week - for next chemotherapy with CBC/CMP.

## 2022-11-16 ENCOUNTER — LAB VISIT (OUTPATIENT)
Dept: LAB | Facility: HOSPITAL | Age: 67
End: 2022-11-16
Attending: INTERNAL MEDICINE
Payer: MEDICARE

## 2022-11-16 ENCOUNTER — TELEPHONE (OUTPATIENT)
Dept: INFUSION THERAPY | Facility: HOSPITAL | Age: 67
End: 2022-11-16

## 2022-11-16 ENCOUNTER — INFUSION (OUTPATIENT)
Dept: INFUSION THERAPY | Facility: HOSPITAL | Age: 67
End: 2022-11-16
Attending: INTERNAL MEDICINE
Payer: MEDICARE

## 2022-11-16 ENCOUNTER — OFFICE VISIT (OUTPATIENT)
Dept: HEMATOLOGY/ONCOLOGY | Facility: CLINIC | Age: 67
End: 2022-11-16
Payer: MEDICARE

## 2022-11-16 VITALS
DIASTOLIC BLOOD PRESSURE: 82 MMHG | OXYGEN SATURATION: 99 % | RESPIRATION RATE: 18 BRPM | HEART RATE: 77 BPM | SYSTOLIC BLOOD PRESSURE: 161 MMHG | HEIGHT: 62 IN | WEIGHT: 201.5 LBS | BODY MASS INDEX: 37.08 KG/M2 | TEMPERATURE: 97 F

## 2022-11-16 VITALS
WEIGHT: 201.5 LBS | RESPIRATION RATE: 18 BRPM | SYSTOLIC BLOOD PRESSURE: 156 MMHG | HEART RATE: 78 BPM | HEIGHT: 62 IN | DIASTOLIC BLOOD PRESSURE: 76 MMHG | BODY MASS INDEX: 37.08 KG/M2 | TEMPERATURE: 97 F | OXYGEN SATURATION: 99 %

## 2022-11-16 DIAGNOSIS — C50.412 MALIGNANT NEOPLASM OF UPPER-OUTER QUADRANT OF LEFT BREAST IN FEMALE, ESTROGEN RECEPTOR POSITIVE: Primary | ICD-10-CM

## 2022-11-16 DIAGNOSIS — D84.821 IMMUNODEFICIENCY DUE TO CHEMOTHERAPY: ICD-10-CM

## 2022-11-16 DIAGNOSIS — C50.912 MALIGNANT NEOPLASM OF LEFT BREAST IN FEMALE, ESTROGEN RECEPTOR POSITIVE, UNSPECIFIED SITE OF BREAST: ICD-10-CM

## 2022-11-16 DIAGNOSIS — D63.1 ANEMIA ASSOCIATED WITH CHRONIC RENAL FAILURE: ICD-10-CM

## 2022-11-16 DIAGNOSIS — N18.5 CKD (CHRONIC KIDNEY DISEASE) STAGE 5, GFR LESS THAN 15 ML/MIN: ICD-10-CM

## 2022-11-16 DIAGNOSIS — Z17.0 MALIGNANT NEOPLASM OF UPPER-OUTER QUADRANT OF LEFT BREAST IN FEMALE, ESTROGEN RECEPTOR POSITIVE: Primary | ICD-10-CM

## 2022-11-16 DIAGNOSIS — N18.9 ANEMIA ASSOCIATED WITH CHRONIC RENAL FAILURE: ICD-10-CM

## 2022-11-16 DIAGNOSIS — Z17.0 MALIGNANT NEOPLASM OF LEFT BREAST IN FEMALE, ESTROGEN RECEPTOR POSITIVE, UNSPECIFIED SITE OF BREAST: ICD-10-CM

## 2022-11-16 DIAGNOSIS — T45.1X5A IMMUNODEFICIENCY DUE TO CHEMOTHERAPY: ICD-10-CM

## 2022-11-16 DIAGNOSIS — E66.01 SEVERE OBESITY (BMI 35.0-39.9) WITH COMORBIDITY: ICD-10-CM

## 2022-11-16 DIAGNOSIS — Z79.899 IMMUNODEFICIENCY DUE TO CHEMOTHERAPY: ICD-10-CM

## 2022-11-16 LAB
ALBUMIN SERPL BCP-MCNC: 3 G/DL (ref 3.5–5.2)
ALP SERPL-CCNC: 58 U/L (ref 55–135)
ALT SERPL W/O P-5'-P-CCNC: 14 U/L (ref 10–44)
ANION GAP SERPL CALC-SCNC: 9 MMOL/L (ref 8–16)
AST SERPL-CCNC: 18 U/L (ref 10–40)
BASOPHILS NFR BLD: 0 % (ref 0–1.9)
BILIRUB SERPL-MCNC: 0.4 MG/DL (ref 0.1–1)
BUN SERPL-MCNC: 17 MG/DL (ref 8–23)
CALCIUM SERPL-MCNC: 8.5 MG/DL (ref 8.7–10.5)
CHLORIDE SERPL-SCNC: 104 MMOL/L (ref 95–110)
CO2 SERPL-SCNC: 26 MMOL/L (ref 23–29)
CREAT SERPL-MCNC: 4.3 MG/DL (ref 0.5–1.4)
DIFFERENTIAL METHOD: ABNORMAL
EOSINOPHIL NFR BLD: 0 % (ref 0–8)
ERYTHROCYTE [DISTWIDTH] IN BLOOD BY AUTOMATED COUNT: 20.9 % (ref 11.5–14.5)
EST. GFR  (NO RACE VARIABLE): 11 ML/MIN/1.73 M^2
GLUCOSE SERPL-MCNC: 105 MG/DL (ref 70–110)
HCT VFR BLD AUTO: 26.8 % (ref 37–48.5)
HGB BLD-MCNC: 8.7 G/DL (ref 12–16)
IMM GRANULOCYTES # BLD AUTO: ABNORMAL K/UL (ref 0–0.04)
IMM GRANULOCYTES NFR BLD AUTO: ABNORMAL % (ref 0–0.5)
LYMPHOCYTES NFR BLD: 8 % (ref 18–48)
MCH RBC QN AUTO: 30.3 PG (ref 27–31)
MCHC RBC AUTO-ENTMCNC: 32.5 G/DL (ref 32–36)
MCV RBC AUTO: 93 FL (ref 82–98)
MONOCYTES NFR BLD: 10 % (ref 4–15)
NEUTROPHILS NFR BLD: 82 % (ref 38–73)
NRBC BLD-RTO: 1 /100 WBC
PLATELET # BLD AUTO: 318 K/UL (ref 150–450)
PMV BLD AUTO: 10.1 FL (ref 9.2–12.9)
POTASSIUM SERPL-SCNC: 4.7 MMOL/L (ref 3.5–5.1)
PROT SERPL-MCNC: 7.7 G/DL (ref 6–8.4)
RBC # BLD AUTO: 2.87 M/UL (ref 4–5.4)
SODIUM SERPL-SCNC: 139 MMOL/L (ref 136–145)
WBC # BLD AUTO: 6.49 K/UL (ref 3.9–12.7)

## 2022-11-16 PROCEDURE — 1159F MED LIST DOCD IN RCRD: CPT | Mod: CPTII,S$GLB,, | Performed by: INTERNAL MEDICINE

## 2022-11-16 PROCEDURE — 3008F BODY MASS INDEX DOCD: CPT | Mod: CPTII,S$GLB,, | Performed by: INTERNAL MEDICINE

## 2022-11-16 PROCEDURE — 1126F PR PAIN SEVERITY QUANTIFIED, NO PAIN PRESENT: ICD-10-PCS | Mod: CPTII,S$GLB,, | Performed by: INTERNAL MEDICINE

## 2022-11-16 PROCEDURE — 3008F PR BODY MASS INDEX (BMI) DOCUMENTED: ICD-10-PCS | Mod: CPTII,S$GLB,, | Performed by: INTERNAL MEDICINE

## 2022-11-16 PROCEDURE — 36415 COLL VENOUS BLD VENIPUNCTURE: CPT | Performed by: INTERNAL MEDICINE

## 2022-11-16 PROCEDURE — 3077F SYST BP >= 140 MM HG: CPT | Mod: CPTII,S$GLB,, | Performed by: INTERNAL MEDICINE

## 2022-11-16 PROCEDURE — 96413 CHEMO IV INFUSION 1 HR: CPT

## 2022-11-16 PROCEDURE — 4010F PR ACE/ARB THEARPY RXD/TAKEN: ICD-10-PCS | Mod: CPTII,S$GLB,, | Performed by: INTERNAL MEDICINE

## 2022-11-16 PROCEDURE — 1157F ADVNC CARE PLAN IN RCRD: CPT | Mod: CPTII,S$GLB,, | Performed by: INTERNAL MEDICINE

## 2022-11-16 PROCEDURE — 80053 COMPREHEN METABOLIC PANEL: CPT | Performed by: INTERNAL MEDICINE

## 2022-11-16 PROCEDURE — 1160F RVW MEDS BY RX/DR IN RCRD: CPT | Mod: CPTII,S$GLB,, | Performed by: INTERNAL MEDICINE

## 2022-11-16 PROCEDURE — 96375 TX/PRO/DX INJ NEW DRUG ADDON: CPT

## 2022-11-16 PROCEDURE — 99215 PR OFFICE/OUTPT VISIT, EST, LEVL V, 40-54 MIN: ICD-10-PCS | Mod: 25,S$GLB,, | Performed by: INTERNAL MEDICINE

## 2022-11-16 PROCEDURE — 1101F PR PT FALLS ASSESS DOC 0-1 FALLS W/OUT INJ PAST YR: ICD-10-PCS | Mod: CPTII,S$GLB,, | Performed by: INTERNAL MEDICINE

## 2022-11-16 PROCEDURE — 3288F PR FALLS RISK ASSESSMENT DOCUMENTED: ICD-10-PCS | Mod: CPTII,S$GLB,, | Performed by: INTERNAL MEDICINE

## 2022-11-16 PROCEDURE — 63600175 PHARM REV CODE 636 W HCPCS: Performed by: INTERNAL MEDICINE

## 2022-11-16 PROCEDURE — 3288F FALL RISK ASSESSMENT DOCD: CPT | Mod: CPTII,S$GLB,, | Performed by: INTERNAL MEDICINE

## 2022-11-16 PROCEDURE — 3077F PR MOST RECENT SYSTOLIC BLOOD PRESSURE >= 140 MM HG: ICD-10-PCS | Mod: CPTII,S$GLB,, | Performed by: INTERNAL MEDICINE

## 2022-11-16 PROCEDURE — 96367 TX/PROPH/DG ADDL SEQ IV INF: CPT

## 2022-11-16 PROCEDURE — 99215 OFFICE O/P EST HI 40 MIN: CPT | Mod: 25,S$GLB,, | Performed by: INTERNAL MEDICINE

## 2022-11-16 PROCEDURE — 1126F AMNT PAIN NOTED NONE PRSNT: CPT | Mod: CPTII,S$GLB,, | Performed by: INTERNAL MEDICINE

## 2022-11-16 PROCEDURE — 1101F PT FALLS ASSESS-DOCD LE1/YR: CPT | Mod: CPTII,S$GLB,, | Performed by: INTERNAL MEDICINE

## 2022-11-16 PROCEDURE — 1160F PR REVIEW ALL MEDS BY PRESCRIBER/CLIN PHARMACIST DOCUMENTED: ICD-10-PCS | Mod: CPTII,S$GLB,, | Performed by: INTERNAL MEDICINE

## 2022-11-16 PROCEDURE — 85007 BL SMEAR W/DIFF WBC COUNT: CPT | Performed by: INTERNAL MEDICINE

## 2022-11-16 PROCEDURE — 99999 PR PBB SHADOW E&M-EST. PATIENT-LVL V: ICD-10-PCS | Mod: PBBFAC,,, | Performed by: INTERNAL MEDICINE

## 2022-11-16 PROCEDURE — 1157F PR ADVANCE CARE PLAN OR EQUIV PRESENT IN MEDICAL RECORD: ICD-10-PCS | Mod: CPTII,S$GLB,, | Performed by: INTERNAL MEDICINE

## 2022-11-16 PROCEDURE — 1159F PR MEDICATION LIST DOCUMENTED IN MEDICAL RECORD: ICD-10-PCS | Mod: CPTII,S$GLB,, | Performed by: INTERNAL MEDICINE

## 2022-11-16 PROCEDURE — 25000003 PHARM REV CODE 250: Performed by: INTERNAL MEDICINE

## 2022-11-16 PROCEDURE — 4010F ACE/ARB THERAPY RXD/TAKEN: CPT | Mod: CPTII,S$GLB,, | Performed by: INTERNAL MEDICINE

## 2022-11-16 PROCEDURE — 3078F DIAST BP <80 MM HG: CPT | Mod: CPTII,S$GLB,, | Performed by: INTERNAL MEDICINE

## 2022-11-16 PROCEDURE — 99999 PR PBB SHADOW E&M-EST. PATIENT-LVL V: CPT | Mod: PBBFAC,,, | Performed by: INTERNAL MEDICINE

## 2022-11-16 PROCEDURE — 85027 COMPLETE CBC AUTOMATED: CPT | Performed by: INTERNAL MEDICINE

## 2022-11-16 PROCEDURE — 3078F PR MOST RECENT DIASTOLIC BLOOD PRESSURE < 80 MM HG: ICD-10-PCS | Mod: CPTII,S$GLB,, | Performed by: INTERNAL MEDICINE

## 2022-11-16 RX ORDER — ONDANSETRON 2 MG/ML
4 INJECTION INTRAMUSCULAR; INTRAVENOUS ONCE
Status: COMPLETED | OUTPATIENT
Start: 2022-11-16 | End: 2022-11-16

## 2022-11-16 RX ORDER — SODIUM CHLORIDE 0.9 % (FLUSH) 0.9 %
10 SYRINGE (ML) INJECTION
Status: DISCONTINUED | OUTPATIENT
Start: 2022-11-16 | End: 2022-11-16 | Stop reason: HOSPADM

## 2022-11-16 RX ORDER — HEPARIN 100 UNIT/ML
500 SYRINGE INTRAVENOUS
Status: DISCONTINUED | OUTPATIENT
Start: 2022-11-16 | End: 2022-11-16 | Stop reason: HOSPADM

## 2022-11-16 RX ORDER — FAMOTIDINE 10 MG/ML
20 INJECTION INTRAVENOUS
Status: COMPLETED | OUTPATIENT
Start: 2022-11-16 | End: 2022-11-16

## 2022-11-16 RX ADMIN — DEXAMETHASONE SODIUM PHOSPHATE 10 MG: 4 INJECTION, SOLUTION INTRA-ARTICULAR; INTRALESIONAL; INTRAMUSCULAR; INTRAVENOUS; SOFT TISSUE at 08:11

## 2022-11-16 RX ADMIN — FAMOTIDINE 20 MG: 10 INJECTION INTRAVENOUS at 08:11

## 2022-11-16 RX ADMIN — PACLITAXEL 162 MG: 6 INJECTION, SOLUTION INTRAVENOUS at 10:11

## 2022-11-16 RX ADMIN — HEPARIN 500 UNITS: 100 SYRINGE at 11:11

## 2022-11-16 RX ADMIN — DIPHENHYDRAMINE HYDROCHLORIDE 50 MG: 50 INJECTION, SOLUTION INTRAMUSCULAR; INTRAVENOUS at 09:11

## 2022-11-16 RX ADMIN — SODIUM CHLORIDE: 0.9 INJECTION, SOLUTION INTRAVENOUS at 08:11

## 2022-11-16 RX ADMIN — ONDANSETRON 4 MG: 2 INJECTION, SOLUTION INTRAMUSCULAR; INTRAVENOUS at 08:11

## 2022-11-16 NOTE — PLAN OF CARE
Problem: Adult Inpatient Plan of Care  Goal: Plan of Care Review  Outcome: Ongoing, Progressing  Flowsheets (Taken 11/16/2022 0914)  Plan of Care Reviewed With: patient  Goal: Patient-Specific Goal (Individualized)  Outcome: Ongoing, Progressing  Flowsheets (Taken 11/16/2022 0914)  Anxieties, Fears or Concerns: none  Individualized Care Needs: feet elevated, warm blanket/pillow  Patient-Specific Goals (Include Timeframe): Patient will tolerate Taxol without events.  Goal: Absence of Hospital-Acquired Illness or Injury  Outcome: Ongoing, Progressing  Goal: Optimal Comfort and Wellbeing  Outcome: Ongoing, Progressing  Intervention: Provide Person-Centered Care  Flowsheets (Taken 11/16/2022 0914)  Trust Relationship/Rapport:   care explained   choices provided   emotional support provided   empathic listening provided   thoughts/feelings acknowledged   reassurance provided   questions encouraged   questions answered  Goal: Readiness for Transition of Care  Outcome: Ongoing, Progressing     Problem: Anemia (Chemotherapy Effects)  Goal: Anemia Symptom Improvement  Outcome: Ongoing, Progressing     Problem: Urinary Bleeding Risk or Actual (Chemotherapy Effects)  Goal: Absence of Hematuria  Outcome: Ongoing, Progressing     Problem: Nausea and Vomiting (Chemotherapy Effects)  Goal: Fluid and Electrolyte Balance  Outcome: Ongoing, Progressing     Problem: Neurotoxicity (Chemotherapy Effects)  Goal: Neurotoxicity Symptom Control  Outcome: Ongoing, Progressing     Problem: Neutropenia (Chemotherapy Effects)  Goal: Absence of Infection  Outcome: Ongoing, Progressing  Intervention: Prevent Infection and Maximize Resistance  Flowsheets (Taken 11/16/2022 0914)  Infection Prevention:   equipment surfaces disinfected   personal protective equipment utilized   hand hygiene promoted     Problem: Oral Mucositis (Chemotherapy Effects)  Goal: Improved Oral Mucous Membrane Integrity  Outcome: Ongoing, Progressing     Problem:  Thrombocytopenia Bleeding Risk (Chemotherapy Effects)  Goal: Absence of Bleeding  Outcome: Ongoing, Progressing

## 2022-11-16 NOTE — TELEPHONE ENCOUNTER
----- Message from Ana Ramos MA sent at 11/16/2022  8:44 AM CST -----  [facility]  need more visits for next weeks treatment.  She is being treated today.

## 2022-11-16 NOTE — NURSING
0828 Patient to unit today for Taxol. Denies complaints at this time.    1115 Patient tolerated Taxol well. Discharged without distress or complaints. Discharged ambulatory and unaccompanied. States daughter is outside.

## 2022-11-16 NOTE — DISCHARGE INSTRUCTIONS
.THANKS FOR ALLOWING ME TO CARE FOR YOU!!!! ~Lydia          THANKS FOR CHOOSING [facility]!!!        [facility]  [address] [facility].  or  [id number] [place] [address] Drive  [phone] phone     [phone] fax  Hours of Operation: Monday- Friday 8:00am- 5:00pm  After hours phone  [phone]  Hematology / Oncology Physicians on call      Dr. Severiano Ospina, SMITH Nguyen, SMITH      Please call with any concerns regarding your appointment today.     .WAYS TO HELP PREVENT INFECTION        WASH YOUR HANDS OFTEN DURING THE DAY, ESPECIALLY BEFORE YOU EAT, AFTER USING THE BATHROOM, AND AFTER TOUCHING ANIMALS    STAY AWAY FROM PEOPLE WHO HAVE ILLNESSES YOU CAN CATCH; SUCH AS COLDS, FLU, CHICKEN POX    TRY TO AVOID CROWDS    STAY AWAY FROM CHILDREN WHO RECENTLY HAVE RECEIVED LIVE VIRUS VACCINES    MAINTAIN GOOD MOUTH CARE    DO NOT SQUEEZE OR SCRATCH PIMPLES    CLEAN CUTS & SCRAPES RIGHT AWAY AND DAILY UNTIL HEALED WITH WARM WATER, SOAP & AN ANTISEPTIC    AVOID CONTACT WITH LITTER BOXES, BIRD CAGES, & FISH TANKS    AVOID STANDING WATER, IE., BIRD BATHS, FLOWER POTS/VASES, OR HUMIDIFIERS    WEAR GLOVES WHEN GARDENING OR CLEANING UP AFTER OTHERS, ESPECIALLY BABIES & SMALL CHILDREN    DO NOT EAT RAW FISH, SEAFOOD, MEAT, OR EGGS   .YOU HAVE STARTED ON CHEMOTHERAPY. IF YOU EXPERIENCE ANY OF THE FOLLOWING PROBLEMS, CALL THE OFFICE IMMEDIATELY.    *FEVER .0 OR GREATER    *CHILLS, ESPECIALLY SHAKING CHILLS, OR SWEATING    *A SEVERE COUGH OR SORE THROAT, OR SINUS PAIN/     PRESSURE    *REDNESS, SWELLING, OR TENDERNESS AROUND A WOUND,     SORE, PIMPLE, RECTAL AREA, OR IV SITE    *SORES OR ULCERS IN THE MOUTH    *BLISTERS ON THE LIPS OR SKIN    *FREQUENT URGENCY TO URINATE OR A BURNING FEELING   WHEN YOU URINATE    *BLOOD IN THE URINE OR STOOL    *ANY UNEXPLAINED BRUISING OR PROLONGED BLEEDING,     (NOSEBLEEDS OR BLEEDING GUMS)    *LOOSE BOWEL MOVEMENTS THAT  DO NOT RESPOND TO     IMODIUM OR MORE THAN THREE TIMES A DAY    *VOMITING UNRESPONSIVE TO ANTINAUSEA MEDICINE    *ANY UNUSUAL PHYSICAL SYMPTOMS THAT BEGAN AFTER     CHEMOTHERAPY    DURING WEEKDAYS, CALL AND ASK TO SPEAK DIRECTLY TO A NURSE.  AT OTHER TIMES, CALL THE OFFICE PHONE NUMBER; THE ANSWERING SERVICE WILL CONTACT THE ON-CALL PHYSICIAN.  SOMEONE IS AVAILABLE 24 HOURS A DAY, SEVEN DAYS A WEEK.

## 2022-11-16 NOTE — PROGRESS NOTES
Subjective:       Patient ID: Malaika Paredes is a 67 y.o. female.    Chief Complaint: Results, Breast Cancer, and Chemotherapy    HPI:  67-year-old female returns for cycle 4 day 15 Taxol.  Patient receiving concurrent Herceptin.  For HER2 positive breast carcinoma.  Patient has underlying renal disease.  In neoadjuvant setting ECOG status 2    Past Medical History:   Diagnosis Date    Cataract     Cataract     CHF (congestive heart failure)     COPD (chronic obstructive pulmonary disease)     Diabetes mellitus     Diabetic retinopathy     Hypertension     Malignant neoplasm of upper-outer quadrant of left breast in female, estrogen receptor positive 6/20/2022     Family History   Problem Relation Age of Onset    Hypertension Mother     Cancer Father     Breast cancer Sister     Diabetes Sister     Cancer Brother     Amblyopia Neg Hx     Blindness Neg Hx     Cataracts Neg Hx     Glaucoma Neg Hx     Macular degeneration Neg Hx     Retinal detachment Neg Hx     Strabismus Neg Hx     Stroke Neg Hx     Thyroid disease Neg Hx      Social History     Socioeconomic History    Marital status:    Tobacco Use    Smoking status: Former     Types: Cigarettes     Quit date: 6/21/2012     Years since quitting: 10.4    Smokeless tobacco: Never   Substance and Sexual Activity    Alcohol use: No    Drug use: Never     Past Surgical History:   Procedure Laterality Date    BREAST BIOPSY Right     benign    CATARACT EXTRACTION W/  INTRAOCULAR LENS IMPLANT Right 08/14/2017    Dr. Moe    CORONARY ARTERY BYPASS GRAFT      FLUOROSCOPY N/A 7/25/2022    Procedure: FLUOROSCOPY/mediport placement;  Surgeon: Cole Perdomo MD;  Location: [facility] CATH LAB;  Service: General;  Laterality: N/A;       Labs:  Lab Results   Component Value Date    WBC 6.49 11/16/2022    HGB 8.7 (L) 11/16/2022    HCT 26.8 (L) 11/16/2022    MCV 93 11/16/2022     11/16/2022     BMP  Lab Results   Component Value Date     11/16/2022    K 4.7  11/16/2022     11/16/2022    CO2 26 11/16/2022    BUN 17 11/16/2022    CREATININE 4.3 (H) 11/16/2022    CALCIUM 8.5 (L) 11/16/2022    ANIONGAP 9 11/16/2022    ESTGFRAFRICA 8 (A) 07/27/2022    EGFRNONAA 7 (A) 07/27/2022     Lab Results   Component Value Date    ALT 14 11/16/2022    AST 18 11/16/2022    ALKPHOS 58 11/16/2022    BILITOT 0.4 11/16/2022       Lab Results   Component Value Date    IRON 43 09/09/2018    TIBC 300 09/09/2018    FERRITIN 1,038 (H) 06/21/2022     Lab Results   Component Value Date    GQUMUQWU40 459 09/09/2018     Lab Results   Component Value Date    FOLATE 6.2 09/09/2018     Lab Results   Component Value Date    TSH 2.815 10/05/2022         Review of Systems   Constitutional:  Positive for fatigue. Negative for activity change, appetite change, chills, diaphoresis, fever and unexpected weight change.   HENT:  Negative for congestion, dental problem, drooling, ear discharge, ear pain, facial swelling, hearing loss, mouth sores, nosebleeds, postnasal drip, rhinorrhea, sinus pressure, sneezing, sore throat, tinnitus, trouble swallowing and voice change.    Eyes:  Negative for photophobia, pain, discharge, redness, itching and visual disturbance.   Respiratory:  Negative for cough, choking, chest tightness, shortness of breath, wheezing and stridor.    Cardiovascular:  Negative for chest pain, palpitations and leg swelling.   Gastrointestinal:  Negative for abdominal distention, abdominal pain, anal bleeding, blood in stool, constipation, diarrhea, nausea, rectal pain and vomiting.   Endocrine: Negative for cold intolerance, heat intolerance, polydipsia, polyphagia and polyuria.   Genitourinary:  Negative for decreased urine volume, difficulty urinating, dyspareunia, dysuria, enuresis, flank pain, frequency, genital sores, hematuria, menstrual problem, pelvic pain, urgency, vaginal bleeding, vaginal discharge and vaginal pain.   Musculoskeletal:  Negative for arthralgias, back pain, gait  problem, joint swelling, myalgias, neck pain and neck stiffness.   Skin:  Negative for color change, pallor and rash.   Allergic/Immunologic: Negative for environmental allergies, food allergies and immunocompromised state.   Neurological:  Positive for weakness. Negative for dizziness, tremors, seizures, syncope, facial asymmetry, speech difficulty, light-headedness, numbness and headaches.   Hematological:  Negative for adenopathy. Does not bruise/bleed easily.   Psychiatric/Behavioral:  Positive for dysphoric mood. Negative for agitation, behavioral problems, confusion, decreased concentration, hallucinations, self-injury, sleep disturbance and suicidal ideas. The patient is nervous/anxious. The patient is not hyperactive.      Objective:      Physical Exam  Vitals reviewed.   Constitutional:       General: She is not in acute distress.     Appearance: She is well-developed. She is not diaphoretic.   HENT:      Head: Normocephalic and atraumatic.      Right Ear: External ear normal.      Left Ear: External ear normal.      Nose: Nose normal.      Right Sinus: No maxillary sinus tenderness or frontal sinus tenderness.      Left Sinus: No maxillary sinus tenderness or frontal sinus tenderness.      Mouth/Throat:      Pharynx: No oropharyngeal exudate.   Eyes:      General: Lids are normal. No scleral icterus.        Right eye: No discharge.         Left eye: No discharge.      Conjunctiva/sclera: Conjunctivae normal.      Right eye: Right conjunctiva is not injected. No hemorrhage.     Left eye: Left conjunctiva is not injected. No hemorrhage.     Pupils: Pupils are equal, round, and reactive to light.   Neck:      Thyroid: No thyromegaly.      Vascular: No JVD.      Trachea: No tracheal deviation.   Cardiovascular:      Rate and Rhythm: Normal rate.   Pulmonary:      Effort: Pulmonary effort is normal. No respiratory distress.      Breath sounds: No stridor.   Chest:      Chest wall: No tenderness.   Abdominal:       General: Bowel sounds are normal. There is no distension.      Palpations: Abdomen is soft. There is no hepatomegaly, splenomegaly or mass.      Tenderness: There is no abdominal tenderness. There is no rebound.   Musculoskeletal:         General: No tenderness. Normal range of motion.      Cervical back: Normal range of motion and neck supple.   Lymphadenopathy:      Cervical: No cervical adenopathy.      Upper Body:      Right upper body: No supraclavicular adenopathy.      Left upper body: No supraclavicular adenopathy.   Skin:     General: Skin is dry.      Findings: No erythema or rash.   Neurological:      Mental Status: She is alert and oriented to person, place, and time.      Cranial Nerves: No cranial nerve deficit.      Motor: Weakness present.      Coordination: Coordination normal.   Psychiatric:         Behavior: Behavior normal.         Thought Content: Thought content normal.         Judgment: Judgment normal.           Assessment:      1. Malignant neoplasm of upper-outer quadrant of left breast in female, estrogen receptor positive    2. Anemia associated with chronic renal failure    3. Immunodeficiency due to chemotherapy    4. CKD (chronic kidney disease) stage 5, GFR less than 15 ml/min    5. Severe obesity (BMI 35.0-39.9) with comorbidity           Plan:     Reviewed information patient's counts are adequate for treatment continue with current treatment recommendations.  Patient can be seen back by nurse practitioner/AP P next 2 cycles.  And will see in 3-4 weeks with primary surgeon Dr. Bev Harris for evaluation of post neoadjuvant systemic chemotherapy.  Discussed implications answered questions.  Orders written reviewed.        Toney العلي Jr, MD FACP

## 2022-11-22 NOTE — PROGRESS NOTES
Subjective:       Patient ID: Malaika Paredes is a 67 y.o. female.    Chief Complaint:   1. Malignant neoplasm of upper-outer quadrant of left breast in female, estrogen receptor positive  Stage IIB (cT3, cN2a(f), cM0, G3, ER+, NM+, HER2+)   2. Anemia associated with chronic renal failure     3. ESRD (end stage renal disease)       Current Treatment:   OP BREAST PACLITAXEL WEEKLY WITH TRASTUZUMAB (Kanjinti) PERTUZUMAB Q3W (THP)     Treatment History:    HPI: This is a 67 year old woman with medical history significant for cataracts, CHF, COPD, HTN, diabetic retinopathy, ESRD on dialysis, and type 2 DM who was referred to Hem/Onc in 6/2022 for locally advanced HER2 positive breast cancer. She presented to GYN in 5/2022 with a palpable breast lump present for one month. Diagnostic MMG revealed a left breast 5.6 cm x 4 cm x 4.3 cm mass at the 2 o'clock position. Biopsied proved invasive ductal carcinoma ER >95%, NM 80%, [id number] 2+, Ki-67 60%. Left axillary LN biopsy was positive. PET revealed no evidence of distance mets.     Baseline Echo revealed LVEF 59%. Repeat Echo in 10/2022 revealed EF 63%.    In light of her dialysis, she was started on Taxol/Herceptin/Perjeta every 3 weeks for 6 cycles prior to reimaging for surgical evaluation.     Her primary Hematologist/Oncologist is Dr. العلي.    Interval History: Patient presents for follow up on Taxol/Herceptin/Perjeta; She is scheduled to receive C5D1 today. She presents alone and reports diarrhea; she takes 1 Imodium and states it is not effective. Advised her to take 2 tablets with first episode of diarrhea and 1 tablet with each episode after that. She also reports unchanged bilateral hand neuropathy; she was taking gabapentin which was effective but requests a refill. Advised her to follow up with her pharmacy for refills as there are 2 remaining.     Reviewed labs with patient:   CBC:   Recent Labs   Lab 11/23/22  0746   WBC 5.54   RBC 2.65 L   Hemoglobin 8.3  L   Hematocrit 25.2 L   Platelets 354   MCV 95   MCH 31.3 H   MCHC 32.9     CMP:  Recent Labs   Lab 11/23/22  0746   Glucose 144 H   Calcium 8.5 L   Albumin 3.0 L   Total Protein 7.5   Sodium 140   Potassium 4.3   CO2 23   Chloride 109   BUN 25 H   Creatinine 4.7 H   Alkaline Phosphatase 56   ALT 17   AST 17   Total Bilirubin 0.4     She has baseline kidney dysfunction and receives dialysis TThSat.     Social History     Socioeconomic History    Marital status:    Tobacco Use    Smoking status: Former     Types: Cigarettes     Quit date: 6/21/2012     Years since quitting: 10.4    Smokeless tobacco: Never   Substance and Sexual Activity    Alcohol use: No    Drug use: Never     Past Medical History:   Diagnosis Date    Cataract     Cataract     CHF (congestive heart failure)     COPD (chronic obstructive pulmonary disease)     Diabetes mellitus     Diabetic retinopathy     Hypertension     Malignant neoplasm of upper-outer quadrant of left breast in female, estrogen receptor positive 6/20/2022     Family History   Problem Relation Age of Onset    Hypertension Mother     Cancer Father     Breast cancer Sister     Diabetes Sister     Cancer Brother     Amblyopia Neg Hx     Blindness Neg Hx     Cataracts Neg Hx     Glaucoma Neg Hx     Macular degeneration Neg Hx     Retinal detachment Neg Hx     Strabismus Neg Hx     Stroke Neg Hx     Thyroid disease Neg Hx      Past Surgical History:   Procedure Laterality Date    BREAST BIOPSY Right     benign    CATARACT EXTRACTION W/  INTRAOCULAR LENS IMPLANT Right 08/14/2017    Dr. Moe    CORONARY ARTERY BYPASS GRAFT      FLUOROSCOPY N/A 7/25/2022    Procedure: FLUOROSCOPY/mediport placement;  Surgeon: Cole Perdomo MD;  Location: [facility] CATH LAB;  Service: General;  Laterality: N/A;     Review of Systems   Constitutional:  Positive for fatigue. Negative for appetite change.   HENT:  Negative for mouth sores, rhinorrhea and sore throat.    Eyes: Negative.    Respiratory:  Negative.     Cardiovascular: Negative.    Gastrointestinal:  Positive for diarrhea. Negative for constipation (relieved by stool softener), nausea (relieved by antiemetic) and vomiting.   Endocrine: Negative.    Genitourinary: Negative.    Musculoskeletal: Negative.    Integumentary:         Hyperpigmentation of nails   Allergic/Immunologic: Negative.    Neurological:  Positive for numbness (bilateral hands and feet; unchanged). Negative for weakness.   Hematological: Negative.    Psychiatric/Behavioral: Negative.         Medication List with Changes/Refills   Current Medications    ACETAMINOPHEN (TYLENOL) 325 MG TABLET    Take 325 mg by mouth every 6 (six) hours as needed for Pain.    ALBUTEROL (PROVENTIL/VENTOLIN HFA) 90 MCG/ACTUATION INHALER    Inhale 1-2 puffs into the lungs every 6 (six) hours as needed for Wheezing. Rescue    ALBUTEROL SULFATE (PROAIR RESPICLICK) 90 MCG/ACTUATION AEPB    Inhale 180 mcg into the lungs every 4 (four) hours. Rescue    AMIODARONE (PACERONE) 100 MG TAB    Take 100 mg by mouth once daily.    AMLODIPINE (NORVASC) 10 MG TABLET    Take 10 mg by mouth once daily.    APIXABAN (ELIQUIS) 2.5 MG TAB    Take 2.5 mg by mouth 2 (two) times daily.    ASPIRIN (ECOTRIN) 81 MG EC TABLET    Take 81 mg by mouth once daily.     CAPRON DM 7.5-7.5 MG/5 ML LIQD    TAKE 10-20 ML BY MOUTH EVERY 6-8 HOURS AS NEEDED COUGH    DICLOFENAC SODIUM (VOLTAREN) 1 % GEL    APPLY TOPICALLY 2 GM TO THE AFFECTED AREA 2 TIMES DAILY    GABAPENTIN (NEURONTIN) 300 MG CAPSULE    Take 1 capsule (300 mg total) by mouth 3 (three) times daily.    INSULIN DEGLUDEC (TRESIBA FLEXTOUCH U-200) 200 UNIT/ML (3 ML) INSULIN PEN    Inject 80 Units into the skin once daily.     INV METOPROLOL TARTRATE 25 MG ORAL TABLET (LOPRESSOR) 25 MG TAB    Take by mouth 2 (two) times daily. FOR INVESTIGATIONAL USE ONLY    KETOROLAC 0.5% (ACULAR) 0.5 % DROP    Place 1 drop into both eyes 4 (four) times daily.    LEVOTHYROXINE (SYNTHROID) 75 MCG TABLET     Take 75 mcg by mouth once daily.     LOSARTAN (COZAAR) 100 MG TABLET    Take 100 mg by mouth once daily.    LOSARTAN (COZAAR) 25 MG TABLET        NEOMYCIN-POLYMYXIN-DEXAMETHASONE (MAXITROL) 3.5MG/ML-10,000 UNIT/ML-0.1 % DRPS    Place 1 drop into both eyes 2 (two) times daily.    OMEPRAZOLE (PRILOSEC) 20 MG CAPSULE    Take 20 mg by mouth once daily.    ONDANSETRON (ZOFRAN) 4 MG TABLET    Take 1 tablet (4 mg total) by mouth every 8 (eight) hours as needed for Nausea.    PREDNISOLONE ACETATE (PRED FORTE) 1 % DRPS    Place 1 drop into both eyes 4 (four) times daily.    PREDNISONE (DELTASONE) 10 MG TABLET    Take by mouth.    PROCHLORPERAZINE (COMPAZINE) 5 MG TABLET    TAKE 1 TABLET (5 MG TOTAL) BY MOUTH 4 (FOUR) TIMES DAILY AS NEEDED FOR NAUSEA.    RENVELA 800 MG TAB    SMARTSI Tablet(s) By Mouth 5 Times Daily    SEVELAMER HCL ORAL    Take by mouth.     Objective:     Vitals:    22 0854   BP: (!) 164/73   Pulse: 81   Temp: 97.2 °F (36.2 °C)     Physical Exam  Vitals reviewed.   Constitutional:       Appearance: Normal appearance.   HENT:      Head: Normocephalic.      Mouth/Throat:      Comments: Wearing mask    Eyes:      Extraocular Movements: Extraocular movements intact.      Pupils: Pupils are equal, round, and reactive to light.   Cardiovascular:      Rate and Rhythm: Normal rate and regular rhythm.      Heart sounds: Normal heart sounds.   Pulmonary:      Effort: Pulmonary effort is normal.      Breath sounds: Normal breath sounds.   Chest:       Abdominal:      General: Bowel sounds are normal.      Palpations: Abdomen is soft.      Comments: rounded     Genitourinary:     Comments: deferred    Musculoskeletal:         General: Normal range of motion.      Cervical back: Normal range of motion and neck supple.   Skin:     General: Skin is warm and dry.   Neurological:      Mental Status: She is alert and oriented to person, place, and time.   Psychiatric:         Behavior: Behavior normal.          Thought Content: Thought content normal.        (1) Restricted in physically strenuous activity, ambulatory and able to do work of light nature  Assessment:     Problem List Items Addressed This Visit          Renal/    ESRD (end stage renal disease)     On dialysis TThSat.             Oncology    Anemia associated with chronic renal failure     ESRD on dialysis.         Malignant neoplasm of upper-outer quadrant of left breast in female, estrogen receptor positive - Primary     Stage IIB (cT3, cN2a(f), cM0, G3, ER+, MI+, HER2+). Currently on Taxol/Herceptin/Perjeta.           Plan:     Malignant neoplasm of upper-outer quadrant of left breast in female, estrogen receptor positive    Anemia associated with chronic renal failure    ESRD (end stage renal disease)    Labs reviewed.   Ok to proceed with C5D1 of Taxol/Herceptin/Perjeta today.  No dose adjustments for renal impairment.   Patient receives dialysis TThSat.   Follow up in 1 week (Wednesday) with  Mg, CBC and Comprehensive Metabolic Panel prior to C5D8.  Echo due 1/2023.  Repeat scans after 6 cycles prior to surgical evaluation.    Route Chart for Scheduling    Med Onc Chart Routing      Follow up with physician    Follow up with [facility] 1 week. see plan note   Infusion scheduling note    Injection scheduling note    Labs CBC, CMP and magnesium   Lab interval:  see plan note   Imaging    Pharmacy appointment No pharmacy appointment needed      Other referrals No additional referrals needed         Treatment Plan Information   OP BREAST PACLITAXEL WEEKLY WITH TRASTUZUMAB (Kanjinti) PERTUZUMAB Q3W (THP)   Toney العلي MD   Upcoming Treatment Dates - OP BREAST PACLITAXEL WEEKLY WITH TRASTUZUMAB (Kanjinti) PERTUZUMAB Q3W (THP)    11/23/2022       Pre-Medications       diphenhydrAMINE (BENADRYL) 50 mg in sodium chloride 0.9% 50 mL IVPB       famotidine (PF) injection 20 mg       dexAMETHasone (DECADRON) 10 mg in sodium chloride 0.9% 50 mL IVPB        Chemotherapy       trastuzumab-anns (KANJINTI) 554 mg in sodium chloride 0.9% 250 mL chemo infusion       pertuzumab (PERJETA) 420 mg in sodium chloride 0.9% 264 mL infusion       PACLitaxeL (TAXOL) 80 mg/m2 = 162 mg in sodium chloride 0.9% 250 mL chemo infusion  11/30/2022       Pre-Medications       diphenhydrAMINE (BENADRYL) 50 mg in sodium chloride 0.9% 50 mL IVPB       famotidine (PF) injection 20 mg       dexAMETHasone (DECADRON) 10 mg in sodium chloride 0.9% 50 mL IVPB       Chemotherapy       PACLitaxeL (TAXOL) 80 mg/m2 = 162 mg in sodium chloride 0.9% 250 mL chemo infusion  12/7/2022       Pre-Medications       diphenhydrAMINE (BENADRYL) 50 mg in sodium chloride 0.9% 50 mL IVPB       famotidine (PF) injection 20 mg       dexAMETHasone (DECADRON) 10 mg in sodium chloride 0.9% 50 mL IVPB       Chemotherapy       PACLitaxeL (TAXOL) 80 mg/m2 = 162 mg in sodium chloride 0.9% 250 mL chemo infusion  12/14/2022       Pre-Medications       diphenhydrAMINE (BENADRYL) 50 mg in sodium chloride 0.9% 50 mL IVPB       famotidine (PF) injection 20 mg       dexAMETHasone (DECADRON) 10 mg in sodium chloride 0.9% 50 mL IVPB       Chemotherapy       pertuzumab (PERJETA) 420 mg in sodium chloride 0.9% 264 mL infusion       trastuzumab-anns (KANJINTI) 554 mg in sodium chloride 0.9% 250 mL chemo infusion       PACLitaxeL (TAXOL) 80 mg/m2 = 162 mg in sodium chloride 0.9% 250 mL chemo infusion    I will review assessment/plan with collaborating physician.      VILMA Brody

## 2022-11-23 ENCOUNTER — INFUSION (OUTPATIENT)
Dept: INFUSION THERAPY | Facility: HOSPITAL | Age: 67
End: 2022-11-23
Attending: INTERNAL MEDICINE
Payer: MEDICARE

## 2022-11-23 ENCOUNTER — OFFICE VISIT (OUTPATIENT)
Dept: HEMATOLOGY/ONCOLOGY | Facility: CLINIC | Age: 67
End: 2022-11-23
Payer: MEDICARE

## 2022-11-23 ENCOUNTER — LAB VISIT (OUTPATIENT)
Dept: LAB | Facility: HOSPITAL | Age: 67
End: 2022-11-23
Attending: INTERNAL MEDICINE
Payer: MEDICARE

## 2022-11-23 VITALS
DIASTOLIC BLOOD PRESSURE: 62 MMHG | OXYGEN SATURATION: 98 % | HEART RATE: 77 BPM | TEMPERATURE: 98 F | SYSTOLIC BLOOD PRESSURE: 150 MMHG | RESPIRATION RATE: 18 BRPM

## 2022-11-23 VITALS
TEMPERATURE: 97 F | DIASTOLIC BLOOD PRESSURE: 73 MMHG | BODY MASS INDEX: 37.48 KG/M2 | OXYGEN SATURATION: 99 % | WEIGHT: 203.69 LBS | SYSTOLIC BLOOD PRESSURE: 164 MMHG | HEIGHT: 62 IN | HEART RATE: 81 BPM

## 2022-11-23 DIAGNOSIS — Z17.0 MALIGNANT NEOPLASM OF LEFT BREAST IN FEMALE, ESTROGEN RECEPTOR POSITIVE, UNSPECIFIED SITE OF BREAST: ICD-10-CM

## 2022-11-23 DIAGNOSIS — C50.412 MALIGNANT NEOPLASM OF UPPER-OUTER QUADRANT OF LEFT BREAST IN FEMALE, ESTROGEN RECEPTOR POSITIVE: Primary | ICD-10-CM

## 2022-11-23 DIAGNOSIS — Z17.0 MALIGNANT NEOPLASM OF UPPER-OUTER QUADRANT OF LEFT BREAST IN FEMALE, ESTROGEN RECEPTOR POSITIVE: Primary | ICD-10-CM

## 2022-11-23 DIAGNOSIS — C50.912 MALIGNANT NEOPLASM OF LEFT BREAST IN FEMALE, ESTROGEN RECEPTOR POSITIVE, UNSPECIFIED SITE OF BREAST: ICD-10-CM

## 2022-11-23 DIAGNOSIS — N18.6 ESRD (END STAGE RENAL DISEASE): ICD-10-CM

## 2022-11-23 DIAGNOSIS — D63.1 ANEMIA ASSOCIATED WITH CHRONIC RENAL FAILURE: ICD-10-CM

## 2022-11-23 DIAGNOSIS — N18.9 ANEMIA ASSOCIATED WITH CHRONIC RENAL FAILURE: ICD-10-CM

## 2022-11-23 LAB
ALBUMIN SERPL BCP-MCNC: 3 G/DL (ref 3.5–5.2)
ALP SERPL-CCNC: 56 U/L (ref 55–135)
ALT SERPL W/O P-5'-P-CCNC: 17 U/L (ref 10–44)
ANION GAP SERPL CALC-SCNC: 8 MMOL/L (ref 8–16)
AST SERPL-CCNC: 17 U/L (ref 10–40)
BASOPHILS # BLD AUTO: 0.03 K/UL (ref 0–0.2)
BASOPHILS NFR BLD: 0.5 % (ref 0–1.9)
BILIRUB SERPL-MCNC: 0.4 MG/DL (ref 0.1–1)
BUN SERPL-MCNC: 25 MG/DL (ref 8–23)
CALCIUM SERPL-MCNC: 8.5 MG/DL (ref 8.7–10.5)
CHLORIDE SERPL-SCNC: 109 MMOL/L (ref 95–110)
CO2 SERPL-SCNC: 23 MMOL/L (ref 23–29)
CREAT SERPL-MCNC: 4.7 MG/DL (ref 0.5–1.4)
DIFFERENTIAL METHOD: ABNORMAL
EOSINOPHIL # BLD AUTO: 0.2 K/UL (ref 0–0.5)
EOSINOPHIL NFR BLD: 4.2 % (ref 0–8)
ERYTHROCYTE [DISTWIDTH] IN BLOOD BY AUTOMATED COUNT: 21.2 % (ref 11.5–14.5)
EST. GFR  (NO RACE VARIABLE): 10 ML/MIN/1.73 M^2
GLUCOSE SERPL-MCNC: 144 MG/DL (ref 70–110)
HCT VFR BLD AUTO: 25.2 % (ref 37–48.5)
HGB BLD-MCNC: 8.3 G/DL (ref 12–16)
IMM GRANULOCYTES # BLD AUTO: 0.15 K/UL (ref 0–0.04)
IMM GRANULOCYTES NFR BLD AUTO: 2.7 % (ref 0–0.5)
LYMPHOCYTES # BLD AUTO: 1.6 K/UL (ref 1–4.8)
LYMPHOCYTES NFR BLD: 28 % (ref 18–48)
MCH RBC QN AUTO: 31.3 PG (ref 27–31)
MCHC RBC AUTO-ENTMCNC: 32.9 G/DL (ref 32–36)
MCV RBC AUTO: 95 FL (ref 82–98)
MONOCYTES # BLD AUTO: 0.5 K/UL (ref 0.3–1)
MONOCYTES NFR BLD: 9 % (ref 4–15)
NEUTROPHILS # BLD AUTO: 3.1 K/UL (ref 1.8–7.7)
NEUTROPHILS NFR BLD: 55.6 % (ref 38–73)
NRBC BLD-RTO: 0 /100 WBC
PLATELET # BLD AUTO: 354 K/UL (ref 150–450)
PMV BLD AUTO: 10.6 FL (ref 9.2–12.9)
POTASSIUM SERPL-SCNC: 4.3 MMOL/L (ref 3.5–5.1)
PROT SERPL-MCNC: 7.5 G/DL (ref 6–8.4)
RBC # BLD AUTO: 2.65 M/UL (ref 4–5.4)
SODIUM SERPL-SCNC: 140 MMOL/L (ref 136–145)
WBC # BLD AUTO: 5.54 K/UL (ref 3.9–12.7)

## 2022-11-23 PROCEDURE — 96413 CHEMO IV INFUSION 1 HR: CPT

## 2022-11-23 PROCEDURE — 3077F SYST BP >= 140 MM HG: CPT | Mod: CPTII,S$GLB,, | Performed by: NURSE PRACTITIONER

## 2022-11-23 PROCEDURE — 99999 PR PBB SHADOW E&M-EST. PATIENT-LVL IV: CPT | Mod: PBBFAC,,, | Performed by: NURSE PRACTITIONER

## 2022-11-23 PROCEDURE — 1101F PT FALLS ASSESS-DOCD LE1/YR: CPT | Mod: CPTII,S$GLB,, | Performed by: NURSE PRACTITIONER

## 2022-11-23 PROCEDURE — 4010F ACE/ARB THERAPY RXD/TAKEN: CPT | Mod: CPTII,S$GLB,, | Performed by: NURSE PRACTITIONER

## 2022-11-23 PROCEDURE — 85025 COMPLETE CBC W/AUTO DIFF WBC: CPT | Performed by: INTERNAL MEDICINE

## 2022-11-23 PROCEDURE — 3008F PR BODY MASS INDEX (BMI) DOCUMENTED: ICD-10-PCS | Mod: CPTII,S$GLB,, | Performed by: NURSE PRACTITIONER

## 2022-11-23 PROCEDURE — 1160F PR REVIEW ALL MEDS BY PRESCRIBER/CLIN PHARMACIST DOCUMENTED: ICD-10-PCS | Mod: CPTII,S$GLB,, | Performed by: NURSE PRACTITIONER

## 2022-11-23 PROCEDURE — 3008F BODY MASS INDEX DOCD: CPT | Mod: CPTII,S$GLB,, | Performed by: NURSE PRACTITIONER

## 2022-11-23 PROCEDURE — 63600175 PHARM REV CODE 636 W HCPCS: Mod: TB | Performed by: NURSE PRACTITIONER

## 2022-11-23 PROCEDURE — 3078F DIAST BP <80 MM HG: CPT | Mod: CPTII,S$GLB,, | Performed by: NURSE PRACTITIONER

## 2022-11-23 PROCEDURE — 99214 PR OFFICE/OUTPT VISIT, EST, LEVL IV, 30-39 MIN: ICD-10-PCS | Mod: 25,S$GLB,, | Performed by: NURSE PRACTITIONER

## 2022-11-23 PROCEDURE — 1157F PR ADVANCE CARE PLAN OR EQUIV PRESENT IN MEDICAL RECORD: ICD-10-PCS | Mod: CPTII,S$GLB,, | Performed by: NURSE PRACTITIONER

## 2022-11-23 PROCEDURE — 1159F PR MEDICATION LIST DOCUMENTED IN MEDICAL RECORD: ICD-10-PCS | Mod: CPTII,S$GLB,, | Performed by: NURSE PRACTITIONER

## 2022-11-23 PROCEDURE — 3077F PR MOST RECENT SYSTOLIC BLOOD PRESSURE >= 140 MM HG: ICD-10-PCS | Mod: CPTII,S$GLB,, | Performed by: NURSE PRACTITIONER

## 2022-11-23 PROCEDURE — 96375 TX/PRO/DX INJ NEW DRUG ADDON: CPT

## 2022-11-23 PROCEDURE — 1126F PR PAIN SEVERITY QUANTIFIED, NO PAIN PRESENT: ICD-10-PCS | Mod: CPTII,S$GLB,, | Performed by: NURSE PRACTITIONER

## 2022-11-23 PROCEDURE — 1101F PR PT FALLS ASSESS DOC 0-1 FALLS W/OUT INJ PAST YR: ICD-10-PCS | Mod: CPTII,S$GLB,, | Performed by: NURSE PRACTITIONER

## 2022-11-23 PROCEDURE — 96367 TX/PROPH/DG ADDL SEQ IV INF: CPT

## 2022-11-23 PROCEDURE — 99999 PR PBB SHADOW E&M-EST. PATIENT-LVL IV: ICD-10-PCS | Mod: PBBFAC,,, | Performed by: NURSE PRACTITIONER

## 2022-11-23 PROCEDURE — 96417 CHEMO IV INFUS EACH ADDL SEQ: CPT

## 2022-11-23 PROCEDURE — 80053 COMPREHEN METABOLIC PANEL: CPT | Performed by: INTERNAL MEDICINE

## 2022-11-23 PROCEDURE — 25000003 PHARM REV CODE 250: Performed by: NURSE PRACTITIONER

## 2022-11-23 PROCEDURE — 1159F MED LIST DOCD IN RCRD: CPT | Mod: CPTII,S$GLB,, | Performed by: NURSE PRACTITIONER

## 2022-11-23 PROCEDURE — 3288F FALL RISK ASSESSMENT DOCD: CPT | Mod: CPTII,S$GLB,, | Performed by: NURSE PRACTITIONER

## 2022-11-23 PROCEDURE — 1157F ADVNC CARE PLAN IN RCRD: CPT | Mod: CPTII,S$GLB,, | Performed by: NURSE PRACTITIONER

## 2022-11-23 PROCEDURE — 99214 OFFICE O/P EST MOD 30 MIN: CPT | Mod: 25,S$GLB,, | Performed by: NURSE PRACTITIONER

## 2022-11-23 PROCEDURE — 36415 COLL VENOUS BLD VENIPUNCTURE: CPT | Performed by: INTERNAL MEDICINE

## 2022-11-23 PROCEDURE — 1160F RVW MEDS BY RX/DR IN RCRD: CPT | Mod: CPTII,S$GLB,, | Performed by: NURSE PRACTITIONER

## 2022-11-23 PROCEDURE — 1126F AMNT PAIN NOTED NONE PRSNT: CPT | Mod: CPTII,S$GLB,, | Performed by: NURSE PRACTITIONER

## 2022-11-23 PROCEDURE — 3288F PR FALLS RISK ASSESSMENT DOCUMENTED: ICD-10-PCS | Mod: CPTII,S$GLB,, | Performed by: NURSE PRACTITIONER

## 2022-11-23 PROCEDURE — 4010F PR ACE/ARB THEARPY RXD/TAKEN: ICD-10-PCS | Mod: CPTII,S$GLB,, | Performed by: NURSE PRACTITIONER

## 2022-11-23 PROCEDURE — 3078F PR MOST RECENT DIASTOLIC BLOOD PRESSURE < 80 MM HG: ICD-10-PCS | Mod: CPTII,S$GLB,, | Performed by: NURSE PRACTITIONER

## 2022-11-23 RX ORDER — EPINEPHRINE 0.3 MG/.3ML
0.3 INJECTION SUBCUTANEOUS ONCE AS NEEDED
Status: CANCELLED | OUTPATIENT
Start: 2022-11-23

## 2022-11-23 RX ORDER — SODIUM CHLORIDE 0.9 % (FLUSH) 0.9 %
10 SYRINGE (ML) INJECTION
Status: CANCELLED | OUTPATIENT
Start: 2022-12-07

## 2022-11-23 RX ORDER — SODIUM CHLORIDE 0.9 % (FLUSH) 0.9 %
10 SYRINGE (ML) INJECTION
Status: DISCONTINUED | OUTPATIENT
Start: 2022-11-23 | End: 2022-11-23 | Stop reason: HOSPADM

## 2022-11-23 RX ORDER — FAMOTIDINE 10 MG/ML
20 INJECTION INTRAVENOUS
Status: CANCELLED | OUTPATIENT
Start: 2022-11-30

## 2022-11-23 RX ORDER — DIPHENHYDRAMINE HYDROCHLORIDE 50 MG/ML
50 INJECTION INTRAMUSCULAR; INTRAVENOUS ONCE AS NEEDED
Status: CANCELLED | OUTPATIENT
Start: 2022-11-23

## 2022-11-23 RX ORDER — FAMOTIDINE 10 MG/ML
20 INJECTION INTRAVENOUS
Status: CANCELLED | OUTPATIENT
Start: 2022-12-07

## 2022-11-23 RX ORDER — HEPARIN 100 UNIT/ML
500 SYRINGE INTRAVENOUS
Status: DISCONTINUED | OUTPATIENT
Start: 2022-11-23 | End: 2022-11-23 | Stop reason: HOSPADM

## 2022-11-23 RX ORDER — HEPARIN 100 UNIT/ML
500 SYRINGE INTRAVENOUS
Status: CANCELLED | OUTPATIENT
Start: 2022-11-30

## 2022-11-23 RX ORDER — HEPARIN 100 UNIT/ML
500 SYRINGE INTRAVENOUS
Status: CANCELLED | OUTPATIENT
Start: 2022-12-07

## 2022-11-23 RX ORDER — ONDANSETRON 2 MG/ML
4 INJECTION INTRAMUSCULAR; INTRAVENOUS ONCE
Status: COMPLETED | OUTPATIENT
Start: 2022-11-23 | End: 2022-11-23

## 2022-11-23 RX ORDER — ONDANSETRON 2 MG/ML
4 INJECTION INTRAMUSCULAR; INTRAVENOUS ONCE
Status: CANCELLED
Start: 2022-12-07 | End: 2022-12-07

## 2022-11-23 RX ORDER — EPINEPHRINE 0.3 MG/.3ML
0.3 INJECTION SUBCUTANEOUS ONCE AS NEEDED
Status: CANCELLED | OUTPATIENT
Start: 2022-12-07

## 2022-11-23 RX ORDER — FAMOTIDINE 10 MG/ML
20 INJECTION INTRAVENOUS
Status: CANCELLED | OUTPATIENT
Start: 2022-11-23

## 2022-11-23 RX ORDER — FAMOTIDINE 10 MG/ML
20 INJECTION INTRAVENOUS
Status: COMPLETED | OUTPATIENT
Start: 2022-11-23 | End: 2022-11-23

## 2022-11-23 RX ORDER — SODIUM CHLORIDE 0.9 % (FLUSH) 0.9 %
10 SYRINGE (ML) INJECTION
Status: CANCELLED | OUTPATIENT
Start: 2022-11-23

## 2022-11-23 RX ORDER — ONDANSETRON 2 MG/ML
4 INJECTION INTRAMUSCULAR; INTRAVENOUS ONCE
Status: CANCELLED
Start: 2022-11-23 | End: 2022-11-23

## 2022-11-23 RX ORDER — ONDANSETRON 2 MG/ML
4 INJECTION INTRAMUSCULAR; INTRAVENOUS ONCE
Status: CANCELLED
Start: 2022-11-30 | End: 2022-11-30

## 2022-11-23 RX ORDER — DIPHENHYDRAMINE HYDROCHLORIDE 50 MG/ML
50 INJECTION INTRAMUSCULAR; INTRAVENOUS ONCE AS NEEDED
Status: CANCELLED | OUTPATIENT
Start: 2022-12-07

## 2022-11-23 RX ORDER — DIPHENHYDRAMINE HYDROCHLORIDE 50 MG/ML
50 INJECTION INTRAMUSCULAR; INTRAVENOUS ONCE AS NEEDED
Status: CANCELLED | OUTPATIENT
Start: 2022-11-30

## 2022-11-23 RX ORDER — EPINEPHRINE 0.3 MG/.3ML
0.3 INJECTION SUBCUTANEOUS ONCE AS NEEDED
Status: CANCELLED | OUTPATIENT
Start: 2022-11-30

## 2022-11-23 RX ORDER — HEPARIN 100 UNIT/ML
500 SYRINGE INTRAVENOUS
Status: CANCELLED | OUTPATIENT
Start: 2022-11-23

## 2022-11-23 RX ORDER — SODIUM CHLORIDE 0.9 % (FLUSH) 0.9 %
10 SYRINGE (ML) INJECTION
Status: CANCELLED | OUTPATIENT
Start: 2022-11-30

## 2022-11-23 RX ADMIN — TRASTUZUMAB-ANNS 554 MG: 150 INJECTION, POWDER, LYOPHILIZED, FOR SOLUTION INTRAVENOUS at 11:11

## 2022-11-23 RX ADMIN — DEXAMETHASONE SODIUM PHOSPHATE 10 MG: 4 INJECTION, SOLUTION INTRA-ARTICULAR; INTRALESIONAL; INTRAMUSCULAR; INTRAVENOUS; SOFT TISSUE at 12:11

## 2022-11-23 RX ADMIN — HEPARIN 500 UNITS: 100 SYRINGE at 01:11

## 2022-11-23 RX ADMIN — DIPHENHYDRAMINE HYDROCHLORIDE 50 MG: 50 INJECTION, SOLUTION INTRAMUSCULAR; INTRAVENOUS at 12:11

## 2022-11-23 RX ADMIN — PERTUZUMAB 420 MG: 30 INJECTION, SOLUTION, CONCENTRATE INTRAVENOUS at 10:11

## 2022-11-23 RX ADMIN — SODIUM CHLORIDE: 0.9 INJECTION, SOLUTION INTRAVENOUS at 09:11

## 2022-11-23 RX ADMIN — ONDANSETRON 4 MG: 2 INJECTION INTRAMUSCULAR; INTRAVENOUS at 09:11

## 2022-11-23 RX ADMIN — PACLITAXEL 162 MG: 6 INJECTION, SOLUTION INTRAVENOUS at 12:11

## 2022-11-23 RX ADMIN — FAMOTIDINE 20 MG: 10 INJECTION INTRAVENOUS at 12:11

## 2022-11-23 NOTE — PLAN OF CARE
Problem: Adult Inpatient Plan of Care  Goal: Plan of Care Review  Outcome: Ongoing, Progressing  Flowsheets (Taken 11/23/2022 1410)  Plan of Care Reviewed With: patient  Goal: Patient-Specific Goal (Individualized)  Outcome: Ongoing, Progressing  Flowsheets (Taken 11/23/2022 1410)  Anxieties, Fears or Concerns: denies  Individualized Care Needs: feet elevated, warm blanket and snacks provided  Patient-Specific Goals (Include Timeframe): tolerate chemo today  Goal: Optimal Comfort and Wellbeing  Outcome: Ongoing, Progressing  Intervention: Provide Person-Centered Care  Flowsheets (Taken 11/23/2022 1410)  Trust Relationship/Rapport:   care explained   choices provided   emotional support provided   empathic listening provided   thoughts/feelings acknowledged   reassurance provided   questions encouraged   questions answered     Problem: Anemia (Chemotherapy Effects)  Goal: Anemia Symptom Improvement  Outcome: Ongoing, Progressing  Intervention: Monitor and Manage Anemia  Flowsheets (Taken 11/23/2022 1410)  Safety Promotion/Fall Prevention:   assistive device/personal item within reach   Fall Risk reviewed with patient/family   in recliner, wheels locked   medications reviewed   instructed to call staff for mobility  Fatigue Management: frequent rest breaks encouraged     Problem: Nausea and Vomiting (Chemotherapy Effects)  Goal: Fluid and Electrolyte Balance  Outcome: Ongoing, Progressing  Intervention: Prevent and Manage Nausea and Vomiting  Flowsheets (Taken 11/23/2022 1410)  Nausea/Vomiting Interventions: (premedicated) other (see comments)  Environmental Support: calm environment promoted     Problem: Neutropenia (Chemotherapy Effects)  Goal: Absence of Infection  Outcome: Ongoing, Progressing  Intervention: Prevent Infection and Maximize Resistance  Flowsheets (Taken 11/23/2022 1410)  Infection Prevention:   environmental surveillance performed   equipment surfaces disinfected   hand hygiene promoted   personal  protective equipment utilized     Problem: Thrombocytopenia Bleeding Risk (Chemotherapy Effects)  Goal: Absence of Bleeding  Outcome: Ongoing, Progressing  Intervention: Minimize Bleeding Risk  Flowsheets (Taken 11/23/2022 1410)  Bleeding Precautions:   blood pressure closely monitored   monitored for signs of bleeding

## 2022-11-28 ENCOUNTER — OFFICE VISIT (OUTPATIENT)
Dept: PODIATRY | Facility: CLINIC | Age: 67
End: 2022-11-28
Payer: MEDICARE

## 2022-11-28 VITALS — BODY MASS INDEX: 37.36 KG/M2 | HEIGHT: 62 IN | WEIGHT: 203 LBS

## 2022-11-28 DIAGNOSIS — L84 CORN OR CALLUS: ICD-10-CM

## 2022-11-28 DIAGNOSIS — T65.91XA ONYCHOLYSIS DUE TO CHEMICAL: Primary | ICD-10-CM

## 2022-11-28 DIAGNOSIS — B35.1 DERMATOPHYTOSIS OF NAIL: ICD-10-CM

## 2022-11-28 DIAGNOSIS — L60.1 ONYCHOLYSIS DUE TO CHEMICAL: Primary | ICD-10-CM

## 2022-11-28 DIAGNOSIS — E11.42 DM TYPE 2 WITH DIABETIC PERIPHERAL NEUROPATHY: ICD-10-CM

## 2022-11-28 PROCEDURE — 99214 OFFICE O/P EST MOD 30 MIN: CPT | Mod: 25,S$GLB,, | Performed by: PODIATRIST

## 2022-11-28 PROCEDURE — 1101F PR PT FALLS ASSESS DOC 0-1 FALLS W/OUT INJ PAST YR: ICD-10-PCS | Mod: CPTII,S$GLB,, | Performed by: PODIATRIST

## 2022-11-28 PROCEDURE — 1159F MED LIST DOCD IN RCRD: CPT | Mod: CPTII,S$GLB,, | Performed by: PODIATRIST

## 2022-11-28 PROCEDURE — 1126F AMNT PAIN NOTED NONE PRSNT: CPT | Mod: CPTII,S$GLB,, | Performed by: PODIATRIST

## 2022-11-28 PROCEDURE — 99999 PR PBB SHADOW E&M-EST. PATIENT-LVL IV: CPT | Mod: PBBFAC,,, | Performed by: PODIATRIST

## 2022-11-28 PROCEDURE — 1159F PR MEDICATION LIST DOCUMENTED IN MEDICAL RECORD: ICD-10-PCS | Mod: CPTII,S$GLB,, | Performed by: PODIATRIST

## 2022-11-28 PROCEDURE — 3008F PR BODY MASS INDEX (BMI) DOCUMENTED: ICD-10-PCS | Mod: CPTII,S$GLB,, | Performed by: PODIATRIST

## 2022-11-28 PROCEDURE — 99999 PR PBB SHADOW E&M-EST. PATIENT-LVL IV: ICD-10-PCS | Mod: PBBFAC,,, | Performed by: PODIATRIST

## 2022-11-28 PROCEDURE — 1160F PR REVIEW ALL MEDS BY PRESCRIBER/CLIN PHARMACIST DOCUMENTED: ICD-10-PCS | Mod: CPTII,S$GLB,, | Performed by: PODIATRIST

## 2022-11-28 PROCEDURE — 11721 PR DEBRIDEMENT OF NAILS, 6 OR MORE: ICD-10-PCS | Mod: 59,Q9,S$GLB, | Performed by: PODIATRIST

## 2022-11-28 PROCEDURE — 3008F BODY MASS INDEX DOCD: CPT | Mod: CPTII,S$GLB,, | Performed by: PODIATRIST

## 2022-11-28 PROCEDURE — 3288F FALL RISK ASSESSMENT DOCD: CPT | Mod: CPTII,S$GLB,, | Performed by: PODIATRIST

## 2022-11-28 PROCEDURE — 4010F PR ACE/ARB THEARPY RXD/TAKEN: ICD-10-PCS | Mod: CPTII,S$GLB,, | Performed by: PODIATRIST

## 2022-11-28 PROCEDURE — 11055 PR TRIM HYPERKERATOTIC SKIN LESION, ONE: ICD-10-PCS | Mod: Q9,S$GLB,, | Performed by: PODIATRIST

## 2022-11-28 PROCEDURE — 99214 PR OFFICE/OUTPT VISIT, EST, LEVL IV, 30-39 MIN: ICD-10-PCS | Mod: 25,S$GLB,, | Performed by: PODIATRIST

## 2022-11-28 PROCEDURE — 3288F PR FALLS RISK ASSESSMENT DOCUMENTED: ICD-10-PCS | Mod: CPTII,S$GLB,, | Performed by: PODIATRIST

## 2022-11-28 PROCEDURE — 11055 PARING/CUTG B9 HYPRKER LES 1: CPT | Mod: Q9,S$GLB,, | Performed by: PODIATRIST

## 2022-11-28 PROCEDURE — 1160F RVW MEDS BY RX/DR IN RCRD: CPT | Mod: CPTII,S$GLB,, | Performed by: PODIATRIST

## 2022-11-28 PROCEDURE — 4010F ACE/ARB THERAPY RXD/TAKEN: CPT | Mod: CPTII,S$GLB,, | Performed by: PODIATRIST

## 2022-11-28 PROCEDURE — 1101F PT FALLS ASSESS-DOCD LE1/YR: CPT | Mod: CPTII,S$GLB,, | Performed by: PODIATRIST

## 2022-11-28 PROCEDURE — 1126F PR PAIN SEVERITY QUANTIFIED, NO PAIN PRESENT: ICD-10-PCS | Mod: CPTII,S$GLB,, | Performed by: PODIATRIST

## 2022-11-28 PROCEDURE — 11721 DEBRIDE NAIL 6 OR MORE: CPT | Mod: 59,Q9,S$GLB, | Performed by: PODIATRIST

## 2022-11-28 PROCEDURE — 1157F ADVNC CARE PLAN IN RCRD: CPT | Mod: CPTII,S$GLB,, | Performed by: PODIATRIST

## 2022-11-28 PROCEDURE — 1157F PR ADVANCE CARE PLAN OR EQUIV PRESENT IN MEDICAL RECORD: ICD-10-PCS | Mod: CPTII,S$GLB,, | Performed by: PODIATRIST

## 2022-11-28 RX ORDER — MUPIROCIN 20 MG/G
OINTMENT TOPICAL DAILY
Qty: 30 G | Refills: 0 | Status: SHIPPED | OUTPATIENT
Start: 2022-11-28 | End: 2023-02-10

## 2022-11-28 NOTE — PROGRESS NOTES
Subjective:     Patient ID: Malaika Paredes is a 67 y.o. female.    Chief Complaint: Nail Problem (No c/o pain, thick and discolored nails, wears tennis shoes with socks, diabetic Pt, last seen on 11/23/22 with VILMA Brody)    Malaika is a 67 y.o. female who presents to the clinic for evaluation and treatment of high risk feet. Malaika has a past medical history of Cataract, Cataract, CHF (congestive heart failure), COPD (chronic obstructive pulmonary disease), Diabetes mellitus, Diabetic retinopathy, Hypertension, and Malignant neoplasm of upper-outer quadrant of left breast in female, estrogen receptor positive (6/20/2022). The patient's chief complaint is painful left big toenail. Patient states her nails have been peeling since starting chemo for breat cancer. This patient has documented high risk feet requiring routine maintenance secondary to diabetes mellitis and those secondary complications of diabetes, as mentioned..    PCP: Travis Scales MD    Date Last Seen by PCP: 11/23/2022    Current shoe gear:  Affected Foot: Tennis shoes     Unaffected Foot: Tennis shoes    Hemoglobin A1C   Date Value Ref Range Status   09/09/2018 5.6 4.0 - 5.6 % Final     Comment:     ADA Screening Guidelines:  5.7-6.4%  Consistent with prediabetes  >or=6.5%  Consistent with diabetes  High levels of fetal hemoglobin interfere with the HbA1C  assay. Heterozygous hemoglobin variants (HbS, HgC, etc)do  not significantly interfere with this assay.   However, presence of multiple variants may affect accuracy.         Patient Active Problem List   Diagnosis    Proliferative diabetic retinopathy of both eyes without macular edema associated with type 2 diabetes mellitus    Lattice degeneration of right retina    Uncontrolled diabetes mellitus with both eyes affected by proliferative retinopathy and macular edema, with long-term current use of insulin    NS (nuclear sclerosis), left    Type 2 diabetes mellitus, with  long-term current use of insulin    ESRD (end stage renal disease)    Hypertensive emergency    COPD (chronic obstructive pulmonary disease)    Shortness of breath    Microcytic anemia    Metabolic acidosis    Hyperkalemia    CKD (chronic kidney disease) stage 5, GFR less than 15 ml/min    Chronic kidney disease-mineral and bone disorder    Anemia associated with chronic renal failure    Diastolic dysfunction    Status post cataract extraction and insertion of intraocular lens of right eye    Malignant neoplasm of upper-outer quadrant of left breast in female, estrogen receptor positive    Severe obesity (BMI 35.0-39.9) with comorbidity    Immunodeficiency due to chemotherapy    Palliative care encounter    Chemotherapy-induced peripheral neuropathy    Primary hypertension    Hyperparathyroidism, unspecified    Drug-induced nonautoimmune hemolytic anemia       Medication List with Changes/Refills   New Medications    MUPIROCIN (BACTROBAN) 2 % OINTMENT    Apply topically once daily.   Current Medications    ACETAMINOPHEN (TYLENOL) 325 MG TABLET    Take 325 mg by mouth every 6 (six) hours as needed for Pain.    ALBUTEROL (PROVENTIL/VENTOLIN HFA) 90 MCG/ACTUATION INHALER    Inhale 1-2 puffs into the lungs every 6 (six) hours as needed for Wheezing. Rescue    ALBUTEROL SULFATE (PROAIR RESPICLICK) 90 MCG/ACTUATION AEPB    Inhale 180 mcg into the lungs every 4 (four) hours. Rescue    AMIODARONE (PACERONE) 100 MG TAB    Take 100 mg by mouth once daily.    AMLODIPINE (NORVASC) 10 MG TABLET    Take 10 mg by mouth once daily.    APIXABAN (ELIQUIS) 2.5 MG TAB    Take 2.5 mg by mouth 2 (two) times daily.    ASPIRIN (ECOTRIN) 81 MG EC TABLET    Take 81 mg by mouth once daily.     CAPRON DM 7.5-7.5 MG/5 ML LIQD    TAKE 10-20 ML BY MOUTH EVERY 6-8 HOURS AS NEEDED COUGH    DICLOFENAC SODIUM (VOLTAREN) 1 % GEL    APPLY TOPICALLY 2 GM TO THE AFFECTED AREA 2 TIMES DAILY    GABAPENTIN (NEURONTIN) 300 MG CAPSULE    Take 1 capsule (300  mg total) by mouth 3 (three) times daily.    INSULIN DEGLUDEC (TRESIBA FLEXTOUCH U-200) 200 UNIT/ML (3 ML) INSULIN PEN    Inject 80 Units into the skin once daily.     INV METOPROLOL TARTRATE 25 MG ORAL TABLET (LOPRESSOR) 25 MG TAB    Take by mouth 2 (two) times daily. FOR INVESTIGATIONAL USE ONLY    KETOROLAC 0.5% (ACULAR) 0.5 % DROP    Place 1 drop into both eyes 4 (four) times daily.    LEVOTHYROXINE (SYNTHROID) 75 MCG TABLET    Take 75 mcg by mouth once daily.     LOSARTAN (COZAAR) 100 MG TABLET    Take 100 mg by mouth once daily.    LOSARTAN (COZAAR) 25 MG TABLET        NEOMYCIN-POLYMYXIN-DEXAMETHASONE (MAXITROL) 3.5MG/ML-10,000 UNIT/ML-0.1 % DRPS    Place 1 drop into both eyes 2 (two) times daily.    OMEPRAZOLE (PRILOSEC) 20 MG CAPSULE    Take 20 mg by mouth once daily.    ONDANSETRON (ZOFRAN) 4 MG TABLET    Take 1 tablet (4 mg total) by mouth every 8 (eight) hours as needed for Nausea.    PREDNISOLONE ACETATE (PRED FORTE) 1 % DRPS    Place 1 drop into both eyes 4 (four) times daily.    PREDNISONE (DELTASONE) 10 MG TABLET    Take by mouth.    PROCHLORPERAZINE (COMPAZINE) 5 MG TABLET    TAKE 1 TABLET (5 MG TOTAL) BY MOUTH 4 (FOUR) TIMES DAILY AS NEEDED FOR NAUSEA.    RENVELA 800 MG TAB    SMARTSI Tablet(s) By Mouth 5 Times Daily    SEVELAMER HCL ORAL    Take by mouth.       Review of patient's allergies indicates:  No Known Allergies    Past Surgical History:   Procedure Laterality Date    BREAST BIOPSY Right     benign    CATARACT EXTRACTION W/  INTRAOCULAR LENS IMPLANT Right 2017    Dr. Moe    CORONARY ARTERY BYPASS GRAFT      FLUOROSCOPY N/A 2022    Procedure: FLUOROSCOPY/mediport placement;  Surgeon: Cole Perdomo MD;  Location: [facility] CATH LAB;  Service: General;  Laterality: N/A;       Family History   Problem Relation Age of Onset    Hypertension Mother     Cancer Father     Breast cancer Sister     Diabetes Sister     Cancer Brother     Amblyopia Neg Hx     Blindness Neg Hx     Cataracts  "Neg Hx     Glaucoma Neg Hx     Macular degeneration Neg Hx     Retinal detachment Neg Hx     Strabismus Neg Hx     Stroke Neg Hx     Thyroid disease Neg Hx        Social History     Socioeconomic History    Marital status:    Tobacco Use    Smoking status: Former     Types: Cigarettes     Quit date: 6/21/2012     Years since quitting: 10.4    Smokeless tobacco: Never   Substance and Sexual Activity    Alcohol use: No    Drug use: Never       Vitals:    11/28/22 0820   Weight: 92.1 kg (203 lb)   Height: 5' 2" (1.575 m)   PainSc: 0-No pain   PainLoc: Foot       Hemoglobin A1C   Date Value Ref Range Status   09/09/2018 5.6 4.0 - 5.6 % Final     Comment:     ADA Screening Guidelines:  5.7-6.4%  Consistent with prediabetes  >or=6.5%  Consistent with diabetes  High levels of fetal hemoglobin interfere with the HbA1C  assay. Heterozygous hemoglobin variants (HbS, HgC, etc)do  not significantly interfere with this assay.   However, presence of multiple variants may affect accuracy.         Review of Systems   Constitutional:  Negative for chills and fever.   Respiratory:  Negative for shortness of breath.    Cardiovascular:  Negative for chest pain, palpitations, orthopnea, claudication and leg swelling.   Gastrointestinal:  Negative for diarrhea, nausea and vomiting.   Musculoskeletal:  Negative for joint pain.   Skin:  Negative for rash.   Neurological:  Positive for tingling and sensory change.   Psychiatric/Behavioral: Negative.             Objective:      PHYSICAL EXAM: Apperance: Alert and orient in no distress,well developed, and with good attention to grooming and body habits  Patient presents ambulating in tennis shoes.   LOWER EXTREMITY EXAM:  VASCULAR: Dorsalis pedis pulses 2/4 bilateral and Posterior Tibial pulses 2/4 bilateral. Capillary fill time <4 seconds bilateral. Mild edema observed bilateral. Varicosities absent bilateral. Skin temperature of the lower extremities is warm to warm, proximal to " distal. Hair growth dim bilateral.  DERMATOLOGICAL: No skin rashes, subcutaneous nodules, lesions, or ulcers observed bilateral. Nails 1,2,3,4 left and 1,2,3,4,5 right elongated, thickened, and discolored with subungual debris. Left 5th nail absent with clean nail bed. Post debridement left 1st and 4th nails absent.  Webspaces 1,2,3,4 bilateral clean, dry and without evidence of break in skin integrity. Mild hyperkeratotic tissue noted to left lateral foot.   NEUROLOGICAL: Light touch, sharp-dull, proprioception all present and equal bilaterally.  Vibratory sensation diminished at bilateral hallux and navicular. Protective sensation absent at 6/10 sites as tested with a [place] 5.07 monofilament.   MUSCULOSKELETAL: Muscle strength is 5/5 for foot inverters, everters, plantarflexors, and dorsiflexors. Muscle tone is normal.         Assessment:       ICD-10-CM ICD-9-CM   1. Onycholysis due to chemical  T65.91XA 703.8    L60.1 E866.9   2. DM type 2 with diabetic peripheral neuropathy  E11.42 250.60     357.2   3. Dermatophytosis of nail  B35.1 110.1   4. Corn or callus - Left Foot  L84 700         Plan:   Onycholysis due to chemical  -     mupirocin (BACTROBAN) 2 % ointment; Apply topically once daily.  Dispense: 30 g; Refill: 0    DM type 2 with diabetic peripheral neuropathy    Dermatophytosis of nail    Corn or callus - Left Foot      I counseled the patient on her conditions, regarding findings of my examination, my impressions, and usual treatment plan.   Greater than 50% of this visit spent on counseling and coordination of care.  Greater than 12 minutes of a 15 minute appointment spent on education about the diabetic foot, neuropathy, and prevention of limb loss.  Shoe inspection. Diabetic Foot Education. Patient reminded of the importance of good nutrition and blood sugar control to help prevent podiatric complications of diabetes. Patient instructed on proper foot hygeine. We discussed wearing proper  shoe gear, daily foot inspections, never walking without protective shoe gear, never putting sharp instruments to feet.    With patient's permission, nails 1-5 bilateral were debrided/trimmed in length and thickness aggressively to their soft tissue attachment mechanically and with electric , removing all offending nail and debris. Patient relates relief following the procedure.  With patient's permission, left callus trimmed in thickness with #15 blade in thickness without incident.   Prescription written for topical Bactroban ointment to be applied once daily to left toenails.   Patient  will continue to monitor the areas daily, inspect feet, wear protective shoe gear when ambulatory, moisturizer to maintain skin integrity. Patient reminded of the importance of good nutrition and blood sugar control to help prevent podiatric complications of diabetes.  Patient to return 3 weeks or sooner if needed.        Winnie Helm DPM  [facility]

## 2022-11-30 ENCOUNTER — LAB VISIT (OUTPATIENT)
Dept: LAB | Facility: HOSPITAL | Age: 67
End: 2022-11-30
Attending: INTERNAL MEDICINE
Payer: MEDICARE

## 2022-11-30 ENCOUNTER — OFFICE VISIT (OUTPATIENT)
Dept: HEMATOLOGY/ONCOLOGY | Facility: CLINIC | Age: 67
End: 2022-11-30
Payer: MEDICARE

## 2022-11-30 ENCOUNTER — INFUSION (OUTPATIENT)
Dept: INFUSION THERAPY | Facility: HOSPITAL | Age: 67
End: 2022-11-30
Attending: INTERNAL MEDICINE
Payer: MEDICARE

## 2022-11-30 VITALS
HEIGHT: 62 IN | HEART RATE: 79 BPM | DIASTOLIC BLOOD PRESSURE: 68 MMHG | TEMPERATURE: 98 F | BODY MASS INDEX: 37 KG/M2 | WEIGHT: 201.06 LBS | OXYGEN SATURATION: 96 % | SYSTOLIC BLOOD PRESSURE: 137 MMHG

## 2022-11-30 VITALS
DIASTOLIC BLOOD PRESSURE: 67 MMHG | SYSTOLIC BLOOD PRESSURE: 141 MMHG | HEART RATE: 80 BPM | RESPIRATION RATE: 18 BRPM | TEMPERATURE: 97 F | OXYGEN SATURATION: 95 %

## 2022-11-30 DIAGNOSIS — Z17.0 MALIGNANT NEOPLASM OF UPPER-OUTER QUADRANT OF LEFT BREAST IN FEMALE, ESTROGEN RECEPTOR POSITIVE: ICD-10-CM

## 2022-11-30 DIAGNOSIS — C50.912 MALIGNANT NEOPLASM OF LEFT BREAST IN FEMALE, ESTROGEN RECEPTOR POSITIVE, UNSPECIFIED SITE OF BREAST: ICD-10-CM

## 2022-11-30 DIAGNOSIS — N18.6 ESRD (END STAGE RENAL DISEASE): ICD-10-CM

## 2022-11-30 DIAGNOSIS — Z17.0 MALIGNANT NEOPLASM OF UPPER-OUTER QUADRANT OF LEFT BREAST IN FEMALE, ESTROGEN RECEPTOR POSITIVE: Primary | ICD-10-CM

## 2022-11-30 DIAGNOSIS — C50.412 MALIGNANT NEOPLASM OF UPPER-OUTER QUADRANT OF LEFT BREAST IN FEMALE, ESTROGEN RECEPTOR POSITIVE: Primary | ICD-10-CM

## 2022-11-30 DIAGNOSIS — Z17.0 MALIGNANT NEOPLASM OF LEFT BREAST IN FEMALE, ESTROGEN RECEPTOR POSITIVE, UNSPECIFIED SITE OF BREAST: ICD-10-CM

## 2022-11-30 DIAGNOSIS — D63.1 ANEMIA ASSOCIATED WITH CHRONIC RENAL FAILURE: ICD-10-CM

## 2022-11-30 DIAGNOSIS — N18.9 ANEMIA ASSOCIATED WITH CHRONIC RENAL FAILURE: ICD-10-CM

## 2022-11-30 DIAGNOSIS — C50.412 MALIGNANT NEOPLASM OF UPPER-OUTER QUADRANT OF LEFT BREAST IN FEMALE, ESTROGEN RECEPTOR POSITIVE: ICD-10-CM

## 2022-11-30 LAB
ALBUMIN SERPL BCP-MCNC: 3 G/DL (ref 3.5–5.2)
ALP SERPL-CCNC: 53 U/L (ref 55–135)
ALT SERPL W/O P-5'-P-CCNC: 12 U/L (ref 10–44)
ANION GAP SERPL CALC-SCNC: 11 MMOL/L (ref 8–16)
AST SERPL-CCNC: 16 U/L (ref 10–40)
BASOPHILS # BLD AUTO: 0.03 K/UL (ref 0–0.2)
BASOPHILS NFR BLD: 0.6 % (ref 0–1.9)
BILIRUB SERPL-MCNC: 0.3 MG/DL (ref 0.1–1)
BUN SERPL-MCNC: 23 MG/DL (ref 8–23)
CALCIUM SERPL-MCNC: 8.1 MG/DL (ref 8.7–10.5)
CHLORIDE SERPL-SCNC: 103 MMOL/L (ref 95–110)
CO2 SERPL-SCNC: 26 MMOL/L (ref 23–29)
CREAT SERPL-MCNC: 4.8 MG/DL (ref 0.5–1.4)
DIFFERENTIAL METHOD: ABNORMAL
EOSINOPHIL # BLD AUTO: 0.2 K/UL (ref 0–0.5)
EOSINOPHIL NFR BLD: 3.3 % (ref 0–8)
ERYTHROCYTE [DISTWIDTH] IN BLOOD BY AUTOMATED COUNT: 21.6 % (ref 11.5–14.5)
EST. GFR  (NO RACE VARIABLE): 9 ML/MIN/1.73 M^2
GLUCOSE SERPL-MCNC: 135 MG/DL (ref 70–110)
HCT VFR BLD AUTO: 25.4 % (ref 37–48.5)
HGB BLD-MCNC: 8.2 G/DL (ref 12–16)
IMM GRANULOCYTES # BLD AUTO: 0.17 K/UL (ref 0–0.04)
IMM GRANULOCYTES NFR BLD AUTO: 3.3 % (ref 0–0.5)
LYMPHOCYTES # BLD AUTO: 1.2 K/UL (ref 1–4.8)
LYMPHOCYTES NFR BLD: 23.5 % (ref 18–48)
MAGNESIUM SERPL-MCNC: 1.8 MG/DL (ref 1.6–2.6)
MCH RBC QN AUTO: 30.5 PG (ref 27–31)
MCHC RBC AUTO-ENTMCNC: 32.3 G/DL (ref 32–36)
MCV RBC AUTO: 94 FL (ref 82–98)
MONOCYTES # BLD AUTO: 0.6 K/UL (ref 0.3–1)
MONOCYTES NFR BLD: 10.7 % (ref 4–15)
NEUTROPHILS # BLD AUTO: 3 K/UL (ref 1.8–7.7)
NEUTROPHILS NFR BLD: 58.6 % (ref 38–73)
NRBC BLD-RTO: 0 /100 WBC
PLATELET # BLD AUTO: 364 K/UL (ref 150–450)
PMV BLD AUTO: 10 FL (ref 9.2–12.9)
POTASSIUM SERPL-SCNC: 4.1 MMOL/L (ref 3.5–5.1)
PROT SERPL-MCNC: 7.6 G/DL (ref 6–8.4)
RBC # BLD AUTO: 2.69 M/UL (ref 4–5.4)
SODIUM SERPL-SCNC: 140 MMOL/L (ref 136–145)
WBC # BLD AUTO: 5.15 K/UL (ref 3.9–12.7)

## 2022-11-30 PROCEDURE — 99215 OFFICE O/P EST HI 40 MIN: CPT | Mod: S$GLB,,,

## 2022-11-30 PROCEDURE — 4010F PR ACE/ARB THEARPY RXD/TAKEN: ICD-10-PCS | Mod: CPTII,S$GLB,,

## 2022-11-30 PROCEDURE — 4010F ACE/ARB THERAPY RXD/TAKEN: CPT | Mod: CPTII,S$GLB,,

## 2022-11-30 PROCEDURE — 3288F FALL RISK ASSESSMENT DOCD: CPT | Mod: CPTII,S$GLB,,

## 2022-11-30 PROCEDURE — 1126F AMNT PAIN NOTED NONE PRSNT: CPT | Mod: CPTII,S$GLB,,

## 2022-11-30 PROCEDURE — 1101F PT FALLS ASSESS-DOCD LE1/YR: CPT | Mod: CPTII,S$GLB,,

## 2022-11-30 PROCEDURE — 96375 TX/PRO/DX INJ NEW DRUG ADDON: CPT

## 2022-11-30 PROCEDURE — 1126F PR PAIN SEVERITY QUANTIFIED, NO PAIN PRESENT: ICD-10-PCS | Mod: CPTII,S$GLB,,

## 2022-11-30 PROCEDURE — 99999 PR PBB SHADOW E&M-EST. PATIENT-LVL IV: CPT | Mod: PBBFAC,,,

## 2022-11-30 PROCEDURE — 85025 COMPLETE CBC W/AUTO DIFF WBC: CPT | Performed by: INTERNAL MEDICINE

## 2022-11-30 PROCEDURE — 25000003 PHARM REV CODE 250: Performed by: NURSE PRACTITIONER

## 2022-11-30 PROCEDURE — 1157F ADVNC CARE PLAN IN RCRD: CPT | Mod: CPTII,S$GLB,,

## 2022-11-30 PROCEDURE — 96367 TX/PROPH/DG ADDL SEQ IV INF: CPT

## 2022-11-30 PROCEDURE — 80053 COMPREHEN METABOLIC PANEL: CPT | Performed by: INTERNAL MEDICINE

## 2022-11-30 PROCEDURE — 63600175 PHARM REV CODE 636 W HCPCS: Performed by: NURSE PRACTITIONER

## 2022-11-30 PROCEDURE — 96413 CHEMO IV INFUSION 1 HR: CPT

## 2022-11-30 PROCEDURE — 3075F SYST BP GE 130 - 139MM HG: CPT | Mod: CPTII,S$GLB,,

## 2022-11-30 PROCEDURE — 36415 COLL VENOUS BLD VENIPUNCTURE: CPT | Performed by: INTERNAL MEDICINE

## 2022-11-30 PROCEDURE — 83735 ASSAY OF MAGNESIUM: CPT | Performed by: NURSE PRACTITIONER

## 2022-11-30 PROCEDURE — 3288F PR FALLS RISK ASSESSMENT DOCUMENTED: ICD-10-PCS | Mod: CPTII,S$GLB,,

## 2022-11-30 PROCEDURE — 3008F BODY MASS INDEX DOCD: CPT | Mod: CPTII,S$GLB,,

## 2022-11-30 PROCEDURE — 1157F PR ADVANCE CARE PLAN OR EQUIV PRESENT IN MEDICAL RECORD: ICD-10-PCS | Mod: CPTII,S$GLB,,

## 2022-11-30 PROCEDURE — 1101F PR PT FALLS ASSESS DOC 0-1 FALLS W/OUT INJ PAST YR: ICD-10-PCS | Mod: CPTII,S$GLB,,

## 2022-11-30 PROCEDURE — 3075F PR MOST RECENT SYSTOLIC BLOOD PRESS GE 130-139MM HG: ICD-10-PCS | Mod: CPTII,S$GLB,,

## 2022-11-30 PROCEDURE — 99215 PR OFFICE/OUTPT VISIT, EST, LEVL V, 40-54 MIN: ICD-10-PCS | Mod: S$GLB,,,

## 2022-11-30 PROCEDURE — 3008F PR BODY MASS INDEX (BMI) DOCUMENTED: ICD-10-PCS | Mod: CPTII,S$GLB,,

## 2022-11-30 PROCEDURE — 3078F PR MOST RECENT DIASTOLIC BLOOD PRESSURE < 80 MM HG: ICD-10-PCS | Mod: CPTII,S$GLB,,

## 2022-11-30 PROCEDURE — 99999 PR PBB SHADOW E&M-EST. PATIENT-LVL IV: ICD-10-PCS | Mod: PBBFAC,,,

## 2022-11-30 PROCEDURE — 3078F DIAST BP <80 MM HG: CPT | Mod: CPTII,S$GLB,,

## 2022-11-30 RX ORDER — HEPARIN 100 UNIT/ML
500 SYRINGE INTRAVENOUS
Status: DISCONTINUED | OUTPATIENT
Start: 2022-11-30 | End: 2022-12-01 | Stop reason: HOSPADM

## 2022-11-30 RX ORDER — ONDANSETRON 2 MG/ML
4 INJECTION INTRAMUSCULAR; INTRAVENOUS ONCE
Status: COMPLETED | OUTPATIENT
Start: 2022-11-30 | End: 2022-11-30

## 2022-11-30 RX ORDER — FAMOTIDINE 10 MG/ML
20 INJECTION INTRAVENOUS
Status: COMPLETED | OUTPATIENT
Start: 2022-11-30 | End: 2022-11-30

## 2022-11-30 RX ADMIN — DEXAMETHASONE SODIUM PHOSPHATE 10 MG: 4 INJECTION, SOLUTION INTRA-ARTICULAR; INTRALESIONAL; INTRAMUSCULAR; INTRAVENOUS; SOFT TISSUE at 09:11

## 2022-11-30 RX ADMIN — FAMOTIDINE 20 MG: 10 INJECTION INTRAVENOUS at 09:11

## 2022-11-30 RX ADMIN — ONDANSETRON 4 MG: 2 INJECTION INTRAMUSCULAR; INTRAVENOUS at 09:11

## 2022-11-30 RX ADMIN — PACLITAXEL 162 MG: 6 INJECTION, SOLUTION INTRAVENOUS at 10:11

## 2022-11-30 RX ADMIN — DIPHENHYDRAMINE HYDROCHLORIDE 50 MG: 50 INJECTION, SOLUTION INTRAMUSCULAR; INTRAVENOUS at 09:11

## 2022-11-30 RX ADMIN — HEPARIN 500 UNITS: 100 SYRINGE at 11:11

## 2022-11-30 NOTE — ASSESSMENT & PLAN NOTE
Cancer Staging   Malignant neoplasm of upper-outer quadrant of left breast in female, estrogen receptor positive  Staging form: Breast, AJCC 8th Edition  - Clinical stage from 6/20/2022: Stage IIB (cT3, cN2a(f), cM0, G3, ER+, IN+, HER2+) - Signed by Toney العلي MD on 7/11/2022    Plan for neoadjuvant systemic therapy with OP BREAST PACLITAXEL WEEKLY WITH TRASTUZUMAB (Kanjinti) PERTUZUMAB Q3W (THP)     ECHO 10/07/22 The estimated ejection fraction is 65%.    Labs reviewed, no concerning cytopenia  -Case discussed with primary oncologist  -Ok to proceed with C5D8 of weekly Taxol today    F/u in one week with repeat labs and MD for C5D15 Taxol

## 2022-11-30 NOTE — PROGRESS NOTES
Subjective:     Patient ID:?Malaika Paredes is a 67 y.o. female.?? MR#: [id number]   ?   PRIMARY ONCOLOGIST: Dr. العلي  ?   CHIEF COMPLAINT: lab review/assessment for chemo?????   ?   ONCOLOGIC DIAGNOSIS:   Malignant neoplasm of upper-outer quadrant of left breast in female, estrogen receptor positive  Stage IIB (cT3, cN2a(f), cM0, G3, ER+, IN+, HER2+)     ?   CURRENT TREATMENT: OP BREAST PACLITAXEL WEEKLY WITH TRASTUZUMAB (Kanjinti) PERTUZUMAB Q3W (THP)       HPI  Ms. Paredes is a pleasant 67-year-old woman with CHF, COPD, HTN, diabetic retinopathy, ESRD on dialysis, and type 2 DM who was referred to Hem/Onc in 6/2022 for locally advanced HER2 positive breast cancer. She presented to GYN in 5/2022 with a palpable breast lump present for one month. Diagnostic MMG revealed a left breast 5.6 cm x 4 cm x 4.3 cm mass at the 2 o'clock position. Biopsied proved invasive ductal carcinoma ER >95%, IN 80%, Wtd6psx 2+, Ki-67 60%. Left axillary LN biopsy was positive. PET revealed no evidence of distance mets.     Interval History: She presents today for C5D8 weekly taxol. Pt states overall she is feeling well and voice no complaints today. She continues to manage neuropathy of hands with gabapentin. Denies issue with maintaining hydration or appetite. Denies n/v/d/c, fever, chills, mouth sores. She continues on [facility].       Oncology History   Malignant neoplasm of upper-outer quadrant of left breast in female, estrogen receptor positive   6/20/2022 Initial Diagnosis    Malignant neoplasm of upper-outer quadrant of left breast in female, estrogen receptor positive     6/20/2022 Cancer Staged    Staging form: Breast, AJCC 8th Edition  - Clinical stage from 6/20/2022: Stage IIB (cT3, cN2a(f), cM0, G3, ER+, IN+, HER2+)        Chemotherapy    Treatment Summary   [Could not find a treatment plan. This SmartLink may be configured incorrectly. Contact a  for help.]      Genetic Testing     Patient has genetic testing done for Nichelle      My Risk negative with VUS MSH3     BRCA 1 and BRCA 2 negative                                        Results revealed patient has the following mutation(s):     7/11/2022 - 7/11/2022 Chemotherapy    Treatment Summary   Plan Name: OP BREAST DOCETAXEL CARBOPLATIN TRASTUZUMAB PERTUZUMAB (TCHP) Q3W  Treatment Goal: Control  Status: Inactive  Start Date:   End Date:   Provider: Toney العلي MD  Chemotherapy: dexAMETHasone (DECADRON) 4 MG Tab, 8 mg, Oral, See admin instructions, 0 of 1 cycle, Start date: --, End date: --  OLANZapine (ZYPREXA) 5 MG tablet, 5 mg, Oral, Nightly, 0 of 1 cycle, Start date: --, End date: --  CARBOplatin (PARAPLATIN) in sodium chloride 0.9% 500 mL chemo infusion, , Intravenous, Clinic/HOD 1 time, 0 of 6 cycles  DOCEtaxeL (TAXOTERE) 75 mg/m2 = 150 mg in sodium chloride 0.9% 250 mL chemo infusion, 75 mg/m2, Intravenous, Clinic/HOD 1 time, 0 of 6 cycles  pertuzumab (PERJETA) 840 mg in sodium chloride 0.9% 278 mL infusion, 840 mg, Intravenous, Clinic/HOD 1 time, 0 of 17 cycles  trastuzumab-dkst (OGIVRI) 738 mg in sodium chloride 0.9% 250 mL chemo infusion, 8 mg/kg, Intravenous, Clinic/HOD 1 time, 0 of 17 cycles       7/27/2022 -  Chemotherapy    Treatment Summary   Plan Name: OP BREAST PACLITAXEL WEEKLY WITH TRASTUZUMAB (Kanjinti) PERTUZUMAB Q3W (THP)  Treatment Goal: Control  Status: Active  Start Date: 7/27/2022  End Date: 12/28/2022 (Planned)  Provider: Toney العلي MD  Chemotherapy: PACLitaxeL (TAXOL) 80 mg/m2 = 162 mg in sodium chloride 0.9% 250 mL chemo infusion, 80 mg/m2 = 162 mg, Intravenous, Clinic/HOD 1 time, 5 of 6 cycles  Administration: 162 mg (7/27/2022), 162 mg (8/9/2022), 162 mg (9/7/2022), 162 mg (8/31/2022), 162 mg (10/5/2022), 162 mg (11/2/2022), 162 mg (11/23/2022), 162 mg (9/28/2022), 162 mg (10/12/2022), 162 mg (9/21/2022), 162 mg (10/26/2022), 162 mg (11/9/2022), 162 mg (11/16/2022), 162 mg (11/30/2022)  pertuzumab  (PERJETA) 840 mg in sodium chloride 0.9% 278 mL infusion, 840 mg, Intravenous, Clinic/HOD 1 time, 5 of 6 cycles  Administration: 840 mg (7/27/2022), 420 mg (9/7/2022), 420 mg (10/5/2022), 420 mg (11/2/2022), 420 mg (11/23/2022)  trastuzumab-anns 738 mg in sodium chloride 0.9% 285.1 mL chemo infusion, 8 mg/kg = 738 mg (100 % of original dose 8 mg/kg), Intravenous, Clinic/HOD 1 time, 5 of 6 cycles  Dose modification: 8 mg/kg (original dose 8 mg/kg, Cycle 1), 6 mg/kg (original dose 6 mg/kg, Cycle 2), 6 mg/kg (original dose 6 mg/kg, Cycle 3), 6 mg/kg (original dose 6 mg/kg, Cycle 4), 6 mg/kg (original dose 6 mg/kg, Cycle 5)  Administration: 738 mg (7/27/2022), 554 mg (9/7/2022), 554 mg (10/5/2022), 554 mg (11/2/2022), 554 mg (11/23/2022)       1/4/2023 -  Chemotherapy    Treatment Summary   Plan Name: OP BREAST PERTUZUMAB/TRASTUZUMAB SUBQ (PHESGO) Q3W  Treatment Goal: Control  Status: Future  Start Date: 1/4/2023 (Planned)  End Date: 8/2/2023 (Planned)  Provider: Toney العلي MD  Chemotherapy: pertuzumab-trastuzumab-hy-zzxf 1,200 mg-600mg- 46389 unit/15mL injection 1,200 mg, 1,200 mg, Subcutaneous, Clinic/HOD 1 time, 0 of 1 cycle  pertuzumab-trastuzumab-hy-zzxf 600 mg-600 mg- 78935 unit/10mL injection 600 mg, 600 mg, Subcutaneous, Clinic/HOD 1 time, 0 of 10 cycles          Social History     Socioeconomic History    Marital status:    Tobacco Use    Smoking status: Former     Types: Cigarettes     Quit date: 6/21/2012     Years since quitting: 10.4    Smokeless tobacco: Never   Substance and Sexual Activity    Alcohol use: No    Drug use: Never      Family History   Problem Relation Age of Onset    Hypertension Mother     Cancer Father     Breast cancer Sister     Diabetes Sister     Cancer Brother     Amblyopia Neg Hx     Blindness Neg Hx     Cataracts Neg Hx     Glaucoma Neg Hx     Macular degeneration Neg Hx     Retinal detachment Neg Hx     Strabismus Neg Hx     Stroke Neg Hx     Thyroid disease Neg Hx        Past Surgical History:   Procedure Laterality Date    BREAST BIOPSY Right     benign    CATARACT EXTRACTION W/  INTRAOCULAR LENS IMPLANT Right 08/14/2017    Dr. Moe    CORONARY ARTERY BYPASS GRAFT      FLUOROSCOPY N/A 7/25/2022    Procedure: FLUOROSCOPY/mediport placement;  Surgeon: Cole Perdomo MD;  Location: [facility] CATH LAB;  Service: General;  Laterality: N/A;        Review of Systems   Constitutional:  Positive for fatigue. Negative for activity change, appetite change, chills, diaphoresis, fever and unexpected weight change.   HENT:  Negative for congestion, dental problem, drooling, ear discharge, ear pain, facial swelling, hearing loss, mouth sores, nosebleeds, postnasal drip, rhinorrhea, sinus pressure, sneezing, sore throat, tinnitus, trouble swallowing and voice change.    Eyes:  Negative for photophobia, pain, discharge, redness, itching and visual disturbance.   Respiratory:  Negative for cough, choking, chest tightness, shortness of breath, wheezing and stridor.    Cardiovascular:  Negative for chest pain, palpitations and leg swelling.   Gastrointestinal:  Negative for abdominal distention, abdominal pain, anal bleeding, blood in stool, constipation, diarrhea, nausea, rectal pain and vomiting.   Endocrine: Negative for cold intolerance, heat intolerance, polydipsia, polyphagia and polyuria.   Genitourinary:  Negative for decreased urine volume, difficulty urinating, dyspareunia, dysuria, enuresis, flank pain, frequency, genital sores, hematuria, menstrual problem, pelvic pain, urgency, vaginal bleeding, vaginal discharge and vaginal pain.   Musculoskeletal:  Negative for arthralgias, back pain, gait problem, joint swelling, myalgias, neck pain and neck stiffness.   Skin:  Negative for color change, pallor and rash.   Allergic/Immunologic: Negative for environmental allergies, food allergies and immunocompromised state.   Neurological:  Negative for dizziness, tremors, seizures, syncope, facial  asymmetry, speech difficulty, weakness, light-headedness, numbness and headaches.   Hematological:  Negative for adenopathy. Does not bruise/bleed easily.   Psychiatric/Behavioral:  Negative for agitation, behavioral problems, confusion, decreased concentration, dysphoric mood, hallucinations, self-injury, sleep disturbance and suicidal ideas. The patient is nervous/anxious. The patient is not hyperactive.      ?   A comprehensive 14-point review of systems was reviewed with patient and was negative other than as specified above.   ?     Objective:      Physical Exam  Vitals reviewed.   Constitutional:       General: She is not in acute distress.     Appearance: She is not ill-appearing, toxic-appearing or diaphoretic.   HENT:      Head: Normocephalic and atraumatic.   Eyes:      Conjunctiva/sclera: Conjunctivae normal.   Cardiovascular:      Rate and Rhythm: Normal rate.   Pulmonary:      Effort: Pulmonary effort is normal.   Musculoskeletal:      Right lower leg: No edema.      Left lower leg: No edema.   Skin:     General: Skin is warm.      Coloration: Skin is not jaundiced or pale.      Findings: No bruising, erythema, lesion or rash.   Neurological:      Mental Status: She is alert.      Motor: No weakness.      Gait: Gait normal.   Psychiatric:         Mood and Affect: Mood normal.         Behavior: Behavior normal.         Thought Content: Thought content normal.         ?   Vitals:    11/30/22 0831   BP: 137/68   Pulse: 79   Temp: 97.7 °F (36.5 °C)      ?       ?   Laboratory:  ?   Lab Visit on 11/30/2022   Component Date Value Ref Range Status    Sodium 11/30/2022 140  136 - 145 mmol/L Final    Potassium 11/30/2022 4.1  3.5 - 5.1 mmol/L Final    Chloride 11/30/2022 103  95 - 110 mmol/L Final    CO2 11/30/2022 26  23 - 29 mmol/L Final    Glucose 11/30/2022 135 (H)  70 - 110 mg/dL Final    BUN 11/30/2022 23  8 - 23 mg/dL Final    Creatinine 11/30/2022 4.8 (H)  0.5 - 1.4 mg/dL Final    Calcium 11/30/2022 8.1  (L)  8.7 - 10.5 mg/dL Final    Total Protein 11/30/2022 7.6  6.0 - 8.4 g/dL Final    Albumin 11/30/2022 3.0 (L)  3.5 - 5.2 g/dL Final    Total Bilirubin 11/30/2022 0.3  0.1 - 1.0 mg/dL Final    Alkaline Phosphatase 11/30/2022 53 (L)  55 - 135 U/L Final    AST 11/30/2022 16  10 - 40 U/L Final    ALT 11/30/2022 12  10 - 44 U/L Final    Anion Gap 11/30/2022 11  8 - 16 mmol/L Final    eGFR 11/30/2022 9 (A)  >60 mL/min/1.73 m^2 Final    WBC 11/30/2022 5.15  3.90 - 12.70 K/uL Final    RBC 11/30/2022 2.69 (L)  4.00 - 5.40 M/uL Final    Hemoglobin 11/30/2022 8.2 (L)  12.0 - 16.0 g/dL Final    Hematocrit 11/30/2022 25.4 (L)  37.0 - 48.5 % Final    MCV 11/30/2022 94  82 - 98 fL Final    MCH 11/30/2022 30.5  27.0 - 31.0 pg Final    MCHC 11/30/2022 32.3  32.0 - 36.0 g/dL Final    RDW 11/30/2022 21.6 (H)  11.5 - 14.5 % Final    Platelets 11/30/2022 364  150 - 450 K/uL Final    MPV 11/30/2022 10.0  9.2 - 12.9 fL Final    Immature Granulocytes 11/30/2022 3.3 (H)  0.0 - 0.5 % Final    Gran # (ANC) 11/30/2022 3.0  1.8 - 7.7 K/uL Final    Immature Grans (Abs) 11/30/2022 0.17 (H)  0.00 - 0.04 K/uL Final    Lymph # 11/30/2022 1.2  1.0 - 4.8 K/uL Final    Mono # 11/30/2022 0.6  0.3 - 1.0 K/uL Final    Eos # 11/30/2022 0.2  0.0 - 0.5 K/uL Final    Baso # 11/30/2022 0.03  0.00 - 0.20 K/uL Final    nRBC 11/30/2022 0  0 /100 WBC Final    Gran % 11/30/2022 58.6  38.0 - 73.0 % Final    Lymph % 11/30/2022 23.5  18.0 - 48.0 % Final    Mono % 11/30/2022 10.7  4.0 - 15.0 % Final    Eosinophil % 11/30/2022 3.3  0.0 - 8.0 % Final    Basophil % 11/30/2022 0.6  0.0 - 1.9 % Final    Differential Method 11/30/2022 Automated   Final    Magnesium 11/30/2022 1.8  1.6 - 2.6 mg/dL Final      ?   Imaging:    No results found for this or any previous visit (from the past 2160 hour(s)).     No results found for this or any previous visit (from the past 2160 hour(s)).     ?   Assessment/Plan:     Problem List Items Addressed This Visit          Oncology     Malignant neoplasm of upper-outer quadrant of left breast in female, estrogen receptor positive - Primary      Cancer Staging   Malignant neoplasm of upper-outer quadrant of left breast in female, estrogen receptor positive  Staging form: Breast, AJCC 8th Edition  - Clinical stage from 6/20/2022: Stage IIB (cT3, cN2a(f), cM0, G3, ER+, IA+, HER2+) - Signed by Toney العلي MD on 7/11/2022    Plan for neoadjuvant systemic therapy with OP BREAST PACLITAXEL WEEKLY WITH TRASTUZUMAB (Kanjinti) PERTUZUMAB Q3W (THP)     ECHO 10/07/22 The estimated ejection fraction is 65%.    Labs reviewed, no concerning cytopenia  -Case discussed with primary oncologist  -Ok to proceed with C5D8 of weekly Taxol today    F/u in one week with repeat labs and MD for C5D15 Taxol                 Relevant Orders    CBC Auto Differential    Comprehensive Metabolic Panel          Med Onc Chart Routing      Follow up with physician 1 week.   Follow up with [facility]    Infusion scheduling note for Taxol   Injection scheduling note    Labs CBC and CMP   Lab interval:     Imaging    Pharmacy appointment    Other referrals           PHOEBE Keen  Hematology/Oncology

## 2022-11-30 NOTE — PLAN OF CARE
Problem: Adult Inpatient Plan of Care  Goal: Plan of Care Review  Outcome: Ongoing, Progressing  Goal: Patient-Specific Goal (Individualized)  Outcome: Ongoing, Progressing  Goal: Optimal Comfort and Wellbeing  Outcome: Ongoing, Progressing  Intervention: Provide Person-Centered Care  Flowsheets (Taken 11/30/2022 0904)  Trust Relationship/Rapport:   care explained   reassurance provided   choices provided   thoughts/feelings acknowledged   emotional support provided   empathic listening provided   questions answered   questions encouraged

## 2022-12-07 ENCOUNTER — INFUSION (OUTPATIENT)
Dept: INFUSION THERAPY | Facility: HOSPITAL | Age: 67
End: 2022-12-07
Attending: INTERNAL MEDICINE
Payer: MEDICARE

## 2022-12-07 ENCOUNTER — LAB VISIT (OUTPATIENT)
Dept: LAB | Facility: HOSPITAL | Age: 67
End: 2022-12-07
Attending: INTERNAL MEDICINE
Payer: MEDICARE

## 2022-12-07 ENCOUNTER — OFFICE VISIT (OUTPATIENT)
Dept: HEMATOLOGY/ONCOLOGY | Facility: CLINIC | Age: 67
End: 2022-12-07
Payer: MEDICARE

## 2022-12-07 VITALS
WEIGHT: 198.19 LBS | RESPIRATION RATE: 16 BRPM | OXYGEN SATURATION: 96 % | TEMPERATURE: 97 F | SYSTOLIC BLOOD PRESSURE: 127 MMHG | BODY MASS INDEX: 36.25 KG/M2 | HEART RATE: 79 BPM | DIASTOLIC BLOOD PRESSURE: 71 MMHG

## 2022-12-07 VITALS
BODY MASS INDEX: 36.47 KG/M2 | OXYGEN SATURATION: 97 % | HEIGHT: 62 IN | RESPIRATION RATE: 18 BRPM | HEART RATE: 84 BPM | TEMPERATURE: 97 F | SYSTOLIC BLOOD PRESSURE: 153 MMHG | DIASTOLIC BLOOD PRESSURE: 76 MMHG | WEIGHT: 198.19 LBS

## 2022-12-07 DIAGNOSIS — Z17.0 MALIGNANT NEOPLASM OF UPPER-OUTER QUADRANT OF LEFT BREAST IN FEMALE, ESTROGEN RECEPTOR POSITIVE: ICD-10-CM

## 2022-12-07 DIAGNOSIS — E66.01 SEVERE OBESITY (BMI 35.0-39.9) WITH COMORBIDITY: ICD-10-CM

## 2022-12-07 DIAGNOSIS — N18.5 CKD (CHRONIC KIDNEY DISEASE) STAGE 5, GFR LESS THAN 15 ML/MIN: ICD-10-CM

## 2022-12-07 DIAGNOSIS — C50.412 MALIGNANT NEOPLASM OF UPPER-OUTER QUADRANT OF LEFT BREAST IN FEMALE, ESTROGEN RECEPTOR POSITIVE: Primary | ICD-10-CM

## 2022-12-07 DIAGNOSIS — Z17.0 MALIGNANT NEOPLASM OF UPPER-OUTER QUADRANT OF LEFT BREAST IN FEMALE, ESTROGEN RECEPTOR POSITIVE: Primary | ICD-10-CM

## 2022-12-07 DIAGNOSIS — E11.22 TYPE 2 DIABETES MELLITUS WITH CHRONIC KIDNEY DISEASE ON CHRONIC DIALYSIS, WITH LONG-TERM CURRENT USE OF INSULIN: ICD-10-CM

## 2022-12-07 DIAGNOSIS — Z79.899 IMMUNODEFICIENCY DUE TO CHEMOTHERAPY: ICD-10-CM

## 2022-12-07 DIAGNOSIS — T45.1X5A IMMUNODEFICIENCY DUE TO CHEMOTHERAPY: ICD-10-CM

## 2022-12-07 DIAGNOSIS — Z79.4 TYPE 2 DIABETES MELLITUS WITH CHRONIC KIDNEY DISEASE ON CHRONIC DIALYSIS, WITH LONG-TERM CURRENT USE OF INSULIN: ICD-10-CM

## 2022-12-07 DIAGNOSIS — N18.6 TYPE 2 DIABETES MELLITUS WITH CHRONIC KIDNEY DISEASE ON CHRONIC DIALYSIS, WITH LONG-TERM CURRENT USE OF INSULIN: ICD-10-CM

## 2022-12-07 DIAGNOSIS — C50.412 MALIGNANT NEOPLASM OF UPPER-OUTER QUADRANT OF LEFT BREAST IN FEMALE, ESTROGEN RECEPTOR POSITIVE: ICD-10-CM

## 2022-12-07 DIAGNOSIS — N18.6 ESRD (END STAGE RENAL DISEASE): ICD-10-CM

## 2022-12-07 DIAGNOSIS — D84.821 IMMUNODEFICIENCY DUE TO CHEMOTHERAPY: ICD-10-CM

## 2022-12-07 DIAGNOSIS — Z99.2 TYPE 2 DIABETES MELLITUS WITH CHRONIC KIDNEY DISEASE ON CHRONIC DIALYSIS, WITH LONG-TERM CURRENT USE OF INSULIN: ICD-10-CM

## 2022-12-07 LAB
ALBUMIN SERPL BCP-MCNC: 3.1 G/DL (ref 3.5–5.2)
ALP SERPL-CCNC: 56 U/L (ref 55–135)
ALT SERPL W/O P-5'-P-CCNC: 14 U/L (ref 10–44)
ANION GAP SERPL CALC-SCNC: 13 MMOL/L (ref 8–16)
ANISOCYTOSIS BLD QL SMEAR: SLIGHT
AST SERPL-CCNC: 19 U/L (ref 10–40)
BASOPHILS NFR BLD: 0 % (ref 0–1.9)
BILIRUB SERPL-MCNC: 0.3 MG/DL (ref 0.1–1)
BUN SERPL-MCNC: 21 MG/DL (ref 8–23)
CALCIUM SERPL-MCNC: 8.6 MG/DL (ref 8.7–10.5)
CHLORIDE SERPL-SCNC: 100 MMOL/L (ref 95–110)
CO2 SERPL-SCNC: 27 MMOL/L (ref 23–29)
CREAT SERPL-MCNC: 4.9 MG/DL (ref 0.5–1.4)
DIFFERENTIAL METHOD: ABNORMAL
EOSINOPHIL NFR BLD: 3 % (ref 0–8)
ERYTHROCYTE [DISTWIDTH] IN BLOOD BY AUTOMATED COUNT: 21.3 % (ref 11.5–14.5)
EST. GFR  (NO RACE VARIABLE): 9 ML/MIN/1.73 M^2
GLUCOSE SERPL-MCNC: 136 MG/DL (ref 70–110)
HCT VFR BLD AUTO: 27.8 % (ref 37–48.5)
HGB BLD-MCNC: 8.9 G/DL (ref 12–16)
IMM GRANULOCYTES # BLD AUTO: ABNORMAL K/UL (ref 0–0.04)
IMM GRANULOCYTES NFR BLD AUTO: ABNORMAL % (ref 0–0.5)
LYMPHOCYTES NFR BLD: 23 % (ref 18–48)
MCH RBC QN AUTO: 31 PG (ref 27–31)
MCHC RBC AUTO-ENTMCNC: 32 G/DL (ref 32–36)
MCV RBC AUTO: 97 FL (ref 82–98)
MONOCYTES NFR BLD: 7 % (ref 4–15)
MYELOCYTES NFR BLD MANUAL: 7 %
NEUTROPHILS NFR BLD: 60 % (ref 38–73)
NRBC BLD-RTO: 1 /100 WBC
PLATELET # BLD AUTO: 455 K/UL (ref 150–450)
PLATELET BLD QL SMEAR: ABNORMAL
PMV BLD AUTO: 9.8 FL (ref 9.2–12.9)
POTASSIUM SERPL-SCNC: 3.8 MMOL/L (ref 3.5–5.1)
PROT SERPL-MCNC: 7.9 G/DL (ref 6–8.4)
RBC # BLD AUTO: 2.87 M/UL (ref 4–5.4)
SCHISTOCYTES BLD QL SMEAR: PRESENT
SODIUM SERPL-SCNC: 140 MMOL/L (ref 136–145)
STOMATOCYTES BLD QL SMEAR: PRESENT
WBC # BLD AUTO: 9.09 K/UL (ref 3.9–12.7)

## 2022-12-07 PROCEDURE — 63600175 PHARM REV CODE 636 W HCPCS: Performed by: NURSE PRACTITIONER

## 2022-12-07 PROCEDURE — 85007 BL SMEAR W/DIFF WBC COUNT: CPT

## 2022-12-07 PROCEDURE — 1160F RVW MEDS BY RX/DR IN RCRD: CPT | Mod: CPTII,S$GLB,, | Performed by: INTERNAL MEDICINE

## 2022-12-07 PROCEDURE — 99215 PR OFFICE/OUTPT VISIT, EST, LEVL V, 40-54 MIN: ICD-10-PCS | Mod: 25,S$GLB,, | Performed by: INTERNAL MEDICINE

## 2022-12-07 PROCEDURE — 3288F FALL RISK ASSESSMENT DOCD: CPT | Mod: CPTII,S$GLB,, | Performed by: INTERNAL MEDICINE

## 2022-12-07 PROCEDURE — 25000003 PHARM REV CODE 250: Performed by: NURSE PRACTITIONER

## 2022-12-07 PROCEDURE — 1157F PR ADVANCE CARE PLAN OR EQUIV PRESENT IN MEDICAL RECORD: ICD-10-PCS | Mod: CPTII,S$GLB,, | Performed by: INTERNAL MEDICINE

## 2022-12-07 PROCEDURE — 1101F PR PT FALLS ASSESS DOC 0-1 FALLS W/OUT INJ PAST YR: ICD-10-PCS | Mod: CPTII,S$GLB,, | Performed by: INTERNAL MEDICINE

## 2022-12-07 PROCEDURE — 99999 PR PBB SHADOW E&M-EST. PATIENT-LVL V: CPT | Mod: PBBFAC,,, | Performed by: INTERNAL MEDICINE

## 2022-12-07 PROCEDURE — 80053 COMPREHEN METABOLIC PANEL: CPT

## 2022-12-07 PROCEDURE — 96413 CHEMO IV INFUSION 1 HR: CPT

## 2022-12-07 PROCEDURE — 1101F PT FALLS ASSESS-DOCD LE1/YR: CPT | Mod: CPTII,S$GLB,, | Performed by: INTERNAL MEDICINE

## 2022-12-07 PROCEDURE — 99999 PR PBB SHADOW E&M-EST. PATIENT-LVL V: ICD-10-PCS | Mod: PBBFAC,,, | Performed by: INTERNAL MEDICINE

## 2022-12-07 PROCEDURE — 3008F PR BODY MASS INDEX (BMI) DOCUMENTED: ICD-10-PCS | Mod: CPTII,S$GLB,, | Performed by: INTERNAL MEDICINE

## 2022-12-07 PROCEDURE — 36415 COLL VENOUS BLD VENIPUNCTURE: CPT

## 2022-12-07 PROCEDURE — 1157F ADVNC CARE PLAN IN RCRD: CPT | Mod: CPTII,S$GLB,, | Performed by: INTERNAL MEDICINE

## 2022-12-07 PROCEDURE — 96367 TX/PROPH/DG ADDL SEQ IV INF: CPT

## 2022-12-07 PROCEDURE — 1159F PR MEDICATION LIST DOCUMENTED IN MEDICAL RECORD: ICD-10-PCS | Mod: CPTII,S$GLB,, | Performed by: INTERNAL MEDICINE

## 2022-12-07 PROCEDURE — 3077F PR MOST RECENT SYSTOLIC BLOOD PRESSURE >= 140 MM HG: ICD-10-PCS | Mod: CPTII,S$GLB,, | Performed by: INTERNAL MEDICINE

## 2022-12-07 PROCEDURE — 1159F MED LIST DOCD IN RCRD: CPT | Mod: CPTII,S$GLB,, | Performed by: INTERNAL MEDICINE

## 2022-12-07 PROCEDURE — 3078F PR MOST RECENT DIASTOLIC BLOOD PRESSURE < 80 MM HG: ICD-10-PCS | Mod: CPTII,S$GLB,, | Performed by: INTERNAL MEDICINE

## 2022-12-07 PROCEDURE — 85027 COMPLETE CBC AUTOMATED: CPT

## 2022-12-07 PROCEDURE — 3078F DIAST BP <80 MM HG: CPT | Mod: CPTII,S$GLB,, | Performed by: INTERNAL MEDICINE

## 2022-12-07 PROCEDURE — 96375 TX/PRO/DX INJ NEW DRUG ADDON: CPT

## 2022-12-07 PROCEDURE — 1160F PR REVIEW ALL MEDS BY PRESCRIBER/CLIN PHARMACIST DOCUMENTED: ICD-10-PCS | Mod: CPTII,S$GLB,, | Performed by: INTERNAL MEDICINE

## 2022-12-07 PROCEDURE — 99215 OFFICE O/P EST HI 40 MIN: CPT | Mod: 25,S$GLB,, | Performed by: INTERNAL MEDICINE

## 2022-12-07 PROCEDURE — 3008F BODY MASS INDEX DOCD: CPT | Mod: CPTII,S$GLB,, | Performed by: INTERNAL MEDICINE

## 2022-12-07 PROCEDURE — 4010F PR ACE/ARB THEARPY RXD/TAKEN: ICD-10-PCS | Mod: CPTII,S$GLB,, | Performed by: INTERNAL MEDICINE

## 2022-12-07 PROCEDURE — 1125F PR PAIN SEVERITY QUANTIFIED, PAIN PRESENT: ICD-10-PCS | Mod: CPTII,S$GLB,, | Performed by: INTERNAL MEDICINE

## 2022-12-07 PROCEDURE — 4010F ACE/ARB THERAPY RXD/TAKEN: CPT | Mod: CPTII,S$GLB,, | Performed by: INTERNAL MEDICINE

## 2022-12-07 PROCEDURE — 3288F PR FALLS RISK ASSESSMENT DOCUMENTED: ICD-10-PCS | Mod: CPTII,S$GLB,, | Performed by: INTERNAL MEDICINE

## 2022-12-07 PROCEDURE — 3077F SYST BP >= 140 MM HG: CPT | Mod: CPTII,S$GLB,, | Performed by: INTERNAL MEDICINE

## 2022-12-07 PROCEDURE — 1125F AMNT PAIN NOTED PAIN PRSNT: CPT | Mod: CPTII,S$GLB,, | Performed by: INTERNAL MEDICINE

## 2022-12-07 RX ORDER — ONDANSETRON 2 MG/ML
4 INJECTION INTRAMUSCULAR; INTRAVENOUS ONCE
Status: COMPLETED | OUTPATIENT
Start: 2022-12-07 | End: 2022-12-07

## 2022-12-07 RX ORDER — HEPARIN 100 UNIT/ML
500 SYRINGE INTRAVENOUS
Status: DISCONTINUED | OUTPATIENT
Start: 2022-12-07 | End: 2022-12-07 | Stop reason: HOSPADM

## 2022-12-07 RX ORDER — FAMOTIDINE 10 MG/ML
20 INJECTION INTRAVENOUS
Status: COMPLETED | OUTPATIENT
Start: 2022-12-07 | End: 2022-12-07

## 2022-12-07 RX ADMIN — SODIUM CHLORIDE: 0.9 INJECTION, SOLUTION INTRAVENOUS at 08:12

## 2022-12-07 RX ADMIN — FAMOTIDINE 20 MG: 10 INJECTION INTRAVENOUS at 08:12

## 2022-12-07 RX ADMIN — DIPHENHYDRAMINE HYDROCHLORIDE 50 MG: 50 INJECTION, SOLUTION INTRAMUSCULAR; INTRAVENOUS at 09:12

## 2022-12-07 RX ADMIN — ONDANSETRON 4 MG: 2 INJECTION INTRAMUSCULAR; INTRAVENOUS at 08:12

## 2022-12-07 RX ADMIN — HEPARIN 500 UNITS: 100 SYRINGE at 10:12

## 2022-12-07 RX ADMIN — DEXAMETHASONE SODIUM PHOSPHATE 10 MG: 4 INJECTION, SOLUTION INTRA-ARTICULAR; INTRALESIONAL; INTRAMUSCULAR; INTRAVENOUS; SOFT TISSUE at 09:12

## 2022-12-07 RX ADMIN — PACLITAXEL 162 MG: 6 INJECTION, SOLUTION INTRAVENOUS at 09:12

## 2022-12-07 NOTE — PLAN OF CARE
Problem: Adult Inpatient Plan of Care  Goal: Plan of Care Review  Outcome: Ongoing, Progressing  Flowsheets (Taken 12/7/2022 0912)  Plan of Care Reviewed With: patient  Goal: Patient-Specific Goal (Individualized)  Outcome: Ongoing, Progressing  Flowsheets (Taken 12/7/2022 0912)  Anxieties, Fears or Concerns: none  Individualized Care Needs: feet up, warm blanket, snacks  Patient-Specific Goals (Include Timeframe): tolerate treatment  Goal: Optimal Comfort and Wellbeing  Outcome: Ongoing, Progressing  Intervention: Provide Person-Centered Care  Flowsheets (Taken 12/7/2022 0912)  Trust Relationship/Rapport:   care explained   questions encouraged   choices provided   reassurance provided   emotional support provided   thoughts/feelings acknowledged   empathic listening provided   questions answered     Problem: Fall Injury Risk  Goal: Absence of Fall and Fall-Related Injury  Outcome: Ongoing, Progressing  Intervention: Identify and Manage Contributors  Flowsheets (Taken 12/7/2022 0912)  Self-Care Promotion: BADL personal routines maintained  Medication Review/Management: medications reviewed  Intervention: Promote Injury-Free Environment  Flowsheets (Taken 12/7/2022 0912)  Safety Promotion/Fall Prevention:   in recliner, wheels locked   nonskid shoes/socks when out of bed   lighting adjusted   room near unit station     Problem: Fatigue  Goal: Improved Activity Tolerance  Outcome: Ongoing, Progressing  Intervention: Promote Improved Energy  Flowsheets (Taken 12/7/2022 0912)  Fatigue Management: frequent rest breaks encouraged  Sleep/Rest Enhancement:   regular sleep/rest pattern promoted   natural light exposure provided

## 2022-12-07 NOTE — DISCHARGE INSTRUCTIONS
THANKS FOR ALLOWING ME TO CARE FOR YOU TODAY!!! ~Lydia          THANKS FOR CHOOSING [facility]!!!      [facility]  [id number] [facility]  [id number] [place] [address] Drive  [phone] phone     [phone] fax  Hours of Operation: Monday- Friday 8:00am- 5:00pm  After hours phone  [phone]  Hematology / Oncology Physicians on call      JAQUAN Zhou Dr., Dr., NP Sydney Prescott, SMITH Lizarraga, VILMA Welsh    Please call with any concerns regarding your appointment today.

## 2022-12-07 NOTE — PROGRESS NOTES
Subjective:       Patient ID: Malaika Paredes is a 67 y.o. female.    Chief Complaint: Results, Breast Cancer, and Chemotherapy    HPI:  67-year-old female end-stage renal disease patient has locally advanced ERPR positive HER2 positive breast carcinoma currently receiving Herceptin Perjeta and Taxol in neoadjuvant setting.  Cycle 5 day 15 of planned 6 cycles of neoadjuvant treatment ECOG status 2    Past Medical History:   Diagnosis Date    Cataract     Cataract     CHF (congestive heart failure)     COPD (chronic obstructive pulmonary disease)     Diabetes mellitus     Diabetic retinopathy     Hypertension     Malignant neoplasm of upper-outer quadrant of left breast in female, estrogen receptor positive 6/20/2022     Family History   Problem Relation Age of Onset    Hypertension Mother     Cancer Father     Breast cancer Sister     Diabetes Sister     Cancer Brother     Amblyopia Neg Hx     Blindness Neg Hx     Cataracts Neg Hx     Glaucoma Neg Hx     Macular degeneration Neg Hx     Retinal detachment Neg Hx     Strabismus Neg Hx     Stroke Neg Hx     Thyroid disease Neg Hx      Social History     Socioeconomic History    Marital status:    Tobacco Use    Smoking status: Former     Types: Cigarettes     Quit date: 6/21/2012     Years since quitting: 10.4    Smokeless tobacco: Never   Substance and Sexual Activity    Alcohol use: No    Drug use: Never     Past Surgical History:   Procedure Laterality Date    BREAST BIOPSY Right     benign    CATARACT EXTRACTION W/  INTRAOCULAR LENS IMPLANT Right 08/14/2017    Dr. Moe    CORONARY ARTERY BYPASS GRAFT      FLUOROSCOPY N/A 7/25/2022    Procedure: FLUOROSCOPY/mediport placement;  Surgeon: Cole Perdomo MD;  Location: [facility] CATH LAB;  Service: General;  Laterality: N/A;       Labs:  Lab Results   Component Value Date    WBC 9.09 12/07/2022    HGB 8.9 (L) 12/07/2022    HCT 27.8 (L) 12/07/2022    MCV 97 12/07/2022     (H)  12/07/2022     BMP  Lab Results   Component Value Date     12/07/2022    K 3.8 12/07/2022     12/07/2022    CO2 27 12/07/2022    BUN 21 12/07/2022    CREATININE 4.9 (H) 12/07/2022    CALCIUM 8.6 (L) 12/07/2022    ANIONGAP 13 12/07/2022    ESTGFRAFRICA 8 (A) 07/27/2022    EGFRNONAA 7 (A) 07/27/2022     Lab Results   Component Value Date    ALT 14 12/07/2022    AST 19 12/07/2022    ALKPHOS 56 12/07/2022    BILITOT 0.3 12/07/2022       Lab Results   Component Value Date    IRON 43 09/09/2018    TIBC 300 09/09/2018    FERRITIN 1,038 (H) 06/21/2022     Lab Results   Component Value Date    OPFXHYZO73 459 09/09/2018     Lab Results   Component Value Date    FOLATE 6.2 09/09/2018     Lab Results   Component Value Date    TSH 2.815 10/05/2022         Review of Systems   Constitutional:  Positive for activity change, appetite change and fatigue. Negative for chills, diaphoresis, fever and unexpected weight change.   HENT:  Negative for congestion, dental problem, drooling, ear discharge, ear pain, facial swelling, hearing loss, mouth sores, nosebleeds, postnasal drip, rhinorrhea, sinus pressure, sneezing, sore throat, tinnitus, trouble swallowing and voice change.    Eyes:  Negative for photophobia, pain, discharge, redness, itching and visual disturbance.   Respiratory:  Negative for cough, choking, chest tightness, shortness of breath, wheezing and stridor.    Cardiovascular:  Negative for chest pain, palpitations and leg swelling.   Gastrointestinal:  Negative for abdominal distention, abdominal pain, anal bleeding, blood in stool, constipation, diarrhea, nausea, rectal pain and vomiting.   Endocrine: Negative for cold intolerance, heat intolerance, polydipsia, polyphagia and polyuria.   Genitourinary:  Negative for decreased urine volume, difficulty urinating, dyspareunia, dysuria, enuresis, flank pain, frequency, genital sores, hematuria, menstrual problem, pelvic pain, urgency, vaginal bleeding, vaginal  discharge and vaginal pain.   Musculoskeletal:  Positive for gait problem. Negative for arthralgias, back pain, joint swelling, myalgias, neck pain and neck stiffness.   Skin:  Negative for color change, pallor and rash.   Allergic/Immunologic: Negative for environmental allergies, food allergies and immunocompromised state.   Neurological:  Positive for numbness. Negative for dizziness, tremors, seizures, syncope, facial asymmetry, speech difficulty, weakness, light-headedness and headaches.   Hematological:  Negative for adenopathy. Does not bruise/bleed easily.   Psychiatric/Behavioral:  Positive for dysphoric mood. Negative for agitation, behavioral problems, confusion, decreased concentration, hallucinations, self-injury, sleep disturbance and suicidal ideas. The patient is nervous/anxious. The patient is not hyperactive.      Objective:      Physical Exam  Vitals reviewed.   Constitutional:       General: She is not in acute distress.     Appearance: She is well-developed. She is obese. She is ill-appearing. She is not diaphoretic.   HENT:      Head: Normocephalic and atraumatic.      Right Ear: External ear normal.      Left Ear: External ear normal.      Nose: Nose normal.      Right Sinus: No maxillary sinus tenderness or frontal sinus tenderness.      Left Sinus: No maxillary sinus tenderness or frontal sinus tenderness.      Mouth/Throat:      Pharynx: No oropharyngeal exudate.   Eyes:      General: Lids are normal. No scleral icterus.        Right eye: No discharge.         Left eye: No discharge.      Conjunctiva/sclera: Conjunctivae normal.      Right eye: Right conjunctiva is not injected. No hemorrhage.     Left eye: Left conjunctiva is not injected. No hemorrhage.     Pupils: Pupils are equal, round, and reactive to light.   Neck:      Thyroid: No thyromegaly.      Vascular: No JVD.      Trachea: No tracheal deviation.   Cardiovascular:      Rate and Rhythm: Normal rate.   Pulmonary:      Effort:  Pulmonary effort is normal. No respiratory distress.      Breath sounds: Normal breath sounds. No stridor.   Chest:      Chest wall: No tenderness.   Abdominal:      General: Bowel sounds are normal. There is no distension.      Palpations: Abdomen is soft. There is no hepatomegaly, splenomegaly or mass.      Tenderness: There is no abdominal tenderness. There is no rebound.   Musculoskeletal:         General: No tenderness. Normal range of motion.      Cervical back: Normal range of motion and neck supple.   Lymphadenopathy:      Cervical: No cervical adenopathy.      Upper Body:      Right upper body: No supraclavicular adenopathy.      Left upper body: No supraclavicular adenopathy.   Skin:     General: Skin is dry.      Findings: No erythema or rash.   Neurological:      Mental Status: She is alert and oriented to person, place, and time.      Cranial Nerves: No cranial nerve deficit.      Motor: Weakness present.      Coordination: Coordination abnormal.      Gait: Gait abnormal.   Psychiatric:         Behavior: Behavior normal.         Thought Content: Thought content normal.         Judgment: Judgment normal.           Assessment:      1. Malignant neoplasm of upper-outer quadrant of left breast in female, estrogen receptor positive    2. Immunodeficiency due to chemotherapy    3. CKD (chronic kidney disease) stage 5, GFR less than 15 ml/min    4. ESRD (end stage renal disease)    5. Severe obesity (BMI 35.0-39.9) with comorbidity    6. Type 2 diabetes mellitus with chronic kidney disease on chronic dialysis, with long-term current use of insulin           Plan:     Extensive review with patient at this time proceed with cycle 5 day 15.  Patient will return for next scheduled visit can be seen back by nurse practitioner.  To initiate cycle 6.  After completion of cycle 6 will refer back to surgery for consideration of definitive surgical procedure.  The patient will need to complete 1 year of Herceptin  Perjeta therapy.  Will have clinical pharmacist review at the completion of this neoadjuvant treatment recommendation.  And will have nurse navigation coordinate plans for surgical appointment in approximately 1 month.  Answered questions discussed toxicity profile with family.      Med Onc Chart Routing      Follow up with physician 1 week.   Follow up with [facility] 1 week.   Infusion scheduling note Proceed with cycle 6 day 1 of planned treatment   Injection scheduling note    Labs CBC and CMP   Lab interval:     Imaging    Pharmacy appointment    Other referrals           Toney العلي Jr, MD FACP

## 2022-12-10 ENCOUNTER — HOSPITAL ENCOUNTER (EMERGENCY)
Facility: HOSPITAL | Age: 67
Discharge: HOME | End: 2022-12-10
Attending: FAMILY MEDICINE
Payer: MEDICARE

## 2022-12-10 VITALS
WEIGHT: 200.19 LBS | RESPIRATION RATE: 18 BRPM | OXYGEN SATURATION: 96 % | SYSTOLIC BLOOD PRESSURE: 185 MMHG | BODY MASS INDEX: 36.61 KG/M2 | TEMPERATURE: 97 F | HEART RATE: 87 BPM | DIASTOLIC BLOOD PRESSURE: 80 MMHG

## 2022-12-10 DIAGNOSIS — L30.4 INTERTRIGO: Primary | ICD-10-CM

## 2022-12-10 DIAGNOSIS — M79.675 GREAT TOE PAIN, LEFT: ICD-10-CM

## 2022-12-10 PROCEDURE — 99284 EMERGENCY DEPT VISIT MOD MDM: CPT

## 2022-12-10 RX ORDER — KETOCONAZOLE 20 MG/G
CREAM TOPICAL DAILY
Qty: 1 EACH | Refills: 1 | Status: SHIPPED | OUTPATIENT
Start: 2022-12-10 | End: 2023-11-16

## 2022-12-10 RX ORDER — ACETAMINOPHEN 325 MG/1
325 TABLET ORAL EVERY 6 HOURS PRN
Qty: 20 TABLET | Refills: 0 | Status: SHIPPED | OUTPATIENT
Start: 2022-12-10 | End: 2022-12-15

## 2022-12-10 RX ORDER — CLINDAMYCIN HYDROCHLORIDE 150 MG/1
300 CAPSULE ORAL EVERY 8 HOURS
Qty: 42 CAPSULE | Refills: 0 | Status: SHIPPED | OUTPATIENT
Start: 2022-12-10 | End: 2022-12-17

## 2022-12-11 NOTE — ED PROVIDER NOTES
Encounter Date: 12/10/2022       History     Chief Complaint   Patient presents with    Toe Pain     Left great toe hurts and abscess to lower abdomen.      Patient presents to ER for left great toe pain, onset approximately 2 weeks ago.  She denies any associated symptoms.  She states she may have hit affected toe on her bed frame.  She has tried nothing for the symptoms.  Pain has been constant since onset.  She denies numbness, tingling, joint immobility, joint instability.    Patient also presents to ER for rash to right lower abdominal skin fold has been present for last 2 weeks or so.  Associated symptoms include pruritus.  She has tried nothing for the symptoms.  Rash has been constant since onset.  She denies fever, chills, generalized body aches.    The history is provided by the patient.   Review of patient's allergies indicates:  No Known Allergies  Past Medical History:   Diagnosis Date    Cataract     Cataract     CHF (congestive heart failure)     COPD (chronic obstructive pulmonary disease)     Diabetes mellitus     Diabetic retinopathy     Hypertension     Malignant neoplasm of upper-outer quadrant of left breast in female, estrogen receptor positive 6/20/2022     Past Surgical History:   Procedure Laterality Date    BREAST BIOPSY Right     benign    CATARACT EXTRACTION W/  INTRAOCULAR LENS IMPLANT Right 08/14/2017    Dr. Moe    CORONARY ARTERY BYPASS GRAFT      FLUOROSCOPY N/A 7/25/2022    Procedure: FLUOROSCOPY/mediport placement;  Surgeon: Cole Perdomo MD;  Location: [facility] CATH LAB;  Service: General;  Laterality: N/A;     Family History   Problem Relation Age of Onset    Hypertension Mother     Cancer Father     Breast cancer Sister     Diabetes Sister     Cancer Brother     Amblyopia Neg Hx     Blindness Neg Hx     Cataracts Neg Hx     Glaucoma Neg Hx     Macular degeneration Neg Hx     Retinal detachment Neg Hx     Strabismus Neg Hx     Stroke Neg Hx     Thyroid disease Neg Hx      Social  History     Tobacco Use    Smoking status: Former     Types: Cigarettes     Quit date: 6/21/2012     Years since quitting: 10.4    Smokeless tobacco: Never   Substance Use Topics    Alcohol use: No    Drug use: Never     Review of Systems   Constitutional:  Negative for chills, fatigue and fever.   HENT:  Negative for ear pain and sore throat.    Eyes:  Negative for pain.   Respiratory:  Negative for cough and shortness of breath.    Cardiovascular:  Negative for chest pain and palpitations.   Gastrointestinal:  Negative for abdominal pain, nausea and vomiting.   Genitourinary:  Negative for dysuria.   Musculoskeletal:  Negative for back pain, myalgias and neck pain.        +right great toe pain   Skin:  Positive for rash.   Neurological:  Negative for weakness and numbness.   All other systems reviewed and are negative.    Physical Exam     Initial Vitals   BP Pulse Resp Temp SpO2   12/10/22 1820 12/10/22 1820 12/10/22 1819 12/10/22 1820 12/10/22 1820   (!) 144/63 93 18 98.8 °F (37.1 °C) 95 %      MAP       --                Physical Exam    Nursing note and vitals reviewed.  Constitutional: She appears well-developed and well-nourished. She is not diaphoretic. No distress.   HENT:   Head: Normocephalic and atraumatic.   Eyes: EOM are normal. Pupils are equal, round, and reactive to light.   Neck: Neck supple.   Normal range of motion.  Cardiovascular:  Normal rate, regular rhythm and intact distal pulses.           Pulmonary/Chest: Breath sounds normal. No respiratory distress.   Abdominal: Abdomen is soft. There is no abdominal tenderness.   Musculoskeletal:         General: Normal range of motion.      Cervical back: Normal range of motion and neck supple.      Right foot: Normal.      Left foot: Normal capillary refill. Tenderness (Left great toe.) present. No swelling or deformity. Normal pulse.      Comments: + left great toe pain with no erythema or edema. Distal sensation intact. Neurovascularly intact. No  open wound. No drainage.     Neurological: She is alert and oriented to person, place, and time. She has normal strength. No sensory deficit. GCS score is 15. GCS eye subscore is 4. GCS verbal subscore is 5. GCS motor subscore is 6.   Skin: Skin is warm and dry. Capillary refill takes less than 2 seconds. Rash (+ nonraised red/brown patches under right side of abdominal skin fold. There are multiple small shallow ulcerated lesions with no drainage noted within erythematous patches. no surrounding erythema. no flutcuance or induration. nonteneder upon palpation.) noted.       ED Course   Procedures  Labs Reviewed - No data to display       Imaging Results              X-Ray Toe 2 or More Views Left (Final result)  Result time 12/10/22 19:52:51      Final result by Lexx Kohler MD (12/10/22 19:52:51)                   Impression:      As above      Electronically signed by: Jose F Hallman  Date:    12/10/2022  Time:    19:52               Narrative:    EXAMINATION:  XR TOE 2 OR MORE VIEWS LEFT    CLINICAL HISTORY:  Pain    COMPARISON:  None    FINDINGS:  Multiple radiographic views  were obtained.    No evidence of acute fracture or dislocation.  Decreased bone mineral density.  Vascular calcification.                                       Medications - No data to display                  Discussed all results with patient she verbalized understanding with no further questions or concerns.  Rash beneath right side of abdominal fold resembles intertrigo will treat with ketoconazole.  Due to ulcerations also noted within rash, will provide patient with clindamycin Rx for secondary bacterial infection.  Discussed use of Tylenol for pain.  Patient agrees with plan and voices comfortable discharge home.  Discussed signs and symptoms to return to ER.  Discussed close follow-up with PCP.    I discussed with patient  that evaluation in the ED does not suggest any emergent or life threatening medical conditions requiring  immediate intervention beyond what was provided in the ED, and I believe patient is safe for discharge. Regardless, an unremarkable evaluation in the ED does not preclude the development or presence of a serious of life threatening condition. As such, patient was instructed to return immediately for any worsening or change in current symptoms.           Clinical Impression:   Final diagnoses:  [L30.4] Intertrigo (Primary)  [M79.675] Great toe pain, left        ED Disposition Condition    Discharge Stable          ED Prescriptions       Medication Sig Dispense Start Date End Date Auth. Provider    ketoconazole (NIZORAL) 2 % cream Apply topically once daily. for 14 days 1 each 12/10/2022 12/24/2022 Travis Driver NP    clindamycin (CLEOCIN) 150 MG capsule Take 2 capsules (300 mg total) by mouth every 8 (eight) hours. for 7 days 42 capsule 12/10/2022 12/17/2022 Travis Driver NP    acetaminophen (TYLENOL) 325 MG tablet Take 1 tablet (325 mg total) by mouth every 6 (six) hours as needed for Pain. 20 tablet 12/10/2022 12/15/2022 Travis Driver NP          Follow-up Information       Follow up With Specialties Details Why Contact Info    Travis Scales MD Cardiology In 2 days  [address]  [address]  [facility] [address] [phone]      [facility] Emergency Dept. Emergency Medicine  As needed, If symptoms worsen [address]  [facility] [address] [phone]             Travis Driver NP  12/10/22 2036

## 2022-12-14 ENCOUNTER — LAB VISIT (OUTPATIENT)
Dept: LAB | Facility: HOSPITAL | Age: 67
End: 2022-12-14
Attending: INTERNAL MEDICINE
Payer: MEDICARE

## 2022-12-14 ENCOUNTER — OFFICE VISIT (OUTPATIENT)
Dept: HEMATOLOGY/ONCOLOGY | Facility: CLINIC | Age: 67
End: 2022-12-14
Payer: MEDICARE

## 2022-12-14 ENCOUNTER — TELEPHONE (OUTPATIENT)
Dept: INFUSION THERAPY | Facility: HOSPITAL | Age: 67
End: 2022-12-14
Payer: MEDICARE

## 2022-12-14 ENCOUNTER — TELEPHONE (OUTPATIENT)
Dept: HEMATOLOGY/ONCOLOGY | Facility: CLINIC | Age: 67
End: 2022-12-14
Payer: MEDICARE

## 2022-12-14 VITALS
HEART RATE: 78 BPM | TEMPERATURE: 98 F | WEIGHT: 198.44 LBS | DIASTOLIC BLOOD PRESSURE: 40 MMHG | BODY MASS INDEX: 36.52 KG/M2 | OXYGEN SATURATION: 96 % | SYSTOLIC BLOOD PRESSURE: 129 MMHG | HEIGHT: 62 IN

## 2022-12-14 DIAGNOSIS — Z51.81 ENCOUNTER FOR MONITORING CARDIOTOXIC DRUG THERAPY: ICD-10-CM

## 2022-12-14 DIAGNOSIS — Z17.0 MALIGNANT NEOPLASM OF LEFT BREAST IN FEMALE, ESTROGEN RECEPTOR POSITIVE, UNSPECIFIED SITE OF BREAST: ICD-10-CM

## 2022-12-14 DIAGNOSIS — B35.6 TINEA CRURIS: ICD-10-CM

## 2022-12-14 DIAGNOSIS — N18.9 CHRONIC KIDNEY DISEASE-MINERAL AND BONE DISORDER: ICD-10-CM

## 2022-12-14 DIAGNOSIS — N18.9 ANEMIA ASSOCIATED WITH CHRONIC RENAL FAILURE: ICD-10-CM

## 2022-12-14 DIAGNOSIS — Z79.899 IMMUNODEFICIENCY DUE TO CHEMOTHERAPY: ICD-10-CM

## 2022-12-14 DIAGNOSIS — E83.9 CHRONIC KIDNEY DISEASE-MINERAL AND BONE DISORDER: ICD-10-CM

## 2022-12-14 DIAGNOSIS — N18.6 ESRD (END STAGE RENAL DISEASE): ICD-10-CM

## 2022-12-14 DIAGNOSIS — D84.821 IMMUNODEFICIENCY DUE TO CHEMOTHERAPY: ICD-10-CM

## 2022-12-14 DIAGNOSIS — M89.9 CHRONIC KIDNEY DISEASE-MINERAL AND BONE DISORDER: ICD-10-CM

## 2022-12-14 DIAGNOSIS — C50.912 MALIGNANT NEOPLASM OF LEFT BREAST IN FEMALE, ESTROGEN RECEPTOR POSITIVE, UNSPECIFIED SITE OF BREAST: ICD-10-CM

## 2022-12-14 DIAGNOSIS — T45.1X5A IMMUNODEFICIENCY DUE TO CHEMOTHERAPY: ICD-10-CM

## 2022-12-14 DIAGNOSIS — Z79.899 ENCOUNTER FOR MONITORING CARDIOTOXIC DRUG THERAPY: ICD-10-CM

## 2022-12-14 DIAGNOSIS — N18.5 CKD (CHRONIC KIDNEY DISEASE) STAGE 5, GFR LESS THAN 15 ML/MIN: ICD-10-CM

## 2022-12-14 DIAGNOSIS — D63.1 ANEMIA ASSOCIATED WITH CHRONIC RENAL FAILURE: ICD-10-CM

## 2022-12-14 DIAGNOSIS — C50.412 MALIGNANT NEOPLASM OF UPPER-OUTER QUADRANT OF LEFT BREAST IN FEMALE, ESTROGEN RECEPTOR POSITIVE: Primary | ICD-10-CM

## 2022-12-14 DIAGNOSIS — Z17.0 MALIGNANT NEOPLASM OF UPPER-OUTER QUADRANT OF LEFT BREAST IN FEMALE, ESTROGEN RECEPTOR POSITIVE: Primary | ICD-10-CM

## 2022-12-14 PROBLEM — D59.2: Status: RESOLVED | Noted: 2022-11-09 | Resolved: 2022-12-14

## 2022-12-14 LAB
ALBUMIN SERPL BCP-MCNC: 2.9 G/DL (ref 3.5–5.2)
ALP SERPL-CCNC: 54 U/L (ref 55–135)
ALT SERPL W/O P-5'-P-CCNC: 9 U/L (ref 10–44)
ANION GAP SERPL CALC-SCNC: 13 MMOL/L (ref 8–16)
ANISOCYTOSIS BLD QL SMEAR: ABNORMAL
AST SERPL-CCNC: 17 U/L (ref 10–40)
BASOPHILS NFR BLD: 0 % (ref 0–1.9)
BILIRUB SERPL-MCNC: 0.2 MG/DL (ref 0.1–1)
BUN SERPL-MCNC: 35 MG/DL (ref 8–23)
CALCIUM SERPL-MCNC: 8.2 MG/DL (ref 8.7–10.5)
CHLORIDE SERPL-SCNC: 107 MMOL/L (ref 95–110)
CO2 SERPL-SCNC: 20 MMOL/L (ref 23–29)
CREAT SERPL-MCNC: 6.3 MG/DL (ref 0.5–1.4)
DACRYOCYTES BLD QL SMEAR: ABNORMAL
DIFFERENTIAL METHOD: ABNORMAL
EOSINOPHIL NFR BLD: 3 % (ref 0–8)
ERYTHROCYTE [DISTWIDTH] IN BLOOD BY AUTOMATED COUNT: 21.6 % (ref 11.5–14.5)
EST. GFR  (NO RACE VARIABLE): 7 ML/MIN/1.73 M^2
GLUCOSE SERPL-MCNC: 182 MG/DL (ref 70–110)
HCT VFR BLD AUTO: 26.7 % (ref 37–48.5)
HGB BLD-MCNC: 8.4 G/DL (ref 12–16)
HYPOCHROMIA BLD QL SMEAR: ABNORMAL
IMM GRANULOCYTES # BLD AUTO: ABNORMAL K/UL (ref 0–0.04)
IMM GRANULOCYTES NFR BLD AUTO: ABNORMAL % (ref 0–0.5)
LYMPHOCYTES NFR BLD: 21 % (ref 18–48)
MCH RBC QN AUTO: 30.4 PG (ref 27–31)
MCHC RBC AUTO-ENTMCNC: 31.5 G/DL (ref 32–36)
MCV RBC AUTO: 97 FL (ref 82–98)
METAMYELOCYTES NFR BLD MANUAL: 5 %
MONOCYTES NFR BLD: 8 % (ref 4–15)
MYELOCYTES NFR BLD MANUAL: 2 %
NEUTROPHILS NFR BLD: 61 % (ref 38–73)
NRBC BLD-RTO: 0 /100 WBC
PLATELET # BLD AUTO: 461 K/UL (ref 150–450)
PLATELET BLD QL SMEAR: ABNORMAL
PMV BLD AUTO: 10.9 FL (ref 9.2–12.9)
POIKILOCYTOSIS BLD QL SMEAR: SLIGHT
POLYCHROMASIA BLD QL SMEAR: ABNORMAL
POTASSIUM SERPL-SCNC: 3.8 MMOL/L (ref 3.5–5.1)
PROT SERPL-MCNC: 7.5 G/DL (ref 6–8.4)
RBC # BLD AUTO: 2.76 M/UL (ref 4–5.4)
SCHISTOCYTES BLD QL SMEAR: PRESENT
SODIUM SERPL-SCNC: 140 MMOL/L (ref 136–145)
WBC # BLD AUTO: 8.28 K/UL (ref 3.9–12.7)

## 2022-12-14 PROCEDURE — 99999 PR PBB SHADOW E&M-EST. PATIENT-LVL III: CPT | Mod: PBBFAC,,, | Performed by: INTERNAL MEDICINE

## 2022-12-14 PROCEDURE — 99215 PR OFFICE/OUTPT VISIT, EST, LEVL V, 40-54 MIN: ICD-10-PCS | Mod: S$GLB,,, | Performed by: INTERNAL MEDICINE

## 2022-12-14 PROCEDURE — 1157F PR ADVANCE CARE PLAN OR EQUIV PRESENT IN MEDICAL RECORD: ICD-10-PCS | Mod: CPTII,S$GLB,, | Performed by: INTERNAL MEDICINE

## 2022-12-14 PROCEDURE — 3008F PR BODY MASS INDEX (BMI) DOCUMENTED: ICD-10-PCS | Mod: CPTII,S$GLB,, | Performed by: INTERNAL MEDICINE

## 2022-12-14 PROCEDURE — 80053 COMPREHEN METABOLIC PANEL: CPT | Performed by: INTERNAL MEDICINE

## 2022-12-14 PROCEDURE — 99215 OFFICE O/P EST HI 40 MIN: CPT | Mod: S$GLB,,, | Performed by: INTERNAL MEDICINE

## 2022-12-14 PROCEDURE — 85007 BL SMEAR W/DIFF WBC COUNT: CPT | Performed by: INTERNAL MEDICINE

## 2022-12-14 PROCEDURE — 3078F DIAST BP <80 MM HG: CPT | Mod: CPTII,S$GLB,, | Performed by: INTERNAL MEDICINE

## 2022-12-14 PROCEDURE — 85027 COMPLETE CBC AUTOMATED: CPT | Performed by: INTERNAL MEDICINE

## 2022-12-14 PROCEDURE — 36415 COLL VENOUS BLD VENIPUNCTURE: CPT | Performed by: INTERNAL MEDICINE

## 2022-12-14 PROCEDURE — 4010F PR ACE/ARB THEARPY RXD/TAKEN: ICD-10-PCS | Mod: CPTII,S$GLB,, | Performed by: INTERNAL MEDICINE

## 2022-12-14 PROCEDURE — 99999 PR PBB SHADOW E&M-EST. PATIENT-LVL III: ICD-10-PCS | Mod: PBBFAC,,, | Performed by: INTERNAL MEDICINE

## 2022-12-14 PROCEDURE — 3008F BODY MASS INDEX DOCD: CPT | Mod: CPTII,S$GLB,, | Performed by: INTERNAL MEDICINE

## 2022-12-14 PROCEDURE — 1157F ADVNC CARE PLAN IN RCRD: CPT | Mod: CPTII,S$GLB,, | Performed by: INTERNAL MEDICINE

## 2022-12-14 PROCEDURE — 3078F PR MOST RECENT DIASTOLIC BLOOD PRESSURE < 80 MM HG: ICD-10-PCS | Mod: CPTII,S$GLB,, | Performed by: INTERNAL MEDICINE

## 2022-12-14 PROCEDURE — 1125F AMNT PAIN NOTED PAIN PRSNT: CPT | Mod: CPTII,S$GLB,, | Performed by: INTERNAL MEDICINE

## 2022-12-14 PROCEDURE — 1160F PR REVIEW ALL MEDS BY PRESCRIBER/CLIN PHARMACIST DOCUMENTED: ICD-10-PCS | Mod: CPTII,S$GLB,, | Performed by: INTERNAL MEDICINE

## 2022-12-14 PROCEDURE — 4010F ACE/ARB THERAPY RXD/TAKEN: CPT | Mod: CPTII,S$GLB,, | Performed by: INTERNAL MEDICINE

## 2022-12-14 PROCEDURE — 3074F SYST BP LT 130 MM HG: CPT | Mod: CPTII,S$GLB,, | Performed by: INTERNAL MEDICINE

## 2022-12-14 PROCEDURE — 3074F PR MOST RECENT SYSTOLIC BLOOD PRESSURE < 130 MM HG: ICD-10-PCS | Mod: CPTII,S$GLB,, | Performed by: INTERNAL MEDICINE

## 2022-12-14 PROCEDURE — 1125F PR PAIN SEVERITY QUANTIFIED, PAIN PRESENT: ICD-10-PCS | Mod: CPTII,S$GLB,, | Performed by: INTERNAL MEDICINE

## 2022-12-14 PROCEDURE — 1159F MED LIST DOCD IN RCRD: CPT | Mod: CPTII,S$GLB,, | Performed by: INTERNAL MEDICINE

## 2022-12-14 PROCEDURE — 1159F PR MEDICATION LIST DOCUMENTED IN MEDICAL RECORD: ICD-10-PCS | Mod: CPTII,S$GLB,, | Performed by: INTERNAL MEDICINE

## 2022-12-14 PROCEDURE — 1160F RVW MEDS BY RX/DR IN RCRD: CPT | Mod: CPTII,S$GLB,, | Performed by: INTERNAL MEDICINE

## 2022-12-14 RX ORDER — HEPARIN 100 UNIT/ML
500 SYRINGE INTRAVENOUS
Status: CANCELLED | OUTPATIENT
Start: 2022-12-21

## 2022-12-14 RX ORDER — SODIUM CHLORIDE 0.9 % (FLUSH) 0.9 %
10 SYRINGE (ML) INJECTION
Status: CANCELLED | OUTPATIENT
Start: 2022-12-14

## 2022-12-14 RX ORDER — FAMOTIDINE 10 MG/ML
20 INJECTION INTRAVENOUS
Status: CANCELLED | OUTPATIENT
Start: 2022-12-21

## 2022-12-14 RX ORDER — FLUCONAZOLE 100 MG/1
100 TABLET ORAL DAILY
Qty: 30 TABLET | Refills: 0 | Status: SHIPPED | OUTPATIENT
Start: 2022-12-14 | End: 2023-01-13

## 2022-12-14 RX ORDER — HEPARIN 100 UNIT/ML
500 SYRINGE INTRAVENOUS
Status: CANCELLED | OUTPATIENT
Start: 2022-12-28

## 2022-12-14 RX ORDER — HEPARIN 100 UNIT/ML
500 SYRINGE INTRAVENOUS
Status: CANCELLED | OUTPATIENT
Start: 2022-12-14

## 2022-12-14 RX ORDER — EPINEPHRINE 0.3 MG/.3ML
0.3 INJECTION SUBCUTANEOUS ONCE AS NEEDED
Status: CANCELLED | OUTPATIENT
Start: 2022-12-28

## 2022-12-14 RX ORDER — ONDANSETRON 2 MG/ML
4 INJECTION INTRAMUSCULAR; INTRAVENOUS ONCE
Status: CANCELLED
Start: 2022-12-28 | End: 2022-12-28

## 2022-12-14 RX ORDER — SODIUM CHLORIDE 0.9 % (FLUSH) 0.9 %
10 SYRINGE (ML) INJECTION
Status: CANCELLED | OUTPATIENT
Start: 2022-12-21

## 2022-12-14 RX ORDER — EPINEPHRINE 0.3 MG/.3ML
0.3 INJECTION SUBCUTANEOUS ONCE AS NEEDED
Status: CANCELLED | OUTPATIENT
Start: 2022-12-21

## 2022-12-14 RX ORDER — ONDANSETRON 2 MG/ML
4 INJECTION INTRAMUSCULAR; INTRAVENOUS ONCE
Status: CANCELLED
Start: 2022-12-21 | End: 2022-12-21

## 2022-12-14 RX ORDER — EPINEPHRINE 0.3 MG/.3ML
0.3 INJECTION SUBCUTANEOUS ONCE AS NEEDED
Status: CANCELLED | OUTPATIENT
Start: 2022-12-14

## 2022-12-14 RX ORDER — FAMOTIDINE 10 MG/ML
20 INJECTION INTRAVENOUS
Status: CANCELLED | OUTPATIENT
Start: 2022-12-28

## 2022-12-14 RX ORDER — DIPHENHYDRAMINE HYDROCHLORIDE 50 MG/ML
50 INJECTION INTRAMUSCULAR; INTRAVENOUS ONCE AS NEEDED
Status: CANCELLED | OUTPATIENT
Start: 2022-12-21

## 2022-12-14 RX ORDER — SODIUM CHLORIDE 0.9 % (FLUSH) 0.9 %
10 SYRINGE (ML) INJECTION
Status: CANCELLED | OUTPATIENT
Start: 2022-12-28

## 2022-12-14 RX ORDER — ONDANSETRON 2 MG/ML
4 INJECTION INTRAMUSCULAR; INTRAVENOUS ONCE
Status: CANCELLED
Start: 2022-12-14 | End: 2022-12-14

## 2022-12-14 RX ORDER — DIPHENHYDRAMINE HYDROCHLORIDE 50 MG/ML
50 INJECTION INTRAMUSCULAR; INTRAVENOUS ONCE AS NEEDED
Status: CANCELLED | OUTPATIENT
Start: 2022-12-28

## 2022-12-14 RX ORDER — FAMOTIDINE 10 MG/ML
20 INJECTION INTRAVENOUS
Status: CANCELLED | OUTPATIENT
Start: 2022-12-14

## 2022-12-14 RX ORDER — DIPHENHYDRAMINE HYDROCHLORIDE 50 MG/ML
50 INJECTION INTRAMUSCULAR; INTRAVENOUS ONCE AS NEEDED
Status: CANCELLED | OUTPATIENT
Start: 2022-12-14

## 2022-12-14 NOTE — TELEPHONE ENCOUNTER
----- Message from Paulette Ying sent at 12/14/2022 10:28 AM CST -----  Contact: [phone] ext [phone]  Zoe with [employer] would like to consult with a nurse in regards to the patient current chemo authorizations. Please call back at [phone] ext [phone]. Thanks evangelina

## 2022-12-14 NOTE — PROGRESS NOTES
Subjective:       Patient ID: Malaika Paredes is a 67 y.o. female.    Chief Complaint: Results, Chemotherapy, and Breast Cancer    HPI:  67-year-old female completing neoadjuvant chemotherapy with Herceptin Perjeta for locally advanced HER2 positive breast carcinoma the patient is complaining of grade 2 peripheral neuropathy I will discontinue her last 2 doses Taxol continue with Herceptin and Perjeta.  ECOG status 2    Past Medical History:   Diagnosis Date    Cataract     Cataract     CHF (congestive heart failure)     COPD (chronic obstructive pulmonary disease)     Diabetes mellitus     Diabetic retinopathy     Hypertension     Malignant neoplasm of upper-outer quadrant of left breast in female, estrogen receptor positive 6/20/2022     Family History   Problem Relation Age of Onset    Hypertension Mother     Cancer Father     Breast cancer Sister     Diabetes Sister     Cancer Brother     Amblyopia Neg Hx     Blindness Neg Hx     Cataracts Neg Hx     Glaucoma Neg Hx     Macular degeneration Neg Hx     Retinal detachment Neg Hx     Strabismus Neg Hx     Stroke Neg Hx     Thyroid disease Neg Hx      Social History     Socioeconomic History    Marital status:    Tobacco Use    Smoking status: Former     Types: Cigarettes     Quit date: 6/21/2012     Years since quitting: 10.4    Smokeless tobacco: Never   Substance and Sexual Activity    Alcohol use: No    Drug use: Never     Past Surgical History:   Procedure Laterality Date    BREAST BIOPSY Right     benign    CATARACT EXTRACTION W/  INTRAOCULAR LENS IMPLANT Right 08/14/2017    Dr. Moe    CORONARY ARTERY BYPASS GRAFT      FLUOROSCOPY N/A 7/25/2022    Procedure: FLUOROSCOPY/mediport placement;  Surgeon: Cole Perdomo MD;  Location: [facility] CATH LAB;  Service: General;  Laterality: N/A;       Labs:  Lab Results   Component Value Date    WBC 8.28 12/14/2022    HGB 8.4 (L) 12/14/2022    HCT 26.7 (L) 12/14/2022    MCV 97 12/14/2022     (H) 12/14/2022      BMP  Lab Results   Component Value Date     12/14/2022    K 3.8 12/14/2022     12/14/2022    CO2 20 (L) 12/14/2022    BUN 35 (H) 12/14/2022    CREATININE 6.3 (H) 12/14/2022    CALCIUM 8.2 (L) 12/14/2022    ANIONGAP 13 12/14/2022    ESTGFRAFRICA 8 (A) 07/27/2022    EGFRNONAA 7 (A) 07/27/2022     Lab Results   Component Value Date    ALT 9 (L) 12/14/2022    AST 17 12/14/2022    ALKPHOS 54 (L) 12/14/2022    BILITOT 0.2 12/14/2022       Lab Results   Component Value Date    IRON 43 09/09/2018    TIBC 300 09/09/2018    FERRITIN 1,038 (H) 06/21/2022     Lab Results   Component Value Date    ZXHQQCGY77 459 09/09/2018     Lab Results   Component Value Date    FOLATE 6.2 09/09/2018     Lab Results   Component Value Date    TSH 2.815 10/05/2022         Review of Systems   Constitutional:  Positive for fatigue. Negative for activity change, appetite change, chills, diaphoresis, fever and unexpected weight change.   HENT:  Negative for congestion, dental problem, drooling, ear discharge, ear pain, facial swelling, hearing loss, mouth sores, nosebleeds, postnasal drip, rhinorrhea, sinus pressure, sneezing, sore throat, tinnitus, trouble swallowing and voice change.    Eyes:  Negative for photophobia, pain, discharge, redness, itching and visual disturbance.   Respiratory:  Negative for cough, choking, chest tightness, shortness of breath, wheezing and stridor.    Cardiovascular:  Negative for chest pain, palpitations and leg swelling.   Gastrointestinal:  Negative for abdominal distention, abdominal pain, anal bleeding, blood in stool, constipation, diarrhea, nausea, rectal pain and vomiting.   Endocrine: Negative for cold intolerance, heat intolerance, polydipsia, polyphagia and polyuria.   Genitourinary:  Negative for decreased urine volume, difficulty urinating, dyspareunia, dysuria, enuresis, flank pain, frequency, genital sores, hematuria, menstrual problem, pelvic pain, urgency, vaginal bleeding, vaginal  discharge and vaginal pain.   Musculoskeletal:  Positive for arthralgias, gait problem and myalgias. Negative for back pain, joint swelling, neck pain and neck stiffness.   Skin:  Negative for color change, pallor and rash.   Allergic/Immunologic: Negative for environmental allergies, food allergies and immunocompromised state.   Neurological:  Positive for weakness and numbness. Negative for dizziness, tremors, seizures, syncope, facial asymmetry, speech difficulty, light-headedness and headaches.   Hematological:  Negative for adenopathy. Does not bruise/bleed easily.   Psychiatric/Behavioral:  Positive for dysphoric mood. Negative for agitation, behavioral problems, confusion, decreased concentration, hallucinations, self-injury, sleep disturbance and suicidal ideas. The patient is nervous/anxious. The patient is not hyperactive.      Objective:      Physical Exam  Vitals reviewed.   Constitutional:       General: She is not in acute distress.     Appearance: She is well-developed. She is obese. She is ill-appearing. She is not diaphoretic.   HENT:      Head: Normocephalic and atraumatic.      Right Ear: External ear normal.      Left Ear: External ear normal.      Nose: Nose normal.      Right Sinus: No maxillary sinus tenderness or frontal sinus tenderness.      Left Sinus: No maxillary sinus tenderness or frontal sinus tenderness.      Mouth/Throat:      Pharynx: No oropharyngeal exudate.   Eyes:      General: Lids are normal. No scleral icterus.        Right eye: No discharge.         Left eye: No discharge.      Conjunctiva/sclera: Conjunctivae normal.      Right eye: Right conjunctiva is not injected. No hemorrhage.     Left eye: Left conjunctiva is not injected. No hemorrhage.     Pupils: Pupils are equal, round, and reactive to light.   Neck:      Thyroid: No thyromegaly.      Vascular: No JVD.      Trachea: No tracheal deviation.   Cardiovascular:      Rate and Rhythm: Normal rate.   Pulmonary:       Effort: Pulmonary effort is normal. No respiratory distress.      Breath sounds: No stridor.   Chest:      Chest wall: No tenderness.   Abdominal:      General: Bowel sounds are normal. There is no distension.      Palpations: Abdomen is soft. There is no hepatomegaly, splenomegaly or mass.      Tenderness: There is no abdominal tenderness. There is no rebound.   Musculoskeletal:         General: No tenderness. Normal range of motion.      Cervical back: Normal range of motion and neck supple.   Lymphadenopathy:      Cervical: No cervical adenopathy.      Upper Body:      Right upper body: No supraclavicular adenopathy.      Left upper body: No supraclavicular adenopathy.   Skin:     General: Skin is dry.      Findings: No erythema or rash.   Neurological:      Mental Status: She is alert and oriented to person, place, and time.      Cranial Nerves: No cranial nerve deficit.      Motor: Weakness present.      Coordination: Coordination abnormal.      Gait: Gait abnormal.   Psychiatric:         Behavior: Behavior normal.         Thought Content: Thought content normal.         Judgment: Judgment normal.           Assessment:      1. Malignant neoplasm of upper-outer quadrant of left breast in female, estrogen receptor positive    2. Tinea cruris    3. Anemia associated with chronic renal failure    4. Immunodeficiency due to chemotherapy    5. CKD (chronic kidney disease) stage 5, GFR less than 15 ml/min    6. Chronic kidney disease-mineral and bone disorder    7. ESRD (end stage renal disease)           Plan:     Have discontinued the final cycle of Taxol therapy because of peripheral neuropathy.  At this time would recommend that patient be seen back by surgery for recommendations in terms of definitive surgical procedure after completion of neoadjuvant therapy will like to try to continue to complete 1 year of Herceptin and Perjeta from start to finish in adjuvant therapy.  Discussed implications of answered  questions with patient.  She does have diogenes sidhu in her groin prescription for Diflucan 100 mg p.o. q.day for 30 days given.  Discussed implications with her and answered questions will ask our navigation team to coordinate.  With surgery and will also ask pharmacy to proceed with reconstruction of Perjeta and Herceptin for 1 year of therapy from initiation treatment total time 40 minutes RTC 3 weeks after Herceptin Perjeta today continue to perform 2D echocardiogram Q 12 weeks.  With standing labs CBC CMP total time 40 minutes        Toney العلي Jr, MD FACP

## 2022-12-14 NOTE — TELEPHONE ENCOUNTER
Called mobile number listed in chart spoke with Ember to make her aware treatment is scheduled for tomorrow at 1400.

## 2022-12-14 NOTE — TELEPHONE ENCOUNTER
Returned call and spoke with Urban. She is requesting last office note and chemo tx plan to be faxed to [phone] for treatment approval. Informed her that information will be sent. She verbalized understanding and had no other issues at this time.

## 2022-12-15 ENCOUNTER — INFUSION (OUTPATIENT)
Dept: INFUSION THERAPY | Facility: HOSPITAL | Age: 67
End: 2022-12-15
Attending: INTERNAL MEDICINE
Payer: MEDICARE

## 2022-12-15 ENCOUNTER — TELEPHONE (OUTPATIENT)
Dept: SURGERY | Facility: CLINIC | Age: 67
End: 2022-12-15
Payer: MEDICARE

## 2022-12-15 VITALS
HEART RATE: 74 BPM | DIASTOLIC BLOOD PRESSURE: 63 MMHG | RESPIRATION RATE: 18 BRPM | TEMPERATURE: 97 F | SYSTOLIC BLOOD PRESSURE: 141 MMHG | OXYGEN SATURATION: 96 %

## 2022-12-15 DIAGNOSIS — Z17.0 MALIGNANT NEOPLASM OF UPPER-OUTER QUADRANT OF LEFT BREAST IN FEMALE, ESTROGEN RECEPTOR POSITIVE: Primary | ICD-10-CM

## 2022-12-15 DIAGNOSIS — C50.412 MALIGNANT NEOPLASM OF UPPER-OUTER QUADRANT OF LEFT BREAST IN FEMALE, ESTROGEN RECEPTOR POSITIVE: Primary | ICD-10-CM

## 2022-12-15 PROCEDURE — 63600175 PHARM REV CODE 636 W HCPCS: Performed by: INTERNAL MEDICINE

## 2022-12-15 PROCEDURE — 25000003 PHARM REV CODE 250: Performed by: INTERNAL MEDICINE

## 2022-12-15 PROCEDURE — 96417 CHEMO IV INFUS EACH ADDL SEQ: CPT

## 2022-12-15 PROCEDURE — 96375 TX/PRO/DX INJ NEW DRUG ADDON: CPT

## 2022-12-15 PROCEDURE — 96413 CHEMO IV INFUSION 1 HR: CPT

## 2022-12-15 RX ORDER — ONDANSETRON 2 MG/ML
4 INJECTION INTRAMUSCULAR; INTRAVENOUS ONCE
Status: COMPLETED | OUTPATIENT
Start: 2022-12-15 | End: 2022-12-15

## 2022-12-15 RX ORDER — FAMOTIDINE 10 MG/ML
20 INJECTION INTRAVENOUS
Status: DISCONTINUED | OUTPATIENT
Start: 2022-12-15 | End: 2022-12-15 | Stop reason: HOSPADM

## 2022-12-15 RX ORDER — HEPARIN 100 UNIT/ML
500 SYRINGE INTRAVENOUS
Status: DISCONTINUED | OUTPATIENT
Start: 2022-12-15 | End: 2022-12-15 | Stop reason: HOSPADM

## 2022-12-15 RX ADMIN — PERTUZUMAB 420 MG: 30 INJECTION, SOLUTION, CONCENTRATE INTRAVENOUS at 02:12

## 2022-12-15 RX ADMIN — TRASTUZUMAB-ANNS 554 MG: 150 INJECTION, POWDER, LYOPHILIZED, FOR SOLUTION INTRAVENOUS at 03:12

## 2022-12-15 RX ADMIN — HEPARIN 500 UNITS: 100 SYRINGE at 04:12

## 2022-12-15 RX ADMIN — ONDANSETRON 4 MG: 2 INJECTION INTRAMUSCULAR; INTRAVENOUS at 01:12

## 2022-12-15 NOTE — PLAN OF CARE
Problem: Adult Inpatient Plan of Care  Goal: Plan of Care Review  Outcome: Ongoing, Progressing  Goal: Patient-Specific Goal (Individualized)  Outcome: Ongoing, Progressing  Goal: Optimal Comfort and Wellbeing  Outcome: Ongoing, Progressing  Intervention: Provide Person-Centered Care  Flowsheets (Taken 12/15/2022 1606)  Trust Relationship/Rapport:   care explained   reassurance provided   choices provided   thoughts/feelings acknowledged   emotional support provided   empathic listening provided   questions answered   questions encouraged

## 2022-12-15 NOTE — TELEPHONE ENCOUNTER
----- Message from Jayjay Arana MD sent at 12/14/2022  4:02 PM CST -----  Please schedule patient to come for appointment to discuss breast cancer  ----- Message -----  From: Toney العلي MD  Sent: 12/14/2022   3:23 PM CST  To: Jayjay Arana MD, [facility] Navigation, #    Have discontinued the final cycle of Taxol therapy because of peripheral neuropathy.  At this time would recommend that patient be seen back by surgery for recommendations in terms of definitive surgical procedure after completion of neoadjuvant therapy will like to try to continue to complete 1 year of Herceptin and Perjeta from start to finish in adjuvant therapy.  Discussed implications of answered questions with patient.  She does have diogenes sidhu in her groin prescription for Diflucan 100 mg p.o. q.day for 30 days given.  Discussed implications with her and answered questions will ask our navigation team to coordinate.  With surgery and will also ask pharmacy to proceed with reconstruction of Perjeta and Herceptin for 1 year of therapy from initiation treatment total time 40 minutes RTC 3 weeks after Herceptin Perjeta today continue to perform 2D echocardiogram Q 12 weeks.  With standing labs CBC CMP

## 2022-12-15 NOTE — TELEPHONE ENCOUNTER
Called patient and scheduled follow up with Dr Arana. Patient is aware of date, time and location.

## 2022-12-30 ENCOUNTER — TELEPHONE (OUTPATIENT)
Dept: SURGERY | Facility: CLINIC | Age: 67
End: 2022-12-30
Payer: MEDICARE

## 2022-12-30 NOTE — TELEPHONE ENCOUNTER
Returned phone call to patient's niece Radhika. She mentioned that she accidentally cancelled patient's 1/9 appt with Dr Arana. Rescheduled appt as previously done, and rescheduled echo as requested at the [place] office. Instructions given for echo procedure. Voiced understanding.

## 2023-01-04 ENCOUNTER — HOSPITAL ENCOUNTER (OUTPATIENT)
Dept: CARDIOLOGY | Facility: HOSPITAL | Age: 68
Discharge: HOME | End: 2023-01-04
Attending: INTERNAL MEDICINE
Payer: MEDICARE

## 2023-01-04 VITALS
SYSTOLIC BLOOD PRESSURE: 141 MMHG | WEIGHT: 198 LBS | BODY MASS INDEX: 36.44 KG/M2 | HEIGHT: 62 IN | DIASTOLIC BLOOD PRESSURE: 63 MMHG

## 2023-01-04 DIAGNOSIS — Z79.899 ENCOUNTER FOR MONITORING CARDIOTOXIC DRUG THERAPY: ICD-10-CM

## 2023-01-04 DIAGNOSIS — Z51.81 ENCOUNTER FOR MONITORING CARDIOTOXIC DRUG THERAPY: ICD-10-CM

## 2023-01-04 LAB
AORTIC ROOT ANNULUS: 2.69 CM
ASCENDING AORTA: 2.97 CM
AV INDEX (PROSTH): 0.7
AV MEAN GRADIENT: 5 MMHG
AV PEAK GRADIENT: 9 MMHG
AV VALVE AREA: 1.98 CM2
AV VELOCITY RATIO: 0.73
BSA FOR ECHO PROCEDURE: 1.98 M2
CV ECHO LV RWT: 0.68 CM
DOP CALC AO PEAK VEL: 1.53 M/S
DOP CALC AO VTI: 36.6 CM
DOP CALC LVOT AREA: 2.8 CM2
DOP CALC LVOT DIAMETER: 1.9 CM
DOP CALC LVOT PEAK VEL: 1.11 M/S
DOP CALC LVOT STROKE VOLUME: 72.55 CM3
DOP CALC RVOT PEAK VEL: 0.94 M/S
DOP CALC RVOT VTI: 20.5 CM
DOP CALCLVOT PEAK VEL VTI: 25.6 CM
E WAVE DECELERATION TIME: 259.22 MSEC
E/A RATIO: 0.77
E/E' RATIO: 10.24 M/S
ECHO LV POSTERIOR WALL: 1.3 CM (ref 0.6–1.1)
EJECTION FRACTION: 60 %
FRACTIONAL SHORTENING: 41 % (ref 28–44)
INTERVENTRICULAR SEPTUM: 1.17 CM (ref 0.6–1.1)
IVC DIAMETER: 1.45 CM
IVRT: 85.63 MSEC
LA MAJOR: 4.9 CM
LA MINOR: 4.94 CM
LA WIDTH: 4.2 CM
LEFT ATRIUM SIZE: 4.13 CM
LEFT ATRIUM VOLUME INDEX MOD: 27.9 ML/M2
LEFT ATRIUM VOLUME INDEX: 38.2 ML/M2
LEFT ATRIUM VOLUME MOD: 52.96 CM3
LEFT ATRIUM VOLUME: 72.54 CM3
LEFT INTERNAL DIMENSION IN SYSTOLE: 2.26 CM (ref 2.1–4)
LEFT VENTRICLE DIASTOLIC VOLUME INDEX: 24.6 ML/M2
LEFT VENTRICLE DIASTOLIC VOLUME: 46.74 ML
LEFT VENTRICLE MASS INDEX: 84 G/M2
LEFT VENTRICLE SYSTOLIC VOLUME INDEX: 9.8 ML/M2
LEFT VENTRICLE SYSTOLIC VOLUME: 18.67 ML
LEFT VENTRICULAR INTERNAL DIMENSION IN DIASTOLE: 3.8 CM (ref 3.5–6)
LEFT VENTRICULAR MASS: 160.03 G
LV LATERAL E/E' RATIO: 8.7 M/S
LV SEPTAL E/E' RATIO: 12.43 M/S
LVOT MG: 2.73 MMHG
LVOT MV: 0.78 CM/S
MV PEAK A VEL: 1.13 M/S
MV PEAK E VEL: 0.87 M/S
MV STENOSIS PRESSURE HALF TIME: 75.17 MS
MV VALVE AREA P 1/2 METHOD: 2.93 CM2
PISA TR MAX VEL: 3.27 M/S
PV MEAN GRADIENT: 1.84 MMHG
PV PEAK VELOCITY: 1.32 CM/S
RA MAJOR: 4.36 CM
RA PRESSURE: 3 MMHG
RA WIDTH: 3.92 CM
RIGHT VENTRICULAR END-DIASTOLIC DIMENSION: 2.87 CM
SINUS: 2.84 CM
STJ: 2.49 CM
TDI LATERAL: 0.1 M/S
TDI SEPTAL: 0.07 M/S
TDI: 0.09 M/S
TR MAX PG: 43 MMHG
TRICUSPID ANNULAR PLANE SYSTOLIC EXCURSION: 1.82 CM
TV REST PULMONARY ARTERY PRESSURE: 46 MMHG

## 2023-01-04 PROCEDURE — 93306 TTE W/DOPPLER COMPLETE: CPT | Mod: 26,,, | Performed by: INTERNAL MEDICINE

## 2023-01-04 PROCEDURE — 93306 ECHO (CUPID ONLY): ICD-10-PCS | Mod: 26,,, | Performed by: INTERNAL MEDICINE

## 2023-01-04 PROCEDURE — 93306 TTE W/DOPPLER COMPLETE: CPT

## 2023-01-09 ENCOUNTER — INFUSION (OUTPATIENT)
Dept: INFUSION THERAPY | Facility: HOSPITAL | Age: 68
End: 2023-01-09
Attending: INTERNAL MEDICINE
Payer: MEDICARE

## 2023-01-09 ENCOUNTER — OFFICE VISIT (OUTPATIENT)
Dept: SURGERY | Facility: CLINIC | Age: 68
End: 2023-01-09
Payer: MEDICARE

## 2023-01-09 ENCOUNTER — OFFICE VISIT (OUTPATIENT)
Dept: HEMATOLOGY/ONCOLOGY | Facility: CLINIC | Age: 68
End: 2023-01-09
Payer: MEDICARE

## 2023-01-09 VITALS
DIASTOLIC BLOOD PRESSURE: 68 MMHG | WEIGHT: 193.31 LBS | TEMPERATURE: 98 F | HEIGHT: 62 IN | SYSTOLIC BLOOD PRESSURE: 133 MMHG | HEART RATE: 80 BPM | BODY MASS INDEX: 35.57 KG/M2

## 2023-01-09 VITALS
DIASTOLIC BLOOD PRESSURE: 68 MMHG | HEART RATE: 82 BPM | SYSTOLIC BLOOD PRESSURE: 158 MMHG | RESPIRATION RATE: 16 BRPM | OXYGEN SATURATION: 99 % | TEMPERATURE: 97 F

## 2023-01-09 VITALS
SYSTOLIC BLOOD PRESSURE: 158 MMHG | HEIGHT: 62 IN | TEMPERATURE: 98 F | HEART RATE: 80 BPM | OXYGEN SATURATION: 98 % | WEIGHT: 194 LBS | BODY MASS INDEX: 35.7 KG/M2 | DIASTOLIC BLOOD PRESSURE: 83 MMHG

## 2023-01-09 DIAGNOSIS — N18.6 TYPE 2 DIABETES MELLITUS WITH CHRONIC KIDNEY DISEASE ON CHRONIC DIALYSIS, WITH LONG-TERM CURRENT USE OF INSULIN: ICD-10-CM

## 2023-01-09 DIAGNOSIS — Z17.0 MALIGNANT NEOPLASM OF LEFT BREAST IN FEMALE, ESTROGEN RECEPTOR POSITIVE, UNSPECIFIED SITE OF BREAST: Primary | ICD-10-CM

## 2023-01-09 DIAGNOSIS — E11.51 TYPE 2 DIABETES MELLITUS WITH DIABETIC PERIPHERAL ANGIOPATHY WITHOUT GANGRENE, WITH LONG-TERM CURRENT USE OF INSULIN: ICD-10-CM

## 2023-01-09 DIAGNOSIS — D68.69 OTHER THROMBOPHILIA: ICD-10-CM

## 2023-01-09 DIAGNOSIS — C50.912 MALIGNANT NEOPLASM OF LEFT BREAST IN FEMALE, ESTROGEN RECEPTOR POSITIVE, UNSPECIFIED SITE OF BREAST: Primary | ICD-10-CM

## 2023-01-09 DIAGNOSIS — T45.1X5A IMMUNODEFICIENCY DUE TO CHEMOTHERAPY: ICD-10-CM

## 2023-01-09 DIAGNOSIS — Z79.899 IMMUNODEFICIENCY DUE TO CHEMOTHERAPY: ICD-10-CM

## 2023-01-09 DIAGNOSIS — C50.412 MALIGNANT NEOPLASM OF UPPER-OUTER QUADRANT OF LEFT BREAST IN FEMALE, ESTROGEN RECEPTOR POSITIVE: Primary | ICD-10-CM

## 2023-01-09 DIAGNOSIS — D84.821 IMMUNODEFICIENCY DUE TO CHEMOTHERAPY: ICD-10-CM

## 2023-01-09 DIAGNOSIS — Z17.0 MALIGNANT NEOPLASM OF UPPER-OUTER QUADRANT OF LEFT BREAST IN FEMALE, ESTROGEN RECEPTOR POSITIVE: Primary | ICD-10-CM

## 2023-01-09 DIAGNOSIS — E11.22 TYPE 2 DIABETES MELLITUS WITH CHRONIC KIDNEY DISEASE ON CHRONIC DIALYSIS, WITH LONG-TERM CURRENT USE OF INSULIN: ICD-10-CM

## 2023-01-09 DIAGNOSIS — E21.3 HYPERPARATHYROIDISM, UNSPECIFIED: ICD-10-CM

## 2023-01-09 DIAGNOSIS — Z79.4 TYPE 2 DIABETES MELLITUS WITH CHRONIC KIDNEY DISEASE ON CHRONIC DIALYSIS, WITH LONG-TERM CURRENT USE OF INSULIN: ICD-10-CM

## 2023-01-09 DIAGNOSIS — N18.6 ESRD (END STAGE RENAL DISEASE): ICD-10-CM

## 2023-01-09 DIAGNOSIS — E66.01 SEVERE OBESITY (BMI 35.0-39.9) WITH COMORBIDITY: ICD-10-CM

## 2023-01-09 DIAGNOSIS — I48.11 LONGSTANDING PERSISTENT ATRIAL FIBRILLATION: ICD-10-CM

## 2023-01-09 DIAGNOSIS — Z79.4 TYPE 2 DIABETES MELLITUS WITH DIABETIC PERIPHERAL ANGIOPATHY WITHOUT GANGRENE, WITH LONG-TERM CURRENT USE OF INSULIN: ICD-10-CM

## 2023-01-09 DIAGNOSIS — N18.9 ANEMIA ASSOCIATED WITH CHRONIC RENAL FAILURE: ICD-10-CM

## 2023-01-09 DIAGNOSIS — D63.1 ANEMIA ASSOCIATED WITH CHRONIC RENAL FAILURE: ICD-10-CM

## 2023-01-09 DIAGNOSIS — Z99.2 TYPE 2 DIABETES MELLITUS WITH CHRONIC KIDNEY DISEASE ON CHRONIC DIALYSIS, WITH LONG-TERM CURRENT USE OF INSULIN: ICD-10-CM

## 2023-01-09 DIAGNOSIS — J43.1 PANLOBULAR EMPHYSEMA: ICD-10-CM

## 2023-01-09 PROCEDURE — 99999 PR PBB SHADOW E&M-EST. PATIENT-LVL III: CPT | Mod: PBBFAC,,, | Performed by: SURGERY

## 2023-01-09 PROCEDURE — 99215 OFFICE O/P EST HI 40 MIN: CPT | Mod: 25,S$GLB,, | Performed by: INTERNAL MEDICINE

## 2023-01-09 PROCEDURE — 3288F FALL RISK ASSESSMENT DOCD: CPT | Mod: CPTII,S$GLB,, | Performed by: SURGERY

## 2023-01-09 PROCEDURE — 4010F ACE/ARB THERAPY RXD/TAKEN: CPT | Mod: CPTII,S$GLB,, | Performed by: SURGERY

## 2023-01-09 PROCEDURE — 3077F PR MOST RECENT SYSTOLIC BLOOD PRESSURE >= 140 MM HG: ICD-10-PCS | Mod: CPTII,S$GLB,, | Performed by: INTERNAL MEDICINE

## 2023-01-09 PROCEDURE — 99999 PR PBB SHADOW E&M-EST. PATIENT-LVL III: ICD-10-PCS | Mod: PBBFAC,,, | Performed by: SURGERY

## 2023-01-09 PROCEDURE — 1157F PR ADVANCE CARE PLAN OR EQUIV PRESENT IN MEDICAL RECORD: ICD-10-PCS | Mod: CPTII,S$GLB,, | Performed by: INTERNAL MEDICINE

## 2023-01-09 PROCEDURE — 1101F PR PT FALLS ASSESS DOC 0-1 FALLS W/OUT INJ PAST YR: ICD-10-PCS | Mod: CPTII,S$GLB,, | Performed by: INTERNAL MEDICINE

## 2023-01-09 PROCEDURE — 63600175 PHARM REV CODE 636 W HCPCS: Mod: TB | Performed by: INTERNAL MEDICINE

## 2023-01-09 PROCEDURE — 3075F PR MOST RECENT SYSTOLIC BLOOD PRESS GE 130-139MM HG: ICD-10-PCS | Mod: CPTII,S$GLB,, | Performed by: SURGERY

## 2023-01-09 PROCEDURE — 4010F ACE/ARB THERAPY RXD/TAKEN: CPT | Mod: CPTII,S$GLB,, | Performed by: INTERNAL MEDICINE

## 2023-01-09 PROCEDURE — 4010F PR ACE/ARB THEARPY RXD/TAKEN: ICD-10-PCS | Mod: CPTII,S$GLB,, | Performed by: SURGERY

## 2023-01-09 PROCEDURE — 1157F ADVNC CARE PLAN IN RCRD: CPT | Mod: CPTII,S$GLB,, | Performed by: INTERNAL MEDICINE

## 2023-01-09 PROCEDURE — 1157F ADVNC CARE PLAN IN RCRD: CPT | Mod: CPTII,S$GLB,, | Performed by: SURGERY

## 2023-01-09 PROCEDURE — 99215 PR OFFICE/OUTPT VISIT, EST, LEVL V, 40-54 MIN: ICD-10-PCS | Mod: 25,S$GLB,, | Performed by: INTERNAL MEDICINE

## 2023-01-09 PROCEDURE — 1101F PT FALLS ASSESS-DOCD LE1/YR: CPT | Mod: CPTII,S$GLB,, | Performed by: INTERNAL MEDICINE

## 2023-01-09 PROCEDURE — 3288F PR FALLS RISK ASSESSMENT DOCUMENTED: ICD-10-PCS | Mod: CPTII,S$GLB,, | Performed by: SURGERY

## 2023-01-09 PROCEDURE — 99999 PR PBB SHADOW E&M-EST. PATIENT-LVL III: CPT | Mod: PBBFAC,,, | Performed by: INTERNAL MEDICINE

## 2023-01-09 PROCEDURE — 3008F BODY MASS INDEX DOCD: CPT | Mod: CPTII,S$GLB,, | Performed by: SURGERY

## 2023-01-09 PROCEDURE — 1126F PR PAIN SEVERITY QUANTIFIED, NO PAIN PRESENT: ICD-10-PCS | Mod: CPTII,S$GLB,, | Performed by: SURGERY

## 2023-01-09 PROCEDURE — 3008F PR BODY MASS INDEX (BMI) DOCUMENTED: ICD-10-PCS | Mod: CPTII,S$GLB,, | Performed by: INTERNAL MEDICINE

## 2023-01-09 PROCEDURE — 1101F PT FALLS ASSESS-DOCD LE1/YR: CPT | Mod: CPTII,S$GLB,, | Performed by: SURGERY

## 2023-01-09 PROCEDURE — 1126F AMNT PAIN NOTED NONE PRSNT: CPT | Mod: CPTII,S$GLB,, | Performed by: INTERNAL MEDICINE

## 2023-01-09 PROCEDURE — 96401 CHEMO ANTI-NEOPL SQ/IM: CPT

## 2023-01-09 PROCEDURE — 3008F PR BODY MASS INDEX (BMI) DOCUMENTED: ICD-10-PCS | Mod: CPTII,S$GLB,, | Performed by: SURGERY

## 2023-01-09 PROCEDURE — 3075F SYST BP GE 130 - 139MM HG: CPT | Mod: CPTII,S$GLB,, | Performed by: SURGERY

## 2023-01-09 PROCEDURE — 1157F PR ADVANCE CARE PLAN OR EQUIV PRESENT IN MEDICAL RECORD: ICD-10-PCS | Mod: CPTII,S$GLB,, | Performed by: SURGERY

## 2023-01-09 PROCEDURE — 3078F DIAST BP <80 MM HG: CPT | Mod: CPTII,S$GLB,, | Performed by: SURGERY

## 2023-01-09 PROCEDURE — 3078F PR MOST RECENT DIASTOLIC BLOOD PRESSURE < 80 MM HG: ICD-10-PCS | Mod: CPTII,S$GLB,, | Performed by: SURGERY

## 2023-01-09 PROCEDURE — 3077F SYST BP >= 140 MM HG: CPT | Mod: CPTII,S$GLB,, | Performed by: INTERNAL MEDICINE

## 2023-01-09 PROCEDURE — 99215 OFFICE O/P EST HI 40 MIN: CPT | Mod: S$GLB,,, | Performed by: SURGERY

## 2023-01-09 PROCEDURE — 1101F PR PT FALLS ASSESS DOC 0-1 FALLS W/OUT INJ PAST YR: ICD-10-PCS | Mod: CPTII,S$GLB,, | Performed by: SURGERY

## 2023-01-09 PROCEDURE — 1126F AMNT PAIN NOTED NONE PRSNT: CPT | Mod: CPTII,S$GLB,, | Performed by: SURGERY

## 2023-01-09 PROCEDURE — 4010F PR ACE/ARB THEARPY RXD/TAKEN: ICD-10-PCS | Mod: CPTII,S$GLB,, | Performed by: INTERNAL MEDICINE

## 2023-01-09 PROCEDURE — 3079F DIAST BP 80-89 MM HG: CPT | Mod: CPTII,S$GLB,, | Performed by: INTERNAL MEDICINE

## 2023-01-09 PROCEDURE — 3079F PR MOST RECENT DIASTOLIC BLOOD PRESSURE 80-89 MM HG: ICD-10-PCS | Mod: CPTII,S$GLB,, | Performed by: INTERNAL MEDICINE

## 2023-01-09 PROCEDURE — 99215 PR OFFICE/OUTPT VISIT, EST, LEVL V, 40-54 MIN: ICD-10-PCS | Mod: S$GLB,,, | Performed by: SURGERY

## 2023-01-09 PROCEDURE — 1126F PR PAIN SEVERITY QUANTIFIED, NO PAIN PRESENT: ICD-10-PCS | Mod: CPTII,S$GLB,, | Performed by: INTERNAL MEDICINE

## 2023-01-09 PROCEDURE — 3288F FALL RISK ASSESSMENT DOCD: CPT | Mod: CPTII,S$GLB,, | Performed by: INTERNAL MEDICINE

## 2023-01-09 PROCEDURE — 3288F PR FALLS RISK ASSESSMENT DOCUMENTED: ICD-10-PCS | Mod: CPTII,S$GLB,, | Performed by: INTERNAL MEDICINE

## 2023-01-09 PROCEDURE — 99999 PR PBB SHADOW E&M-EST. PATIENT-LVL III: ICD-10-PCS | Mod: PBBFAC,,, | Performed by: INTERNAL MEDICINE

## 2023-01-09 PROCEDURE — 3008F BODY MASS INDEX DOCD: CPT | Mod: CPTII,S$GLB,, | Performed by: INTERNAL MEDICINE

## 2023-01-09 RX ORDER — EPINEPHRINE 0.3 MG/.3ML
0.3 INJECTION SUBCUTANEOUS ONCE AS NEEDED
Status: CANCELLED | OUTPATIENT
Start: 2023-01-31

## 2023-01-09 RX ORDER — EPINEPHRINE 0.3 MG/.3ML
0.3 INJECTION SUBCUTANEOUS ONCE AS NEEDED
Status: CANCELLED | OUTPATIENT
Start: 2023-01-10

## 2023-01-09 RX ORDER — EPINEPHRINE 1 MG/ML
0.3 INJECTION, SOLUTION, CONCENTRATE INTRAVENOUS ONCE AS NEEDED
Status: DISCONTINUED | OUTPATIENT
Start: 2023-01-09 | End: 2023-01-09 | Stop reason: HOSPADM

## 2023-01-09 RX ORDER — HEPARIN 100 UNIT/ML
500 SYRINGE INTRAVENOUS
Status: CANCELLED | OUTPATIENT
Start: 2023-01-10

## 2023-01-09 RX ORDER — EPINEPHRINE 0.3 MG/.3ML
0.3 INJECTION SUBCUTANEOUS ONCE AS NEEDED
Status: CANCELLED | OUTPATIENT
Start: 2023-03-14

## 2023-01-09 RX ORDER — SODIUM CHLORIDE 0.9 % (FLUSH) 0.9 %
10 SYRINGE (ML) INJECTION
Status: CANCELLED | OUTPATIENT
Start: 2023-01-31

## 2023-01-09 RX ORDER — DIPHENHYDRAMINE HYDROCHLORIDE 50 MG/ML
50 INJECTION INTRAMUSCULAR; INTRAVENOUS ONCE AS NEEDED
Status: CANCELLED | OUTPATIENT
Start: 2023-02-21

## 2023-01-09 RX ORDER — DIPHENHYDRAMINE HYDROCHLORIDE 50 MG/ML
50 INJECTION INTRAMUSCULAR; INTRAVENOUS ONCE AS NEEDED
Status: DISCONTINUED | OUTPATIENT
Start: 2023-01-09 | End: 2023-01-09 | Stop reason: HOSPADM

## 2023-01-09 RX ORDER — DIPHENHYDRAMINE HYDROCHLORIDE 50 MG/ML
50 INJECTION INTRAMUSCULAR; INTRAVENOUS ONCE AS NEEDED
Status: CANCELLED | OUTPATIENT
Start: 2023-03-14

## 2023-01-09 RX ORDER — EPINEPHRINE 0.3 MG/.3ML
0.3 INJECTION SUBCUTANEOUS ONCE AS NEEDED
Status: CANCELLED | OUTPATIENT
Start: 2023-02-21

## 2023-01-09 RX ORDER — HEPARIN 100 UNIT/ML
500 SYRINGE INTRAVENOUS
Status: CANCELLED | OUTPATIENT
Start: 2023-03-14

## 2023-01-09 RX ORDER — HEPARIN 100 UNIT/ML
500 SYRINGE INTRAVENOUS
Status: CANCELLED | OUTPATIENT
Start: 2023-02-21

## 2023-01-09 RX ORDER — SODIUM CHLORIDE 0.9 % (FLUSH) 0.9 %
10 SYRINGE (ML) INJECTION
Status: CANCELLED | OUTPATIENT
Start: 2023-01-10

## 2023-01-09 RX ORDER — SODIUM CHLORIDE 0.9 % (FLUSH) 0.9 %
10 SYRINGE (ML) INJECTION
Status: CANCELLED | OUTPATIENT
Start: 2023-02-21

## 2023-01-09 RX ORDER — DIPHENHYDRAMINE HYDROCHLORIDE 50 MG/ML
50 INJECTION INTRAMUSCULAR; INTRAVENOUS ONCE AS NEEDED
Status: CANCELLED | OUTPATIENT
Start: 2023-01-31

## 2023-01-09 RX ORDER — SODIUM CHLORIDE 0.9 % (FLUSH) 0.9 %
10 SYRINGE (ML) INJECTION
Status: CANCELLED | OUTPATIENT
Start: 2023-03-14

## 2023-01-09 RX ORDER — DIPHENHYDRAMINE HYDROCHLORIDE 50 MG/ML
50 INJECTION INTRAMUSCULAR; INTRAVENOUS ONCE AS NEEDED
Status: CANCELLED | OUTPATIENT
Start: 2023-01-10

## 2023-01-09 RX ORDER — HEPARIN 100 UNIT/ML
500 SYRINGE INTRAVENOUS
Status: CANCELLED | OUTPATIENT
Start: 2023-01-31

## 2023-01-09 RX ADMIN — PERTUZUMAB, TRASTUZUMAB, AND HYALURONIDASE-ZZXF 600 MG: 600; 600; 2000 INJECTION, SOLUTION SUBCUTANEOUS at 02:01

## 2023-01-09 NOTE — PLAN OF CARE
Patient arrived to chair by herself. No complaints expressed. Discuss POC with patient. She was unaware of the change in route but after explaining what to expect she was fine with it. Answered all questions.     Problem: Adult Inpatient Plan of Care  Goal: Plan of Care Review  Outcome: Ongoing, Progressing  Flowsheets (Taken 1/9/2023 1538)  Plan of Care Reviewed With: patient  Goal: Patient-Specific Goal (Individualized)  Outcome: Ongoing, Progressing  Flowsheets (Taken 1/9/2023 1538)  Anxieties, Fears or Concerns: none expressed today  Individualized Care Needs: Elevated feet, warm blankets provided. Snack & gingerale provided as well  Goal: Patient Tolerated injection well  Goal: Optimal Comfort and Wellbeing  Outcome: Ongoing, Progressing  Intervention: Provide Person-Centered Care  Flowsheets (Taken 1/9/2023 1538)  Trust Relationship/Rapport:   care explained   choices provided   emotional support provided   empathic listening provided   questions answered   questions encouraged     Problem: Fall Injury Risk  Goal: Absence of Fall and Fall-Related Injury  Outcome: Ongoing, Progressing  Intervention: Identify and Manage Contributors  Flowsheets (Taken 1/9/2023 1538)  Self-Care Promotion: BADL personal objects within reach  Medication Review/Management: medications reviewed  Intervention: Promote Injury-Free Environment  Flowsheets (Taken 1/9/2023 1538)  Safety Promotion/Fall Prevention:   in recliner, wheels locked   lighting adjusted   medications reviewed     Problem: Fatigue  Goal: Improved Activity Tolerance  Outcome: Ongoing, Progressing  Intervention: Promote Improved Energy  Flowsheets (Taken 1/9/2023 1538)  Fatigue Management:   frequent rest breaks encouraged   paced activity encouraged  Sleep/Rest Enhancement: therapeutic touch utilized     Problem: Anemia (Chemotherapy Effects)  Goal: Anemia Symptom Improvement  Outcome: Ongoing, Progressing  Intervention: Monitor and Manage Anemia  Flowsheets (Taken  1/9/2023 1538)  Safety Promotion/Fall Prevention:   in recliner, wheels locked   lighting adjusted   medications reviewed  Fatigue Management:   frequent rest breaks encouraged   paced activity encouraged

## 2023-01-09 NOTE — PROGRESS NOTES
Subjective:       Patient ID: Malaika Paredes is a 67 y.o. female.    Chief Complaint: Results, Chemotherapy, and Breast Cancer    HPI:  67-year-old female history of stage IIB ER positive HER2 positive breast carcinoma receiving combination Herceptin and Perjeta.  Patient is being planned for initiation of primary surgical resection.  ECOG status 2 on long-term dialysis    Past Medical History:   Diagnosis Date    Cataract     Cataract     CHF (congestive heart failure)     COPD (chronic obstructive pulmonary disease)     Diabetes mellitus     Diabetic retinopathy     Hypertension     Malignant neoplasm of upper-outer quadrant of left breast in female, estrogen receptor positive 6/20/2022     Family History   Problem Relation Age of Onset    Hypertension Mother     Cancer Father     Breast cancer Sister     Diabetes Sister     Cancer Brother     Amblyopia Neg Hx     Blindness Neg Hx     Cataracts Neg Hx     Glaucoma Neg Hx     Macular degeneration Neg Hx     Retinal detachment Neg Hx     Strabismus Neg Hx     Stroke Neg Hx     Thyroid disease Neg Hx      Social History     Socioeconomic History    Marital status:    Tobacco Use    Smoking status: Former     Types: Cigarettes     Quit date: 6/21/2012     Years since quitting: 10.5    Smokeless tobacco: Never   Substance and Sexual Activity    Alcohol use: No    Drug use: Never     Past Surgical History:   Procedure Laterality Date    BREAST BIOPSY Right     benign    CATARACT EXTRACTION W/  INTRAOCULAR LENS IMPLANT Right 08/14/2017    Dr. Moe    CORONARY ARTERY BYPASS GRAFT      FLUOROSCOPY N/A 7/25/2022    Procedure: FLUOROSCOPY/mediport placement;  Surgeon: Cole Perdomo MD;  Location: [facility] CATH LAB;  Service: General;  Laterality: N/A;       Labs:  Lab Results   Component Value Date    WBC 8.28 12/14/2022    HGB 8.4 (L) 12/14/2022    HCT 26.7 (L) 12/14/2022    MCV 97 12/14/2022     (H) 12/14/2022     BMP  Lab  Results   Component Value Date     12/14/2022    K 3.8 12/14/2022     12/14/2022    CO2 20 (L) 12/14/2022    BUN 35 (H) 12/14/2022    CREATININE 6.3 (H) 12/14/2022    CALCIUM 8.2 (L) 12/14/2022    ANIONGAP 13 12/14/2022    ESTGFRAFRICA 8 (A) 07/27/2022    EGFRNONAA 7 (A) 07/27/2022     Lab Results   Component Value Date    ALT 9 (L) 12/14/2022    AST 17 12/14/2022    ALKPHOS 54 (L) 12/14/2022    BILITOT 0.2 12/14/2022       Lab Results   Component Value Date    IRON 43 09/09/2018    TIBC 300 09/09/2018    FERRITIN 1,038 (H) 06/21/2022     Lab Results   Component Value Date    OHQGPBBS36 459 09/09/2018     Lab Results   Component Value Date    FOLATE 6.2 09/09/2018     Lab Results   Component Value Date    TSH 2.815 10/05/2022         Review of Systems   Constitutional:  Negative for activity change, appetite change, chills, diaphoresis, fatigue, fever and unexpected weight change.   HENT:  Negative for congestion, dental problem, drooling, ear discharge, ear pain, facial swelling, hearing loss, mouth sores, nosebleeds, postnasal drip, rhinorrhea, sinus pressure, sneezing, sore throat, tinnitus, trouble swallowing and voice change.    Eyes:  Negative for photophobia, pain, discharge, redness, itching and visual disturbance.   Respiratory:  Negative for cough, choking, chest tightness, shortness of breath, wheezing and stridor.    Cardiovascular:  Negative for chest pain, palpitations and leg swelling.   Gastrointestinal:  Negative for abdominal distention, abdominal pain, anal bleeding, blood in stool, constipation, diarrhea, nausea, rectal pain and vomiting.   Endocrine: Negative for cold intolerance, heat intolerance, polydipsia, polyphagia and polyuria.   Genitourinary:  Negative for decreased urine volume, difficulty urinating, dyspareunia, dysuria, enuresis, flank pain, frequency, genital sores, hematuria, menstrual problem, pelvic pain, urgency, vaginal bleeding, vaginal discharge and vaginal pain.    Musculoskeletal:  Negative for arthralgias, back pain, gait problem, joint swelling, myalgias, neck pain and neck stiffness.   Skin:  Negative for color change, pallor and rash.   Allergic/Immunologic: Negative for environmental allergies, food allergies and immunocompromised state.   Neurological:  Negative for dizziness, tremors, seizures, syncope, facial asymmetry, speech difficulty, weakness, light-headedness, numbness and headaches.   Hematological:  Negative for adenopathy. Does not bruise/bleed easily.   Psychiatric/Behavioral:  Negative for agitation, behavioral problems, confusion, decreased concentration, dysphoric mood, hallucinations, self-injury, sleep disturbance and suicidal ideas. The patient is not nervous/anxious and is not hyperactive.      Objective:      Physical Exam  Vitals reviewed.   Constitutional:       General: She is not in acute distress.     Appearance: She is well-developed. She is not diaphoretic.   HENT:      Head: Normocephalic and atraumatic.      Right Ear: External ear normal.      Left Ear: External ear normal.      Nose: Nose normal.      Right Sinus: No maxillary sinus tenderness or frontal sinus tenderness.      Left Sinus: No maxillary sinus tenderness or frontal sinus tenderness.      Mouth/Throat:      Pharynx: No oropharyngeal exudate.   Eyes:      General: Lids are normal. No scleral icterus.        Right eye: No discharge.         Left eye: No discharge.      Conjunctiva/sclera: Conjunctivae normal.      Right eye: Right conjunctiva is not injected. No hemorrhage.     Left eye: Left conjunctiva is not injected. No hemorrhage.     Pupils: Pupils are equal, round, and reactive to light.   Neck:      Thyroid: No thyromegaly.      Vascular: No JVD.      Trachea: No tracheal deviation.   Cardiovascular:      Rate and Rhythm: Normal rate.   Pulmonary:      Effort: Pulmonary effort is normal. No respiratory distress.      Breath sounds: No stridor.   Chest:      Chest wall:  No tenderness.   Abdominal:      General: Bowel sounds are normal. There is no distension.      Palpations: Abdomen is soft. There is no hepatomegaly, splenomegaly or mass.      Tenderness: There is no abdominal tenderness. There is no rebound.   Musculoskeletal:         General: No tenderness. Normal range of motion.      Cervical back: Normal range of motion and neck supple.   Lymphadenopathy:      Cervical: No cervical adenopathy.      Upper Body:      Right upper body: No supraclavicular adenopathy.      Left upper body: No supraclavicular adenopathy.   Skin:     General: Skin is dry.      Findings: No erythema or rash.   Neurological:      Mental Status: She is alert and oriented to person, place, and time.      Cranial Nerves: No cranial nerve deficit.      Coordination: Coordination normal.   Psychiatric:         Behavior: Behavior normal.         Thought Content: Thought content normal.         Judgment: Judgment normal.           Assessment:      1. Malignant neoplasm of upper-outer quadrant of left breast in female, estrogen receptor positive    2. Anemia associated with chronic renal failure    3. Immunodeficiency due to chemotherapy    4. ESRD (end stage renal disease)    5. Other thrombophilia    6. Longstanding persistent atrial fibrillation    7. Type 2 diabetes mellitus with diabetic peripheral angiopathy without gangrene, with long-term current use of insulin    8. Type 2 diabetes mellitus with chronic kidney disease on chronic dialysis, with long-term current use of insulin    9. Severe obesity (BMI 35.0-39.9) with comorbidity    10. Hyperparathyroidism, unspecified    11. Panlobular emphysema           Plan:     Extensive conversation with patient continue with current dose of Herceptin and Perjeta.  On every three-week basis will return in 12 weeks with 2D echocardiogram prior discussed implications defer to surgery to proceed with primary surgical resection at this point non myelosuppressive  portion of treatment can be continued while surgical intervention occurs      Med Onc Chart Routing      Follow up with physician 3 months.   Follow up with [facility]    Infusion scheduling note    Injection scheduling note Continue combination of Perjeta and Herceptin on a three-week basis   Labs    Imaging ECHO   2D echocardiogram in 12 weeks   Pharmacy appointment    Other referrals         Toney العلي Jr, MD FACP

## 2023-01-09 NOTE — NURSING
Injection given without difficulties to Left outer thigh, subq. Band-Aid applied. Patient instructed to stay in the clinic for 15 minutes, she stayed in recliner. Patient verbalized understanding and will notify nurse with any complaints. RTC 1/30/23. Provided her with a calendar for upcoming appointments.

## 2023-01-09 NOTE — PROGRESS NOTES
History & Physical    SUBJECTIVE:     History of Present Illness:  Patient is a 67 y.o. female presents to discuss next step in breast cancer treatment following neoadjuvant chemotherapy. She did not complete last cycle due to neuropathy. She reports decrease in mass size of the breast.     presents to discuss left breast biopsy.  Her left breast and axillary biopsy were showed invasive ductal carcinoma ER+/KY+ Her2+ with metastatic disease to the lymph node.    Initially referred for abnormal mammogram of the left breast. She denies any breast mass, nipple discharge, breast pain or other changes. No prior breast surgery. Prior right breast biopsy benign. Family history of breast cancer in her sister. Menarche age 12,  1st at 16, menopause at 48. No HRT.     Chief Complaint   Patient presents with    Pre-op Exam         Review of patient's allergies indicates:  No Known Allergies    Current Outpatient Medications   Medication Sig Dispense Refill    albuterol (PROVENTIL/VENTOLIN HFA) 90 mcg/actuation inhaler Inhale 1-2 puffs into the lungs every 6 (six) hours as needed for Wheezing. Rescue 1 Inhaler 0    albuterol sulfate (PROAIR RESPICLICK) 90 mcg/actuation AePB Inhale 180 mcg into the lungs every 4 (four) hours. Rescue 1 each 3    amiodarone (PACERONE) 100 MG Tab Take 100 mg by mouth once daily.      amLODIPine (NORVASC) 10 MG tablet Take 10 mg by mouth once daily.      apixaban (ELIQUIS) 2.5 mg Tab Take 2.5 mg by mouth 2 (two) times daily.      aspirin (ECOTRIN) 81 MG EC tablet Take 81 mg by mouth once daily.       CAPRON DM 7.5-7.5 mg/5 mL Liqd TAKE 10-20 ML BY MOUTH EVERY 6-8 HOURS AS NEEDED COUGH      diclofenac sodium (VOLTAREN) 1 % Gel APPLY TOPICALLY 2 GM TO THE AFFECTED AREA 2 TIMES DAILY      fluconazole (DIFLUCAN) 100 MG tablet Take 1 tablet (100 mg total) by mouth once daily. 30 tablet 0    gabapentin (NEURONTIN) 300 MG capsule Take 1 capsule (300 mg total) by mouth 3 (three) times daily. 90 capsule  2    insulin degludec (TRESIBA FLEXTOUCH U-200) 200 unit/mL (3 mL) insulin pen Inject 80 Units into the skin once daily.       INV metoprolol tartrate 25 mg oral tablet (LOPRESSOR) 25 MG Tab Take by mouth 2 (two) times daily. FOR INVESTIGATIONAL USE ONLY      iron sucrose in NS (VENOFER) 100 mg/100 mL PgBk 50 mg.      ketoconazole (NIZORAL) 2 % cream Apply topically once daily. for 14 days 1 each 1    ketorolac 0.5% (ACULAR) 0.5 % Drop Place 1 drop into both eyes 4 (four) times daily.      levothyroxine (SYNTHROID) 75 MCG tablet Take 75 mcg by mouth once daily.       losartan (COZAAR) 100 MG tablet Take 100 mg by mouth once daily.      losartan (COZAAR) 25 MG tablet       mupirocin (BACTROBAN) 2 % ointment Apply topically once daily. 30 g 0    neomycin-polymyxin-dexamethasone (MAXITROL) 3.5mg/mL-10,000 unit/mL-0.1 % DrpS Place 1 drop into both eyes 2 (two) times daily.      omeprazole (PRILOSEC) 20 MG capsule Take 20 mg by mouth once daily.      ondansetron (ZOFRAN) 4 MG tablet Take 1 tablet (4 mg total) by mouth every 8 (eight) hours as needed for Nausea. 20 tablet 11    prednisoLONE acetate (PRED FORTE) 1 % DrpS Place 1 drop into both eyes 4 (four) times daily.      predniSONE (DELTASONE) 10 MG tablet Take by mouth.      prochlorperazine (COMPAZINE) 5 MG tablet TAKE 1 TABLET (5 MG TOTAL) BY MOUTH 4 (FOUR) TIMES DAILY AS NEEDED FOR NAUSEA. 20 tablet 11    RENVELA 800 mg Tab SMARTSI Tablet(s) By Mouth 5 Times Daily      SEVELAMER HCL ORAL Take by mouth.       Current Facility-Administered Medications   Medication Dose Route Frequency Provider Last Rate Last Admin    sodium polystyrene 15 gram/60 mL suspension 15 g  15 g Oral 1 time in Clinic/[facility]           Past Medical History:   Diagnosis Date    Cataract     Cataract     CHF (congestive heart failure)     COPD (chronic obstructive pulmonary disease)     Diabetes mellitus     Diabetic retinopathy     Hypertension     Malignant neoplasm of  upper-outer quadrant of left breast in female, estrogen receptor positive 6/20/2022     Past Surgical History:   Procedure Laterality Date    BREAST BIOPSY Right     benign    CATARACT EXTRACTION W/  INTRAOCULAR LENS IMPLANT Right 08/14/2017    Dr. Moe    CORONARY ARTERY BYPASS GRAFT      FLUOROSCOPY N/A 7/25/2022    Procedure: FLUOROSCOPY/mediport placement;  Surgeon: Cole Perdomo MD;  Location: [facility] CATH LAB;  Service: General;  Laterality: N/A;     Family History   Problem Relation Age of Onset    Hypertension Mother     Cancer Father     Breast cancer Sister     Diabetes Sister     Cancer Brother     Amblyopia Neg Hx     Blindness Neg Hx     Cataracts Neg Hx     Glaucoma Neg Hx     Macular degeneration Neg Hx     Retinal detachment Neg Hx     Strabismus Neg Hx     Stroke Neg Hx     Thyroid disease Neg Hx      Social History     Tobacco Use    Smoking status: Former     Types: Cigarettes     Quit date: 6/21/2012     Years since quitting: 10.5    Smokeless tobacco: Never   Substance Use Topics    Alcohol use: No    Drug use: Never        Review of Systems:  Review of Systems   Constitutional:  Negative for activity change, appetite change, chills, diaphoresis, fatigue, fever and unexpected weight change.   HENT:  Negative for congestion, dental problem, hearing loss, rhinorrhea, sore throat and trouble swallowing.    Eyes:  Positive for visual disturbance. Negative for discharge.   Respiratory:  Positive for wheezing. Negative for cough, chest tightness and shortness of breath.    Cardiovascular:  Positive for palpitations. Negative for chest pain and leg swelling.   Gastrointestinal:  Positive for diarrhea. Negative for abdominal distention, abdominal pain, blood in stool, constipation, nausea and vomiting.   Endocrine: Negative for cold intolerance, heat intolerance, polydipsia, polyphagia and polyuria.   Genitourinary:  Negative for difficulty urinating, dysuria, frequency, hematuria, menstrual problem and  "urgency.   Musculoskeletal:  Positive for arthralgias. Negative for gait problem, joint swelling, myalgias and neck pain.   Skin:  Negative for color change, pallor, rash and wound.   Neurological:  Positive for weakness. Negative for dizziness, syncope, light-headedness, numbness and headaches.   Hematological:  Negative for adenopathy. Does not bruise/bleed easily.   Psychiatric/Behavioral:  Negative for confusion, decreased concentration, dysphoric mood and sleep disturbance. The patient is not nervous/anxious.      OBJECTIVE:     Vital Signs (Most Recent)  Temp: 98.2 °F (36.8 °C) (01/09/23 1139)  Pulse: 80 (01/09/23 1139)  BP: 133/68 (01/09/23 1139)  5' 2" (1.575 m)  87.7 kg (193 lb 5.5 oz)     Physical Exam:  Physical Exam  Vitals reviewed.   Constitutional:       General: She is not in acute distress.     Appearance: She is well-developed. She is not diaphoretic.   HENT:      Head: Normocephalic and atraumatic.      Right Ear: External ear normal.      Left Ear: External ear normal.   Eyes:      General: No scleral icterus.     Conjunctiva/sclera: Conjunctivae normal.      Pupils: Pupils are equal, round, and reactive to light.   Neck:      Thyroid: No thyromegaly.      Trachea: No tracheal deviation.   Cardiovascular:      Rate and Rhythm: Normal rate and regular rhythm.      Heart sounds: Normal heart sounds. No murmur heard.    No friction rub. No gallop.   Pulmonary:      Effort: Pulmonary effort is normal. No respiratory distress.      Breath sounds: Normal breath sounds. No wheezing or rales.   Chest:      Chest wall: No tenderness.   Breasts:     Right: No inverted nipple, mass, nipple discharge, skin change or tenderness.      Left: No inverted nipple, mass, nipple discharge, skin change or tenderness.       Abdominal:      General: Bowel sounds are normal. There is no distension.      Palpations: Abdomen is soft.      Tenderness: There is no abdominal tenderness.      Hernia: No hernia is present. "   Musculoskeletal:         General: No tenderness or deformity. Normal range of motion.      Cervical back: Normal range of motion and neck supple.   Lymphadenopathy:      Cervical: No cervical adenopathy.   Skin:     General: Skin is warm and dry.      Coloration: Skin is not pale.      Findings: No erythema or rash.   Neurological:      Mental Status: She is alert and oriented to person, place, and time.   Psychiatric:         Behavior: Behavior normal.         Thought Content: Thought content normal.         Judgment: Judgment normal.       Diagnostic Results:  Result:   Mammo Digital Diagnostic Bilat with Alfredo  US Breast Left Limited     History:  Patient is 66 y.o. and is seen for diagnostic imaging.     Films Compared:  Prior images (if available) were compared.     Findings:  This procedure was performed using tomosynthesis. Computer-aided detection was utilized in the interpretation of this examination.  The breasts have scattered areas of fibroglandular density.      Mammo Digital Diagnostic Bilat with Alfredo  Left  Mass: There is a 4.9 cm irregularly shaped mass with spiculated margins seen in the upper outer quadrant of the left breast in the anterior depth. The mass correlates with the palpable mass reported by the patient. Associated features include skin retraction. There are also associated calcifications.   Lymph Node: There are multiple similar lymph nodes seen in the left axilla.      Right  There is no evidence of suspicious masses, calcifications, or other abnormal findings in the right breast.     US Breast Left Limited  Left  Mass: There is a 5.6 cm x 4 cm x 4.3 cm irregularly shaped, non-parallel, hypoechoic mass with spiculated margins seen in the left breast at 2 o'clock, 3 cm from the nipple.   Lymph Node: There are multiple similar lymph nodes seen in the left axilla. Largest node is 3.4 cm x 1.6 cm x 2.0 cm.      Impression:  Left  Mass: Left breast 5.6 cm x 4 cm x 4.3 cm mass at the 2  o'clock position. Assessment: 5 - Highly suggestive of malignancy. Ultrasound-guided biopsy is recommended.   Lymph Node: Left axilla lymph node (multiple findings). Assessment: 5 - Highly suggestive of malignancy. Ultrasound-guided biopsy is recommended.      Right  There is no mammographic or sonographic evidence of malignancy in the right breast.     BI-RADS Category:   Overall: 5 - Highly Suggestive of Malignancy     Recommendation:  Ultrasound-guided biopsy is recommended.  Ultrasound-guided biopsy is recommended.     Your estimated lifetime risk of breast cancer (to age 85) based on Tyrer-Cuzick risk assessment model is Tyrer-Cuzick: 6.49 %. According to the [employer] Society, patients with a lifetime breast cancer risk of 20% or higher might benefit from supplemental screening tests.    Final Pathologic Diagnosis Part 1   Left axilla, ultrasound-guided core biopsy:   Positive for metastatic carcinoma   Metastasis measures 10 mm in greatest dimension   Part 2   Left breast, 2:00, ultrasound-guided core biopsy:   Invasive ductal carcinoma   Milton grade 2:  Tubule formation -3, nuclear pleomorphism-2 and mitotic   count-2   Invasive carcinoma measures 17 mm in greatest dimension   No carcinoma in Situ or lymphovascular invasion identified   Tumor biomarkers   Estrogen receptor (ER):  Positive, greater than 95%, strong   Progesterone receptor (PGR):  Positive, 80%, strong   HER2 (IHC):  Equivocal, 2+   Ki-67:  60%   Additional IHC:   P63:  Negative throughout, supporting the diagnosis of invasive carcinoma   This case was reviewed by Dr. GABBY Cannon who concurs with the above diagnosis.  VC      Comment: Interp By Bette Pitts M.D., Signed on 06/21/2022 at 12:02   Supplemental Diagnosis HER2, Breast Tumor, FISH, Tissue   Result Summary   POSITIVE   Interpretation   This tumor sample is positive for HER2 (ERBB2) gene amplification.   Result   nuc erwin([id number]¿4,HER2x5¿8)   HER2/D17Z1 ratio: 2.03   Average  HER2 signals per cell: 6.3   Average D17Z1 signals per cell: 3.1           ASSESSMENT/PLAN:     67 y/o female with left breast invasive ductal carcinoma ER+/IL+ Her2+ with metastatic disease to the lymph node    PLAN:Plan     Will plan to obtain interval imaging PET scan to assess response/potential for metastatic disease and mammogram to assess response as primary mass has shrunk significantly  We did discuss potential surgical therapy pending outcome of scan.  Currently the patient is interested in lumpectomy with sentinel lymph node biopsy possible axillary dissection and adjuvant XRT.  We did discuss other options of mastectomy as well as possibility of reconstruction in patients undergoing mastectomy.  Patient will follow-up upon completion of interval imaging to discuss results and schedule next steps in treatment.  45 minutes of total time spent on the encounter, which includes face to face time and non-face to face time preparing to see the patient (eg, review of tests), Obtaining and/or reviewing separately obtained history, Documenting clinical information in the electronic or other health record, Independently interpreting results (not separately reported) and communicating results to the patient/family/caregiver, or Care coordination (not separately reported).

## 2023-01-11 ENCOUNTER — TELEPHONE (OUTPATIENT)
Dept: CARDIOLOGY | Facility: CLINIC | Age: 68
End: 2023-01-11
Payer: MEDICARE

## 2023-01-11 NOTE — TELEPHONE ENCOUNTER
----- Message from Yumiko Slater LPN sent at 1/11/2023 10:53 AM CST -----  Good morning,    I tried calling at ext. [phone] and no answer. I was trying to get this patient scheduled for a ECHO. She will need it before her appointment with Dr. العلي. Its scheduled on 04/03.     Thanks  Yumiko

## 2023-01-12 ENCOUNTER — OFFICE VISIT (OUTPATIENT)
Dept: PODIATRY | Facility: CLINIC | Age: 68
End: 2023-01-12
Payer: MEDICARE

## 2023-01-12 ENCOUNTER — HOSPITAL ENCOUNTER (OUTPATIENT)
Dept: RADIOLOGY | Facility: HOSPITAL | Age: 68
Discharge: HOME | End: 2023-01-12
Attending: PODIATRIST
Payer: MEDICARE

## 2023-01-12 VITALS — BODY MASS INDEX: 35.7 KG/M2 | HEIGHT: 62 IN | WEIGHT: 194 LBS

## 2023-01-12 DIAGNOSIS — L03.116 CELLULITIS OF LEFT FOOT: ICD-10-CM

## 2023-01-12 DIAGNOSIS — L03.116 CELLULITIS OF LEFT FOOT: Primary | ICD-10-CM

## 2023-01-12 DIAGNOSIS — I73.9 PAD (PERIPHERAL ARTERY DISEASE): ICD-10-CM

## 2023-01-12 DIAGNOSIS — L97.522 ISCHEMIC TOE ULCER, LEFT, WITH FAT LAYER EXPOSED: ICD-10-CM

## 2023-01-12 DIAGNOSIS — E11.42 DM TYPE 2 WITH DIABETIC PERIPHERAL NEUROPATHY: ICD-10-CM

## 2023-01-12 PROCEDURE — 99999 PR PBB SHADOW E&M-EST. PATIENT-LVL III: ICD-10-PCS | Mod: PBBFAC,,, | Performed by: PODIATRIST

## 2023-01-12 PROCEDURE — 1160F RVW MEDS BY RX/DR IN RCRD: CPT | Mod: CPTII,S$GLB,, | Performed by: PODIATRIST

## 2023-01-12 PROCEDURE — 3288F FALL RISK ASSESSMENT DOCD: CPT | Mod: CPTII,S$GLB,, | Performed by: PODIATRIST

## 2023-01-12 PROCEDURE — 1125F AMNT PAIN NOTED PAIN PRSNT: CPT | Mod: CPTII,S$GLB,, | Performed by: PODIATRIST

## 2023-01-12 PROCEDURE — 3008F PR BODY MASS INDEX (BMI) DOCUMENTED: ICD-10-PCS | Mod: CPTII,S$GLB,, | Performed by: PODIATRIST

## 2023-01-12 PROCEDURE — 1125F PR PAIN SEVERITY QUANTIFIED, PAIN PRESENT: ICD-10-PCS | Mod: CPTII,S$GLB,, | Performed by: PODIATRIST

## 2023-01-12 PROCEDURE — 99214 OFFICE O/P EST MOD 30 MIN: CPT | Mod: 25,S$GLB,, | Performed by: PODIATRIST

## 2023-01-12 PROCEDURE — 1159F MED LIST DOCD IN RCRD: CPT | Mod: CPTII,S$GLB,, | Performed by: PODIATRIST

## 2023-01-12 PROCEDURE — 4010F PR ACE/ARB THEARPY RXD/TAKEN: ICD-10-PCS | Mod: CPTII,S$GLB,, | Performed by: PODIATRIST

## 2023-01-12 PROCEDURE — 99214 PR OFFICE/OUTPT VISIT, EST, LEVL IV, 30-39 MIN: ICD-10-PCS | Mod: 25,S$GLB,, | Performed by: PODIATRIST

## 2023-01-12 PROCEDURE — 73630 X-RAY EXAM OF FOOT: CPT | Mod: 26,LT,, | Performed by: RADIOLOGY

## 2023-01-12 PROCEDURE — 73630 X-RAY EXAM OF FOOT: CPT | Mod: TC,LT

## 2023-01-12 PROCEDURE — 99999 PR PBB SHADOW E&M-EST. PATIENT-LVL III: CPT | Mod: PBBFAC,,, | Performed by: PODIATRIST

## 2023-01-12 PROCEDURE — 3008F BODY MASS INDEX DOCD: CPT | Mod: CPTII,S$GLB,, | Performed by: PODIATRIST

## 2023-01-12 PROCEDURE — 1159F PR MEDICATION LIST DOCUMENTED IN MEDICAL RECORD: ICD-10-PCS | Mod: CPTII,S$GLB,, | Performed by: PODIATRIST

## 2023-01-12 PROCEDURE — 1101F PT FALLS ASSESS-DOCD LE1/YR: CPT | Mod: CPTII,S$GLB,, | Performed by: PODIATRIST

## 2023-01-12 PROCEDURE — 4010F ACE/ARB THERAPY RXD/TAKEN: CPT | Mod: CPTII,S$GLB,, | Performed by: PODIATRIST

## 2023-01-12 PROCEDURE — 1157F PR ADVANCE CARE PLAN OR EQUIV PRESENT IN MEDICAL RECORD: ICD-10-PCS | Mod: CPTII,S$GLB,, | Performed by: PODIATRIST

## 2023-01-12 PROCEDURE — 1157F ADVNC CARE PLAN IN RCRD: CPT | Mod: CPTII,S$GLB,, | Performed by: PODIATRIST

## 2023-01-12 PROCEDURE — 73630 XR FOOT COMPLETE 3 VIEW LEFT: ICD-10-PCS | Mod: 26,LT,, | Performed by: RADIOLOGY

## 2023-01-12 PROCEDURE — 1160F PR REVIEW ALL MEDS BY PRESCRIBER/CLIN PHARMACIST DOCUMENTED: ICD-10-PCS | Mod: CPTII,S$GLB,, | Performed by: PODIATRIST

## 2023-01-12 PROCEDURE — 3288F PR FALLS RISK ASSESSMENT DOCUMENTED: ICD-10-PCS | Mod: CPTII,S$GLB,, | Performed by: PODIATRIST

## 2023-01-12 PROCEDURE — 1101F PR PT FALLS ASSESS DOC 0-1 FALLS W/OUT INJ PAST YR: ICD-10-PCS | Mod: CPTII,S$GLB,, | Performed by: PODIATRIST

## 2023-01-12 RX ORDER — AMOXICILLIN AND CLAVULANATE POTASSIUM 875; 125 MG/1; MG/1
1 TABLET, FILM COATED ORAL 2 TIMES DAILY
Qty: 20 TABLET | Refills: 0 | Status: SHIPPED | OUTPATIENT
Start: 2023-01-12 | End: 2023-01-19 | Stop reason: SDUPTHER

## 2023-01-12 NOTE — PROGRESS NOTES
Subjective:     Patient ID: Malaika Paredes is a 67 y.o. female.    Chief Complaint: Toe Pain (Pt c/o left hallux toe pain 7/10,left 2nd digit wound,  diabetic pt wears tennis shoes, PCP Dr. Scales last seen unknown) and Wound Care    Malaika is a 67 y.o. female who presents to the clinic for evaluation and treatment of high risk feet. Malaika has a past medical history of Cataract, Cataract, CHF (congestive heart failure), COPD (chronic obstructive pulmonary disease), Diabetes mellitus, Diabetic retinopathy, Hypertension, and Malignant neoplasm of upper-outer quadrant of left breast in female, estrogen receptor positive (6/20/2022). The patient's chief complaint is left big toe pain. Patient rates pain 7/10. Patient states the toes  has been hurting for awhile. Patient rates pain 7/10. This patient has documented high risk feet requiring routine maintenance secondary to diabetes mellitis and those secondary complications of diabetes, as mentioned..    PCP: Travis Scales MD    Date Last Seen by PCP: 11/23/2022    Current shoe gear:  Affected Foot: Tennis shoes     Unaffected Foot: Tennis shoes    History of Trauma: negative  Sign of Infection: none    Hemoglobin A1C   Date Value Ref Range Status   09/09/2018 5.6 4.0 - 5.6 % Final     Comment:     ADA Screening Guidelines:  5.7-6.4%  Consistent with prediabetes  >or=6.5%  Consistent with diabetes  High levels of fetal hemoglobin interfere with the HbA1C  assay. Heterozygous hemoglobin variants (HbS, HgC, etc)do  not significantly interfere with this assay.   However, presence of multiple variants may affect accuracy.         Patient Active Problem List   Diagnosis    Proliferative diabetic retinopathy of both eyes without macular edema associated with type 2 diabetes mellitus    Lattice degeneration of right retina    Uncontrolled diabetes mellitus with both eyes affected by proliferative retinopathy and macular edema, with long-term current use of  insulin    NS (nuclear sclerosis), left    Type 2 diabetes mellitus, with long-term current use of insulin    ESRD (end stage renal disease)    Hypertensive emergency    COPD (chronic obstructive pulmonary disease)    Shortness of breath    Microcytic anemia    Metabolic acidosis    Hyperkalemia    CKD (chronic kidney disease) stage 5, GFR less than 15 ml/min    Chronic kidney disease-mineral and bone disorder    Anemia associated with chronic renal failure    Diastolic dysfunction    Status post cataract extraction and insertion of intraocular lens of right eye    Malignant neoplasm of upper-outer quadrant of left breast in female, estrogen receptor positive    Severe obesity (BMI 35.0-39.9) with comorbidity    Immunodeficiency due to chemotherapy    Palliative care encounter    Peripheral neuropathy due to chemotherapy    Primary hypertension    Hyperparathyroidism, unspecified    Other thrombophilia    Longstanding persistent atrial fibrillation    Type 2 diabetes mellitus with diabetic peripheral angiopathy without gangrene, with long-term current use of insulin       Medication List with Changes/Refills   New Medications    AMOXICILLIN-CLAVULANATE 875-125MG (AUGMENTIN) 875-125 MG PER TABLET    Take 1 tablet by mouth 2 (two) times daily.   Current Medications    ALBUTEROL (PROVENTIL/VENTOLIN HFA) 90 MCG/ACTUATION INHALER    Inhale 1-2 puffs into the lungs every 6 (six) hours as needed for Wheezing. Rescue    ALBUTEROL SULFATE (PROAIR RESPICLICK) 90 MCG/ACTUATION AEPB    Inhale 180 mcg into the lungs every 4 (four) hours. Rescue    AMIODARONE (PACERONE) 100 MG TAB    Take 100 mg by mouth once daily.    AMLODIPINE (NORVASC) 10 MG TABLET    Take 10 mg by mouth once daily.    APIXABAN (ELIQUIS) 2.5 MG TAB    Take 2.5 mg by mouth 2 (two) times daily.    ASPIRIN (ECOTRIN) 81 MG EC TABLET    Take 81 mg by mouth once daily.     CAPRON DM 7.5-7.5 MG/5 ML LIQD    TAKE 10-20 ML BY MOUTH EVERY 6-8 HOURS AS NEEDED COUGH     DICLOFENAC SODIUM (VOLTAREN) 1 % GEL    APPLY TOPICALLY 2 GM TO THE AFFECTED AREA 2 TIMES DAILY    GABAPENTIN (NEURONTIN) 300 MG CAPSULE    Take 1 capsule (300 mg total) by mouth 3 (three) times daily.    INSULIN DEGLUDEC (TRESIBA FLEXTOUCH U-200) 200 UNIT/ML (3 ML) INSULIN PEN    Inject 80 Units into the skin once daily.     INV METOPROLOL TARTRATE 25 MG ORAL TABLET (LOPRESSOR) 25 MG TAB    Take by mouth 2 (two) times daily. FOR INVESTIGATIONAL USE ONLY    IRON SUCROSE IN NS (VENOFER) 100 MG/100 ML PGBK    50 mg.    KETOCONAZOLE (NIZORAL) 2 % CREAM    Apply topically once daily. for 14 days    KETOROLAC 0.5% (ACULAR) 0.5 % DROP    Place 1 drop into both eyes 4 (four) times daily.    LEVOTHYROXINE (SYNTHROID) 75 MCG TABLET    Take 75 mcg by mouth once daily.     LOSARTAN (COZAAR) 100 MG TABLET    Take 100 mg by mouth once daily.    LOSARTAN (COZAAR) 25 MG TABLET        MUPIROCIN (BACTROBAN) 2 % OINTMENT    Apply topically once daily.    NEOMYCIN-POLYMYXIN-DEXAMETHASONE (MAXITROL) 3.5MG/ML-10,000 UNIT/ML-0.1 % DRPS    Place 1 drop into both eyes 2 (two) times daily.    OMEPRAZOLE (PRILOSEC) 20 MG CAPSULE    Take 20 mg by mouth once daily.    ONDANSETRON (ZOFRAN) 4 MG TABLET    Take 1 tablet (4 mg total) by mouth every 8 (eight) hours as needed for Nausea.    PREDNISOLONE ACETATE (PRED FORTE) 1 % DRPS    Place 1 drop into both eyes 4 (four) times daily.    PREDNISONE (DELTASONE) 10 MG TABLET    Take by mouth.    PROCHLORPERAZINE (COMPAZINE) 5 MG TABLET    TAKE 1 TABLET (5 MG TOTAL) BY MOUTH 4 (FOUR) TIMES DAILY AS NEEDED FOR NAUSEA.    RENVELA 800 MG TAB    SMARTSI Tablet(s) By Mouth 5 Times Daily    SEVELAMER HCL ORAL    Take by mouth.       Review of patient's allergies indicates:  No Known Allergies    Past Surgical History:   Procedure Laterality Date    BREAST BIOPSY Right     benign    CATARACT EXTRACTION W/  INTRAOCULAR LENS IMPLANT Right 2017    Dr. Moe    CORONARY ARTERY BYPASS GRAFT       "FLUOROSCOPY N/A 7/25/2022    Procedure: FLUOROSCOPY/mediport placement;  Surgeon: Cole Perdomo MD;  Location: [facility] CATH LAB;  Service: General;  Laterality: N/A;       Family History   Problem Relation Age of Onset    Hypertension Mother     Cancer Father     Breast cancer Sister     Diabetes Sister     Cancer Brother     Amblyopia Neg Hx     Blindness Neg Hx     Cataracts Neg Hx     Glaucoma Neg Hx     Macular degeneration Neg Hx     Retinal detachment Neg Hx     Strabismus Neg Hx     Stroke Neg Hx     Thyroid disease Neg Hx        Social History     Socioeconomic History    Marital status:    Tobacco Use    Smoking status: Former     Types: Cigarettes     Quit date: 6/21/2012     Years since quitting: 10.5    Smokeless tobacco: Never   Substance and Sexual Activity    Alcohol use: No    Drug use: Never       Vitals:    01/12/23 0837   Weight: 88 kg (194 lb 0.1 oz)   Height: 5' 2" (1.575 m)   PainSc:   7       Hemoglobin A1C   Date Value Ref Range Status   09/09/2018 5.6 4.0 - 5.6 % Final     Comment:     ADA Screening Guidelines:  5.7-6.4%  Consistent with prediabetes  >or=6.5%  Consistent with diabetes  High levels of fetal hemoglobin interfere with the HbA1C  assay. Heterozygous hemoglobin variants (HbS, HgC, etc)do  not significantly interfere with this assay.   However, presence of multiple variants may affect accuracy.         Review of Systems   Constitutional:  Negative for chills and fever.   Respiratory:  Negative for shortness of breath.    Cardiovascular:  Negative for chest pain, palpitations, orthopnea, claudication and leg swelling.   Gastrointestinal:  Negative for diarrhea, nausea and vomiting.   Musculoskeletal:  Negative for joint pain.   Skin:  Negative for rash.   Neurological:  Positive for tingling and sensory change.   Psychiatric/Behavioral: Negative.             Objective:      PHYSICAL EXAM: Apperance: Alert and orient in no distress,well developed, and with good attention to " grooming and body habits  Patient presents ambulating in tennis shoes.   Lower Extremity Exam  VASCULAR: Dorsalis pedis pulses 1/4 bilateral and Posterior Tibial pulses 1/4 bilateral. Capillary fill time <blanched bilateral. Mild edema observed bilateral. Varicosities present bilateral. Skin temperature of the lower extremities is warm to cool, proximal to distal. Hair growth dim bilateral. (--) lymphangitis or (+) cellulitis noted left.  DERMATOLOGICAL: (+) edema, (+) erythema, (--) malodor, (--)  drainage, (+) warmth to left foot.  Ulcer noted to left medial 2nd toe  with maceration/granular base and with a 1 mm yellow/white border and hyperkeratosis surrounding ulcer. Ulcer measurement  1cm x 1cm.  The ulcer does not extend into deeper tissue and (--) sinus tracts exist.  The dorsum surface of the feet are soft and supple.  The plantar aspects of feet are dry and scaly. Webspaces 1,2,3,4 clean, dry and without evidence of break in skin integrity bilateral. Left hallux nail loose.   NEUROLOGICAL: Light touch, sharp-dull, proprioception all present and equal bilaterally.  Vibratory sensation diminished at bilateral hallux and navicular. Protective sensation absent at 6/10 sites as tested with a [place] 5.07 monofilament.   MUSCULOSKELETAL: Muscle strength is 5/5 for foot inverters, everters, plantarflexors, and dorsiflexors. Muscle tone is normal. (+) pain on palpation of left hallux and 2nd toes.       01/12/2023                  Assessment:       ICD-10-CM ICD-9-CM   1. Cellulitis of left foot - Left Foot  L03.116 682.7   2. Ischemic toe ulcer, left, with fat layer exposed - Left Foot  L97.522 707.15   3. DM type 2 with diabetic peripheral neuropathy  E11.42 250.60     357.2   4. PAD (peripheral artery disease)  I73.9 443.9       Plan:   Cellulitis of left foot - Left Foot  -     CBC Auto Differential; Future; Expected date: 01/12/2023  -     Sedimentation rate; Future; Expected date: 01/12/2023  -      C-Reactive Protein; Future; Expected date: 01/12/2023  -     Procalcitonin; Future; Expected date: 01/12/2023  -     CV Ultrasound doppler arterial leg left; Future  -     X-Ray Foot Complete Left; Future; Expected date: 01/12/2023  -     Ambulatory referral/consult to Home Health; Future; Expected date: 01/13/2023  -     amoxicillin-clavulanate 875-125mg (AUGMENTIN) 875-125 mg per tablet; Take 1 tablet by mouth 2 (two) times daily.  Dispense: 20 tablet; Refill: 0    Ischemic toe ulcer, left, with fat layer exposed - Left Foot  -     CBC Auto Differential; Future; Expected date: 01/12/2023  -     Sedimentation rate; Future; Expected date: 01/12/2023  -     C-Reactive Protein; Future; Expected date: 01/12/2023  -     Procalcitonin; Future; Expected date: 01/12/2023  -     CV Ultrasound doppler arterial leg left; Future  -     X-Ray Foot Complete Left; Future; Expected date: 01/12/2023  -     Ambulatory referral/consult to Home Health; Future; Expected date: 01/13/2023  -     amoxicillin-clavulanate 875-125mg (AUGMENTIN) 875-125 mg per tablet; Take 1 tablet by mouth 2 (two) times daily.  Dispense: 20 tablet; Refill: 0    DM type 2 with diabetic peripheral neuropathy  -     Ambulatory referral/consult to Home Health; Future; Expected date: 01/13/2023  -     amoxicillin-clavulanate 875-125mg (AUGMENTIN) 875-125 mg per tablet; Take 1 tablet by mouth 2 (two) times daily.  Dispense: 20 tablet; Refill: 0    PAD (peripheral artery disease)  -     amoxicillin-clavulanate 875-125mg (AUGMENTIN) 875-125 mg per tablet; Take 1 tablet by mouth 2 (two) times daily.  Dispense: 20 tablet; Refill: 0      I counseled the patient on her conditions, regarding findings of my examination, my impressions, and usual treatment plan.   Short-term goals include maintaining good offloading and minimizing bioburden, promoting granulation and epithelialization to healing.  Long-term goals include keeping the wound healed by good offloading and  medical management under the direction of internist.  Ordered by lateral lower extremity arterial ultrasounds to be completed as soon as possible.  Order left foot x-ray, CBC, ESR, CRP, to be completed today.  Wound was irrigated with sterile saline and cleansed with wound cleanse cloth. The patient tolerated this well. Wound was then dressed with Iodosorb and football wrap. Patient was also instructed on the importance of keeping the wound and dressings clean dry and intact and keeping pressure off the wound area until complete healing of the wound.   Home health ordered completed.   Prescription written for Augmentin be taken twice daily.  Follow-up:Patient is to return to the clinic in 1 weeks  for follow-up but should call [facility] immediately if any signs of infection, such as fever, chills, sweats, increased redness or pain.         Winnie Helm DPM  [facility]

## 2023-01-12 NOTE — Clinical Note
Patient has new wound to left foot with ischemic changes noted. Arterial ultrasound completed yesterday. Can you please have your office schedule to see her.

## 2023-01-16 PROCEDURE — G0180 MD CERTIFICATION HHA PATIENT: HCPCS | Mod: ,,, | Performed by: PODIATRIST

## 2023-01-16 PROCEDURE — G0180 PR HOME HEALTH MD CERTIFICATION: ICD-10-PCS | Mod: ,,, | Performed by: PODIATRIST

## 2023-01-18 ENCOUNTER — HOSPITAL ENCOUNTER (OUTPATIENT)
Dept: CARDIOLOGY | Facility: HOSPITAL | Age: 68
Discharge: HOME | End: 2023-01-18
Attending: PODIATRIST
Payer: MEDICARE

## 2023-01-18 VITALS
HEIGHT: 62 IN | WEIGHT: 193 LBS | DIASTOLIC BLOOD PRESSURE: 82 MMHG | BODY MASS INDEX: 35.51 KG/M2 | SYSTOLIC BLOOD PRESSURE: 186 MMHG

## 2023-01-18 DIAGNOSIS — L97.522 ISCHEMIC TOE ULCER, LEFT, WITH FAT LAYER EXPOSED: ICD-10-CM

## 2023-01-18 DIAGNOSIS — L03.116 CELLULITIS OF LEFT FOOT: ICD-10-CM

## 2023-01-18 LAB
LEFT ANT TIBIAL SYS PSV: 54 CM/S
LEFT CFA PSV: 179 CM/S
LEFT EXTERNAL ILIAC PSV: 103 CM/S
LEFT PERONEAL SYS PSV: 176 CM/S
LEFT POPLITEAL PSV: 119 CM/S
LEFT POST TIBIAL SYS PSV: 41 CM/S
LEFT PROFUNDA SYS PSV: 120 CM/S
LEFT SUPER FEMORAL DIST SYS PSV: 124 CM/S
LEFT SUPER FEMORAL MID SYS PSV: 158 CM/S
LEFT SUPER FEMORAL OSTIAL SYS PSV: 120 CM/S
LEFT SUPER FEMORAL PROX SYS PSV: 102 CM/S
LEFT TIB/PER TRUNK SYS PSV: 95 CM/S
OHS CV LEFT LOWER EXTREMITY ABI (NO CALC): 0.75

## 2023-01-18 PROCEDURE — 93926 LOWER EXTREMITY STUDY: CPT | Mod: 26,LT,, | Performed by: INTERNAL MEDICINE

## 2023-01-18 PROCEDURE — 93926 CV US DOPPLER ARTERIAL LEG LEFT (CUPID ONLY): ICD-10-PCS | Mod: 26,LT,, | Performed by: INTERNAL MEDICINE

## 2023-01-18 PROCEDURE — 93926 LOWER EXTREMITY STUDY: CPT | Mod: LT

## 2023-01-19 ENCOUNTER — OFFICE VISIT (OUTPATIENT)
Dept: PODIATRY | Facility: CLINIC | Age: 68
End: 2023-01-19
Payer: MEDICARE

## 2023-01-19 VITALS — BODY MASS INDEX: 35.49 KG/M2 | HEIGHT: 62 IN | WEIGHT: 192.88 LBS

## 2023-01-19 DIAGNOSIS — L97.522 ISCHEMIC TOE ULCER, LEFT, WITH FAT LAYER EXPOSED: Primary | ICD-10-CM

## 2023-01-19 DIAGNOSIS — I99.8 ISCHEMIC PAIN OF FOOT, LEFT: ICD-10-CM

## 2023-01-19 DIAGNOSIS — I73.9 PAD (PERIPHERAL ARTERY DISEASE): ICD-10-CM

## 2023-01-19 DIAGNOSIS — L03.116 CELLULITIS OF LEFT FOOT: ICD-10-CM

## 2023-01-19 DIAGNOSIS — M79.672 ISCHEMIC PAIN OF FOOT, LEFT: ICD-10-CM

## 2023-01-19 DIAGNOSIS — L97.522 ISCHEMIC TOE ULCER, LEFT, WITH FAT LAYER EXPOSED: ICD-10-CM

## 2023-01-19 DIAGNOSIS — E11.42 DM TYPE 2 WITH DIABETIC PERIPHERAL NEUROPATHY: ICD-10-CM

## 2023-01-19 PROCEDURE — 99212 OFFICE O/P EST SF 10 MIN: CPT | Mod: S$GLB,,, | Performed by: PODIATRIST

## 2023-01-19 PROCEDURE — 1160F RVW MEDS BY RX/DR IN RCRD: CPT | Mod: CPTII,S$GLB,, | Performed by: PODIATRIST

## 2023-01-19 PROCEDURE — 1159F MED LIST DOCD IN RCRD: CPT | Mod: CPTII,S$GLB,, | Performed by: PODIATRIST

## 2023-01-19 PROCEDURE — 1159F PR MEDICATION LIST DOCUMENTED IN MEDICAL RECORD: ICD-10-PCS | Mod: CPTII,S$GLB,, | Performed by: PODIATRIST

## 2023-01-19 PROCEDURE — 99999 PR PBB SHADOW E&M-EST. PATIENT-LVL V: CPT | Mod: PBBFAC,,, | Performed by: PODIATRIST

## 2023-01-19 PROCEDURE — 1101F PR PT FALLS ASSESS DOC 0-1 FALLS W/OUT INJ PAST YR: ICD-10-PCS | Mod: CPTII,S$GLB,, | Performed by: PODIATRIST

## 2023-01-19 PROCEDURE — 1125F PR PAIN SEVERITY QUANTIFIED, PAIN PRESENT: ICD-10-PCS | Mod: CPTII,S$GLB,, | Performed by: PODIATRIST

## 2023-01-19 PROCEDURE — 99999 PR PBB SHADOW E&M-EST. PATIENT-LVL V: ICD-10-PCS | Mod: PBBFAC,,, | Performed by: PODIATRIST

## 2023-01-19 PROCEDURE — 1160F PR REVIEW ALL MEDS BY PRESCRIBER/CLIN PHARMACIST DOCUMENTED: ICD-10-PCS | Mod: CPTII,S$GLB,, | Performed by: PODIATRIST

## 2023-01-19 PROCEDURE — 4010F PR ACE/ARB THEARPY RXD/TAKEN: ICD-10-PCS | Mod: CPTII,S$GLB,, | Performed by: PODIATRIST

## 2023-01-19 PROCEDURE — 3288F FALL RISK ASSESSMENT DOCD: CPT | Mod: CPTII,S$GLB,, | Performed by: PODIATRIST

## 2023-01-19 PROCEDURE — 1101F PT FALLS ASSESS-DOCD LE1/YR: CPT | Mod: CPTII,S$GLB,, | Performed by: PODIATRIST

## 2023-01-19 PROCEDURE — 1157F ADVNC CARE PLAN IN RCRD: CPT | Mod: CPTII,S$GLB,, | Performed by: PODIATRIST

## 2023-01-19 PROCEDURE — 4010F ACE/ARB THERAPY RXD/TAKEN: CPT | Mod: CPTII,S$GLB,, | Performed by: PODIATRIST

## 2023-01-19 PROCEDURE — 1157F PR ADVANCE CARE PLAN OR EQUIV PRESENT IN MEDICAL RECORD: ICD-10-PCS | Mod: CPTII,S$GLB,, | Performed by: PODIATRIST

## 2023-01-19 PROCEDURE — 3008F PR BODY MASS INDEX (BMI) DOCUMENTED: ICD-10-PCS | Mod: CPTII,S$GLB,, | Performed by: PODIATRIST

## 2023-01-19 PROCEDURE — 3288F PR FALLS RISK ASSESSMENT DOCUMENTED: ICD-10-PCS | Mod: CPTII,S$GLB,, | Performed by: PODIATRIST

## 2023-01-19 PROCEDURE — 3008F BODY MASS INDEX DOCD: CPT | Mod: CPTII,S$GLB,, | Performed by: PODIATRIST

## 2023-01-19 PROCEDURE — 1125F AMNT PAIN NOTED PAIN PRSNT: CPT | Mod: CPTII,S$GLB,, | Performed by: PODIATRIST

## 2023-01-19 PROCEDURE — 99212 PR OFFICE/OUTPT VISIT, EST, LEVL II, 10-19 MIN: ICD-10-PCS | Mod: S$GLB,,, | Performed by: PODIATRIST

## 2023-01-19 RX ORDER — TRAMADOL HYDROCHLORIDE 50 MG/1
50 TABLET ORAL EVERY 6 HOURS PRN
Qty: 20 TABLET | Refills: 0 | Status: SHIPPED | OUTPATIENT
Start: 2023-01-19 | End: 2023-01-19 | Stop reason: SDUPTHER

## 2023-01-19 RX ORDER — TRAMADOL HYDROCHLORIDE 50 MG/1
50 TABLET ORAL EVERY 6 HOURS PRN
Qty: 20 TABLET | Refills: 0 | Status: ON HOLD | OUTPATIENT
Start: 2023-01-19 | End: 2023-03-02 | Stop reason: HOSPADM

## 2023-01-19 RX ORDER — AMOXICILLIN AND CLAVULANATE POTASSIUM 875; 125 MG/1; MG/1
1 TABLET, FILM COATED ORAL 2 TIMES DAILY
Qty: 20 TABLET | Refills: 0 | Status: SHIPPED | OUTPATIENT
Start: 2023-01-19 | End: 2023-02-10

## 2023-01-19 RX ORDER — AMOXICILLIN AND CLAVULANATE POTASSIUM 875; 125 MG/1; MG/1
1 TABLET, FILM COATED ORAL 2 TIMES DAILY
Qty: 20 TABLET | Refills: 0 | Status: SHIPPED | OUTPATIENT
Start: 2023-01-19 | End: 2023-01-19 | Stop reason: SDUPTHER

## 2023-01-19 NOTE — PROGRESS NOTES
Subjective:     Patient ID: Malaika Paredes is a 67 y.o. female.    Chief Complaint: Wound Care and Toe Pain (Pt c/o left hallux toe pain 8/10, left 2nd digit wound f/u pt c/o 10/10 pain, diabetic pt wears post-op shoe, PCP Dr. Scales last seen unknown )    Malaika is a 67 y.o. female who presents to the clinic for evaluation and treatment of high risk feet. Malaika has a past medical history of Cataract, Cataract, CHF (congestive heart failure), COPD (chronic obstructive pulmonary disease), Diabetes mellitus, Diabetic retinopathy, Hypertension, and Malignant neoplasm of upper-outer quadrant of left breast in female, estrogen receptor positive (6/20/2022). The patient's chief complaint is left big toe pain. Patient rates pain 8/10. Patient states the toes  has been hurting for awhile. Patient states the foot feels better sometimes. Patient states home health came out. Patient also states she is taking oral antibiotics as prescribed.  This patient has documented high risk feet requiring routine maintenance secondary to diabetes mellitis and those secondary complications of diabetes, as mentioned..    PCP: Travis Scales MD    Date Last Seen by PCP: 11/23/2022    Current shoe gear:  Affected Foot: Tennis shoes     Unaffected Foot: Tennis shoes    History of Trauma: negative  Sign of Infection: none    Hemoglobin A1C   Date Value Ref Range Status   09/09/2018 5.6 4.0 - 5.6 % Final     Comment:     ADA Screening Guidelines:  5.7-6.4%  Consistent with prediabetes  >or=6.5%  Consistent with diabetes  High levels of fetal hemoglobin interfere with the HbA1C  assay. Heterozygous hemoglobin variants (HbS, HgC, etc)do  not significantly interfere with this assay.   However, presence of multiple variants may affect accuracy.         Patient Active Problem List   Diagnosis    Proliferative diabetic retinopathy of both eyes without macular edema associated with type 2 diabetes mellitus    Lattice degeneration of  right retina    Uncontrolled diabetes mellitus with both eyes affected by proliferative retinopathy and macular edema, with long-term current use of insulin    NS (nuclear sclerosis), left    Type 2 diabetes mellitus, with long-term current use of insulin    ESRD (end stage renal disease)    Hypertensive emergency    COPD (chronic obstructive pulmonary disease)    Shortness of breath    Microcytic anemia    Metabolic acidosis    Hyperkalemia    CKD (chronic kidney disease) stage 5, GFR less than 15 ml/min    Chronic kidney disease-mineral and bone disorder    Anemia associated with chronic renal failure    Diastolic dysfunction    Status post cataract extraction and insertion of intraocular lens of right eye    Malignant neoplasm of upper-outer quadrant of left breast in female, estrogen receptor positive    Severe obesity (BMI 35.0-39.9) with comorbidity    Immunodeficiency due to chemotherapy    Palliative care encounter    Peripheral neuropathy due to chemotherapy    Primary hypertension    Hyperparathyroidism, unspecified    Other thrombophilia    Longstanding persistent atrial fibrillation    Type 2 diabetes mellitus with diabetic peripheral angiopathy without gangrene, with long-term current use of insulin       Medication List with Changes/Refills   New Medications    TRAMADOL (ULTRAM) 50 MG TABLET    Take 1 tablet (50 mg total) by mouth every 6 (six) hours as needed for Pain.   Current Medications    ALBUTEROL (PROVENTIL/VENTOLIN HFA) 90 MCG/ACTUATION INHALER    Inhale 1-2 puffs into the lungs every 6 (six) hours as needed for Wheezing. Rescue    ALBUTEROL SULFATE (PROAIR RESPICLICK) 90 MCG/ACTUATION AEPB    Inhale 180 mcg into the lungs every 4 (four) hours. Rescue    AMIODARONE (PACERONE) 100 MG TAB    Take 100 mg by mouth once daily.    AMLODIPINE (NORVASC) 10 MG TABLET    Take 10 mg by mouth once daily.    APIXABAN (ELIQUIS) 2.5 MG TAB    Take 2.5 mg by mouth 2 (two) times daily.    ASPIRIN (ECOTRIN) 81  MG EC TABLET    Take 81 mg by mouth once daily.     CAPRON DM 7.5-7.5 MG/5 ML LIQD    TAKE 10-20 ML BY MOUTH EVERY 6-8 HOURS AS NEEDED COUGH    DICLOFENAC SODIUM (VOLTAREN) 1 % GEL    APPLY TOPICALLY 2 GM TO THE AFFECTED AREA 2 TIMES DAILY    GABAPENTIN (NEURONTIN) 300 MG CAPSULE    Take 1 capsule (300 mg total) by mouth 3 (three) times daily.    INSULIN DEGLUDEC (TRESIBA FLEXTOUCH U-200) 200 UNIT/ML (3 ML) INSULIN PEN    Inject 80 Units into the skin once daily.     INV METOPROLOL TARTRATE 25 MG ORAL TABLET (LOPRESSOR) 25 MG TAB    Take by mouth 2 (two) times daily. FOR INVESTIGATIONAL USE ONLY    IRON SUCROSE IN NS (VENOFER) 100 MG/100 ML PGBK    50 mg.    KETOCONAZOLE (NIZORAL) 2 % CREAM    Apply topically once daily. for 14 days    KETOROLAC 0.5% (ACULAR) 0.5 % DROP    Place 1 drop into both eyes 4 (four) times daily.    LEVOTHYROXINE (SYNTHROID) 75 MCG TABLET    Take 75 mcg by mouth once daily.     LOSARTAN (COZAAR) 100 MG TABLET    Take 100 mg by mouth once daily.    LOSARTAN (COZAAR) 25 MG TABLET        MUPIROCIN (BACTROBAN) 2 % OINTMENT    Apply topically once daily.    NEOMYCIN-POLYMYXIN-DEXAMETHASONE (MAXITROL) 3.5MG/ML-10,000 UNIT/ML-0.1 % DRPS    Place 1 drop into both eyes 2 (two) times daily.    OMEPRAZOLE (PRILOSEC) 20 MG CAPSULE    Take 20 mg by mouth once daily.    ONDANSETRON (ZOFRAN) 4 MG TABLET    Take 1 tablet (4 mg total) by mouth every 8 (eight) hours as needed for Nausea.    PREDNISOLONE ACETATE (PRED FORTE) 1 % DRPS    Place 1 drop into both eyes 4 (four) times daily.    PREDNISONE (DELTASONE) 10 MG TABLET    Take by mouth.    PROCHLORPERAZINE (COMPAZINE) 5 MG TABLET    TAKE 1 TABLET (5 MG TOTAL) BY MOUTH 4 (FOUR) TIMES DAILY AS NEEDED FOR NAUSEA.    RENVELA 800 MG TAB    SMARTSI Tablet(s) By Mouth 5 Times Daily    SEVELAMER HCL ORAL    Take by mouth.   Changed and/or Refilled Medications    Modified Medication Previous Medication    AMOXICILLIN-CLAVULANATE 875-125MG (AUGMENTIN) 875-125  "MG PER TABLET amoxicillin-clavulanate 875-125mg (AUGMENTIN) 875-125 mg per tablet       Take 1 tablet by mouth 2 (two) times daily.    Take 1 tablet by mouth 2 (two) times daily.       Review of patient's allergies indicates:  No Known Allergies    Past Surgical History:   Procedure Laterality Date    BREAST BIOPSY Right     benign    CATARACT EXTRACTION W/  INTRAOCULAR LENS IMPLANT Right 08/14/2017    Dr. Moe    CORONARY ARTERY BYPASS GRAFT      FLUOROSCOPY N/A 7/25/2022    Procedure: FLUOROSCOPY/mediport placement;  Surgeon: Cole Perdomo MD;  Location: [facility] CATH LAB;  Service: General;  Laterality: N/A;       Family History   Problem Relation Age of Onset    Hypertension Mother     Cancer Father     Breast cancer Sister     Diabetes Sister     Cancer Brother     Amblyopia Neg Hx     Blindness Neg Hx     Cataracts Neg Hx     Glaucoma Neg Hx     Macular degeneration Neg Hx     Retinal detachment Neg Hx     Strabismus Neg Hx     Stroke Neg Hx     Thyroid disease Neg Hx        Social History     Socioeconomic History    Marital status:    Tobacco Use    Smoking status: Former     Types: Cigarettes     Quit date: 6/21/2012     Years since quitting: 10.5    Smokeless tobacco: Never   Substance and Sexual Activity    Alcohol use: No    Drug use: Never       Vitals:    01/19/23 0824   Weight: 87.5 kg (192 lb 14.4 oz)   Height: 5' 2" (1.575 m)   PainSc: 10-Worst pain ever   PainLoc: Toe       Hemoglobin A1C   Date Value Ref Range Status   09/09/2018 5.6 4.0 - 5.6 % Final     Comment:     ADA Screening Guidelines:  5.7-6.4%  Consistent with prediabetes  >or=6.5%  Consistent with diabetes  High levels of fetal hemoglobin interfere with the HbA1C  assay. Heterozygous hemoglobin variants (HbS, HgC, etc)do  not significantly interfere with this assay.   However, presence of multiple variants may affect accuracy.         Review of Systems   Constitutional:  Negative for chills and fever.   Respiratory:  Negative for " shortness of breath.    Cardiovascular:  Negative for chest pain, palpitations, orthopnea, claudication and leg swelling.   Gastrointestinal:  Negative for diarrhea, nausea and vomiting.   Musculoskeletal:  Negative for joint pain.   Skin:  Negative for rash.   Neurological:  Positive for tingling and sensory change.   Psychiatric/Behavioral: Negative.             Objective:      PHYSICAL EXAM: Apperance: Alert and orient in no distress,well developed, and with good attention to grooming and body habits  Patient presents ambulating in tennis shoes.   Lower Extremity Exam  VASCULAR: Dorsalis pedis pulses 1/4 bilateral and Posterior Tibial pulses 1/4 bilateral. Capillary fill time <blanched bilateral. Mild edema observed bilateral. Varicosities present bilateral. Skin temperature of the lower extremities is warm to cool, proximal to distal. Hair growth dim bilateral. (--) lymphangitis or (+) cellulitis noted left.  DERMATOLOGICAL: (+) decreased edema, (+) erythema, (--) malodor, (--)  drainage, (+) decreased warmth to left foot.  Ulcer noted to left medial 2nd toe  with granular base and with a 1 mm yellow/white border and hyperkeratosis surrounding ulcer. Ulcer measurement  0.6cm x 0.6cm.  The ulcer does not extend into deeper tissue and (--) sinus tracts exist.  The dorsum surface of the feet are soft and supple.  The plantar aspects of feet are dry and scaly. Webspaces 1,2,3,4 clean, dry and without evidence of break in skin integrity bilateral. Left hallux nail loose.   NEUROLOGICAL: Light touch, sharp-dull, proprioception all present and equal bilaterally.  Vibratory sensation diminished at bilateral hallux and navicular. Protective sensation absent at 6/10 sites as tested with a [place] 5.07 monofilament.   MUSCULOSKELETAL: Muscle strength is 5/5 for foot inverters, everters, plantarflexors, and dorsiflexors. Muscle tone is normal. (+) pain on palpation of left hallux and 2nd toes.      01/19/23 01/12/2023                  Assessment:       ICD-10-CM ICD-9-CM   1. Ischemic toe ulcer, left, with fat layer exposed  L97.522 707.15   2. DM type 2 with diabetic peripheral neuropathy  E11.42 250.60     357.2   3. Ischemic pain of foot, left  M79.672 459.9    I99.8 729.5   4. Cellulitis of left foot - Left Foot  L03.116 682.7   5. Ischemic toe ulcer, left, with fat layer exposed - Left Foot  L97.522 707.15   6. PAD (peripheral artery disease)  I73.9 443.9       Plan:   Ischemic toe ulcer, left, with fat layer exposed  -     traMADoL (ULTRAM) 50 mg tablet; Take 1 tablet (50 mg total) by mouth every 6 (six) hours as needed for Pain.  Dispense: 20 tablet; Refill: 0  -     amoxicillin-clavulanate 875-125mg (AUGMENTIN) 875-125 mg per tablet; Take 1 tablet by mouth 2 (two) times daily.  Dispense: 20 tablet; Refill: 0    DM type 2 with diabetic peripheral neuropathy  -     amoxicillin-clavulanate 875-125mg (AUGMENTIN) 875-125 mg per tablet; Take 1 tablet by mouth 2 (two) times daily.  Dispense: 20 tablet; Refill: 0    Ischemic pain of foot, left  -     traMADoL (ULTRAM) 50 mg tablet; Take 1 tablet (50 mg total) by mouth every 6 (six) hours as needed for Pain.  Dispense: 20 tablet; Refill: 0    Cellulitis of left foot - Left Foot    Ischemic toe ulcer, left, with fat layer exposed - Left Foot  -     traMADoL (ULTRAM) 50 mg tablet; Take 1 tablet (50 mg total) by mouth every 6 (six) hours as needed for Pain.  Dispense: 20 tablet; Refill: 0  -     amoxicillin-clavulanate 875-125mg (AUGMENTIN) 875-125 mg per tablet; Take 1 tablet by mouth 2 (two) times daily.  Dispense: 20 tablet; Refill: 0    PAD (peripheral artery disease)  -     amoxicillin-clavulanate 875-125mg (AUGMENTIN) 875-125 mg per tablet; Take 1 tablet by mouth 2 (two) times daily.  Dispense: 20 tablet; Refill: 0    I counseled the patient on her conditions, regarding findings of my examination, my impressions, and usual treatment plan.    Short-term goals include maintaining good offloading and minimizing bioburden, promoting granulation and epithelialization to healing.  Long-term goals include keeping the wound healed by good offloading and medical management under the direction of internist.  Ordered by lateral lower extremity arterial ultrasounds to be completed as soon as possible.  Order left foot x-ray, CBC, ESR, CRP, to be completed today.  Wound was irrigated with sterile saline and cleansed with wound cleanse cloth. The patient tolerated this well. Wound was then dressed with Iodosorb and football wrap. Patient was also instructed on the importance of keeping the wound and dressings clean dry and intact and keeping pressure off the wound area until complete healing of the wound.   Home health to continue.   Prescribed Tramadol 50mg to be taken as needed for pain. Patient advised on the possible elevation of blood pressure or heart effects and caution to take pills as needed and to discontinue use if symptoms arise, patient agreed.   Prescription refilled for Augmentin be taken twice daily.  Requested sooner appointment with interventional cardiology (Dr. Rader) for vascular intervention from arterial ultrasound.   Follow-up:Patient is to return to the clinic in 1 weeks  for follow-up but should call [facility] immediately if any signs of infection, such as fever, chills, sweats, increased redness or pain.         Sharnette Miles, DPM [facility]

## 2023-01-27 ENCOUNTER — EXTERNAL HOME HEALTH (OUTPATIENT)
Dept: HOME HEALTH SERVICES | Facility: HOSPITAL | Age: 68
End: 2023-01-27
Payer: MEDICARE

## 2023-01-30 ENCOUNTER — INFUSION (OUTPATIENT)
Dept: INFUSION THERAPY | Facility: HOSPITAL | Age: 68
End: 2023-01-30
Attending: SURGERY
Payer: MEDICARE

## 2023-01-30 ENCOUNTER — HOSPITAL ENCOUNTER (OUTPATIENT)
Dept: RADIOLOGY | Facility: HOSPITAL | Age: 68
Discharge: HOME | End: 2023-01-30
Attending: SURGERY
Payer: MEDICARE

## 2023-01-30 VITALS
SYSTOLIC BLOOD PRESSURE: 182 MMHG | TEMPERATURE: 98 F | HEART RATE: 79 BPM | DIASTOLIC BLOOD PRESSURE: 84 MMHG | OXYGEN SATURATION: 98 % | RESPIRATION RATE: 18 BRPM

## 2023-01-30 DIAGNOSIS — Z17.0 MALIGNANT NEOPLASM OF LEFT BREAST IN FEMALE, ESTROGEN RECEPTOR POSITIVE, UNSPECIFIED SITE OF BREAST: ICD-10-CM

## 2023-01-30 DIAGNOSIS — C50.912 MALIGNANT NEOPLASM OF LEFT BREAST IN FEMALE, ESTROGEN RECEPTOR POSITIVE, UNSPECIFIED SITE OF BREAST: ICD-10-CM

## 2023-01-30 DIAGNOSIS — Z17.0 MALIGNANT NEOPLASM OF UPPER-OUTER QUADRANT OF LEFT BREAST IN FEMALE, ESTROGEN RECEPTOR POSITIVE: Primary | ICD-10-CM

## 2023-01-30 DIAGNOSIS — C50.412 MALIGNANT NEOPLASM OF UPPER-OUTER QUADRANT OF LEFT BREAST IN FEMALE, ESTROGEN RECEPTOR POSITIVE: Primary | ICD-10-CM

## 2023-01-30 PROCEDURE — 77065 MAMMO DIGITAL DIAGNOSTIC LEFT WITH TOMO: ICD-10-PCS | Mod: 26,LT,, | Performed by: RADIOLOGY

## 2023-01-30 PROCEDURE — A9552 F18 FDG: HCPCS

## 2023-01-30 PROCEDURE — 96402 CHEMO HORMON ANTINEOPL SQ/IM: CPT

## 2023-01-30 PROCEDURE — 78815 NM PET CT ROUTINE: ICD-10-PCS | Mod: 26,PS,, | Performed by: RADIOLOGY

## 2023-01-30 PROCEDURE — 77065 DX MAMMO INCL CAD UNI: CPT | Mod: 26,LT,, | Performed by: RADIOLOGY

## 2023-01-30 PROCEDURE — 63600175 PHARM REV CODE 636 W HCPCS: Mod: TB | Performed by: INTERNAL MEDICINE

## 2023-01-30 PROCEDURE — 77061 BREAST TOMOSYNTHESIS UNI: CPT | Mod: 26,LT,, | Performed by: RADIOLOGY

## 2023-01-30 PROCEDURE — 77061 BREAST TOMOSYNTHESIS UNI: CPT | Mod: TC,LT

## 2023-01-30 PROCEDURE — 77061 MAMMO DIGITAL DIAGNOSTIC LEFT WITH TOMO: ICD-10-PCS | Mod: 26,LT,, | Performed by: RADIOLOGY

## 2023-01-30 PROCEDURE — 78815 PET IMAGE W/CT SKULL-THIGH: CPT | Mod: 26,PS,, | Performed by: RADIOLOGY

## 2023-01-30 PROCEDURE — 96401 CHEMO ANTI-NEOPL SQ/IM: CPT

## 2023-01-30 RX ORDER — BUDESONIDE 1 MG/2ML
INHALANT ORAL
COMMUNITY
End: 2023-02-10

## 2023-01-30 RX ORDER — LEVOFLOXACIN 250 MG/1
250 TABLET ORAL DAILY
COMMUNITY
Start: 2023-01-26 | End: 2023-11-16

## 2023-01-30 RX ADMIN — PERTUZUMAB, TRASTUZUMAB, AND HYALURONIDASE-ZZXF 600 MG: 600; 600; 2000 INJECTION, SOLUTION SUBCUTANEOUS at 02:01

## 2023-01-30 NOTE — DISCHARGE INSTRUCTIONS
THANKS FOR ALLOWING ME TO CARE FOR YOU TODAY!!! ~Lydia          THANKS FOR CHOOSING [facility]!!!      [facility]  [address] [facility]  [id number] [place] [address] Drive  [phone] phone     [phone] fax  Hours of Operation: Monday- Friday 8:00am- 5:00pm  After hours phone  [phone]  Hematology / Oncology Physicians on call      JAQUAN Zhou Dr., Dr., NP Sydney Prescott, SMITH Lizarraga, VILMA Welsh    Please call with any concerns regarding your appointment today.

## 2023-01-30 NOTE — NURSING
Sent message to Dr. العلي about patietn blood pressure. She had reported having not taken her blood pressure med yet today. She took it after first blood pressure and manual blood pressure done and Dr. العلي said to go ahead and treat.

## 2023-01-30 NOTE — PLAN OF CARE
Problem: Adult Inpatient Plan of Care  Goal: Plan of Care Review  Outcome: Ongoing, Progressing  Flowsheets (Taken 1/30/2023 1403)  Plan of Care Reviewed With: patient  Goal: Patient-Specific Goal (Individualized)  Outcome: Ongoing, Progressing  Flowsheets (Taken 1/30/2023 1403)  Anxieties, Fears or Concerns: none  Individualized Care Needs: feet up, snack, printout, warm blanket  Goal: Optimal Comfort and Wellbeing  Outcome: Ongoing, Progressing  Intervention: Provide Person-Centered Care  Flowsheets (Taken 1/30/2023 1403)  Trust Relationship/Rapport:   care explained   questions encouraged   choices provided   questions answered   reassurance provided   emotional support provided   thoughts/feelings acknowledged   empathic listening provided     Problem: Fall Injury Risk  Goal: Absence of Fall and Fall-Related Injury  Outcome: Ongoing, Progressing  Intervention: Identify and Manage Contributors  Flowsheets (Taken 1/30/2023 1403)  Self-Care Promotion: BADL personal routines maintained  Medication Review/Management: medications reviewed  Intervention: Promote Injury-Free Environment  Flowsheets (Taken 1/30/2023 1403)  Safety Promotion/Fall Prevention:   in recliner, wheels locked   nonskid shoes/socks when out of bed   room near unit station   medications reviewed

## 2023-02-02 ENCOUNTER — TUMOR BOARD CONFERENCE (OUTPATIENT)
Dept: HEMATOLOGY/ONCOLOGY | Facility: CLINIC | Age: 68
End: 2023-02-02
Payer: MEDICARE

## 2023-02-02 NOTE — PROGRESS NOTES
Interdisciplinary Breast Cancer Conference    Malaika Paredes    Female    Date Presented to Tumor Board: 02/02/23    Presenting Hospital / Clinic: [facility]    Tumor Laterality: Left         Presenter: Dr. Jayjay Arana    Reason for Consultation: Follow-Up from Prior Tumor Board    Specialties Present: Medical Oncology;Hematology;Radiation Oncology;Pathology;Genetics;Navigation;Surgical Oncology    Patient Status: a current patient    Treatment to Date: Neoadjuvant Chemotherapy    Clinical Trial Eligibility: Not discussed                   Cancer Staging   Malignant neoplasm of upper-outer quadrant of left breast in female, estrogen receptor positive  Staging form: Breast, AJCC 8th Edition  - Clinical stage from 6/20/2022: Stage IIB (cT3, cN2a(f), cM0, G3, ER+, SC+, HER2+) - Signed by Toney العلي MD on 7/11/2022      Recommended Plan: Chemotherapy;Surgery    Proceed with primary surgical resection, non myelosuppressive portion of treatment can be continued while surgical intervention occurs    Metastatic Site(s): None    Presentation at Cancer Conference: Prospective

## 2023-02-08 ENCOUNTER — HOSPITAL ENCOUNTER (OUTPATIENT)
Dept: CARDIOLOGY | Facility: HOSPITAL | Age: 68
Discharge: HOME | End: 2023-02-08
Attending: SURGERY
Payer: MEDICARE

## 2023-02-08 ENCOUNTER — OFFICE VISIT (OUTPATIENT)
Dept: SURGERY | Facility: CLINIC | Age: 68
End: 2023-02-08
Payer: MEDICARE

## 2023-02-08 VITALS
WEIGHT: 193.31 LBS | HEART RATE: 68 BPM | TEMPERATURE: 99 F | DIASTOLIC BLOOD PRESSURE: 72 MMHG | SYSTOLIC BLOOD PRESSURE: 131 MMHG | BODY MASS INDEX: 35.57 KG/M2 | HEIGHT: 62 IN

## 2023-02-08 DIAGNOSIS — C50.912 MALIGNANT NEOPLASM OF LEFT BREAST IN FEMALE, ESTROGEN RECEPTOR POSITIVE, UNSPECIFIED SITE OF BREAST: ICD-10-CM

## 2023-02-08 DIAGNOSIS — R59.0 LYMPHADENOPATHY, INGUINAL: ICD-10-CM

## 2023-02-08 DIAGNOSIS — C50.912 MALIGNANT NEOPLASM OF LEFT BREAST IN FEMALE, ESTROGEN RECEPTOR POSITIVE, UNSPECIFIED SITE OF BREAST: Primary | ICD-10-CM

## 2023-02-08 DIAGNOSIS — C50.412 MALIGNANT NEOPLASM OF UPPER-OUTER QUADRANT OF LEFT BREAST IN FEMALE, ESTROGEN RECEPTOR POSITIVE: ICD-10-CM

## 2023-02-08 DIAGNOSIS — Z17.0 MALIGNANT NEOPLASM OF LEFT BREAST IN FEMALE, ESTROGEN RECEPTOR POSITIVE, UNSPECIFIED SITE OF BREAST: ICD-10-CM

## 2023-02-08 DIAGNOSIS — Z17.0 MALIGNANT NEOPLASM OF UPPER-OUTER QUADRANT OF LEFT BREAST IN FEMALE, ESTROGEN RECEPTOR POSITIVE: ICD-10-CM

## 2023-02-08 DIAGNOSIS — Z17.0 MALIGNANT NEOPLASM OF LEFT BREAST IN FEMALE, ESTROGEN RECEPTOR POSITIVE, UNSPECIFIED SITE OF BREAST: Primary | ICD-10-CM

## 2023-02-08 PROCEDURE — 99999 PR PBB SHADOW E&M-EST. PATIENT-LVL V: ICD-10-PCS | Mod: PBBFAC,,, | Performed by: SURGERY

## 2023-02-08 PROCEDURE — 3075F PR MOST RECENT SYSTOLIC BLOOD PRESS GE 130-139MM HG: ICD-10-PCS | Mod: CPTII,S$GLB,, | Performed by: SURGERY

## 2023-02-08 PROCEDURE — 1126F AMNT PAIN NOTED NONE PRSNT: CPT | Mod: CPTII,S$GLB,, | Performed by: SURGERY

## 2023-02-08 PROCEDURE — 99999 PR PBB SHADOW E&M-EST. PATIENT-LVL V: CPT | Mod: PBBFAC,,, | Performed by: SURGERY

## 2023-02-08 PROCEDURE — 1157F PR ADVANCE CARE PLAN OR EQUIV PRESENT IN MEDICAL RECORD: ICD-10-PCS | Mod: CPTII,S$GLB,, | Performed by: SURGERY

## 2023-02-08 PROCEDURE — 1160F PR REVIEW ALL MEDS BY PRESCRIBER/CLIN PHARMACIST DOCUMENTED: ICD-10-PCS | Mod: CPTII,S$GLB,, | Performed by: SURGERY

## 2023-02-08 PROCEDURE — 93010 EKG 12-LEAD: ICD-10-PCS | Mod: ,,, | Performed by: INTERNAL MEDICINE

## 2023-02-08 PROCEDURE — 93010 ELECTROCARDIOGRAM REPORT: CPT | Mod: ,,, | Performed by: INTERNAL MEDICINE

## 2023-02-08 PROCEDURE — 3044F PR MOST RECENT HEMOGLOBIN A1C LEVEL <7.0%: ICD-10-PCS | Mod: CPTII,S$GLB,, | Performed by: SURGERY

## 2023-02-08 PROCEDURE — 3008F PR BODY MASS INDEX (BMI) DOCUMENTED: ICD-10-PCS | Mod: CPTII,S$GLB,, | Performed by: SURGERY

## 2023-02-08 PROCEDURE — 99215 PR OFFICE/OUTPT VISIT, EST, LEVL V, 40-54 MIN: ICD-10-PCS | Mod: S$GLB,,, | Performed by: SURGERY

## 2023-02-08 PROCEDURE — 99215 OFFICE O/P EST HI 40 MIN: CPT | Mod: S$GLB,,, | Performed by: SURGERY

## 2023-02-08 PROCEDURE — 93005 ELECTROCARDIOGRAM TRACING: CPT

## 2023-02-08 PROCEDURE — 1159F MED LIST DOCD IN RCRD: CPT | Mod: CPTII,S$GLB,, | Performed by: SURGERY

## 2023-02-08 PROCEDURE — 3044F HG A1C LEVEL LT 7.0%: CPT | Mod: CPTII,S$GLB,, | Performed by: SURGERY

## 2023-02-08 PROCEDURE — 1160F RVW MEDS BY RX/DR IN RCRD: CPT | Mod: CPTII,S$GLB,, | Performed by: SURGERY

## 2023-02-08 PROCEDURE — 1159F PR MEDICATION LIST DOCUMENTED IN MEDICAL RECORD: ICD-10-PCS | Mod: CPTII,S$GLB,, | Performed by: SURGERY

## 2023-02-08 PROCEDURE — 3075F SYST BP GE 130 - 139MM HG: CPT | Mod: CPTII,S$GLB,, | Performed by: SURGERY

## 2023-02-08 PROCEDURE — 1157F ADVNC CARE PLAN IN RCRD: CPT | Mod: CPTII,S$GLB,, | Performed by: SURGERY

## 2023-02-08 PROCEDURE — 1126F PR PAIN SEVERITY QUANTIFIED, NO PAIN PRESENT: ICD-10-PCS | Mod: CPTII,S$GLB,, | Performed by: SURGERY

## 2023-02-08 PROCEDURE — 4010F PR ACE/ARB THEARPY RXD/TAKEN: ICD-10-PCS | Mod: CPTII,S$GLB,, | Performed by: SURGERY

## 2023-02-08 PROCEDURE — 4010F ACE/ARB THERAPY RXD/TAKEN: CPT | Mod: CPTII,S$GLB,, | Performed by: SURGERY

## 2023-02-08 PROCEDURE — 3078F PR MOST RECENT DIASTOLIC BLOOD PRESSURE < 80 MM HG: ICD-10-PCS | Mod: CPTII,S$GLB,, | Performed by: SURGERY

## 2023-02-08 PROCEDURE — 3008F BODY MASS INDEX DOCD: CPT | Mod: CPTII,S$GLB,, | Performed by: SURGERY

## 2023-02-08 PROCEDURE — 3078F DIAST BP <80 MM HG: CPT | Mod: CPTII,S$GLB,, | Performed by: SURGERY

## 2023-02-08 RX ORDER — LIDOCAINE HYDROCHLORIDE 10 MG/ML
1 INJECTION, SOLUTION EPIDURAL; INFILTRATION; INTRACAUDAL; PERINEURAL ONCE
Status: CANCELLED | OUTPATIENT
Start: 2023-02-08 | End: 2023-02-08

## 2023-02-08 RX ORDER — SODIUM CHLORIDE 9 MG/ML
INJECTION, SOLUTION INTRAVENOUS CONTINUOUS
Status: CANCELLED | OUTPATIENT
Start: 2023-02-08

## 2023-02-10 ENCOUNTER — OFFICE VISIT (OUTPATIENT)
Dept: INTERNAL MEDICINE | Facility: CLINIC | Age: 68
End: 2023-02-10
Payer: MEDICARE

## 2023-02-10 ENCOUNTER — HOSPITAL ENCOUNTER (OUTPATIENT)
Dept: RADIOLOGY | Facility: HOSPITAL | Age: 68
Discharge: HOME | End: 2023-02-10
Attending: NURSE PRACTITIONER
Payer: MEDICARE

## 2023-02-10 VITALS
HEART RATE: 68 BPM | SYSTOLIC BLOOD PRESSURE: 151 MMHG | TEMPERATURE: 98 F | DIASTOLIC BLOOD PRESSURE: 75 MMHG | OXYGEN SATURATION: 96 % | RESPIRATION RATE: 18 BRPM

## 2023-02-10 DIAGNOSIS — Z17.0 MALIGNANT NEOPLASM OF UPPER-OUTER QUADRANT OF LEFT BREAST IN FEMALE, ESTROGEN RECEPTOR POSITIVE: ICD-10-CM

## 2023-02-10 DIAGNOSIS — T45.1X5A IMMUNODEFICIENCY DUE TO CHEMOTHERAPY: ICD-10-CM

## 2023-02-10 DIAGNOSIS — E11.65 UNCONTROLLED TYPE 2 DIABETES MELLITUS WITH HYPERGLYCEMIA: Primary | ICD-10-CM

## 2023-02-10 DIAGNOSIS — Z79.4 TYPE 2 DIABETES MELLITUS WITH CHRONIC KIDNEY DISEASE ON CHRONIC DIALYSIS, WITH LONG-TERM CURRENT USE OF INSULIN: ICD-10-CM

## 2023-02-10 DIAGNOSIS — D53.9 MACROCYTIC ANEMIA: ICD-10-CM

## 2023-02-10 DIAGNOSIS — Z01.818 PREOPERATIVE EXAMINATION: ICD-10-CM

## 2023-02-10 DIAGNOSIS — I48.0 PAF (PAROXYSMAL ATRIAL FIBRILLATION): ICD-10-CM

## 2023-02-10 DIAGNOSIS — D84.821 IMMUNODEFICIENCY DUE TO CHEMOTHERAPY: ICD-10-CM

## 2023-02-10 DIAGNOSIS — N18.6 ESRD (END STAGE RENAL DISEASE): ICD-10-CM

## 2023-02-10 DIAGNOSIS — Z79.899 IMMUNODEFICIENCY DUE TO CHEMOTHERAPY: ICD-10-CM

## 2023-02-10 DIAGNOSIS — I51.89 DIASTOLIC DYSFUNCTION: ICD-10-CM

## 2023-02-10 DIAGNOSIS — N18.6 TYPE 2 DIABETES MELLITUS WITH CHRONIC KIDNEY DISEASE ON CHRONIC DIALYSIS, WITH LONG-TERM CURRENT USE OF INSULIN: ICD-10-CM

## 2023-02-10 DIAGNOSIS — I25.10 CORONARY ARTERY DISEASE, UNSPECIFIED VESSEL OR LESION TYPE, UNSPECIFIED WHETHER ANGINA PRESENT, UNSPECIFIED WHETHER NATIVE OR TRANSPLANTED HEART: ICD-10-CM

## 2023-02-10 DIAGNOSIS — I10 PRIMARY HYPERTENSION: ICD-10-CM

## 2023-02-10 DIAGNOSIS — C50.412 MALIGNANT NEOPLASM OF UPPER-OUTER QUADRANT OF LEFT BREAST IN FEMALE, ESTROGEN RECEPTOR POSITIVE: ICD-10-CM

## 2023-02-10 DIAGNOSIS — E11.22 TYPE 2 DIABETES MELLITUS WITH CHRONIC KIDNEY DISEASE ON CHRONIC DIALYSIS, WITH LONG-TERM CURRENT USE OF INSULIN: ICD-10-CM

## 2023-02-10 DIAGNOSIS — Z99.2 TYPE 2 DIABETES MELLITUS WITH CHRONIC KIDNEY DISEASE ON CHRONIC DIALYSIS, WITH LONG-TERM CURRENT USE OF INSULIN: ICD-10-CM

## 2023-02-10 PROBLEM — L97.529 ISCHEMIC ULCER OF TOE OF LEFT FOOT: Status: ACTIVE | Noted: 2023-02-10

## 2023-02-10 PROBLEM — E87.5 HYPERKALEMIA: Status: RESOLVED | Noted: 2018-09-09 | Resolved: 2023-02-10

## 2023-02-10 PROBLEM — E87.20 METABOLIC ACIDOSIS: Status: RESOLVED | Noted: 2018-09-09 | Resolved: 2023-02-10

## 2023-02-10 PROCEDURE — 99999 PR PBB SHADOW E&M-EST. PATIENT-LVL IV: ICD-10-PCS | Mod: PBBFAC,,,

## 2023-02-10 PROCEDURE — 71046 XR CHEST PA AND LATERAL: ICD-10-PCS | Mod: 26,,, | Performed by: RADIOLOGY

## 2023-02-10 PROCEDURE — 99999 PR PBB SHADOW E&M-EST. PATIENT-LVL IV: CPT | Mod: PBBFAC,,,

## 2023-02-10 PROCEDURE — 71046 X-RAY EXAM CHEST 2 VIEWS: CPT | Mod: TC

## 2023-02-10 PROCEDURE — 71046 X-RAY EXAM CHEST 2 VIEWS: CPT | Mod: 26,,, | Performed by: RADIOLOGY

## 2023-02-10 RX ORDER — LOPERAMIDE HYDROCHLORIDE 2 MG/1
2 CAPSULE ORAL 4 TIMES DAILY PRN
COMMUNITY
End: 2023-08-14 | Stop reason: SDUPTHER

## 2023-02-10 RX ORDER — BUDESONIDE, GLYCOPYRROLATE, AND FORMOTEROL FUMARATE 160; 9; 4.8 UG/1; UG/1; UG/1
1 AEROSOL, METERED RESPIRATORY (INHALATION) DAILY
COMMUNITY

## 2023-02-10 RX ORDER — ACETAMINOPHEN 500 MG/1
500 CAPSULE, LIQUID FILLED ORAL EVERY 6 HOURS PRN
COMMUNITY

## 2023-02-10 RX ORDER — SODIUM BICARBONATE 650 MG/1
650 TABLET ORAL 2 TIMES DAILY
COMMUNITY

## 2023-02-10 RX ORDER — CALCIUM CARBONATE 500(1250)
1 TABLET,CHEWABLE ORAL 2 TIMES DAILY PRN
COMMUNITY

## 2023-02-10 RX ORDER — ATORVASTATIN CALCIUM 10 MG/1
10 TABLET, FILM COATED ORAL DAILY
COMMUNITY

## 2023-02-10 RX ORDER — METOPROLOL SUCCINATE 25 MG/1
25 TABLET, EXTENDED RELEASE ORAL 2 TIMES DAILY
COMMUNITY

## 2023-02-10 NOTE — ASSESSMENT & PLAN NOTE
Check Hgb A1c  Cont Tresba 60 units daily   Pt reports blood sugars run 110-130 in am - will hold insulin am of surgery  Accu check pre/post op

## 2023-02-10 NOTE — ASSESSMENT & PLAN NOTE
S/p CABG (2020?)  No recent CP or SOB   chronic ADAN   Cont ASA, BB, Statin , Losartan   Hold Losartan am of surgery  Appointment made for preop cardiology evaluation

## 2023-02-10 NOTE — ASSESSMENT & PLAN NOTE
Known risk factors for perioperative complications:     ESRD- HD TTS  PAF-on ASA  DM  Diastolic CHF  COPD  NIRAJ  Immunocompromised- on chemotherapy        Difficulty with intubation is not anticipated.    Cardiac Risk Estimation: Based on the Revised Cardiac Risk index, patient is a Class risk IV with a 9.1% risk of a major cardiac event in a low risk procedure.    1.) Preoperative workup as follows:CXR, HGb A1c, Preop cardiology evaluation   2.) Change in medication regimen before surgery: pt only on ASA. If cardiology prescribes Eliquis- pt will need to hold this medication 2-3 days prior to surgery  3.) Prophylaxis for cardiac events with perioperative beta-blockers: cont Toprol    4.) Invasive hemodynamic monitoring perioperatively: not indicated.  5.) Deep vein thrombosis prophylaxis postoperatively: regimen to be chosen by surgical team.  6.) Surveillance for postoperative MI with ECG immediately postoperatively and on postoperati ve days 1 and 2 AND troponin levels 24 hours postoperatively and on day 4 or hospital discharge (whichever comes first): not indicated.  7.) Current medications which may produce withdrawal symptoms if withheld perioperatively: BB  8.) Other measures: Careful attention to perioperative glycemic control (accu check pre/post).  Postoperative hypertension management with IV hydralazine until able to take oral medications.  Postoperative cardiac telemetry (while hospitalized ).  Postoperative incentive spirometry to prevent pneumonia.  Adjustment of dosing of applicable medications for renal insufficiency.  ESRD

## 2023-02-10 NOTE — ASSESSMENT & PLAN NOTE
Stage IIB ER positive HER2 positive breast carcinoma currently on Chemotherapy  receiving combination Herceptin and Perjeta    The patient is scheduled for a Left simple mastectomy with Left axillary lymphadenectomy on 3/1/23 per Dr. Arana

## 2023-02-10 NOTE — DISCHARGE INSTRUCTIONS
Pre op instructions reviewed with pt : Spoke about pre op process and surgery instructions, verbalized understanding.    To confirm, Surgery is scheduled on 2/28/23. We will call you late afternoon the business day prior to surgery with your arrival time.    *Please report to the [facility] (1st Floor) located off of [facility] (2nd Entrance/Building on the left, in front of the flag pole).  Address: [address] [place] [address] [place]. [address]        INSTRUCTIONS IMPORTANT!!!  Do Not Eat, Drink, or Smoke after 12 midnight unless instructed otherwise by your Surgeon. OK to brush teeth, no gum, candy or mints!      *Take Only these medications with a small sip of water Morning of Surgery:  Amlodipine  Atorvastatin  Inhaler  Levothyroxine  Metoprolol  Prilosec      Blood Thinners: Stop taking Eliquis prior to surgery per Physician Instructions! Call your Surgeon office to inquire about any questions regarding your blood thinner medication.    ____  HOLD all vitamins, herbal supplements, aspirin products & NSAIDS 7 days prior to surgery, as these can thin the blood.  ____  NO Acrylic/fake nails or nail polish worn day of surgery (specifically hand/arm & foot surgeries).  ____  NO powder, lotions, deodorants, oils or cream on body.  ____  Remove all jewelry & piercings prior to surgery.  ____  Remove Dentures, Hearing Aids & Contact Lens prior to surgery.  ____  Bring photo ID and insurance information to hospital (Leave Valuables at Home).  ____  If going home the same day, arrange for a ride home. You will not be able to drive for 24 hrs if Anesthesia was used.   ____  Females (ages 11-60): may need to give a urine sample the morning of surgery; please see Pre op Nurse prior to using the restroom.  ____  Males: Stop ED medications (Viagra, Cialis) 24 hrs prior to surgery.  ____  Wear clean, loose fitting clothing to allow for dressings/ bandages.            Diabetic Patients: If you take diabetic  medication, do NOT take morning of surgery unless instructed by Doctor. Metformin to be stopped 24 hrs prior to surgery time. DO NOT take long-acting insulin the evening before surgery. Blood sugars will be checked in pre-op by Nurse.    Bathing Instructions:    -Shower with anti-bacterial Soap (Hibiclens or Dial) the night before surgery and the morning of.   -Do not use Hibiclens on your face or genitals.   -Apply clean clothes after shower.  -Do not shave your face or body 2 days prior to surgery unless instructed otherwise by your Surgeon.  -Do not shave pubic hair 7 days prior to surgery (gyn pt's).    [facility] Visitor/Ride Policy:  Only 2 adults allowed (over the age of 18) to accompany you to the Hospital. You Must have a ride home from a responsible adult that you know and trust. Medical Transport, Uber or Lyft can only be used if patient has a responsible adult to accompany them during ride home.    Discharge Instructions: You will receive Post-op/Discharge instructions by your Discharge Nurse prior to going home. Please call your Surgeon's office with any post-surgery questions/concerns @ [phone].    *If you are running late or have questions the morning of surgery, please call the Surgery Dept @ [phone]  *Insurance/ Financial Questions, please call [phone]    Thank you,  -[facility] Pre Admit Testing Nurse  [phone] or [phone]  M-F 7:30 am-4 pm (Closed Major Holidays)

## 2023-02-10 NOTE — ASSESSMENT & PLAN NOTE
Pt had unhealing left toe ulcer- currently almost fully healed   Followed by [facility]   Last Arterial U/S showed biphasic and monophasic waveforms- f/u with Cardiology or Vascular surgery

## 2023-02-10 NOTE — H&P
Preoperative History and Physical                                                                     Chief Complaint: Preoperative evaluation     History of Present Illness:      Malaika Paredes is a 67 y.o. female who presents to the office today for a preoperative consultation at the request of Dr. Arana who plans on performing Left simple mastectomy with Left axillary lymphadenectomy on 3/1/23.     Functional Status:      The patient is not able to climb a flight of stairs. The patient is able to ambulate household distances with a cane without difficulty. The patient's functional status is not affected by the surgical problem. The patient's functional status is not affected by shortness of breath, chest pain, dyspnea on exertion and fatigue.    MET score less than 4    Past Medical History:      Past Medical History:   Diagnosis Date    CAD (coronary artery disease)     Cataract     Cataract     CHF (congestive heart failure)     COPD (chronic obstructive pulmonary disease)     Diabetes mellitus     Diabetic retinopathy     ESRD (end stage renal disease)     TTS    Hypertension     Ischemic ulcer of toe of left foot     Malignant neoplasm of upper-outer quadrant of left breast in female, estrogen receptor positive 06/20/2022    left    PAD (peripheral artery disease)     PAF (paroxysmal atrial fibrillation)         Past Surgical History:      Past Surgical History:   Procedure Laterality Date    BREAST BIOPSY Right     benign    CATARACT EXTRACTION W/  INTRAOCULAR LENS IMPLANT Right 08/14/2017    Dr. Moe    CORONARY ARTERY BYPASS GRAFT  2020    FLUOROSCOPY N/A 07/25/2022    Procedure: FLUOROSCOPY/mediport placement;  Surgeon: Cole Perdomo MD;  Location: [facility];  Service: General;  Laterality: N/A;        Social History:      Social History     Socioeconomic History    Marital status:    Tobacco Use    Smoking status: Former     Years: 30.00      Types: Cigarettes     Quit date: 6/21/2012     Years since quitting: 10.6    Smokeless tobacco: Never   Substance and Sexual Activity    Alcohol use: No    Drug use: Never        Family History:      Family History   Problem Relation Age of Onset    Hypertension Mother     Cancer Father     Breast cancer Sister     Diabetes Sister     Cancer Brother     Amblyopia Neg Hx     Blindness Neg Hx     Cataracts Neg Hx     Glaucoma Neg Hx     Macular degeneration Neg Hx     Retinal detachment Neg Hx     Strabismus Neg Hx     Stroke Neg Hx     Thyroid disease Neg Hx        Allergies:      Review of patient's allergies indicates:  No Known Allergies    Medications:      Current Outpatient Medications   Medication Sig    acetaminophen (TYLENOL) 500 mg Cap Take 500 mg by mouth every 6 (six) hours as needed.    amLODIPine (NORVASC) 10 MG tablet Take 10 mg by mouth once daily.    aspirin (ECOTRIN) 81 MG EC tablet Take 81 mg by mouth once daily.     atorvastatin (LIPITOR) 10 MG tablet Take 10 mg by mouth once daily.    budesonide-glycopyr-formoterol (BREZTRI AEROSPHERE) 160-9-4.8 mcg/actuation HFAA Inhale 1 puff into the lungs once daily.    calcium carbonate (OS-CAMERON) 500 mg calcium (1,250 mg) chewable tablet Take 1 tablet by mouth 2 (two) times daily as needed for Heartburn.    insulin degludec (TRESIBA FLEXTOUCH U-200) 200 unit/mL (3 mL) insulin pen Inject 60 Units into the skin once daily.    levoFLOXacin (LEVAQUIN) 250 MG tablet Take 250 mg by mouth once daily.    levothyroxine (TIROSINT) 88 mcg Cap Take 75 mcg by mouth before breakfast.    loperamide (IMODIUM) 2 mg capsule Take 2 mg by mouth 4 (four) times daily as needed for Diarrhea.    losartan (COZAAR) 25 MG tablet Take 25 mg by mouth once daily.    metoprolol succinate (TOPROL-XL) 25 MG 24 hr tablet Take 25 mg by mouth 2 (two) times daily.    omeprazole (PRILOSEC) 20 MG capsule Take 20 mg by mouth once daily.    prednisoLONE acetate (PRED FORTE) 1 % DrpS Place 1 drop  into both eyes 4 (four) times daily.    sevelamer HCL (RENAGEL) 800 MG Tab Take 3 tablets by mouth 3 (three) times daily.    sodium bicarbonate 650 MG tablet Take 650 mg by mouth 2 (two) times daily.    traMADoL (ULTRAM) 50 mg tablet Take 1 tablet (50 mg total) by mouth every 6 (six) hours as needed for Pain.    albuterol sulfate (PROAIR RESPICLICK) 90 mcg/actuation AePB Inhale 180 mcg into the lungs every 4 (four) hours. Rescue (Patient not taking: Reported on 2/10/2023)    amiodarone (PACERONE) 100 MG Tab Take 100 mg by mouth once daily.    apixaban (ELIQUIS) 2.5 mg Tab Take 2.5 mg by mouth 2 (two) times daily.    ketoconazole (NIZORAL) 2 % cream Apply topically once daily. for 14 days     No current facility-administered medications for this visit.       Vitals:      Vitals:    02/10/23 1011   BP: (!) 151/75   Pulse: 68   Resp: 18   Temp: 97.9 °F (36.6 °C)       Review of Systems:        Constitutional: Negative for fever, chills, weight loss, malaise/fatigue and diaphoresis.   HENT: Negative for hearing loss, ear pain, nosebleeds, congestion, sore throat, neck pain, tinnitus and ear discharge.    Eyes: Negative for blurred vision, double vision, photophobia, pain, discharge and redness.   Respiratory: +chronic ADAN Negative for cough, hemoptysis, sputum production, , wheezing and stridor.    Cardiovascular: Negative for chest pain, palpitations, orthopnea, claudication, leg swelling and PND.   Gastrointestinal: Negative for heartburn, nausea, vomiting, abdominal pain, diarrhea, constipation, blood in stool and melena.   Genitourinary: Negative for dysuria, urgency, frequency, hematuria and flank pain.   Musculoskeletal: +arthritis pain to knees/hips +gait problem Negative for myalgias and falls.   Skin: Negative for itching and rash. +wound to left toe   Neurological: Negative for dizziness, tingling, tremors, sensory change, speech change, focal weakness, seizures, loss of consciousness, weakness and headaches.    Endo/Heme/Allergies: Negative for environmental allergies and polydipsia. Does not bruise/bleed easily.   Psychiatric/Behavioral: Negative for depression, suicidal ideas, hallucinations, memory loss and substance abuse. The patient is not nervous/anxious and does not have insomnia.    All 14 systems reviewed and negative except as noted above.    Physical Exam:      Constitutional: Appears well-developed, well-nourished and in no acute distress.  Patient is oriented to person, place, and time.   Head: Normocephalic and atraumatic. Mucous membranes moist.  Neck: Neck supple no mass.   Cardiovascular: Normal rate and regular rhythm.  S1 S2 appreciated by ascultation.Right IJ Mediport.  Pulmonary/Chest: Effort normal and clear to auscultation bilaterally. No respiratory distress.   Abdomen: Soft. Non-tender and non-distended. Bowel sounds are normal.   Neurological: Patient is alert and oriented to person, place and time. Moves all extremities.  Skin: Warm and dry. No lesions.  Left foot walking shoe and dressing in place. Dressing removed-Wound to left 2nd toe C/D/I and healed   Extremities: No clubbing, cyanosis or edema.    Laboratory data:      Reviewed and noted in plan where applicable. Please see chart for full laboratory data.    @AQESQLVZJ03(cpk,cpkmb,troponini,mb)@ @BEXHCBFXZ35(poctglucose)@     Lab Results   Component Value Date    INR 1.0 07/25/2022    INR 1.0 09/09/2018       Lab Results   Component Value Date    WBC 7.34 02/08/2023    HGB 11.7 (L) 02/08/2023    HCT 37.6 02/08/2023     (H) 02/08/2023     02/08/2023       @QSLSLCEXS37(GLU,NA,K,Cl,CO2,BUN,Creatinine,Calcium,MG)@    Predictors of intubation difficulty:       Morbid obesity? no   Anatomically abnormal facies? no   Prominent incisors? no   Receding mandible? no   Short, thick neck? no   Neck range of motion: normal   Dentition: Edentulous  Based on the Modified Mallampati, patient is a mallampati score: II (hard and soft palate,  upper portion of tonsils anduvula visible)    Cardiographics:      ECG 2/8/23: Normal sinus rhythm   Left anterior fascicular block   LVH with QRS widening   Nonspecific T wave abnormality   When compared with ECG of 14-SEP-2022 11:51,   No significant change was found      Echocardiogram 7/12/22:   Concentric hypertrophy and normal systolic function.  The quantitatively derived ejection fraction is 59%.  The left ventricular global longitudinal strain is -21.1%.  The estimated ejection fraction is 60%.  Grade II left ventricular diastolic dysfunction.  Normal right ventricular size with normal right ventricular systolic function.  Moderate left atrial enlargement.  Mild tricuspid regurgitation.  There is pulmonary hypertension.    Imaging:      Chest x-ray 2/10/23  Right IJ Mediport distal tip is pulled into the right internal jugular vein.  Evidence of previous CABG.  Cardiac silhouette is enlarged.  Lungs are clear.  No effusion or pneumothorax    Will message Dr. العلي about the CXR results   Assessment and Plan:      Preoperative examination  Known risk factors for perioperative complications:     ESRD- HD TTS  PAF-on ASA  DM  Diastolic CHF  COPD  NIRAJ  Immunocompromised- on chemotherapy        Difficulty with intubation is not anticipated.    Cardiac Risk Estimation: Based on the Revised Cardiac Risk index, patient is a Class risk IV with a 9.1% risk of a major cardiac event in a low risk procedure.    1.) Preoperative workup as follows:CXR, HGb A1c, Preop cardiology evaluation   2.) Change in medication regimen before surgery: pt only on ASA. If cardiology prescribes Eliquis- pt will need to hold this medication 2-3 days prior to surgery  3.) Prophylaxis for cardiac events with perioperative beta-blockers: cont Toprol    4.) Invasive hemodynamic monitoring perioperatively: not indicated.  5.) Deep vein thrombosis prophylaxis postoperatively: regimen to be chosen by surgical team.  6.) Surveillance for  "postoperative MI with ECG immediately postoperatively and on postoperati ve days 1 and 2 AND troponin levels 24 hours postoperatively and on day 4 or hospital discharge (whichever comes first): not indicated.  7.) Current medications which may produce withdrawal symptoms if withheld perioperatively: BB  8.) Other measures: Careful attention to perioperative glycemic control (accu check pre/post).  Postoperative hypertension management with IV hydralazine until able to take oral medications.  Postoperative cardiac telemetry (while hospitalized ).  Postoperative incentive spirometry to prevent pneumonia.  Adjustment of dosing of applicable medications for renal insufficiency.  ESRD    Malignant neoplasm of upper-outer quadrant of left breast in female, estrogen receptor positive  Stage IIB ER positive HER2 positive breast carcinoma currently on Chemotherapy receiving combination Herceptin and Perjeta    The patient is scheduled for a Left simple mastectomy with Left axillary lymphadenectomy on 3/1/23 per Dr. Arana     Type 2 diabetes mellitus, with long-term current use of insulin  Check Hgb A1c  Cont Tresba 60 units daily   Pt reports blood sugars run 110-130 in am - will hold insulin am of surgery  Accu check pre/post op     PAF (paroxysmal atrial fibrillation)  Pt was followed by Dr. Mejia at some point- no clinic visit for over one year. No records available  She was on Toprol, Amiodarone and Eliquis  However, pt reports she has been out of Eliquis for "awhile" and does not know about Amiodarone  EKG showed NSR  CHADS-VASC score of 6 with 9.7% stroke risk   Pt instructed to take ASA daily and cont Toprol.  Will obtain records from Dr. Mejia's office.   Pt requests to see [facility] cardiology   Appointment made for today for preop examination     CAD (coronary artery disease)  S/p CABG (2020?)  No recent CP or SOB   chronic ADAN   Cont ASA, BB, Statin , Losartan   Hold Losartan am of surgery  Appointment made for " preop cardiology evaluation     Diastolic dysfunction  Compensated   Chronic ADAN- not above baseline   Denies weight gain/swelling   CXR : Cardiomegaly, clear lungs     Primary hypertension  BP stable   Cont Amlodipine, Losartan, and Toprol   Hold Losartan am of surgery    ESRD (end stage renal disease)  HD on TTS schedule   Reports compliance   Check serum K am of surgery    Immunodeficiency due to chemotherapy  currently on chemo for breast cancer followed by [facility]     Macrocytic anemia  H/H stable         Electronically signed by Dipti Lam NP on 2/10/2023 at 12:18 PM.

## 2023-02-10 NOTE — ASSESSMENT & PLAN NOTE
"Pt was followed by Dr. Mejia at some point- no clinic visit for over one year  She was on Toprol, Amiodarone and Eliquis  However, pt reports she has been out of Eliquis for "awhile" and does not know about Amiodarone  CHADS-VASC score of 6 with 9.7% stroke risk   Pt instructed to take ASA daily   Will obtain records from Dr. Mejia's office.   Pt requests to see [facility] cardiology   Appointment made for today for preop examination   "

## 2023-02-13 ENCOUNTER — TELEPHONE (OUTPATIENT)
Dept: RADIOLOGY | Facility: HOSPITAL | Age: 68
End: 2023-02-13
Payer: MEDICARE

## 2023-02-13 DIAGNOSIS — D53.9 MACROCYTIC ANEMIA: Primary | ICD-10-CM

## 2023-02-13 NOTE — TELEPHONE ENCOUNTER
Interventional Radiology:    Spoke to pt and got her scheduled for 2/20 @ 1pm. Informed pt to be NPO after midnight, show up to [facility] on [address] at 12pm, either have a ride with them or have a phone number for the person driving them home so that we can get in contact with them to keep them updated. Answered all questions that the pt had and pt verbalized understanding of all discussed.

## 2023-02-17 ENCOUNTER — TELEPHONE (OUTPATIENT)
Dept: SURGERY | Facility: CLINIC | Age: 68
End: 2023-02-17
Payer: MEDICARE

## 2023-02-17 NOTE — PRE ADMISSION SCREENING
Messaged Dr Arana & Anesthesia about pt needing Cardiac Clearance and was NO SHOW for appt made on 2/10/23. Dr Arana response: office to reach out to pt about making another cardiology appt prior to surgery. Messaged Emilia with Dr Arana office.

## 2023-02-20 ENCOUNTER — HOSPITAL ENCOUNTER (OUTPATIENT)
Dept: RADIOLOGY | Facility: HOSPITAL | Age: 68
Discharge: HOME | End: 2023-02-20
Attending: SURGERY
Payer: MEDICARE

## 2023-02-21 ENCOUNTER — TELEPHONE (OUTPATIENT)
Dept: INFUSION THERAPY | Facility: HOSPITAL | Age: 68
End: 2023-02-21
Payer: MEDICARE

## 2023-02-21 NOTE — TELEPHONE ENCOUNTER
----- Message from Geneva Pritchett sent at 2/20/2023 11:35 AM CST -----  Contact: Ember Pa needs a call back at [phone], Regards to getting her missed infusion reschedule.    thanks  Td

## 2023-02-22 ENCOUNTER — TELEPHONE (OUTPATIENT)
Dept: RADIOLOGY | Facility: HOSPITAL | Age: 68
End: 2023-02-22
Payer: MEDICARE

## 2023-02-22 ENCOUNTER — TELEPHONE (OUTPATIENT)
Dept: SURGERY | Facility: CLINIC | Age: 68
End: 2023-02-22
Payer: MEDICARE

## 2023-02-22 ENCOUNTER — INFUSION (OUTPATIENT)
Dept: INFUSION THERAPY | Facility: HOSPITAL | Age: 68
End: 2023-02-22
Attending: INTERNAL MEDICINE
Payer: MEDICARE

## 2023-02-22 ENCOUNTER — TELEPHONE (OUTPATIENT)
Dept: CARDIOLOGY | Facility: CLINIC | Age: 68
End: 2023-02-22
Payer: MEDICARE

## 2023-02-22 VITALS
TEMPERATURE: 98 F | RESPIRATION RATE: 18 BRPM | DIASTOLIC BLOOD PRESSURE: 77 MMHG | SYSTOLIC BLOOD PRESSURE: 171 MMHG | BODY MASS INDEX: 35.54 KG/M2 | OXYGEN SATURATION: 96 % | HEART RATE: 73 BPM | WEIGHT: 194.31 LBS

## 2023-02-22 DIAGNOSIS — C50.412 MALIGNANT NEOPLASM OF UPPER-OUTER QUADRANT OF LEFT BREAST IN FEMALE, ESTROGEN RECEPTOR POSITIVE: Primary | ICD-10-CM

## 2023-02-22 DIAGNOSIS — Z17.0 MALIGNANT NEOPLASM OF UPPER-OUTER QUADRANT OF LEFT BREAST IN FEMALE, ESTROGEN RECEPTOR POSITIVE: Primary | ICD-10-CM

## 2023-02-22 PROCEDURE — 96401 CHEMO ANTI-NEOPL SQ/IM: CPT

## 2023-02-22 PROCEDURE — 63600175 PHARM REV CODE 636 W HCPCS: Mod: TB | Performed by: INTERNAL MEDICINE

## 2023-02-22 RX ADMIN — PERTUZUMAB, TRASTUZUMAB, AND HYALURONIDASE-ZZXF 600 MG: 600; 600; 2000 INJECTION, SOLUTION SUBCUTANEOUS at 01:02

## 2023-02-22 NOTE — TELEPHONE ENCOUNTER
Pt no longer under DR Helm- pt stated she has a cardiologist and refused appointment with DR Rader

## 2023-02-22 NOTE — PROGRESS NOTES
History & Physical    SUBJECTIVE:     History of Present Illness:  Patient is a 67 y.o. female presents for follow up PET scan/mammogram and preop. She denies any changes since last visit. Her case was presented at tumor board with decision to move forward with surgical therapy.    presents to discuss next step in breast cancer treatment following neoadjuvant chemotherapy. She did not complete last cycle due to neuropathy. She reports decrease in mass size of the breast.     presents to discuss left breast biopsy.  Her left breast and axillary biopsy were showed invasive ductal carcinoma ER+/WA+ Her2+ with metastatic disease to the lymph node.    Initially referred for abnormal mammogram of the left breast. She denies any breast mass, nipple discharge, breast pain or other changes. No prior breast surgery. Prior right breast biopsy benign. Family history of breast cancer in her sister. Menarche age 12,  1st at 16, menopause at 48. No HRT.     Chief Complaint   Patient presents with    Results     Mammo / pet scan          Review of patient's allergies indicates:  No Known Allergies    Current Outpatient Medications   Medication Sig Dispense Refill    albuterol sulfate (PROAIR RESPICLICK) 90 mcg/actuation AePB Inhale 180 mcg into the lungs every 4 (four) hours. Rescue (Patient not taking: Reported on 2/10/2023) 1 each 3    amiodarone (PACERONE) 100 MG Tab Take 100 mg by mouth once daily.      amLODIPine (NORVASC) 10 MG tablet Take 10 mg by mouth once daily.      aspirin (ECOTRIN) 81 MG EC tablet Take 81 mg by mouth once daily.       insulin degludec (TRESIBA FLEXTOUCH U-200) 200 unit/mL (3 mL) insulin pen Inject 60 Units into the skin once daily.      levoFLOXacin (LEVAQUIN) 250 MG tablet Take 250 mg by mouth once daily.      levothyroxine (TIROSINT) 88 mcg Cap Take 75 mcg by mouth before breakfast.      losartan (COZAAR) 25 MG tablet Take 25 mg by mouth once daily.      omeprazole (PRILOSEC) 20 MG capsule  Take 20 mg by mouth once daily.      prednisoLONE acetate (PRED FORTE) 1 % DrpS Place 1 drop into both eyes 4 (four) times daily.      sevelamer HCL (RENAGEL) 800 MG Tab Take 3 tablets by mouth 3 (three) times daily.      traMADoL (ULTRAM) 50 mg tablet Take 1 tablet (50 mg total) by mouth every 6 (six) hours as needed for Pain. 20 tablet 0    acetaminophen (TYLENOL) 500 mg Cap Take 500 mg by mouth every 6 (six) hours as needed.      apixaban (ELIQUIS) 2.5 mg Tab Take 2.5 mg by mouth 2 (two) times daily.      atorvastatin (LIPITOR) 10 MG tablet Take 10 mg by mouth once daily.      budesonide-glycopyr-formoterol (BREZTRI AEROSPHERE) 160-9-4.8 mcg/actuation HFAA Inhale 1 puff into the lungs once daily.      calcium carbonate (OS-CAMERON) 500 mg calcium (1,250 mg) chewable tablet Take 1 tablet by mouth 2 (two) times daily as needed for Heartburn.      ketoconazole (NIZORAL) 2 % cream Apply topically once daily. for 14 days 1 each 1    loperamide (IMODIUM) 2 mg capsule Take 2 mg by mouth 4 (four) times daily as needed for Diarrhea.      metoprolol succinate (TOPROL-XL) 25 MG 24 hr tablet Take 25 mg by mouth 2 (two) times daily.      sodium bicarbonate 650 MG tablet Take 650 mg by mouth 2 (two) times daily.       No current facility-administered medications for this visit.       Past Medical History:   Diagnosis Date    CAD (coronary artery disease)     Cataract     Cataract     CHF (congestive heart failure)     COPD (chronic obstructive pulmonary disease)     Diabetes mellitus     Diabetic retinopathy     ESRD (end stage renal disease)     TTS    Hypertension     Ischemic ulcer of toe of left foot     Malignant neoplasm of upper-outer quadrant of left breast in female, estrogen receptor positive 06/20/2022    left    PAD (peripheral artery disease)     PAF (paroxysmal atrial fibrillation)      Past Surgical History:   Procedure Laterality Date    BREAST BIOPSY Right     benign    CATARACT EXTRACTION W/  INTRAOCULAR LENS  IMPLANT Right 08/14/2017    Dr. Moe    CORONARY ARTERY BYPASS GRAFT  2020    FLUOROSCOPY N/A 07/25/2022    Procedure: FLUOROSCOPY/mediport placement;  Surgeon: Cole Perdomo MD;  Location: [facility] CATH LAB;  Service: General;  Laterality: N/A;     Family History   Problem Relation Age of Onset    Hypertension Mother     Cancer Father     Breast cancer Sister     Diabetes Sister     Cancer Brother     Amblyopia Neg Hx     Blindness Neg Hx     Cataracts Neg Hx     Glaucoma Neg Hx     Macular degeneration Neg Hx     Retinal detachment Neg Hx     Strabismus Neg Hx     Stroke Neg Hx     Thyroid disease Neg Hx      Social History     Tobacco Use    Smoking status: Former     Years: 30.00     Types: Cigarettes     Quit date: 6/21/2012     Years since quitting: 10.6    Smokeless tobacco: Never   Substance Use Topics    Alcohol use: No    Drug use: Never        Review of Systems:  Review of Systems   Constitutional:  Negative for activity change, appetite change, chills, diaphoresis, fatigue, fever and unexpected weight change.   HENT:  Negative for congestion, dental problem, hearing loss, rhinorrhea, sore throat and trouble swallowing.    Eyes:  Positive for visual disturbance. Negative for discharge.   Respiratory:  Positive for wheezing. Negative for cough, chest tightness and shortness of breath.    Cardiovascular:  Positive for palpitations. Negative for chest pain and leg swelling.   Gastrointestinal:  Positive for diarrhea. Negative for abdominal distention, abdominal pain, blood in stool, constipation, nausea and vomiting.   Endocrine: Negative for cold intolerance, heat intolerance, polydipsia, polyphagia and polyuria.   Genitourinary:  Negative for difficulty urinating, dysuria, frequency, hematuria, menstrual problem and urgency.   Musculoskeletal:  Positive for arthralgias. Negative for gait problem, joint swelling, myalgias and neck pain.   Skin:  Negative for color change, pallor, rash and wound.  "  Neurological:  Positive for weakness. Negative for dizziness, syncope, light-headedness, numbness and headaches.   Hematological:  Negative for adenopathy. Does not bruise/bleed easily.   Psychiatric/Behavioral:  Negative for confusion, decreased concentration, dysphoric mood and sleep disturbance. The patient is not nervous/anxious.      OBJECTIVE:     Vital Signs (Most Recent)  Temp: 98.6 °F (37 °C) (02/08/23 0947)  Pulse: 68 (02/08/23 0947)  BP: 131/72 (02/08/23 0947)  5' 2" (1.575 m)  87.7 kg (193 lb 5.5 oz)     Physical Exam:  Physical Exam  Vitals reviewed.   Constitutional:       General: She is not in acute distress.     Appearance: She is well-developed. She is not diaphoretic.   HENT:      Head: Normocephalic and atraumatic.      Right Ear: External ear normal.      Left Ear: External ear normal.   Eyes:      General: No scleral icterus.     Conjunctiva/sclera: Conjunctivae normal.      Pupils: Pupils are equal, round, and reactive to light.   Neck:      Thyroid: No thyromegaly.      Trachea: No tracheal deviation.   Cardiovascular:      Rate and Rhythm: Normal rate and regular rhythm.      Heart sounds: Normal heart sounds. No murmur heard.    No friction rub. No gallop.   Pulmonary:      Effort: Pulmonary effort is normal. No respiratory distress.      Breath sounds: Normal breath sounds. No wheezing or rales.   Chest:      Chest wall: No tenderness.   Breasts:     Right: No inverted nipple, mass, nipple discharge, skin change or tenderness.      Left: No inverted nipple, mass, nipple discharge, skin change or tenderness.       Abdominal:      General: Bowel sounds are normal. There is no distension.      Palpations: Abdomen is soft.      Tenderness: There is no abdominal tenderness.      Hernia: No hernia is present.   Musculoskeletal:         General: No tenderness or deformity. Normal range of motion.      Cervical back: Normal range of motion and neck supple.   Lymphadenopathy:      Cervical: No " cervical adenopathy.   Skin:     General: Skin is warm and dry.      Coloration: Skin is not pale.      Findings: No erythema or rash.   Neurological:      Mental Status: She is alert and oriented to person, place, and time.   Psychiatric:         Behavior: Behavior normal.         Thought Content: Thought content normal.         Judgment: Judgment normal.       Diagnostic Results:  Result:   Mammo Digital Diagnostic Bilat with Alfredo  US Breast Left Limited     History:  Patient is 66 y.o. and is seen for diagnostic imaging.     Films Compared:  Prior images (if available) were compared.     Findings:  This procedure was performed using tomosynthesis. Computer-aided detection was utilized in the interpretation of this examination.  The breasts have scattered areas of fibroglandular density.      Mammo Digital Diagnostic Bilat with Alfredo  Left  Mass: There is a 4.9 cm irregularly shaped mass with spiculated margins seen in the upper outer quadrant of the left breast in the anterior depth. The mass correlates with the palpable mass reported by the patient. Associated features include skin retraction. There are also associated calcifications.   Lymph Node: There are multiple similar lymph nodes seen in the left axilla.      Right  There is no evidence of suspicious masses, calcifications, or other abnormal findings in the right breast.     US Breast Left Limited  Left  Mass: There is a 5.6 cm x 4 cm x 4.3 cm irregularly shaped, non-parallel, hypoechoic mass with spiculated margins seen in the left breast at 2 o'clock, 3 cm from the nipple.   Lymph Node: There are multiple similar lymph nodes seen in the left axilla. Largest node is 3.4 cm x 1.6 cm x 2.0 cm.      Impression:  Left  Mass: Left breast 5.6 cm x 4 cm x 4.3 cm mass at the 2 o'clock position. Assessment: 5 - Highly suggestive of malignancy. Ultrasound-guided biopsy is recommended.   Lymph Node: Left axilla lymph node (multiple findings). Assessment: 5 - Highly  suggestive of malignancy. Ultrasound-guided biopsy is recommended.      Right  There is no mammographic or sonographic evidence of malignancy in the right breast.     BI-RADS Category:   Overall: 5 - Highly Suggestive of Malignancy     Recommendation:  Ultrasound-guided biopsy is recommended.  Ultrasound-guided biopsy is recommended.     Your estimated lifetime risk of breast cancer (to age 85) based on Tyrer-Cuzick risk assessment model is Tyrer-Cuzick: 6.49 %. According to the [employer], patients with a lifetime breast cancer risk of 20% or higher might benefit from supplemental screening tests.    Final Pathologic Diagnosis Part 1   Left axilla, ultrasound-guided core biopsy:   Positive for metastatic carcinoma   Metastasis measures 10 mm in greatest dimension   Part 2   Left breast, 2:00, ultrasound-guided core biopsy:   Invasive ductal carcinoma   Bubba grade 2:  Tubule formation -3, nuclear pleomorphism-2 and mitotic   count-2   Invasive carcinoma measures 17 mm in greatest dimension   No carcinoma in Situ or lymphovascular invasion identified   Tumor biomarkers   Estrogen receptor (ER):  Positive, greater than 95%, strong   Progesterone receptor (PGR):  Positive, 80%, strong   HER2 (IHC):  Equivocal, 2+   Ki-67:  60%   Additional IHC:   P63:  Negative throughout, supporting the diagnosis of invasive carcinoma   This case was reviewed by Dr. GABBY Cannon who concurs with the above diagnosis.  VC      Comment: Interp By Bette Pitts M.D., Signed on 06/21/2022 at 12:02   Supplemental Diagnosis HER2, Breast Tumor, FISH, Tissue   Result Summary   POSITIVE   Interpretation   This tumor sample is positive for HER2 (ERBB2) gene amplification.   Result   nuc erwin(H44P5u1¿4,HER2x5¿8)   HER2/D17Z1 ratio: 2.03   Average HER2 signals per cell: 6.3   Average D17Z1 signals per cell: 3.1         PET/CT:  Impression:     Interval improvement.  Left breast mass demonstrates decreased in size in decreased  hypermetabolic activity.  Left pectoralis and axillary lymphadenopathy also demonstrates marked significant decreased activity.  Residual nonspecific mildly hypermetabolic right axillary and bilateral inguinal lymph nodes.    Mammogram:  Result:   Mammo Digital Diagnostic Left with Alfredo     History:  Patient is 67 y.o. and is seen for diagnostic imaging.     Films Compared:  Prior images (if available) were compared.     Findings:  This procedure was performed using tomosynthesis. Computer-aided detection was utilized in the interpretation of this examination.  The left breast has scattered areas of fibroglandular density.      Known malignant breast mass currently measures approximately 4.1 x 3.0 x 2.9 cm, previous 4.5 x 3.7 x 3.6 cm. Note that suspicious calcifications extend at least 2 cm medial to the breast mass and 5-6 cm medial to the post biopsy clip.  There is been decrease in size of the left axillary adenopathy, particularly the prior biopsied enlarged lymph node.         Impression:  Decrease in size of malignant left breast mass and axillary adenopathy. Correlate with ordered PET-CT.         BI-RADS Category:   Overall: 6 - Known Biopsy-Proven Malignancy        Recommendation:  There are no mammo recommendations for this study.    ASSESSMENT/PLAN:     65 y/o female with left breast invasive ductal carcinoma ER+/MD+ Her2+ with metastatic disease to the lymph node    PLAN:Plan     PET/CT and mammogram reviewed  Left mastectomy without reconstruction (bra prothesis - following treatment with ALND (clinically palpable nodes) 2/28/23; discussed alternative surgical options including lumpectomy + XRT, reconstruction; potential need for further treatment including hormone therapy  Preop: CBC, CMP, EKG, CXR, cardiology clearance; lymphoscintigraphy day of surgery  Risks and benefits discussed with patient including but not limited to: pain, bleeding, infection, injury to underlying nerves or vessels,  lymphedema,parathesias, need for further surgery   PT/OT referral for pre/post mastectomy    US guided biopsy inguinal lymph node remains pet avid but unchanged from previous appearance    45 minutes of total time spent on the encounter, which includes face to face time and non-face to face time preparing to see the patient (eg, review of tests), Obtaining and/or reviewing separately obtained history, Documenting clinical information in the electronic or other health record, Independently interpreting results (not separately reported) and communicating results to the patient/family/caregiver, or Care coordination (not separately reported).     Also discussed this with patient's family over the phone during the visit

## 2023-02-22 NOTE — PLAN OF CARE
Pt tolerated all well.  Pt instructed to monitor bp at home, f/u with pcp, and report to ER for emergent symptoms.  Pt verbalized understandings.

## 2023-02-22 NOTE — H&P (VIEW-ONLY)
History & Physical    SUBJECTIVE:     History of Present Illness:  Patient is a 67 y.o. female presents for follow up PET scan/mammogram and preop. She denies any changes since last visit. Her case was presented at tumor board with decision to move forward with surgical therapy.    presents to discuss next step in breast cancer treatment following neoadjuvant chemotherapy. She did not complete last cycle due to neuropathy. She reports decrease in mass size of the breast.     presents to discuss left breast biopsy.  Her left breast and axillary biopsy were showed invasive ductal carcinoma ER+/SD+ Her2+ with metastatic disease to the lymph node.    Initially referred for abnormal mammogram of the left breast. She denies any breast mass, nipple discharge, breast pain or other changes. No prior breast surgery. Prior right breast biopsy benign. Family history of breast cancer in her sister. Menarche age 12,  1st at 16, menopause at 48. No HRT.     Chief Complaint   Patient presents with    Results     Mammo / pet scan          Review of patient's allergies indicates:  No Known Allergies    Current Outpatient Medications   Medication Sig Dispense Refill    albuterol sulfate (PROAIR RESPICLICK) 90 mcg/actuation AePB Inhale 180 mcg into the lungs every 4 (four) hours. Rescue (Patient not taking: Reported on 2/10/2023) 1 each 3    amiodarone (PACERONE) 100 MG Tab Take 100 mg by mouth once daily.      amLODIPine (NORVASC) 10 MG tablet Take 10 mg by mouth once daily.      aspirin (ECOTRIN) 81 MG EC tablet Take 81 mg by mouth once daily.       insulin degludec (TRESIBA FLEXTOUCH U-200) 200 unit/mL (3 mL) insulin pen Inject 60 Units into the skin once daily.      levoFLOXacin (LEVAQUIN) 250 MG tablet Take 250 mg by mouth once daily.      levothyroxine (TIROSINT) 88 mcg Cap Take 75 mcg by mouth before breakfast.      losartan (COZAAR) 25 MG tablet Take 25 mg by mouth once daily.      omeprazole (PRILOSEC) 20 MG capsule  Take 20 mg by mouth once daily.      prednisoLONE acetate (PRED FORTE) 1 % DrpS Place 1 drop into both eyes 4 (four) times daily.      sevelamer HCL (RENAGEL) 800 MG Tab Take 3 tablets by mouth 3 (three) times daily.      traMADoL (ULTRAM) 50 mg tablet Take 1 tablet (50 mg total) by mouth every 6 (six) hours as needed for Pain. 20 tablet 0    acetaminophen (TYLENOL) 500 mg Cap Take 500 mg by mouth every 6 (six) hours as needed.      apixaban (ELIQUIS) 2.5 mg Tab Take 2.5 mg by mouth 2 (two) times daily.      atorvastatin (LIPITOR) 10 MG tablet Take 10 mg by mouth once daily.      budesonide-glycopyr-formoterol (BREZTRI AEROSPHERE) 160-9-4.8 mcg/actuation HFAA Inhale 1 puff into the lungs once daily.      calcium carbonate (OS-CAMERON) 500 mg calcium (1,250 mg) chewable tablet Take 1 tablet by mouth 2 (two) times daily as needed for Heartburn.      ketoconazole (NIZORAL) 2 % cream Apply topically once daily. for 14 days 1 each 1    loperamide (IMODIUM) 2 mg capsule Take 2 mg by mouth 4 (four) times daily as needed for Diarrhea.      metoprolol succinate (TOPROL-XL) 25 MG 24 hr tablet Take 25 mg by mouth 2 (two) times daily.      sodium bicarbonate 650 MG tablet Take 650 mg by mouth 2 (two) times daily.       No current facility-administered medications for this visit.       Past Medical History:   Diagnosis Date    CAD (coronary artery disease)     Cataract     Cataract     CHF (congestive heart failure)     COPD (chronic obstructive pulmonary disease)     Diabetes mellitus     Diabetic retinopathy     ESRD (end stage renal disease)     TTS    Hypertension     Ischemic ulcer of toe of left foot     Malignant neoplasm of upper-outer quadrant of left breast in female, estrogen receptor positive 06/20/2022    left    PAD (peripheral artery disease)     PAF (paroxysmal atrial fibrillation)      Past Surgical History:   Procedure Laterality Date    BREAST BIOPSY Right     benign    CATARACT EXTRACTION W/  INTRAOCULAR LENS  IMPLANT Right 08/14/2017    Dr. Moe    CORONARY ARTERY BYPASS GRAFT  2020    FLUOROSCOPY N/A 07/25/2022    Procedure: FLUOROSCOPY/mediport placement;  Surgeon: Cole Perdomo MD;  Location: [facility] CATH LAB;  Service: General;  Laterality: N/A;     Family History   Problem Relation Age of Onset    Hypertension Mother     Cancer Father     Breast cancer Sister     Diabetes Sister     Cancer Brother     Amblyopia Neg Hx     Blindness Neg Hx     Cataracts Neg Hx     Glaucoma Neg Hx     Macular degeneration Neg Hx     Retinal detachment Neg Hx     Strabismus Neg Hx     Stroke Neg Hx     Thyroid disease Neg Hx      Social History     Tobacco Use    Smoking status: Former     Years: 30.00     Types: Cigarettes     Quit date: 6/21/2012     Years since quitting: 10.6    Smokeless tobacco: Never   Substance Use Topics    Alcohol use: No    Drug use: Never        Review of Systems:  Review of Systems   Constitutional:  Negative for activity change, appetite change, chills, diaphoresis, fatigue, fever and unexpected weight change.   HENT:  Negative for congestion, dental problem, hearing loss, rhinorrhea, sore throat and trouble swallowing.    Eyes:  Positive for visual disturbance. Negative for discharge.   Respiratory:  Positive for wheezing. Negative for cough, chest tightness and shortness of breath.    Cardiovascular:  Positive for palpitations. Negative for chest pain and leg swelling.   Gastrointestinal:  Positive for diarrhea. Negative for abdominal distention, abdominal pain, blood in stool, constipation, nausea and vomiting.   Endocrine: Negative for cold intolerance, heat intolerance, polydipsia, polyphagia and polyuria.   Genitourinary:  Negative for difficulty urinating, dysuria, frequency, hematuria, menstrual problem and urgency.   Musculoskeletal:  Positive for arthralgias. Negative for gait problem, joint swelling, myalgias and neck pain.   Skin:  Negative for color change, pallor, rash and wound.  "  Neurological:  Positive for weakness. Negative for dizziness, syncope, light-headedness, numbness and headaches.   Hematological:  Negative for adenopathy. Does not bruise/bleed easily.   Psychiatric/Behavioral:  Negative for confusion, decreased concentration, dysphoric mood and sleep disturbance. The patient is not nervous/anxious.      OBJECTIVE:     Vital Signs (Most Recent)  Temp: 98.6 °F (37 °C) (02/08/23 0947)  Pulse: 68 (02/08/23 0947)  BP: 131/72 (02/08/23 0947)  5' 2" (1.575 m)  87.7 kg (193 lb 5.5 oz)     Physical Exam:  Physical Exam  Vitals reviewed.   Constitutional:       General: She is not in acute distress.     Appearance: She is well-developed. She is not diaphoretic.   HENT:      Head: Normocephalic and atraumatic.      Right Ear: External ear normal.      Left Ear: External ear normal.   Eyes:      General: No scleral icterus.     Conjunctiva/sclera: Conjunctivae normal.      Pupils: Pupils are equal, round, and reactive to light.   Neck:      Thyroid: No thyromegaly.      Trachea: No tracheal deviation.   Cardiovascular:      Rate and Rhythm: Normal rate and regular rhythm.      Heart sounds: Normal heart sounds. No murmur heard.    No friction rub. No gallop.   Pulmonary:      Effort: Pulmonary effort is normal. No respiratory distress.      Breath sounds: Normal breath sounds. No wheezing or rales.   Chest:      Chest wall: No tenderness.   Breasts:     Right: No inverted nipple, mass, nipple discharge, skin change or tenderness.      Left: No inverted nipple, mass, nipple discharge, skin change or tenderness.       Abdominal:      General: Bowel sounds are normal. There is no distension.      Palpations: Abdomen is soft.      Tenderness: There is no abdominal tenderness.      Hernia: No hernia is present.   Musculoskeletal:         General: No tenderness or deformity. Normal range of motion.      Cervical back: Normal range of motion and neck supple.   Lymphadenopathy:      Cervical: No " cervical adenopathy.   Skin:     General: Skin is warm and dry.      Coloration: Skin is not pale.      Findings: No erythema or rash.   Neurological:      Mental Status: She is alert and oriented to person, place, and time.   Psychiatric:         Behavior: Behavior normal.         Thought Content: Thought content normal.         Judgment: Judgment normal.       Diagnostic Results:  Result:   Mammo Digital Diagnostic Bilat with Alfredo  US Breast Left Limited     History:  Patient is 66 y.o. and is seen for diagnostic imaging.     Films Compared:  Prior images (if available) were compared.     Findings:  This procedure was performed using tomosynthesis. Computer-aided detection was utilized in the interpretation of this examination.  The breasts have scattered areas of fibroglandular density.      Mammo Digital Diagnostic Bilat with Alfredo  Left  Mass: There is a 4.9 cm irregularly shaped mass with spiculated margins seen in the upper outer quadrant of the left breast in the anterior depth. The mass correlates with the palpable mass reported by the patient. Associated features include skin retraction. There are also associated calcifications.   Lymph Node: There are multiple similar lymph nodes seen in the left axilla.      Right  There is no evidence of suspicious masses, calcifications, or other abnormal findings in the right breast.     US Breast Left Limited  Left  Mass: There is a 5.6 cm x 4 cm x 4.3 cm irregularly shaped, non-parallel, hypoechoic mass with spiculated margins seen in the left breast at 2 o'clock, 3 cm from the nipple.   Lymph Node: There are multiple similar lymph nodes seen in the left axilla. Largest node is 3.4 cm x 1.6 cm x 2.0 cm.      Impression:  Left  Mass: Left breast 5.6 cm x 4 cm x 4.3 cm mass at the 2 o'clock position. Assessment: 5 - Highly suggestive of malignancy. Ultrasound-guided biopsy is recommended.   Lymph Node: Left axilla lymph node (multiple findings). Assessment: 5 - Highly  suggestive of malignancy. Ultrasound-guided biopsy is recommended.      Right  There is no mammographic or sonographic evidence of malignancy in the right breast.     BI-RADS Category:   Overall: 5 - Highly Suggestive of Malignancy     Recommendation:  Ultrasound-guided biopsy is recommended.  Ultrasound-guided biopsy is recommended.     Your estimated lifetime risk of breast cancer (to age 85) based on Tyrer-Cuzick risk assessment model is Tyrer-Cuzick: 6.49 %. According to the [employer], patients with a lifetime breast cancer risk of 20% or higher might benefit from supplemental screening tests.    Final Pathologic Diagnosis Part 1   Left axilla, ultrasound-guided core biopsy:   Positive for metastatic carcinoma   Metastasis measures 10 mm in greatest dimension   Part 2   Left breast, 2:00, ultrasound-guided core biopsy:   Invasive ductal carcinoma   Bubba grade 2:  Tubule formation -3, nuclear pleomorphism-2 and mitotic   count-2   Invasive carcinoma measures 17 mm in greatest dimension   No carcinoma in Situ or lymphovascular invasion identified   Tumor biomarkers   Estrogen receptor (ER):  Positive, greater than 95%, strong   Progesterone receptor (PGR):  Positive, 80%, strong   HER2 (IHC):  Equivocal, 2+   Ki-67:  60%   Additional IHC:   P63:  Negative throughout, supporting the diagnosis of invasive carcinoma   This case was reviewed by Dr. GABBY Cannon who concurs with the above diagnosis.  VC      Comment: Interp By Bette Pitts M.D., Signed on 06/21/2022 at 12:02   Supplemental Diagnosis HER2, Breast Tumor, FISH, Tissue   Result Summary   POSITIVE   Interpretation   This tumor sample is positive for HER2 (ERBB2) gene amplification.   Result   nuc erwin([id number]¿4,HER2x5¿8)   HER2/D17Z1 ratio: 2.03   Average HER2 signals per cell: 6.3   Average D17Z1 signals per cell: 3.1         PET/CT:  Impression:     Interval improvement.  Left breast mass demonstrates decreased in size in decreased  hypermetabolic activity.  Left pectoralis and axillary lymphadenopathy also demonstrates marked significant decreased activity.  Residual nonspecific mildly hypermetabolic right axillary and bilateral inguinal lymph nodes.    Mammogram:  Result:   Mammo Digital Diagnostic Left with Alfredo     History:  Patient is 67 y.o. and is seen for diagnostic imaging.     Films Compared:  Prior images (if available) were compared.     Findings:  This procedure was performed using tomosynthesis. Computer-aided detection was utilized in the interpretation of this examination.  The left breast has scattered areas of fibroglandular density.      Known malignant breast mass currently measures approximately 4.1 x 3.0 x 2.9 cm, previous 4.5 x 3.7 x 3.6 cm. Note that suspicious calcifications extend at least 2 cm medial to the breast mass and 5-6 cm medial to the post biopsy clip.  There is been decrease in size of the left axillary adenopathy, particularly the prior biopsied enlarged lymph node.         Impression:  Decrease in size of malignant left breast mass and axillary adenopathy. Correlate with ordered PET-CT.         BI-RADS Category:   Overall: 6 - Known Biopsy-Proven Malignancy        Recommendation:  There are no mammo recommendations for this study.    ASSESSMENT/PLAN:     65 y/o female with left breast invasive ductal carcinoma ER+/KY+ Her2+ with metastatic disease to the lymph node    PLAN:Plan     PET/CT and mammogram reviewed  Left mastectomy without reconstruction (bra prothesis - following treatment with ALND (clinically palpable nodes) 2/28/23; discussed alternative surgical options including lumpectomy + XRT, reconstruction; potential need for further treatment including hormone therapy  Preop: CBC, CMP, EKG, CXR, cardiology clearance; lymphoscintigraphy day of surgery  Risks and benefits discussed with patient including but not limited to: pain, bleeding, infection, injury to underlying nerves or vessels,  lymphedema,parathesias, need for further surgery   PT/OT referral for pre/post mastectomy    US guided biopsy inguinal lymph node remains pet avid but unchanged from previous appearance    45 minutes of total time spent on the encounter, which includes face to face time and non-face to face time preparing to see the patient (eg, review of tests), Obtaining and/or reviewing separately obtained history, Documenting clinical information in the electronic or other health record, Independently interpreting results (not separately reported) and communicating results to the patient/family/caregiver, or Care coordination (not separately reported).     Also discussed this with patient's family over the phone during the visit

## 2023-02-22 NOTE — H&P (VIEW-ONLY)
History & Physical    SUBJECTIVE:     History of Present Illness:  Patient is a 67 y.o. female presents for follow up PET scan/mammogram and preop. She denies any changes since last visit. Her case was presented at tumor board with decision to move forward with surgical therapy.    presents to discuss next step in breast cancer treatment following neoadjuvant chemotherapy. She did not complete last cycle due to neuropathy. She reports decrease in mass size of the breast.     presents to discuss left breast biopsy.  Her left breast and axillary biopsy were showed invasive ductal carcinoma ER+/IL+ Her2+ with metastatic disease to the lymph node.    Initially referred for abnormal mammogram of the left breast. She denies any breast mass, nipple discharge, breast pain or other changes. No prior breast surgery. Prior right breast biopsy benign. Family history of breast cancer in her sister. Menarche age 12,  1st at 16, menopause at 48. No HRT.     Chief Complaint   Patient presents with    Results     Mammo / pet scan          Review of patient's allergies indicates:  No Known Allergies    Current Outpatient Medications   Medication Sig Dispense Refill    albuterol sulfate (PROAIR RESPICLICK) 90 mcg/actuation AePB Inhale 180 mcg into the lungs every 4 (four) hours. Rescue (Patient not taking: Reported on 2/10/2023) 1 each 3    amiodarone (PACERONE) 100 MG Tab Take 100 mg by mouth once daily.      amLODIPine (NORVASC) 10 MG tablet Take 10 mg by mouth once daily.      aspirin (ECOTRIN) 81 MG EC tablet Take 81 mg by mouth once daily.       insulin degludec (TRESIBA FLEXTOUCH U-200) 200 unit/mL (3 mL) insulin pen Inject 60 Units into the skin once daily.      levoFLOXacin (LEVAQUIN) 250 MG tablet Take 250 mg by mouth once daily.      levothyroxine (TIROSINT) 88 mcg Cap Take 75 mcg by mouth before breakfast.      losartan (COZAAR) 25 MG tablet Take 25 mg by mouth once daily.      omeprazole (PRILOSEC) 20 MG capsule  Take 20 mg by mouth once daily.      prednisoLONE acetate (PRED FORTE) 1 % DrpS Place 1 drop into both eyes 4 (four) times daily.      sevelamer HCL (RENAGEL) 800 MG Tab Take 3 tablets by mouth 3 (three) times daily.      traMADoL (ULTRAM) 50 mg tablet Take 1 tablet (50 mg total) by mouth every 6 (six) hours as needed for Pain. 20 tablet 0    acetaminophen (TYLENOL) 500 mg Cap Take 500 mg by mouth every 6 (six) hours as needed.      apixaban (ELIQUIS) 2.5 mg Tab Take 2.5 mg by mouth 2 (two) times daily.      atorvastatin (LIPITOR) 10 MG tablet Take 10 mg by mouth once daily.      budesonide-glycopyr-formoterol (BREZTRI AEROSPHERE) 160-9-4.8 mcg/actuation HFAA Inhale 1 puff into the lungs once daily.      calcium carbonate (OS-CAMERON) 500 mg calcium (1,250 mg) chewable tablet Take 1 tablet by mouth 2 (two) times daily as needed for Heartburn.      ketoconazole (NIZORAL) 2 % cream Apply topically once daily. for 14 days 1 each 1    loperamide (IMODIUM) 2 mg capsule Take 2 mg by mouth 4 (four) times daily as needed for Diarrhea.      metoprolol succinate (TOPROL-XL) 25 MG 24 hr tablet Take 25 mg by mouth 2 (two) times daily.      sodium bicarbonate 650 MG tablet Take 650 mg by mouth 2 (two) times daily.       No current facility-administered medications for this visit.       Past Medical History:   Diagnosis Date    CAD (coronary artery disease)     Cataract     Cataract     CHF (congestive heart failure)     COPD (chronic obstructive pulmonary disease)     Diabetes mellitus     Diabetic retinopathy     ESRD (end stage renal disease)     TTS    Hypertension     Ischemic ulcer of toe of left foot     Malignant neoplasm of upper-outer quadrant of left breast in female, estrogen receptor positive 06/20/2022    left    PAD (peripheral artery disease)     PAF (paroxysmal atrial fibrillation)      Past Surgical History:   Procedure Laterality Date    BREAST BIOPSY Right     benign    CATARACT EXTRACTION W/  INTRAOCULAR LENS  IMPLANT Right 08/14/2017    Dr. Moe    CORONARY ARTERY BYPASS GRAFT  2020    FLUOROSCOPY N/A 07/25/2022    Procedure: FLUOROSCOPY/mediport placement;  Surgeon: Cole Perdomo MD;  Location: [facility] CATH LAB;  Service: General;  Laterality: N/A;     Family History   Problem Relation Age of Onset    Hypertension Mother     Cancer Father     Breast cancer Sister     Diabetes Sister     Cancer Brother     Amblyopia Neg Hx     Blindness Neg Hx     Cataracts Neg Hx     Glaucoma Neg Hx     Macular degeneration Neg Hx     Retinal detachment Neg Hx     Strabismus Neg Hx     Stroke Neg Hx     Thyroid disease Neg Hx      Social History     Tobacco Use    Smoking status: Former     Years: 30.00     Types: Cigarettes     Quit date: 6/21/2012     Years since quitting: 10.6    Smokeless tobacco: Never   Substance Use Topics    Alcohol use: No    Drug use: Never        Review of Systems:  Review of Systems   Constitutional:  Negative for activity change, appetite change, chills, diaphoresis, fatigue, fever and unexpected weight change.   HENT:  Negative for congestion, dental problem, hearing loss, rhinorrhea, sore throat and trouble swallowing.    Eyes:  Positive for visual disturbance. Negative for discharge.   Respiratory:  Positive for wheezing. Negative for cough, chest tightness and shortness of breath.    Cardiovascular:  Positive for palpitations. Negative for chest pain and leg swelling.   Gastrointestinal:  Positive for diarrhea. Negative for abdominal distention, abdominal pain, blood in stool, constipation, nausea and vomiting.   Endocrine: Negative for cold intolerance, heat intolerance, polydipsia, polyphagia and polyuria.   Genitourinary:  Negative for difficulty urinating, dysuria, frequency, hematuria, menstrual problem and urgency.   Musculoskeletal:  Positive for arthralgias. Negative for gait problem, joint swelling, myalgias and neck pain.   Skin:  Negative for color change, pallor, rash and wound.  "  Neurological:  Positive for weakness. Negative for dizziness, syncope, light-headedness, numbness and headaches.   Hematological:  Negative for adenopathy. Does not bruise/bleed easily.   Psychiatric/Behavioral:  Negative for confusion, decreased concentration, dysphoric mood and sleep disturbance. The patient is not nervous/anxious.      OBJECTIVE:     Vital Signs (Most Recent)  Temp: 98.6 °F (37 °C) (02/08/23 0947)  Pulse: 68 (02/08/23 0947)  BP: 131/72 (02/08/23 0947)  5' 2" (1.575 m)  87.7 kg (193 lb 5.5 oz)     Physical Exam:  Physical Exam  Vitals reviewed.   Constitutional:       General: She is not in acute distress.     Appearance: She is well-developed. She is not diaphoretic.   HENT:      Head: Normocephalic and atraumatic.      Right Ear: External ear normal.      Left Ear: External ear normal.   Eyes:      General: No scleral icterus.     Conjunctiva/sclera: Conjunctivae normal.      Pupils: Pupils are equal, round, and reactive to light.   Neck:      Thyroid: No thyromegaly.      Trachea: No tracheal deviation.   Cardiovascular:      Rate and Rhythm: Normal rate and regular rhythm.      Heart sounds: Normal heart sounds. No murmur heard.    No friction rub. No gallop.   Pulmonary:      Effort: Pulmonary effort is normal. No respiratory distress.      Breath sounds: Normal breath sounds. No wheezing or rales.   Chest:      Chest wall: No tenderness.   Breasts:     Right: No inverted nipple, mass, nipple discharge, skin change or tenderness.      Left: No inverted nipple, mass, nipple discharge, skin change or tenderness.       Abdominal:      General: Bowel sounds are normal. There is no distension.      Palpations: Abdomen is soft.      Tenderness: There is no abdominal tenderness.      Hernia: No hernia is present.   Musculoskeletal:         General: No tenderness or deformity. Normal range of motion.      Cervical back: Normal range of motion and neck supple.   Lymphadenopathy:      Cervical: No " cervical adenopathy.   Skin:     General: Skin is warm and dry.      Coloration: Skin is not pale.      Findings: No erythema or rash.   Neurological:      Mental Status: She is alert and oriented to person, place, and time.   Psychiatric:         Behavior: Behavior normal.         Thought Content: Thought content normal.         Judgment: Judgment normal.       Diagnostic Results:  Result:   Mammo Digital Diagnostic Bilat with Alfredo  US Breast Left Limited     History:  Patient is 66 y.o. and is seen for diagnostic imaging.     Films Compared:  Prior images (if available) were compared.     Findings:  This procedure was performed using tomosynthesis. Computer-aided detection was utilized in the interpretation of this examination.  The breasts have scattered areas of fibroglandular density.      Mammo Digital Diagnostic Bilat with Alfredo  Left  Mass: There is a 4.9 cm irregularly shaped mass with spiculated margins seen in the upper outer quadrant of the left breast in the anterior depth. The mass correlates with the palpable mass reported by the patient. Associated features include skin retraction. There are also associated calcifications.   Lymph Node: There are multiple similar lymph nodes seen in the left axilla.      Right  There is no evidence of suspicious masses, calcifications, or other abnormal findings in the right breast.     US Breast Left Limited  Left  Mass: There is a 5.6 cm x 4 cm x 4.3 cm irregularly shaped, non-parallel, hypoechoic mass with spiculated margins seen in the left breast at 2 o'clock, 3 cm from the nipple.   Lymph Node: There are multiple similar lymph nodes seen in the left axilla. Largest node is 3.4 cm x 1.6 cm x 2.0 cm.      Impression:  Left  Mass: Left breast 5.6 cm x 4 cm x 4.3 cm mass at the 2 o'clock position. Assessment: 5 - Highly suggestive of malignancy. Ultrasound-guided biopsy is recommended.   Lymph Node: Left axilla lymph node (multiple findings). Assessment: 5 - Highly  suggestive of malignancy. Ultrasound-guided biopsy is recommended.      Right  There is no mammographic or sonographic evidence of malignancy in the right breast.     BI-RADS Category:   Overall: 5 - Highly Suggestive of Malignancy     Recommendation:  Ultrasound-guided biopsy is recommended.  Ultrasound-guided biopsy is recommended.     Your estimated lifetime risk of breast cancer (to age 85) based on Tyrer-Cuzick risk assessment model is Tyrer-Cuzick: 6.49 %. According to the [employer] Society, patients with a lifetime breast cancer risk of 20% or higher might benefit from supplemental screening tests.    Final Pathologic Diagnosis Part 1   Left axilla, ultrasound-guided core biopsy:   Positive for metastatic carcinoma   Metastasis measures 10 mm in greatest dimension   Part 2   Left breast, 2:00, ultrasound-guided core biopsy:   Invasive ductal carcinoma   Bubba grade 2:  Tubule formation -3, nuclear pleomorphism-2 and mitotic   count-2   Invasive carcinoma measures 17 mm in greatest dimension   No carcinoma in Situ or lymphovascular invasion identified   Tumor biomarkers   Estrogen receptor (ER):  Positive, greater than 95%, strong   Progesterone receptor (PGR):  Positive, 80%, strong   HER2 (IHC):  Equivocal, 2+   Ki-67:  60%   Additional IHC:   P63:  Negative throughout, supporting the diagnosis of invasive carcinoma   This case was reviewed by Dr. GABBY Cannon who concurs with the above diagnosis.  VC      Comment: Interp By Bette Pitts M.D., Signed on 06/21/2022 at 12:02   Supplemental Diagnosis HER2, Breast Tumor, FISH, Tissue   Result Summary   POSITIVE   Interpretation   This tumor sample is positive for HER2 (ERBB2) gene amplification.   Result   nuc erwin([id number],HER2x5¿8)   HER2/D17Z1 ratio: 2.03   Average HER2 signals per cell: 6.3   Average D17Z1 signals per cell: 3.1         PET/CT:  Impression:     Interval improvement.  Left breast mass demonstrates decreased in size in decreased  hypermetabolic activity.  Left pectoralis and axillary lymphadenopathy also demonstrates marked significant decreased activity.  Residual nonspecific mildly hypermetabolic right axillary and bilateral inguinal lymph nodes.    Mammogram:  Result:   Mammo Digital Diagnostic Left with Alfredo     History:  Patient is 67 y.o. and is seen for diagnostic imaging.     Films Compared:  Prior images (if available) were compared.     Findings:  This procedure was performed using tomosynthesis. Computer-aided detection was utilized in the interpretation of this examination.  The left breast has scattered areas of fibroglandular density.      Known malignant breast mass currently measures approximately 4.1 x 3.0 x 2.9 cm, previous 4.5 x 3.7 x 3.6 cm. Note that suspicious calcifications extend at least 2 cm medial to the breast mass and 5-6 cm medial to the post biopsy clip.  There is been decrease in size of the left axillary adenopathy, particularly the prior biopsied enlarged lymph node.         Impression:  Decrease in size of malignant left breast mass and axillary adenopathy. Correlate with ordered PET-CT.         BI-RADS Category:   Overall: 6 - Known Biopsy-Proven Malignancy        Recommendation:  There are no mammo recommendations for this study.    ASSESSMENT/PLAN:     67 y/o female with left breast invasive ductal carcinoma ER+/GA+ Her2+ with metastatic disease to the lymph node    PLAN:Plan     PET/CT and mammogram reviewed  Left mastectomy without reconstruction (bra prothesis - following treatment with ALND (clinically palpable nodes) 2/28/23; discussed alternative surgical options including lumpectomy + XRT, reconstruction; potential need for further treatment including hormone therapy  Preop: CBC, CMP, EKG, CXR, cardiology clearance; lymphoscintigraphy day of surgery  Risks and benefits discussed with patient including but not limited to: pain, bleeding, infection, injury to underlying nerves or vessels,  lymphedema,parathesias, need for further surgery   PT/OT referral for pre/post mastectomy    US guided biopsy inguinal lymph node remains pet avid but unchanged from previous appearance    45 minutes of total time spent on the encounter, which includes face to face time and non-face to face time preparing to see the patient (eg, review of tests), Obtaining and/or reviewing separately obtained history, Documenting clinical information in the electronic or other health record, Independently interpreting results (not separately reported) and communicating results to the patient/family/caregiver, or Care coordination (not separately reported).     Also discussed this with patient's family over the phone during the visit

## 2023-02-23 ENCOUNTER — HOSPITAL ENCOUNTER (OUTPATIENT)
Dept: RADIOLOGY | Facility: HOSPITAL | Age: 68
Discharge: HOME | End: 2023-02-23
Attending: SURGERY
Payer: MEDICARE

## 2023-02-23 DIAGNOSIS — D53.9 MACROCYTIC ANEMIA: ICD-10-CM

## 2023-02-23 PROCEDURE — 88342 CHG IMMUNOCYTOCHEMISTRY: ICD-10-PCS | Mod: 26,59,, | Performed by: PATHOLOGY

## 2023-02-23 PROCEDURE — 88185 FLOWCYTOMETRY/TC ADD-ON: CPT | Mod: 59 | Performed by: PATHOLOGY

## 2023-02-23 PROCEDURE — 76942 US BIOPSY LYMPH NODES (XPD): ICD-10-PCS | Mod: 26,LT,, | Performed by: RADIOLOGY

## 2023-02-23 PROCEDURE — 88364 INSITU HYBRIDIZATION (FISH): CPT | Performed by: PATHOLOGY

## 2023-02-23 PROCEDURE — 88189 PR  FLOWCYTOMETRY/READ, 16 & > MARKERS: ICD-10-PCS | Mod: ,,, | Performed by: PATHOLOGY

## 2023-02-23 PROCEDURE — 88341 IMHCHEM/IMCYTCHM EA ADD ANTB: CPT | Mod: 26,,, | Performed by: PATHOLOGY

## 2023-02-23 PROCEDURE — 88342 IMHCHEM/IMCYTCHM 1ST ANTB: CPT | Performed by: PATHOLOGY

## 2023-02-23 PROCEDURE — 38505 NEEDLE BIOPSY LYMPH NODES: CPT | Mod: LT,,, | Performed by: RADIOLOGY

## 2023-02-23 PROCEDURE — 88365 INSITU HYBRIDIZATION (FISH): CPT | Performed by: PATHOLOGY

## 2023-02-23 PROCEDURE — 76942 ECHO GUIDE FOR BIOPSY: CPT | Mod: 26,LT,, | Performed by: RADIOLOGY

## 2023-02-23 PROCEDURE — 88365 INSITU HYBRIDIZATION (FISH): CPT | Mod: 26,,, | Performed by: PATHOLOGY

## 2023-02-23 PROCEDURE — 88189 FLOWCYTOMETRY/READ 16 & >: CPT | Mod: ,,, | Performed by: PATHOLOGY

## 2023-02-23 PROCEDURE — 88184 FLOWCYTOMETRY/ TC 1 MARKER: CPT | Performed by: PATHOLOGY

## 2023-02-23 PROCEDURE — 88341 IMHCHEM/IMCYTCHM EA ADD ANTB: CPT | Mod: 59 | Performed by: PATHOLOGY

## 2023-02-23 PROCEDURE — 88364 INSITU HYBRIDIZATION (FISH): CPT | Mod: 26,,, | Performed by: PATHOLOGY

## 2023-02-23 PROCEDURE — 88364 CHG INSITU HYBRIDIZATION (FISH: ICD-10-PCS | Mod: 26,,, | Performed by: PATHOLOGY

## 2023-02-23 PROCEDURE — 88305 TISSUE EXAM BY PATHOLOGIST: CPT | Mod: 26,,, | Performed by: PATHOLOGY

## 2023-02-23 PROCEDURE — 88365 PR  TISSUE HYBRIDIZATION: ICD-10-PCS | Mod: 26,,, | Performed by: PATHOLOGY

## 2023-02-23 PROCEDURE — 38505 US BIOPSY LYMPH NODES (XPD): ICD-10-PCS | Mod: LT,,, | Performed by: RADIOLOGY

## 2023-02-23 PROCEDURE — 88342 IMHCHEM/IMCYTCHM 1ST ANTB: CPT | Mod: 26,59,, | Performed by: PATHOLOGY

## 2023-02-23 PROCEDURE — 88341 PR IHC OR ICC EACH ADD'L SINGLE ANTIBODY  STAINPR: ICD-10-PCS | Mod: 26,,, | Performed by: PATHOLOGY

## 2023-02-23 PROCEDURE — 88305 TISSUE EXAM BY PATHOLOGIST: CPT | Performed by: PATHOLOGY

## 2023-02-23 PROCEDURE — 88305 TISSUE EXAM BY PATHOLOGIST: ICD-10-PCS | Mod: 26,,, | Performed by: PATHOLOGY

## 2023-02-23 PROCEDURE — 76942 ECHO GUIDE FOR BIOPSY: CPT | Mod: TC

## 2023-02-23 NOTE — DISCHARGE SUMMARY
[facility] (Hospital)  Discharge Note  Short Stay    US Biopsy Lymph Nodes (xpd)      OUTCOME: Patient tolerated treatment/procedure well without complication and is now ready for discharge.    DISPOSITION: Home or Self Care    FINAL DIAGNOSIS:  <principal problem not specified>    FOLLOWUP: In clinic    DISCHARGE INSTRUCTIONS:  No discharge procedures on file.     TIME SPENT ON DISCHARGE: 15 minutes    Pre Op Diagnosis: Left inguinal lymphadenopathy     Post Op Diagnosis: same     Procedure:  US guided biopsy of left inguinal lymph node     Procedure performed by: Alec CALHOUN, Jairo PARTIDA     Written Informed Consent Obtained: Yes     Specimen Removed:  yes     Estimated Blood Loss:  minimal     Findings: Local anesthesia and moderate sedation were used.     The patient tolerated the procedure well and there were no complications.      Disposition:  F/U in clinic or with ordering physician    Discharge instructions:  Light activity for 24 hours.  Remove band aid in 24 hours.  No baths (showers are appropriate).    Sterile technique was performed in the left inguinal and proximal medial thigh, lidocaine was used as a local anesthetic.  Multiple samples taken percutaneously from the lymph node.  Pt tolerated the procedure well without immediate complications.  Please see radiologist report for details. F/u with PCP and/or ordering physician.

## 2023-02-24 LAB
FLOW CYTOMETRY ANTIBODIES ANALYZED - LYMPH NODE: NORMAL
FLOW CYTOMETRY COMMENT - LYMPH NODE: NORMAL
FLOW CYTOMETRY INTERPRETATION - LYMPH NODE: NORMAL

## 2023-02-27 ENCOUNTER — CLINICAL SUPPORT (OUTPATIENT)
Dept: REHABILITATION | Facility: HOSPITAL | Age: 68
End: 2023-02-27
Attending: SURGERY
Payer: MEDICARE

## 2023-02-27 DIAGNOSIS — C50.912 MALIGNANT NEOPLASM OF LEFT BREAST IN FEMALE, ESTROGEN RECEPTOR POSITIVE, UNSPECIFIED SITE OF BREAST: ICD-10-CM

## 2023-02-27 DIAGNOSIS — Z17.0 MALIGNANT NEOPLASM OF LEFT BREAST IN FEMALE, ESTROGEN RECEPTOR POSITIVE, UNSPECIFIED SITE OF BREAST: ICD-10-CM

## 2023-02-27 PROCEDURE — 97535 SELF CARE MNGMENT TRAINING: CPT | Mod: PN

## 2023-02-27 PROCEDURE — 97161 PT EVAL LOW COMPLEX 20 MIN: CPT | Mod: PN

## 2023-02-27 NOTE — PLAN OF CARE
OUTPATIENT PHYSICAL THERAPY   PRE-OP EVALUATION    Date: 2/27/2023   Name: Malaika Vazquez [facility] Number: [id number]    Therapy Diagnosis:   Encounter Diagnosis   Name Primary?    Malignant neoplasm of left breast in female, estrogen receptor positive, unspecified site of breast      Physician: Jayjay Arana MD    Physician Orders: PT Eval and Treat   Medical Diagnosis: malignant neoplasm of left breast  Evaluation Date: 2/27/2023  Authorization period Expiration: 2/8/2024  Plan of Care Certification Period: 2/27/2023    Visit #: 1/ Visits authorized: 1  Time In:2:25 pm  Time Out: 3:10 pm  Total Billable Time: 25 minutes    Precautions: Standard    History   History of Present Illness: Malaika is a 67 y.o. female that presents to  [facility] at the [facility] secondary to dx of left breast cancer. She is scheduled for a left mastectomy on Wednesday March 1. She thinks that they may be removing lymph nodes but is not sure. She is unsure of having to get radiation. She has dialysis 3x/week and has a port on the right side of chest wall.       Pt presents today for baseline measurements to aid in the early detection of lymphedema, UE muscle testing, postural and ROM assessment along with education of risk of lymphedema and surgical precautions post surgery. Circumferential measurements will be taken today of BL UEs for early detection of lymphedema post surgery. Pt will also be instructed in exercises to perform pre and post-surgery to insure best outcomes.     Past Medical History:   Past Medical History:   Diagnosis Date    CAD (coronary artery disease)     Cataract     Cataract     CHF (congestive heart failure)     COPD (chronic obstructive pulmonary disease)     Diabetes mellitus     Diabetic retinopathy     ESRD (end stage renal disease)     TTS    Hypertension     Ischemic ulcer of toe of left foot     Malignant neoplasm of upper-outer quadrant of left breast in female,  estrogen receptor positive 06/20/2022    left    PAD (peripheral artery disease)     PAF (paroxysmal atrial fibrillation)        Past Surgical History:   Malaika Paredes  has a past surgical history that includes Cataract extraction w/  intraocular lens implant (Right, 08/14/2017); Breast biopsy (Right); Coronary artery bypass graft (2020); and Fluoroscopy (N/A, 07/25/2022).    Medications:  Malaika has a current medication list which includes the following prescription(s): acetaminophen, albuterol sulfate, amiodarone, amlodipine, aspirin, atorvastatin, breztri aerosphere, calcium carbonate, insulin degludec, ketoconazole, levofloxacin, levothyroxine, loperamide, losartan, metoprolol succinate, omeprazole, prednisolone acetate, sevelamer hcl, sodium bicarbonate, and tramadol.    Allergies:  Review of patient's allergies indicates:  No Known Allergies       Hand dominance: right handed  Prior Therapy: no  Nutrition:  Normal  Social History: niece lives with her  Place of Residence (Steps/Adaptations): level floor, has ramp  Current functional status:  no limitations with upper extremities   Exercise routine prior to onset : walks at time  DME owned: uses a cane  Work:  currently not working                       Subjective   Pt states: no complaints  Pain: 0/10 on VAS.     Objective   Mental status :alert    Posture/Alignment   Postural examination/scapula alignment: rounded shoulders, forward head  Joint integrity: WFLs  Skin integrity: intact  Edema: none noted    Sensation: Light Touch: Intact           Proprioception: Intact  - appearance: well groomed     ROM:   UPPER EXTREMITY--AROM/PROM  (R) UE: WNLs  (L) UE: WNLs     Shoulder Range of Motion:   ACTIVE ROM LEFT RIGHT   Flexion 180 180   Abduction 180 180   Extension wnl wnl   IR/90deg wnl wnl   ER/90deg wnl wnl     Strength: manual muscle test grades below   Upper Extremity Strength   (L) UE (R) UE   Shoulder flexion: 4/5 4/5   Shoulder Abduction: 4+/5 4+/5  "  Shoulder internal rotation/external rotation  nt nt   Elbow flexion: 4+/5 4+/5   Elbow extension: 4+/5 4+/5       Baseline Measurements of BL UE's for early detection of Lymphedema:     LANDMARK RIGHT UE LEFT UE DIFFERENCE   E + 8" 37.5 cm 39.5 cm 2 cm   E + 6" 36 cm 38.5 cm 2.5 cm   E + 4" 30.5 cm 32.5 cm 2 cm   E + 2" 27.5 cm 28.5 cm 1 cm   Elbow 26 cm 26 cm 0 cm   W+ 8" 27.5 cm 27 cm - cm   W +  6" 25.5 cm 27 cm 2.5 cm   W + 4" 20 cm 20 cm 0 cm   Wrist 16 cm 16 cm 0 cm   DPC 19.5 cm 18.5 cm 0 cm   IP Thumb 7 cm 6.5 cm - cm       Endurance Assessment:   Evaluation   30 second Chair Rise  (adults > 61 y/o) 9 completed with arms       Coordination:   - fine motor: WFL  - UE coordination: intact     - LE coordination:  Not tested     Functional Mobility (Bed mobility, transfers)  Bed mobility: I =  independent   Roll to left: I  Roll to right: I  Supine to prone: I  Scooting to edge of bed: I  Supine to sit: I  Sit to supine: I  Transfers to bed: I  Transfers to toilet: I  Sit to stand:  I  Stand pivot:  I  Car transfers: I      ADL's:  Feeding: I = independent   Grooming: I  Hygiene: I  UB Dressing: I  LB Dressing: I  Toileting: I  Bathing: I    Gait Assessment:   - AD used: none  - Assistance: independent  - Distance: community distances         Pt has no cultural, educational or language barriers to learning provided.    Treatment and Patient Education     Total Time Separate from Evaluation: 25 minutes    Patient participated in self care/home management for 25 minutes including the following:      - Role of PT in multi - disciplinary team, goals for PT  - Pt was educated in lymphedema etiology and management plans.    - Pt was provided with written risk reductions and precautions for managing lymphedema.   - Reviewed JACOBO drain care instructions.     ROM/lifting Precautions post surgery discussed -  until drains have been removed:  - do not lift affected arm above 90 degrees of shoulder flexion  - do not lift " over 5 lbs  - do not pull or push heavy objects  - do not sleep on your stomach or surgery side     Pt was instructed in and performed therapeutic exercise for postural correction and alignment, stretching and soft tissue mobility, and strengthening.   Exercises included:   - deep breathing and relaxation  - scapular retractions  - fist making  - elbow flexion/extension    Pt was able to demonstrate and report understanding and performance      DISCUSSED THE FOLLOWING REGARDING LYMPHEDEMA:  -discussed etiology of lymphedema and how she is susceptible for lymphedema if she has radiation and/or removal of lymph nodes  -discussed cellulitis and ways to decrease risk of cellulitis (she has a shunt in the left arm which could lead to cellulitis if she were to get lymphedema)  -instructed to make appointment with lymphedema therapists at the first sign of heaviness and/or pain or if the left arm and/or hand swells       See EMR under Patient Instructions for exercises provided 2/27/2023.      Assessment   This is a 67 y.o. female referred to outpatient physical therapy and presents with a medical diagnosis of malignant neoplasm of the left breast  and was seen today pre-operatively to assess strength and ROM of BL UEs, to take baseline circumferential measurements of BL UEs to aid in the early detection of lymphedema and provide pt education on exercises/precations post breast surgery. Pt does not exhibit any ROM impairments; she has a port in the right side of the chest and shunts for dialysis located in the left arm.  Pt educated in lymphedema risks/precautions as well as ROM/lifting precautions post surgery - pt demonstrated/verbalized understanding. No goals established this visit as goals for PT will be established post surgery at follow up.      Anticipated barriers to physical therapy: does not use written instruction     Pt's spiritual, cultural and educational needs considered and pt agreeable to plan of care and  goals as stated below:     Medical necessity is demonstrated by the following IMPAIRMENTS/PROMBLEM LIST:  History  Co-morbidities and personal factors that may impact the plan of care Co-morbidities:   See above information    Personal Factors:   no deficits     low   Examination  Body Structures and Functions, activity limitations and participation restrictions that may impact the plan of care Body Regions:   upper extremities    Body Systems:    Has a shunt in the left arm    Participation Restrictions:   none    Activity limitations:   Learning and applying knowledge  Questionable if she can read    General Tasks and Commands  no deficits    Communication  no deficits    Mobility  no deficits    Self care  no deficits    Domestic Life  no deficits    Interactions/Relationships  no deficits    Life Areas  no deficits    Community and Social Life  no deficits         low   Clinical Presentation stable and uncomplicated low   Decision Making/ Complexity Score: low           Plan   Schedule patient for follow up with Physical therapy post surgery. Goals for therapy post surgery will be established at that time.     Therapist:   Marjorie Varghese, PT  2/27/2023

## 2023-02-28 ENCOUNTER — TELEPHONE (OUTPATIENT)
Dept: PREADMISSION TESTING | Facility: HOSPITAL | Age: 68
End: 2023-02-28
Payer: MEDICARE

## 2023-02-28 ENCOUNTER — ANESTHESIA EVENT (OUTPATIENT)
Dept: SURGERY | Facility: HOSPITAL | Age: 68
End: 2023-02-28
Payer: MEDICARE

## 2023-02-28 NOTE — TELEPHONE ENCOUNTER
Called and spoke with pt carisa about the following:     Please arrive to [facility] ([facility] at 0530 am on 3/1/23 for your scheduled procedure.  Address: [address] [place] [address] [place] [id number] ([address] Building on left, 1st Floor Lobby)  >>>NO eating or drinking after midnight unless instructed otherwise by your Surgeon<<<    Thank you,  -[facility]  Mon-Fri 8 am - 4 pm [phone]

## 2023-02-28 NOTE — ANESTHESIA PREPROCEDURE EVALUATION
02/28/2023  Malaika Paredes is a 67 y.o., female.    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    Lattice degeneration of right retina    Uncontrolled diabetes mellitus with both eyes affected by proliferative retinopathy and macular edema, with long-term current use of insulin    NS (nuclear sclerosis), left    Type 2 diabetes mellitus, with long-term current use of insulin    ESRD (end stage renal disease)    COPD (chronic obstructive pulmonary disease)    Macrocytic anemia    Chronic kidney disease-mineral and bone disorder    Anemia associated with chronic renal failure    Diastolic dysfunction    Status post cataract extraction and insertion of intraocular lens of right eye    Malignant neoplasm of upper-outer quadrant of left breast in female, estrogen receptor positive    Severe obesity (BMI 35.0-39.9) with comorbidity    Immunodeficiency due to chemotherapy    Palliative care encounter    Peripheral neuropathy due to chemotherapy    Primary hypertension    Hyperparathyroidism, unspecified    Other thrombophilia    PAF (paroxysmal atrial fibrillation)    Type 2 diabetes mellitus with diabetic peripheral angiopathy without gangrene, with long-term current use of insulin    Ischemic ulcer of toe of left foot    Preoperative examination    CAD (coronary artery disease)       Pre-op Assessment    I have reviewed the Patient Summary Reports.    I have reviewed the NPO Status.   I have reviewed the Medications.     Review of Systems  Anesthesia Hx:  No problems with previous Anesthesia    Social:  Former Smoker    Hematology/Oncology:         -- Anemia: Oncology Comments: Malignant neoplasm of upper-outer quadrant of left breast in female, estrogen receptor positive     EENT/Dental:   Diabetic retinopathy   Cardiovascular:   Hypertension CAD  CABG/stent  CHF PVD ECG has been reviewed. PAF (paroxysmal atrial fibrillation)    CORONARY ARTERY BYPASS GRAFT   Pulmonary:   COPD    Renal/:   Chronic Renal Disease, ESRD    Hepatic/GI:  Hepatic/GI Normal    Neurological:   Peripheral Neuropathy    Endocrine:   Diabetes Hyperparathyroidism Obesity / BMI > 30      Physical Exam  General: Well nourished    Airway:  Mallampati: II   Mouth Opening: Normal  TM Distance: Normal  Neck ROM: Normal ROM    Dental:  Edentulous        Anesthesia Plan  Type of Anesthesia, risks & benefits discussed:    Anesthesia Type: Gen ETT  Intra-op Monitoring Plan: Standard ASA Monitors  Post Op Pain Control Plan: multimodal analgesia  Induction:  IV  Airway Plan: , Post-Induction  Informed Consent: Informed consent signed with the Patient and all parties understand the risks and agree with anesthesia plan.  All questions answered.   ASA Score: 4    Ready For Surgery From Anesthesia Perspective.     .      Chemistry        Component Value Date/Time     02/08/2023 1138    K 4.5 02/08/2023 1138     02/08/2023 1138    CO2 24 02/08/2023 1138    BUN 36 (H) 02/08/2023 1138    CREATININE 5.3 (H) 02/08/2023 1138     02/08/2023 1138        Component Value Date/Time    CALCIUM 8.6 (L) 02/08/2023 1138    ALKPHOS 57 02/08/2023 1138    AST 15 02/08/2023 1138    ALT 11 02/08/2023 1138    BILITOT 0.3 02/08/2023 1138    ESTGFRAFRICA 8 (A) 07/27/2022 0757    EGFRNONAA 7 (A) 07/27/2022 0757        Lab Results   Component Value Date    WBC 7.34 02/08/2023    HGB 11.7 (L) 02/08/2023    HCT 37.6 02/08/2023     (H) 02/08/2023     02/08/2023       Normal sinus rhythm   Left anterior fascicular block   LVH with QRS widening   Nonspecific T wave abnormality   When compared with ECG of 14-SEP-2022 11:51,   No significant change was found   Confirmed by CLARI LEE MD ([id number]) on 2/8/2023 6:32:01 PM      Echo 1/4/23:   The left ventricle is normal in size with normal systolic function.   The estimated ejection fraction is 60%.   Grade I left ventricular diastolic dysfunction.   Normal right ventricular size with normal right ventricular systolic function.   Mild left atrial enlargement.   Mild to moderate tricuspid regurgitation.   There is pulmonary hypertension.   The estimated PA systolic pressure is 46 mmHg.

## 2023-03-01 ENCOUNTER — HOSPITAL ENCOUNTER (OUTPATIENT)
Facility: HOSPITAL | Age: 68
Discharge: HOME | End: 2023-03-02
Attending: SURGERY | Admitting: SURGERY
Payer: MEDICARE

## 2023-03-01 ENCOUNTER — ANESTHESIA (OUTPATIENT)
Dept: SURGERY | Facility: HOSPITAL | Age: 68
End: 2023-03-01
Payer: MEDICARE

## 2023-03-01 DIAGNOSIS — C50.912 MALIGNANT NEOPLASM OF LEFT BREAST IN FEMALE, ESTROGEN RECEPTOR POSITIVE, UNSPECIFIED SITE OF BREAST: ICD-10-CM

## 2023-03-01 DIAGNOSIS — C50.919 BREAST CANCER: ICD-10-CM

## 2023-03-01 DIAGNOSIS — Z17.0 MALIGNANT NEOPLASM OF LEFT BREAST IN FEMALE, ESTROGEN RECEPTOR POSITIVE, UNSPECIFIED SITE OF BREAST: ICD-10-CM

## 2023-03-01 LAB
COMMENT: NORMAL
FINAL PATHOLOGIC DIAGNOSIS: NORMAL
GROSS: NORMAL
Lab: NORMAL
MICROSCOPIC EXAM: NORMAL
POCT GLUCOSE: 104 MG/DL (ref 70–110)
POCT GLUCOSE: 73 MG/DL (ref 70–110)
POTASSIUM SERPL-SCNC: 3.6 MMOL/L (ref 3.5–5.1)

## 2023-03-01 PROCEDURE — 88341 IMHCHEM/IMCYTCHM EA ADD ANTB: CPT | Mod: 59 | Performed by: PATHOLOGY

## 2023-03-01 PROCEDURE — 94799 UNLISTED PULMONARY SVC/PX: CPT

## 2023-03-01 PROCEDURE — 63600175 PHARM REV CODE 636 W HCPCS: Performed by: NURSE ANESTHETIST, CERTIFIED REGISTERED

## 2023-03-01 PROCEDURE — 36590 PR REMOVAL TUNNELED CV CATH W SUBQ PORT OR PUMP: ICD-10-PCS | Mod: 51,,, | Performed by: SURGERY

## 2023-03-01 PROCEDURE — 82962 GLUCOSE BLOOD TEST: CPT | Performed by: SURGERY

## 2023-03-01 PROCEDURE — 36000707: Performed by: SURGERY

## 2023-03-01 PROCEDURE — 71000016 HC POSTOP RECOV ADDL HR: Performed by: SURGERY

## 2023-03-01 PROCEDURE — 84132 ASSAY OF SERUM POTASSIUM: CPT | Performed by: ANESTHESIOLOGY

## 2023-03-01 PROCEDURE — 25000003 PHARM REV CODE 250: Performed by: SURGERY

## 2023-03-01 PROCEDURE — 36590 REMOVAL TUNNELED CV CATH: CPT | Mod: 51,,, | Performed by: SURGERY

## 2023-03-01 PROCEDURE — 88342 IMHCHEM/IMCYTCHM 1ST ANTB: CPT | Performed by: PATHOLOGY

## 2023-03-01 PROCEDURE — 37000009 HC ANESTHESIA EA ADD 15 MINS: Performed by: SURGERY

## 2023-03-01 PROCEDURE — 25000003 PHARM REV CODE 250: Performed by: NURSE ANESTHETIST, CERTIFIED REGISTERED

## 2023-03-01 PROCEDURE — 71000015 HC POSTOP RECOV 1ST HR: Performed by: SURGERY

## 2023-03-01 PROCEDURE — 37000008 HC ANESTHESIA 1ST 15 MINUTES: Performed by: SURGERY

## 2023-03-01 PROCEDURE — 71000033 HC RECOVERY, INTIAL HOUR: Performed by: SURGERY

## 2023-03-01 PROCEDURE — 88341 IMHCHEM/IMCYTCHM EA ADD ANTB: CPT | Mod: 26,,, | Performed by: PATHOLOGY

## 2023-03-01 PROCEDURE — 88307 PR  SURG PATH,LEVEL V: ICD-10-PCS | Mod: 26,,, | Performed by: PATHOLOGY

## 2023-03-01 PROCEDURE — 88341 PR IHC OR ICC EACH ADD'L SINGLE ANTIBODY  STAINPR: ICD-10-PCS | Mod: 26,,, | Performed by: PATHOLOGY

## 2023-03-01 PROCEDURE — 63600175 PHARM REV CODE 636 W HCPCS: Performed by: SURGERY

## 2023-03-01 PROCEDURE — 88342 CHG IMMUNOCYTOCHEMISTRY: ICD-10-PCS | Mod: 26,,, | Performed by: PATHOLOGY

## 2023-03-01 PROCEDURE — 36000706: Performed by: SURGERY

## 2023-03-01 PROCEDURE — 63600175 PHARM REV CODE 636 W HCPCS: Performed by: ANESTHESIOLOGY

## 2023-03-01 PROCEDURE — 88307 TISSUE EXAM BY PATHOLOGIST: CPT | Mod: 26,,, | Performed by: PATHOLOGY

## 2023-03-01 PROCEDURE — 94761 N-INVAS EAR/PLS OXIMETRY MLT: CPT

## 2023-03-01 PROCEDURE — 71000039 HC RECOVERY, EACH ADD'L HOUR: Performed by: SURGERY

## 2023-03-01 PROCEDURE — 27000221 HC OXYGEN, UP TO 24 HOURS

## 2023-03-01 PROCEDURE — 99900035 HC TECH TIME PER 15 MIN (STAT)

## 2023-03-01 PROCEDURE — 88342 IMHCHEM/IMCYTCHM 1ST ANTB: CPT | Mod: 26,,, | Performed by: PATHOLOGY

## 2023-03-01 PROCEDURE — 27201423 OPTIME MED/SURG SUP & DEVICES STERILE SUPPLY: Performed by: SURGERY

## 2023-03-01 PROCEDURE — 88307 TISSUE EXAM BY PATHOLOGIST: CPT | Performed by: PATHOLOGY

## 2023-03-01 PROCEDURE — 19307 PR MASTECTOMY, MODIFIED RADICAL: ICD-10-PCS | Mod: LT,,, | Performed by: SURGERY

## 2023-03-01 PROCEDURE — 19307 MAST MOD RAD: CPT | Mod: LT,,, | Performed by: SURGERY

## 2023-03-01 RX ORDER — LIDOCAINE HYDROCHLORIDE 10 MG/ML
1 INJECTION, SOLUTION EPIDURAL; INFILTRATION; INTRACAUDAL; PERINEURAL ONCE
Status: DISCONTINUED | OUTPATIENT
Start: 2023-03-01 | End: 2023-03-01 | Stop reason: HOSPADM

## 2023-03-01 RX ORDER — CHLORHEXIDINE GLUCONATE ORAL RINSE 1.2 MG/ML
10 SOLUTION DENTAL 2 TIMES DAILY
Status: DISCONTINUED | OUTPATIENT
Start: 2023-03-01 | End: 2023-03-02 | Stop reason: HOSPADM

## 2023-03-01 RX ORDER — BUPIVACAINE HYDROCHLORIDE 2.5 MG/ML
INJECTION, SOLUTION EPIDURAL; INFILTRATION; INTRACAUDAL
Status: DISCONTINUED | OUTPATIENT
Start: 2023-03-01 | End: 2023-03-01 | Stop reason: HOSPADM

## 2023-03-01 RX ORDER — MIDAZOLAM HYDROCHLORIDE 1 MG/ML
INJECTION, SOLUTION INTRAMUSCULAR; INTRAVENOUS
Status: DISCONTINUED | OUTPATIENT
Start: 2023-03-01 | End: 2023-03-01

## 2023-03-01 RX ORDER — HYDROMORPHONE HYDROCHLORIDE 2 MG/ML
0.2 INJECTION, SOLUTION INTRAMUSCULAR; INTRAVENOUS; SUBCUTANEOUS EVERY 5 MIN PRN
Status: DISCONTINUED | OUTPATIENT
Start: 2023-03-01 | End: 2023-03-01 | Stop reason: HOSPADM

## 2023-03-01 RX ORDER — PHENYLEPHRINE HYDROCHLORIDE 10 MG/ML
INJECTION INTRAVENOUS
Status: DISCONTINUED | OUTPATIENT
Start: 2023-03-01 | End: 2023-03-01

## 2023-03-01 RX ORDER — METOPROLOL SUCCINATE 25 MG/1
25 TABLET, EXTENDED RELEASE ORAL DAILY
Status: DISCONTINUED | OUTPATIENT
Start: 2023-03-01 | End: 2023-03-01

## 2023-03-01 RX ORDER — FENTANYL CITRATE 50 UG/ML
INJECTION, SOLUTION INTRAMUSCULAR; INTRAVENOUS
Status: DISCONTINUED | OUTPATIENT
Start: 2023-03-01 | End: 2023-03-01

## 2023-03-01 RX ORDER — ONDANSETRON 2 MG/ML
INJECTION INTRAMUSCULAR; INTRAVENOUS
Status: DISCONTINUED | OUTPATIENT
Start: 2023-03-01 | End: 2023-03-01

## 2023-03-01 RX ORDER — PROPOFOL 10 MG/ML
VIAL (ML) INTRAVENOUS
Status: DISCONTINUED | OUTPATIENT
Start: 2023-03-01 | End: 2023-03-01

## 2023-03-01 RX ORDER — KETAMINE HCL IN 0.9 % NACL 50 MG/5 ML
SYRINGE (ML) INTRAVENOUS
Status: DISCONTINUED | OUTPATIENT
Start: 2023-03-01 | End: 2023-03-01

## 2023-03-01 RX ORDER — CLONIDINE HYDROCHLORIDE 0.1 MG/1
0.1 TABLET ORAL EVERY 6 HOURS PRN
Status: DISCONTINUED | OUTPATIENT
Start: 2023-03-01 | End: 2023-03-02 | Stop reason: HOSPADM

## 2023-03-01 RX ORDER — EPHEDRINE SULFATE 50 MG/ML
INJECTION, SOLUTION INTRAVENOUS
Status: DISCONTINUED | OUTPATIENT
Start: 2023-03-01 | End: 2023-03-01

## 2023-03-01 RX ORDER — ROCURONIUM BROMIDE 10 MG/ML
INJECTION, SOLUTION INTRAVENOUS
Status: DISCONTINUED | OUTPATIENT
Start: 2023-03-01 | End: 2023-03-01

## 2023-03-01 RX ORDER — CEFAZOLIN SODIUM 2 G/50ML
2 SOLUTION INTRAVENOUS
Status: COMPLETED | OUTPATIENT
Start: 2023-03-01 | End: 2023-03-01

## 2023-03-01 RX ORDER — SODIUM CHLORIDE 9 MG/ML
INJECTION, SOLUTION INTRAVENOUS CONTINUOUS
Status: DISCONTINUED | OUTPATIENT
Start: 2023-03-01 | End: 2023-03-01

## 2023-03-01 RX ORDER — HYDROCODONE BITARTRATE AND ACETAMINOPHEN 10; 325 MG/1; MG/1
1 TABLET ORAL EVERY 4 HOURS PRN
Status: DISCONTINUED | OUTPATIENT
Start: 2023-03-01 | End: 2023-03-02 | Stop reason: HOSPADM

## 2023-03-01 RX ORDER — DIPHENHYDRAMINE HYDROCHLORIDE 50 MG/ML
25 INJECTION INTRAMUSCULAR; INTRAVENOUS EVERY 4 HOURS PRN
Status: DISCONTINUED | OUTPATIENT
Start: 2023-03-01 | End: 2023-03-02 | Stop reason: HOSPADM

## 2023-03-01 RX ORDER — MORPHINE SULFATE 2 MG/ML
2 INJECTION, SOLUTION INTRAMUSCULAR; INTRAVENOUS
Status: DISCONTINUED | OUTPATIENT
Start: 2023-03-01 | End: 2023-03-02 | Stop reason: HOSPADM

## 2023-03-01 RX ORDER — LIDOCAINE HYDROCHLORIDE 10 MG/ML
INJECTION, SOLUTION EPIDURAL; INFILTRATION; INTRACAUDAL; PERINEURAL
Status: DISCONTINUED | OUTPATIENT
Start: 2023-03-01 | End: 2023-03-01 | Stop reason: HOSPADM

## 2023-03-01 RX ORDER — LIDOCAINE HYDROCHLORIDE 20 MG/ML
INJECTION, SOLUTION EPIDURAL; INFILTRATION; INTRACAUDAL; PERINEURAL
Status: DISCONTINUED | OUTPATIENT
Start: 2023-03-01 | End: 2023-03-01

## 2023-03-01 RX ORDER — ACETAMINOPHEN 325 MG/1
650 TABLET ORAL EVERY 6 HOURS PRN
Status: DISCONTINUED | OUTPATIENT
Start: 2023-03-01 | End: 2023-03-02 | Stop reason: HOSPADM

## 2023-03-01 RX ORDER — HYDROCODONE BITARTRATE AND ACETAMINOPHEN 5; 325 MG/1; MG/1
1 TABLET ORAL EVERY 4 HOURS PRN
Status: DISCONTINUED | OUTPATIENT
Start: 2023-03-01 | End: 2023-03-02 | Stop reason: HOSPADM

## 2023-03-01 RX ORDER — OXYCODONE AND ACETAMINOPHEN 5; 325 MG/1; MG/1
1 TABLET ORAL
Status: DISCONTINUED | OUTPATIENT
Start: 2023-03-01 | End: 2023-03-01 | Stop reason: HOSPADM

## 2023-03-01 RX ORDER — SODIUM CHLORIDE 9 MG/ML
INJECTION, SOLUTION INTRAVENOUS CONTINUOUS
Status: DISCONTINUED | OUTPATIENT
Start: 2023-03-01 | End: 2023-03-02 | Stop reason: HOSPADM

## 2023-03-01 RX ORDER — ONDANSETRON 8 MG/1
8 TABLET, ORALLY DISINTEGRATING ORAL EVERY 8 HOURS PRN
Status: DISCONTINUED | OUTPATIENT
Start: 2023-03-01 | End: 2023-03-02 | Stop reason: HOSPADM

## 2023-03-01 RX ORDER — METOPROLOL SUCCINATE 25 MG/1
25 TABLET, EXTENDED RELEASE ORAL DAILY
Status: DISCONTINUED | OUTPATIENT
Start: 2023-03-01 | End: 2023-03-01 | Stop reason: HOSPADM

## 2023-03-01 RX ORDER — ONDANSETRON 2 MG/ML
4 INJECTION INTRAMUSCULAR; INTRAVENOUS DAILY PRN
Status: DISCONTINUED | OUTPATIENT
Start: 2023-03-01 | End: 2023-03-01 | Stop reason: HOSPADM

## 2023-03-01 RX ORDER — TALC
9 POWDER (GRAM) TOPICAL NIGHTLY PRN
Status: DISCONTINUED | OUTPATIENT
Start: 2023-03-01 | End: 2023-03-02 | Stop reason: HOSPADM

## 2023-03-01 RX ADMIN — EPHEDRINE SULFATE 10 MG: 50 INJECTION INTRAVENOUS at 09:03

## 2023-03-01 RX ADMIN — CHLORHEXIDINE GLUCONATE 0.12% ORAL RINSE 10 ML: 1.2 LIQUID ORAL at 09:03

## 2023-03-01 RX ADMIN — HYDROMORPHONE HYDROCHLORIDE 0.2 MG: 2 INJECTION, SOLUTION INTRAMUSCULAR; INTRAVENOUS; SUBCUTANEOUS at 01:03

## 2023-03-01 RX ADMIN — SUGAMMADEX 150 MG: 100 INJECTION, SOLUTION INTRAVENOUS at 10:03

## 2023-03-01 RX ADMIN — PHENYLEPHRINE HYDROCHLORIDE 100 MCG: 10 INJECTION INTRAVENOUS at 08:03

## 2023-03-01 RX ADMIN — FENTANYL CITRATE 50 MCG: 50 INJECTION, SOLUTION INTRAMUSCULAR; INTRAVENOUS at 07:03

## 2023-03-01 RX ADMIN — HYDROCODONE BITARTRATE AND ACETAMINOPHEN 1 TABLET: 10; 325 TABLET ORAL at 09:03

## 2023-03-01 RX ADMIN — Medication 15 MG: at 08:03

## 2023-03-01 RX ADMIN — EPHEDRINE SULFATE 10 MG: 50 INJECTION INTRAVENOUS at 08:03

## 2023-03-01 RX ADMIN — METOPROLOL SUCCINATE 25 MG: 25 TABLET, EXTENDED RELEASE ORAL at 06:03

## 2023-03-01 RX ADMIN — PHENYLEPHRINE HYDROCHLORIDE 100 MCG: 10 INJECTION INTRAVENOUS at 07:03

## 2023-03-01 RX ADMIN — GLYCOPYRROLATE 0.2 MG: 0.2 INJECTION, SOLUTION INTRAMUSCULAR; INTRAVENOUS at 08:03

## 2023-03-01 RX ADMIN — Medication 25 MG: at 07:03

## 2023-03-01 RX ADMIN — FENTANYL CITRATE 50 MCG: 50 INJECTION, SOLUTION INTRAMUSCULAR; INTRAVENOUS at 08:03

## 2023-03-01 RX ADMIN — LIDOCAINE HYDROCHLORIDE 100 MG: 20 INJECTION, SOLUTION EPIDURAL; INFILTRATION; INTRACAUDAL; PERINEURAL at 07:03

## 2023-03-01 RX ADMIN — ROCURONIUM BROMIDE 50 MG: 10 INJECTION, SOLUTION INTRAVENOUS at 07:03

## 2023-03-01 RX ADMIN — PHENYLEPHRINE HYDROCHLORIDE 200 MCG: 10 INJECTION INTRAVENOUS at 08:03

## 2023-03-01 RX ADMIN — ONDANSETRON 4 MG: 2 INJECTION INTRAMUSCULAR; INTRAVENOUS at 10:03

## 2023-03-01 RX ADMIN — SUGAMMADEX 50 MG: 100 INJECTION, SOLUTION INTRAVENOUS at 08:03

## 2023-03-01 RX ADMIN — MIDAZOLAM 1 MG: 1 INJECTION INTRAMUSCULAR; INTRAVENOUS at 07:03

## 2023-03-01 RX ADMIN — PROPOFOL 100 MG: 10 INJECTION, EMULSION INTRAVENOUS at 07:03

## 2023-03-01 RX ADMIN — SODIUM CHLORIDE: 9 INJECTION, SOLUTION INTRAVENOUS at 03:03

## 2023-03-01 RX ADMIN — PHENYLEPHRINE HYDROCHLORIDE 100 MCG: 10 INJECTION INTRAVENOUS at 09:03

## 2023-03-01 RX ADMIN — CEFAZOLIN SODIUM 2 G: 2 SOLUTION INTRAVENOUS at 07:03

## 2023-03-01 RX ADMIN — SODIUM CHLORIDE: 9 INJECTION, SOLUTION INTRAVENOUS at 07:03

## 2023-03-01 NOTE — OP NOTE
[facility] Surg  Surgery Department  Operative Note    SUMMARY     Date of Procedure: 3/1/2023     Procedure:   Left modified radical mastectomy  Port a cath removal    Surgeon(s) and Role:     * Collette Ceballos MD - Primary    Assisting Surgeon: None    Pre-Operative Diagnosis: Malignant neoplasm of left breast in female, estrogen receptor positive, unspecified site of breast [C50.912, Z17.0]    Post-Operative Diagnosis: Post-Op Diagnosis Codes:     * Malignant neoplasm of left breast in female, estrogen receptor positive, unspecified site of breast [C50.912, Z17.0]    Anesthesia: General    Operative Findings (including complications, if any): Left modified radical mastectomy  Port a cath removal    Description of Technical Procedures: Left mastectomy with axillary dissection and right chest port removal      Estimated Blood Loss (EBL): 30cc           Implants: * No implants in log *    Specimens:   Specimen (24h ago, onward)       Start     Ordered    03/01/23 0947  Specimen to Pathology, Surgery General Surgery  Once        Comments: Pre-op Diagnosis: Malignant neoplasm of left breast in female, estrogen receptor positive, unspecified site of breast [C50.912, Z17.0]Procedure(s):MASTECTOMY, SIMPLELYMPHADENECTOMY, AXILLARYREMOVAL, CATHETER, CENTRAL VENOUS, TUNNELED, WITH PORT Number of specimens: 2Name of specimens: 1. Left breast : Short Superior, Long Lateral -perm2. Axillary contents -perm     References:    Click here for ordering Quick Tip   Question Answer Comment   Procedure Type: General Surgery    Specimen Class: Routine/Screening    Which provider would you like to cc? COLLETTE CEBALLOS    Release to patient Immediate        03/01/23 1013                            Condition: Good    Disposition: PACU - hemodynamically stable.    Procedure in Detail:  The patient was then brought to the operating room and placed in the supine position with both upper extremities extended.  general anesthesia was performed.   Perioperative antibiotics were administered and a time out was performed confirming the patient, site, and procedure.  The bilateral chest and axilla was then prepped and draped in the usual sterile fashion.    We turned our attention to the left breast where an elliptical incision was fashioned incorporating the nipple areolar complex.  The incision was made with a 15-blade and extended through the subcutaneous tissues with Bovie cautery.  Skin flaps were raised to the clavicle superiorly.  We then proceeded to raise the remainder of the flaps to the lateral border of the sternum medially, to the inframammary fold inferiorly, and to the anterior border of the latissimus dorsi muscle laterally.  The breast tissue was sharply excised off the chest wall taking care to incorporate the pectoralis fascia while leaving the serratus fascia behind.     An axillary dissection was performed with removal of the associated lymph nodes and surrounding adipose tissue. This included levels I and II. This was accomplished by exposing the axillary vein superiorly. Small venous tributaries, lymphatics, and vessels were clipped and ligated or cauterized and divided. The subscapularis muscle was skeletonized. The long thoracic and thoracodorsal neurovascular bundles were identified and preserved. The tissue between the long thoracic and thoracodorsal bundle was removed.  Of note, at the end of the dissection, level 3 nodes were palpated for any abnormality and none were noted.      The specimen was submitted to pathology. The wound was irrigated. Two 15 Sami drain were placed. The skin incision was closed in layers with a 3-0 Vicryl suture for the deep dermis followed by a a running 4-0 monocryl used for the subcuticular closure. The drains were secured with sutures. Dermabond was applied along with a dry bulky gauze dressing.    We then turned our attention to the right chest. An incision was made over the port through prior scar.  The port and catheter were removed. The catheter tract was closed with a 3-0 vicryl along with deep dermal layer. The subcuticular layer was closed with a 4-0 monocryl. Dermabond and a sterile dressing were applied    The patient was transferred to recovery in a stable and satisfactory condition.

## 2023-03-01 NOTE — ANESTHESIA POSTPROCEDURE EVALUATION
Anesthesia Post Evaluation    Patient: Malaika Paredes    Procedure(s) Performed: Procedure(s) (LRB):  LYMPHADENECTOMY, AXILLARY (Left)  REMOVAL, CATHETER, CENTRAL VENOUS, TUNNELED, WITH PORT (N/A)  MASTECTOMY, MODIFIED RADICAL (Left)    Final Anesthesia Type: general      Patient location during evaluation: GI PACU  Patient participation: Yes- Able to Participate  Level of consciousness: awake and alert  Post-procedure vital signs: reviewed and stable  Pain management: adequate  Airway patency: patent    PONV status at discharge: No PONV  Anesthetic complications: no      Cardiovascular status: hemodynamically stable  Respiratory status: unassisted, room air and spontaneous ventilation  Hydration status: euvolemic  Follow-up not needed.          Vitals Value Taken Time   /77 03/01/23 1045   Temp  03/01/23 1045   Pulse 83 03/01/23 1045   Resp 0 03/01/23 1045   SpO2 100 % 03/01/23 1045   Vitals shown include unvalidated device data.      No case tracking events are documented in the log.      Pain/Matt Score: No data recorded

## 2023-03-01 NOTE — ANESTHESIA PROCEDURE NOTES
Intubation    Date/Time: 3/1/2023 7:11 AM  Performed by: Stefanie Montgomery CRNA  Authorized by: Del Pollard II, MD     Intubation:     Induction:  Intravenous    Intubated:  Postinduction    Mask Ventilation:  Easy mask    Attempts:  1    Attempted By:  Student    Method of Intubation:  Direct    Blade:  Woods 2    Laryngeal View Grade: Grade I - full view of cords      Difficult Airway Encountered?: No      Complications:  None    Airway Device:  Oral endotracheal tube    Airway Device Size:  7.5    Style/Cuff Inflation:  Cuffed (inflated to minimal occlusive pressure)    Tube secured:  21    Secured at:  The lips    Placement Verified By:  Capnometry    Complicating Factors:  None    Findings Post-Intubation:  BS equal bilateral and atraumatic/condition of teeth unchanged

## 2023-03-02 VITALS
HEART RATE: 71 BPM | BODY MASS INDEX: 35.51 KG/M2 | RESPIRATION RATE: 15 BRPM | OXYGEN SATURATION: 96 % | HEIGHT: 62 IN | SYSTOLIC BLOOD PRESSURE: 149 MMHG | DIASTOLIC BLOOD PRESSURE: 95 MMHG | TEMPERATURE: 99 F | WEIGHT: 193 LBS

## 2023-03-02 PROBLEM — Z51.5 PALLIATIVE CARE ENCOUNTER: Status: RESOLVED | Noted: 2022-08-09 | Resolved: 2023-03-02

## 2023-03-02 PROBLEM — I48.0 PAF (PAROXYSMAL ATRIAL FIBRILLATION): Status: RESOLVED | Noted: 2023-01-09 | Resolved: 2023-03-02

## 2023-03-02 LAB
ANION GAP SERPL CALC-SCNC: 15 MMOL/L (ref 8–16)
BUN SERPL-MCNC: 34 MG/DL (ref 8–23)
CALCIUM SERPL-MCNC: 8.5 MG/DL (ref 8.7–10.5)
CHLORIDE SERPL-SCNC: 108 MMOL/L (ref 95–110)
CO2 SERPL-SCNC: 14 MMOL/L (ref 23–29)
CREAT SERPL-MCNC: 5.4 MG/DL (ref 0.5–1.4)
ERYTHROCYTE [DISTWIDTH] IN BLOOD BY AUTOMATED COUNT: 16.7 % (ref 11.5–14.5)
EST. GFR  (NO RACE VARIABLE): 8 ML/MIN/1.73 M^2
GLUCOSE SERPL-MCNC: 116 MG/DL (ref 70–110)
HCT VFR BLD AUTO: 35.9 % (ref 37–48.5)
HGB BLD-MCNC: 11.7 G/DL (ref 12–16)
MCH RBC QN AUTO: 30.5 PG (ref 27–31)
MCHC RBC AUTO-ENTMCNC: 32.6 G/DL (ref 32–36)
MCV RBC AUTO: 94 FL (ref 82–98)
PLATELET # BLD AUTO: 185 K/UL (ref 150–450)
PMV BLD AUTO: 11.5 FL (ref 9.2–12.9)
POTASSIUM SERPL-SCNC: 4.9 MMOL/L (ref 3.5–5.1)
RBC # BLD AUTO: 3.83 M/UL (ref 4–5.4)
SODIUM SERPL-SCNC: 137 MMOL/L (ref 136–145)
WBC # BLD AUTO: 8.14 K/UL (ref 3.9–12.7)

## 2023-03-02 PROCEDURE — 94761 N-INVAS EAR/PLS OXIMETRY MLT: CPT

## 2023-03-02 PROCEDURE — 99900035 HC TECH TIME PER 15 MIN (STAT)

## 2023-03-02 PROCEDURE — 85027 COMPLETE CBC AUTOMATED: CPT | Performed by: SURGERY

## 2023-03-02 PROCEDURE — 25000003 PHARM REV CODE 250: Performed by: SURGERY

## 2023-03-02 PROCEDURE — 25000003 PHARM REV CODE 250: Performed by: INTERNAL MEDICINE

## 2023-03-02 PROCEDURE — 80048 BASIC METABOLIC PNL TOTAL CA: CPT | Performed by: SURGERY

## 2023-03-02 PROCEDURE — 97530 THERAPEUTIC ACTIVITIES: CPT

## 2023-03-02 PROCEDURE — 94799 UNLISTED PULMONARY SVC/PX: CPT

## 2023-03-02 PROCEDURE — 36415 COLL VENOUS BLD VENIPUNCTURE: CPT | Performed by: SURGERY

## 2023-03-02 PROCEDURE — 97162 PT EVAL MOD COMPLEX 30 MIN: CPT

## 2023-03-02 RX ORDER — DEXTROSE 40 %
30 GEL (GRAM) ORAL
Status: DISCONTINUED | OUTPATIENT
Start: 2023-03-02 | End: 2023-03-02 | Stop reason: HOSPADM

## 2023-03-02 RX ORDER — METOPROLOL SUCCINATE 25 MG/1
25 TABLET, EXTENDED RELEASE ORAL 2 TIMES DAILY
Status: DISCONTINUED | OUTPATIENT
Start: 2023-03-02 | End: 2023-03-02 | Stop reason: HOSPADM

## 2023-03-02 RX ORDER — ONDANSETRON 4 MG/1
4 TABLET, ORALLY DISINTEGRATING ORAL EVERY 8 HOURS PRN
Qty: 30 TABLET | Refills: 0 | Status: SHIPPED | OUTPATIENT
Start: 2023-03-02

## 2023-03-02 RX ORDER — GLUCAGON 1 MG
1 KIT INJECTION
Status: DISCONTINUED | OUTPATIENT
Start: 2023-03-02 | End: 2023-03-02 | Stop reason: HOSPADM

## 2023-03-02 RX ORDER — SEVELAMER CARBONATE 800 MG/1
1600 TABLET, FILM COATED ORAL
Status: DISCONTINUED | OUTPATIENT
Start: 2023-03-02 | End: 2023-03-02 | Stop reason: HOSPADM

## 2023-03-02 RX ORDER — AMIODARONE HYDROCHLORIDE 100 MG/1
100 TABLET ORAL DAILY
Status: DISCONTINUED | OUTPATIENT
Start: 2023-03-02 | End: 2023-03-02

## 2023-03-02 RX ORDER — LEVOTHYROXINE SODIUM 50 UG/1
50 TABLET ORAL
Status: DISCONTINUED | OUTPATIENT
Start: 2023-03-03 | End: 2023-03-02 | Stop reason: HOSPADM

## 2023-03-02 RX ORDER — CALCIUM CARBONATE 200(500)MG
500 TABLET,CHEWABLE ORAL 2 TIMES DAILY PRN
Status: DISCONTINUED | OUTPATIENT
Start: 2023-03-02 | End: 2023-03-02 | Stop reason: HOSPADM

## 2023-03-02 RX ORDER — ATORVASTATIN CALCIUM 10 MG/1
10 TABLET, FILM COATED ORAL DAILY
Status: DISCONTINUED | OUTPATIENT
Start: 2023-03-02 | End: 2023-03-02 | Stop reason: HOSPADM

## 2023-03-02 RX ORDER — DOCUSATE SODIUM 100 MG/1
100 CAPSULE, LIQUID FILLED ORAL 2 TIMES DAILY PRN
Qty: 20 CAPSULE | Refills: 0 | Status: SHIPPED | OUTPATIENT
Start: 2023-03-02

## 2023-03-02 RX ORDER — OXYCODONE HYDROCHLORIDE 5 MG/1
5 TABLET ORAL EVERY 6 HOURS PRN
Qty: 20 TABLET | Refills: 0 | Status: ON HOLD | OUTPATIENT
Start: 2023-03-02 | End: 2023-03-31 | Stop reason: SDUPTHER

## 2023-03-02 RX ORDER — INSULIN ASPART 100 [IU]/ML
0-5 INJECTION, SOLUTION INTRAVENOUS; SUBCUTANEOUS
Status: DISCONTINUED | OUTPATIENT
Start: 2023-03-02 | End: 2023-03-02 | Stop reason: HOSPADM

## 2023-03-02 RX ORDER — SODIUM BICARBONATE 650 MG/1
650 TABLET ORAL 2 TIMES DAILY
Status: DISCONTINUED | OUTPATIENT
Start: 2023-03-02 | End: 2023-03-02 | Stop reason: HOSPADM

## 2023-03-02 RX ORDER — AMLODIPINE BESYLATE 10 MG/1
10 TABLET ORAL DAILY
Status: DISCONTINUED | OUTPATIENT
Start: 2023-03-02 | End: 2023-03-02 | Stop reason: HOSPADM

## 2023-03-02 RX ORDER — DEXTROSE 40 %
15 GEL (GRAM) ORAL
Status: DISCONTINUED | OUTPATIENT
Start: 2023-03-02 | End: 2023-03-02 | Stop reason: HOSPADM

## 2023-03-02 RX ORDER — ASPIRIN 81 MG/1
81 TABLET ORAL DAILY
Status: DISCONTINUED | OUTPATIENT
Start: 2023-03-02 | End: 2023-03-02 | Stop reason: HOSPADM

## 2023-03-02 RX ORDER — CYCLOBENZAPRINE HCL 5 MG
5 TABLET ORAL 3 TIMES DAILY PRN
Qty: 30 TABLET | Refills: 0 | Status: SHIPPED | OUTPATIENT
Start: 2023-03-02 | End: 2023-03-12

## 2023-03-02 RX ADMIN — ASPIRIN 81 MG: 81 TABLET, COATED ORAL at 01:03

## 2023-03-02 RX ADMIN — ATORVASTATIN CALCIUM 10 MG: 10 TABLET, FILM COATED ORAL at 01:03

## 2023-03-02 RX ADMIN — AMLODIPINE BESYLATE 10 MG: 10 TABLET ORAL at 01:03

## 2023-03-02 RX ADMIN — CHLORHEXIDINE GLUCONATE 0.12% ORAL RINSE 10 ML: 1.2 LIQUID ORAL at 08:03

## 2023-03-02 NOTE — HOSPITAL COURSE
Patient was admitted and underwent left modified radical mastectomy with lymph node dissection and right chest port removal.  Patient tolerated procedure well postoperatively she is tolerating her diet, her O2 sats stable on room air, she was able to participate therapy.

## 2023-03-02 NOTE — ASSESSMENT & PLAN NOTE
Status post left mastectomy width lymphadenectomy right chest port removal  Patient tolerated surgery well pain is well-controlled  She will follow up with Oncology post discharge

## 2023-03-02 NOTE — ASSESSMENT & PLAN NOTE
Patient's FSGs are controlled on current medication regimen.  Last A1c reviewed-   Lab Results   Component Value Date    HGBA1C 5.8 (H) 02/10/2023     Most recent fingerstick glucose reviewed- No results for input(s): POCTGLUCOSE in the last 24 hours.  Current correctional scale  Low  Maintain anti-hyperglycemic dose as follows-   Antihyperglycemics (From admission, onward)    None        Hold Oral hypoglycemics while patient is in the hospital.

## 2023-03-02 NOTE — CONSULTS
[facility] Medicine  Consult Note    Patient Name: Malaika Paredes  MRN: [id number]  Admission Date: 3/1/2023  Hospital Length of Stay: 0 days  Attending Physician:Jayjay Arana MD  Primary Care Provider: Travis Scales MD           Patient information was obtained from patient, past medical records and ER records.     Consults  Subjective:     Principal Problem: Malignant neoplasm of upper-outer quadrant of left breast in female, estrogen receptor positive    Chief Complaint: For surgery       HPI: Patient is a 67-year-old female with breast cancer admitted left simple mastectomy with left axillary lymphadenectomy.  Her medical problems include diabetes mellitus, PAF, ischemic ulcer of the left, macrocytic anemia, ESRD on Tuesday Thursday Saturday schedule, hypertension, diastolic dysfunction, coronary artery disease status post CABG.  Patient underwent uneventful surgery and Hospital Medicine was consulted today for medical management of the above conditions.  Patient seen sitting in chair after walking with physical therapy.  She denies any shortness of breath, chest pain, nausea, vomiting.  She reports her appetite is good and is hoping to go home later today.      Past Medical History:   Diagnosis Date    CAD (coronary artery disease)     Cataract     Cataract     CHF (congestive heart failure)     COPD (chronic obstructive pulmonary disease)     Diabetes mellitus     Diabetic retinopathy     ESRD (end stage renal disease)     TTS    Hypertension     Ischemic ulcer of toe of left foot     Malignant neoplasm of upper-outer quadrant of left breast in female, estrogen receptor positive 06/20/2022    left    PAD (peripheral artery disease)     PAF (paroxysmal atrial fibrillation)        Past Surgical History:   Procedure Laterality Date    AXILLARY NODE DISSECTION Left 3/1/2023    Procedure: LYMPHADENECTOMY, AXILLARY;  Surgeon: Jayjay Arana MD;  Location: [facility] ER;  Service: General;   Laterality: Left;    BREAST BIOPSY Right     benign    CATARACT EXTRACTION W/  INTRAOCULAR LENS IMPLANT Right 08/14/2017    Dr. Moe    CORONARY ARTERY BYPASS GRAFT  2020    FLUOROSCOPY N/A 07/25/2022    Procedure: FLUOROSCOPY/mediport placement;  Surgeon: Cole Perdomo MD;  Location: [facility] CATH LAB;  Service: General;  Laterality: N/A;    MEDIPORT REMOVAL N/A 3/1/2023    Procedure: REMOVAL, CATHETER, CENTRAL VENOUS, TUNNELED, WITH PORT;  Surgeon: Jayjay Arana MD;  Location: [facility] OR;  Service: General;  Laterality: N/A;    MODIFIED RADICAL MASTECTOMY W/ AXILLARY LYMPH NODE DISSECTION Left 3/1/2023    Procedure: MASTECTOMY, MODIFIED RADICAL;  Surgeon: Jayjay Arana MD;  Location: [facility] OR;  Service: General;  Laterality: Left;       Review of patient's allergies indicates:  No Known Allergies    No current facility-administered medications on file prior to encounter.     Current Outpatient Medications on File Prior to Encounter   Medication Sig    albuterol sulfate (PROAIR RESPICLICK) 90 mcg/actuation AePB Inhale 180 mcg into the lungs every 4 (four) hours. Rescue    amiodarone (PACERONE) 100 MG Tab Take 100 mg by mouth once daily.    amLODIPine (NORVASC) 10 MG tablet Take 10 mg by mouth once daily.    aspirin (ECOTRIN) 81 MG EC tablet Take 81 mg by mouth once daily.     insulin degludec (TRESIBA FLEXTOUCH U-200) 200 unit/mL (3 mL) insulin pen Inject 60 Units into the skin once daily.    ketoconazole (NIZORAL) 2 % cream Apply topically once daily. for 14 days    levoFLOXacin (LEVAQUIN) 250 MG tablet Take 250 mg by mouth once daily.    levothyroxine (TIROSINT) 88 mcg Cap Take 75 mcg by mouth before breakfast.    losartan (COZAAR) 25 MG tablet Take 25 mg by mouth once daily.    omeprazole (PRILOSEC) 20 MG capsule Take 20 mg by mouth once daily.    prednisoLONE acetate (PRED FORTE) 1 % DrpS Place 1 drop into both eyes 4 (four) times daily.    sevelamer HCL (RENAGEL) 800 MG Tab Take 3 tablets by mouth 3 (three) times  daily.    [DISCONTINUED] traMADoL (ULTRAM) 50 mg tablet Take 1 tablet (50 mg total) by mouth every 6 (six) hours as needed for Pain.     Family History       Problem Relation (Age of Onset)    Breast cancer Sister    Cancer Father, Brother    Diabetes Sister    Hypertension Mother          Tobacco Use    Smoking status: Former     Years: 30.00     Types: Cigarettes     Quit date: 6/21/2012     Years since quitting: 10.7    Smokeless tobacco: Never   Substance and Sexual Activity    Alcohol use: No    Drug use: Never    Sexual activity: Not on file     Review of Systems   Constitutional:  Negative for fatigue and fever.   Respiratory:  Negative for cough and shortness of breath.    Cardiovascular:  Negative for chest pain and leg swelling.   Gastrointestinal:  Negative for nausea and vomiting.   Skin:  Positive for wound.        Left mastectomy dressing in place no swelling and left arm   All other systems reviewed and are negative.  Objective:     Vital Signs (Most Recent):  Temp: 99 °F (37.2 °C) (03/02/23 1129)  Pulse: 71 (03/02/23 1129)  Resp: 15 (03/02/23 1129)  BP: (!) 149/95 (03/02/23 1129)  SpO2: 96 % (03/02/23 1129)   Vital Signs (24h Range):  Temp:  [98 °F (36.7 °C)-99.3 °F (37.4 °C)] 99 °F (37.2 °C)  Pulse:  [70-80] 71  Resp:  [13-20] 15  SpO2:  [95 %-100 %] 96 %  BP: (123-192)/(61-95) 149/95     Weight: 87.5 kg (193 lb 0.2 oz)  Body mass index is 35.3 kg/m².    Physical Exam  Vitals reviewed.   Constitutional:       Appearance: Normal appearance. She is obese.   HENT:      Head: Normocephalic and atraumatic.      Mouth/Throat:      Mouth: Mucous membranes are moist.      Pharynx: Oropharynx is clear.   Eyes:      Extraocular Movements: Extraocular movements intact.      Conjunctiva/sclera: Conjunctivae normal.   Cardiovascular:      Rate and Rhythm: Normal rate and regular rhythm.      Pulses: Normal pulses.      Heart sounds: Normal heart sounds.   Pulmonary:      Effort: Pulmonary effort is normal.       Breath sounds: Normal breath sounds.   Abdominal:      General: Bowel sounds are normal.      Palpations: Abdomen is soft.   Musculoskeletal:         General: Normal range of motion.      Cervical back: Normal range of motion and neck supple.   Skin:     General: Skin is warm and dry.      Comments: Left chest dressing in place which is clean dry intact   Neurological:      General: No focal deficit present.      Mental Status: She is alert and oriented to person, place, and time. Mental status is at baseline.   Psychiatric:         Mood and Affect: Mood normal.         Behavior: Behavior normal.         Thought Content: Thought content normal.       Significant Labs: All pertinent labs within the past 24 hours have been reviewed.  CBC:   Recent Labs   Lab 03/02/23  0817   WBC 8.14   HGB 11.7*   HCT 35.9*        CMP:   Recent Labs   Lab 03/01/23  0626 03/02/23  0817   NA  --  137   K 3.6 4.9   CL  --  108   CO2  --  14*   GLU  --  116*   BUN  --  34*   CREATININE  --  5.4*   CALCIUM  --  8.5*   ANIONGAP  --  15       Significant Imaging: I have reviewed all pertinent imaging results/findings within the past 24 hours.    Assessment/Plan:     * Malignant neoplasm of upper-outer quadrant of left breast in female, estrogen receptor positive  Status post left mastectomy width lymphadenectomy right chest port removal  Patient tolerated surgery well pain is well-controlled  She will follow up with Oncology post discharge      PAF (paroxysmal atrial fibrillation) Dr Scooter Mejia [facility]  Patient with Permanent atrial fibrillation which is controlled currently with Beta Blocker. Patient is currently in sinus rhythm.[id number] Score: 4. HASBLED Score: . Anticoagulation currently not on anticoagulation, will continue to follow with Cardiology and will defer to their expertise        Primary hypertension  Continue home beta-blocker perioperatively  Restart ARB losartan      Severe obesity (BMI 35.0-39.9) with  comorbidity  Body mass index is 35.3 kg/m². Morbid obesity complicates all aspects of disease management from diagnostic modalities to treatment. Weight loss encouraged and health benefits explained to patient.         Diastolic dysfunction    Volume status is stable O2 sats are appropriate    Anemia associated with chronic renal failure  Hemoglobin.  At 11.7  Managed with erythropoietin stimulating agents dialysis  No significant blood loss during surgery      COPD (chronic obstructive pulmonary disease)  History of COPD on inhalers at home we will continue      ESRD (end stage renal disease)  Patient on chronic maintenance hemodialysis on a Tuesday Thursday Saturday schedule at   Volume status is stable, potassium was controlled        Type 2 diabetes mellitus, with long-term current use of insulin  Patient's FSGs are controlled on current medication regimen.  Last A1c reviewed-   Lab Results   Component Value Date    HGBA1C 5.8 (H) 02/10/2023     Most recent fingerstick glucose reviewed- No results for input(s): POCTGLUCOSE in the last 24 hours.  Current correctional scale  Low  Maintain anti-hyperglycemic dose as follows-   Antihyperglycemics (From admission, onward)      None          Hold Oral hypoglycemics while patient is in the hospital.      VTE Risk Mitigation (From admission, onward)           Ordered     IP VTE HIGH RISK PATIENT  Once         03/01/23 1435     Place sequential compression device  Until discontinued         03/01/23 1435                        Thank you for your consult. I will follow-up with patient. Please contact us if you have any additional questions.    Fadumo Villalobos MD  Department of Hospital Medicine   [facility]

## 2023-03-02 NOTE — ASSESSMENT & PLAN NOTE
Body mass index is 35.3 kg/m². Morbid obesity complicates all aspects of disease management from diagnostic modalities to treatment. Weight loss encouraged and health benefits explained to patient.

## 2023-03-02 NOTE — ASSESSMENT & PLAN NOTE
Patient on chronic maintenance hemodialysis on a Tuesday Thursday Saturday schedule  Volume status is stable, potassium was controlled  We will add sodium bicarb 2 tabs b.i.d.

## 2023-03-02 NOTE — NURSING
Discharge summary, health teachings and instructions given to patient --verbalized understanding. IV removed-no complications noted. Tele monitor removed and returned to the monitor room. All questions answered with regards to discharge instructions. Reminded of the importance of follow-up appointments. Instructed about awaiting the new prescribed meds from [facility] outpatient pharmacy before getting discharged-verbalized understanding. Awaiting for pickup.

## 2023-03-02 NOTE — PLAN OF CARE
[facility]  Initial Discharge Assessment       Primary Care Provider: Travis Scales MD    Admission Diagnosis: Malignant neoplasm of left breast in female, estrogen receptor positive, unspecified site of breast [C50.912, Z17.0]  Breast cancer [C50.919]    Admission Date: 3/1/2023  Expected Discharge Date: per attending         Payor: [employer] MANAGED MEDICARE / Plan: HU[employer] MEDICARE HMO / Product Type: Capitation /     Extended Emergency Contact Information  Primary Emergency Contact: Prosper Paredes  Mobile Phone: [phone]  Relation: Healthcare Power of   Secondary Emergency Contact: Ember Paredes  Address: [address]           [place] [address] [facility]  Home Phone: [phone]  Mobile Phone: [phone]  Relation: Other    Discharge Plan A: Home with family         [facility] - [place], [place] - [address]  [address]  [place] [address]  Phone: [phone] Fax: [phone]    [id number] - [place], [place] - [address] AT [facility] [address]  [address]  [place] [address]  Phone: [phone] Fax: [phone]      Initial Assessment (most recent)       Adult Discharge Assessment - 03/02/23 1012          Discharge Assessment    Assessment Type Discharge Planning Assessment     Confirmed/corrected address, phone number and insurance Yes     Confirmed Demographics Correct on Facesheet     Source of Information patient     When was your last doctors appointment? --   last month    Communicated NU with patient/caregiver Date not available/Unable to determine     Reason For Admission malignant neoplasm     People in Home --   niece    Do you expect to return to your current living situation? Yes     Do you have help at home or someone to help you manage your care at home? Yes     Who are your caregiver(s) and their phone number(s)? niece     Prior to hospitilization cognitive status: Alert/Oriented      Current cognitive status: Alert/Oriented     Equipment Currently Used at Home [email or web address]     Readmission within 30 days? No     Patient currently being followed by outpatient case management? No     Do you currently have service(s) that help you manage your care at home? No     Do you take prescription medications? Yes     Do you have prescription coverage? Yes     Coverage humana     Do you have any problems affording any of your prescribed medications? No     Is the patient taking medications as prescribed? yes     Who is going to help you get home at discharge? niece     How do you get to doctors appointments? family or friend will provide     Are you on dialysis? No     Do you take coumadin? Yes     Who monitors your labs? self and niece     Discharge Plan A Home with family                   Pt has been set up with [facility]

## 2023-03-02 NOTE — HPI
Patient is a 67-year-old female with breast cancer admitted left simple mastectomy with left axillary lymphadenectomy.  Her medical problems include diabetes mellitus, PAF, ischemic ulcer of the left, macrocytic anemia, ESRD on Tuesday Thursday Saturday schedule, hypertension, diastolic dysfunction, coronary artery disease status post CABG.  Patient underwent uneventful surgery and Hospital Medicine was consulted today for medical management of the above conditions.  Patient seen sitting in chair after walking with physical therapy.  She denies any shortness of breath, chest pain, nausea, vomiting.  She reports her appetite is good and is hoping to go home later today.

## 2023-03-02 NOTE — PLAN OF CARE
[facility]  Discharge Final Note    Primary Care Provider: Travis Scales MD    Expected Discharge Date: 3/2/2023    Final Discharge Note (most recent)       Final Note - 03/02/23 1146          Final Note    Assessment Type Final Discharge Note     Anticipated Discharge Disposition Home-Health Care [facility]     Hospital Resources/Appts/Education Provided Appointments scheduled and added to AVS        Post-Acute Status    Post-Acute Authorization Home Health     Home Health Status Set-up Complete/Auth obtained     Coverage humana                          Contact Info       Jayjay Arana MD   Specialty: General Surgery, Bariatrics    [address]  [address]th [address]  [place] [facility] [address]   Phone: [phone]       Next Steps: Follow up in 1 week(s)    Instructions: Post-op mastectomy drain check

## 2023-03-02 NOTE — ASSESSMENT & PLAN NOTE
Patient with Permanent atrial fibrillation which is controlled currently with Beta Blocker. Patient is currently in sinus rhythm.[id number] Score: 4. HASBLED Score: . Anticoagulation currently not on anticoagulation, will continue to follow with Cardiology and will defer to their expertise

## 2023-03-02 NOTE — SUBJECTIVE & OBJECTIVE
Past Medical History:   Diagnosis Date    CAD (coronary artery disease)     Cataract     Cataract     CHF (congestive heart failure)     COPD (chronic obstructive pulmonary disease)     Diabetes mellitus     Diabetic retinopathy     ESRD (end stage renal disease)     TTS    Hypertension     Ischemic ulcer of toe of left foot     Malignant neoplasm of upper-outer quadrant of left breast in female, estrogen receptor positive 06/20/2022    left    PAD (peripheral artery disease)     PAF (paroxysmal atrial fibrillation)        Past Surgical History:   Procedure Laterality Date    AXILLARY NODE DISSECTION Left 3/1/2023    Procedure: LYMPHADENECTOMY, AXILLARY;  Surgeon: Jayjay Arana MD;  Location: [facility] OR;  Service: General;  Laterality: Left;    BREAST BIOPSY Right     benign    CATARACT EXTRACTION W/  INTRAOCULAR LENS IMPLANT Right 08/14/2017    Dr. Moe    CORONARY ARTERY BYPASS GRAFT  2020    FLUOROSCOPY N/A 07/25/2022    Procedure: FLUOROSCOPY/mediport placement;  Surgeon: Cole Perdomo MD;  Location: [facility] CATH LAB;  Service: General;  Laterality: N/A;    MEDIPORT REMOVAL N/A 3/1/2023    Procedure: REMOVAL, CATHETER, CENTRAL VENOUS, TUNNELED, WITH PORT;  Surgeon: Jayjay Arana MD;  Location: [facility] OR;  Service: General;  Laterality: N/A;    MODIFIED RADICAL MASTECTOMY W/ AXILLARY LYMPH NODE DISSECTION Left 3/1/2023    Procedure: MASTECTOMY, MODIFIED RADICAL;  Surgeon: Jayjay Arana MD;  Location: [facility] OR;  Service: General;  Laterality: Left;       Review of patient's allergies indicates:  No Known Allergies    No current facility-administered medications on file prior to encounter.     Current Outpatient Medications on File Prior to Encounter   Medication Sig    albuterol sulfate (PROAIR RESPICLICK) 90 mcg/actuation AePB Inhale 180 mcg into the lungs every 4 (four) hours. Rescue    amiodarone (PACERONE) 100 MG Tab Take 100 mg by mouth once daily.    amLODIPine (NORVASC) 10 MG tablet Take 10 mg by mouth once  daily.    aspirin (ECOTRIN) 81 MG EC tablet Take 81 mg by mouth once daily.     insulin degludec (TRESIBA FLEXTOUCH U-200) 200 unit/mL (3 mL) insulin pen Inject 60 Units into the skin once daily.    ketoconazole (NIZORAL) 2 % cream Apply topically once daily. for 14 days    levoFLOXacin (LEVAQUIN) 250 MG tablet Take 250 mg by mouth once daily.    levothyroxine (TIROSINT) 88 mcg Cap Take 75 mcg by mouth before breakfast.    losartan (COZAAR) 25 MG tablet Take 25 mg by mouth once daily.    omeprazole (PRILOSEC) 20 MG capsule Take 20 mg by mouth once daily.    prednisoLONE acetate (PRED FORTE) 1 % DrpS Place 1 drop into both eyes 4 (four) times daily.    sevelamer HCL (RENAGEL) 800 MG Tab Take 3 tablets by mouth 3 (three) times daily.    [DISCONTINUED] traMADoL (ULTRAM) 50 mg tablet Take 1 tablet (50 mg total) by mouth every 6 (six) hours as needed for Pain.     Family History       Problem Relation (Age of Onset)    Breast cancer Sister    Cancer Father, Brother    Diabetes Sister    Hypertension Mother          Tobacco Use    Smoking status: Former     Years: 30.00     Types: Cigarettes     Quit date: 6/21/2012     Years since quitting: 10.7    Smokeless tobacco: Never   Substance and Sexual Activity    Alcohol use: No    Drug use: Never    Sexual activity: Not on file     Review of Systems   Constitutional:  Negative for fatigue and fever.   Respiratory:  Negative for cough and shortness of breath.    Cardiovascular:  Negative for chest pain and leg swelling.   Gastrointestinal:  Negative for nausea and vomiting.   Skin:  Positive for wound.        Left mastectomy dressing in place no swelling and left arm   All other systems reviewed and are negative.  Objective:     Vital Signs (Most Recent):  Temp: 99 °F (37.2 °C) (03/02/23 1129)  Pulse: 71 (03/02/23 1129)  Resp: 15 (03/02/23 1129)  BP: (!) 149/95 (03/02/23 1129)  SpO2: 96 % (03/02/23 1129)   Vital Signs (24h Range):  Temp:  [98 °F (36.7 °C)-99.3 °F (37.4 °C)] 99  °F (37.2 °C)  Pulse:  [70-80] 71  Resp:  [13-20] 15  SpO2:  [95 %-100 %] 96 %  BP: (123-192)/(61-95) 149/95     Weight: 87.5 kg (193 lb 0.2 oz)  Body mass index is 35.3 kg/m².    Physical Exam  Vitals reviewed.   Constitutional:       Appearance: Normal appearance. She is obese.   HENT:      Head: Normocephalic and atraumatic.      Mouth/Throat:      Mouth: Mucous membranes are moist.      Pharynx: Oropharynx is clear.   Eyes:      Extraocular Movements: Extraocular movements intact.      Conjunctiva/sclera: Conjunctivae normal.   Cardiovascular:      Rate and Rhythm: Normal rate and regular rhythm.      Pulses: Normal pulses.      Heart sounds: Normal heart sounds.   Pulmonary:      Effort: Pulmonary effort is normal.      Breath sounds: Normal breath sounds.   Abdominal:      General: Bowel sounds are normal.      Palpations: Abdomen is soft.   Musculoskeletal:         General: Normal range of motion.      Cervical back: Normal range of motion and neck supple.   Skin:     General: Skin is warm and dry.      Comments: Left chest dressing in place which is clean dry intact   Neurological:      General: No focal deficit present.      Mental Status: She is alert and oriented to person, place, and time. Mental status is at baseline.   Psychiatric:         Mood and Affect: Mood normal.         Behavior: Behavior normal.         Thought Content: Thought content normal.       Significant Labs: All pertinent labs within the past 24 hours have been reviewed.  CBC:   Recent Labs   Lab 03/02/23  0817   WBC 8.14   HGB 11.7*   HCT 35.9*        CMP:   Recent Labs   Lab 03/01/23  0626 03/02/23  0817   NA  --  137   K 3.6 4.9   CL  --  108   CO2  --  14*   GLU  --  116*   BUN  --  34*   CREATININE  --  5.4*   CALCIUM  --  8.5*   ANIONGAP  --  15       Significant Imaging: I have reviewed all pertinent imaging results/findings within the past 24 hours.

## 2023-03-02 NOTE — PLAN OF CARE
Patient is in stable condition, no acute distress, remained free from injuries, receiving IV fluids, pain adequately controlled with PRN, dressing to L breast CDI with surgical bra in place, dressing to R chest CDI, on cardiac monitoring (NSR), VSS, and all active orders reviewed. 24 hr chart check performed.

## 2023-03-02 NOTE — ASSESSMENT & PLAN NOTE
Hemoglobin.  At 11.7  Managed with erythropoietin stimulating agents dialysis  No significant blood loss during surgery

## 2023-03-02 NOTE — PT/OT/SLP EVAL
Physical Therapy Evaluation    Patient Name:  Malaika Paredes   MRN:  [id number]    Recommendations:     Discharge Recommendations: home health PT   Discharge Equipment Recommendations: walker, rolling   Barriers to discharge: None    Assessment:     Malaika Paredes is a 67 y.o. female admitted with a medical diagnosis of Malignant neoplasm of upper-outer quadrant of left breast in female, estrogen receptor positive.  She presents with the following impairments/functional limitations: impaired endurance, weakness, impaired functional mobility, gait instability, impaired balance, pain, decreased ROM, decreased lower extremity function, decreased upper extremity function .    Rehab Prognosis: Good; patient would benefit from acute skilled PT services to address these deficits and reach maximum level of function.    Recent Surgery: Procedure(s) (LRB):  LYMPHADENECTOMY, AXILLARY (Left)  REMOVAL, CATHETER, CENTRAL VENOUS, TUNNELED, WITH PORT (N/A)  MASTECTOMY, MODIFIED RADICAL (Left) 1 Day Post-Op    Plan:     During this hospitalization, patient to be seen 3 x/week to address the identified rehab impairments via gait training, therapeutic activities, therapeutic exercises and progress toward the following goals:    Plan of Care Expires:  03/16/23    Subjective     Chief Complaint: PAIN   Patient/Family Comments/goals: INC MOBILITY   Pain/Comfort:  Pain Rating 1: 3/10  Location - Side 1: Left  Location 1: chest  Pain Rating Post-Intervention 1: 3/10    Patients cultural, spiritual, Caodaism conflicts given the current situation:      Living Environment:   PT LIVES AT HOME WITH NIECE IN A 1ST FLOOR APT WITH NO STEPS   Prior to admission, patients level of function was MOD I WITH SPC AND DOESN'T DRIVE .  Equipment used at home: cane, straight, grab bar.  DME owned (not currently used): none.  Upon discharge, patient will have assistance from NIECE.    Objective:     Communicated with NURSE DONNELLY AND [facility] CHART  "REVIEW  prior to session.  Patient found supine with peripheral IV, telemetry, JACOBO drain  upon PT entry to room.    General Precautions: Standard, fall  Orthopedic Precautions:N/A   Braces: N/A  Respiratory Status: Room air    Exams:  Cognitive Exam:  Patient is oriented to Person, Place, Time, and Situation  Postural Exam:  Patient presented with the following abnormalities:    -       Rounded shoulders  -       Forward head  RLE ROM: WFL  RLE Strength: WFL  LLE ROM: WFL  LLE Strength: WFL    Functional Mobility:  Bed Mobility:     Supine to Sit: stand by assistance  Transfers:     Sit to Stand:  contact guard assistance with rolling walker  Bed to Chair: contact guard assistance with  rolling walker  using  Stand Pivot  Gait: PT GT TRAINED X 160' WITH RW AND CGA      AM-PAC 6 CLICK MOBILITY  Total Score:18       Treatment & Education:  PT EDUCATED ON POST MASTECTOMY PROGRAM AND HANDOUT GIVEN.  PT EDUCATED ON RISK FOR FALLS DUE TO GENERALIZED WEAKNESS, EDUCATED ON "CALL DON'T FALL", ENCOURAGED TO CALL FOR ASSISTANCE WITH ALL NEEDS SUCH AS BED<>CHAIR TRANSFERS OR TRIPS TO BATHROOM.       Patient left up in chair with all lines intact and call button in reach.    GOALS:   Multidisciplinary Problems       Physical Therapy Goals          Problem: Physical Therapy    Goal Priority Disciplines Outcome Goal Variances Interventions   Physical Therapy Goal     PT, PT/OT Ongoing, Progressing     Description: LTG: 3/16/23  1. PT WILL COMPLETE BED MOBILITY IND  2. PT WILL STAND T/F TO CHAIR MOD I WITH RW  3. PT WILL GT TRAIN X 250' WITH RW JASON                         History:     Past Medical History:   Diagnosis Date    CAD (coronary artery disease)     Cataract     Cataract     CHF (congestive heart failure)     COPD (chronic obstructive pulmonary disease)     Diabetes mellitus     Diabetic retinopathy     ESRD (end stage renal disease)     TTS    Hypertension     Ischemic ulcer of toe of left foot     Malignant neoplasm of " upper-outer quadrant of left breast in female, estrogen receptor positive 06/20/2022    left    PAD (peripheral artery disease)     PAF (paroxysmal atrial fibrillation)        Past Surgical History:   Procedure Laterality Date    AXILLARY NODE DISSECTION Left 3/1/2023    Procedure: LYMPHADENECTOMY, AXILLARY;  Surgeon: Jayjay Arana MD;  Location: [facility] OR;  Service: General;  Laterality: Left;    BREAST BIOPSY Right     benign    CATARACT EXTRACTION W/  INTRAOCULAR LENS IMPLANT Right 08/14/2017    Dr. Moe    CORONARY ARTERY BYPASS GRAFT  2020    FLUOROSCOPY N/A 07/25/2022    Procedure: FLUOROSCOPY/mediport placement;  Surgeon: Cole Perdomo MD;  Location: [facility] CATH LAB;  Service: General;  Laterality: N/A;    MEDIPORT REMOVAL N/A 3/1/2023    Procedure: REMOVAL, CATHETER, CENTRAL VENOUS, TUNNELED, WITH PORT;  Surgeon: Jayjay Arana MD;  Location: [facility] OR;  Service: General;  Laterality: N/A;    MODIFIED RADICAL MASTECTOMY W/ AXILLARY LYMPH NODE DISSECTION Left 3/1/2023    Procedure: MASTECTOMY, MODIFIED RADICAL;  Surgeon: Jayjay Arana MD;  Location: [facility] OR;  Service: General;  Laterality: Left;       Time Tracking:     PT Received On: 03/02/23  PT Start Time: 0740     PT Stop Time: 0805  PT Total Time (min): 25 min     Billable Minutes: Evaluation 15 and Therapeutic Activity 10      03/02/2023

## 2023-03-03 NOTE — HOSPITAL COURSE
03/01/2023: Left mastectomy with axillary dissection and Mediport removal    03/02/2023: VSS, pain controlled and tolerating diet. Stable for discharge to home.

## 2023-03-03 NOTE — DISCHARGE SUMMARY
[facility] Surg  General Surgery  Discharge Summary      Patient Name: Malaika Paredes  MRN: [id number]  Admission Date: 3/1/2023  Hospital Length of Stay: 0 days  Discharge Date and Time:  03/03/2023 8:50 AM  Attending Physician: No att. providers found   Discharging Provider: Akilah Estrada PA-C  Primary Care Provider: Travis Scales MD    HPI:   66 y/o female with malignant neoplasm of the left breast who presents for surgical management.      Procedure(s) (LRB):  LYMPHADENECTOMY, AXILLARY (Left)  REMOVAL, CATHETER, CENTRAL VENOUS, TUNNELED, WITH PORT (N/A)  MASTECTOMY, MODIFIED RADICAL (Left)      Indwelling Lines/Drains at time of discharge:   Lines/Drains/Airways     Drain  Duration                Closed/Suction Drain 03/01/23 0926 Left Mediastinal Bulb 15 Fr. 1 day         Closed/Suction Drain 03/01/23 0934 Left;Lateral Breast Bulb 15 Fr. 1 day              Hospital Course: 03/01/2023: Left mastectomy with axillary dissection and Mediport removal    03/02/2023: VSS, pain controlled and tolerating diet. Stable for discharge to home.       Goals of Care Treatment Preferences:  Code Status: Full Code    Health care agent: son Prosper Paige [phone], 2: carisa Paige [phone], 3: carisa Vazquez [phone]  Health care agent number: No value filed.                   Consults:   Consults (From admission, onward)        Status Ordering Provider     Consult to Case Management/Social Work  Once        Provider:  (Not yet assigned)    COLLETTE Ibarra          Significant Diagnostic Studies: Labs:   CMP   Recent Labs   Lab 03/02/23  0817      K 4.9      CO2 14*   *   BUN 34*   CREATININE 5.4*   CALCIUM 8.5*   ANIONGAP 15    and CBC   Recent Labs   Lab 03/02/23  0817   WBC 8.14   HGB 11.7*   HCT 35.9*          Pending Diagnostic Studies:     Procedure Component Value Units Date/Time    Specimen to Pathology, Surgery General Surgery [[id number]] Collected:  03/01/23 1041    Order Status: Sent Lab Status: In process Updated: 03/01/23 1328    Specimen: Tissue         Final Active Diagnoses:    Diagnosis Date Noted POA    PRINCIPAL PROBLEM:  Malignant neoplasm of upper-outer quadrant of left breast in female, estrogen receptor positive [C50.412, Z17.0] 06/20/2022 Not Applicable    Primary hypertension [I10] 09/14/2022 Yes    Severe obesity (BMI 35.0-39.9) with comorbidity [E66.01] 06/21/2022 Yes    Diastolic dysfunction [I51.89] 09/11/2018 Yes    Anemia associated with chronic renal failure [N18.9, D63.1] 09/10/2018 Yes    ESRD (end stage renal disease) [N18.6] 09/09/2018 Yes    COPD (chronic obstructive pulmonary disease) [J44.9] 09/09/2018 Yes    Type 2 diabetes mellitus, with long-term current use of insulin [E11.9, Z79.4] 01/09/2018 Not Applicable      Problems Resolved During this Admission:    Diagnosis Date Noted Date Resolved POA    PAF (paroxysmal atrial fibrillation) [I48.0] 01/09/2023 03/02/2023 Yes      Discharged Condition: good    Disposition: Home-Health Care [facility]    Follow Up:   Contact information for follow-up providers     Jayjay Arana MD Follow up in 1 week(s).    Specialties: General Surgery, Bariatrics  Why: Post-op mastectomy drain check  Contact information:  [address]  [address]  [place] [address] [phone]                   Contact information for after-discharge care     Home Medical Care     [facility] .    Service: Home Health Services  Contact information:  [address]  [facility] [address] [phone]                           Patient Instructions:   No discharge procedures on file.  Medications:  Reconciled Home Medications:      Medication List      START taking these medications    cyclobenzaprine 5 MG tablet  Commonly known as: FLEXERIL  Take 1 tablet (5 mg total) by mouth 3 (three) times daily as needed for Muscle spasms.     docusate sodium 100 MG  capsule  Commonly known as: COLACE  Take 1 capsule (100 mg total) by mouth 2 (two) times daily as needed for Constipation.     ondansetron 4 MG Tbdl  Commonly known as: ZOFRAN-ODT  Dissolve 1 tablet (4 mg total) by mouth every 8 (eight) hours as needed (nausea).     oxyCODONE 5 MG immediate release tablet  Commonly known as: ROXICODONE  Take 1 tablet (5 mg total) by mouth every 6 (six) hours as needed for Pain.        CONTINUE taking these medications    acetaminophen 500 mg Cap  Commonly known as: TYLENOL  Take 500 mg by mouth every 6 (six) hours as needed.     albuterol sulfate 90 mcg/actuation inhaler  Commonly known as: PROAIR RESPICLICK  Inhale 180 mcg into the lungs every 4 (four) hours. Rescue     amLODIPine 10 MG tablet  Commonly known as: NORVASC  Take 10 mg by mouth once daily.     aspirin 81 MG EC tablet  Commonly known as: ECOTRIN  Take 81 mg by mouth once daily.     atorvastatin 10 MG tablet  Commonly known as: LIPITOR  Take 10 mg by mouth once daily.     BREZTRI AEROSPHERE 160-9-4.8 mcg/actuation [facility]  Generic drug: budesonide-glycopyr-formoterol  Inhale 1 puff into the lungs once daily.     calcium carbonate 500 mg calcium (1,250 mg) chewable tablet  Commonly known as: OS-CAMERON  Take 1 tablet by mouth 2 (two) times daily as needed for Heartburn.     insulin degludec 200 unit/mL (3 mL) insulin pen  Commonly known as: TRESIBA FLEXTOUCH U-200  Inject 60 Units into the skin once daily.     ketoconazole 2 % cream  Commonly known as: NIZORAL  Apply topically once daily. for 14 days     levoFLOXacin 250 MG tablet  Commonly known as: LEVAQUIN  Take 250 mg by mouth once daily.     levothyroxine 88 mcg Cap  Commonly known as: TIROSINT  Take 75 mcg by mouth before breakfast.     loperamide 2 mg capsule  Commonly known as: IMODIUM  Take 2 mg by mouth 4 (four) times daily as needed for Diarrhea.     losartan 25 MG tablet  Commonly known as: COZAAR  Take 25 mg by mouth once daily.     metoprolol succinate 25 MG 24 hr  tablet  Commonly known as: TOPROL-XL  Take 25 mg by mouth 2 (two) times daily.     omeprazole 20 MG capsule  Commonly known as: PRILOSEC  Take 20 mg by mouth once daily.     prednisoLONE acetate 1 % Drps  Commonly known as: PRED FORTE  Place 1 drop into both eyes 4 (four) times daily.     sevelamer  MG Tab  Commonly known as: RENAGEL  Take 3 tablets by mouth 3 (three) times daily.     sodium bicarbonate 650 MG tablet  Take 650 mg by mouth 2 (two) times daily.        STOP taking these medications    amiodarone 100 MG Tab  Commonly known as: PACERONE     traMADoL 50 mg tablet  Commonly known as: ULTRAM          Time spent on the discharge of patient: 20 minutes    Akilah Estrada PA-C  General Surgery  [facility]

## 2023-03-08 ENCOUNTER — OFFICE VISIT (OUTPATIENT)
Dept: SURGERY | Facility: CLINIC | Age: 68
End: 2023-03-08
Payer: MEDICARE

## 2023-03-08 VITALS
HEART RATE: 81 BPM | DIASTOLIC BLOOD PRESSURE: 76 MMHG | BODY MASS INDEX: 32.66 KG/M2 | SYSTOLIC BLOOD PRESSURE: 184 MMHG | WEIGHT: 178.56 LBS

## 2023-03-08 DIAGNOSIS — Z17.0 MALIGNANT NEOPLASM OF LEFT BREAST IN FEMALE, ESTROGEN RECEPTOR POSITIVE, UNSPECIFIED SITE OF BREAST: Primary | ICD-10-CM

## 2023-03-08 DIAGNOSIS — C50.912 MALIGNANT NEOPLASM OF LEFT BREAST IN FEMALE, ESTROGEN RECEPTOR POSITIVE, UNSPECIFIED SITE OF BREAST: Primary | ICD-10-CM

## 2023-03-08 LAB
FINAL PATHOLOGIC DIAGNOSIS: NORMAL
GROSS: NORMAL
Lab: NORMAL
MICROSCOPIC EXAM: NORMAL

## 2023-03-08 PROCEDURE — 99024 PR POST-OP FOLLOW-UP VISIT: ICD-10-PCS | Mod: S$GLB,,,

## 2023-03-08 PROCEDURE — 99999 PR PBB SHADOW E&M-EST. PATIENT-LVL IV: ICD-10-PCS | Mod: PBBFAC,,,

## 2023-03-08 PROCEDURE — 3044F HG A1C LEVEL LT 7.0%: CPT | Mod: CPTII,S$GLB,,

## 2023-03-08 PROCEDURE — 3078F PR MOST RECENT DIASTOLIC BLOOD PRESSURE < 80 MM HG: ICD-10-PCS | Mod: CPTII,S$GLB,,

## 2023-03-08 PROCEDURE — 1126F PR PAIN SEVERITY QUANTIFIED, NO PAIN PRESENT: ICD-10-PCS | Mod: CPTII,S$GLB,,

## 2023-03-08 PROCEDURE — 3078F DIAST BP <80 MM HG: CPT | Mod: CPTII,S$GLB,,

## 2023-03-08 PROCEDURE — 1159F MED LIST DOCD IN RCRD: CPT | Mod: CPTII,S$GLB,,

## 2023-03-08 PROCEDURE — 3008F PR BODY MASS INDEX (BMI) DOCUMENTED: ICD-10-PCS | Mod: CPTII,S$GLB,,

## 2023-03-08 PROCEDURE — 3077F PR MOST RECENT SYSTOLIC BLOOD PRESSURE >= 140 MM HG: ICD-10-PCS | Mod: CPTII,S$GLB,,

## 2023-03-08 PROCEDURE — 4010F ACE/ARB THERAPY RXD/TAKEN: CPT | Mod: CPTII,S$GLB,,

## 2023-03-08 PROCEDURE — 3008F BODY MASS INDEX DOCD: CPT | Mod: CPTII,S$GLB,,

## 2023-03-08 PROCEDURE — 1157F PR ADVANCE CARE PLAN OR EQUIV PRESENT IN MEDICAL RECORD: ICD-10-PCS | Mod: CPTII,S$GLB,,

## 2023-03-08 PROCEDURE — 1157F ADVNC CARE PLAN IN RCRD: CPT | Mod: CPTII,S$GLB,,

## 2023-03-08 PROCEDURE — 99999 PR PBB SHADOW E&M-EST. PATIENT-LVL IV: CPT | Mod: PBBFAC,,,

## 2023-03-08 PROCEDURE — 3077F SYST BP >= 140 MM HG: CPT | Mod: CPTII,S$GLB,,

## 2023-03-08 PROCEDURE — 1159F PR MEDICATION LIST DOCUMENTED IN MEDICAL RECORD: ICD-10-PCS | Mod: CPTII,S$GLB,,

## 2023-03-08 PROCEDURE — 1160F PR REVIEW ALL MEDS BY PRESCRIBER/CLIN PHARMACIST DOCUMENTED: ICD-10-PCS | Mod: CPTII,S$GLB,,

## 2023-03-08 PROCEDURE — 3044F PR MOST RECENT HEMOGLOBIN A1C LEVEL <7.0%: ICD-10-PCS | Mod: CPTII,S$GLB,,

## 2023-03-08 PROCEDURE — 99024 POSTOP FOLLOW-UP VISIT: CPT | Mod: S$GLB,,,

## 2023-03-08 PROCEDURE — 4010F PR ACE/ARB THEARPY RXD/TAKEN: ICD-10-PCS | Mod: CPTII,S$GLB,,

## 2023-03-08 PROCEDURE — 1126F AMNT PAIN NOTED NONE PRSNT: CPT | Mod: CPTII,S$GLB,,

## 2023-03-08 PROCEDURE — 1160F RVW MEDS BY RX/DR IN RCRD: CPT | Mod: CPTII,S$GLB,,

## 2023-03-08 NOTE — PROGRESS NOTES
History & Physical    SUBJECTIVE:     History of Present Illness:  Patient is a 67 y.o. female presents for postoperative evaluation status post left breast total mastectomy with axillary dissection.  She is doing well today.  She denies much pain in the area at all.  She is tolerating regular diet and having normal bowel movements.  The lateral drain is putting out more than medial drain, and they are both serosanguineous.  She denies fevers, chills, nausea, and vomiting.    presents for follow up PET scan/mammogram and preop. She denies any changes since last visit. Her case was presented at tumor board with decision to move forward with surgical therapy.    presents to discuss next step in breast cancer treatment following neoadjuvant chemotherapy. She did not complete last cycle due to neuropathy. She reports decrease in mass size of the breast.     presents to discuss left breast biopsy.  Her left breast and axillary biopsy were showed invasive ductal carcinoma ER+/ID+ Her2+ with metastatic disease to the lymph node.    Initially referred for abnormal mammogram of the left breast. She denies any breast mass, nipple discharge, breast pain or other changes. No prior breast surgery. Prior right breast biopsy benign. Family history of breast cancer in her sister. Menarche age 12,  1st at 16, menopause at 48. No HRT.     Chief Complaint   Patient presents with    Post-op Evaluation     Check-up, drain         Review of patient's allergies indicates:  No Known Allergies    Current Outpatient Medications   Medication Sig Dispense Refill    acetaminophen (TYLENOL) 500 mg Cap Take 500 mg by mouth every 6 (six) hours as needed.      albuterol sulfate (PROAIR RESPICLICK) 90 mcg/actuation AePB Inhale 180 mcg into the lungs every 4 (four) hours. Rescue 1 each 3    amLODIPine (NORVASC) 10 MG tablet Take 10 mg by mouth once daily.      aspirin (ECOTRIN) 81 MG EC tablet Take 81 mg by mouth once daily.       atorvastatin  (LIPITOR) 10 MG tablet Take 10 mg by mouth once daily.      budesonide-glycopyr-formoterol (BREZTRI AEROSPHERE) 160-9-4.8 mcg/actuation HFAA Inhale 1 puff into the lungs once daily.      calcium carbonate (OS-CAMERON) 500 mg calcium (1,250 mg) chewable tablet Take 1 tablet by mouth 2 (two) times daily as needed for Heartburn.      cyclobenzaprine (FLEXERIL) 5 MG tablet Take 1 tablet (5 mg total) by mouth 3 (three) times daily as needed for Muscle spasms. 30 tablet 0    docusate sodium (COLACE) 100 MG capsule Take 1 capsule (100 mg total) by mouth 2 (two) times daily as needed for Constipation. 20 capsule 0    insulin degludec (TRESIBA FLEXTOUCH U-200) 200 unit/mL (3 mL) insulin pen Inject 60 Units into the skin once daily.      levoFLOXacin (LEVAQUIN) 250 MG tablet Take 250 mg by mouth once daily.      levothyroxine (TIROSINT) 88 mcg Cap Take 75 mcg by mouth before breakfast.      loperamide (IMODIUM) 2 mg capsule Take 2 mg by mouth 4 (four) times daily as needed for Diarrhea.      losartan (COZAAR) 25 MG tablet Take 25 mg by mouth once daily.      metoprolol succinate (TOPROL-XL) 25 MG 24 hr tablet Take 25 mg by mouth 2 (two) times daily.      omeprazole (PRILOSEC) 20 MG capsule Take 20 mg by mouth once daily.      ondansetron (ZOFRAN-ODT) 4 MG TbDL Dissolve 1 tablet (4 mg total) by mouth every 8 (eight) hours as needed (nausea). 30 tablet 0    oxyCODONE (ROXICODONE) 5 MG immediate release tablet Take 1 tablet (5 mg total) by mouth every 6 (six) hours as needed for Pain. 20 tablet 0    prednisoLONE acetate (PRED FORTE) 1 % DrpS Place 1 drop into both eyes 4 (four) times daily.      sevelamer HCL (RENAGEL) 800 MG Tab Take 3 tablets by mouth 3 (three) times daily.      sodium bicarbonate 650 MG tablet Take 650 mg by mouth 2 (two) times daily.      ketoconazole (NIZORAL) 2 % cream Apply topically once daily. for 14 days 1 each 1     No current facility-administered medications for this visit.       Past Medical History:    Diagnosis Date    CAD (coronary artery disease)     Cataract     Cataract     CHF (congestive heart failure)     COPD (chronic obstructive pulmonary disease)     Diabetes mellitus     Diabetic retinopathy     ESRD (end stage renal disease)     TTS    Hypertension     Ischemic ulcer of toe of left foot     Malignant neoplasm of upper-outer quadrant of left breast in female, estrogen receptor positive 06/20/2022    left    PAD (peripheral artery disease)     PAF (paroxysmal atrial fibrillation)      Past Surgical History:   Procedure Laterality Date    AXILLARY NODE DISSECTION Left 3/1/2023    Procedure: LYMPHADENECTOMY, AXILLARY;  Surgeon: Jayjay Arana MD;  Location: [facility] OR;  Service: General;  Laterality: Left;    BREAST BIOPSY Right     benign    CATARACT EXTRACTION W/  INTRAOCULAR LENS IMPLANT Right 08/14/2017    Dr. Moe    CORONARY ARTERY BYPASS GRAFT  2020    FLUOROSCOPY N/A 07/25/2022    Procedure: FLUOROSCOPY/mediport placement;  Surgeon: Cole Perdomo MD;  Location: [facility] CATH LAB;  Service: General;  Laterality: N/A;    MEDIPORT REMOVAL N/A 3/1/2023    Procedure: REMOVAL, CATHETER, CENTRAL VENOUS, TUNNELED, WITH PORT;  Surgeon: Jayjay Arana MD;  Location: [facility] OR;  Service: General;  Laterality: N/A;    MODIFIED RADICAL MASTECTOMY W/ AXILLARY LYMPH NODE DISSECTION Left 3/1/2023    Procedure: MASTECTOMY, MODIFIED RADICAL;  Surgeon: Jayjay Arana MD;  Location: [facility] OR;  Service: General;  Laterality: Left;     Family History   Problem Relation Age of Onset    Hypertension Mother     Cancer Father     Breast cancer Sister     Diabetes Sister     Cancer Brother     Amblyopia Neg Hx     Blindness Neg Hx     Cataracts Neg Hx     Glaucoma Neg Hx     Macular degeneration Neg Hx     Retinal detachment Neg Hx     Strabismus Neg Hx     Stroke Neg Hx     Thyroid disease Neg Hx      Social History     Tobacco Use    Smoking status: Former     Years: 30.00     Types: Cigarettes     Quit date: 6/21/2012     Years  since quitting: 10.7    Smokeless tobacco: Never   Substance Use Topics    Alcohol use: No    Drug use: Never        Review of Systems:  Review of Systems   Constitutional:  Negative for chills, fatigue, fever and unexpected weight change.   Respiratory:  Negative for cough, shortness of breath, wheezing and stridor.    Cardiovascular:  Negative for chest pain, palpitations and leg swelling.   Gastrointestinal:  Negative for abdominal distention, abdominal pain, constipation, diarrhea, nausea and vomiting.   Genitourinary:  Negative for difficulty urinating, dysuria, frequency, hematuria and urgency.   Skin:  Negative for color change, pallor, rash and wound.   Hematological:  Does not bruise/bleed easily.     OBJECTIVE:     Vital Signs (Most Recent)  Pulse: 81 (03/08/23 1310)  BP: (!) 184/76 (03/08/23 1310)     81 kg (178 lb 9.2 oz)     Physical Exam:  Physical Exam  Vitals reviewed.   Constitutional:       General: She is not in acute distress.     Appearance: She is well-developed.   HENT:      Head: Normocephalic and atraumatic.      Right Ear: External ear normal.      Left Ear: External ear normal.      Nose: Nose normal.      Mouth/Throat:      Mouth: Mucous membranes are moist.   Eyes:      Extraocular Movements: Extraocular movements intact.      Conjunctiva/sclera: Conjunctivae normal.   Cardiovascular:      Rate and Rhythm: Normal rate.   Pulmonary:      Effort: Pulmonary effort is normal. No respiratory distress.   Chest:       Musculoskeletal:      Cervical back: Neck supple.   Skin:     General: Skin is warm and dry.   Neurological:      Mental Status: She is alert and oriented to person, place, and time.   Psychiatric:         Behavior: Behavior normal.       Diagnostic Results:  Result:   Mammo Digital Diagnostic Bilat with Alfredo  US Breast Left Limited     History:  Patient is 66 y.o. and is seen for diagnostic imaging.     Films Compared:  Prior images (if available) were compared.      Findings:  This procedure was performed using tomosynthesis. Computer-aided detection was utilized in the interpretation of this examination.  The breasts have scattered areas of fibroglandular density.      Mammo Digital Diagnostic Bilat with Alfredo  Left  Mass: There is a 4.9 cm irregularly shaped mass with spiculated margins seen in the upper outer quadrant of the left breast in the anterior depth. The mass correlates with the palpable mass reported by the patient. Associated features include skin retraction. There are also associated calcifications.   Lymph Node: There are multiple similar lymph nodes seen in the left axilla.      Right  There is no evidence of suspicious masses, calcifications, or other abnormal findings in the right breast.     US Breast Left Limited  Left  Mass: There is a 5.6 cm x 4 cm x 4.3 cm irregularly shaped, non-parallel, hypoechoic mass with spiculated margins seen in the left breast at 2 o'clock, 3 cm from the nipple.   Lymph Node: There are multiple similar lymph nodes seen in the left axilla. Largest node is 3.4 cm x 1.6 cm x 2.0 cm.      Impression:  Left  Mass: Left breast 5.6 cm x 4 cm x 4.3 cm mass at the 2 o'clock position. Assessment: 5 - Highly suggestive of malignancy. Ultrasound-guided biopsy is recommended.   Lymph Node: Left axilla lymph node (multiple findings). Assessment: 5 - Highly suggestive of malignancy. Ultrasound-guided biopsy is recommended.      Right  There is no mammographic or sonographic evidence of malignancy in the right breast.     BI-RADS Category:   Overall: 5 - Highly Suggestive of Malignancy     Recommendation:  Ultrasound-guided biopsy is recommended.  Ultrasound-guided biopsy is recommended.     Your estimated lifetime risk of breast cancer (to age 85) based on Tyrer-Cuzick risk assessment model is Tyrer-Cuzick: 6.49 %. According to the [employer] Society, patients with a lifetime breast cancer risk of 20% or higher might benefit from  supplemental screening tests.    Final Pathologic Diagnosis Part 1   Left axilla, ultrasound-guided core biopsy:   Positive for metastatic carcinoma   Metastasis measures 10 mm in greatest dimension   Part 2   Left breast, 2:00, ultrasound-guided core biopsy:   Invasive ductal carcinoma   Americus grade 2:  Tubule formation -3, nuclear pleomorphism-2 and mitotic   count-2   Invasive carcinoma measures 17 mm in greatest dimension   No carcinoma in Situ or lymphovascular invasion identified   Tumor biomarkers   Estrogen receptor (ER):  Positive, greater than 95%, strong   Progesterone receptor (PGR):  Positive, 80%, strong   HER2 (IHC):  Equivocal, 2+   Ki-67:  60%   Additional IHC:   P63:  Negative throughout, supporting the diagnosis of invasive carcinoma   This case was reviewed by Dr. GABBY Cannon who concurs with the above diagnosis.  VC      Comment: Interp By Bette Pitts M.D., Signed on 06/21/2022 at 12:02   Supplemental Diagnosis HER2, Breast Tumor, FISH, Tissue   Result Summary   POSITIVE   Interpretation   This tumor sample is positive for HER2 (ERBB2) gene amplification.   Result   nuc erwin([id number]¿4,HER2x5¿8)   HER2/D17Z1 ratio: 2.03   Average HER2 signals per cell: 6.3   Average D17Z1 signals per cell: 3.1         PET/CT:  Impression:     Interval improvement.  Left breast mass demonstrates decreased in size in decreased hypermetabolic activity.  Left pectoralis and axillary lymphadenopathy also demonstrates marked significant decreased activity.  Residual nonspecific mildly hypermetabolic right axillary and bilateral inguinal lymph nodes.    Mammogram:  Result:   Mammo Digital Diagnostic Left with Alfredo     History:  Patient is 67 y.o. and is seen for diagnostic imaging.     Films Compared:  Prior images (if available) were compared.     Findings:  This procedure was performed using tomosynthesis. Computer-aided detection was utilized in the interpretation of this examination.  The left breast has  scattered areas of fibroglandular density.      Known malignant breast mass currently measures approximately 4.1 x 3.0 x 2.9 cm, previous 4.5 x 3.7 x 3.6 cm. Note that suspicious calcifications extend at least 2 cm medial to the breast mass and 5-6 cm medial to the post biopsy clip.  There is been decrease in size of the left axillary adenopathy, particularly the prior biopsied enlarged lymph node.         Impression:  Decrease in size of malignant left breast mass and axillary adenopathy. Correlate with ordered PET-CT.         BI-RADS Category:   Overall: 6 - Known Biopsy-Proven Malignancy        Recommendation:  There are no mammo recommendations for this study.    ASSESSMENT/PLAN:     67 y/o female with left breast invasive ductal carcinoma ER+/IA+ Her2+ with metastatic disease to the lymph node now status post left breast total mastectomy with axillary dissection.  Lateral drain removed today.      - local wound care to drain site, bandage placed.  - continue to measure output amount and appearance of remaining drain.  - return to clinic in 1 week for pathology discussion and drain removal.    Akilah Estrada PA-C  [facility]

## 2023-03-13 ENCOUNTER — INFUSION (OUTPATIENT)
Dept: INFUSION THERAPY | Facility: HOSPITAL | Age: 68
End: 2023-03-13
Payer: MEDICARE

## 2023-03-13 VITALS
SYSTOLIC BLOOD PRESSURE: 155 MMHG | WEIGHT: 178.56 LBS | TEMPERATURE: 98 F | BODY MASS INDEX: 32.86 KG/M2 | HEART RATE: 80 BPM | OXYGEN SATURATION: 96 % | RESPIRATION RATE: 18 BRPM | HEIGHT: 62 IN | DIASTOLIC BLOOD PRESSURE: 69 MMHG

## 2023-03-13 DIAGNOSIS — C50.412 MALIGNANT NEOPLASM OF UPPER-OUTER QUADRANT OF LEFT BREAST IN FEMALE, ESTROGEN RECEPTOR POSITIVE: Primary | ICD-10-CM

## 2023-03-13 DIAGNOSIS — Z17.0 MALIGNANT NEOPLASM OF UPPER-OUTER QUADRANT OF LEFT BREAST IN FEMALE, ESTROGEN RECEPTOR POSITIVE: Primary | ICD-10-CM

## 2023-03-13 PROCEDURE — 96401 CHEMO ANTI-NEOPL SQ/IM: CPT

## 2023-03-13 PROCEDURE — 63600175 PHARM REV CODE 636 W HCPCS: Mod: JZ,TB | Performed by: INTERNAL MEDICINE

## 2023-03-13 RX ADMIN — PERTUZUMAB, TRASTUZUMAB, AND HYALURONIDASE-ZZXF 600 MG: 600; 600; 2000 INJECTION, SOLUTION SUBCUTANEOUS at 11:03

## 2023-03-13 NOTE — NURSING
Injection given without difficulties to Left thigh .Bandaid applied. Patient instructed to stay in the clinic for 15 minutes. Patient verbalized understanding and will notify nurse with any complaints.

## 2023-03-13 NOTE — DISCHARGE INSTRUCTIONS
THANKS FOR ALLOWING ME TO CARE FOR YOU TODAY!!!          THANKS FOR CHOOSING [facility]!!!      [facility]  [address] [address]  [phone] phone     [phone] fax  Hours of Operation: Monday- Friday 8:00am- 5:00pm  After hours phone  [phone]  Hematology / Oncology Physicians on call      JAQUAN Zhou Dr., Dr., NP Sydney Prescott, SMITH Lizarraga, VILMA Welsh    Please call with any concerns regarding your appointment today.

## 2023-03-14 ENCOUNTER — PATIENT MESSAGE (OUTPATIENT)
Dept: PHARMACY | Facility: CLINIC | Age: 68
End: 2023-03-14
Payer: MEDICARE

## 2023-03-15 ENCOUNTER — TELEPHONE (OUTPATIENT)
Dept: SURGERY | Facility: CLINIC | Age: 68
End: 2023-03-15
Payer: MEDICARE

## 2023-03-15 ENCOUNTER — OFFICE VISIT (OUTPATIENT)
Dept: SURGERY | Facility: CLINIC | Age: 68
End: 2023-03-15
Payer: MEDICARE

## 2023-03-15 VITALS
WEIGHT: 190.06 LBS | BODY MASS INDEX: 34.98 KG/M2 | HEIGHT: 62 IN | HEART RATE: 75 BPM | TEMPERATURE: 98 F | DIASTOLIC BLOOD PRESSURE: 71 MMHG | SYSTOLIC BLOOD PRESSURE: 137 MMHG

## 2023-03-15 DIAGNOSIS — Z17.0 MALIGNANT NEOPLASM OF LEFT BREAST IN FEMALE, ESTROGEN RECEPTOR POSITIVE, UNSPECIFIED SITE OF BREAST: Primary | ICD-10-CM

## 2023-03-15 DIAGNOSIS — R59.0 LYMPHADENOPATHY, INGUINAL: ICD-10-CM

## 2023-03-15 DIAGNOSIS — C50.912 MALIGNANT NEOPLASM OF LEFT BREAST IN FEMALE, ESTROGEN RECEPTOR POSITIVE, UNSPECIFIED SITE OF BREAST: Primary | ICD-10-CM

## 2023-03-15 PROCEDURE — 3044F HG A1C LEVEL LT 7.0%: CPT | Mod: CPTII,S$GLB,, | Performed by: SURGERY

## 2023-03-15 PROCEDURE — 3008F BODY MASS INDEX DOCD: CPT | Mod: CPTII,S$GLB,, | Performed by: SURGERY

## 2023-03-15 PROCEDURE — 1126F PR PAIN SEVERITY QUANTIFIED, NO PAIN PRESENT: ICD-10-PCS | Mod: CPTII,S$GLB,, | Performed by: SURGERY

## 2023-03-15 PROCEDURE — 3075F PR MOST RECENT SYSTOLIC BLOOD PRESS GE 130-139MM HG: ICD-10-PCS | Mod: CPTII,S$GLB,, | Performed by: SURGERY

## 2023-03-15 PROCEDURE — 4010F ACE/ARB THERAPY RXD/TAKEN: CPT | Mod: CPTII,S$GLB,, | Performed by: SURGERY

## 2023-03-15 PROCEDURE — 99999 PR PBB SHADOW E&M-EST. PATIENT-LVL IV: CPT | Mod: PBBFAC,,, | Performed by: SURGERY

## 2023-03-15 PROCEDURE — 99999 PR PBB SHADOW E&M-EST. PATIENT-LVL IV: ICD-10-PCS | Mod: PBBFAC,,, | Performed by: SURGERY

## 2023-03-15 PROCEDURE — 1159F MED LIST DOCD IN RCRD: CPT | Mod: CPTII,S$GLB,, | Performed by: SURGERY

## 2023-03-15 PROCEDURE — 99024 PR POST-OP FOLLOW-UP VISIT: ICD-10-PCS | Mod: S$GLB,,, | Performed by: SURGERY

## 2023-03-15 PROCEDURE — 1159F PR MEDICATION LIST DOCUMENTED IN MEDICAL RECORD: ICD-10-PCS | Mod: CPTII,S$GLB,, | Performed by: SURGERY

## 2023-03-15 PROCEDURE — 1157F ADVNC CARE PLAN IN RCRD: CPT | Mod: CPTII,S$GLB,, | Performed by: SURGERY

## 2023-03-15 PROCEDURE — 1126F AMNT PAIN NOTED NONE PRSNT: CPT | Mod: CPTII,S$GLB,, | Performed by: SURGERY

## 2023-03-15 PROCEDURE — 1160F PR REVIEW ALL MEDS BY PRESCRIBER/CLIN PHARMACIST DOCUMENTED: ICD-10-PCS | Mod: CPTII,S$GLB,, | Performed by: SURGERY

## 2023-03-15 PROCEDURE — 3044F PR MOST RECENT HEMOGLOBIN A1C LEVEL <7.0%: ICD-10-PCS | Mod: CPTII,S$GLB,, | Performed by: SURGERY

## 2023-03-15 PROCEDURE — 3078F PR MOST RECENT DIASTOLIC BLOOD PRESSURE < 80 MM HG: ICD-10-PCS | Mod: CPTII,S$GLB,, | Performed by: SURGERY

## 2023-03-15 PROCEDURE — 4010F PR ACE/ARB THEARPY RXD/TAKEN: ICD-10-PCS | Mod: CPTII,S$GLB,, | Performed by: SURGERY

## 2023-03-15 PROCEDURE — 3008F PR BODY MASS INDEX (BMI) DOCUMENTED: ICD-10-PCS | Mod: CPTII,S$GLB,, | Performed by: SURGERY

## 2023-03-15 PROCEDURE — 3078F DIAST BP <80 MM HG: CPT | Mod: CPTII,S$GLB,, | Performed by: SURGERY

## 2023-03-15 PROCEDURE — 3075F SYST BP GE 130 - 139MM HG: CPT | Mod: CPTII,S$GLB,, | Performed by: SURGERY

## 2023-03-15 PROCEDURE — 1157F PR ADVANCE CARE PLAN OR EQUIV PRESENT IN MEDICAL RECORD: ICD-10-PCS | Mod: CPTII,S$GLB,, | Performed by: SURGERY

## 2023-03-15 PROCEDURE — 1160F RVW MEDS BY RX/DR IN RCRD: CPT | Mod: CPTII,S$GLB,, | Performed by: SURGERY

## 2023-03-15 PROCEDURE — 99024 POSTOP FOLLOW-UP VISIT: CPT | Mod: S$GLB,,, | Performed by: SURGERY

## 2023-03-15 NOTE — TELEPHONE ENCOUNTER
----- Message from Mackenzie Vela sent at 3/15/2023  9:39 AM CDT -----  Contact: [phone]  Marbella the patients Home health nurse requesting a call back in regards to a blister near the patients incision site. Please call back at [phone]. Thanks KD

## 2023-03-15 NOTE — PROGRESS NOTES
History & Physical    SUBJECTIVE:     History of Present Illness:  Patient is a 67 y.o. female status post left breast mastectomy with axillary dissection 2023 presents for drain removal and follow-up.  She denies any pain.    Postop visit 1 She is doing well today.  She denies much pain in the area at all.  She is tolerating regular diet and having normal bowel movements.  The lateral drain is putting out more than medial drain, and they are both serosanguineous.  She denies fevers, chills, nausea, and vomiting.    presents for follow up PET scan/mammogram and preop. She denies any changes since last visit. Her case was presented at tumor board with decision to move forward with surgical therapy.    presents to discuss next step in breast cancer treatment following neoadjuvant chemotherapy. She did not complete last cycle due to neuropathy. She reports decrease in mass size of the breast.     presents to discuss left breast biopsy.  Her left breast and axillary biopsy were showed invasive ductal carcinoma ER+/[place]+ Her2+ with metastatic disease to the lymph node.    Initially referred for abnormal mammogram of the left breast. She denies any breast mass, nipple discharge, breast pain or other changes. No prior breast surgery. Prior right breast biopsy benign. Family history of breast cancer in her sister. Menarche age 12,  1st at 16, menopause at 48. No HRT.     No chief complaint on file.        Review of patient's allergies indicates:  No Known Allergies    Current Outpatient Medications   Medication Sig Dispense Refill    acetaminophen (TYLENOL) 500 mg Cap Take 500 mg by mouth every 6 (six) hours as needed.      albuterol sulfate (PROAIR RESPICLICK) 90 mcg/actuation AePB Inhale 180 mcg into the lungs every 4 (four) hours. Rescue 1 each 3    amLODIPine (NORVASC) 10 MG tablet Take 10 mg by mouth once daily.      aspirin (ECOTRIN) 81 MG EC tablet Take 81 mg by mouth once daily.       atorvastatin (LIPITOR)  10 MG tablet Take 10 mg by mouth once daily.      budesonide-glycopyr-formoterol (BREZTRI AEROSPHERE) 160-9-4.8 mcg/actuation HFAA Inhale 1 puff into the lungs once daily.      calcium carbonate (OS-CAMERON) 500 mg calcium (1,250 mg) chewable tablet Take 1 tablet by mouth 2 (two) times daily as needed for Heartburn.      docusate sodium (COLACE) 100 MG capsule Take 1 capsule (100 mg total) by mouth 2 (two) times daily as needed for Constipation. 20 capsule 0    insulin degludec (TRESIBA FLEXTOUCH U-200) 200 unit/mL (3 mL) insulin pen Inject 60 Units into the skin once daily.      levoFLOXacin (LEVAQUIN) 250 MG tablet Take 250 mg by mouth once daily.      levothyroxine (TIROSINT) 88 mcg Cap Take 75 mcg by mouth before breakfast.      loperamide (IMODIUM) 2 mg capsule Take 2 mg by mouth 4 (four) times daily as needed for Diarrhea.      losartan (COZAAR) 25 MG tablet Take 25 mg by mouth once daily.      metoprolol succinate (TOPROL-XL) 25 MG 24 hr tablet Take 25 mg by mouth 2 (two) times daily.      omeprazole (PRILOSEC) 20 MG capsule Take 20 mg by mouth once daily.      ondansetron (ZOFRAN-ODT) 4 MG TbDL Dissolve 1 tablet (4 mg total) by mouth every 8 (eight) hours as needed (nausea). 30 tablet 0    oxyCODONE (ROXICODONE) 5 MG immediate release tablet Take 1 tablet (5 mg total) by mouth every 6 (six) hours as needed for Pain. 20 tablet 0    prednisoLONE acetate (PRED FORTE) 1 % DrpS Place 1 drop into both eyes 4 (four) times daily.      sevelamer HCL (RENAGEL) 800 MG Tab Take 3 tablets by mouth 3 (three) times daily.      sodium bicarbonate 650 MG tablet Take 650 mg by mouth 2 (two) times daily.      ketoconazole (NIZORAL) 2 % cream Apply topically once daily. for 14 days 1 each 1     No current facility-administered medications for this visit.       Past Medical History:   Diagnosis Date    CAD (coronary artery disease)     Cataract     Cataract     CHF (congestive heart failure)     COPD (chronic obstructive pulmonary  disease)     Diabetes mellitus     Diabetic retinopathy     ESRD (end stage renal disease)     TTS    Hypertension     Ischemic ulcer of toe of left foot     Malignant neoplasm of upper-outer quadrant of left breast in female, estrogen receptor positive 06/20/2022    left    PAD (peripheral artery disease)     PAF (paroxysmal atrial fibrillation)      Past Surgical History:   Procedure Laterality Date    AXILLARY NODE DISSECTION Left 3/1/2023    Procedure: LYMPHADENECTOMY, AXILLARY;  Surgeon: Jayjay Arana MD;  Location: [facility] OR;  Service: General;  Laterality: Left;    BREAST BIOPSY Right     benign    CATARACT EXTRACTION W/  INTRAOCULAR LENS IMPLANT Right 08/14/2017    Dr. Moe    CORONARY ARTERY BYPASS GRAFT  2020    FLUOROSCOPY N/A 07/25/2022    Procedure: FLUOROSCOPY/mediport placement;  Surgeon: Cole Perdomo MD;  Location: [facility] CATH LAB;  Service: General;  Laterality: N/A;    MEDIPORT REMOVAL N/A 3/1/2023    Procedure: REMOVAL, CATHETER, CENTRAL VENOUS, TUNNELED, WITH PORT;  Surgeon: Jayjay Arana MD;  Location: [facility] OR;  Service: General;  Laterality: N/A;    MODIFIED RADICAL MASTECTOMY W/ AXILLARY LYMPH NODE DISSECTION Left 3/1/2023    Procedure: MASTECTOMY, MODIFIED RADICAL;  Surgeon: Jayjay Arana MD;  Location: [facility] OR;  Service: General;  Laterality: Left;     Family History   Problem Relation Age of Onset    Hypertension Mother     Cancer Father     Breast cancer Sister     Diabetes Sister     Cancer Brother     Amblyopia Neg Hx     Blindness Neg Hx     Cataracts Neg Hx     Glaucoma Neg Hx     Macular degeneration Neg Hx     Retinal detachment Neg Hx     Strabismus Neg Hx     Stroke Neg Hx     Thyroid disease Neg Hx      Social History     Tobacco Use    Smoking status: Former     Years: 30.00     Types: Cigarettes     Quit date: 6/21/2012     Years since quitting: 10.7    Smokeless tobacco: Never   Substance Use Topics    Alcohol use: No    Drug use: Never        Review of Systems:  Review of  "Systems   Constitutional:  Negative for chills, fatigue, fever and unexpected weight change.   Respiratory:  Negative for cough, shortness of breath, wheezing and stridor.    Cardiovascular:  Negative for chest pain, palpitations and leg swelling.   Gastrointestinal:  Negative for abdominal distention, abdominal pain, constipation, diarrhea, nausea and vomiting.   Genitourinary:  Negative for difficulty urinating, dysuria, frequency, hematuria and urgency.   Skin:  Negative for color change, pallor, rash and wound.   Hematological:  Does not bruise/bleed easily.     OBJECTIVE:     Vital Signs (Most Recent)  Temp: 97.8 °F (36.6 °C) (03/15/23 1246)  Pulse: 75 (03/15/23 1246)  BP: 137/71 (03/15/23 1246)  5' 2" (1.575 m)  86.2 kg (190 lb 0.6 oz)     Physical Exam:  Physical Exam  Vitals reviewed.   Constitutional:       General: She is not in acute distress.     Appearance: She is well-developed.   HENT:      Head: Normocephalic and atraumatic.      Right Ear: External ear normal.      Left Ear: External ear normal.      Nose: Nose normal.      Mouth/Throat:      Mouth: Mucous membranes are moist.   Eyes:      Extraocular Movements: Extraocular movements intact.      Conjunctiva/sclera: Conjunctivae normal.   Cardiovascular:      Rate and Rhythm: Normal rate.   Pulmonary:      Effort: Pulmonary effort is normal. No respiratory distress.   Chest:       Musculoskeletal:      Cervical back: Neck supple.   Skin:     General: Skin is warm and dry.   Neurological:      Mental Status: She is alert and oriented to person, place, and time.   Psychiatric:         Behavior: Behavior normal.       Diagnostic Results:  Result:   Mammo Digital Diagnostic Bilat with Alfredo  US Breast Left Limited     History:  Patient is 66 y.o. and is seen for diagnostic imaging.     Films Compared:  Prior images (if available) were compared.     Findings:  This procedure was performed using tomosynthesis. Computer-aided detection was utilized in the " interpretation of this examination.  The breasts have scattered areas of fibroglandular density.      Mammo Digital Diagnostic Bilat with Alfredo  Left  Mass: There is a 4.9 cm irregularly shaped mass with spiculated margins seen in the upper outer quadrant of the left breast in the anterior depth. The mass correlates with the palpable mass reported by the patient. Associated features include skin retraction. There are also associated calcifications.   Lymph Node: There are multiple similar lymph nodes seen in the left axilla.      Right  There is no evidence of suspicious masses, calcifications, or other abnormal findings in the right breast.     US Breast Left Limited  Left  Mass: There is a 5.6 cm x 4 cm x 4.3 cm irregularly shaped, non-parallel, hypoechoic mass with spiculated margins seen in the left breast at 2 o'clock, 3 cm from the nipple.   Lymph Node: There are multiple similar lymph nodes seen in the left axilla. Largest node is 3.4 cm x 1.6 cm x 2.0 cm.      Impression:  Left  Mass: Left breast 5.6 cm x 4 cm x 4.3 cm mass at the 2 o'clock position. Assessment: 5 - Highly suggestive of malignancy. Ultrasound-guided biopsy is recommended.   Lymph Node: Left axilla lymph node (multiple findings). Assessment: 5 - Highly suggestive of malignancy. Ultrasound-guided biopsy is recommended.      Right  There is no mammographic or sonographic evidence of malignancy in the right breast.     BI-RADS Category:   Overall: 5 - Highly Suggestive of Malignancy     Recommendation:  Ultrasound-guided biopsy is recommended.  Ultrasound-guided biopsy is recommended.     Your estimated lifetime risk of breast cancer (to age 85) based on Tyrer-Cuzick risk assessment model is Tyrer-Cuzick: 6.49 %. According to the American Cancer Society, patients with a lifetime breast cancer risk of 20% or higher might benefit from supplemental screening tests.    Final Pathologic Diagnosis Part 1   Left axilla, ultrasound-guided core biopsy:    Positive for metastatic carcinoma   Metastasis measures 10 mm in greatest dimension   Part 2   Left breast, 2:00, ultrasound-guided core biopsy:   Invasive ductal carcinoma   Bubba grade 2:  Tubule formation -3, nuclear pleomorphism-2 and mitotic   count-2   Invasive carcinoma measures 17 mm in greatest dimension   No carcinoma in Situ or lymphovascular invasion identified   Tumor biomarkers   Estrogen receptor (ER):  Positive, greater than 95%, strong   Progesterone receptor (PGR):  Positive, 80%, strong   HER2 (IHC):  Equivocal, 2+   Ki-67:  60%   Additional IHC:   P63:  Negative throughout, supporting the diagnosis of invasive carcinoma   This case was reviewed by Dr. GABBY Cannon who concurs with the above diagnosis.  VC      Comment: Interp By Bette Pitts M.D., Signed on 06/21/2022 at 12:02   Supplemental Diagnosis HER2, Breast Tumor, FISH, Tissue   Result Summary   POSITIVE   Interpretation   This tumor sample is positive for HER2 (ERBB2) gene amplification.   Result   nuc erwin([id number]¿4,HER2x5¿8)   HER2/D17Z1 ratio: 2.03   Average HER2 signals per cell: 6.3   Average D17Z1 signals per cell: 3.1         PET/CT:  Impression:     Interval improvement.  Left breast mass demonstrates decreased in size in decreased hypermetabolic activity.  Left pectoralis and axillary lymphadenopathy also demonstrates marked significant decreased activity.  Residual nonspecific mildly hypermetabolic right axillary and bilateral inguinal lymph nodes.    Mammogram:  Result:   Mammo Digital Diagnostic Left with Alfredo     History:  Patient is 67 y.o. and is seen for diagnostic imaging.     Films Compared:  Prior images (if available) were compared.     Findings:  This procedure was performed using tomosynthesis. Computer-aided detection was utilized in the interpretation of this examination.  The left breast has scattered areas of fibroglandular density.      Known malignant breast mass currently measures approximately 4.1 x 3.0 x  2.9 cm, previous 4.5 x 3.7 x 3.6 cm. Note that suspicious calcifications extend at least 2 cm medial to the breast mass and 5-6 cm medial to the post biopsy clip.  There is been decrease in size of the left axillary adenopathy, particularly the prior biopsied enlarged lymph node.         Impression:  Decrease in size of malignant left breast mass and axillary adenopathy. Correlate with ordered PET-CT.         BI-RADS Category:   Overall: 6 - Known Biopsy-Proven Malignancy        Recommendation:  There are no mammo recommendations for this study.    Final Pathologic Diagnosis 1. Left breast, mastectomy:   Residual invasive ductal carcinoma status post neoadjuvant treatment, Grade 2   (tubules = 3, nuclei = 3, mitoses = 1)   Invasive carcinoma measures 43 mm (4.3 cm) in greatest dimension (measured   microscopically)   Calcifications associated with invasive carcinoma and non-neoplastic breast   2 of 7 lymph nodes positive for metastatic carcinoma (2/7) with extranodal   extension   Largest metastatic deposit measures 9 mm (0.9 cm)   Biopsy clip site identified grossly   Best tumor block for future studies: 1C   See surgical pathology cancer case summary below   2.  Axillary contents, dissection:   14 lymph nodes negative for metastatic carcinoma (0/14)   Immunohistochemical stains for AE1/AE3 were performed with adequate controls   and are negative   Surgical pathology cancer case summary:   Procedure:  Total mastectomy   Specimen laterality:  Left   Tumor site:  Upper outer quadrant   Histologic type: Invasive carcinoma of no special type (ductal)   Histologic grade ([place] Histologic Score):   Glandular (acinar/tubular) differentiation:  Score 3   Nuclear pleomorphism:  Score 3   Mitotic rate:  Score 1   Overall grade:  Grade 2 (score 7)   Tumor size:  Greatest dimension of largest invasive focus greater than 1 mm:   43 mm   Tumor focality:  Single focus of invasive carcinoma   Ductal carcinoma in situ (DCIS):   Not identified   Lobular carcinoma in situ (LCIS):  Not identified   Tumor extent:  Not applicable (skin, nipple, and skeletal muscle are absent   or are uninvolved)   Lymphovascular invasion:  Not identified   Dermal lymphovascular invasion:  Not identified   Microcalcifications:   Present in invasive carcinoma   Present in nonneoplastic tissue   Treatment effect in the breast:  No definite response to presurgical therapy   in the invasive carcinoma   Treatment effect in the lymph nodes:  No definite response to presurgical   therapy in metastatic carcinoma   Margins status for invasive carcinoma:  All margins negative for invasive   carcinoma   Distance from invasive carcinoma to closest margin:  21 mm (measured grossly)   Closest margin to invasive carcinoma:  Anterior (skin)   All other margins are greater than 21 mm (measured grossly)   Regional lymph node status:  Regional lymph nodes present   Tumor present in regional lymph nodes   Number of lymph nodes with macrometastases (greater than 2 mm):  2   Size of largest moni metastatic deposit:  9 mm   Extranodal extension:  Present, 2 mm or less   Total number of lymph nodes examined:  21   Distant metastases:  Not applicable   Pathologic stage classification (pTNM):   TNM descriptors: y (posttreatment)   pT category: ypT2:  Posttreatment tumor greater than 20 mm but less than or   equal to 50 mm in greatest dimension   Regional lymph nodes modifier:  Not applicable   pN: pN1a:  Metastases in 1-3 axillary lymph nodes, at least 1 metastases   larger than 2.0 mm   pM:  Not applicable   Breast biomarker testing performed on previous biopsy case [facility]- and   reported as below:   Estrogen receptor (ER):  Positive, greater than 95%, strong   Progesterone receptor (PGR): Positive, 80%, strong   HER2 (IHC):  Equivocal, 2+   HER2 FISH: Positive   Ki-67:  60%      Final Pathologic Diagnosis 1. Lymph node, needle core biopsy:   - A kappa light chain restricted plasma  cell population detected.  Recommend   additional tissue biopsy for further classifying this process.  See comment.   - No evidence of metastatic carcinoma       ASSESSMENT/PLAN:     67 y/o female with left breast invasive ductal carcinoma ER+/[place]+ Her2+ with metastatic disease to the lymph node now status post left breast total mastectomy with axillary dissection and IR inguinal lymph node biopsy    - breast pathology reviewed  - IR biopsy left inguinal lymph node reviewed with recommendations for further excisional biopsy due to nondiagnostic core needle biopsy  - superficial skin necrosis midportion of incision remainder of bandage in place.  Continue daily wound care with washing over site.  Discussed with patient she may need debridement of the superficial skin  - follow-up in 1 week for re-evaluation of wound and scheduling of left inguinal lymph node excisional biopsy, will decide if she needs to undergo debridement of mastectomy superficial skin flap at that time as well

## 2023-03-17 PROCEDURE — G0179 PR HOME HEALTH MD RECERTIFICATION: ICD-10-PCS | Mod: ,,, | Performed by: PODIATRIST

## 2023-03-17 PROCEDURE — G0179 MD RECERTIFICATION HHA PT: HCPCS | Mod: ,,, | Performed by: PODIATRIST

## 2023-03-22 ENCOUNTER — OFFICE VISIT (OUTPATIENT)
Dept: SURGERY | Facility: CLINIC | Age: 68
End: 2023-03-22
Payer: MEDICARE

## 2023-03-22 ENCOUNTER — TELEPHONE (OUTPATIENT)
Dept: SURGERY | Facility: CLINIC | Age: 68
End: 2023-03-22

## 2023-03-22 VITALS
SYSTOLIC BLOOD PRESSURE: 157 MMHG | HEART RATE: 73 BPM | TEMPERATURE: 98 F | WEIGHT: 189.38 LBS | DIASTOLIC BLOOD PRESSURE: 64 MMHG | BODY MASS INDEX: 34.85 KG/M2 | HEIGHT: 62 IN

## 2023-03-22 DIAGNOSIS — R23.4 SKIN ESCHAR: ICD-10-CM

## 2023-03-22 DIAGNOSIS — R59.1 LYMPHADENOPATHY: ICD-10-CM

## 2023-03-22 DIAGNOSIS — R59.0 LYMPHADENOPATHY, INGUINAL: Primary | ICD-10-CM

## 2023-03-22 PROCEDURE — 4010F PR ACE/ARB THEARPY RXD/TAKEN: ICD-10-PCS | Mod: CPTII,S$GLB,, | Performed by: SURGERY

## 2023-03-22 PROCEDURE — 3008F PR BODY MASS INDEX (BMI) DOCUMENTED: ICD-10-PCS | Mod: CPTII,S$GLB,, | Performed by: SURGERY

## 2023-03-22 PROCEDURE — 1160F RVW MEDS BY RX/DR IN RCRD: CPT | Mod: CPTII,S$GLB,, | Performed by: SURGERY

## 2023-03-22 PROCEDURE — 3044F HG A1C LEVEL LT 7.0%: CPT | Mod: CPTII,S$GLB,, | Performed by: SURGERY

## 2023-03-22 PROCEDURE — 99999 PR PBB SHADOW E&M-EST. PATIENT-LVL V: ICD-10-PCS | Mod: PBBFAC,,, | Performed by: SURGERY

## 2023-03-22 PROCEDURE — 3008F BODY MASS INDEX DOCD: CPT | Mod: CPTII,S$GLB,, | Performed by: SURGERY

## 2023-03-22 PROCEDURE — 4010F ACE/ARB THERAPY RXD/TAKEN: CPT | Mod: CPTII,S$GLB,, | Performed by: SURGERY

## 2023-03-22 PROCEDURE — 99999 PR PBB SHADOW E&M-EST. PATIENT-LVL V: CPT | Mod: PBBFAC,,, | Performed by: SURGERY

## 2023-03-22 PROCEDURE — 3044F PR MOST RECENT HEMOGLOBIN A1C LEVEL <7.0%: ICD-10-PCS | Mod: CPTII,S$GLB,, | Performed by: SURGERY

## 2023-03-22 PROCEDURE — 1126F AMNT PAIN NOTED NONE PRSNT: CPT | Mod: CPTII,S$GLB,, | Performed by: SURGERY

## 2023-03-22 PROCEDURE — 3078F DIAST BP <80 MM HG: CPT | Mod: CPTII,S$GLB,, | Performed by: SURGERY

## 2023-03-22 PROCEDURE — 3078F PR MOST RECENT DIASTOLIC BLOOD PRESSURE < 80 MM HG: ICD-10-PCS | Mod: CPTII,S$GLB,, | Performed by: SURGERY

## 2023-03-22 PROCEDURE — 1159F MED LIST DOCD IN RCRD: CPT | Mod: CPTII,S$GLB,, | Performed by: SURGERY

## 2023-03-22 PROCEDURE — 1159F PR MEDICATION LIST DOCUMENTED IN MEDICAL RECORD: ICD-10-PCS | Mod: CPTII,S$GLB,, | Performed by: SURGERY

## 2023-03-22 PROCEDURE — 1126F PR PAIN SEVERITY QUANTIFIED, NO PAIN PRESENT: ICD-10-PCS | Mod: CPTII,S$GLB,, | Performed by: SURGERY

## 2023-03-22 PROCEDURE — 99024 PR POST-OP FOLLOW-UP VISIT: ICD-10-PCS | Mod: S$GLB,,, | Performed by: SURGERY

## 2023-03-22 PROCEDURE — 1157F PR ADVANCE CARE PLAN OR EQUIV PRESENT IN MEDICAL RECORD: ICD-10-PCS | Mod: CPTII,S$GLB,, | Performed by: SURGERY

## 2023-03-22 PROCEDURE — 1160F PR REVIEW ALL MEDS BY PRESCRIBER/CLIN PHARMACIST DOCUMENTED: ICD-10-PCS | Mod: CPTII,S$GLB,, | Performed by: SURGERY

## 2023-03-22 PROCEDURE — 1157F ADVNC CARE PLAN IN RCRD: CPT | Mod: CPTII,S$GLB,, | Performed by: SURGERY

## 2023-03-22 PROCEDURE — 99024 POSTOP FOLLOW-UP VISIT: CPT | Mod: S$GLB,,, | Performed by: SURGERY

## 2023-03-22 PROCEDURE — 3077F PR MOST RECENT SYSTOLIC BLOOD PRESSURE >= 140 MM HG: ICD-10-PCS | Mod: CPTII,S$GLB,, | Performed by: SURGERY

## 2023-03-22 PROCEDURE — 3077F SYST BP >= 140 MM HG: CPT | Mod: CPTII,S$GLB,, | Performed by: SURGERY

## 2023-03-22 RX ORDER — SODIUM CHLORIDE 9 MG/ML
INJECTION, SOLUTION INTRAVENOUS CONTINUOUS
Status: CANCELLED | OUTPATIENT
Start: 2023-03-22

## 2023-03-22 RX ORDER — LIDOCAINE HYDROCHLORIDE 10 MG/ML
1 INJECTION, SOLUTION EPIDURAL; INFILTRATION; INTRACAUDAL; PERINEURAL ONCE
Status: CANCELLED | OUTPATIENT
Start: 2023-03-22 | End: 2023-03-22

## 2023-03-22 NOTE — TELEPHONE ENCOUNTER
----- Message from Akilah Estrada PA-C sent at 3/22/2023 12:47 PM CDT -----  Contact: [phone]  Dr. Arana saw her in clinic today. Should be taken care of.      Akilah Estrada PA-C  [facility]    ----- Message -----  From: Gwendolyn Bush MA  Sent: 3/22/2023  11:44 AM CDT  To: Akilah Estrada PA-C    Good morning Akilah. Please advise. Thank you!  ----- Message -----  From: Mackenzie Vela  Sent: 3/22/2023  11:10 AM CDT  To: Sumit Ro Staff    Patients niece requesting a call back in regards to wound care suggestions. Please call back at [phone]. Thanks KD

## 2023-03-22 NOTE — PROGRESS NOTES
History & Physical    SUBJECTIVE:     History of Present Illness:  Patient is a 67 y.o. female status post left breast mastectomy with axillary dissection 2023 presents for postop check of left mastectomy incision site and to discuss left inguinal lymph node biopsy.  In the interim she is doing well denies any pain or drainage from her mastectomy site.    presents for drain removal and follow-up.  She denies any pain.    presents for follow up PET scan/mammogram and preop. She denies any changes since last visit. Her case was presented at tumor board with decision to move forward with surgical therapy.    presents to discuss next step in breast cancer treatment following neoadjuvant chemotherapy. She did not complete last cycle due to neuropathy. She reports decrease in mass size of the breast.     presents to discuss left breast biopsy.  Her left breast and axillary biopsy were showed invasive ductal carcinoma ER+/MS+ Her2+ with metastatic disease to the lymph node.    Initially referred for abnormal mammogram of the left breast. She denies any breast mass, nipple discharge, breast pain or other changes. No prior breast surgery. Prior right breast biopsy benign. Family history of breast cancer in her sister. Menarche age 12,  1st at 16, menopause at 48. No HRT.     No chief complaint on file.        Review of patient's allergies indicates:  No Known Allergies    Current Outpatient Medications   Medication Sig Dispense Refill    acetaminophen (TYLENOL) 500 mg Cap Take 500 mg by mouth every 6 (six) hours as needed.      albuterol sulfate (PROAIR RESPICLICK) 90 mcg/actuation AePB Inhale 180 mcg into the lungs every 4 (four) hours. Rescue 1 each 3    amLODIPine (NORVASC) 10 MG tablet Take 10 mg by mouth once daily.      aspirin (ECOTRIN) 81 MG EC tablet Take 81 mg by mouth once daily.       atorvastatin (LIPITOR) 10 MG tablet Take 10 mg by mouth once daily.      budesonide-glycopyr-formoterol (BREZTRI  AEROSPHERE) 160-9-4.8 mcg/actuation HFAA Inhale 1 puff into the lungs once daily.      calcium carbonate (OS-CAMERON) 500 mg calcium (1,250 mg) chewable tablet Take 1 tablet by mouth 2 (two) times daily as needed for Heartburn.      docusate sodium (COLACE) 100 MG capsule Take 1 capsule (100 mg total) by mouth 2 (two) times daily as needed for Constipation. 20 capsule 0    insulin degludec (TRESIBA FLEXTOUCH U-200) 200 unit/mL (3 mL) insulin pen Inject 60 Units into the skin once daily.      levoFLOXacin (LEVAQUIN) 250 MG tablet Take 250 mg by mouth once daily.      levothyroxine (TIROSINT) 88 mcg Cap Take 75 mcg by mouth before breakfast.      loperamide (IMODIUM) 2 mg capsule Take 2 mg by mouth 4 (four) times daily as needed for Diarrhea.      losartan (COZAAR) 25 MG tablet Take 25 mg by mouth once daily.      metoprolol succinate (TOPROL-XL) 25 MG 24 hr tablet Take 25 mg by mouth 2 (two) times daily.      omeprazole (PRILOSEC) 20 MG capsule Take 20 mg by mouth once daily.      ondansetron (ZOFRAN-ODT) 4 MG TbDL Dissolve 1 tablet (4 mg total) by mouth every 8 (eight) hours as needed (nausea). 30 tablet 0    oxyCODONE (ROXICODONE) 5 MG immediate release tablet Take 1 tablet (5 mg total) by mouth every 6 (six) hours as needed for Pain. 20 tablet 0    prednisoLONE acetate (PRED FORTE) 1 % DrpS Place 1 drop into both eyes 4 (four) times daily.      sevelamer HCL (RENAGEL) 800 MG Tab Take 3 tablets by mouth 3 (three) times daily.      sodium bicarbonate 650 MG tablet Take 650 mg by mouth 2 (two) times daily.      ketoconazole (NIZORAL) 2 % cream Apply topically once daily. for 14 days 1 each 1     No current facility-administered medications for this visit.       Past Medical History:   Diagnosis Date    CAD (coronary artery disease)     Cataract     Cataract     CHF (congestive heart failure)     COPD (chronic obstructive pulmonary disease)     Diabetes mellitus     Diabetic retinopathy     ESRD (end stage renal disease)      TTS    Hypertension     Ischemic ulcer of toe of left foot     Malignant neoplasm of upper-outer quadrant of left breast in female, estrogen receptor positive 06/20/2022    left    PAD (peripheral artery disease)     PAF (paroxysmal atrial fibrillation)      Past Surgical History:   Procedure Laterality Date    AXILLARY NODE DISSECTION Left 3/1/2023    Procedure: LYMPHADENECTOMY, AXILLARY;  Surgeon: aJyjay Arana MD;  Location: [facility] OR;  Service: General;  Laterality: Left;    BREAST BIOPSY Right     benign    CATARACT EXTRACTION W/  INTRAOCULAR LENS IMPLANT Right 08/14/2017    Dr. Moe    CORONARY ARTERY BYPASS GRAFT  2020    FLUOROSCOPY N/A 07/25/2022    Procedure: FLUOROSCOPY/mediport placement;  Surgeon: Cole Perdomo MD;  Location: [facility] CATH LAB;  Service: General;  Laterality: N/A;    MEDIPORT REMOVAL N/A 3/1/2023    Procedure: REMOVAL, CATHETER, CENTRAL VENOUS, TUNNELED, WITH PORT;  Surgeon: Jayjay Arana MD;  Location: [facility] OR;  Service: General;  Laterality: N/A;    MODIFIED RADICAL MASTECTOMY W/ AXILLARY LYMPH NODE DISSECTION Left 3/1/2023    Procedure: MASTECTOMY, MODIFIED RADICAL;  Surgeon: Jayjay Arana MD;  Location: [facility] OR;  Service: General;  Laterality: Left;     Family History   Problem Relation Age of Onset    Hypertension Mother     Cancer Father     Breast cancer Sister     Diabetes Sister     Cancer Brother     Amblyopia Neg Hx     Blindness Neg Hx     Cataracts Neg Hx     Glaucoma Neg Hx     Macular degeneration Neg Hx     Retinal detachment Neg Hx     Strabismus Neg Hx     Stroke Neg Hx     Thyroid disease Neg Hx      Social History     Tobacco Use    Smoking status: Former     Years: 30.00     Types: Cigarettes     Quit date: 6/21/2012     Years since quitting: 10.7    Smokeless tobacco: Never   Substance Use Topics    Alcohol use: No    Drug use: Never        Review of Systems:  Review of Systems   Constitutional:  Negative for chills, fatigue, fever and unexpected weight change.  "  Respiratory:  Negative for cough, shortness of breath, wheezing and stridor.    Cardiovascular:  Negative for chest pain, palpitations and leg swelling.   Gastrointestinal:  Negative for abdominal distention, abdominal pain, constipation, diarrhea, nausea and vomiting.   Genitourinary:  Negative for difficulty urinating, dysuria, frequency, hematuria and urgency.   Skin:  Negative for color change, pallor, rash and wound.   Hematological:  Does not bruise/bleed easily.     OBJECTIVE:     Vital Signs (Most Recent)  Temp: 97.9 °F (36.6 °C) (03/22/23 1045)  Pulse: 73 (03/22/23 1045)  BP: (!) 157/64 (03/22/23 1045)  5' 2" (1.575 m)  85.9 kg (189 lb 6 oz)     Physical Exam:  Physical Exam  Vitals reviewed.   Constitutional:       General: She is not in acute distress.     Appearance: She is well-developed.   HENT:      Head: Normocephalic and atraumatic.      Right Ear: External ear normal.      Left Ear: External ear normal.      Nose: Nose normal.      Mouth/Throat:      Mouth: Mucous membranes are moist.   Eyes:      Extraocular Movements: Extraocular movements intact.      Conjunctiva/sclera: Conjunctivae normal.   Cardiovascular:      Rate and Rhythm: Normal rate.   Pulmonary:      Effort: Pulmonary effort is normal. No respiratory distress.   Chest:       Musculoskeletal:      Cervical back: Neck supple.   Skin:     General: Skin is warm and dry.   Neurological:      Mental Status: She is alert and oriented to person, place, and time.   Psychiatric:         Behavior: Behavior normal.       Diagnostic Results:  Result:   Mammo Digital Diagnostic Bilat with Alfredo  US Breast Left Limited     History:  Patient is 66 y.o. and is seen for diagnostic imaging.     Films Compared:  Prior images (if available) were compared.     Findings:  This procedure was performed using tomosynthesis. Computer-aided detection was utilized in the interpretation of this examination.  The breasts have scattered areas of fibroglandular " density.      Mammo Digital Diagnostic Bilat with Alfredo  Left  Mass: There is a 4.9 cm irregularly shaped mass with spiculated margins seen in the upper outer quadrant of the left breast in the anterior depth. The mass correlates with the palpable mass reported by the patient. Associated features include skin retraction. There are also associated calcifications.   Lymph Node: There are multiple similar lymph nodes seen in the left axilla.      Right  There is no evidence of suspicious masses, calcifications, or other abnormal findings in the right breast.     US Breast Left Limited  Left  Mass: There is a 5.6 cm x 4 cm x 4.3 cm irregularly shaped, non-parallel, hypoechoic mass with spiculated margins seen in the left breast at 2 o'clock, 3 cm from the nipple.   Lymph Node: There are multiple similar lymph nodes seen in the left axilla. Largest node is 3.4 cm x 1.6 cm x 2.0 cm.      Impression:  Left  Mass: Left breast 5.6 cm x 4 cm x 4.3 cm mass at the 2 o'clock position. Assessment: 5 - Highly suggestive of malignancy. Ultrasound-guided biopsy is recommended.   Lymph Node: Left axilla lymph node (multiple findings). Assessment: 5 - Highly suggestive of malignancy. Ultrasound-guided biopsy is recommended.      Right  There is no mammographic or sonographic evidence of malignancy in the right breast.     BI-RADS Category:   Overall: 5 - Highly Suggestive of Malignancy     Recommendation:  Ultrasound-guided biopsy is recommended.  Ultrasound-guided biopsy is recommended.     Your estimated lifetime risk of breast cancer (to age 85) based on Tyrer-Cuzick risk assessment model is Tyrer-Cuzick: 6.49 %. According to the [employer] Society, patients with a lifetime breast cancer risk of 20% or higher might benefit from supplemental screening tests.    Final Pathologic Diagnosis Part 1   Left axilla, ultrasound-guided core biopsy:   Positive for metastatic carcinoma   Metastasis measures 10 mm in greatest dimension    Part 2   Left breast, 2:00, ultrasound-guided core biopsy:   Invasive ductal carcinoma   [place] grade 2:  Tubule formation -3, nuclear pleomorphism-2 and mitotic   count-2   Invasive carcinoma measures 17 mm in greatest dimension   No carcinoma in Situ or lymphovascular invasion identified   Tumor biomarkers   Estrogen receptor (ER):  Positive, greater than 95%, strong   Progesterone receptor (PGR):  Positive, 80%, strong   HER2 (IHC):  Equivocal, 2+   Ki-67:  60%   Additional IHC:   P63:  Negative throughout, supporting the diagnosis of invasive carcinoma   This case was reviewed by Dr. GABBY Cannon who concurs with the above diagnosis.  VC      Comment: Interp By Bette Pitts M.D., Signed on 06/21/2022 at 12:02   Supplemental Diagnosis HER2, Breast Tumor, FISH, Tissue   Result Summary   POSITIVE   Interpretation   This tumor sample is positive for HER2 (ERBB2) gene amplification.   Result   nuc erwin([id number],HER2x5¿8)   HER2/D17Z1 ratio: 2.03   Average HER2 signals per cell: 6.3   Average D17Z1 signals per cell: 3.1         PET/CT:  Impression:     Interval improvement.  Left breast mass demonstrates decreased in size in decreased hypermetabolic activity.  Left pectoralis and axillary lymphadenopathy also demonstrates marked significant decreased activity.  Residual nonspecific mildly hypermetabolic right axillary and bilateral inguinal lymph nodes.    Mammogram:  Result:   Mammo Digital Diagnostic Left with Alfredo     History:  Patient is 67 y.o. and is seen for diagnostic imaging.     Films Compared:  Prior images (if available) were compared.     Findings:  This procedure was performed using tomosynthesis. Computer-aided detection was utilized in the interpretation of this examination.  The left breast has scattered areas of fibroglandular density.      Known malignant breast mass currently measures approximately 4.1 x 3.0 x 2.9 cm, previous 4.5 x 3.7 x 3.6 cm. Note that suspicious calcifications extend at  least 2 cm medial to the breast mass and 5-6 cm medial to the post biopsy clip.  There is been decrease in size of the left axillary adenopathy, particularly the prior biopsied enlarged lymph node.         Impression:  Decrease in size of malignant left breast mass and axillary adenopathy. Correlate with ordered PET-CT.         BI-RADS Category:   Overall: 6 - Known Biopsy-Proven Malignancy        Recommendation:  There are no mammo recommendations for this study.    Final Pathologic Diagnosis 1. Left breast, mastectomy:   Residual invasive ductal carcinoma status post neoadjuvant treatment, Grade 2   (tubules = 3, nuclei = 3, mitoses = 1)   Invasive carcinoma measures 43 mm (4.3 cm) in greatest dimension (measured   microscopically)   Calcifications associated with invasive carcinoma and non-neoplastic breast   2 of 7 lymph nodes positive for metastatic carcinoma (2/7) with extranodal   extension   Largest metastatic deposit measures 9 mm (0.9 cm)   Biopsy clip site identified grossly   Best tumor block for future studies: 1C   See surgical pathology cancer case summary below   2.  Axillary contents, dissection:   14 lymph nodes negative for metastatic carcinoma (0/14)   Immunohistochemical stains for AE1/AE3 were performed with adequate controls   and are negative   Surgical pathology cancer case summary:   Procedure:  Total mastectomy   Specimen laterality:  Left   Tumor site:  Upper outer quadrant   Histologic type: Invasive carcinoma of no special type (ductal)   Histologic grade (Bubba Histologic Score):   Glandular (acinar/tubular) differentiation:  Score 3   Nuclear pleomorphism:  Score 3   Mitotic rate:  Score 1   Overall grade:  Grade 2 (score 7)   Tumor size:  Greatest dimension of largest invasive focus greater than 1 mm:   43 mm   Tumor focality:  Single focus of invasive carcinoma   Ductal carcinoma in situ (DCIS):  Not identified   Lobular carcinoma in situ (LCIS):  Not identified   Tumor extent:   Not applicable (skin, nipple, and skeletal muscle are absent   or are uninvolved)   Lymphovascular invasion:  Not identified   Dermal lymphovascular invasion:  Not identified   Microcalcifications:   Present in invasive carcinoma   Present in nonneoplastic tissue   Treatment effect in the breast:  No definite response to presurgical therapy   in the invasive carcinoma   Treatment effect in the lymph nodes:  No definite response to presurgical   therapy in metastatic carcinoma   Margins status for invasive carcinoma:  All margins negative for invasive   carcinoma   Distance from invasive carcinoma to closest margin:  21 mm (measured grossly)   Closest margin to invasive carcinoma:  Anterior (skin)   All other margins are greater than 21 mm (measured grossly)   Regional lymph node status:  Regional lymph nodes present   Tumor present in regional lymph nodes   Number of lymph nodes with macrometastases (greater than 2 mm):  2   Size of largest moni metastatic deposit:  9 mm   Extranodal extension:  Present, 2 mm or less   Total number of lymph nodes examined:  21   Distant metastases:  Not applicable   Pathologic stage classification (pTNM):   TNM descriptors: y (posttreatment)   pT category: ypT2:  Posttreatment tumor greater than 20 mm but less than or   equal to 50 mm in greatest dimension   Regional lymph nodes modifier:  Not applicable   pN: pN1a:  Metastases in 1-3 axillary lymph nodes, at least 1 metastases   larger than 2.0 mm   pM:  Not applicable   Breast biomarker testing performed on previous biopsy case [facility]- and   reported as below:   Estrogen receptor (ER):  Positive, greater than 95%, strong   Progesterone receptor (PGR): Positive, 80%, strong   HER2 (IHC):  Equivocal, 2+   HER2 FISH: Positive   Ki-67:  60%      Final Pathologic Diagnosis 1. Lymph node, needle core biopsy:   - A kappa light chain restricted plasma cell population detected.  Recommend   additional tissue biopsy for further  classifying this process.  See comment.   - No evidence of metastatic carcinoma       ASSESSMENT/PLAN:     67 y/o female with left breast invasive ductal carcinoma ER+/NE+ Her2+ with metastatic disease to the lymph node now status post left breast total mastectomy with axillary dissection and IR inguinal lymph node biopsy    Mastectomy incision with eschar over upper midportion of incision continue local wound care    Left inguinal lymph node excisional biopsy wire localization by IR along with left chest eschar debridement possible wound VAC placement 03/31/2023  Preop:  CBC, CMP, EKG reviewed   Risks and benefits discussed with patient including but not limited to:  Pain, bleeding, infection, scarring, delayed wound healing, not diagnostic, lymphedema, injury to underlying vessels, need for further procedure

## 2023-03-22 NOTE — H&P (VIEW-ONLY)
History & Physical    SUBJECTIVE:     History of Present Illness:  Patient is a 67 y.o. female status post left breast mastectomy with axillary dissection 2023 presents for postop check of left mastectomy incision site and to discuss left inguinal lymph node biopsy.  In the interim she is doing well denies any pain or drainage from her mastectomy site.    presents for drain removal and follow-up.  She denies any pain.    presents for follow up PET scan/mammogram and preop. She denies any changes since last visit. Her case was presented at tumor board with decision to move forward with surgical therapy.    presents to discuss next step in breast cancer treatment following neoadjuvant chemotherapy. She did not complete last cycle due to neuropathy. She reports decrease in mass size of the breast.     presents to discuss left breast biopsy.  Her left breast and axillary biopsy were showed invasive ductal carcinoma ER+/TX+ Her2+ with metastatic disease to the lymph node.    Initially referred for abnormal mammogram of the left breast. She denies any breast mass, nipple discharge, breast pain or other changes. No prior breast surgery. Prior right breast biopsy benign. Family history of breast cancer in her sister. Menarche age 12,  1st at 16, menopause at 48. No HRT.     No chief complaint on file.        Review of patient's allergies indicates:  No Known Allergies    Current Outpatient Medications   Medication Sig Dispense Refill    acetaminophen (TYLENOL) 500 mg Cap Take 500 mg by mouth every 6 (six) hours as needed.      albuterol sulfate (PROAIR RESPICLICK) 90 mcg/actuation AePB Inhale 180 mcg into the lungs every 4 (four) hours. Rescue 1 each 3    amLODIPine (NORVASC) 10 MG tablet Take 10 mg by mouth once daily.      aspirin (ECOTRIN) 81 MG EC tablet Take 81 mg by mouth once daily.       atorvastatin (LIPITOR) 10 MG tablet Take 10 mg by mouth once daily.      budesonide-glycopyr-formoterol (BREZTRI  AEROSPHERE) 160-9-4.8 mcg/actuation HFAA Inhale 1 puff into the lungs once daily.      calcium carbonate (OS-CAMERON) 500 mg calcium (1,250 mg) chewable tablet Take 1 tablet by mouth 2 (two) times daily as needed for Heartburn.      docusate sodium (COLACE) 100 MG capsule Take 1 capsule (100 mg total) by mouth 2 (two) times daily as needed for Constipation. 20 capsule 0    insulin degludec (TRESIBA FLEXTOUCH U-200) 200 unit/mL (3 mL) insulin pen Inject 60 Units into the skin once daily.      levoFLOXacin (LEVAQUIN) 250 MG tablet Take 250 mg by mouth once daily.      levothyroxine (TIROSINT) 88 mcg Cap Take 75 mcg by mouth before breakfast.      loperamide (IMODIUM) 2 mg capsule Take 2 mg by mouth 4 (four) times daily as needed for Diarrhea.      losartan (COZAAR) 25 MG tablet Take 25 mg by mouth once daily.      metoprolol succinate (TOPROL-XL) 25 MG 24 hr tablet Take 25 mg by mouth 2 (two) times daily.      omeprazole (PRILOSEC) 20 MG capsule Take 20 mg by mouth once daily.      ondansetron (ZOFRAN-ODT) 4 MG TbDL Dissolve 1 tablet (4 mg total) by mouth every 8 (eight) hours as needed (nausea). 30 tablet 0    oxyCODONE (ROXICODONE) 5 MG immediate release tablet Take 1 tablet (5 mg total) by mouth every 6 (six) hours as needed for Pain. 20 tablet 0    prednisoLONE acetate (PRED FORTE) 1 % DrpS Place 1 drop into both eyes 4 (four) times daily.      sevelamer HCL (RENAGEL) 800 MG Tab Take 3 tablets by mouth 3 (three) times daily.      sodium bicarbonate 650 MG tablet Take 650 mg by mouth 2 (two) times daily.      ketoconazole (NIZORAL) 2 % cream Apply topically once daily. for 14 days 1 each 1     No current facility-administered medications for this visit.       Past Medical History:   Diagnosis Date    CAD (coronary artery disease)     Cataract     Cataract     CHF (congestive heart failure)     COPD (chronic obstructive pulmonary disease)     Diabetes mellitus     Diabetic retinopathy     ESRD (end stage renal disease)      TTS    Hypertension     Ischemic ulcer of toe of left foot     Malignant neoplasm of upper-outer quadrant of left breast in female, estrogen receptor positive 06/20/2022    left    PAD (peripheral artery disease)     PAF (paroxysmal atrial fibrillation)      Past Surgical History:   Procedure Laterality Date    AXILLARY NODE DISSECTION Left 3/1/2023    Procedure: LYMPHADENECTOMY, AXILLARY;  Surgeon: Jayjay Arana MD;  Location: [facility] OR;  Service: General;  Laterality: Left;    BREAST BIOPSY Right     benign    CATARACT EXTRACTION W/  INTRAOCULAR LENS IMPLANT Right 08/14/2017    Dr. Moe    CORONARY ARTERY BYPASS GRAFT  2020    FLUOROSCOPY N/A 07/25/2022    Procedure: FLUOROSCOPY/mediport placement;  Surgeon: Cole Perdomo MD;  Location: [facility] CATH LAB;  Service: General;  Laterality: N/A;    MEDIPORT REMOVAL N/A 3/1/2023    Procedure: REMOVAL, CATHETER, CENTRAL VENOUS, TUNNELED, WITH PORT;  Surgeon: Jayjay Arana MD;  Location: [facility] OR;  Service: General;  Laterality: N/A;    MODIFIED RADICAL MASTECTOMY W/ AXILLARY LYMPH NODE DISSECTION Left 3/1/2023    Procedure: MASTECTOMY, MODIFIED RADICAL;  Surgeon: Jayjay Arana MD;  Location: [facility] OR;  Service: General;  Laterality: Left;     Family History   Problem Relation Age of Onset    Hypertension Mother     Cancer Father     Breast cancer Sister     Diabetes Sister     Cancer Brother     Amblyopia Neg Hx     Blindness Neg Hx     Cataracts Neg Hx     Glaucoma Neg Hx     Macular degeneration Neg Hx     Retinal detachment Neg Hx     Strabismus Neg Hx     Stroke Neg Hx     Thyroid disease Neg Hx      Social History     Tobacco Use    Smoking status: Former     Years: 30.00     Types: Cigarettes     Quit date: 6/21/2012     Years since quitting: 10.7    Smokeless tobacco: Never   Substance Use Topics    Alcohol use: No    Drug use: Never        Review of Systems:  Review of Systems   Constitutional:  Negative for chills, fatigue, fever and unexpected weight change.  "  Respiratory:  Negative for cough, shortness of breath, wheezing and stridor.    Cardiovascular:  Negative for chest pain, palpitations and leg swelling.   Gastrointestinal:  Negative for abdominal distention, abdominal pain, constipation, diarrhea, nausea and vomiting.   Genitourinary:  Negative for difficulty urinating, dysuria, frequency, hematuria and urgency.   Skin:  Negative for color change, pallor, rash and wound.   Hematological:  Does not bruise/bleed easily.     OBJECTIVE:     Vital Signs (Most Recent)  Temp: 97.9 °F (36.6 °C) (03/22/23 1045)  Pulse: 73 (03/22/23 1045)  BP: (!) 157/64 (03/22/23 1045)  5' 2" (1.575 m)  85.9 kg (189 lb 6 oz)     Physical Exam:  Physical Exam  Vitals reviewed.   Constitutional:       General: She is not in acute distress.     Appearance: She is well-developed.   HENT:      Head: Normocephalic and atraumatic.      Right Ear: External ear normal.      Left Ear: External ear normal.      Nose: Nose normal.      Mouth/Throat:      Mouth: Mucous membranes are moist.   Eyes:      Extraocular Movements: Extraocular movements intact.      Conjunctiva/sclera: Conjunctivae normal.   Cardiovascular:      Rate and Rhythm: Normal rate.   Pulmonary:      Effort: Pulmonary effort is normal. No respiratory distress.   Chest:       Musculoskeletal:      Cervical back: Neck supple.   Skin:     General: Skin is warm and dry.   Neurological:      Mental Status: She is alert and oriented to person, place, and time.   Psychiatric:         Behavior: Behavior normal.       Diagnostic Results:  Result:   Mammo Digital Diagnostic Bilat with Alfredo  US Breast Left Limited     History:  Patient is 66 y.o. and is seen for diagnostic imaging.     Films Compared:  Prior images (if available) were compared.     Findings:  This procedure was performed using tomosynthesis. Computer-aided detection was utilized in the interpretation of this examination.  The breasts have scattered areas of fibroglandular " density.      Mammo Digital Diagnostic Bilat with Alfredo  Left  Mass: There is a 4.9 cm irregularly shaped mass with spiculated margins seen in the upper outer quadrant of the left breast in the anterior depth. The mass correlates with the palpable mass reported by the patient. Associated features include skin retraction. There are also associated calcifications.   Lymph Node: There are multiple similar lymph nodes seen in the left axilla.      Right  There is no evidence of suspicious masses, calcifications, or other abnormal findings in the right breast.     US Breast Left Limited  Left  Mass: There is a 5.6 cm x 4 cm x 4.3 cm irregularly shaped, non-parallel, hypoechoic mass with spiculated margins seen in the left breast at 2 o'clock, 3 cm from the nipple.   Lymph Node: There are multiple similar lymph nodes seen in the left axilla. Largest node is 3.4 cm x 1.6 cm x 2.0 cm.      Impression:  Left  Mass: Left breast 5.6 cm x 4 cm x 4.3 cm mass at the 2 o'clock position. Assessment: 5 - Highly suggestive of malignancy. Ultrasound-guided biopsy is recommended.   Lymph Node: Left axilla lymph node (multiple findings). Assessment: 5 - Highly suggestive of malignancy. Ultrasound-guided biopsy is recommended.      Right  There is no mammographic or sonographic evidence of malignancy in the right breast.     BI-RADS Category:   Overall: 5 - Highly Suggestive of Malignancy     Recommendation:  Ultrasound-guided biopsy is recommended.  Ultrasound-guided biopsy is recommended.     Your estimated lifetime risk of breast cancer (to age 85) based on Tyrer-Cuzick risk assessment model is Tyrer-Cuzick: 6.49 %. According to the American Cancer Society, patients with a lifetime breast cancer risk of 20% or higher might benefit from supplemental screening tests.    Final Pathologic Diagnosis Part 1   Left axilla, ultrasound-guided core biopsy:   Positive for metastatic carcinoma   Metastasis measures 10 mm in greatest dimension    Part 2   Left breast, 2:00, ultrasound-guided core biopsy:   Invasive ductal carcinoma   [place] grade 2:  Tubule formation -3, nuclear pleomorphism-2 and mitotic   count-2   Invasive carcinoma measures 17 mm in greatest dimension   No carcinoma in Situ or lymphovascular invasion identified   Tumor biomarkers   Estrogen receptor (ER):  Positive, greater than 95%, strong   Progesterone receptor (PGR):  Positive, 80%, strong   HER2 (IHC):  Equivocal, 2+   Ki-67:  60%   Additional IHC:   P63:  Negative throughout, supporting the diagnosis of invasive carcinoma   This case was reviewed by Dr. GABBY Cannon who concurs with the above diagnosis.  VC      Comment: Interp By Bette Pitts M.D., Signed on 06/21/2022 at 12:02   Supplemental Diagnosis HER2, Breast Tumor, FISH, Tissue   Result Summary   POSITIVE   Interpretation   This tumor sample is positive for HER2 (ERBB2) gene amplification.   Result   nuc erwin([id number]¿4,HER2x5¿8)   HER2/D17Z1 ratio: 2.03   Average HER2 signals per cell: 6.3   Average D17Z1 signals per cell: 3.1         PET/CT:  Impression:     Interval improvement.  Left breast mass demonstrates decreased in size in decreased hypermetabolic activity.  Left pectoralis and axillary lymphadenopathy also demonstrates marked significant decreased activity.  Residual nonspecific mildly hypermetabolic right axillary and bilateral inguinal lymph nodes.    Mammogram:  Result:   Mammo Digital Diagnostic Left with Alfredo     History:  Patient is 67 y.o. and is seen for diagnostic imaging.     Films Compared:  Prior images (if available) were compared.     Findings:  This procedure was performed using tomosynthesis. Computer-aided detection was utilized in the interpretation of this examination.  The left breast has scattered areas of fibroglandular density.      Known malignant breast mass currently measures approximately 4.1 x 3.0 x 2.9 cm, previous 4.5 x 3.7 x 3.6 cm. Note that suspicious calcifications extend at  least 2 cm medial to the breast mass and 5-6 cm medial to the post biopsy clip.  There is been decrease in size of the left axillary adenopathy, particularly the prior biopsied enlarged lymph node.         Impression:  Decrease in size of malignant left breast mass and axillary adenopathy. Correlate with ordered PET-CT.         BI-RADS Category:   Overall: 6 - Known Biopsy-Proven Malignancy        Recommendation:  There are no mammo recommendations for this study.    Final Pathologic Diagnosis 1. Left breast, mastectomy:   Residual invasive ductal carcinoma status post neoadjuvant treatment, Grade 2   (tubules = 3, nuclei = 3, mitoses = 1)   Invasive carcinoma measures 43 mm (4.3 cm) in greatest dimension (measured   microscopically)   Calcifications associated with invasive carcinoma and non-neoplastic breast   2 of 7 lymph nodes positive for metastatic carcinoma (2/7) with extranodal   extension   Largest metastatic deposit measures 9 mm (0.9 cm)   Biopsy clip site identified grossly   Best tumor block for future studies: 1C   See surgical pathology cancer case summary below   2.  Axillary contents, dissection:   14 lymph nodes negative for metastatic carcinoma (0/14)   Immunohistochemical stains for AE1/AE3 were performed with adequate controls   and are negative   Surgical pathology cancer case summary:   Procedure:  Total mastectomy   Specimen laterality:  Left   Tumor site:  Upper outer quadrant   Histologic type: Invasive carcinoma of no special type (ductal)   Histologic grade (Bubba Histologic Score):   Glandular (acinar/tubular) differentiation:  Score 3   Nuclear pleomorphism:  Score 3   Mitotic rate:  Score 1   Overall grade:  Grade 2 (score 7)   Tumor size:  Greatest dimension of largest invasive focus greater than 1 mm:   43 mm   Tumor focality:  Single focus of invasive carcinoma   Ductal carcinoma in situ (DCIS):  Not identified   Lobular carcinoma in situ (LCIS):  Not identified   Tumor extent:   Not applicable (skin, nipple, and skeletal muscle are absent   or are uninvolved)   Lymphovascular invasion:  Not identified   Dermal lymphovascular invasion:  Not identified   Microcalcifications:   Present in invasive carcinoma   Present in nonneoplastic tissue   Treatment effect in the breast:  No definite response to presurgical therapy   in the invasive carcinoma   Treatment effect in the lymph nodes:  No definite response to presurgical   therapy in metastatic carcinoma   Margins status for invasive carcinoma:  All margins negative for invasive   carcinoma   Distance from invasive carcinoma to closest margin:  21 mm (measured grossly)   Closest margin to invasive carcinoma:  Anterior (skin)   All other margins are greater than 21 mm (measured grossly)   Regional lymph node status:  Regional lymph nodes present   Tumor present in regional lymph nodes   Number of lymph nodes with macrometastases (greater than 2 mm):  2   Size of largest moni metastatic deposit:  9 mm   Extranodal extension:  Present, 2 mm or less   Total number of lymph nodes examined:  21   Distant metastases:  Not applicable   Pathologic stage classification (pTNM):   TNM descriptors: y (posttreatment)   pT category: ypT2:  Posttreatment tumor greater than 20 mm but less than or   equal to 50 mm in greatest dimension   Regional lymph nodes modifier:  Not applicable   pN: pN1a:  Metastases in 1-3 axillary lymph nodes, at least 1 metastases   larger than 2.0 mm   pM:  Not applicable   Breast biomarker testing performed on previous biopsy case GMW- and   reported as below:   Estrogen receptor (ER):  Positive, greater than 95%, strong   Progesterone receptor (PGR): Positive, 80%, strong   HER2 (IHC):  Equivocal, 2+   HER2 FISH: Positive   Ki-67:  60%      Final Pathologic Diagnosis 1. Lymph node, needle core biopsy:   - A kappa light chain restricted plasma cell population detected.  Recommend   additional tissue biopsy for further  classifying this process.  See comment.   - No evidence of metastatic carcinoma       ASSESSMENT/PLAN:     67 y/o female with left breast invasive ductal carcinoma ER+/NC+ Her2+ with metastatic disease to the lymph node now status post left breast total mastectomy with axillary dissection and IR inguinal lymph node biopsy    Mastectomy incision with eschar over upper midportion of incision continue local wound care    Left inguinal lymph node excisional biopsy wire localization by IR along with left chest eschar debridement possible wound VAC placement 03/31/2023  Preop:  CBC, CMP, EKG reviewed   Risks and benefits discussed with patient including but not limited to:  Pain, bleeding, infection, scarring, delayed wound healing, not diagnostic, lymphedema, injury to underlying vessels, need for further procedure

## 2023-03-23 ENCOUNTER — TELEPHONE (OUTPATIENT)
Dept: PREADMISSION TESTING | Facility: HOSPITAL | Age: 68
End: 2023-03-23
Payer: MEDICARE

## 2023-03-23 NOTE — TELEPHONE ENCOUNTER
Pre op instructions reviewed with pt carisa ,verbalized understanding. Instructions emailed to: [email or web address]    To confirm, Surgery is scheduled on 3/31/23. We will call you late afternoon the business day prior to surgery with your arrival time.    *Please report to the [facility] Lobby (1st Floor) located off of [facility] (2nd Entrance/Building on the left, in front of the flag pole).  Address: [address] [place] [address] [place] [address]      INSTRUCTIONS IMPORTANT!!!  Do Not Eat, Drink, or Smoke after 12 midnight unless instructed otherwise by your Surgeon. OK to brush teeth, no gum, candy or mints!      *Take Only these medications with a small sip of water Morning of Surgery:  Amlodipine  Atorvastatin  Inhalers (bring with you to hospital)  Levothyroxine  Metoprolol  Prilosec      ____  HOLD all vitamins, herbal supplements, aspirin products & NSAIDS 7 days prior to surgery, as these can thin the blood.  ____  NO Acrylic/fake nails or nail polish worn day of surgery (specifically hand/arm & foot surgeries).  ____  NO powder, lotions, deodorants, oils or cream on body.  ____  Remove all jewelry & piercings prior to surgery.  ____  Remove Dentures, Hearing Aids & Contact Lens prior to surgery.  ____  Bring photo ID and insurance information to hospital (Leave Valuables at Home).  ____  If going home the same day, arrange for a ride home. You will not be able to drive for 24 hrs if Anesthesia was used.   ____  Females (ages 11-60): may need to give a urine sample the morning of surgery; please see Pre op Nurse prior to using the restroom.  ____  Males: Stop ED medications (Viagra, Cialis) 24 hrs prior to surgery.  ____  Wear clean, loose fitting clothing to allow for dressings/ bandages.            Diabetic Patients: If you take diabetic medication, do NOT take morning of surgery unless instructed by Doctor. Metformin to be stopped 24 hrs prior to surgery time. DO NOT take long-acting insulin  the evening before surgery. Blood sugars will be checked in pre-op by Nurse.    Bathing Instructions:    -Shower with anti-bacterial Soap (Hibiclens or Dial) the night before surgery and the morning of.   -Do not use Hibiclens on your face or genitals.   -Apply clean clothes after shower.  -Do not shave your face or body 2 days prior to surgery unless instructed otherwise by your Surgeon.  -Do not shave pubic hair 7 days prior to surgery (gyn pt's).    [facility] Visitor/Ride Policy:  Only 2 adults allowed (over the age of 18) to accompany you to the Hospital. You Must have a ride home from a responsible adult that you know and trust. Medical Transport, Uber or Lyft can only be used if patient has a responsible adult to accompany them during ride home.    Discharge Instructions: You will receive Post-op/Discharge instructions by your Discharge Nurse prior to going home. Please call your Surgeon's office with any post-surgery questions/concerns @ [phone].    *If you are running late or have questions the morning of surgery, please call the Surgery Dept @ [phone]  *Insurance/ Financial Questions, please call [phone]    Thank you,  -[facility] Pre Admit Testing Nurse  [phone] or [phone]  M-F 7:30 am-4:30 pm (Closed Major Holidays)

## 2023-03-30 ENCOUNTER — TELEPHONE (OUTPATIENT)
Dept: PREADMISSION TESTING | Facility: HOSPITAL | Age: 68
End: 2023-03-30
Payer: MEDICARE

## 2023-03-31 ENCOUNTER — HOSPITAL ENCOUNTER (OUTPATIENT)
Facility: HOSPITAL | Age: 68
Discharge: HOME | End: 2023-03-31
Attending: SURGERY | Admitting: SURGERY
Payer: MEDICARE

## 2023-03-31 ENCOUNTER — ANESTHESIA (OUTPATIENT)
Dept: SURGERY | Facility: HOSPITAL | Age: 68
End: 2023-03-31
Payer: MEDICARE

## 2023-03-31 ENCOUNTER — HOSPITAL ENCOUNTER (OUTPATIENT)
Dept: RADIOLOGY | Facility: HOSPITAL | Age: 68
Discharge: HOME | End: 2023-03-31
Attending: SURGERY
Payer: MEDICARE

## 2023-03-31 ENCOUNTER — EXTERNAL HOME HEALTH (OUTPATIENT)
Dept: HOME HEALTH SERVICES | Facility: HOSPITAL | Age: 68
End: 2023-03-31
Payer: MEDICARE

## 2023-03-31 ENCOUNTER — TELEPHONE (OUTPATIENT)
Dept: SURGERY | Facility: CLINIC | Age: 68
End: 2023-03-31
Payer: MEDICARE

## 2023-03-31 ENCOUNTER — ANESTHESIA EVENT (OUTPATIENT)
Dept: SURGERY | Facility: HOSPITAL | Age: 68
End: 2023-03-31
Payer: MEDICARE

## 2023-03-31 VITALS
DIASTOLIC BLOOD PRESSURE: 61 MMHG | RESPIRATION RATE: 14 BRPM | SYSTOLIC BLOOD PRESSURE: 119 MMHG | HEART RATE: 70 BPM | OXYGEN SATURATION: 98 %

## 2023-03-31 VITALS
HEIGHT: 62 IN | OXYGEN SATURATION: 94 % | DIASTOLIC BLOOD PRESSURE: 86 MMHG | WEIGHT: 187.81 LBS | RESPIRATION RATE: 16 BRPM | HEART RATE: 83 BPM | BODY MASS INDEX: 34.56 KG/M2 | TEMPERATURE: 98 F | SYSTOLIC BLOOD PRESSURE: 178 MMHG

## 2023-03-31 DIAGNOSIS — R59.0 LYMPHADENOPATHY, INGUINAL: Primary | ICD-10-CM

## 2023-03-31 DIAGNOSIS — R23.4 SKIN ESCHAR: ICD-10-CM

## 2023-03-31 DIAGNOSIS — R59.0 LYMPHADENOPATHY, INGUINAL: ICD-10-CM

## 2023-03-31 LAB
GRAM STN SPEC: NORMAL
GRAM STN SPEC: NORMAL
POCT GLUCOSE: 77 MG/DL (ref 70–110)
POCT GLUCOSE: 85 MG/DL (ref 70–110)
POTASSIUM SERPL-SCNC: 4.9 MMOL/L (ref 3.5–5.1)

## 2023-03-31 PROCEDURE — 88341 IMHCHEM/IMCYTCHM EA ADD ANTB: CPT | Mod: 59 | Performed by: PATHOLOGY

## 2023-03-31 PROCEDURE — 88305 TISSUE EXAM BY PATHOLOGIST: CPT | Performed by: PATHOLOGY

## 2023-03-31 PROCEDURE — 88184 FLOWCYTOMETRY/ TC 1 MARKER: CPT | Performed by: PATHOLOGY

## 2023-03-31 PROCEDURE — 87116 MYCOBACTERIA CULTURE: CPT | Performed by: SURGERY

## 2023-03-31 PROCEDURE — 88364 CHG INSITU HYBRIDIZATION (FISH: ICD-10-PCS | Mod: 26,,, | Performed by: PATHOLOGY

## 2023-03-31 PROCEDURE — 38531 OPEN BX/EXC INGUINOFEM NODES: CPT | Mod: 79,LT,, | Performed by: SURGERY

## 2023-03-31 PROCEDURE — 88189 FLOWCYTOMETRY/READ 16 & >: CPT | Mod: ,,, | Performed by: PATHOLOGY

## 2023-03-31 PROCEDURE — 11045 DBRDMT SUBQ TISS EACH ADDL: CPT | Mod: 59,,, | Performed by: SURGERY

## 2023-03-31 PROCEDURE — 88342 CHG IMMUNOCYTOCHEMISTRY: ICD-10-PCS | Mod: 26,59,, | Performed by: PATHOLOGY

## 2023-03-31 PROCEDURE — 88342 IMHCHEM/IMCYTCHM 1ST ANTB: CPT | Mod: 26,59,, | Performed by: PATHOLOGY

## 2023-03-31 PROCEDURE — 88341 PR IHC OR ICC EACH ADD'L SINGLE ANTIBODY  STAINPR: ICD-10-PCS | Mod: 26,59,, | Performed by: PATHOLOGY

## 2023-03-31 PROCEDURE — 88305 TISSUE EXAM BY PATHOLOGIST: ICD-10-PCS | Mod: 26,,, | Performed by: PATHOLOGY

## 2023-03-31 PROCEDURE — 81305 MYD88 GENE P.LEU265PRO VRNT: CPT | Performed by: PATHOLOGY

## 2023-03-31 PROCEDURE — 71000015 HC POSTOP RECOV 1ST HR: Performed by: SURGERY

## 2023-03-31 PROCEDURE — 36000706: Performed by: SURGERY

## 2023-03-31 PROCEDURE — 88365 INSITU HYBRIDIZATION (FISH): CPT | Mod: 26,,, | Performed by: PATHOLOGY

## 2023-03-31 PROCEDURE — 77012 CT GUIDED NEEDLE PLACEMENT: ICD-10-PCS | Mod: 26,,, | Performed by: RADIOLOGY

## 2023-03-31 PROCEDURE — 11042 PR DEBRIDEMENT, SKIN, SUB-Q TISSUE,=<20 SQ CM: ICD-10-PCS | Mod: 59,78,, | Performed by: SURGERY

## 2023-03-31 PROCEDURE — 87075 CULTR BACTERIA EXCEPT BLOOD: CPT | Performed by: SURGERY

## 2023-03-31 PROCEDURE — 88185 FLOWCYTOMETRY/TC ADD-ON: CPT | Performed by: PATHOLOGY

## 2023-03-31 PROCEDURE — 87070 CULTURE OTHR SPECIMN AEROBIC: CPT | Performed by: SURGERY

## 2023-03-31 PROCEDURE — 11042 DBRDMT SUBQ TIS 1ST 20SQCM/<: CPT | Mod: 59,78,, | Performed by: SURGERY

## 2023-03-31 PROCEDURE — 77012 CT SCAN FOR NEEDLE BIOPSY: CPT | Mod: 26,,, | Performed by: RADIOLOGY

## 2023-03-31 PROCEDURE — 88364 INSITU HYBRIDIZATION (FISH): CPT | Mod: 26,,, | Performed by: PATHOLOGY

## 2023-03-31 PROCEDURE — 88342 IMHCHEM/IMCYTCHM 1ST ANTB: CPT | Performed by: PATHOLOGY

## 2023-03-31 PROCEDURE — 71000033 HC RECOVERY, INTIAL HOUR: Performed by: SURGERY

## 2023-03-31 PROCEDURE — 88189 PR  FLOWCYTOMETRY/READ, 16 & > MARKERS: ICD-10-PCS | Mod: ,,, | Performed by: PATHOLOGY

## 2023-03-31 PROCEDURE — 87205 SMEAR GRAM STAIN: CPT | Performed by: SURGERY

## 2023-03-31 PROCEDURE — 88365 INSITU HYBRIDIZATION (FISH): CPT | Performed by: PATHOLOGY

## 2023-03-31 PROCEDURE — 11045 PR DEB SUBQ TISSUE ADD-ON: ICD-10-PCS | Mod: 59,,, | Performed by: SURGERY

## 2023-03-31 PROCEDURE — 88365 PR  TISSUE HYBRIDIZATION: ICD-10-PCS | Mod: 26,,, | Performed by: PATHOLOGY

## 2023-03-31 PROCEDURE — 88364 INSITU HYBRIDIZATION (FISH): CPT | Performed by: PATHOLOGY

## 2023-03-31 PROCEDURE — 87102 FUNGUS ISOLATION CULTURE: CPT | Performed by: SURGERY

## 2023-03-31 PROCEDURE — 71000039 HC RECOVERY, EACH ADD'L HOUR: Performed by: SURGERY

## 2023-03-31 PROCEDURE — 88341 IMHCHEM/IMCYTCHM EA ADD ANTB: CPT | Mod: 26,59,, | Performed by: PATHOLOGY

## 2023-03-31 PROCEDURE — 38531 PR BIOPSY/EXCISION, INGUINOFEMORAL NODE(S), OPEN: ICD-10-PCS | Mod: 79,LT,, | Performed by: SURGERY

## 2023-03-31 PROCEDURE — 37000008 HC ANESTHESIA 1ST 15 MINUTES: Performed by: SURGERY

## 2023-03-31 PROCEDURE — 27201423 OPTIME MED/SURG SUP & DEVICES STERILE SUPPLY: Performed by: SURGERY

## 2023-03-31 PROCEDURE — 87206 SMEAR FLUORESCENT/ACID STAI: CPT | Performed by: SURGERY

## 2023-03-31 PROCEDURE — 63600175 PHARM REV CODE 636 W HCPCS: Performed by: RADIOLOGY

## 2023-03-31 PROCEDURE — 63600175 PHARM REV CODE 636 W HCPCS

## 2023-03-31 PROCEDURE — 88305 TISSUE EXAM BY PATHOLOGIST: CPT | Mod: 26,,, | Performed by: PATHOLOGY

## 2023-03-31 PROCEDURE — 77012 CT SCAN FOR NEEDLE BIOPSY: CPT | Mod: TC

## 2023-03-31 PROCEDURE — 25000003 PHARM REV CODE 250: Performed by: SURGERY

## 2023-03-31 PROCEDURE — 25000003 PHARM REV CODE 250

## 2023-03-31 PROCEDURE — 37000009 HC ANESTHESIA EA ADD 15 MINS: Performed by: SURGERY

## 2023-03-31 PROCEDURE — 84132 ASSAY OF SERUM POTASSIUM: CPT | Performed by: SURGERY

## 2023-03-31 PROCEDURE — 63600175 PHARM REV CODE 636 W HCPCS: Performed by: SURGERY

## 2023-03-31 PROCEDURE — 36000707: Performed by: SURGERY

## 2023-03-31 RX ORDER — ONDANSETRON 2 MG/ML
4 INJECTION INTRAMUSCULAR; INTRAVENOUS EVERY 12 HOURS PRN
Status: DISCONTINUED | OUTPATIENT
Start: 2023-03-31 | End: 2023-04-03 | Stop reason: HOSPADM

## 2023-03-31 RX ORDER — SODIUM CHLORIDE 9 MG/ML
INJECTION, SOLUTION INTRAVENOUS CONTINUOUS
Status: DISCONTINUED | OUTPATIENT
Start: 2023-03-31 | End: 2023-04-03 | Stop reason: HOSPADM

## 2023-03-31 RX ORDER — PROPOFOL 10 MG/ML
VIAL (ML) INTRAVENOUS
Status: DISCONTINUED | OUTPATIENT
Start: 2023-03-31 | End: 2023-03-31

## 2023-03-31 RX ORDER — OXYCODONE AND ACETAMINOPHEN 5; 325 MG/1; MG/1
1 TABLET ORAL
Status: DISCONTINUED | OUTPATIENT
Start: 2023-03-31 | End: 2023-04-03 | Stop reason: HOSPADM

## 2023-03-31 RX ORDER — ONDANSETRON 2 MG/ML
4 INJECTION INTRAMUSCULAR; INTRAVENOUS DAILY PRN
Status: DISCONTINUED | OUTPATIENT
Start: 2023-03-31 | End: 2023-04-03 | Stop reason: HOSPADM

## 2023-03-31 RX ORDER — PHENYLEPHRINE HYDROCHLORIDE 10 MG/ML
INJECTION INTRAVENOUS
Status: DISCONTINUED | OUTPATIENT
Start: 2023-03-31 | End: 2023-03-31

## 2023-03-31 RX ORDER — FENTANYL CITRATE 50 UG/ML
INJECTION, SOLUTION INTRAMUSCULAR; INTRAVENOUS
Status: DISCONTINUED | OUTPATIENT
Start: 2023-03-31 | End: 2023-03-31

## 2023-03-31 RX ORDER — ONDANSETRON 2 MG/ML
INJECTION INTRAMUSCULAR; INTRAVENOUS
Status: DISCONTINUED | OUTPATIENT
Start: 2023-03-31 | End: 2023-03-31

## 2023-03-31 RX ORDER — LIDOCAINE HYDROCHLORIDE 10 MG/ML
1 INJECTION, SOLUTION EPIDURAL; INFILTRATION; INTRACAUDAL; PERINEURAL ONCE
Status: DISCONTINUED | OUTPATIENT
Start: 2023-03-31 | End: 2023-04-03 | Stop reason: HOSPADM

## 2023-03-31 RX ORDER — BUPIVACAINE HYDROCHLORIDE 2.5 MG/ML
INJECTION, SOLUTION EPIDURAL; INFILTRATION; INTRACAUDAL
Status: DISCONTINUED | OUTPATIENT
Start: 2023-03-31 | End: 2023-03-31 | Stop reason: HOSPADM

## 2023-03-31 RX ORDER — FENTANYL CITRATE 50 UG/ML
INJECTION, SOLUTION INTRAMUSCULAR; INTRAVENOUS CODE/TRAUMA/SEDATION MEDICATION
Status: COMPLETED | OUTPATIENT
Start: 2023-03-31 | End: 2023-03-31

## 2023-03-31 RX ORDER — LIDOCAINE HYDROCHLORIDE 10 MG/ML
INJECTION, SOLUTION EPIDURAL; INFILTRATION; INTRACAUDAL; PERINEURAL
Status: DISCONTINUED | OUTPATIENT
Start: 2023-03-31 | End: 2023-03-31 | Stop reason: HOSPADM

## 2023-03-31 RX ORDER — MIDAZOLAM HYDROCHLORIDE 1 MG/ML
INJECTION INTRAMUSCULAR; INTRAVENOUS
Status: DISCONTINUED | OUTPATIENT
Start: 2023-03-31 | End: 2023-03-31

## 2023-03-31 RX ORDER — OXYCODONE HYDROCHLORIDE 5 MG/1
5 TABLET ORAL EVERY 6 HOURS PRN
Qty: 10 TABLET | Refills: 0 | Status: SHIPPED | OUTPATIENT
Start: 2023-03-31

## 2023-03-31 RX ORDER — SODIUM CHLORIDE, SODIUM LACTATE, POTASSIUM CHLORIDE, CALCIUM CHLORIDE 600; 310; 30; 20 MG/100ML; MG/100ML; MG/100ML; MG/100ML
INJECTION, SOLUTION INTRAVENOUS CONTINUOUS PRN
Status: DISCONTINUED | OUTPATIENT
Start: 2023-03-31 | End: 2023-03-31

## 2023-03-31 RX ORDER — ROCURONIUM BROMIDE 10 MG/ML
INJECTION, SOLUTION INTRAVENOUS
Status: DISCONTINUED | OUTPATIENT
Start: 2023-03-31 | End: 2023-03-31

## 2023-03-31 RX ORDER — LIDOCAINE HYDROCHLORIDE 10 MG/ML
INJECTION, SOLUTION EPIDURAL; INFILTRATION; INTRACAUDAL; PERINEURAL
Status: DISCONTINUED | OUTPATIENT
Start: 2023-03-31 | End: 2023-03-31

## 2023-03-31 RX ORDER — KETAMINE HCL IN 0.9 % NACL 50 MG/5 ML
SYRINGE (ML) INTRAVENOUS
Status: DISCONTINUED | OUTPATIENT
Start: 2023-03-31 | End: 2023-03-31

## 2023-03-31 RX ORDER — HYDROCODONE BITARTRATE AND ACETAMINOPHEN 5; 325 MG/1; MG/1
1 TABLET ORAL EVERY 4 HOURS PRN
Status: DISCONTINUED | OUTPATIENT
Start: 2023-03-31 | End: 2023-04-03 | Stop reason: HOSPADM

## 2023-03-31 RX ORDER — MIDAZOLAM HYDROCHLORIDE 1 MG/ML
INJECTION INTRAMUSCULAR; INTRAVENOUS CODE/TRAUMA/SEDATION MEDICATION
Status: COMPLETED | OUTPATIENT
Start: 2023-03-31 | End: 2023-03-31

## 2023-03-31 RX ORDER — HYDROCODONE BITARTRATE AND ACETAMINOPHEN 10; 325 MG/1; MG/1
1 TABLET ORAL EVERY 4 HOURS PRN
Status: DISCONTINUED | OUTPATIENT
Start: 2023-03-31 | End: 2023-04-03 | Stop reason: HOSPADM

## 2023-03-31 RX ORDER — HYDROMORPHONE HYDROCHLORIDE 2 MG/ML
0.2 INJECTION, SOLUTION INTRAMUSCULAR; INTRAVENOUS; SUBCUTANEOUS EVERY 5 MIN PRN
Status: DISCONTINUED | OUTPATIENT
Start: 2023-03-31 | End: 2023-04-03 | Stop reason: HOSPADM

## 2023-03-31 RX ORDER — CEFAZOLIN SODIUM 2 G/50ML
2 SOLUTION INTRAVENOUS
Status: COMPLETED | OUTPATIENT
Start: 2023-03-31 | End: 2023-03-31

## 2023-03-31 RX ADMIN — Medication 10 MG: at 01:03

## 2023-03-31 RX ADMIN — PHENYLEPHRINE HYDROCHLORIDE 100 MCG: 10 INJECTION INTRAVENOUS at 01:03

## 2023-03-31 RX ADMIN — Medication 20 MG: at 12:03

## 2023-03-31 RX ADMIN — MIDAZOLAM HYDROCHLORIDE 0.5 MG: 1 INJECTION INTRAMUSCULAR; INTRAVENOUS at 12:03

## 2023-03-31 RX ADMIN — SODIUM CHLORIDE, SODIUM LACTATE, POTASSIUM CHLORIDE, AND CALCIUM CHLORIDE: 600; 310; 30; 20 INJECTION, SOLUTION INTRAVENOUS at 12:03

## 2023-03-31 RX ADMIN — MIDAZOLAM HYDROCHLORIDE 1 MG: 1 INJECTION, SOLUTION INTRAMUSCULAR; INTRAVENOUS at 12:03

## 2023-03-31 RX ADMIN — CEFAZOLIN SODIUM 2 G: 2 SOLUTION INTRAVENOUS at 01:03

## 2023-03-31 RX ADMIN — FENTANYL CITRATE 25 MCG: 50 INJECTION, SOLUTION INTRAMUSCULAR; INTRAVENOUS at 12:03

## 2023-03-31 RX ADMIN — PROPOFOL 100 MG: 10 INJECTION, EMULSION INTRAVENOUS at 12:03

## 2023-03-31 RX ADMIN — SUGAMMADEX 200 MG: 100 INJECTION, SOLUTION INTRAVENOUS at 02:03

## 2023-03-31 RX ADMIN — PHENYLEPHRINE HYDROCHLORIDE 50 MCG: 10 INJECTION INTRAVENOUS at 01:03

## 2023-03-31 RX ADMIN — ONDANSETRON 4 MG: 2 INJECTION INTRAMUSCULAR; INTRAVENOUS at 01:03

## 2023-03-31 RX ADMIN — LIDOCAINE HYDROCHLORIDE 100 MG: 10 INJECTION, SOLUTION EPIDURAL; INFILTRATION; INTRACAUDAL; PERINEURAL at 12:03

## 2023-03-31 RX ADMIN — FENTANYL CITRATE 50 MCG: 50 INJECTION, SOLUTION INTRAMUSCULAR; INTRAVENOUS at 12:03

## 2023-03-31 RX ADMIN — ROCURONIUM BROMIDE 10 MG: 10 SOLUTION INTRAVENOUS at 01:03

## 2023-03-31 RX ADMIN — FENTANYL CITRATE 50 MCG: 50 INJECTION, SOLUTION INTRAMUSCULAR; INTRAVENOUS at 01:03

## 2023-03-31 RX ADMIN — ROCURONIUM BROMIDE 40 MG: 10 SOLUTION INTRAVENOUS at 12:03

## 2023-03-31 RX ADMIN — GLYCOPYRROLATE 0.2 MG: 0.2 INJECTION, SOLUTION INTRAMUSCULAR; INTRAVENOUS at 01:03

## 2023-03-31 NOTE — ANESTHESIA PROCEDURE NOTES
Intubation    Date/Time: 3/31/2023 12:59 PM  Performed by: Cristino Woods CRNA  Authorized by: Juan Yao MD     Intubation:     Induction:  Intravenous    Intubated:  Postinduction    Mask Ventilation:  Easy mask    Attempts:  1    Attempted By:  CRNA    Method of Intubation:  Direct    Blade:  Zeyad 3    Laryngeal View Grade: Grade I - full view of cords      Difficult Airway Encountered?: No      Complications:  None    Airway Device:  Oral endotracheal tube    Airway Device Size:  7.5    Style/Cuff Inflation:  Cuffed (inflated to minimal occlusive pressure)    Tube secured:  22    Secured at:  The lips    Placement Verified By:  Capnometry    Complicating Factors:  None    Findings Post-Intubation:  BS equal bilateral

## 2023-03-31 NOTE — CARE UPDATE
Patient back from localization of l Iguinal lymph node  CT, vitals /61, HR 69 O2 94% on room air, TEMP 97.7, no complaints of pain noted.

## 2023-03-31 NOTE — DISCHARGE SUMMARY
[facility] (Hospital)  Discharge Note  Short Stay    CT Guided Needle Placement      OUTCOME: Patient tolerated treatment/procedure well without complication and is now ready for discharge.    DISPOSITION: Home or Self Care    FINAL DIAGNOSIS:  <principal problem not specified>    FOLLOWUP: In clinic    DISCHARGE INSTRUCTIONS:  No discharge procedures on file.     TIME SPENT ON DISCHARGE: 15 minutes    Pre Op Diagnosis: left inguinal lymph node     Post Op Diagnosis: same     Procedure:  wire localization     Procedure performed by: Conor CALHOUN, Jairo PARTIDA     Written Informed Consent Obtained: Yes     Specimen Removed:  yes     Estimated Blood Loss:  minimal     Findings: Local anesthesia and moderate sedation were used.     The patient tolerated the procedure well and there were no complications.      Disposition:  F/U in clinic or with ordering physician    Discharge instructions:  Light activity for 24 hours.  Remove band aid in 24 hours.  No baths (showers are appropriate).      Sterile technique was performed in the right, lidocaine was used as a local anesthetic.  Wire localization to left inguinal lymph node and Dr. Arana notified of positioning at the time.  Pt tolerated the procedure well without immediate complications.  Please see radiologist report for details. F/u with PCP and/or ordering physician.

## 2023-03-31 NOTE — OP NOTE
[facility] ([facility])  Surgery Department  Operative Note    SUMMARY     Date of Procedure: 3/31/2023     Procedure:   Left inguinal lymph node biopsy   Left chest wall debridement    Surgeon(s) and Role:     * Collette Ceballos MD - Primary    Assistant: RAMAN Lane     Pre-Operative Diagnosis: Lymphadenopathy, inguinal [R59.0]  Skin eschar [R23.4]    Post-Operative Diagnosis: Post-Op Diagnosis Codes:     * Lymphadenopathy, inguinal [R59.0]     * Skin eschar [R23.4]    Anesthesia: General    Operative Findings (including complications, if any): Left inguinal lymph node biopsy   Left chest wall debridement    Description of Technical Procedures: Left inguinal lymph node biopsy   Left chest wall debridement    Significant Surgical Tasks Conducted by the Assistant(s), if Applicable:  Assistance with retraction and closure    Estimated Blood Loss (EBL): 10cc           Implants: * No implants in log *    Specimens:   Specimen (24h ago, onward)       Start     Ordered    03/31/23 1425  Specimen to Pathology, Surgery General Surgery  Once        Comments: Pre-op Diagnosis: Lymphadenopathy, inguinal [R59.0]Skin eschar [R23.4]Procedure(s):BIOPSY, LYMPH NODE, INGUINALDEBRIDEMENT, WOUND Number of specimens: 1Name of specimens: 1. Left Groin Lymph Node Biopsy-  PERM     References:    Click here for ordering Quick Tip   Question Answer Comment   Procedure Type: General Surgery    Specimen Class: Routine/Screening    Which provider would you like to cc? COLLETTE CEBALLOS    Release to patient Immediate        03/31/23 5792                            Condition: Good    Disposition: PACU - hemodynamically stable.    Procedure in detail:   The patient was brought to the OR and underwent general anesthesia.  Her left chest and groin were prepped and draped in usual sterile fashion.  Incision was made at the site of the wire localization in the left groin.  Bovie electrocautery was used to dissect down through subcutaneous  tissues.  The wire localized lymph node was encountered.  Using Bovie electrocautery and the harmonic scalpel we circumferentially dissected the lymph node out for specimen.  Lymphatic ducts were clipped.  Wound was irrigated and hemostasis noted.  Local anesthetic was injected.  The incision was closed in layers using 3-0 Vicryl deep subcutaneous and deep dermal layers followed by 4-0 Monocryl subcuticular layer.  Dermabond was applied to the incision site.  We then turned our attention to the left chest wall.  The eschar overlying the previous mastectomy site was excised using Bovie electrocautery.  This was debrided to healthy viable tissue.  The site measured 8 x 5 x 1 cm. There was an underlying seroma which was drained.  The wound was then packed with Kerlix gauze.  The soft tissue was covered with Xeroform dressing.  A bandage was applied.  The patient was transferred to recovery in stable and satisfactory condition.

## 2023-03-31 NOTE — TRANSFER OF CARE
"Anesthesia Transfer of Care Note    Patient: Malaika Paredes    Procedure(s) Performed: Procedure(s) (LRB):  BIOPSY, LYMPH NODE, INGUINAL (Left)  DEBRIDEMENT, WOUND (Left)    Patient location: PACU    Anesthesia Type: general    Transport from OR: Transported from OR on room air with adequate spontaneous ventilation    Post pain: adequate analgesia    Post assessment: no apparent anesthetic complications    Post vital signs: stable    Level of consciousness: responds to stimulation    Nausea/Vomiting: no nausea/vomiting    Complications: none    Transfer of care protocol was followed      Last vitals:   Visit Vitals  BP (!) 182/82 (BP Location: Right arm, Patient Position: Sitting)   Pulse 81   Temp 36.5 °C (97.7 °F) (Temporal)   Resp 18   Ht 5' 2" (1.575 m)   Wt 85.2 kg (187 lb 13.3 oz)   LMP  (LMP Unknown)   SpO2 97%   Breastfeeding No   BMI 34.35 kg/m²     "

## 2023-03-31 NOTE — DISCHARGE SUMMARY
[facility] ([facility])  Discharge Note  Short Stay    Procedure(s) (LRB):  BIOPSY, LYMPH NODE, INGUINAL (Left)  DEBRIDEMENT, WOUND (Left)      OUTCOME: Patient tolerated treatment/procedure well without complication and is now ready for discharge.    DISPOSITION: Home or Self Care    FINAL DIAGNOSIS:  Lymphadenopathy, inguinal    FOLLOWUP: In clinic    DISCHARGE INSTRUCTIONS:    Discharge Procedure Orders   Diet general     Call MD for:  temperature >100.4     Call MD for:  persistent nausea and vomiting     Call MD for:  severe uncontrolled pain     Call MD for:  difficulty breathing, headache or visual disturbances     Call MD for:  redness, tenderness, or signs of infection (pain, swelling, redness, odor or green/yellow discharge around incision site)     Call MD for:  hives     Call MD for:  persistent dizziness or light-headedness     Call MD for:  extreme fatigue     Remove dressing in 48 hours     Activity as tolerated        TIME SPENT ON DISCHARGE: 30 minutes

## 2023-03-31 NOTE — ANESTHESIA PREPROCEDURE EVALUATION
03/31/2023  Malaika Paredes is a 67 y.o., female.    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    Lattice degeneration of right retina    Uncontrolled diabetes mellitus with both eyes affected by proliferative retinopathy and macular edema, with long-term current use of insulin    NS (nuclear sclerosis), left    Type 2 diabetes mellitus, with long-term current use of insulin    ESRD (end stage renal disease)    COPD (chronic obstructive pulmonary disease)    Macrocytic anemia    Chronic kidney disease-mineral and bone disorder    Anemia associated with chronic renal failure    Diastolic dysfunction    Status post cataract extraction and insertion of intraocular lens of right eye    Malignant neoplasm of upper-outer quadrant of left breast in female, estrogen receptor positive    Severe obesity (BMI 35.0-39.9) with comorbidity    Immunodeficiency due to chemotherapy    Peripheral neuropathy due to chemotherapy    Primary hypertension    Hyperparathyroidism, unspecified    Other thrombophilia    Type 2 diabetes mellitus with diabetic peripheral angiopathy without gangrene, with long-term current use of insulin    Ischemic ulcer of toe of left foot    Preoperative examination    CAD (coronary artery disease)       Pre-op Assessment    I have reviewed the Patient Summary Reports.    I have reviewed the NPO Status.   I have reviewed the Medications.     Review of Systems  Anesthesia Hx:  No problems with previous Anesthesia    Social:  Former Smoker    Hematology/Oncology:         -- Anemia: Oncology Comments: Malignant neoplasm of upper-outer quadrant of left breast in female, estrogen receptor positive    EENT/Dental:   Diabetic retinopathy   Cardiovascular:   Hypertension CAD  asymptomatic CABG/stent  CHF PVD ECG has been reviewed. PAF (paroxysmal atrial fibrillation)    CORONARY ARTERY BYPASS GRAFT   Pulmonary:   COPD, mild    Renal/:   Chronic Renal Disease, ESRD    Hepatic/GI:  Hepatic/GI Normal    Neurological:   Peripheral Neuropathy    Endocrine:   Diabetes Hyperparathyroidism Obesity / BMI > 30      Physical Exam  General: Well nourished    Airway:  Mallampati: II   Mouth Opening: Normal  TM Distance: Normal  Neck ROM: Normal ROM    Dental:  Edentulous        Anesthesia Plan  Type of Anesthesia, risks & benefits discussed:    Anesthesia Type: Gen ETT  Intra-op Monitoring Plan: Standard ASA Monitors  Post Op Pain Control Plan: multimodal analgesia  Induction:  IV  Airway Plan: , Post-Induction  Informed Consent: Informed consent signed with the Patient and all parties understand the risks and agree with anesthesia plan.  All questions answered.   ASA Score: 4    Ready For Surgery From Anesthesia Perspective.     .        Chemistry        Component Value Date/Time     03/02/2023 0817    K 4.9 03/02/2023 0817     03/02/2023 0817    CO2 14 (L) 03/02/2023 0817    BUN 34 (H) 03/02/2023 0817    CREATININE 5.4 (H) 03/02/2023 0817     (H) 03/02/2023 0817        Component Value Date/Time    CALCIUM 8.5 (L) 03/02/2023 0817    ALKPHOS 57 02/08/2023 1138    AST 15 02/08/2023 1138    ALT 11 02/08/2023 1138    BILITOT 0.3 02/08/2023 1138    ESTGFRAFRICA 8 (A) 07/27/2022 0757    EGFRNONAA 7 (A) 07/27/2022 0757        Lab Results   Component Value Date    WBC 8.14 03/02/2023    HGB 11.7 (L) 03/02/2023    HCT 35.9 (L) 03/02/2023    MCV 94 03/02/2023     03/02/2023       Normal sinus rhythm   Left anterior fascicular block   LVH with QRS widening   Nonspecific T wave abnormality   When compared with ECG of 14-SEP-2022 11:51,   No significant change was found   Confirmed by CLARI LEE MD ([id number]) on 2/8/2023 6:32:01 PM     Echo 1/4/23:   The left ventricle is normal  in size with normal systolic function.   The estimated ejection fraction is 60%.   Grade I left ventricular diastolic dysfunction.   Normal right ventricular size with normal right ventricular systolic function.   Mild left atrial enlargement.   Mild to moderate tricuspid regurgitation.   There is pulmonary hypertension.   The estimated PA systolic pressure is 46 mmHg.

## 2023-04-01 ENCOUNTER — NURSE TRIAGE (OUTPATIENT)
Dept: ADMINISTRATIVE | Facility: CLINIC | Age: 68
End: 2023-04-01
Payer: MEDICARE

## 2023-04-01 NOTE — TELEPHONE ENCOUNTER
Spoke with Lani (home health) who states patient is home with her family and her packing has come out.  Lani states she walked family through how to reapply a dry dressing.  Lani states she would need an order to replace packing that has fallen out.  Lani is not with the patient but states she is going there later today.   Lani was connected with Dr. Ignacio to discuss.  No further assistance needed from nurse on call line.  Advised Lani to have patient call OOC if they develop any symptoms.  Lani verbalized understanding.   Reason for Disposition   [1] Caller requests to speak ONLY to PCP AND [2] URGENT question    Protocols used: PCP Call - No Triage-A-

## 2023-04-02 ENCOUNTER — DOCUMENT SCAN (OUTPATIENT)
Dept: HOME HEALTH SERVICES | Facility: HOSPITAL | Age: 68
End: 2023-04-02
Payer: MEDICARE

## 2023-04-02 NOTE — ANESTHESIA POSTPROCEDURE EVALUATION
Anesthesia Post Evaluation    Patient: Malaika Paredes    Procedure(s) Performed: Procedure(s) (LRB):  BIOPSY, LYMPH NODE, INGUINAL (Left)  DEBRIDEMENT, WOUND (Left)    Final Anesthesia Type: general      Patient location during evaluation: PACU  Patient participation: Yes- Able to Participate  Level of consciousness: awake and alert and oriented  Post-procedure vital signs: reviewed and stable  Pain management: adequate  Airway patency: patent  NIRAJ mitigation strategies: Verification of full reversal of neuromuscular block  PONV status at discharge: No PONV  Anesthetic complications: no      Cardiovascular status: blood pressure returned to baseline and hemodynamically stable  Respiratory status: unassisted  Hydration status: euvolemic  Follow-up not needed.          Vitals Value Taken Time   /86 03/31/23 1552   Temp 36.6 °C (97.9 °F) 03/31/23 1550   Pulse 83 03/31/23 1555   Resp 16 03/31/23 1555   SpO2 94 % 03/31/23 1555         Event Time   Out of Recovery 15:54:52         Pain/Matt Score: No data recorded

## 2023-04-03 ENCOUNTER — OFFICE VISIT (OUTPATIENT)
Dept: SURGERY | Facility: CLINIC | Age: 68
End: 2023-04-03
Payer: MEDICARE

## 2023-04-03 VITALS
TEMPERATURE: 98 F | DIASTOLIC BLOOD PRESSURE: 84 MMHG | HEIGHT: 62 IN | BODY MASS INDEX: 34.24 KG/M2 | WEIGHT: 186.06 LBS | HEART RATE: 78 BPM | SYSTOLIC BLOOD PRESSURE: 178 MMHG

## 2023-04-03 DIAGNOSIS — R23.4 SKIN ESCHAR: Primary | ICD-10-CM

## 2023-04-03 DIAGNOSIS — S21.102D OPEN WOUND OF LEFT CHEST WALL, SUBSEQUENT ENCOUNTER: ICD-10-CM

## 2023-04-03 PROCEDURE — 99024 POSTOP FOLLOW-UP VISIT: CPT | Mod: S$GLB,,, | Performed by: SURGERY

## 2023-04-03 PROCEDURE — 3077F SYST BP >= 140 MM HG: CPT | Mod: CPTII,S$GLB,, | Performed by: SURGERY

## 2023-04-03 PROCEDURE — 1126F PR PAIN SEVERITY QUANTIFIED, NO PAIN PRESENT: ICD-10-PCS | Mod: CPTII,S$GLB,, | Performed by: SURGERY

## 2023-04-03 PROCEDURE — 1157F PR ADVANCE CARE PLAN OR EQUIV PRESENT IN MEDICAL RECORD: ICD-10-PCS | Mod: CPTII,S$GLB,, | Performed by: SURGERY

## 2023-04-03 PROCEDURE — 99024 PR POST-OP FOLLOW-UP VISIT: ICD-10-PCS | Mod: S$GLB,,, | Performed by: SURGERY

## 2023-04-03 PROCEDURE — 3079F PR MOST RECENT DIASTOLIC BLOOD PRESSURE 80-89 MM HG: ICD-10-PCS | Mod: CPTII,S$GLB,, | Performed by: SURGERY

## 2023-04-03 PROCEDURE — 1126F AMNT PAIN NOTED NONE PRSNT: CPT | Mod: CPTII,S$GLB,, | Performed by: SURGERY

## 2023-04-03 PROCEDURE — 3044F HG A1C LEVEL LT 7.0%: CPT | Mod: CPTII,S$GLB,, | Performed by: SURGERY

## 2023-04-03 PROCEDURE — 3079F DIAST BP 80-89 MM HG: CPT | Mod: CPTII,S$GLB,, | Performed by: SURGERY

## 2023-04-03 PROCEDURE — 1159F PR MEDICATION LIST DOCUMENTED IN MEDICAL RECORD: ICD-10-PCS | Mod: CPTII,S$GLB,, | Performed by: SURGERY

## 2023-04-03 PROCEDURE — 1157F ADVNC CARE PLAN IN RCRD: CPT | Mod: CPTII,S$GLB,, | Performed by: SURGERY

## 2023-04-03 PROCEDURE — 3044F PR MOST RECENT HEMOGLOBIN A1C LEVEL <7.0%: ICD-10-PCS | Mod: CPTII,S$GLB,, | Performed by: SURGERY

## 2023-04-03 PROCEDURE — 4010F PR ACE/ARB THEARPY RXD/TAKEN: ICD-10-PCS | Mod: CPTII,S$GLB,, | Performed by: SURGERY

## 2023-04-03 PROCEDURE — 1160F PR REVIEW ALL MEDS BY PRESCRIBER/CLIN PHARMACIST DOCUMENTED: ICD-10-PCS | Mod: CPTII,S$GLB,, | Performed by: SURGERY

## 2023-04-03 PROCEDURE — 4010F ACE/ARB THERAPY RXD/TAKEN: CPT | Mod: CPTII,S$GLB,, | Performed by: SURGERY

## 2023-04-03 PROCEDURE — 3008F PR BODY MASS INDEX (BMI) DOCUMENTED: ICD-10-PCS | Mod: CPTII,S$GLB,, | Performed by: SURGERY

## 2023-04-03 PROCEDURE — 99999 PR PBB SHADOW E&M-EST. PATIENT-LVL V: CPT | Mod: PBBFAC,,, | Performed by: SURGERY

## 2023-04-03 PROCEDURE — 99999 PR PBB SHADOW E&M-EST. PATIENT-LVL V: ICD-10-PCS | Mod: PBBFAC,,, | Performed by: SURGERY

## 2023-04-03 PROCEDURE — 3008F BODY MASS INDEX DOCD: CPT | Mod: CPTII,S$GLB,, | Performed by: SURGERY

## 2023-04-03 PROCEDURE — 1159F MED LIST DOCD IN RCRD: CPT | Mod: CPTII,S$GLB,, | Performed by: SURGERY

## 2023-04-03 PROCEDURE — 3077F PR MOST RECENT SYSTOLIC BLOOD PRESSURE >= 140 MM HG: ICD-10-PCS | Mod: CPTII,S$GLB,, | Performed by: SURGERY

## 2023-04-03 PROCEDURE — 1160F RVW MEDS BY RX/DR IN RCRD: CPT | Mod: CPTII,S$GLB,, | Performed by: SURGERY

## 2023-04-03 RX ORDER — LIDOCAINE HYDROCHLORIDE 10 MG/ML
1 INJECTION, SOLUTION EPIDURAL; INFILTRATION; INTRACAUDAL; PERINEURAL ONCE
Status: CANCELLED | OUTPATIENT
Start: 2023-04-03 | End: 2023-04-03

## 2023-04-03 RX ORDER — SODIUM CHLORIDE 9 MG/ML
INJECTION, SOLUTION INTRAVENOUS CONTINUOUS
Status: CANCELLED | OUTPATIENT
Start: 2023-04-03

## 2023-04-03 NOTE — H&P (VIEW-ONLY)
History & Physical    SUBJECTIVE:     History of Present Illness:  Patient is a 67 y.o. female status post left breast mastectomy with axillary dissection 2023 status post left inguinal lymph node biopsy and left chest wall debridement 3/31 presents for postop wound check.  She denies any pain or changes.    presents for drain removal and follow-up.  She denies any pain.    presents for follow up PET scan/mammogram and preop. She denies any changes since last visit. Her case was presented at tumor board with decision to move forward with surgical therapy.    presents to discuss next step in breast cancer treatment following neoadjuvant chemotherapy. She did not complete last cycle due to neuropathy. She reports decrease in mass size of the breast.     presents to discuss left breast biopsy.  Her left breast and axillary biopsy were showed invasive ductal carcinoma ER+/OK+ Her2+ with metastatic disease to the lymph node.    Initially referred for abnormal mammogram of the left breast. She denies any breast mass, nipple discharge, breast pain or other changes. No prior breast surgery. Prior right breast biopsy benign. Family history of breast cancer in her sister. Menarche age 12,  1st at 16, menopause at 48. No HRT.     No chief complaint on file.        Review of patient's allergies indicates:  No Known Allergies    No current facility-administered medications for this visit.     Current Outpatient Medications   Medication Sig Dispense Refill    oxyCODONE (ROXICODONE) 5 MG immediate release tablet Take 1 tablet (5 mg total) by mouth every 6 (six) hours as needed for Pain. 10 tablet 0     Facility-Administered Medications Ordered in Other Visits   Medication Dose Route Frequency Provider Last Rate Last Admin    0.9%  NaCl infusion   Intravenous Continuous Jayjay Arana MD        0.9%  NaCl infusion   Intravenous Continuous Jayjay Arana MD        HYDROcodone-acetaminophen  mg per tablet 1 tablet  1  tablet Oral Q4H PRN Jayjay Arana MD        HYDROcodone-acetaminophen 5-325 mg per tablet 1 tablet  1 tablet Oral Q4H PRN Jayjay Arana MD        HYDROmorphone (PF) injection 0.2 mg  0.2 mg Intravenous Q5 Min PRN Del Pollard II, MD        LIDOcaine (PF) 10 mg/ml (1%) injection 10 mg  1 mL Intradermal Once Jayjay Arana MD        ondansetron injection 4 mg  4 mg Intravenous Daily PRN Del Pollard II, MD        ondansetron injection 4 mg  4 mg Intravenous Q12H PRN Jayjay Arana MD        oxyCODONE-acetaminophen 5-325 mg per tablet 1 tablet  1 tablet Oral Q3H PRN Del Pollard II, MD           Past Medical History:   Diagnosis Date    CAD (coronary artery disease)     Cataract     Cataract     CHF (congestive heart failure)     COPD (chronic obstructive pulmonary disease)     Diabetes mellitus     Diabetic retinopathy     ESRD (end stage renal disease)     TTS    Hypertension     Ischemic ulcer of toe of left foot     Malignant neoplasm of upper-outer quadrant of left breast in female, estrogen receptor positive 06/20/2022    left    PAD (peripheral artery disease)     PAF (paroxysmal atrial fibrillation)      Past Surgical History:   Procedure Laterality Date    AXILLARY NODE DISSECTION Left 3/1/2023    Procedure: LYMPHADENECTOMY, AXILLARY;  Surgeon: Jayjay Arana MD;  Location: [facility] OR;  Service: General;  Laterality: Left;    BIOPSY OF INGUINAL LYMPH NODE Left 3/31/2023    Procedure: BIOPSY, LYMPH NODE, INGUINAL;  Surgeon: Jayjay Arana MD;  Location: [facility] OR;  Service: General;  Laterality: Left;    BREAST BIOPSY Right     benign    CATARACT EXTRACTION W/  INTRAOCULAR LENS IMPLANT Right 08/14/2017    Dr. Moe    CORONARY ARTERY BYPASS GRAFT  2020    FLUOROSCOPY N/A 07/25/2022    Procedure: FLUOROSCOPY/mediport placement;  Surgeon: Cole Perdomo MD;  Location: [facility] CATH LAB;  Service: General;  Laterality: N/A;    MEDIPORT REMOVAL N/A 3/1/2023    Procedure: REMOVAL, CATHETER, CENTRAL VENOUS, TUNNELED,  "WITH PORT;  Surgeon: Jayjay Arana MD;  Location: [facility] OR;  Service: General;  Laterality: N/A;    MODIFIED RADICAL MASTECTOMY W/ AXILLARY LYMPH NODE DISSECTION Left 3/1/2023    Procedure: MASTECTOMY, MODIFIED RADICAL;  Surgeon: Jayjay Arana MD;  Location: [facility] OR;  Service: General;  Laterality: Left;    WOUND DEBRIDEMENT Left 3/31/2023    Procedure: DEBRIDEMENT, WOUND;  Surgeon: Jayjay Arana MD;  Location: [facility] OR;  Service: General;  Laterality: Left;  left chest wall eschar debridement     Family History   Problem Relation Age of Onset    Hypertension Mother     Cancer Father     Breast cancer Sister     Diabetes Sister     Cancer Brother     Amblyopia Neg Hx     Blindness Neg Hx     Cataracts Neg Hx     Glaucoma Neg Hx     Macular degeneration Neg Hx     Retinal detachment Neg Hx     Strabismus Neg Hx     Stroke Neg Hx     Thyroid disease Neg Hx      Social History     Tobacco Use    Smoking status: Former     Years: 30.00     Types: Cigarettes     Quit date: 6/21/2012     Years since quitting: 10.7    Smokeless tobacco: Never   Substance Use Topics    Alcohol use: No    Drug use: Never        Review of Systems:  Review of Systems   Constitutional:  Negative for chills, fatigue, fever and unexpected weight change.   Respiratory:  Negative for cough, shortness of breath, wheezing and stridor.    Cardiovascular:  Negative for chest pain, palpitations and leg swelling.   Gastrointestinal:  Negative for abdominal distention, abdominal pain, constipation, diarrhea, nausea and vomiting.   Genitourinary:  Negative for difficulty urinating, dysuria, frequency, hematuria and urgency.   Skin:  Negative for color change, pallor, rash and wound.   Hematological:  Does not bruise/bleed easily.     OBJECTIVE:     Vital Signs (Most Recent)  Temp: 98 °F (36.7 °C) (04/03/23 1231)  Pulse: 78 (04/03/23 1231)  BP: (!) 178/84 (04/03/23 1231)  5' 2" (1.575 m)  84.4 kg (186 lb 1.1 oz)     Physical Exam:  Physical Exam  Vitals " reviewed.   Constitutional:       General: She is not in acute distress.     Appearance: She is well-developed.   HENT:      Head: Normocephalic and atraumatic.      Right Ear: External ear normal.      Left Ear: External ear normal.      Nose: Nose normal.      Mouth/Throat:      Mouth: Mucous membranes are moist.   Eyes:      Extraocular Movements: Extraocular movements intact.      Conjunctiva/sclera: Conjunctivae normal.   Cardiovascular:      Rate and Rhythm: Normal rate.   Pulmonary:      Effort: Pulmonary effort is normal. No respiratory distress.   Chest:       Musculoskeletal:      Cervical back: Neck supple.   Skin:     General: Skin is warm and dry.   Neurological:      Mental Status: She is alert and oriented to person, place, and time.   Psychiatric:         Behavior: Behavior normal.       Diagnostic Results:  Result:   Mammo Digital Diagnostic Bilat with Alfredo  US Breast Left Limited     History:  Patient is 66 y.o. and is seen for diagnostic imaging.     Films Compared:  Prior images (if available) were compared.     Findings:  This procedure was performed using tomosynthesis. Computer-aided detection was utilized in the interpretation of this examination.  The breasts have scattered areas of fibroglandular density.      Mammo Digital Diagnostic Bilat with Lafredo  Left  Mass: There is a 4.9 cm irregularly shaped mass with spiculated margins seen in the upper outer quadrant of the left breast in the anterior depth. The mass correlates with the palpable mass reported by the patient. Associated features include skin retraction. There are also associated calcifications.   Lymph Node: There are multiple similar lymph nodes seen in the left axilla.      Right  There is no evidence of suspicious masses, calcifications, or other abnormal findings in the right breast.     US Breast Left Limited  Left  Mass: There is a 5.6 cm x 4 cm x 4.3 cm irregularly shaped, non-parallel, hypoechoic mass with spiculated  margins seen in the left breast at 2 o'clock, 3 cm from the nipple.   Lymph Node: There are multiple similar lymph nodes seen in the left axilla. Largest node is 3.4 cm x 1.6 cm x 2.0 cm.      Impression:  Left  Mass: Left breast 5.6 cm x 4 cm x 4.3 cm mass at the 2 o'clock position. Assessment: 5 - Highly suggestive of malignancy. Ultrasound-guided biopsy is recommended.   Lymph Node: Left axilla lymph node (multiple findings). Assessment: 5 - Highly suggestive of malignancy. Ultrasound-guided biopsy is recommended.      Right  There is no mammographic or sonographic evidence of malignancy in the right breast.     BI-RADS Category:   Overall: 5 - Highly Suggestive of Malignancy     Recommendation:  Ultrasound-guided biopsy is recommended.  Ultrasound-guided biopsy is recommended.     Your estimated lifetime risk of breast cancer (to age 85) based on Tyrer-Cuzick risk assessment model is Tyrer-Cuzick: 6.49 %. According to the [employer] Society, patients with a lifetime breast cancer risk of 20% or higher might benefit from supplemental screening tests.    Final Pathologic Diagnosis Part 1   Left axilla, ultrasound-guided core biopsy:   Positive for metastatic carcinoma   Metastasis measures 10 mm in greatest dimension   Part 2   Left breast, 2:00, ultrasound-guided core biopsy:   Invasive ductal carcinoma   Bubba grade 2:  Tubule formation -3, nuclear pleomorphism-2 and mitotic   count-2   Invasive carcinoma measures 17 mm in greatest dimension   No carcinoma in Situ or lymphovascular invasion identified   Tumor biomarkers   Estrogen receptor (ER):  Positive, greater than 95%, strong   Progesterone receptor (PGR):  Positive, 80%, strong   HER2 (IHC):  Equivocal, 2+   Ki-67:  60%   Additional IHC:   P63:  Negative throughout, supporting the diagnosis of invasive carcinoma   This case was reviewed by Dr. GABBY Cannon who concurs with the above diagnosis.  VC      Comment: Interp By Bette Pitts M.D., Signed  on 06/21/2022 at 12:02   Supplemental Diagnosis HER2, Breast Tumor, FISH, Tissue   Result Summary   POSITIVE   Interpretation   This tumor sample is positive for HER2 (ERBB2) gene amplification.   Result   nuc erwin([id number],HER2x5¿8)   HER2/D17Z1 ratio: 2.03   Average HER2 signals per cell: 6.3   Average D17Z1 signals per cell: 3.1         PET/CT:  Impression:     Interval improvement.  Left breast mass demonstrates decreased in size in decreased hypermetabolic activity.  Left pectoralis and axillary lymphadenopathy also demonstrates marked significant decreased activity.  Residual nonspecific mildly hypermetabolic right axillary and bilateral inguinal lymph nodes.    Mammogram:  Result:   Mammo Digital Diagnostic Left with Alfredo     History:  Patient is 67 y.o. and is seen for diagnostic imaging.     Films Compared:  Prior images (if available) were compared.     Findings:  This procedure was performed using tomosynthesis. Computer-aided detection was utilized in the interpretation of this examination.  The left breast has scattered areas of fibroglandular density.      Known malignant breast mass currently measures approximately 4.1 x 3.0 x 2.9 cm, previous 4.5 x 3.7 x 3.6 cm. Note that suspicious calcifications extend at least 2 cm medial to the breast mass and 5-6 cm medial to the post biopsy clip.  There is been decrease in size of the left axillary adenopathy, particularly the prior biopsied enlarged lymph node.         Impression:  Decrease in size of malignant left breast mass and axillary adenopathy. Correlate with ordered PET-CT.         BI-RADS Category:   Overall: 6 - Known Biopsy-Proven Malignancy        Recommendation:  There are no mammo recommendations for this study.    Final Pathologic Diagnosis 1. Left breast, mastectomy:   Residual invasive ductal carcinoma status post neoadjuvant treatment, Grade 2   (tubules = 3, nuclei = 3, mitoses = 1)   Invasive carcinoma measures 43 mm (4.3 cm) in greatest  dimension (measured   microscopically)   Calcifications associated with invasive carcinoma and non-neoplastic breast   2 of 7 lymph nodes positive for metastatic carcinoma (2/7) with extranodal   extension   Largest metastatic deposit measures 9 mm (0.9 cm)   Biopsy clip site identified grossly   Best tumor block for future studies: 1C   See surgical pathology cancer case summary below   2.  Axillary contents, dissection:   14 lymph nodes negative for metastatic carcinoma (0/14)   Immunohistochemical stains for AE1/AE3 were performed with adequate controls   and are negative   Surgical pathology cancer case summary:   Procedure:  Total mastectomy   Specimen laterality:  Left   Tumor site:  Upper outer quadrant   Histologic type: Invasive carcinoma of no special type (ductal)   Histologic grade (Bubba Histologic Score):   Glandular (acinar/tubular) differentiation:  Score 3   Nuclear pleomorphism:  Score 3   Mitotic rate:  Score 1   Overall grade:  Grade 2 (score 7)   Tumor size:  Greatest dimension of largest invasive focus greater than 1 mm:   43 mm   Tumor focality:  Single focus of invasive carcinoma   Ductal carcinoma in situ (DCIS):  Not identified   Lobular carcinoma in situ (LCIS):  Not identified   Tumor extent:  Not applicable (skin, nipple, and skeletal muscle are absent   or are uninvolved)   Lymphovascular invasion:  Not identified   Dermal lymphovascular invasion:  Not identified   Microcalcifications:   Present in invasive carcinoma   Present in nonneoplastic tissue   Treatment effect in the breast:  No definite response to presurgical therapy   in the invasive carcinoma   Treatment effect in the lymph nodes:  No definite response to presurgical   therapy in metastatic carcinoma   Margins status for invasive carcinoma:  All margins negative for invasive   carcinoma   Distance from invasive carcinoma to closest margin:  21 mm (measured grossly)   Closest margin to invasive carcinoma:  Anterior  (skin)   All other margins are greater than 21 mm (measured grossly)   Regional lymph node status:  Regional lymph nodes present   Tumor present in regional lymph nodes   Number of lymph nodes with macrometastases (greater than 2 mm):  2   Size of largest moni metastatic deposit:  9 mm   Extranodal extension:  Present, 2 mm or less   Total number of lymph nodes examined:  21   Distant metastases:  Not applicable   Pathologic stage classification (pTNM):   TNM descriptors: y (posttreatment)   pT category: ypT2:  Posttreatment tumor greater than 20 mm but less than or   equal to 50 mm in greatest dimension   Regional lymph nodes modifier:  Not applicable   pN: pN1a:  Metastases in 1-3 axillary lymph nodes, at least 1 metastases   larger than 2.0 mm   pM:  Not applicable   Breast biomarker testing performed on previous biopsy case FKD- and   reported as below:   Estrogen receptor (ER):  Positive, greater than 95%, strong   Progesterone receptor (PGR): Positive, 80%, strong   HER2 (IHC):  Equivocal, 2+   HER2 FISH: Positive   Ki-67:  60%      Final Pathologic Diagnosis 1. Lymph node, needle core biopsy:   - A kappa light chain restricted plasma cell population detected.  Recommend   additional tissue biopsy for further classifying this process.  See comment.   - No evidence of metastatic carcinoma       ASSESSMENT/PLAN:     67 y/o female with left breast invasive ductal carcinoma ER+/NV+ Her2+ with metastatic disease to the lymph node now status post left breast total mastectomy with axillary dissection; status post left inguinal lymph node excisional biopsy, left chest wall wound debridement    Dressing change today  Will need wound VAC to expedite healing process has local wound care will be difficult, patient with limited resources and unable to care for wound on her own    Wound VAC placement 04/06/2023  Risks and benefits discussed with patient including but not limited to: Pain, bleeding, infection,  scarring, delayed wound healing, alternative wound care discuss, need for further procedure

## 2023-04-03 NOTE — PROGRESS NOTES
History & Physical    SUBJECTIVE:     History of Present Illness:  Patient is a 67 y.o. female status post left breast mastectomy with axillary dissection 2023 status post left inguinal lymph node biopsy and left chest wall debridement 3/31 presents for postop wound check.  She denies any pain or changes.    presents for drain removal and follow-up.  She denies any pain.    presents for follow up PET scan/mammogram and preop. She denies any changes since last visit. Her case was presented at tumor board with decision to move forward with surgical therapy.    presents to discuss next step in breast cancer treatment following neoadjuvant chemotherapy. She did not complete last cycle due to neuropathy. She reports decrease in mass size of the breast.     presents to discuss left breast biopsy.  Her left breast and axillary biopsy were showed invasive ductal carcinoma ER+/KY+ Her2+ with metastatic disease to the lymph node.    Initially referred for abnormal mammogram of the left breast. She denies any breast mass, nipple discharge, breast pain or other changes. No prior breast surgery. Prior right breast biopsy benign. Family history of breast cancer in her sister. Menarche age 12,  1st at 16, menopause at 48. No HRT.     No chief complaint on file.        Review of patient's allergies indicates:  No Known Allergies    No current facility-administered medications for this visit.     Current Outpatient Medications   Medication Sig Dispense Refill    oxyCODONE (ROXICODONE) 5 MG immediate release tablet Take 1 tablet (5 mg total) by mouth every 6 (six) hours as needed for Pain. 10 tablet 0     Facility-Administered Medications Ordered in Other Visits   Medication Dose Route Frequency Provider Last Rate Last Admin    0.9%  NaCl infusion   Intravenous Continuous Jayjay Arana MD        0.9%  NaCl infusion   Intravenous Continuous Jayjay Arana MD        HYDROcodone-acetaminophen  mg per tablet 1 tablet  1  tablet Oral Q4H PRN Jayjay Arana MD        HYDROcodone-acetaminophen 5-325 mg per tablet 1 tablet  1 tablet Oral Q4H PRN Jayjay rAana MD        HYDROmorphone (PF) injection 0.2 mg  0.2 mg Intravenous Q5 Min PRN Del Pollard II, MD        LIDOcaine (PF) 10 mg/ml (1%) injection 10 mg  1 mL Intradermal Once Jayjay Arana MD        ondansetron injection 4 mg  4 mg Intravenous Daily PRN Del Pollard II, MD        ondansetron injection 4 mg  4 mg Intravenous Q12H PRN Jayjay Arana MD        oxyCODONE-acetaminophen 5-325 mg per tablet 1 tablet  1 tablet Oral Q3H PRN Del Pollard II, MD           Past Medical History:   Diagnosis Date    CAD (coronary artery disease)     Cataract     Cataract     CHF (congestive heart failure)     COPD (chronic obstructive pulmonary disease)     Diabetes mellitus     Diabetic retinopathy     ESRD (end stage renal disease)     TTS    Hypertension     Ischemic ulcer of toe of left foot     Malignant neoplasm of upper-outer quadrant of left breast in female, estrogen receptor positive 06/20/2022    left    PAD (peripheral artery disease)     PAF (paroxysmal atrial fibrillation)      Past Surgical History:   Procedure Laterality Date    AXILLARY NODE DISSECTION Left 3/1/2023    Procedure: LYMPHADENECTOMY, AXILLARY;  Surgeon: Jayjay Arana MD;  Location: [facility] OR;  Service: General;  Laterality: Left;    BIOPSY OF INGUINAL LYMPH NODE Left 3/31/2023    Procedure: BIOPSY, LYMPH NODE, INGUINAL;  Surgeon: Jayjay Arana MD;  Location: [facility] OR;  Service: General;  Laterality: Left;    BREAST BIOPSY Right     benign    CATARACT EXTRACTION W/  INTRAOCULAR LENS IMPLANT Right 08/14/2017    Dr. Moe    CORONARY ARTERY BYPASS GRAFT  2020    FLUOROSCOPY N/A 07/25/2022    Procedure: FLUOROSCOPY/mediport placement;  Surgeon: Cole Perdomo MD;  Location: [facility] CATH LAB;  Service: General;  Laterality: N/A;    MEDIPORT REMOVAL N/A 3/1/2023    Procedure: REMOVAL, CATHETER, CENTRAL VENOUS, TUNNELED,  "WITH PORT;  Surgeon: Jayjay Arana MD;  Location: [facility] OR;  Service: General;  Laterality: N/A;    MODIFIED RADICAL MASTECTOMY W/ AXILLARY LYMPH NODE DISSECTION Left 3/1/2023    Procedure: MASTECTOMY, MODIFIED RADICAL;  Surgeon: Jayjay Aarna MD;  Location: [facility] OR;  Service: General;  Laterality: Left;    WOUND DEBRIDEMENT Left 3/31/2023    Procedure: DEBRIDEMENT, WOUND;  Surgeon: Jayjay Arana MD;  Location: [facility] OR;  Service: General;  Laterality: Left;  left chest wall eschar debridement     Family History   Problem Relation Age of Onset    Hypertension Mother     Cancer Father     Breast cancer Sister     Diabetes Sister     Cancer Brother     Amblyopia Neg Hx     Blindness Neg Hx     Cataracts Neg Hx     Glaucoma Neg Hx     Macular degeneration Neg Hx     Retinal detachment Neg Hx     Strabismus Neg Hx     Stroke Neg Hx     Thyroid disease Neg Hx      Social History     Tobacco Use    Smoking status: Former     Years: 30.00     Types: Cigarettes     Quit date: 6/21/2012     Years since quitting: 10.7    Smokeless tobacco: Never   Substance Use Topics    Alcohol use: No    Drug use: Never        Review of Systems:  Review of Systems   Constitutional:  Negative for chills, fatigue, fever and unexpected weight change.   Respiratory:  Negative for cough, shortness of breath, wheezing and stridor.    Cardiovascular:  Negative for chest pain, palpitations and leg swelling.   Gastrointestinal:  Negative for abdominal distention, abdominal pain, constipation, diarrhea, nausea and vomiting.   Genitourinary:  Negative for difficulty urinating, dysuria, frequency, hematuria and urgency.   Skin:  Negative for color change, pallor, rash and wound.   Hematological:  Does not bruise/bleed easily.     OBJECTIVE:     Vital Signs (Most Recent)  Temp: 98 °F (36.7 °C) (04/03/23 1231)  Pulse: 78 (04/03/23 1231)  BP: (!) 178/84 (04/03/23 1231)  5' 2" (1.575 m)  84.4 kg (186 lb 1.1 oz)     Physical Exam:  Physical Exam  Vitals " reviewed.   Constitutional:       General: She is not in acute distress.     Appearance: She is well-developed.   HENT:      Head: Normocephalic and atraumatic.      Right Ear: External ear normal.      Left Ear: External ear normal.      Nose: Nose normal.      Mouth/Throat:      Mouth: Mucous membranes are moist.   Eyes:      Extraocular Movements: Extraocular movements intact.      Conjunctiva/sclera: Conjunctivae normal.   Cardiovascular:      Rate and Rhythm: Normal rate.   Pulmonary:      Effort: Pulmonary effort is normal. No respiratory distress.   Chest:       Musculoskeletal:      Cervical back: Neck supple.   Skin:     General: Skin is warm and dry.   Neurological:      Mental Status: She is alert and oriented to person, place, and time.   Psychiatric:         Behavior: Behavior normal.       Diagnostic Results:  Result:   Mammo Digital Diagnostic Bilat with Alfredo  US Breast Left Limited     History:  Patient is 66 y.o. and is seen for diagnostic imaging.     Films Compared:  Prior images (if available) were compared.     Findings:  This procedure was performed using tomosynthesis. Computer-aided detection was utilized in the interpretation of this examination.  The breasts have scattered areas of fibroglandular density.      Mammo Digital Diagnostic Bilat with Alfredo  Left  Mass: There is a 4.9 cm irregularly shaped mass with spiculated margins seen in the upper outer quadrant of the left breast in the anterior depth. The mass correlates with the palpable mass reported by the patient. Associated features include skin retraction. There are also associated calcifications.   Lymph Node: There are multiple similar lymph nodes seen in the left axilla.      Right  There is no evidence of suspicious masses, calcifications, or other abnormal findings in the right breast.     US Breast Left Limited  Left  Mass: There is a 5.6 cm x 4 cm x 4.3 cm irregularly shaped, non-parallel, hypoechoic mass with spiculated  margins seen in the left breast at 2 o'clock, 3 cm from the nipple.   Lymph Node: There are multiple similar lymph nodes seen in the left axilla. Largest node is 3.4 cm x 1.6 cm x 2.0 cm.      Impression:  Left  Mass: Left breast 5.6 cm x 4 cm x 4.3 cm mass at the 2 o'clock position. Assessment: 5 - Highly suggestive of malignancy. Ultrasound-guided biopsy is recommended.   Lymph Node: Left axilla lymph node (multiple findings). Assessment: 5 - Highly suggestive of malignancy. Ultrasound-guided biopsy is recommended.      Right  There is no mammographic or sonographic evidence of malignancy in the right breast.     BI-RADS Category:   Overall: 5 - Highly Suggestive of Malignancy     Recommendation:  Ultrasound-guided biopsy is recommended.  Ultrasound-guided biopsy is recommended.     Your estimated lifetime risk of breast cancer (to age 85) based on Tyrer-Cuzick risk assessment model is Tyrer-Cuzick: 6.49 %. According to the American Cancer Society, patients with a lifetime breast cancer risk of 20% or higher might benefit from supplemental screening tests.    Final Pathologic Diagnosis Part 1   Left axilla, ultrasound-guided core biopsy:   Positive for metastatic carcinoma   Metastasis measures 10 mm in greatest dimension   Part 2   Left breast, 2:00, ultrasound-guided core biopsy:   Invasive ductal carcinoma   Bubba grade 2:  Tubule formation -3, nuclear pleomorphism-2 and mitotic   count-2   Invasive carcinoma measures 17 mm in greatest dimension   No carcinoma in Situ or lymphovascular invasion identified   Tumor biomarkers   Estrogen receptor (ER):  Positive, greater than 95%, strong   Progesterone receptor (PGR):  Positive, 80%, strong   HER2 (IHC):  Equivocal, 2+   Ki-67:  60%   Additional IHC:   P63:  Negative throughout, supporting the diagnosis of invasive carcinoma   This case was reviewed by Dr. GABBY Cannon who concurs with the above diagnosis.  VC      Comment: Interp By Bette Pitts M.D., Signed  on 06/21/2022 at 12:02   Supplemental Diagnosis HER2, Breast Tumor, FISH, Tissue   Result Summary   POSITIVE   Interpretation   This tumor sample is positive for HER2 (ERBB2) gene amplification.   Result   nuc erwin(D85L4y5¿4,HER2x5¿8)   HER2/D17Z1 ratio: 2.03   Average HER2 signals per cell: 6.3   Average D17Z1 signals per cell: 3.1         PET/CT:  Impression:     Interval improvement.  Left breast mass demonstrates decreased in size in decreased hypermetabolic activity.  Left pectoralis and axillary lymphadenopathy also demonstrates marked significant decreased activity.  Residual nonspecific mildly hypermetabolic right axillary and bilateral inguinal lymph nodes.    Mammogram:  Result:   Mammo Digital Diagnostic Left with Alfredo     History:  Patient is 67 y.o. and is seen for diagnostic imaging.     Films Compared:  Prior images (if available) were compared.     Findings:  This procedure was performed using tomosynthesis. Computer-aided detection was utilized in the interpretation of this examination.  The left breast has scattered areas of fibroglandular density.      Known malignant breast mass currently measures approximately 4.1 x 3.0 x 2.9 cm, previous 4.5 x 3.7 x 3.6 cm. Note that suspicious calcifications extend at least 2 cm medial to the breast mass and 5-6 cm medial to the post biopsy clip.  There is been decrease in size of the left axillary adenopathy, particularly the prior biopsied enlarged lymph node.         Impression:  Decrease in size of malignant left breast mass and axillary adenopathy. Correlate with ordered PET-CT.         BI-RADS Category:   Overall: 6 - Known Biopsy-Proven Malignancy        Recommendation:  There are no mammo recommendations for this study.    Final Pathologic Diagnosis 1. Left breast, mastectomy:   Residual invasive ductal carcinoma status post neoadjuvant treatment, Grade 2   (tubules = 3, nuclei = 3, mitoses = 1)   Invasive carcinoma measures 43 mm (4.3 cm) in greatest  dimension (measured   microscopically)   Calcifications associated with invasive carcinoma and non-neoplastic breast   2 of 7 lymph nodes positive for metastatic carcinoma (2/7) with extranodal   extension   Largest metastatic deposit measures 9 mm (0.9 cm)   Biopsy clip site identified grossly   Best tumor block for future studies: 1C   See surgical pathology cancer case summary below   2.  Axillary contents, dissection:   14 lymph nodes negative for metastatic carcinoma (0/14)   Immunohistochemical stains for AE1/AE3 were performed with adequate controls   and are negative   Surgical pathology cancer case summary:   Procedure:  Total mastectomy   Specimen laterality:  Left   Tumor site:  Upper outer quadrant   Histologic type: Invasive carcinoma of no special type (ductal)   Histologic grade (Bubba Histologic Score):   Glandular (acinar/tubular) differentiation:  Score 3   Nuclear pleomorphism:  Score 3   Mitotic rate:  Score 1   Overall grade:  Grade 2 (score 7)   Tumor size:  Greatest dimension of largest invasive focus greater than 1 mm:   43 mm   Tumor focality:  Single focus of invasive carcinoma   Ductal carcinoma in situ (DCIS):  Not identified   Lobular carcinoma in situ (LCIS):  Not identified   Tumor extent:  Not applicable (skin, nipple, and skeletal muscle are absent   or are uninvolved)   Lymphovascular invasion:  Not identified   Dermal lymphovascular invasion:  Not identified   Microcalcifications:   Present in invasive carcinoma   Present in nonneoplastic tissue   Treatment effect in the breast:  No definite response to presurgical therapy   in the invasive carcinoma   Treatment effect in the lymph nodes:  No definite response to presurgical   therapy in metastatic carcinoma   Margins status for invasive carcinoma:  All margins negative for invasive   carcinoma   Distance from invasive carcinoma to closest margin:  21 mm (measured grossly)   Closest margin to invasive carcinoma:  Anterior  (skin)   All other margins are greater than 21 mm (measured grossly)   Regional lymph node status:  Regional lymph nodes present   Tumor present in regional lymph nodes   Number of lymph nodes with macrometastases (greater than 2 mm):  2   Size of largest moni metastatic deposit:  9 mm   Extranodal extension:  Present, 2 mm or less   Total number of lymph nodes examined:  21   Distant metastases:  Not applicable   Pathologic stage classification (pTNM):   TNM descriptors: y (posttreatment)   pT category: ypT2:  Posttreatment tumor greater than 20 mm but less than or   equal to 50 mm in greatest dimension   Regional lymph nodes modifier:  Not applicable   pN: pN1a:  Metastases in 1-3 axillary lymph nodes, at least 1 metastases   larger than 2.0 mm   pM:  Not applicable   Breast biomarker testing performed on previous biopsy case KLY- and   reported as below:   Estrogen receptor (ER):  Positive, greater than 95%, strong   Progesterone receptor (PGR): Positive, 80%, strong   HER2 (IHC):  Equivocal, 2+   HER2 FISH: Positive   Ki-67:  60%      Final Pathologic Diagnosis 1. Lymph node, needle core biopsy:   - A kappa light chain restricted plasma cell population detected.  Recommend   additional tissue biopsy for further classifying this process.  See comment.   - No evidence of metastatic carcinoma       ASSESSMENT/PLAN:     67 y/o female with left breast invasive ductal carcinoma ER+/AZ+ Her2+ with metastatic disease to the lymph node now status post left breast total mastectomy with axillary dissection; status post left inguinal lymph node excisional biopsy, left chest wall wound debridement    Dressing change today  Will need wound VAC to expedite healing process has local wound care will be difficult, patient with limited resources and unable to care for wound on her own    Wound VAC placement 04/06/2023  Risks and benefits discussed with patient including but not limited to: Pain, bleeding, infection,  scarring, delayed wound healing, alternative wound care discuss, need for further procedure

## 2023-04-04 DIAGNOSIS — S21.102D OPEN WOUND OF LEFT CHEST WALL, SUBSEQUENT ENCOUNTER: Primary | ICD-10-CM

## 2023-04-04 LAB — BACTERIA SPEC AEROBE CULT: NO GROWTH

## 2023-04-04 NOTE — PRE-PROCEDURE INSTRUCTIONS
Emailed the following information to: [email or web address]    To confirm, Surgery is scheduled on 4/6/23. We will call you late afternoon the business day prior to surgery with your arrival time.    *Please report to the [facility] Lobby (1st Floor) located off of [facility] (2nd Entrance/Building on the left, in front of the flag pole).  Address: [address] [place] [address] [place]. [id number]        INSTRUCTIONS IMPORTANT!!!  Do Not Eat, Drink, or Smoke after 12 midnight unless instructed otherwise by your Surgeon. OK to brush teeth, no gum, candy or mints!      *Take Only these medications with a small sip of water Morning of Surgery:  Amlodipine  Atorvastatin  Inhalers  Levothyroxine  Metoprolol  Prilosec    ____  HOLD all vitamins, herbal supplements, aspirin products & NSAIDS 7 days prior to surgery, as these can thin the blood.  ____  NO Acrylic/fake nails or nail polish worn day of surgery (specifically hand/arm & foot surgeries).  ____  NO powder, lotions, deodorants, oils or cream on body.  ____  Remove all jewelry & piercings prior to surgery.  ____  Remove Dentures, Hearing Aids & Contact Lens prior to surgery.  ____  Bring photo ID and insurance information to hospital (Leave Valuables at Home).  ____  If going home the same day, arrange for a ride home. You will not be able to drive for 24 hrs if Anesthesia was used.   ____  Females (ages 11-60): may need to give a urine sample the morning of surgery; please see Pre op Nurse prior to using the restroom.  ____  Males: Stop ED medications (Viagra, Cialis) 24 hrs prior to surgery.  ____  Wear clean, loose fitting clothing to allow for dressings/ bandages.            Diabetic Patients: If you take diabetic medication, do NOT take morning of surgery unless instructed by Doctor. Metformin to be stopped 24 hrs prior to surgery time. DO NOT take long-acting insulin the evening before surgery. Blood sugars will be checked in pre-op by Nurse.    Bathing  Instructions:    -Shower with anti-bacterial Soap (Hibiclens or Dial) the night before surgery and the morning of.   -Do not use Hibiclens on your face or genitals.   -Apply clean clothes after shower.  -Do not shave your face or body 2 days prior to surgery unless instructed otherwise by your Surgeon.  -Do not shave pubic hair 7 days prior to surgery (gyn pt's).    [facility] Visitor/Ride Policy:  Only 2 adults allowed (over the age of 18) to accompany you to the Hospital. You Must have a ride home from a responsible adult that you know and trust. Medical Transport, Uber or Lyft can only be used if patient has a responsible adult to accompany them during ride home.    Discharge Instructions: You will receive Post-op/Discharge instructions by your Discharge Nurse prior to going home. Please call your Surgeon's office with any post-surgery questions/concerns @ [phone].    *If you are running late or have questions the morning of surgery, please call the Surgery Dept @ [phone]  *Insurance/ Financial Questions, please call [phone]    Thank you,  -[facility] Pre Admit Testing Nurse  [phone] or [phone]  M-F 7:30 am-4:30 pm (Closed Major Holidays)

## 2023-04-05 ENCOUNTER — TELEPHONE (OUTPATIENT)
Dept: PREADMISSION TESTING | Facility: HOSPITAL | Age: 68
End: 2023-04-05
Payer: MEDICARE

## 2023-04-05 ENCOUNTER — TELEPHONE (OUTPATIENT)
Dept: SURGERY | Facility: CLINIC | Age: 68
End: 2023-04-05
Payer: MEDICARE

## 2023-04-05 NOTE — TELEPHONE ENCOUNTER
Called and spoke with patient to remind to to bring wound vac supplies tomorrow to surgery. Patient verbalized understanding.

## 2023-04-06 ENCOUNTER — ANESTHESIA EVENT (OUTPATIENT)
Dept: SURGERY | Facility: HOSPITAL | Age: 68
End: 2023-04-06
Payer: MEDICARE

## 2023-04-06 ENCOUNTER — HOSPITAL ENCOUNTER (OUTPATIENT)
Facility: HOSPITAL | Age: 68
Discharge: HOME | End: 2023-04-06
Attending: SURGERY | Admitting: SURGERY
Payer: MEDICARE

## 2023-04-06 ENCOUNTER — TELEPHONE (OUTPATIENT)
Dept: INFUSION THERAPY | Facility: HOSPITAL | Age: 68
End: 2023-04-06
Payer: MEDICARE

## 2023-04-06 ENCOUNTER — ANESTHESIA (OUTPATIENT)
Dept: SURGERY | Facility: HOSPITAL | Age: 68
End: 2023-04-06
Payer: MEDICARE

## 2023-04-06 DIAGNOSIS — S21.102D OPEN WOUND OF LEFT CHEST WALL, SUBSEQUENT ENCOUNTER: ICD-10-CM

## 2023-04-06 DIAGNOSIS — R23.4 SKIN ESCHAR: ICD-10-CM

## 2023-04-06 DIAGNOSIS — S21.102A OPEN WOUND OF LEFT CHEST WALL: ICD-10-CM

## 2023-04-06 LAB
POCT GLUCOSE: 101 MG/DL (ref 70–110)
POTASSIUM SERPL-SCNC: 5.1 MMOL/L (ref 3.5–5.1)

## 2023-04-06 PROCEDURE — 97606 PR NEG PRESS WOUND THERAPY (NPWT) W/NON-DISPOSABLE WOUND VAC DEVICE (DME), >=50 CM: ICD-10-PCS | Mod: ,,, | Performed by: SURGERY

## 2023-04-06 PROCEDURE — 25000003 PHARM REV CODE 250: Performed by: SURGERY

## 2023-04-06 PROCEDURE — 36000704 HC OR TIME LEV I 1ST 15 MIN: Performed by: SURGERY

## 2023-04-06 PROCEDURE — 36000705 HC OR TIME LEV I EA ADD 15 MIN: Performed by: SURGERY

## 2023-04-06 PROCEDURE — 71000033 HC RECOVERY, INTIAL HOUR: Performed by: SURGERY

## 2023-04-06 PROCEDURE — C1729 CATH, DRAINAGE: HCPCS | Performed by: SURGERY

## 2023-04-06 PROCEDURE — 37000008 HC ANESTHESIA 1ST 15 MINUTES: Performed by: SURGERY

## 2023-04-06 PROCEDURE — 63600175 PHARM REV CODE 636 W HCPCS: Performed by: NURSE ANESTHETIST, CERTIFIED REGISTERED

## 2023-04-06 PROCEDURE — 97606 NEG PRS WND THER DME>50 SQCM: CPT | Mod: ,,, | Performed by: SURGERY

## 2023-04-06 PROCEDURE — 71000015 HC POSTOP RECOV 1ST HR: Performed by: SURGERY

## 2023-04-06 PROCEDURE — 84132 ASSAY OF SERUM POTASSIUM: CPT | Performed by: STUDENT IN AN ORGANIZED HEALTH CARE EDUCATION/TRAINING PROGRAM

## 2023-04-06 PROCEDURE — 25000003 PHARM REV CODE 250: Performed by: NURSE ANESTHETIST, CERTIFIED REGISTERED

## 2023-04-06 PROCEDURE — 37000009 HC ANESTHESIA EA ADD 15 MINS: Performed by: SURGERY

## 2023-04-06 RX ORDER — ONDANSETRON 2 MG/ML
4 INJECTION INTRAMUSCULAR; INTRAVENOUS DAILY PRN
Status: DISCONTINUED | OUTPATIENT
Start: 2023-04-06 | End: 2023-04-06 | Stop reason: HOSPADM

## 2023-04-06 RX ORDER — CEFAZOLIN SODIUM 1 G/3ML
INJECTION, POWDER, FOR SOLUTION INTRAMUSCULAR; INTRAVENOUS
Status: DISCONTINUED | OUTPATIENT
Start: 2023-04-06 | End: 2023-04-06

## 2023-04-06 RX ORDER — SODIUM CHLORIDE 9 MG/ML
INJECTION, SOLUTION INTRAVENOUS CONTINUOUS
Status: DISCONTINUED | OUTPATIENT
Start: 2023-04-06 | End: 2023-04-06 | Stop reason: HOSPADM

## 2023-04-06 RX ORDER — LIDOCAINE HYDROCHLORIDE 10 MG/ML
1 INJECTION, SOLUTION EPIDURAL; INFILTRATION; INTRACAUDAL; PERINEURAL ONCE
Status: DISCONTINUED | OUTPATIENT
Start: 2023-04-06 | End: 2023-04-06 | Stop reason: HOSPADM

## 2023-04-06 RX ORDER — PHENYLEPHRINE HYDROCHLORIDE 10 MG/ML
INJECTION INTRAVENOUS
Status: DISCONTINUED | OUTPATIENT
Start: 2023-04-06 | End: 2023-04-06

## 2023-04-06 RX ORDER — KETAMINE HCL IN 0.9 % NACL 50 MG/5 ML
SYRINGE (ML) INTRAVENOUS
Status: DISCONTINUED | OUTPATIENT
Start: 2023-04-06 | End: 2023-04-06

## 2023-04-06 RX ORDER — ONDANSETRON 2 MG/ML
INJECTION INTRAMUSCULAR; INTRAVENOUS
Status: DISCONTINUED | OUTPATIENT
Start: 2023-04-06 | End: 2023-04-06

## 2023-04-06 RX ORDER — HYDROMORPHONE HYDROCHLORIDE 2 MG/ML
0.2 INJECTION, SOLUTION INTRAMUSCULAR; INTRAVENOUS; SUBCUTANEOUS EVERY 5 MIN PRN
Status: DISCONTINUED | OUTPATIENT
Start: 2023-04-06 | End: 2023-04-06 | Stop reason: HOSPADM

## 2023-04-06 RX ORDER — PROPOFOL 10 MG/ML
VIAL (ML) INTRAVENOUS
Status: DISCONTINUED | OUTPATIENT
Start: 2023-04-06 | End: 2023-04-06

## 2023-04-06 RX ORDER — ACETAMINOPHEN 10 MG/ML
1000 INJECTION, SOLUTION INTRAVENOUS ONCE
Status: DISCONTINUED | OUTPATIENT
Start: 2023-04-06 | End: 2023-04-06 | Stop reason: HOSPADM

## 2023-04-06 RX ORDER — FENTANYL CITRATE 50 UG/ML
INJECTION, SOLUTION INTRAMUSCULAR; INTRAVENOUS
Status: DISCONTINUED | OUTPATIENT
Start: 2023-04-06 | End: 2023-04-06

## 2023-04-06 RX ORDER — MIDAZOLAM HYDROCHLORIDE 1 MG/ML
INJECTION INTRAMUSCULAR; INTRAVENOUS
Status: DISCONTINUED | OUTPATIENT
Start: 2023-04-06 | End: 2023-04-06

## 2023-04-06 RX ORDER — CEFAZOLIN SODIUM 2 G/50ML
2 SOLUTION INTRAVENOUS
Status: DISCONTINUED | OUTPATIENT
Start: 2023-04-06 | End: 2023-04-06 | Stop reason: HOSPADM

## 2023-04-06 RX ADMIN — PHENYLEPHRINE HYDROCHLORIDE 200 MCG: 10 INJECTION INTRAVENOUS at 08:04

## 2023-04-06 RX ADMIN — PHENYLEPHRINE HYDROCHLORIDE 100 MCG: 10 INJECTION INTRAVENOUS at 08:04

## 2023-04-06 RX ADMIN — SODIUM CHLORIDE: 0.9 INJECTION, SOLUTION INTRAVENOUS at 08:04

## 2023-04-06 RX ADMIN — FENTANYL CITRATE 25 MCG: 50 INJECTION, SOLUTION INTRAMUSCULAR; INTRAVENOUS at 08:04

## 2023-04-06 RX ADMIN — Medication 10 MG: at 08:04

## 2023-04-06 RX ADMIN — PROPOFOL 100 MG: 10 INJECTION, EMULSION INTRAVENOUS at 08:04

## 2023-04-06 RX ADMIN — PHENYLEPHRINE HYDROCHLORIDE 100 MCG: 10 INJECTION INTRAVENOUS at 09:04

## 2023-04-06 RX ADMIN — CEFAZOLIN 2 G: 330 INJECTION, POWDER, FOR SOLUTION INTRAMUSCULAR; INTRAVENOUS at 08:04

## 2023-04-06 RX ADMIN — Medication 20 MG: at 08:04

## 2023-04-06 RX ADMIN — MIDAZOLAM HYDROCHLORIDE 1 MG: 1 INJECTION INTRAMUSCULAR; INTRAVENOUS at 08:04

## 2023-04-06 RX ADMIN — ONDANSETRON 4 MG: 2 INJECTION INTRAMUSCULAR; INTRAVENOUS at 08:04

## 2023-04-06 NOTE — TRANSFER OF CARE
"Anesthesia Transfer of Care Note    Patient: Malaika Paredes    Procedure(s) Performed: Procedure(s) (LRB):  APPLICATION, WOUND VAC (Left)    Patient location: PACU    Anesthesia Type: general    Post pain: adequate analgesia    Post assessment: no apparent anesthetic complications    Post vital signs: stable    Level of consciousness: sedated    Nausea/Vomiting: no nausea/vomiting    Complications: none    Transfer of care protocol was followed      Last vitals:   Visit Vitals  BP (!) 183/79 (BP Location: Right arm, Patient Position: Sitting)   Pulse 84   Temp 36.5 °C (97.7 °F) (Temporal)   Resp 18   Ht 5' 2" (1.575 m)   Wt 84.9 kg (187 lb 2.7 oz)   LMP  (LMP Unknown)   SpO2 96%   Breastfeeding No   BMI 34.23 kg/m²     "

## 2023-04-06 NOTE — TELEPHONE ENCOUNTER
----- Message from Deanne Slater sent at 3/31/2023  2:55 PM CDT -----  Contact: Sean / Carisa  Patients carisa Estrada is calling to speak with the nurse in regards to appt . Needing to reschedule infusion appt to 04/19/2023. Please give Sean a callback at [phone].

## 2023-04-06 NOTE — ANESTHESIA PREPROCEDURE EVALUATION
04/06/2023  Malaika Paredes is a 67 y.o., female.    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    Lattice degeneration of right retina    Uncontrolled diabetes mellitus with both eyes affected by proliferative retinopathy and macular edema, with long-term current use of insulin    NS (nuclear sclerosis), left    Type 2 diabetes mellitus, with long-term current use of insulin    ESRD (end stage renal disease)    COPD (chronic obstructive pulmonary disease)    Macrocytic anemia    Chronic kidney disease-mineral and bone disorder    Anemia associated with chronic renal failure    Diastolic dysfunction    Status post cataract extraction and insertion of intraocular lens of right eye    Malignant neoplasm of upper-outer quadrant of left breast in female, estrogen receptor positive    Severe obesity (BMI 35.0-39.9) with comorbidity    Immunodeficiency due to chemotherapy    Peripheral neuropathy due to chemotherapy    Primary hypertension    Hyperparathyroidism, unspecified    Other thrombophilia    Type 2 diabetes mellitus with diabetic peripheral angiopathy without gangrene, with long-term current use of insulin    Ischemic ulcer of toe of left foot    Preoperative examination    CAD (coronary artery disease)    Lymphadenopathy, inguinal    Skin eschar     Past Medical History:   Diagnosis Date    CAD (coronary artery disease)     Cataract     Cataract     CHF (congestive heart failure)     COPD (chronic obstructive pulmonary disease)     Diabetes mellitus     Diabetic retinopathy     ESRD (end stage renal disease)     TTS    Hypertension     Ischemic ulcer of toe of left foot     Malignant neoplasm of upper-outer quadrant of left breast in female, estrogen  receptor positive 06/20/2022    left    PAD (peripheral artery disease)     PAF (paroxysmal atrial fibrillation)      Past Surgical History:   Procedure Laterality Date    AXILLARY NODE DISSECTION Left 3/1/2023    Procedure: LYMPHADENECTOMY, AXILLARY;  Surgeon: Jayjay Arana MD;  Location: [facility] OR;  Service: General;  Laterality: Left;    BIOPSY OF INGUINAL LYMPH NODE Left 3/31/2023    Procedure: BIOPSY, LYMPH NODE, INGUINAL;  Surgeon: Jayjay Arana MD;  Location: [facility] OR;  Service: General;  Laterality: Left;    BREAST BIOPSY Right     benign    CATARACT EXTRACTION W/  INTRAOCULAR LENS IMPLANT Right 08/14/2017    Dr. Moe    CORONARY ARTERY BYPASS GRAFT  2020    FLUOROSCOPY N/A 07/25/2022    Procedure: FLUOROSCOPY/mediport placement;  Surgeon: Cole Perdomo MD;  Location: [facility] CATH LAB;  Service: General;  Laterality: N/A;    MEDIPORT REMOVAL N/A 3/1/2023    Procedure: REMOVAL, CATHETER, CENTRAL VENOUS, TUNNELED, WITH PORT;  Surgeon: Jayjay Arana MD;  Location: [facility] OR;  Service: General;  Laterality: N/A;    MODIFIED RADICAL MASTECTOMY W/ AXILLARY LYMPH NODE DISSECTION Left 3/1/2023    Procedure: MASTECTOMY, MODIFIED RADICAL;  Surgeon: Jayjay Arana MD;  Location: [facility] OR;  Service: General;  Laterality: Left;    WOUND DEBRIDEMENT Left 3/31/2023    Procedure: DEBRIDEMENT, WOUND;  Surgeon: Jayjay Arana MD;  Location: [facility] OR;  Service: General;  Laterality: Left;  left chest wall eschar debridement         Chemistry        Component Value Date/Time     03/02/2023 0817    K 4.9 03/31/2023 1143     03/02/2023 0817    CO2 14 (L) 03/02/2023 0817    BUN 34 (H) 03/02/2023 0817    CREATININE 5.4 (H) 03/02/2023 0817     (H) 03/02/2023 0817        Component Value Date/Time    CALCIUM 8.5 (L) 03/02/2023 0817    ALKPHOS 57 02/08/2023 1138    AST 15 02/08/2023 1138    ALT 11 02/08/2023 1138    BILITOT 0.3 02/08/2023 1138    ESTGFRAFRICA 8 (A) 07/27/2022 0757    EGFRNONAA 7 (A) 07/27/2022 0757         Lab Results   Component Value Date    WBC 8.14 03/02/2023    HGB 11.7 (L) 03/02/2023    HCT 35.9 (L) 03/02/2023    MCV 94 03/02/2023     03/02/2023       Pre-op Assessment    I have reviewed the Patient Summary Reports.    I have reviewed the NPO Status.   I have reviewed the Medications.     Review of Systems  Anesthesia Hx:  No problems with previous Anesthesia  History of prior surgery of interest to airway management or planning: Previous anesthesia: General, MAC  Denies Personal Hx of Anesthesia complications.   Social:  Former Smoker    Hematology/Oncology:         -- Anemia: Hematology Comments: 11.7/35.9 (chronic anemia) Oncology Comments: Malignant neoplasm of upper-outer quadrant of left breast in female, estrogen receptor positive - now with area of poor healing      EENT/Dental:   Diabetic retinopathy   Cardiovascular:   Hypertension CAD asymptomatic CABG/stent  CHF PVD ECG has been reviewed. PAF (paroxysmal atrial fibrillation)    CORONARY ARTERY BYPASS GRAFT   Pulmonary:   COPD, mild    Renal/:   Chronic Renal Disease, ESRD H/D on Tu/TH/Sat - last dialysis on Tuesday of this week   Hepatic/GI:  Hepatic/GI Normal    Neurological:   Peripheral Neuropathy    Endocrine:   Diabetes, type 2 Hyperparathyroidism Obesity / BMI > 30      Physical Exam  General: Well nourished, Cooperative, Alert and Oriented  Denies any recent LE swelling; denies any new SOB  Airway:  Mallampati: II   Mouth Opening: Normal  TM Distance: Normal  Neck ROM: Normal ROM    Dental:  Edentulous    Chest/Lungs:  Normal Respiratory Rate        Anesthesia Plan  Type of Anesthesia, risks & benefits discussed:    Anesthesia Type: Gen Supraglottic Airway, MAC  Intra-op Monitoring Plan: Standard ASA Monitors  Post Op Pain Control Plan: multimodal analgesia and IV/PO Opioids PRN  Induction:  IV  Airway Plan: , Post-Induction  Informed Consent: Informed consent signed with the Patient and all parties understand the risks and  agree with anesthesia plan.  All questions answered.   ASA Score: 4  Day of Surgery Review of History & Physical: H&P Update referred to the surgeon/provider.  Anesthesia Plan Notes: Intubation:     Induction:  Intravenous    Intubated:  Postinduction    Mask Ventilation:  Easy mask    Attempts:  1    Attempted By:  CRNA    Method of Intubation:  Direct    Blade:  Zeyad 3    Laryngeal View Grade: Grade I - full view of cords      Difficult Airway Encountered?: No      Complications:  None    Airway Device:  Oral endotracheal tube    Airway Device Size:  7.5    Style/Cuff Inflation:  Cuffed (inflated to minimal occlusive pressure)    Tube secured:  22    Secured at:  The lips    Placement Verified By:  Capnometry    Complicating Factors:  None    Findings Post-Intubation:  BS equal bilateral    Ready For Surgery From Anesthesia Perspective.     .        Chemistry        Component Value Date/Time     03/02/2023 0817    K 4.9 03/31/2023 1143     03/02/2023 0817    CO2 14 (L) 03/02/2023 0817    BUN 34 (H) 03/02/2023 0817    CREATININE 5.4 (H) 03/02/2023 0817     (H) 03/02/2023 0817        Component Value Date/Time    CALCIUM 8.5 (L) 03/02/2023 0817    ALKPHOS 57 02/08/2023 1138    AST 15 02/08/2023 1138    ALT 11 02/08/2023 1138    BILITOT 0.3 02/08/2023 1138    ESTGFRAFRICA 8 (A) 07/27/2022 0757    EGFRNONAA 7 (A) 07/27/2022 0757        Lab Results   Component Value Date    WBC 8.14 03/02/2023    HGB 11.7 (L) 03/02/2023    HCT 35.9 (L) 03/02/2023    MCV 94 03/02/2023     03/02/2023       Normal sinus rhythm   Left anterior fascicular block   LVH with QRS widening   Nonspecific T wave abnormality   When compared with ECG of 14-SEP-2022 11:51,   No significant change was found   Confirmed by CLARI LEE MD ([id number]) on 2/8/2023 6:32:01 PM     Echo 1/4/23:   The left ventricle is normal in size with normal systolic function.   The estimated ejection fraction is 60%.   Grade I left  ventricular diastolic dysfunction.   Normal right ventricular size with normal right ventricular systolic function.   Mild left atrial enlargement.   Mild to moderate tricuspid regurgitation.   There is pulmonary hypertension.   The estimated PA systolic pressure is 46 mmHg.

## 2023-04-06 NOTE — ANESTHESIA PROCEDURE NOTES
Intubation    Date/Time: 4/6/2023 8:31 AM  Performed by: Stefanie Montgomery CRNA  Authorized by: Fady Castro MD     Intubation:     Induction:  Intravenous    Mask Ventilation:  Easy mask    Attempts:  1    Attempted By:  CRNA    Difficult Airway Encountered?: No      Complications:  None    Airway Device:  Supraglottic airway/LMA    Airway Device Size:  4.0    Placement Verified By:  Capnometry    Complicating Factors:  None    Findings Post-Intubation:  BS equal bilateral and atraumatic/condition of teeth unchanged

## 2023-04-06 NOTE — DISCHARGE SUMMARY
[facility] - Surgery (Hospital)  Discharge Note  Short Stay    Procedure(s) (LRB):  APPLICATION, WOUND VAC (Left)      OUTCOME: Patient tolerated treatment/procedure well without complication and is now ready for discharge.    DISPOSITION: Home or Self Care    FINAL DIAGNOSIS:  Open wound of left chest wall    FOLLOWUP: In clinic    DISCHARGE INSTRUCTIONS:    Discharge Procedure Orders   Diet general     Wound care routine (specify)   Order Comments: Wound care routine:  Wound VAC changes as scheduled with home health     Call MD for:  temperature >100.4     Call MD for:  persistent nausea and vomiting     Call MD for:  severe uncontrolled pain     Call MD for:  difficulty breathing, headache or visual disturbances     Call MD for:  redness, tenderness, or signs of infection (pain, swelling, redness, odor or green/yellow discharge around incision site)     Call MD for:  hives     Call MD for:  persistent dizziness or light-headedness     Call MD for:  extreme fatigue     Activity as tolerated        TIME SPENT ON DISCHARGE: 30 minutes

## 2023-04-06 NOTE — OP NOTE
[facility] - Surgery ([facility])  Surgery Department  Operative Note    SUMMARY     Date of Procedure: 4/6/2023     Procedure:  Left chest wall wound VAC placement    Surgeon(s) and Role:     * Jayjay Arana MD - Primary    Assistant: RAMAN Lane     Pre-Operative Diagnosis: Skin eschar [R23.4]  Open wound of left chest wall, subsequent encounter [S21.102D]    Post-Operative Diagnosis: Post-Op Diagnosis Codes:     * Skin eschar [R23.4]     * Open wound of left chest wall, subsequent encounter [S21.102D]    Anesthesia: Monitor Anesthesia Care    Operative Findings (including complications, if any):  Left chest wall wound VAC placement    Description of Technical Procedures:  Wound VAC applied to open wound left chest wall    Significant Surgical Tasks Conducted by the Assistant(s), if Applicable:  Assistance with wound VAC placement    Estimated Blood Loss (EBL): 10cc           Implants: * No implants in log *    Specimens:   Specimen (24h ago, onward)      None                    Condition: Good    Disposition: PACU - hemodynamically stable.    Procedure in detail:  Patient was taken to the OR and underwent general anesthesia.  Her left chest wall was prepped and draped in usual sterile fashion.  Wound was irrigated in thoroughly cleared.  Granulation tissue was noted.  The wound measured approximately 15 x 8 cm by 4 cm.  A wound VAC sponge was placed into the defect and secured.  Good suction was noted.  The patient tolerated procedure and was transferred to recovery postoperatively.

## 2023-04-07 NOTE — ANESTHESIA POSTPROCEDURE EVALUATION
Anesthesia Post Evaluation    Patient: Malaika Paredes    Procedure(s) Performed: Procedure(s) (LRB):  APPLICATION, WOUND VAC (Left)    Final Anesthesia Type: general      Patient location during evaluation: PACU  Patient participation: Yes- Able to Participate  Level of consciousness: awake  Post-procedure vital signs: reviewed and stable  Pain management: adequate  Airway patency: patent    PONV status at discharge: No PONV  Anesthetic complications: no      Cardiovascular status: hemodynamically stable  Respiratory status: unassisted  Hydration status: euvolemic  Follow-up not needed.          Vitals Value Taken Time   /69 04/06/23 0955   Temp 36.6 °C (97.8 °F) 04/06/23 0955   Pulse 77 04/06/23 0955   Resp 15 04/06/23 0955   SpO2 98 % 04/06/23 0955         Event Time   Out of Recovery 09:49:05         Pain/Matt Score: Matt Score: 10 (4/6/2023  9:56 AM)

## 2023-04-09 LAB — BACTERIA SPEC ANAEROBE CULT: NORMAL

## 2023-04-10 VITALS
HEIGHT: 62 IN | BODY MASS INDEX: 34.45 KG/M2 | HEART RATE: 77 BPM | WEIGHT: 187.19 LBS | TEMPERATURE: 98 F | RESPIRATION RATE: 15 BRPM | DIASTOLIC BLOOD PRESSURE: 69 MMHG | SYSTOLIC BLOOD PRESSURE: 159 MMHG | OXYGEN SATURATION: 98 %

## 2023-04-10 NOTE — PROGRESS NOTES
The decision was made at the time of the last surgical procedure on April 6 2 proceed with wound VAC placement to induce negative pressure wound therapy.      NPWT was deemed medically necessary to aid in the formation of granulation tissue, which cannot be achieved by other traditional wound healing methods like wet-to-dry dressings due to risk for delayed and decreased healing for this patient.  Comorbidities contributing to this include obesity, type 2 diabetes, end-stage renal disease, high risk of infection, and recent chemotherapy.    The patient has adequate nutritional for wound healing.     Akilah Estrada PA-C  [facility]

## 2023-04-12 ENCOUNTER — TELEPHONE (OUTPATIENT)
Dept: SURGERY | Facility: CLINIC | Age: 68
End: 2023-04-12
Payer: MEDICARE

## 2023-04-12 NOTE — TELEPHONE ENCOUNTER
----- Message from Mandie Amaya sent at 4/12/2023  2:20 PM CDT -----  Regarding: please advise  Who Called:Anoop          What is the reqeust in detail: Requesting call back to discuss rescheduling appt that was missed today. Please advise         Can the clinic reply by [facility]?no         Best Call Back Number:[phone]           Additional Information:

## 2023-04-12 NOTE — TELEPHONE ENCOUNTER
Returned call to patient's niece and rescheduled today's missed appointment. They are aware of date, time and location.

## 2023-04-14 ENCOUNTER — PATIENT MESSAGE (OUTPATIENT)
Dept: SURGERY | Facility: HOSPITAL | Age: 68
End: 2023-04-14
Payer: MEDICARE

## 2023-04-17 ENCOUNTER — TELEPHONE (OUTPATIENT)
Dept: SURGERY | Facility: CLINIC | Age: 68
End: 2023-04-17
Payer: MEDICARE

## 2023-04-17 ENCOUNTER — OFFICE VISIT (OUTPATIENT)
Dept: SURGERY | Facility: CLINIC | Age: 68
End: 2023-04-17
Payer: MEDICARE

## 2023-04-17 VITALS
SYSTOLIC BLOOD PRESSURE: 145 MMHG | HEIGHT: 62 IN | HEART RATE: 78 BPM | TEMPERATURE: 98 F | WEIGHT: 187.63 LBS | DIASTOLIC BLOOD PRESSURE: 74 MMHG | BODY MASS INDEX: 34.53 KG/M2

## 2023-04-17 DIAGNOSIS — R59.0 LYMPHADENOPATHY, INGUINAL: ICD-10-CM

## 2023-04-17 DIAGNOSIS — S21.102D OPEN WOUND OF LEFT CHEST WALL, SUBSEQUENT ENCOUNTER: ICD-10-CM

## 2023-04-17 DIAGNOSIS — Z17.0 MALIGNANT NEOPLASM OF UPPER-OUTER QUADRANT OF LEFT BREAST IN FEMALE, ESTROGEN RECEPTOR POSITIVE: Primary | ICD-10-CM

## 2023-04-17 DIAGNOSIS — C50.412 MALIGNANT NEOPLASM OF UPPER-OUTER QUADRANT OF LEFT BREAST IN FEMALE, ESTROGEN RECEPTOR POSITIVE: Primary | ICD-10-CM

## 2023-04-17 PROCEDURE — 1160F RVW MEDS BY RX/DR IN RCRD: CPT | Mod: CPTII,S$GLB,, | Performed by: SURGERY

## 2023-04-17 PROCEDURE — 3008F PR BODY MASS INDEX (BMI) DOCUMENTED: ICD-10-PCS | Mod: CPTII,S$GLB,, | Performed by: SURGERY

## 2023-04-17 PROCEDURE — 1157F ADVNC CARE PLAN IN RCRD: CPT | Mod: CPTII,S$GLB,, | Performed by: SURGERY

## 2023-04-17 PROCEDURE — 4010F PR ACE/ARB THEARPY RXD/TAKEN: ICD-10-PCS | Mod: CPTII,S$GLB,, | Performed by: SURGERY

## 2023-04-17 PROCEDURE — 3077F SYST BP >= 140 MM HG: CPT | Mod: CPTII,S$GLB,, | Performed by: SURGERY

## 2023-04-17 PROCEDURE — 3008F BODY MASS INDEX DOCD: CPT | Mod: CPTII,S$GLB,, | Performed by: SURGERY

## 2023-04-17 PROCEDURE — 3078F PR MOST RECENT DIASTOLIC BLOOD PRESSURE < 80 MM HG: ICD-10-PCS | Mod: CPTII,S$GLB,, | Performed by: SURGERY

## 2023-04-17 PROCEDURE — 3078F DIAST BP <80 MM HG: CPT | Mod: CPTII,S$GLB,, | Performed by: SURGERY

## 2023-04-17 PROCEDURE — 1126F PR PAIN SEVERITY QUANTIFIED, NO PAIN PRESENT: ICD-10-PCS | Mod: CPTII,S$GLB,, | Performed by: SURGERY

## 2023-04-17 PROCEDURE — 3044F PR MOST RECENT HEMOGLOBIN A1C LEVEL <7.0%: ICD-10-PCS | Mod: CPTII,S$GLB,, | Performed by: SURGERY

## 2023-04-17 PROCEDURE — 1159F PR MEDICATION LIST DOCUMENTED IN MEDICAL RECORD: ICD-10-PCS | Mod: CPTII,S$GLB,, | Performed by: SURGERY

## 2023-04-17 PROCEDURE — 99024 PR POST-OP FOLLOW-UP VISIT: ICD-10-PCS | Mod: S$GLB,,, | Performed by: SURGERY

## 2023-04-17 PROCEDURE — 99024 POSTOP FOLLOW-UP VISIT: CPT | Mod: S$GLB,,, | Performed by: SURGERY

## 2023-04-17 PROCEDURE — 1157F PR ADVANCE CARE PLAN OR EQUIV PRESENT IN MEDICAL RECORD: ICD-10-PCS | Mod: CPTII,S$GLB,, | Performed by: SURGERY

## 2023-04-17 PROCEDURE — 1159F MED LIST DOCD IN RCRD: CPT | Mod: CPTII,S$GLB,, | Performed by: SURGERY

## 2023-04-17 PROCEDURE — 1160F PR REVIEW ALL MEDS BY PRESCRIBER/CLIN PHARMACIST DOCUMENTED: ICD-10-PCS | Mod: CPTII,S$GLB,, | Performed by: SURGERY

## 2023-04-17 PROCEDURE — 99999 PR PBB SHADOW E&M-EST. PATIENT-LVL IV: CPT | Mod: PBBFAC,,, | Performed by: SURGERY

## 2023-04-17 PROCEDURE — 1126F AMNT PAIN NOTED NONE PRSNT: CPT | Mod: CPTII,S$GLB,, | Performed by: SURGERY

## 2023-04-17 PROCEDURE — 4010F ACE/ARB THERAPY RXD/TAKEN: CPT | Mod: CPTII,S$GLB,, | Performed by: SURGERY

## 2023-04-17 PROCEDURE — 3044F HG A1C LEVEL LT 7.0%: CPT | Mod: CPTII,S$GLB,, | Performed by: SURGERY

## 2023-04-17 PROCEDURE — 3077F PR MOST RECENT SYSTOLIC BLOOD PRESSURE >= 140 MM HG: ICD-10-PCS | Mod: CPTII,S$GLB,, | Performed by: SURGERY

## 2023-04-17 PROCEDURE — 99999 PR PBB SHADOW E&M-EST. PATIENT-LVL IV: ICD-10-PCS | Mod: PBBFAC,,, | Performed by: SURGERY

## 2023-04-17 NOTE — TELEPHONE ENCOUNTER
Called [facility] for them to come out to pt's house to change wound vac. Called [phone] and spoke with Lore. She verbalized this pt has an appt today for wound vac change.

## 2023-04-17 NOTE — PROGRESS NOTES
History & Physical    SUBJECTIVE:     History of Present Illness:  Patient is a 67 y.o. female status post left breast mastectomy with axillary dissection 2023 status post left inguinal lymph node biopsy and left chest wall debridement 3/31 presents for postop wound check.  She denies any pain or changes.  She missed her most recent wound VAC change.    presents for drain removal and follow-up.  She denies any pain.    presents for follow up PET scan/mammogram and preop. She denies any changes since last visit. Her case was presented at tumor board with decision to move forward with surgical therapy.    presents to discuss next step in breast cancer treatment following neoadjuvant chemotherapy. She did not complete last cycle due to neuropathy. She reports decrease in mass size of the breast.     presents to discuss left breast biopsy.  Her left breast and axillary biopsy were showed invasive ductal carcinoma ER+/WY+ Her2+ with metastatic disease to the lymph node.    Initially referred for abnormal mammogram of the left breast. She denies any breast mass, nipple discharge, breast pain or other changes. No prior breast surgery. Prior right breast biopsy benign. Family history of breast cancer in her sister. Menarche age 12,  1st at 16, menopause at 48. No HRT.     No chief complaint on file.        Review of patient's allergies indicates:  No Known Allergies    Current Outpatient Medications   Medication Sig Dispense Refill    acetaminophen (TYLENOL) 500 mg Cap Take 500 mg by mouth every 6 (six) hours as needed.      albuterol sulfate (PROAIR RESPICLICK) 90 mcg/actuation AePB Inhale 180 mcg into the lungs every 4 (four) hours. Rescue 1 each 3    amLODIPine (NORVASC) 10 MG tablet Take 10 mg by mouth once daily.      aspirin (ECOTRIN) 81 MG EC tablet Take 81 mg by mouth once daily.       atorvastatin (LIPITOR) 10 MG tablet Take 10 mg by mouth once daily.      budesonide-glycopyr-formoterol (BREZTRI  AEROSPHERE) 160-9-4.8 mcg/actuation HFAA Inhale 1 puff into the lungs once daily.      calcium carbonate (OS-CAMERON) 500 mg calcium (1,250 mg) chewable tablet Take 1 tablet by mouth 2 (two) times daily as needed for Heartburn.      docusate sodium (COLACE) 100 MG capsule Take 1 capsule (100 mg total) by mouth 2 (two) times daily as needed for Constipation. 20 capsule 0    insulin degludec (TRESIBA FLEXTOUCH U-200) 200 unit/mL (3 mL) insulin pen Inject 60 Units into the skin once daily.      levoFLOXacin (LEVAQUIN) 250 MG tablet Take 250 mg by mouth once daily.      levothyroxine (TIROSINT) 88 mcg Cap Take 75 mcg by mouth before breakfast.      loperamide (IMODIUM) 2 mg capsule Take 2 mg by mouth 4 (four) times daily as needed for Diarrhea.      losartan (COZAAR) 25 MG tablet Take 25 mg by mouth once daily.      metoprolol succinate (TOPROL-XL) 25 MG 24 hr tablet Take 25 mg by mouth 2 (two) times daily.      omeprazole (PRILOSEC) 20 MG capsule Take 20 mg by mouth once daily.      ondansetron (ZOFRAN-ODT) 4 MG TbDL Dissolve 1 tablet (4 mg total) by mouth every 8 (eight) hours as needed (nausea). 30 tablet 0    oxyCODONE (ROXICODONE) 5 MG immediate release tablet Take 1 tablet (5 mg total) by mouth every 6 (six) hours as needed for Pain. 10 tablet 0    prednisoLONE acetate (PRED FORTE) 1 % DrpS Place 1 drop into both eyes 4 (four) times daily.      sevelamer HCL (RENAGEL) 800 MG Tab Take 3 tablets by mouth 3 (three) times daily.      sodium bicarbonate 650 MG tablet Take 650 mg by mouth 2 (two) times daily.      ketoconazole (NIZORAL) 2 % cream Apply topically once daily. for 14 days 1 each 1     No current facility-administered medications for this visit.       Past Medical History:   Diagnosis Date    CAD (coronary artery disease)     Cataract     Cataract     CHF (congestive heart failure)     COPD (chronic obstructive pulmonary disease)     Diabetes mellitus     Diabetic retinopathy     ESRD (end stage renal disease)      TTS    Hypertension     Ischemic ulcer of toe of left foot     Malignant neoplasm of upper-outer quadrant of left breast in female, estrogen receptor positive 06/20/2022    left    PAD (peripheral artery disease)     PAF (paroxysmal atrial fibrillation)      Past Surgical History:   Procedure Laterality Date    APPLICATION OF WOUND VACUUM-ASSISTED CLOSURE DEVICE Left 4/6/2023    Procedure: APPLICATION, WOUND VAC;  Surgeon: Jayjay Arana MD;  Location: [facility] OR;  Service: General;  Laterality: Left;  left chest    AXILLARY NODE DISSECTION Left 3/1/2023    Procedure: LYMPHADENECTOMY, AXILLARY;  Surgeon: Jayjay Arana MD;  Location: [facility] OR;  Service: General;  Laterality: Left;    BIOPSY OF INGUINAL LYMPH NODE Left 3/31/2023    Procedure: BIOPSY, LYMPH NODE, INGUINAL;  Surgeon: Jayjay Arana MD;  Location: [facility] OR;  Service: General;  Laterality: Left;    BREAST BIOPSY Right     benign    CATARACT EXTRACTION W/  INTRAOCULAR LENS IMPLANT Right 08/14/2017    Dr. Moe    CORONARY ARTERY BYPASS GRAFT  2020    FLUOROSCOPY N/A 07/25/2022    Procedure: FLUOROSCOPY/mediport placement;  Surgeon: Cole Perdomo MD;  Location: [facility] CATH LAB;  Service: General;  Laterality: N/A;    MEDIPORT REMOVAL N/A 3/1/2023    Procedure: REMOVAL, CATHETER, CENTRAL VENOUS, TUNNELED, WITH PORT;  Surgeon: Jayjay Arana MD;  Location: [facility] OR;  Service: General;  Laterality: N/A;    MODIFIED RADICAL MASTECTOMY W/ AXILLARY LYMPH NODE DISSECTION Left 3/1/2023    Procedure: MASTECTOMY, MODIFIED RADICAL;  Surgeon: Jayjay Arana MD;  Location: [facility] OR;  Service: General;  Laterality: Left;    WOUND DEBRIDEMENT Left 3/31/2023    Procedure: DEBRIDEMENT, WOUND;  Surgeon: Jayjay Arana MD;  Location: [facility] OR;  Service: General;  Laterality: Left;  left chest wall eschar debridement     Family History   Problem Relation Age of Onset    Hypertension Mother     Cancer Father     Breast cancer Sister     Diabetes Sister     Cancer Brother     Amblyopia  "Neg Hx     Blindness Neg Hx     Cataracts Neg Hx     Glaucoma Neg Hx     Macular degeneration Neg Hx     Retinal detachment Neg Hx     Strabismus Neg Hx     Stroke Neg Hx     Thyroid disease Neg Hx      Social History     Tobacco Use    Smoking status: Former     Years: 30.00     Types: Cigarettes     Quit date: 6/21/2012     Years since quitting: 10.8    Smokeless tobacco: Never   Substance Use Topics    Alcohol use: No    Drug use: Never        Review of Systems:  Review of Systems   Constitutional:  Negative for chills, fatigue, fever and unexpected weight change.   Respiratory:  Negative for cough, shortness of breath, wheezing and stridor.    Cardiovascular:  Negative for chest pain, palpitations and leg swelling.   Gastrointestinal:  Negative for abdominal distention, abdominal pain, constipation, diarrhea, nausea and vomiting.   Genitourinary:  Negative for difficulty urinating, dysuria, frequency, hematuria and urgency.   Skin:  Negative for color change, pallor, rash and wound.   Hematological:  Does not bruise/bleed easily.     OBJECTIVE:     Vital Signs (Most Recent)  Temp: 98.3 °F (36.8 °C) (04/17/23 1037)  Pulse: 78 (04/17/23 1037)  BP: (!) 145/74 (04/17/23 1037)  5' 2" (1.575 m)  85.1 kg (187 lb 9.8 oz)     Physical Exam:  Physical Exam  Vitals reviewed.   Constitutional:       General: She is not in acute distress.     Appearance: She is well-developed.   HENT:      Head: Normocephalic and atraumatic.      Right Ear: External ear normal.      Left Ear: External ear normal.      Nose: Nose normal.      Mouth/Throat:      Mouth: Mucous membranes are moist.   Eyes:      Extraocular Movements: Extraocular movements intact.      Conjunctiva/sclera: Conjunctivae normal.   Cardiovascular:      Rate and Rhythm: Normal rate.   Pulmonary:      Effort: Pulmonary effort is normal. No respiratory distress.   Chest:       Musculoskeletal:      Cervical back: Neck supple.   Skin:     General: Skin is warm and dry. "          Neurological:      Mental Status: She is alert and oriented to person, place, and time.   Psychiatric:         Behavior: Behavior normal.       Diagnostic Results:  Result:   Mammo Digital Diagnostic Bilat with Alfredo  US Breast Left Limited     History:  Patient is 66 y.o. and is seen for diagnostic imaging.     Films Compared:  Prior images (if available) were compared.     Findings:  This procedure was performed using tomosynthesis. Computer-aided detection was utilized in the interpretation of this examination.  The breasts have scattered areas of fibroglandular density.      Mammo Digital Diagnostic Bilat with Alfredo  Left  Mass: There is a 4.9 cm irregularly shaped mass with spiculated margins seen in the upper outer quadrant of the left breast in the anterior depth. The mass correlates with the palpable mass reported by the patient. Associated features include skin retraction. There are also associated calcifications.   Lymph Node: There are multiple similar lymph nodes seen in the left axilla.      Right  There is no evidence of suspicious masses, calcifications, or other abnormal findings in the right breast.     US Breast Left Limited  Left  Mass: There is a 5.6 cm x 4 cm x 4.3 cm irregularly shaped, non-parallel, hypoechoic mass with spiculated margins seen in the left breast at 2 o'clock, 3 cm from the nipple.   Lymph Node: There are multiple similar lymph nodes seen in the left axilla. Largest node is 3.4 cm x 1.6 cm x 2.0 cm.      Impression:  Left  Mass: Left breast 5.6 cm x 4 cm x 4.3 cm mass at the 2 o'clock position. Assessment: 5 - Highly suggestive of malignancy. Ultrasound-guided biopsy is recommended.   Lymph Node: Left axilla lymph node (multiple findings). Assessment: 5 - Highly suggestive of malignancy. Ultrasound-guided biopsy is recommended.      Right  There is no mammographic or sonographic evidence of malignancy in the right breast.     BI-RADS Category:   Overall: 5 - Highly  Suggestive of Malignancy     Recommendation:  Ultrasound-guided biopsy is recommended.  Ultrasound-guided biopsy is recommended.     Your estimated lifetime risk of breast cancer (to age 85) based on Tyrer-Cuzick risk assessment model is Tyrer-Cuzick: 6.49 %. According to the [employer] Society, patients with a lifetime breast cancer risk of 20% or higher might benefit from supplemental screening tests.    Final Pathologic Diagnosis Part 1   Left axilla, ultrasound-guided core biopsy:   Positive for metastatic carcinoma   Metastasis measures 10 mm in greatest dimension   Part 2   Left breast, 2:00, ultrasound-guided core biopsy:   Invasive ductal carcinoma   Medford grade 2:  Tubule formation -3, nuclear pleomorphism-2 and mitotic   count-2   Invasive carcinoma measures 17 mm in greatest dimension   No carcinoma in Situ or lymphovascular invasion identified   Tumor biomarkers   Estrogen receptor (ER):  Positive, greater than 95%, strong   Progesterone receptor (PGR):  Positive, 80%, strong   HER2 (IHC):  Equivocal, 2+   Ki-67:  60%   Additional IHC:   P63:  Negative throughout, supporting the diagnosis of invasive carcinoma   This case was reviewed by Dr. GABBY Cannon who concurs with the above diagnosis.  VC      Comment: Interp By Bette Pitts M.D., Signed on 06/21/2022 at 12:02   Supplemental Diagnosis HER2, Breast Tumor, FISH, Tissue   Result Summary   POSITIVE   Interpretation   This tumor sample is positive for HER2 (ERBB2) gene amplification.   Result   nuc erwin([id number]¿4,HER2x5¿8)   HER2/D17Z1 ratio: 2.03   Average HER2 signals per cell: 6.3   Average D17Z1 signals per cell: 3.1         PET/CT:  Impression:     Interval improvement.  Left breast mass demonstrates decreased in size in decreased hypermetabolic activity.  Left pectoralis and axillary lymphadenopathy also demonstrates marked significant decreased activity.  Residual nonspecific mildly hypermetabolic right axillary and bilateral inguinal  lymph nodes.    Mammogram:  Result:   Mammo Digital Diagnostic Left with Alfredo     History:  Patient is 67 y.o. and is seen for diagnostic imaging.     Films Compared:  Prior images (if available) were compared.     Findings:  This procedure was performed using tomosynthesis. Computer-aided detection was utilized in the interpretation of this examination.  The left breast has scattered areas of fibroglandular density.      Known malignant breast mass currently measures approximately 4.1 x 3.0 x 2.9 cm, previous 4.5 x 3.7 x 3.6 cm. Note that suspicious calcifications extend at least 2 cm medial to the breast mass and 5-6 cm medial to the post biopsy clip.  There is been decrease in size of the left axillary adenopathy, particularly the prior biopsied enlarged lymph node.         Impression:  Decrease in size of malignant left breast mass and axillary adenopathy. Correlate with ordered PET-CT.         BI-RADS Category:   Overall: 6 - Known Biopsy-Proven Malignancy        Recommendation:  There are no mammo recommendations for this study.    Final Pathologic Diagnosis 1. Left breast, mastectomy:   Residual invasive ductal carcinoma status post neoadjuvant treatment, Grade 2   (tubules = 3, nuclei = 3, mitoses = 1)   Invasive carcinoma measures 43 mm (4.3 cm) in greatest dimension (measured   microscopically)   Calcifications associated with invasive carcinoma and non-neoplastic breast   2 of 7 lymph nodes positive for metastatic carcinoma (2/7) with extranodal   extension   Largest metastatic deposit measures 9 mm (0.9 cm)   Biopsy clip site identified grossly   Best tumor block for future studies: 1C   See surgical pathology cancer case summary below   2.  Axillary contents, dissection:   14 lymph nodes negative for metastatic carcinoma (0/14)   Immunohistochemical stains for AE1/AE3 were performed with adequate controls   and are negative   Surgical pathology cancer case summary:   Procedure:  Total mastectomy    Specimen laterality:  Left   Tumor site:  Upper outer quadrant   Histologic type: Invasive carcinoma of no special type (ductal)   Histologic grade ([place] Histologic Score):   Glandular (acinar/tubular) differentiation:  Score 3   Nuclear pleomorphism:  Score 3   Mitotic rate:  Score 1   Overall grade:  Grade 2 (score 7)   Tumor size:  Greatest dimension of largest invasive focus greater than 1 mm:   43 mm   Tumor focality:  Single focus of invasive carcinoma   Ductal carcinoma in situ (DCIS):  Not identified   Lobular carcinoma in situ (LCIS):  Not identified   Tumor extent:  Not applicable (skin, nipple, and skeletal muscle are absent   or are uninvolved)   Lymphovascular invasion:  Not identified   Dermal lymphovascular invasion:  Not identified   Microcalcifications:   Present in invasive carcinoma   Present in nonneoplastic tissue   Treatment effect in the breast:  No definite response to presurgical therapy   in the invasive carcinoma   Treatment effect in the lymph nodes:  No definite response to presurgical   therapy in metastatic carcinoma   Margins status for invasive carcinoma:  All margins negative for invasive   carcinoma   Distance from invasive carcinoma to closest margin:  21 mm (measured grossly)   Closest margin to invasive carcinoma:  Anterior (skin)   All other margins are greater than 21 mm (measured grossly)   Regional lymph node status:  Regional lymph nodes present   Tumor present in regional lymph nodes   Number of lymph nodes with macrometastases (greater than 2 mm):  2   Size of largest moni metastatic deposit:  9 mm   Extranodal extension:  Present, 2 mm or less   Total number of lymph nodes examined:  21   Distant metastases:  Not applicable   Pathologic stage classification (pTNM):   TNM descriptors: y (posttreatment)   pT category: ypT2:  Posttreatment tumor greater than 20 mm but less than or   equal to 50 mm in greatest dimension   Regional lymph nodes modifier:  Not  applicable   pN: pN1a:  Metastases in 1-3 axillary lymph nodes, at least 1 metastases   larger than 2.0 mm   pM:  Not applicable   Breast biomarker testing performed on previous biopsy case PMI- and   reported as below:   Estrogen receptor (ER):  Positive, greater than 95%, strong   Progesterone receptor (PGR): Positive, 80%, strong   HER2 (IHC):  Equivocal, 2+   HER2 FISH: Positive   Ki-67:  60%      Final Pathologic Diagnosis 1. Lymph node, needle core biopsy:   - A kappa light chain restricted plasma cell population detected.  Recommend   additional tissue biopsy for further classifying this process.  See comment.   - No evidence of metastatic carcinoma     Final Pathologic Diagnosis LYMPH NODE, LEFT GROIN, EXCISION:   -- REACTIVE LYMPHOID FOLLICLE WITH ATYPICAL PLASMACYTOSIS (KAPPA SKEWED).   -- CALCIFICATION IN FOCAL AREAS.   -- NO EVIDENCE OF METASTATIC CARCINOMA.   -- SEE COMMENT.      ASSESSMENT/PLAN:     67 y/o female with left breast invasive ductal carcinoma ER+/AK+ Her2+ with metastatic disease to the lymph node now status post left breast total mastectomy with axillary dissection; status post left inguinal lymph node excisional biopsy, left chest wall wound debridement    Pathology reviewed   Continue wound VAC changes for left chest wall wound  Follow-up in 2 weeks for wound check

## 2023-04-18 ENCOUNTER — TELEPHONE (OUTPATIENT)
Dept: ADMINISTRATIVE | Facility: CLINIC | Age: 68
End: 2023-04-18

## 2023-04-18 ENCOUNTER — DOCUMENT SCAN (OUTPATIENT)
Dept: HOME HEALTH SERVICES | Facility: HOSPITAL | Age: 68
End: 2023-04-18
Payer: MEDICARE

## 2023-04-18 DIAGNOSIS — E11.42 DIABETIC POLYNEUROPATHY ASSOCIATED WITH TYPE 2 DIABETES MELLITUS: ICD-10-CM

## 2023-04-18 DIAGNOSIS — Z48.3 AFTERCARE FOLLOWING SURGERY FOR NEOPLASM: Primary | ICD-10-CM

## 2023-04-18 DIAGNOSIS — N18.6 HYPERTENSIVE HEART FAILURE WITH ESRD: ICD-10-CM

## 2023-04-18 DIAGNOSIS — I13.2 HYPERTENSIVE HEART FAILURE WITH ESRD: ICD-10-CM

## 2023-04-19 ENCOUNTER — LAB VISIT (OUTPATIENT)
Dept: LAB | Facility: HOSPITAL | Age: 68
End: 2023-04-19
Attending: INTERNAL MEDICINE
Payer: MEDICARE

## 2023-04-19 ENCOUNTER — OFFICE VISIT (OUTPATIENT)
Dept: HEMATOLOGY/ONCOLOGY | Facility: CLINIC | Age: 68
End: 2023-04-19
Payer: MEDICARE

## 2023-04-19 VITALS
WEIGHT: 185.63 LBS | HEIGHT: 62 IN | DIASTOLIC BLOOD PRESSURE: 73 MMHG | TEMPERATURE: 97 F | OXYGEN SATURATION: 95 % | SYSTOLIC BLOOD PRESSURE: 130 MMHG | HEART RATE: 71 BPM | BODY MASS INDEX: 34.16 KG/M2

## 2023-04-19 DIAGNOSIS — Z17.0 MALIGNANT NEOPLASM OF LEFT BREAST IN FEMALE, ESTROGEN RECEPTOR POSITIVE, UNSPECIFIED SITE OF BREAST: ICD-10-CM

## 2023-04-19 DIAGNOSIS — E83.9 CHRONIC KIDNEY DISEASE-MINERAL AND BONE DISORDER: ICD-10-CM

## 2023-04-19 DIAGNOSIS — E44.1 MILD PROTEIN-CALORIE MALNUTRITION: ICD-10-CM

## 2023-04-19 DIAGNOSIS — Z17.0 MALIGNANT NEOPLASM OF UPPER-OUTER QUADRANT OF LEFT BREAST IN FEMALE, ESTROGEN RECEPTOR POSITIVE: Primary | ICD-10-CM

## 2023-04-19 DIAGNOSIS — C50.912 MALIGNANT NEOPLASM OF LEFT BREAST IN FEMALE, ESTROGEN RECEPTOR POSITIVE, UNSPECIFIED SITE OF BREAST: ICD-10-CM

## 2023-04-19 DIAGNOSIS — N18.9 CHRONIC KIDNEY DISEASE-MINERAL AND BONE DISORDER: ICD-10-CM

## 2023-04-19 DIAGNOSIS — D84.821 IMMUNODEFICIENCY DUE TO CHEMOTHERAPY: ICD-10-CM

## 2023-04-19 DIAGNOSIS — N18.6 ESRD (END STAGE RENAL DISEASE): ICD-10-CM

## 2023-04-19 DIAGNOSIS — Z79.899 IMMUNODEFICIENCY DUE TO CHEMOTHERAPY: ICD-10-CM

## 2023-04-19 DIAGNOSIS — M89.9 CHRONIC KIDNEY DISEASE-MINERAL AND BONE DISORDER: ICD-10-CM

## 2023-04-19 DIAGNOSIS — C77.9 SECONDARY MALIGNANCY OF REGIONAL LYMPH NODE: ICD-10-CM

## 2023-04-19 DIAGNOSIS — T45.1X5A IMMUNODEFICIENCY DUE TO CHEMOTHERAPY: ICD-10-CM

## 2023-04-19 DIAGNOSIS — C50.412 MALIGNANT NEOPLASM OF UPPER-OUTER QUADRANT OF LEFT BREAST IN FEMALE, ESTROGEN RECEPTOR POSITIVE: Primary | ICD-10-CM

## 2023-04-19 LAB
ALBUMIN SERPL BCP-MCNC: 2.9 G/DL (ref 3.5–5.2)
ALP SERPL-CCNC: 70 U/L (ref 55–135)
ALT SERPL W/O P-5'-P-CCNC: 10 U/L (ref 10–44)
ANION GAP SERPL CALC-SCNC: 13 MMOL/L (ref 8–16)
AST SERPL-CCNC: 15 U/L (ref 10–40)
BASOPHILS # BLD AUTO: 0.02 K/UL (ref 0–0.2)
BASOPHILS NFR BLD: 0.3 % (ref 0–1.9)
BILIRUB SERPL-MCNC: 0.3 MG/DL (ref 0.1–1)
BUN SERPL-MCNC: 37 MG/DL (ref 8–23)
CALCIUM SERPL-MCNC: 8.2 MG/DL (ref 8.7–10.5)
CHLORIDE SERPL-SCNC: 103 MMOL/L (ref 95–110)
CO2 SERPL-SCNC: 22 MMOL/L (ref 23–29)
CREAT SERPL-MCNC: 5 MG/DL (ref 0.5–1.4)
DIFFERENTIAL METHOD: ABNORMAL
EOSINOPHIL # BLD AUTO: 0.4 K/UL (ref 0–0.5)
EOSINOPHIL NFR BLD: 7.6 % (ref 0–8)
ERYTHROCYTE [DISTWIDTH] IN BLOOD BY AUTOMATED COUNT: 17.1 % (ref 11.5–14.5)
EST. GFR  (NO RACE VARIABLE): 9 ML/MIN/1.73 M^2
GLUCOSE SERPL-MCNC: 138 MG/DL (ref 70–110)
HCT VFR BLD AUTO: 37.5 % (ref 37–48.5)
HGB BLD-MCNC: 11.9 G/DL (ref 12–16)
IMM GRANULOCYTES # BLD AUTO: 0.01 K/UL (ref 0–0.04)
IMM GRANULOCYTES NFR BLD AUTO: 0.2 % (ref 0–0.5)
LYMPHOCYTES # BLD AUTO: 1.8 K/UL (ref 1–4.8)
LYMPHOCYTES NFR BLD: 30.7 % (ref 18–48)
MCH RBC QN AUTO: 28.1 PG (ref 27–31)
MCHC RBC AUTO-ENTMCNC: 31.7 G/DL (ref 32–36)
MCV RBC AUTO: 88 FL (ref 82–98)
MONOCYTES # BLD AUTO: 0.6 K/UL (ref 0.3–1)
MONOCYTES NFR BLD: 10.5 % (ref 4–15)
NEUTROPHILS # BLD AUTO: 2.9 K/UL (ref 1.8–7.7)
NEUTROPHILS NFR BLD: 50.7 % (ref 38–73)
NRBC BLD-RTO: 0 /100 WBC
PLATELET # BLD AUTO: 269 K/UL (ref 150–450)
PMV BLD AUTO: 10.1 FL (ref 9.2–12.9)
POTASSIUM SERPL-SCNC: 4.4 MMOL/L (ref 3.5–5.1)
PROT SERPL-MCNC: 7.9 G/DL (ref 6–8.4)
RBC # BLD AUTO: 4.24 M/UL (ref 4–5.4)
SODIUM SERPL-SCNC: 138 MMOL/L (ref 136–145)
WBC # BLD AUTO: 5.8 K/UL (ref 3.9–12.7)

## 2023-04-19 PROCEDURE — 1101F PR PT FALLS ASSESS DOC 0-1 FALLS W/OUT INJ PAST YR: ICD-10-PCS | Mod: CPTII,S$GLB,, | Performed by: INTERNAL MEDICINE

## 2023-04-19 PROCEDURE — 85025 COMPLETE CBC W/AUTO DIFF WBC: CPT | Performed by: INTERNAL MEDICINE

## 2023-04-19 PROCEDURE — 3288F FALL RISK ASSESSMENT DOCD: CPT | Mod: CPTII,S$GLB,, | Performed by: INTERNAL MEDICINE

## 2023-04-19 PROCEDURE — 1126F AMNT PAIN NOTED NONE PRSNT: CPT | Mod: CPTII,S$GLB,, | Performed by: INTERNAL MEDICINE

## 2023-04-19 PROCEDURE — 99999 PR PBB SHADOW E&M-EST. PATIENT-LVL V: ICD-10-PCS | Mod: PBBFAC,,, | Performed by: INTERNAL MEDICINE

## 2023-04-19 PROCEDURE — 3008F BODY MASS INDEX DOCD: CPT | Mod: CPTII,S$GLB,, | Performed by: INTERNAL MEDICINE

## 2023-04-19 PROCEDURE — 4010F PR ACE/ARB THEARPY RXD/TAKEN: ICD-10-PCS | Mod: CPTII,S$GLB,, | Performed by: INTERNAL MEDICINE

## 2023-04-19 PROCEDURE — 1159F PR MEDICATION LIST DOCUMENTED IN MEDICAL RECORD: ICD-10-PCS | Mod: CPTII,S$GLB,, | Performed by: INTERNAL MEDICINE

## 2023-04-19 PROCEDURE — 3044F HG A1C LEVEL LT 7.0%: CPT | Mod: CPTII,S$GLB,, | Performed by: INTERNAL MEDICINE

## 2023-04-19 PROCEDURE — 1101F PT FALLS ASSESS-DOCD LE1/YR: CPT | Mod: CPTII,S$GLB,, | Performed by: INTERNAL MEDICINE

## 2023-04-19 PROCEDURE — 3078F DIAST BP <80 MM HG: CPT | Mod: CPTII,S$GLB,, | Performed by: INTERNAL MEDICINE

## 2023-04-19 PROCEDURE — 99999 PR PBB SHADOW E&M-EST. PATIENT-LVL V: CPT | Mod: PBBFAC,,, | Performed by: INTERNAL MEDICINE

## 2023-04-19 PROCEDURE — 3288F PR FALLS RISK ASSESSMENT DOCUMENTED: ICD-10-PCS | Mod: CPTII,S$GLB,, | Performed by: INTERNAL MEDICINE

## 2023-04-19 PROCEDURE — 99215 OFFICE O/P EST HI 40 MIN: CPT | Mod: S$GLB,,, | Performed by: INTERNAL MEDICINE

## 2023-04-19 PROCEDURE — 1126F PR PAIN SEVERITY QUANTIFIED, NO PAIN PRESENT: ICD-10-PCS | Mod: CPTII,S$GLB,, | Performed by: INTERNAL MEDICINE

## 2023-04-19 PROCEDURE — 3078F PR MOST RECENT DIASTOLIC BLOOD PRESSURE < 80 MM HG: ICD-10-PCS | Mod: CPTII,S$GLB,, | Performed by: INTERNAL MEDICINE

## 2023-04-19 PROCEDURE — 99215 PR OFFICE/OUTPT VISIT, EST, LEVL V, 40-54 MIN: ICD-10-PCS | Mod: S$GLB,,, | Performed by: INTERNAL MEDICINE

## 2023-04-19 PROCEDURE — 1157F PR ADVANCE CARE PLAN OR EQUIV PRESENT IN MEDICAL RECORD: ICD-10-PCS | Mod: CPTII,S$GLB,, | Performed by: INTERNAL MEDICINE

## 2023-04-19 PROCEDURE — 4010F ACE/ARB THERAPY RXD/TAKEN: CPT | Mod: CPTII,S$GLB,, | Performed by: INTERNAL MEDICINE

## 2023-04-19 PROCEDURE — 80053 COMPREHEN METABOLIC PANEL: CPT | Performed by: INTERNAL MEDICINE

## 2023-04-19 PROCEDURE — 36415 COLL VENOUS BLD VENIPUNCTURE: CPT | Performed by: INTERNAL MEDICINE

## 2023-04-19 PROCEDURE — 3044F PR MOST RECENT HEMOGLOBIN A1C LEVEL <7.0%: ICD-10-PCS | Mod: CPTII,S$GLB,, | Performed by: INTERNAL MEDICINE

## 2023-04-19 PROCEDURE — 1160F RVW MEDS BY RX/DR IN RCRD: CPT | Mod: CPTII,S$GLB,, | Performed by: INTERNAL MEDICINE

## 2023-04-19 PROCEDURE — 1159F MED LIST DOCD IN RCRD: CPT | Mod: CPTII,S$GLB,, | Performed by: INTERNAL MEDICINE

## 2023-04-19 PROCEDURE — 1157F ADVNC CARE PLAN IN RCRD: CPT | Mod: CPTII,S$GLB,, | Performed by: INTERNAL MEDICINE

## 2023-04-19 PROCEDURE — 3075F PR MOST RECENT SYSTOLIC BLOOD PRESS GE 130-139MM HG: ICD-10-PCS | Mod: CPTII,S$GLB,, | Performed by: INTERNAL MEDICINE

## 2023-04-19 PROCEDURE — 1160F PR REVIEW ALL MEDS BY PRESCRIBER/CLIN PHARMACIST DOCUMENTED: ICD-10-PCS | Mod: CPTII,S$GLB,, | Performed by: INTERNAL MEDICINE

## 2023-04-19 PROCEDURE — 3075F SYST BP GE 130 - 139MM HG: CPT | Mod: CPTII,S$GLB,, | Performed by: INTERNAL MEDICINE

## 2023-04-19 PROCEDURE — 3008F PR BODY MASS INDEX (BMI) DOCUMENTED: ICD-10-PCS | Mod: CPTII,S$GLB,, | Performed by: INTERNAL MEDICINE

## 2023-04-19 RX ORDER — BENZONATATE 200 MG/1
CAPSULE ORAL
COMMUNITY
Start: 2022-10-15 | End: 2023-11-16

## 2023-04-19 RX ORDER — SODIUM CHLORIDE 0.9 % (FLUSH) 0.9 %
10 SYRINGE (ML) INJECTION
Status: CANCELLED | OUTPATIENT
Start: 2023-07-10

## 2023-04-19 RX ORDER — ONDANSETRON 2 MG/ML
8 INJECTION INTRAMUSCULAR; INTRAVENOUS
Status: CANCELLED | OUTPATIENT
Start: 2023-07-10

## 2023-04-19 RX ORDER — HEPARIN 100 UNIT/ML
500 SYRINGE INTRAVENOUS
Status: CANCELLED | OUTPATIENT
Start: 2023-07-10

## 2023-04-19 NOTE — PROGRESS NOTES
Subjective:       Patient ID: Malaika Paredes is a 67 y.o. female.    Chief Complaint: Results, Chemotherapy, and Breast Cancer    HPI:  67-year-old female history of Stage IIB (cT3, cN2a(f), cM0, G3, ER+, LA+, HER2+) status post completion of neoadjuvant chemotherapy with OP BREAST PACLITAXEL WEEKLY WITH TRASTUZUMAB (Kanjinti) PERTUZUMAB Q3W (THP) patient returns status post mastectomy which revealed left breast mastectomy 4.3 cm invasive carcinoma in 2 of 7 lymph nodes positive for metastatic carcinoma with extra moni extension.  Completion of axillary dissection revealed 14 additional nodes negative for malignancy.  Was asked to see the patient for further evaluation drainage tube still in place        Past Medical History:   Diagnosis Date    CAD (coronary artery disease)     Cataract     Cataract     CHF (congestive heart failure)     COPD (chronic obstructive pulmonary disease)     Diabetes mellitus     Diabetic retinopathy     ESRD (end stage renal disease)     TTS    Hypertension     Ischemic ulcer of toe of left foot     Malignant neoplasm of upper-outer quadrant of left breast in female, estrogen receptor positive 06/20/2022    left    PAD (peripheral artery disease)     PAF (paroxysmal atrial fibrillation)      Family History   Problem Relation Age of Onset    Hypertension Mother     Cancer Father     Breast cancer Sister     Diabetes Sister     Cancer Brother     Amblyopia Neg Hx     Blindness Neg Hx     Cataracts Neg Hx     Glaucoma Neg Hx     Macular degeneration Neg Hx     Retinal detachment Neg Hx     Strabismus Neg Hx     Stroke Neg Hx     Thyroid disease Neg Hx      Social History     Socioeconomic History    Marital status:    Tobacco Use    Smoking status: Former     Years: 30.00     Types: Cigarettes     Quit date: 6/21/2012     Years since quitting: 10.8    Smokeless tobacco: Never   Substance and Sexual Activity    Alcohol use: No    Drug use: Never     Past Surgical History:    Procedure Laterality Date    APPLICATION OF WOUND VACUUM-ASSISTED CLOSURE DEVICE Left 4/6/2023    Procedure: APPLICATION, WOUND VAC;  Surgeon: Jayjay Arana MD;  Location: [facility] OR;  Service: General;  Laterality: Left;  left chest    AXILLARY NODE DISSECTION Left 3/1/2023    Procedure: LYMPHADENECTOMY, AXILLARY;  Surgeon: Jayjay Arana MD;  Location: [facility] OR;  Service: General;  Laterality: Left;    BIOPSY OF INGUINAL LYMPH NODE Left 3/31/2023    Procedure: BIOPSY, LYMPH NODE, INGUINAL;  Surgeon: Jayjay Arana MD;  Location: [facility] OR;  Service: General;  Laterality: Left;    BREAST BIOPSY Right     benign    CATARACT EXTRACTION W/  INTRAOCULAR LENS IMPLANT Right 08/14/2017    Dr. Moe    CORONARY ARTERY BYPASS GRAFT  2020    FLUOROSCOPY N/A 07/25/2022    Procedure: FLUOROSCOPY/mediport placement;  Surgeon: Cole Perdomo MD;  Location: [facility] CATH LAB;  Service: General;  Laterality: N/A;    MEDIPORT REMOVAL N/A 3/1/2023    Procedure: REMOVAL, CATHETER, CENTRAL VENOUS, TUNNELED, WITH PORT;  Surgeon: Jayjay Arana MD;  Location: [facility] OR;  Service: General;  Laterality: N/A;    MODIFIED RADICAL MASTECTOMY W/ AXILLARY LYMPH NODE DISSECTION Left 3/1/2023    Procedure: MASTECTOMY, MODIFIED RADICAL;  Surgeon: Jayjay Arana MD;  Location: [facility] OR;  Service: General;  Laterality: Left;    WOUND DEBRIDEMENT Left 3/31/2023    Procedure: DEBRIDEMENT, WOUND;  Surgeon: Jayjay Arana MD;  Location: [facility] OR;  Service: General;  Laterality: Left;  left chest wall eschar debridement       Labs:  Lab Results   Component Value Date    WBC 5.80 04/19/2023    HGB 11.9 (L) 04/19/2023    HCT 37.5 04/19/2023    MCV 88 04/19/2023     04/19/2023     BMP  Lab Results   Component Value Date     04/19/2023    K 4.4 04/19/2023     04/19/2023    CO2 22 (L) 04/19/2023    BUN 37 (H) 04/19/2023    CREATININE 5.0 (H) 04/19/2023    CALCIUM 8.2 (L) 04/19/2023    ANIONGAP 13 04/19/2023    ESTGFRAFRICA 8 (A) 07/27/2022    EGFRNONAA 7  (A) 07/27/2022     Lab Results   Component Value Date    ALT 10 04/19/2023    AST 15 04/19/2023    ALKPHOS 70 04/19/2023    BILITOT 0.3 04/19/2023       Lab Results   Component Value Date    IRON 43 09/09/2018    TIBC 300 09/09/2018    FERRITIN 1,038 (H) 06/21/2022     Lab Results   Component Value Date    CGOJBWBV97 459 09/09/2018     Lab Results   Component Value Date    FOLATE 6.2 09/09/2018     Lab Results   Component Value Date    TSH 2.815 10/05/2022         Review of Systems   Constitutional:  Positive for activity change, appetite change and fatigue. Negative for chills, diaphoresis, fever and unexpected weight change.   HENT:  Negative for congestion, dental problem, drooling, ear discharge, ear pain, facial swelling, hearing loss, mouth sores, nosebleeds, postnasal drip, rhinorrhea, sinus pressure, sneezing, sore throat, tinnitus, trouble swallowing and voice change.    Eyes:  Negative for photophobia, pain, discharge, redness, itching and visual disturbance.   Respiratory:  Negative for cough, choking, chest tightness, shortness of breath, wheezing and stridor.    Cardiovascular:  Negative for chest pain, palpitations and leg swelling.   Gastrointestinal:  Negative for abdominal distention, abdominal pain, anal bleeding, blood in stool, constipation, diarrhea, nausea, rectal pain and vomiting.   Endocrine: Negative for cold intolerance, heat intolerance, polydipsia, polyphagia and polyuria.   Genitourinary:  Negative for decreased urine volume, difficulty urinating, dyspareunia, dysuria, enuresis, flank pain, frequency, genital sores, hematuria, menstrual problem, pelvic pain, urgency, vaginal bleeding, vaginal discharge and vaginal pain.   Musculoskeletal:  Negative for arthralgias, back pain, gait problem, joint swelling, myalgias, neck pain and neck stiffness.   Skin:  Negative for color change, pallor and rash.   Allergic/Immunologic: Negative for environmental allergies, food allergies and  immunocompromised state.   Neurological:  Positive for weakness. Negative for dizziness, tremors, seizures, syncope, facial asymmetry, speech difficulty, light-headedness, numbness and headaches.   Hematological:  Negative for adenopathy. Does not bruise/bleed easily.   Psychiatric/Behavioral:  Positive for dysphoric mood. Negative for agitation, behavioral problems, confusion, decreased concentration, hallucinations, self-injury, sleep disturbance and suicidal ideas. The patient is nervous/anxious. The patient is not hyperactive.      Objective:      Physical Exam  Vitals reviewed.   Constitutional:       General: She is not in acute distress.     Appearance: She is well-developed. She is obese. She is ill-appearing. She is not diaphoretic.   HENT:      Head: Normocephalic and atraumatic.      Right Ear: External ear normal.      Left Ear: External ear normal.      Nose: Nose normal.      Right Sinus: No maxillary sinus tenderness or frontal sinus tenderness.      Left Sinus: No maxillary sinus tenderness or frontal sinus tenderness.      Mouth/Throat:      Pharynx: No oropharyngeal exudate.   Eyes:      General: Lids are normal. No scleral icterus.        Right eye: No discharge.         Left eye: No discharge.      Conjunctiva/sclera: Conjunctivae normal.      Right eye: Right conjunctiva is not injected. No hemorrhage.     Left eye: Left conjunctiva is not injected. No hemorrhage.     Pupils: Pupils are equal, round, and reactive to light.   Neck:      Thyroid: No thyromegaly.      Vascular: No JVD.      Trachea: No tracheal deviation.   Cardiovascular:      Rate and Rhythm: Normal rate.   Pulmonary:      Effort: Pulmonary effort is normal. No respiratory distress.      Breath sounds: No stridor.   Chest:      Chest wall: No tenderness.          Comments: Status post mastectomy drainage tube in place  Abdominal:      General: Bowel sounds are normal. There is no distension.      Palpations: Abdomen is soft.  There is no hepatomegaly, splenomegaly or mass.      Tenderness: There is no abdominal tenderness. There is no rebound.   Musculoskeletal:         General: No tenderness. Normal range of motion.      Cervical back: Normal range of motion and neck supple.   Lymphadenopathy:      Cervical: No cervical adenopathy.      Upper Body:      Right upper body: No supraclavicular adenopathy.      Left upper body: No supraclavicular adenopathy.   Skin:     General: Skin is dry.      Findings: No erythema or rash.   Neurological:      Mental Status: She is alert and oriented to person, place, and time.      Cranial Nerves: No cranial nerve deficit.      Coordination: Coordination normal.   Psychiatric:         Behavior: Behavior normal.         Thought Content: Thought content normal.         Judgment: Judgment normal.           Assessment:      1. Malignant neoplasm of upper-outer quadrant of left breast in female, estrogen receptor positive    2. Immunodeficiency due to chemotherapy    3. Chronic kidney disease-mineral and bone disorder    4. ESRD (end stage renal disease)    5. Mild protein-calorie malnutrition    6. Secondary malignancy of regional lymph node           Med Onc Chart Routing      Follow up with physician . Nurse navigation to coordinate   Follow up with [facility]    Infusion scheduling note    Injection scheduling note    Labs    Imaging    Pharmacy appointment    Other referrals            Plan:     Extensive conversation with patient despite aggressive systemic neoadjuvant chemotherapy residual disease present as well as lymph node extra moni extension.  Two of 7 nodes I would make referral to Radiation Oncology.  In addition the patient has been placed through the Oncology pathways and has been recommended to receive T DM1 q. 3 weeks times 13 weeks.  Discussed implications of answered questions with her will need consent signed on next visit.  Would like to start after drainage has complete in Radiation  Oncology consultation obtain  Total time 40 minutes      Toney العلي Jr, MD FACP

## 2023-04-19 NOTE — PLAN OF CARE
DISCONTINUE ON PATHWAY REGIMEN - Breast    [id number]        Pertuzumab (Perjeta)       Pertuzumab (Perjeta)       Trastuzumab-xxxx       Trastuzumab-xxxx       Carboplatin       Docetaxel (Taxotere)           Additional Orders: Premedicate with dexamethasone 8 mg PO bid for three   days beginning 1 day prior to docetaxel therapy.  Recommended monitoring: LVEF   baseline and q3-4 months or with the development of cardiac symptoms and regimen   changes for metastatic treatment.  Tano et al. Marli Oncol. 2013   Sep;24(9):2278-63    **Always confirm dose/schedule in your pharmacy ordering system**    REASON: Other Reason  PRIOR TREATMENT: [id number]  TREATMENT RESPONSE: Partial Response (ME)    START ON PATHWAY REGIMEN - Breast    [id number]        Ado-trastuzumab emtansine (Kadcyla)           Additional Orders: LVEF assessment recommended at baseline and at least   every 3 months during treatment.    **Always confirm dose/schedule in your pharmacy ordering system**    Patient Characteristics:  Post-Neoadjuvant Therapy and Resection, HER2 Positive, ER Positive, Residual   Disease, Adjuvant Targeted Therapy After Neoadjuvant Chemo/Targeted Therapy  Therapeutic Status: Post-Neoadjuvant Therapy and Resection  Residual Invasive Disease Post-Neoadjuvant Therapy<= Yes  ER Status: Positive (+)  HER2 Status: Positive (+)  ME Status: Positive (+)  Intent of Therapy:  Curative Intent, Not Discussed with Patient

## 2023-04-21 ENCOUNTER — DOCUMENT SCAN (OUTPATIENT)
Dept: HOME HEALTH SERVICES | Facility: HOSPITAL | Age: 68
End: 2023-04-21
Payer: MEDICARE

## 2023-04-21 DIAGNOSIS — Z51.81 ENCOUNTER FOR MONITORING CARDIOTOXIC DRUG THERAPY: Primary | ICD-10-CM

## 2023-04-21 DIAGNOSIS — Z79.899 ENCOUNTER FOR MONITORING CARDIOTOXIC DRUG THERAPY: Primary | ICD-10-CM

## 2023-04-27 ENCOUNTER — TELEPHONE (OUTPATIENT)
Dept: SURGERY | Facility: CLINIC | Age: 68
End: 2023-04-27
Payer: MEDICARE

## 2023-04-27 NOTE — TELEPHONE ENCOUNTER
----- Message from Shefali Umaña sent at 4/27/2023 12:45 PM CDT -----  Pts home health nurse stated the pts left groin area is swollen where a biopsy was done. She is requesting a call back at [phone]

## 2023-05-01 LAB — FUNGUS SPEC CULT: NORMAL

## 2023-05-03 ENCOUNTER — OFFICE VISIT (OUTPATIENT)
Dept: SURGERY | Facility: CLINIC | Age: 68
End: 2023-05-03
Payer: MEDICARE

## 2023-05-03 ENCOUNTER — DOCUMENT SCAN (OUTPATIENT)
Dept: HOME HEALTH SERVICES | Facility: HOSPITAL | Age: 68
End: 2023-05-03
Payer: MEDICARE

## 2023-05-03 VITALS
BODY MASS INDEX: 34.07 KG/M2 | HEART RATE: 83 BPM | WEIGHT: 186.31 LBS | SYSTOLIC BLOOD PRESSURE: 132 MMHG | DIASTOLIC BLOOD PRESSURE: 74 MMHG

## 2023-05-03 DIAGNOSIS — Z17.0 MALIGNANT NEOPLASM OF UPPER-OUTER QUADRANT OF LEFT BREAST IN FEMALE, ESTROGEN RECEPTOR POSITIVE: Primary | ICD-10-CM

## 2023-05-03 DIAGNOSIS — C50.412 MALIGNANT NEOPLASM OF UPPER-OUTER QUADRANT OF LEFT BREAST IN FEMALE, ESTROGEN RECEPTOR POSITIVE: Primary | ICD-10-CM

## 2023-05-03 LAB
COMMENT: NORMAL
FINAL PATHOLOGIC DIAGNOSIS: NORMAL
GROSS: NORMAL
Lab: NORMAL
MICROSCOPIC EXAM: NORMAL
SUPPLEMENTAL DIAGNOSIS: NORMAL

## 2023-05-03 PROCEDURE — 3075F PR MOST RECENT SYSTOLIC BLOOD PRESS GE 130-139MM HG: ICD-10-PCS | Mod: CPTII,S$GLB,,

## 2023-05-03 PROCEDURE — 99999 PR PBB SHADOW E&M-EST. PATIENT-LVL IV: ICD-10-PCS | Mod: PBBFAC,,,

## 2023-05-03 PROCEDURE — 1126F PR PAIN SEVERITY QUANTIFIED, NO PAIN PRESENT: ICD-10-PCS | Mod: CPTII,S$GLB,,

## 2023-05-03 PROCEDURE — 4010F PR ACE/ARB THEARPY RXD/TAKEN: ICD-10-PCS | Mod: CPTII,S$GLB,,

## 2023-05-03 PROCEDURE — 3078F DIAST BP <80 MM HG: CPT | Mod: CPTII,S$GLB,,

## 2023-05-03 PROCEDURE — 3008F PR BODY MASS INDEX (BMI) DOCUMENTED: ICD-10-PCS | Mod: CPTII,S$GLB,,

## 2023-05-03 PROCEDURE — 1160F PR REVIEW ALL MEDS BY PRESCRIBER/CLIN PHARMACIST DOCUMENTED: ICD-10-PCS | Mod: CPTII,S$GLB,,

## 2023-05-03 PROCEDURE — 3044F PR MOST RECENT HEMOGLOBIN A1C LEVEL <7.0%: ICD-10-PCS | Mod: CPTII,S$GLB,,

## 2023-05-03 PROCEDURE — 3078F PR MOST RECENT DIASTOLIC BLOOD PRESSURE < 80 MM HG: ICD-10-PCS | Mod: CPTII,S$GLB,,

## 2023-05-03 PROCEDURE — 3008F BODY MASS INDEX DOCD: CPT | Mod: CPTII,S$GLB,,

## 2023-05-03 PROCEDURE — 1159F PR MEDICATION LIST DOCUMENTED IN MEDICAL RECORD: ICD-10-PCS | Mod: CPTII,S$GLB,,

## 2023-05-03 PROCEDURE — 99024 POSTOP FOLLOW-UP VISIT: CPT | Mod: S$GLB,,,

## 2023-05-03 PROCEDURE — 1160F RVW MEDS BY RX/DR IN RCRD: CPT | Mod: CPTII,S$GLB,,

## 2023-05-03 PROCEDURE — 99999 PR PBB SHADOW E&M-EST. PATIENT-LVL IV: CPT | Mod: PBBFAC,,,

## 2023-05-03 PROCEDURE — 3075F SYST BP GE 130 - 139MM HG: CPT | Mod: CPTII,S$GLB,,

## 2023-05-03 PROCEDURE — 4010F ACE/ARB THERAPY RXD/TAKEN: CPT | Mod: CPTII,S$GLB,,

## 2023-05-03 PROCEDURE — 1157F PR ADVANCE CARE PLAN OR EQUIV PRESENT IN MEDICAL RECORD: ICD-10-PCS | Mod: CPTII,S$GLB,,

## 2023-05-03 PROCEDURE — 1157F ADVNC CARE PLAN IN RCRD: CPT | Mod: CPTII,S$GLB,,

## 2023-05-03 PROCEDURE — 3044F HG A1C LEVEL LT 7.0%: CPT | Mod: CPTII,S$GLB,,

## 2023-05-03 PROCEDURE — 1126F AMNT PAIN NOTED NONE PRSNT: CPT | Mod: CPTII,S$GLB,,

## 2023-05-03 PROCEDURE — 99024 PR POST-OP FOLLOW-UP VISIT: ICD-10-PCS | Mod: S$GLB,,,

## 2023-05-03 PROCEDURE — 1159F MED LIST DOCD IN RCRD: CPT | Mod: CPTII,S$GLB,,

## 2023-05-03 NOTE — PATIENT INSTRUCTIONS
If the spot on your leg becomes painful, red, or warm, please call us ASAP or show your home health nurse.    Please ask the home health nurse to take a picture of you breast wound tomorrow and email it to [email or web address] so we can review it.    We will see you in two weeks. Be waiting for a call from Dr. العلي regarding a follow-up appointment about the pathology from the lymph nodes in your leg.

## 2023-05-03 NOTE — PROGRESS NOTES
History & Physical    SUBJECTIVE:     History of Present Illness:  Patient is a 67 y.o. female status post left breast mastectomy with axillary dissection 2023 status post left inguinal lymph node biopsy and left chest wall debridement 3/31 presents for postop wound check.  She denies any pain or changes.  Home health is changing her wound VAC on Monday and Thursday. There have been new pathology results regarding the inguinal lymph nodes since the last visit.     presents for drain removal and follow-up.  She denies any pain.    presents for follow up PET scan/mammogram and preop. She denies any changes since last visit. Her case was presented at tumor board with decision to move forward with surgical therapy.    presents to discuss next step in breast cancer treatment following neoadjuvant chemotherapy. She did not complete last cycle due to neuropathy. She reports decrease in mass size of the breast.     presents to discuss left breast biopsy.  Her left breast and axillary biopsy were showed invasive ductal carcinoma ER+/WV+ Her2+ with metastatic disease to the lymph node.    Initially referred for abnormal mammogram of the left breast. She denies any breast mass, nipple discharge, breast pain or other changes. No prior breast surgery. Prior right breast biopsy benign. Family history of breast cancer in her sister. Menarche age 12,  1st at 16, menopause at 48. No HRT.     Chief Complaint   Patient presents with    Post-op Evaluation     5 week post op// sx 3/31 lymph node biospy inguinal// wound vac // Dr. Arana         Review of patient's allergies indicates:  No Known Allergies    Current Outpatient Medications   Medication Sig Dispense Refill    acetaminophen (TYLENOL) 500 mg Cap Take 500 mg by mouth every 6 (six) hours as needed.      albuterol sulfate (PROAIR RESPICLICK) 90 mcg/actuation AePB Inhale 180 mcg into the lungs every 4 (four) hours. Rescue 1 each 3    amLODIPine (NORVASC) 10 MG  tablet Take 10 mg by mouth once daily.      aspirin (ECOTRIN) 81 MG EC tablet Take 81 mg by mouth once daily.       atorvastatin (LIPITOR) 10 MG tablet Take 10 mg by mouth once daily.      benzonatate (TESSALON) 200 MG capsule benzonatate Take 1 Capsule (oral) 3 times per day PRN - Cough for 7 days 20221015 capsule 3 times per day oral 7 days active 200 mg      budesonide-glycopyr-formoterol (BREZTRI AEROSPHERE) 160-9-4.8 mcg/actuation HFAA Inhale 1 puff into the lungs once daily.      calcium carbonate (OS-CAMERON) 500 mg calcium (1,250 mg) chewable tablet Take 1 tablet by mouth 2 (two) times daily as needed for Heartburn.      docusate sodium (COLACE) 100 MG capsule Take 1 capsule (100 mg total) by mouth 2 (two) times daily as needed for Constipation. 20 capsule 0    insulin degludec (TRESIBA FLEXTOUCH U-200) 200 unit/mL (3 mL) insulin pen Inject 60 Units into the skin once daily.      levoFLOXacin (LEVAQUIN) 250 MG tablet Take 250 mg by mouth once daily.      levothyroxine (TIROSINT) 88 mcg Cap Take 75 mcg by mouth before breakfast.      loperamide (IMODIUM) 2 mg capsule Take 2 mg by mouth 4 (four) times daily as needed for Diarrhea.      losartan (COZAAR) 25 MG tablet Take 25 mg by mouth once daily.      metoprolol succinate (TOPROL-XL) 25 MG 24 hr tablet Take 25 mg by mouth 2 (two) times daily.      omeprazole (PRILOSEC) 20 MG capsule Take 20 mg by mouth once daily.      ondansetron (ZOFRAN-ODT) 4 MG TbDL Dissolve 1 tablet (4 mg total) by mouth every 8 (eight) hours as needed (nausea). 30 tablet 0    oxyCODONE (ROXICODONE) 5 MG immediate release tablet Take 1 tablet (5 mg total) by mouth every 6 (six) hours as needed for Pain. 10 tablet 0    prednisoLONE acetate (PRED FORTE) 1 % DrpS Place 1 drop into both eyes 4 (four) times daily.      sevelamer HCL (RENAGEL) 800 MG Tab Take 3 tablets by mouth 3 (three) times daily.      sodium bicarbonate 650 MG tablet Take 650 mg by mouth 2 (two) times daily.      ketoconazole  (NIZORAL) 2 % cream Apply topically once daily. for 14 days 1 each 1     No current facility-administered medications for this visit.       Past Medical History:   Diagnosis Date    CAD (coronary artery disease)     Cataract     Cataract     CHF (congestive heart failure)     COPD (chronic obstructive pulmonary disease)     Diabetes mellitus     Diabetic retinopathy     ESRD (end stage renal disease)     TTS    Hypertension     Ischemic ulcer of toe of left foot     Malignant neoplasm of upper-outer quadrant of left breast in female, estrogen receptor positive 06/20/2022    left    PAD (peripheral artery disease)     PAF (paroxysmal atrial fibrillation)      Past Surgical History:   Procedure Laterality Date    APPLICATION OF WOUND VACUUM-ASSISTED CLOSURE DEVICE Left 4/6/2023    Procedure: APPLICATION, WOUND VAC;  Surgeon: Jayjay Arana MD;  Location: [facility] OR;  Service: General;  Laterality: Left;  left chest    AXILLARY NODE DISSECTION Left 3/1/2023    Procedure: LYMPHADENECTOMY, AXILLARY;  Surgeon: Jayjay Arana MD;  Location: [facility] OR;  Service: General;  Laterality: Left;    BIOPSY OF INGUINAL LYMPH NODE Left 3/31/2023    Procedure: BIOPSY, LYMPH NODE, INGUINAL;  Surgeon: Jayjay Arana MD;  Location: [facility] OR;  Service: General;  Laterality: Left;    BREAST BIOPSY Right     benign    CATARACT EXTRACTION W/  INTRAOCULAR LENS IMPLANT Right 08/14/2017    Dr. Moe    CORONARY ARTERY BYPASS GRAFT  2020    FLUOROSCOPY N/A 07/25/2022    Procedure: FLUOROSCOPY/mediport placement;  Surgeon: Cole Perdomo MD;  Location: [facility] CATH LAB;  Service: General;  Laterality: N/A;    MEDIPORT REMOVAL N/A 3/1/2023    Procedure: REMOVAL, CATHETER, CENTRAL VENOUS, TUNNELED, WITH PORT;  Surgeon: Jayjay Arana MD;  Location: [facility] OR;  Service: General;  Laterality: N/A;    MODIFIED RADICAL MASTECTOMY W/ AXILLARY LYMPH NODE DISSECTION Left 3/1/2023    Procedure: MASTECTOMY, MODIFIED RADICAL;  Surgeon: Jayjay Arana MD;  Location: [facility]  OR;  Service: General;  Laterality: Left;    WOUND DEBRIDEMENT Left 3/31/2023    Procedure: DEBRIDEMENT, WOUND;  Surgeon: Jayjay Arana MD;  Location: [facility] OR;  Service: General;  Laterality: Left;  left chest wall eschar debridement     Family History   Problem Relation Age of Onset    Hypertension Mother     Cancer Father     Breast cancer Sister     Diabetes Sister     Cancer Brother     Amblyopia Neg Hx     Blindness Neg Hx     Cataracts Neg Hx     Glaucoma Neg Hx     Macular degeneration Neg Hx     Retinal detachment Neg Hx     Strabismus Neg Hx     Stroke Neg Hx     Thyroid disease Neg Hx      Social History     Tobacco Use    Smoking status: Former     Years: 30.00     Types: Cigarettes     Quit date: 6/21/2012     Years since quitting: 10.8    Smokeless tobacco: Never   Substance Use Topics    Alcohol use: No    Drug use: Never        Review of Systems:  Review of Systems   Constitutional:  Negative for chills, fatigue, fever and unexpected weight change.   Respiratory:  Negative for cough, shortness of breath, wheezing and stridor.    Cardiovascular:  Negative for chest pain, palpitations and leg swelling.   Gastrointestinal:  Negative for abdominal distention, abdominal pain, constipation, diarrhea, nausea and vomiting.   Genitourinary:  Negative for difficulty urinating, dysuria, frequency, hematuria and urgency.   Skin:  Negative for color change, pallor, rash and wound.   Hematological:  Does not bruise/bleed easily.     OBJECTIVE:     Vital Signs (Most Recent)  Pulse: 83 (05/03/23 1042)  BP: 132/74 (05/03/23 1042)     84.5 kg (186 lb 4.6 oz)     Physical Exam:  Physical Exam  Vitals reviewed.   Constitutional:       General: She is not in acute distress.     Appearance: She is well-developed.   HENT:      Head: Normocephalic and atraumatic.      Right Ear: External ear normal.      Left Ear: External ear normal.      Nose: Nose normal.      Mouth/Throat:      Mouth: Mucous membranes are moist.   Eyes:       Extraocular Movements: Extraocular movements intact.      Conjunctiva/sclera: Conjunctivae normal.   Cardiovascular:      Rate and Rhythm: Normal rate.   Pulmonary:      Effort: Pulmonary effort is normal. No respiratory distress.   Chest:       Musculoskeletal:      Cervical back: Neck supple.   Skin:     General: Skin is warm and dry.          Neurological:      Mental Status: She is alert and oriented to person, place, and time.   Psychiatric:         Behavior: Behavior normal.       Diagnostic Results:  Result:   Mammo Digital Diagnostic Bilat with Alfredo  US Breast Left Limited     History:  Patient is 66 y.o. and is seen for diagnostic imaging.     Films Compared:  Prior images (if available) were compared.     Findings:  This procedure was performed using tomosynthesis. Computer-aided detection was utilized in the interpretation of this examination.  The breasts have scattered areas of fibroglandular density.      Mammo Digital Diagnostic Bilat with Alfredo  Left  Mass: There is a 4.9 cm irregularly shaped mass with spiculated margins seen in the upper outer quadrant of the left breast in the anterior depth. The mass correlates with the palpable mass reported by the patient. Associated features include skin retraction. There are also associated calcifications.   Lymph Node: There are multiple similar lymph nodes seen in the left axilla.      Right  There is no evidence of suspicious masses, calcifications, or other abnormal findings in the right breast.     US Breast Left Limited  Left  Mass: There is a 5.6 cm x 4 cm x 4.3 cm irregularly shaped, non-parallel, hypoechoic mass with spiculated margins seen in the left breast at 2 o'clock, 3 cm from the nipple.   Lymph Node: There are multiple similar lymph nodes seen in the left axilla. Largest node is 3.4 cm x 1.6 cm x 2.0 cm.      Impression:  Left  Mass: Left breast 5.6 cm x 4 cm x 4.3 cm mass at the 2 o'clock position. Assessment: 5 - Highly suggestive of  malignancy. Ultrasound-guided biopsy is recommended.   Lymph Node: Left axilla lymph node (multiple findings). Assessment: 5 - Highly suggestive of malignancy. Ultrasound-guided biopsy is recommended.      Right  There is no mammographic or sonographic evidence of malignancy in the right breast.     BI-RADS Category:   Overall: 5 - Highly Suggestive of Malignancy     Recommendation:  Ultrasound-guided biopsy is recommended.  Ultrasound-guided biopsy is recommended.     Your estimated lifetime risk of breast cancer (to age 85) based on Tyrer-Cuzick risk assessment model is Tyrer-Cuzick: 6.49 %. According to the [employer] Society, patients with a lifetime breast cancer risk of 20% or higher might benefit from supplemental screening tests.    Final Pathologic Diagnosis Part 1   Left axilla, ultrasound-guided core biopsy:   Positive for metastatic carcinoma   Metastasis measures 10 mm in greatest dimension   Part 2   Left breast, 2:00, ultrasound-guided core biopsy:   Invasive ductal carcinoma   Bubba grade 2:  Tubule formation -3, nuclear pleomorphism-2 and mitotic   count-2   Invasive carcinoma measures 17 mm in greatest dimension   No carcinoma in Situ or lymphovascular invasion identified   Tumor biomarkers   Estrogen receptor (ER):  Positive, greater than 95%, strong   Progesterone receptor (PGR):  Positive, 80%, strong   HER2 (IHC):  Equivocal, 2+   Ki-67:  60%   Additional IHC:   P63:  Negative throughout, supporting the diagnosis of invasive carcinoma   This case was reviewed by Dr. GABBY Cannon who concurs with the above diagnosis.  VC      Comment: Interp By Bette Pitts M.D., Signed on 06/21/2022 at 12:02   Supplemental Diagnosis HER2, Breast Tumor, FISH, Tissue   Result Summary   POSITIVE   Interpretation   This tumor sample is positive for HER2 (ERBB2) gene amplification.   Result   nuc erwin(U60V0m0¿4,HER2x5¿8)   HER2/D17Z1 ratio: 2.03   Average HER2 signals per cell: 6.3   Average D17Z1 signals per  cell: 3.1         PET/CT:  Impression:     Interval improvement.  Left breast mass demonstrates decreased in size in decreased hypermetabolic activity.  Left pectoralis and axillary lymphadenopathy also demonstrates marked significant decreased activity.  Residual nonspecific mildly hypermetabolic right axillary and bilateral inguinal lymph nodes.    Mammogram:  Result:   Mammo Digital Diagnostic Left with Alfredo     History:  Patient is 67 y.o. and is seen for diagnostic imaging.     Films Compared:  Prior images (if available) were compared.     Findings:  This procedure was performed using tomosynthesis. Computer-aided detection was utilized in the interpretation of this examination.  The left breast has scattered areas of fibroglandular density.      Known malignant breast mass currently measures approximately 4.1 x 3.0 x 2.9 cm, previous 4.5 x 3.7 x 3.6 cm. Note that suspicious calcifications extend at least 2 cm medial to the breast mass and 5-6 cm medial to the post biopsy clip.  There is been decrease in size of the left axillary adenopathy, particularly the prior biopsied enlarged lymph node.         Impression:  Decrease in size of malignant left breast mass and axillary adenopathy. Correlate with ordered PET-CT.         BI-RADS Category:   Overall: 6 - Known Biopsy-Proven Malignancy        Recommendation:  There are no mammo recommendations for this study.    Final Pathologic Diagnosis 1. Left breast, mastectomy:   Residual invasive ductal carcinoma status post neoadjuvant treatment, Grade 2   (tubules = 3, nuclei = 3, mitoses = 1)   Invasive carcinoma measures 43 mm (4.3 cm) in greatest dimension (measured   microscopically)   Calcifications associated with invasive carcinoma and non-neoplastic breast   2 of 7 lymph nodes positive for metastatic carcinoma (2/7) with extranodal   extension   Largest metastatic deposit measures 9 mm (0.9 cm)   Biopsy clip site identified grossly   Best tumor block for future  studies: 1C   See surgical pathology cancer case summary below   2.  Axillary contents, dissection:   14 lymph nodes negative for metastatic carcinoma (0/14)   Immunohistochemical stains for AE1/AE3 were performed with adequate controls   and are negative   Surgical pathology cancer case summary:   Procedure:  Total mastectomy   Specimen laterality:  Left   Tumor site:  Upper outer quadrant   Histologic type: Invasive carcinoma of no special type (ductal)   Histologic grade ([place] Histologic Score):   Glandular (acinar/tubular) differentiation:  Score 3   Nuclear pleomorphism:  Score 3   Mitotic rate:  Score 1   Overall grade:  Grade 2 (score 7)   Tumor size:  Greatest dimension of largest invasive focus greater than 1 mm:   43 mm   Tumor focality:  Single focus of invasive carcinoma   Ductal carcinoma in situ (DCIS):  Not identified   Lobular carcinoma in situ (LCIS):  Not identified   Tumor extent:  Not applicable (skin, nipple, and skeletal muscle are absent   or are uninvolved)   Lymphovascular invasion:  Not identified   Dermal lymphovascular invasion:  Not identified   Microcalcifications:   Present in invasive carcinoma   Present in nonneoplastic tissue   Treatment effect in the breast:  No definite response to presurgical therapy   in the invasive carcinoma   Treatment effect in the lymph nodes:  No definite response to presurgical   therapy in metastatic carcinoma   Margins status for invasive carcinoma:  All margins negative for invasive   carcinoma   Distance from invasive carcinoma to closest margin:  21 mm (measured grossly)   Closest margin to invasive carcinoma:  Anterior (skin)   All other margins are greater than 21 mm (measured grossly)   Regional lymph node status:  Regional lymph nodes present   Tumor present in regional lymph nodes   Number of lymph nodes with macrometastases (greater than 2 mm):  2   Size of largest moni metastatic deposit:  9 mm   Extranodal extension:  Present, 2 mm or  less   Total number of lymph nodes examined:  21   Distant metastases:  Not applicable   Pathologic stage classification (pTNM):   TNM descriptors: y (posttreatment)   pT category: ypT2:  Posttreatment tumor greater than 20 mm but less than or   equal to 50 mm in greatest dimension   Regional lymph nodes modifier:  Not applicable   pN: pN1a:  Metastases in 1-3 axillary lymph nodes, at least 1 metastases   larger than 2.0 mm   pM:  Not applicable   Breast biomarker testing performed on previous biopsy case SVX- and   reported as below:   Estrogen receptor (ER):  Positive, greater than 95%, strong   Progesterone receptor (PGR): Positive, 80%, strong   HER2 (IHC):  Equivocal, 2+   HER2 FISH: Positive   Ki-67:  60%      Final Pathologic Diagnosis 1. Lymph node, needle core biopsy:   - A kappa light chain restricted plasma cell population detected.  Recommend   additional tissue biopsy for further classifying this process.  See comment.   - No evidence of metastatic carcinoma     Final Pathologic Diagnosis LYMPH NODE, LEFT GROIN, EXCISION:   -- REACTIVE LYMPHOID FOLLICLE WITH ATYPICAL PLASMACYTOSIS (KAPPA SKEWED).   -- CALCIFICATION IN FOCAL AREAS.   -- NO EVIDENCE OF METASTATIC CARCINOMA.   -- SEE COMMENT.      ASSESSMENT/PLAN:     67 y/o female with left breast invasive ductal carcinoma ER+/TN+ Her2+ with metastatic disease to the lymph node now status post left breast total mastectomy with axillary dissection; status post left inguinal lymph node excisional biopsy, left chest wall wound debridement    Continue wound VAC changes for left chest wall wound  Needs oncology follow-up for further evaluation of inguinal node pathology  Monitor lump in the groin. Warm compresses and may need to aspirate in the future.   Follow-up in 2 weeks for wound check    Akilah Estrada PA-C  [facility]

## 2023-05-05 ENCOUNTER — OFFICE VISIT (OUTPATIENT)
Dept: RADIATION ONCOLOGY | Facility: CLINIC | Age: 68
End: 2023-05-05
Payer: MEDICARE

## 2023-05-05 VITALS
RESPIRATION RATE: 18 BRPM | OXYGEN SATURATION: 98 % | TEMPERATURE: 98 F | HEIGHT: 62 IN | BODY MASS INDEX: 33.73 KG/M2 | HEART RATE: 83 BPM | WEIGHT: 183.31 LBS | SYSTOLIC BLOOD PRESSURE: 155 MMHG | DIASTOLIC BLOOD PRESSURE: 79 MMHG

## 2023-05-05 DIAGNOSIS — Z17.0 MALIGNANT NEOPLASM OF UPPER-OUTER QUADRANT OF LEFT BREAST IN FEMALE, ESTROGEN RECEPTOR POSITIVE: ICD-10-CM

## 2023-05-05 DIAGNOSIS — C50.412 MALIGNANT NEOPLASM OF UPPER-OUTER QUADRANT OF LEFT BREAST IN FEMALE, ESTROGEN RECEPTOR POSITIVE: ICD-10-CM

## 2023-05-05 PROCEDURE — 3288F FALL RISK ASSESSMENT DOCD: CPT | Mod: CPTII,S$GLB,, | Performed by: RADIOLOGY

## 2023-05-05 PROCEDURE — 1157F ADVNC CARE PLAN IN RCRD: CPT | Mod: CPTII,S$GLB,, | Performed by: RADIOLOGY

## 2023-05-05 PROCEDURE — 3078F DIAST BP <80 MM HG: CPT | Mod: CPTII,S$GLB,, | Performed by: RADIOLOGY

## 2023-05-05 PROCEDURE — 99215 OFFICE O/P EST HI 40 MIN: CPT | Mod: S$GLB,,, | Performed by: RADIOLOGY

## 2023-05-05 PROCEDURE — 3077F SYST BP >= 140 MM HG: CPT | Mod: CPTII,S$GLB,, | Performed by: RADIOLOGY

## 2023-05-05 PROCEDURE — 1101F PR PT FALLS ASSESS DOC 0-1 FALLS W/OUT INJ PAST YR: ICD-10-PCS | Mod: CPTII,S$GLB,, | Performed by: RADIOLOGY

## 2023-05-05 PROCEDURE — 1126F AMNT PAIN NOTED NONE PRSNT: CPT | Mod: CPTII,S$GLB,, | Performed by: RADIOLOGY

## 2023-05-05 PROCEDURE — 1159F MED LIST DOCD IN RCRD: CPT | Mod: CPTII,S$GLB,, | Performed by: RADIOLOGY

## 2023-05-05 PROCEDURE — 1160F RVW MEDS BY RX/DR IN RCRD: CPT | Mod: CPTII,S$GLB,, | Performed by: RADIOLOGY

## 2023-05-05 PROCEDURE — 1159F PR MEDICATION LIST DOCUMENTED IN MEDICAL RECORD: ICD-10-PCS | Mod: CPTII,S$GLB,, | Performed by: RADIOLOGY

## 2023-05-05 PROCEDURE — 3288F PR FALLS RISK ASSESSMENT DOCUMENTED: ICD-10-PCS | Mod: CPTII,S$GLB,, | Performed by: RADIOLOGY

## 2023-05-05 PROCEDURE — 3044F PR MOST RECENT HEMOGLOBIN A1C LEVEL <7.0%: ICD-10-PCS | Mod: CPTII,S$GLB,, | Performed by: RADIOLOGY

## 2023-05-05 PROCEDURE — 1157F PR ADVANCE CARE PLAN OR EQUIV PRESENT IN MEDICAL RECORD: ICD-10-PCS | Mod: CPTII,S$GLB,, | Performed by: RADIOLOGY

## 2023-05-05 PROCEDURE — 3078F PR MOST RECENT DIASTOLIC BLOOD PRESSURE < 80 MM HG: ICD-10-PCS | Mod: CPTII,S$GLB,, | Performed by: RADIOLOGY

## 2023-05-05 PROCEDURE — 1160F PR REVIEW ALL MEDS BY PRESCRIBER/CLIN PHARMACIST DOCUMENTED: ICD-10-PCS | Mod: CPTII,S$GLB,, | Performed by: RADIOLOGY

## 2023-05-05 PROCEDURE — 1101F PT FALLS ASSESS-DOCD LE1/YR: CPT | Mod: CPTII,S$GLB,, | Performed by: RADIOLOGY

## 2023-05-05 PROCEDURE — 99999 PR PBB SHADOW E&M-EST. PATIENT-LVL V: ICD-10-PCS | Mod: PBBFAC,,, | Performed by: RADIOLOGY

## 2023-05-05 PROCEDURE — 99215 PR OFFICE/OUTPT VISIT, EST, LEVL V, 40-54 MIN: ICD-10-PCS | Mod: S$GLB,,, | Performed by: RADIOLOGY

## 2023-05-05 PROCEDURE — 3077F PR MOST RECENT SYSTOLIC BLOOD PRESSURE >= 140 MM HG: ICD-10-PCS | Mod: CPTII,S$GLB,, | Performed by: RADIOLOGY

## 2023-05-05 PROCEDURE — 4010F ACE/ARB THERAPY RXD/TAKEN: CPT | Mod: CPTII,S$GLB,, | Performed by: RADIOLOGY

## 2023-05-05 PROCEDURE — 99999 PR PBB SHADOW E&M-EST. PATIENT-LVL V: CPT | Mod: PBBFAC,,, | Performed by: RADIOLOGY

## 2023-05-05 PROCEDURE — 4010F PR ACE/ARB THEARPY RXD/TAKEN: ICD-10-PCS | Mod: CPTII,S$GLB,, | Performed by: RADIOLOGY

## 2023-05-05 PROCEDURE — 3044F HG A1C LEVEL LT 7.0%: CPT | Mod: CPTII,S$GLB,, | Performed by: RADIOLOGY

## 2023-05-05 PROCEDURE — 3008F BODY MASS INDEX DOCD: CPT | Mod: CPTII,S$GLB,, | Performed by: RADIOLOGY

## 2023-05-05 PROCEDURE — 1126F PR PAIN SEVERITY QUANTIFIED, NO PAIN PRESENT: ICD-10-PCS | Mod: CPTII,S$GLB,, | Performed by: RADIOLOGY

## 2023-05-05 PROCEDURE — 3008F PR BODY MASS INDEX (BMI) DOCUMENTED: ICD-10-PCS | Mod: CPTII,S$GLB,, | Performed by: RADIOLOGY

## 2023-05-05 NOTE — PROGRESS NOTES
[facility] - Radiation Oncology       HISTORY OF PRESENT ILLNESS:   This patient presents for discussion of adjuvant radiotherapy for her Lt. breast cancer.      Ms. Paredes presented in May of 2022 with a palpable mass in the Lt. breast.  Diagnostic MMG confirmed a 4.9 cm irregular mass with spiculated margins in the UOQ of the Lt. breast. The mass measured 5.6 cm on ultrasound. There was axillary adenopathy, the largest node was 3.4 cm.  Core biopsies revealed invasive ductal carcinoma, histologic grade 3, nuclear grade 2 and mitotic index 2.  The tumor was strongly ER (95%) and VA (80%) positive.  Her-2 was 2+ and positive on FISH.  Ki-67 was 60%.  Biopsy of the node was positive for metastatic carcinoma.   PET scan revealed the Lt. breast mass with multiple highly FDG avid axillary nodes. There was no evidence of distant metastatic disease.  She received neoadjuvant chemotherapy with 5 cycles of paclitaxel with Trastuzumab with Pertuzumab, followed by Trastuzumab/Pertuzumab completed in March of 2023.  Repeat PET scan revealed decreased tumor size and PET activity in the nodes. She underwent Lt. breast mastectomy with sentinel node biopsy and subsequent Lt. axillary dissection non 3/1/23.  Pathology revealed a residual 4.3 cm breast mass with no associated DCIS or LVI.  The margins were negative.  There was tumor involvement of 2 of 21 axillary nodes.  The largest metastatic deposit was 9 mm.  The patient presents for discussion of radiotherapy.  Today the patient states she feels fair.  Still has chest wall drain in place.     REVIEW OF SYSTEMS:   Review of Systems   Constitutional:  Negative for chills, fever, malaise/fatigue and weight loss.   Respiratory:  Negative for cough and shortness of breath.    Cardiovascular:  Negative for chest pain and palpitations.   Gastrointestinal:  Negative for abdominal pain, constipation and diarrhea.   Genitourinary:  Negative for dysuria, frequency  and urgency.     GYN HISTORY:  Family history of breast cancer in her sister. Menarche age 12,  1st at 16, menopause at 48. No HRT.     PAST MEDICAL HISTORY:  Past Medical History:   Diagnosis Date    CAD (coronary artery disease)     Cataract     Cataract     CHF (congestive heart failure)     COPD (chronic obstructive pulmonary disease)     Diabetes mellitus     Diabetic retinopathy     ESRD (end stage renal disease)     TTS    Hypertension     Ischemic ulcer of toe of left foot     Malignant neoplasm of upper-outer quadrant of left breast in female, estrogen receptor positive 2022    left    PAD (peripheral artery disease)     PAF (paroxysmal atrial fibrillation)        PAST SURGICAL HISTORY:  Past Surgical History:   Procedure Laterality Date    APPLICATION OF WOUND VACUUM-ASSISTED CLOSURE DEVICE Left 2023    Procedure: APPLICATION, WOUND VAC;  Surgeon: Jayjay Arana MD;  Location: [facility] OR;  Service: General;  Laterality: Left;  left chest    AXILLARY NODE DISSECTION Left 3/1/2023    Procedure: LYMPHADENECTOMY, AXILLARY;  Surgeon: Jayjay Arana MD;  Location: [facility] OR;  Service: General;  Laterality: Left;    BIOPSY OF INGUINAL LYMPH NODE Left 3/31/2023    Procedure: BIOPSY, LYMPH NODE, INGUINAL;  Surgeon: Jayjay Arana MD;  Location: [facility] OR;  Service: General;  Laterality: Left;    BREAST BIOPSY Right     benign    CATARACT EXTRACTION W/  INTRAOCULAR LENS IMPLANT Right 2017    Dr. Moe    CORONARY ARTERY BYPASS GRAFT  2020    FLUOROSCOPY N/A 2022    Procedure: FLUOROSCOPY/mediport placement;  Surgeon: Cole Perdomo MD;  Location: [facility] CATH LAB;  Service: General;  Laterality: N/A;    MEDIPORT REMOVAL N/A 3/1/2023    Procedure: REMOVAL, CATHETER, CENTRAL VENOUS, TUNNELED, WITH PORT;  Surgeon: Jayjay Arana MD;  Location: [facility] OR;  Service: General;  Laterality: N/A;    MODIFIED RADICAL MASTECTOMY W/ AXILLARY LYMPH NODE DISSECTION Left 3/1/2023    Procedure: MASTECTOMY, MODIFIED  RADICAL;  Surgeon: Jayjay Arana MD;  Location: [facility] OR;  Service: General;  Laterality: Left;    WOUND DEBRIDEMENT Left 3/31/2023    Procedure: DEBRIDEMENT, WOUND;  Surgeon: Jayjay Arana MD;  Location: [facility] OR;  Service: General;  Laterality: Left;  left chest wall eschar debridement       ALLERGIES:   Review of patient's allergies indicates:  No Known Allergies    MEDICATIONS:  Current Outpatient Medications   Medication Sig    acetaminophen (TYLENOL) 500 mg Cap Take 500 mg by mouth every 6 (six) hours as needed.    albuterol sulfate (PROAIR RESPICLICK) 90 mcg/actuation AePB Inhale 180 mcg into the lungs every 4 (four) hours. Rescue    amLODIPine (NORVASC) 10 MG tablet Take 10 mg by mouth once daily.    aspirin (ECOTRIN) 81 MG EC tablet Take 81 mg by mouth once daily.     atorvastatin (LIPITOR) 10 MG tablet Take 10 mg by mouth once daily.    benzonatate (TESSALON) 200 MG capsule benzonatate Take 1 Capsule (oral) 3 times per day PRN - Cough for 7 days 20221015 capsule 3 times per day oral 7 days active 200 mg    budesonide-glycopyr-formoterol (BREZTRI AEROSPHERE) 160-9-4.8 mcg/actuation HFAA Inhale 1 puff into the lungs once daily.    calcium carbonate (OS-CAMERON) 500 mg calcium (1,250 mg) chewable tablet Take 1 tablet by mouth 2 (two) times daily as needed for Heartburn.    docusate sodium (COLACE) 100 MG capsule Take 1 capsule (100 mg total) by mouth 2 (two) times daily as needed for Constipation.    insulin degludec (TRESIBA FLEXTOUCH U-200) 200 unit/mL (3 mL) insulin pen Inject 60 Units into the skin once daily.    ketoconazole (NIZORAL) 2 % cream Apply topically once daily. for 14 days    levoFLOXacin (LEVAQUIN) 250 MG tablet Take 250 mg by mouth once daily.    levothyroxine (TIROSINT) 88 mcg Cap Take 75 mcg by mouth before breakfast.    loperamide (IMODIUM) 2 mg capsule Take 2 mg by mouth 4 (four) times daily as needed for Diarrhea.    losartan (COZAAR) 25 MG tablet Take 25 mg by mouth once daily.    metoprolol  succinate (TOPROL-XL) 25 MG 24 hr tablet Take 25 mg by mouth 2 (two) times daily.    omeprazole (PRILOSEC) 20 MG capsule Take 20 mg by mouth once daily.    ondansetron (ZOFRAN-ODT) 4 MG TbDL Dissolve 1 tablet (4 mg total) by mouth every 8 (eight) hours as needed (nausea).    oxyCODONE (ROXICODONE) 5 MG immediate release tablet Take 1 tablet (5 mg total) by mouth every 6 (six) hours as needed for Pain.    prednisoLONE acetate (PRED FORTE) 1 % DrpS Place 1 drop into both eyes 4 (four) times daily.    sevelamer HCL (RENAGEL) 800 MG Tab Take 3 tablets by mouth 3 (three) times daily.    sodium bicarbonate 650 MG tablet Take 650 mg by mouth 2 (two) times daily.     No current facility-administered medications for this visit.       SOCIAL HISTORY:  Social History     Socioeconomic History    Marital status:    Tobacco Use    Smoking status: Former     Years: 30.00     Types: Cigarettes     Quit date: 6/21/2012     Years since quitting: 10.8    Smokeless tobacco: Never   Substance and Sexual Activity    Alcohol use: No    Drug use: Never       FAMILY HISTORY:  Family History   Problem Relation Age of Onset    Hypertension Mother     Cancer Father     Breast cancer Sister     Diabetes Sister     Cancer Brother     Amblyopia Neg Hx     Blindness Neg Hx     Cataracts Neg Hx     Glaucoma Neg Hx     Macular degeneration Neg Hx     Retinal detachment Neg Hx     Strabismus Neg Hx     Stroke Neg Hx     Thyroid disease Neg Hx          PHYSICAL EXAMINATION:  Vitals:    05/05/23 1011   BP: (!) 155/79   Pulse: 83   Resp: 18   Temp: 97.5 °F (36.4 °C)     Physical Exam  Constitutional:       General: She is not in acute distress.     Appearance: Normal appearance.   Pulmonary:      Effort: Pulmonary effort is normal. No respiratory distress.   Chest:          Comments: status post mastectomy  chest wall drain in place.   Abdominal:      General: There is no distension.      Palpations: Abdomen is soft.   Musculoskeletal:      Left  upper arm: No swelling or edema.      Left forearm: No swelling or edema.   Lymphadenopathy:      Upper Body:      Left upper body: No supraclavicular or axillary adenopathy.   Neurological:      Mental Status: She is alert.       ASSESSMENT/PLAN:  Clinical stage IIB (T3, N2, M0, G3, ER+, Pr+, Her-2+) yp T2,N1 ductal cancer of the UOQ of the Lt. breast.     ECO    I had a long discussion with the patient and her niece (per phone) explained that based on he pathology from the mastectomy, we would recommend a course of adjuvant radiotherapy to the Lt. chest wall and regional lymphatics.  Discussed the procedures, risks and benefits of radiotherapy.  Discussed the acute and long term side effects including lung fibrosis and lymphedema.  Recommend 50.4 Gy to the chest wall and lymphatics.  Will plan follow up in 2 weeks for simulation.  Thank you for allowing us to participate in the care of this patient.    Psychosocial Distress screening score of Distress Score: 0 - No Distress noted and reviewed. No intervention indicated.     I spent approximately 45 minutes reviewing the available records and evaluating the patient, out of which over 50% of the time was spent face to face with the patient in counseling and coordinating this patient's care.

## 2023-05-12 ENCOUNTER — TELEPHONE (OUTPATIENT)
Dept: ADMINISTRATIVE | Facility: CLINIC | Age: 68
End: 2023-05-12
Payer: MEDICARE

## 2023-05-12 DIAGNOSIS — E11.42 DIABETIC POLYNEUROPATHY ASSOCIATED WITH TYPE 2 DIABETES MELLITUS: ICD-10-CM

## 2023-05-12 DIAGNOSIS — J43.1 PANLOBULAR EMPHYSEMA: Primary | ICD-10-CM

## 2023-05-12 DIAGNOSIS — N18.6 ESRD (END STAGE RENAL DISEASE): ICD-10-CM

## 2023-05-13 ENCOUNTER — DOCUMENT SCAN (OUTPATIENT)
Dept: HOME HEALTH SERVICES | Facility: HOSPITAL | Age: 68
End: 2023-05-13
Payer: MEDICARE

## 2023-05-15 ENCOUNTER — TELEPHONE (OUTPATIENT)
Dept: SURGERY | Facility: CLINIC | Age: 68
End: 2023-05-15
Payer: MEDICARE

## 2023-05-15 NOTE — TELEPHONE ENCOUNTER
----- Message from Martina Cruz sent at 5/15/2023  4:11 PM CDT -----  Regarding: pt called  Name of Who is Calling: BETTY FUCHS [[id number]] [place] ( home health )      What is the request in detail:  fax number is [phone] for [facility]      Can the clinic reply by [facility]: No      What [facility] to [facility] Back [facility]: [phone]

## 2023-05-15 NOTE — TELEPHONE ENCOUNTER
Will fax orders for wet-to-dry daily dressing changes to [facility].     Akilah Estrada PA-C  [facility]      ----- Message from Shashank Delong sent at 5/15/2023  3:52 PM CDT -----  Contact: Eunice- [facility] nurse  Eunice would like a call back at [phone], in regards to a wound care order the pt is needing. She states that it is urgent.

## 2023-05-16 PROCEDURE — G0179 MD RECERTIFICATION HHA PT: HCPCS | Mod: ,,, | Performed by: SURGERY

## 2023-05-16 PROCEDURE — G0179 PR HOME HEALTH MD RECERTIFICATION: ICD-10-PCS | Mod: ,,, | Performed by: SURGERY

## 2023-05-17 ENCOUNTER — OFFICE VISIT (OUTPATIENT)
Dept: SURGERY | Facility: CLINIC | Age: 68
End: 2023-05-17
Payer: MEDICARE

## 2023-05-17 VITALS
SYSTOLIC BLOOD PRESSURE: 146 MMHG | BODY MASS INDEX: 33.71 KG/M2 | WEIGHT: 184.31 LBS | DIASTOLIC BLOOD PRESSURE: 75 MMHG | HEART RATE: 80 BPM

## 2023-05-17 DIAGNOSIS — Z17.0 MALIGNANT NEOPLASM OF UPPER-OUTER QUADRANT OF LEFT BREAST IN FEMALE, ESTROGEN RECEPTOR POSITIVE: ICD-10-CM

## 2023-05-17 DIAGNOSIS — S21.102D OPEN WOUND OF LEFT CHEST WALL, SUBSEQUENT ENCOUNTER: Primary | ICD-10-CM

## 2023-05-17 DIAGNOSIS — C50.412 MALIGNANT NEOPLASM OF UPPER-OUTER QUADRANT OF LEFT BREAST IN FEMALE, ESTROGEN RECEPTOR POSITIVE: ICD-10-CM

## 2023-05-17 PROCEDURE — 4010F ACE/ARB THERAPY RXD/TAKEN: CPT | Mod: CPTII,,,

## 2023-05-17 PROCEDURE — 3078F DIAST BP <80 MM HG: CPT | Mod: CPTII,,,

## 2023-05-17 PROCEDURE — 1126F AMNT PAIN NOTED NONE PRSNT: CPT | Mod: CPTII,,,

## 2023-05-17 PROCEDURE — 99024 POSTOP FOLLOW-UP VISIT: CPT | Mod: ,,,

## 2023-05-17 PROCEDURE — 1159F PR MEDICATION LIST DOCUMENTED IN MEDICAL RECORD: ICD-10-PCS | Mod: CPTII,,,

## 2023-05-17 PROCEDURE — 1157F ADVNC CARE PLAN IN RCRD: CPT | Mod: CPTII,,,

## 2023-05-17 PROCEDURE — 1160F PR REVIEW ALL MEDS BY PRESCRIBER/CLIN PHARMACIST DOCUMENTED: ICD-10-PCS | Mod: CPTII,,,

## 2023-05-17 PROCEDURE — 4010F PR ACE/ARB THEARPY RXD/TAKEN: ICD-10-PCS | Mod: CPTII,,,

## 2023-05-17 PROCEDURE — 99999 PR PBB SHADOW E&M-EST. PATIENT-LVL IV: ICD-10-PCS | Mod: PBBFAC,,,

## 2023-05-17 PROCEDURE — 1126F PR PAIN SEVERITY QUANTIFIED, NO PAIN PRESENT: ICD-10-PCS | Mod: CPTII,,,

## 2023-05-17 PROCEDURE — 99024 PR POST-OP FOLLOW-UP VISIT: ICD-10-PCS | Mod: ,,,

## 2023-05-17 PROCEDURE — 3044F HG A1C LEVEL LT 7.0%: CPT | Mod: CPTII,,,

## 2023-05-17 PROCEDURE — 1159F MED LIST DOCD IN RCRD: CPT | Mod: CPTII,,,

## 2023-05-17 PROCEDURE — 3078F PR MOST RECENT DIASTOLIC BLOOD PRESSURE < 80 MM HG: ICD-10-PCS | Mod: CPTII,,,

## 2023-05-17 PROCEDURE — 3077F PR MOST RECENT SYSTOLIC BLOOD PRESSURE >= 140 MM HG: ICD-10-PCS | Mod: CPTII,,,

## 2023-05-17 PROCEDURE — 3008F PR BODY MASS INDEX (BMI) DOCUMENTED: ICD-10-PCS | Mod: CPTII,,,

## 2023-05-17 PROCEDURE — 99999 PR PBB SHADOW E&M-EST. PATIENT-LVL IV: CPT | Mod: PBBFAC,,,

## 2023-05-17 PROCEDURE — 1157F PR ADVANCE CARE PLAN OR EQUIV PRESENT IN MEDICAL RECORD: ICD-10-PCS | Mod: CPTII,,,

## 2023-05-17 PROCEDURE — 3077F SYST BP >= 140 MM HG: CPT | Mod: CPTII,,,

## 2023-05-17 PROCEDURE — 1160F RVW MEDS BY RX/DR IN RCRD: CPT | Mod: CPTII,,,

## 2023-05-17 PROCEDURE — 3008F BODY MASS INDEX DOCD: CPT | Mod: CPTII,,,

## 2023-05-17 PROCEDURE — 3044F PR MOST RECENT HEMOGLOBIN A1C LEVEL <7.0%: ICD-10-PCS | Mod: CPTII,,,

## 2023-05-17 NOTE — PROGRESS NOTES
History & Physical    SUBJECTIVE:     History of Present Illness:  Patient is a 67 y.o. female status post left breast mastectomy with axillary dissection 2023 status post left inguinal lymph node biopsy and left chest wall debridement 3/31 presents for postop wound check.  She denies any pain or changes.  Home health has been having difficulty keeping a seal on the wound vac, but it is on place at the time of exam today. She is seeing radiation and medical oncology tomorrow.     presents for drain removal and follow-up.  She denies any pain.    presents for follow up PET scan/mammogram and preop. She denies any changes since last visit. Her case was presented at tumor board with decision to move forward with surgical therapy.    presents to discuss next step in breast cancer treatment following neoadjuvant chemotherapy. She did not complete last cycle due to neuropathy. She reports decrease in mass size of the breast.     presents to discuss left breast biopsy.  Her left breast and axillary biopsy were showed invasive ductal carcinoma ER+/AK+ Her2+ with metastatic disease to the lymph node.    Initially referred for abnormal mammogram of the left breast. She denies any breast mass, nipple discharge, breast pain or other changes. No prior breast surgery. Prior right breast biopsy benign. Family history of breast cancer in her sister. Menarche age 12,  1st at 16, menopause at 48. No HRT.     Chief Complaint   Patient presents with    Follow-up     2 week follow up         Review of patient's allergies indicates:  No Known Allergies    Current Outpatient Medications   Medication Sig Dispense Refill    acetaminophen (TYLENOL) 500 mg Cap Take 500 mg by mouth every 6 (six) hours as needed.      albuterol sulfate (PROAIR RESPICLICK) 90 mcg/actuation AePB Inhale 180 mcg into the lungs every 4 (four) hours. Rescue 1 each 3    amLODIPine (NORVASC) 10 MG tablet Take 10 mg by mouth once daily.      aspirin (ECOTRIN)  81 MG EC tablet Take 81 mg by mouth once daily.       atorvastatin (LIPITOR) 10 MG tablet Take 10 mg by mouth once daily.      benzonatate (TESSALON) 200 MG capsule benzonatate Take 1 Capsule (oral) 3 times per day PRN - Cough for 7 days 20221015 capsule 3 times per day oral 7 days active 200 mg      budesonide-glycopyr-formoterol (BREZTRI AEROSPHERE) 160-9-4.8 mcg/actuation HFAA Inhale 1 puff into the lungs once daily.      calcium carbonate (OS-CAMERON) 500 mg calcium (1,250 mg) chewable tablet Take 1 tablet by mouth 2 (two) times daily as needed for Heartburn.      docusate sodium (COLACE) 100 MG capsule Take 1 capsule (100 mg total) by mouth 2 (two) times daily as needed for Constipation. 20 capsule 0    insulin degludec (TRESIBA FLEXTOUCH U-200) 200 unit/mL (3 mL) insulin pen Inject 60 Units into the skin once daily.      levoFLOXacin (LEVAQUIN) 250 MG tablet Take 250 mg by mouth once daily.      levothyroxine (TIROSINT) 88 mcg Cap Take 75 mcg by mouth before breakfast.      loperamide (IMODIUM) 2 mg capsule Take 2 mg by mouth 4 (four) times daily as needed for Diarrhea.      losartan (COZAAR) 25 MG tablet Take 25 mg by mouth once daily.      metoprolol succinate (TOPROL-XL) 25 MG 24 hr tablet Take 25 mg by mouth 2 (two) times daily.      omeprazole (PRILOSEC) 20 MG capsule Take 20 mg by mouth once daily.      ondansetron (ZOFRAN-ODT) 4 MG TbDL Dissolve 1 tablet (4 mg total) by mouth every 8 (eight) hours as needed (nausea). 30 tablet 0    oxyCODONE (ROXICODONE) 5 MG immediate release tablet Take 1 tablet (5 mg total) by mouth every 6 (six) hours as needed for Pain. 10 tablet 0    prednisoLONE acetate (PRED FORTE) 1 % DrpS Place 1 drop into both eyes 4 (four) times daily.      sevelamer HCL (RENAGEL) 800 MG Tab Take 3 tablets by mouth 3 (three) times daily.      sodium bicarbonate 650 MG tablet Take 650 mg by mouth 2 (two) times daily.      ketoconazole (NIZORAL) 2 % cream Apply topically once daily. for 14 days 1  each 1     No current facility-administered medications for this visit.       Past Medical History:   Diagnosis Date    CAD (coronary artery disease)     Cataract     Cataract     CHF (congestive heart failure)     COPD (chronic obstructive pulmonary disease)     Diabetes mellitus     Diabetic retinopathy     ESRD (end stage renal disease)     TTS    Hypertension     Ischemic ulcer of toe of left foot     Malignant neoplasm of upper-outer quadrant of left breast in female, estrogen receptor positive 06/20/2022    left    PAD (peripheral artery disease)     PAF (paroxysmal atrial fibrillation)      Past Surgical History:   Procedure Laterality Date    APPLICATION OF WOUND VACUUM-ASSISTED CLOSURE DEVICE Left 4/6/2023    Procedure: APPLICATION, WOUND VAC;  Surgeon: Jayjay Arana MD;  Location: [facility];  Service: General;  Laterality: Left;  left chest    AXILLARY NODE DISSECTION Left 3/1/2023    Procedure: LYMPHADENECTOMY, AXILLARY;  Surgeon: Jayjay Arana MD;  Location: [facility];  Service: General;  Laterality: Left;    BIOPSY OF INGUINAL LYMPH NODE Left 3/31/2023    Procedure: BIOPSY, LYMPH NODE, INGUINAL;  Surgeon: Jayjay Arana MD;  Location: [facility];  Service: General;  Laterality: Left;    BREAST BIOPSY Right     benign    CATARACT EXTRACTION W/  INTRAOCULAR LENS IMPLANT Right 08/14/2017    Dr. Moe    CORONARY ARTERY BYPASS GRAFT  2020    FLUOROSCOPY N/A 07/25/2022    Procedure: FLUOROSCOPY/mediport placement;  Surgeon: Cole Perdomo MD;  Location: [facility];  Service: General;  Laterality: N/A;    MEDIPORT REMOVAL N/A 3/1/2023    Procedure: REMOVAL, CATHETER, CENTRAL VENOUS, TUNNELED, WITH PORT;  Surgeon: Jayjay Arana MD;  Location: [facility];  Service: General;  Laterality: N/A;    MODIFIED RADICAL MASTECTOMY W/ AXILLARY LYMPH NODE DISSECTION Left 3/1/2023    Procedure: MASTECTOMY, MODIFIED RADICAL;  Surgeon: Jayjay Arana MD;  Location: [facility];  Service: General;  Laterality: Left;    WOUND DEBRIDEMENT  Left 3/31/2023    Procedure: DEBRIDEMENT, WOUND;  Surgeon: Jayjay Arana MD;  Location: [facility];  Service: General;  Laterality: Left;  left chest wall eschar debridement     Family History   Problem Relation Age of Onset    Hypertension Mother     Cancer Father     Breast cancer Sister     Diabetes Sister     Cancer Brother     Amblyopia Neg Hx     Blindness Neg Hx     Cataracts Neg Hx     Glaucoma Neg Hx     Macular degeneration Neg Hx     Retinal detachment Neg Hx     Strabismus Neg Hx     Stroke Neg Hx     Thyroid disease Neg Hx      Social History     Tobacco Use    Smoking status: Former     Years: 30.00     Types: Cigarettes     Quit date: 6/21/2012     Years since quitting: 10.9    Smokeless tobacco: Never   Substance Use Topics    Alcohol use: No    Drug use: Never        Review of Systems:  Review of Systems   Constitutional:  Negative for chills, fatigue, fever and unexpected weight change.   Respiratory:  Negative for cough, shortness of breath, wheezing and stridor.    Cardiovascular:  Negative for chest pain, palpitations and leg swelling.   Gastrointestinal:  Negative for abdominal distention, abdominal pain, constipation, diarrhea, nausea and vomiting.   Genitourinary:  Negative for difficulty urinating, dysuria, frequency, hematuria and urgency.   Skin:  Positive for wound. Negative for color change, pallor and rash.   Hematological:  Does not bruise/bleed easily.     OBJECTIVE:     Vital Signs (Most Recent)  Pulse: 80 (05/17/23 1318)  BP: (!) 146/75 (05/17/23 1318)     83.6 kg (184 lb 4.9 oz)     Physical Exam:  Physical Exam  Vitals reviewed.   Constitutional:       General: She is not in acute distress.     Appearance: She is well-developed.   HENT:      Head: Normocephalic and atraumatic.      Right Ear: External ear normal.      Left Ear: External ear normal.      Nose: Nose normal.      Mouth/Throat:      Mouth: Mucous membranes are moist.   Eyes:      Extraocular Movements: Extraocular  movements intact.      Conjunctiva/sclera: Conjunctivae normal.   Cardiovascular:      Rate and Rhythm: Normal rate.   Pulmonary:      Effort: Pulmonary effort is normal. No respiratory distress.   Chest:       Musculoskeletal:      Cervical back: Neck supple.   Skin:     General: Skin is warm and dry.          Neurological:      Mental Status: She is alert and oriented to person, place, and time.   Psychiatric:         Behavior: Behavior normal.       Diagnostic Results:  Result:   Mammo Digital Diagnostic Bilat with Alfredo  US Breast Left Limited     History:  Patient is 66 y.o. and is seen for diagnostic imaging.     Films Compared:  Prior images (if available) were compared.     Findings:  This procedure was performed using tomosynthesis. Computer-aided detection was utilized in the interpretation of this examination.  The breasts have scattered areas of fibroglandular density.      Mammo Digital Diagnostic Bilat with Alfredo  Left  Mass: There is a 4.9 cm irregularly shaped mass with spiculated margins seen in the upper outer quadrant of the left breast in the anterior depth. The mass correlates with the palpable mass reported by the patient. Associated features include skin retraction. There are also associated calcifications.   Lymph Node: There are multiple similar lymph nodes seen in the left axilla.      Right  There is no evidence of suspicious masses, calcifications, or other abnormal findings in the right breast.     US Breast Left Limited  Left  Mass: There is a 5.6 cm x 4 cm x 4.3 cm irregularly shaped, non-parallel, hypoechoic mass with spiculated margins seen in the left breast at 2 o'clock, 3 cm from the nipple.   Lymph Node: There are multiple similar lymph nodes seen in the left axilla. Largest node is 3.4 cm x 1.6 cm x 2.0 cm.      Impression:  Left  Mass: Left breast 5.6 cm x 4 cm x 4.3 cm mass at the 2 o'clock position. Assessment: 5 - Highly suggestive of malignancy. Ultrasound-guided biopsy is  recommended.   Lymph Node: Left axilla lymph node (multiple findings). Assessment: 5 - Highly suggestive of malignancy. Ultrasound-guided biopsy is recommended.      Right  There is no mammographic or sonographic evidence of malignancy in the right breast.     BI-RADS Category:   Overall: 5 - Highly Suggestive of Malignancy     Recommendation:  Ultrasound-guided biopsy is recommended.  Ultrasound-guided biopsy is recommended.     Your estimated lifetime risk of breast cancer (to age 85) based on Tyrer-Cuzick risk assessment model is Tyrer-Cuzick: 6.49 %. According to the [employer] Society, patients with a lifetime breast cancer risk of 20% or higher might benefit from supplemental screening tests.    Final Pathologic Diagnosis Part 1   Left axilla, ultrasound-guided core biopsy:   Positive for metastatic carcinoma   Metastasis measures 10 mm in greatest dimension   Part 2   Left breast, 2:00, ultrasound-guided core biopsy:   Invasive ductal carcinoma   Bubba grade 2:  Tubule formation -3, nuclear pleomorphism-2 and mitotic   count-2   Invasive carcinoma measures 17 mm in greatest dimension   No carcinoma in Situ or lymphovascular invasion identified   Tumor biomarkers   Estrogen receptor (ER):  Positive, greater than 95%, strong   Progesterone receptor (PGR):  Positive, 80%, strong   HER2 (IHC):  Equivocal, 2+   Ki-67:  60%   Additional IHC:   P63:  Negative throughout, supporting the diagnosis of invasive carcinoma   This case was reviewed by Dr. GABBY Cannon who concurs with the above diagnosis.  VC      Comment: Interp By Bette Pitts M.D., Signed on 06/21/2022 at 12:02   Supplemental Diagnosis HER2, Breast Tumor, FISH, Tissue   Result Summary   POSITIVE   Interpretation   This tumor sample is positive for HER2 (ERBB2) gene amplification.   Result   nuc erwin([id number]¿4,HER2x5¿8)   HER2/D17Z1 ratio: 2.03   Average HER2 signals per cell: 6.3   Average D17Z1 signals per cell: 3.1          PET/CT:  Impression:     Interval improvement.  Left breast mass demonstrates decreased in size in decreased hypermetabolic activity.  Left pectoralis and axillary lymphadenopathy also demonstrates marked significant decreased activity.  Residual nonspecific mildly hypermetabolic right axillary and bilateral inguinal lymph nodes.    Mammogram:  Result:   Mammo Digital Diagnostic Left with Alfredo     History:  Patient is 67 y.o. and is seen for diagnostic imaging.     Films Compared:  Prior images (if available) were compared.     Findings:  This procedure was performed using tomosynthesis. Computer-aided detection was utilized in the interpretation of this examination.  The left breast has scattered areas of fibroglandular density.      Known malignant breast mass currently measures approximately 4.1 x 3.0 x 2.9 cm, previous 4.5 x 3.7 x 3.6 cm. Note that suspicious calcifications extend at least 2 cm medial to the breast mass and 5-6 cm medial to the post biopsy clip.  There is been decrease in size of the left axillary adenopathy, particularly the prior biopsied enlarged lymph node.         Impression:  Decrease in size of malignant left breast mass and axillary adenopathy. Correlate with ordered PET-CT.         BI-RADS Category:   Overall: 6 - Known Biopsy-Proven Malignancy        Recommendation:  There are no mammo recommendations for this study.    Final Pathologic Diagnosis 1. Left breast, mastectomy:   Residual invasive ductal carcinoma status post neoadjuvant treatment, Grade 2   (tubules = 3, nuclei = 3, mitoses = 1)   Invasive carcinoma measures 43 mm (4.3 cm) in greatest dimension (measured   microscopically)   Calcifications associated with invasive carcinoma and non-neoplastic breast   2 of 7 lymph nodes positive for metastatic carcinoma (2/7) with extranodal   extension   Largest metastatic deposit measures 9 mm (0.9 cm)   Biopsy clip site identified grossly   Best tumor block for future studies: 1C    See surgical pathology cancer case summary below   2.  Axillary contents, dissection:   14 lymph nodes negative for metastatic carcinoma (0/14)   Immunohistochemical stains for AE1/AE3 were performed with adequate controls   and are negative   Surgical pathology cancer case summary:   Procedure:  Total mastectomy   Specimen laterality:  Left   Tumor site:  Upper outer quadrant   Histologic type: Invasive carcinoma of no special type (ductal)   Histologic grade (Bubba Histologic Score):   Glandular (acinar/tubular) differentiation:  Score 3   Nuclear pleomorphism:  Score 3   Mitotic rate:  Score 1   Overall grade:  Grade 2 (score 7)   Tumor size:  Greatest dimension of largest invasive focus greater than 1 mm:   43 mm   Tumor focality:  Single focus of invasive carcinoma   Ductal carcinoma in situ (DCIS):  Not identified   Lobular carcinoma in situ (LCIS):  Not identified   Tumor extent:  Not applicable (skin, nipple, and skeletal muscle are absent   or are uninvolved)   Lymphovascular invasion:  Not identified   Dermal lymphovascular invasion:  Not identified   Microcalcifications:   Present in invasive carcinoma   Present in nonneoplastic tissue   Treatment effect in the breast:  No definite response to presurgical therapy   in the invasive carcinoma   Treatment effect in the lymph nodes:  No definite response to presurgical   therapy in metastatic carcinoma   Margins status for invasive carcinoma:  All margins negative for invasive   carcinoma   Distance from invasive carcinoma to closest margin:  21 mm (measured grossly)   Closest margin to invasive carcinoma:  Anterior (skin)   All other margins are greater than 21 mm (measured grossly)   Regional lymph node status:  Regional lymph nodes present   Tumor present in regional lymph nodes   Number of lymph nodes with macrometastases (greater than 2 mm):  2   Size of largest moni metastatic deposit:  9 mm   Extranodal extension:  Present, 2 mm or less   Total  number of lymph nodes examined:  21   Distant metastases:  Not applicable   Pathologic stage classification (pTNM):   TNM descriptors: y (posttreatment)   pT category: ypT2:  Posttreatment tumor greater than 20 mm but less than or   equal to 50 mm in greatest dimension   Regional lymph nodes modifier:  Not applicable   pN: pN1a:  Metastases in 1-3 axillary lymph nodes, at least 1 metastases   larger than 2.0 mm   pM:  Not applicable   Breast biomarker testing performed on previous biopsy case VNS- and   reported as below:   Estrogen receptor (ER):  Positive, greater than 95%, strong   Progesterone receptor (PGR): Positive, 80%, strong   HER2 (IHC):  Equivocal, 2+   HER2 FISH: Positive   Ki-67:  60%      Final Pathologic Diagnosis 1. Lymph node, needle core biopsy:   - A kappa light chain restricted plasma cell population detected.  Recommend   additional tissue biopsy for further classifying this process.  See comment.   - No evidence of metastatic carcinoma     Final Pathologic Diagnosis LYMPH NODE, LEFT GROIN, EXCISION:   -- REACTIVE LYMPHOID FOLLICLE WITH ATYPICAL PLASMACYTOSIS (KAPPA SKEWED).   -- CALCIFICATION IN FOCAL AREAS.   -- NO EVIDENCE OF METASTATIC CARCINOMA.   -- SEE COMMENT.      ASSESSMENT/PLAN:     67 y/o female with left breast invasive ductal carcinoma ER+/NH+ Her2+ with metastatic disease to the lymph node now status post left breast total mastectomy with axillary dissection; status post left inguinal lymph node excisional biopsy, left chest wall wound debridement    Wound vac removed, transition to wet-to-dry dressings. New orders faxed to [facility].  Keep follow-up with radiation and medical oncology    Follow-up in 3 weeks for wound check    Akilah Estrada PA-C  [facility]

## 2023-05-18 ENCOUNTER — LAB VISIT (OUTPATIENT)
Dept: LAB | Facility: HOSPITAL | Age: 68
End: 2023-05-18
Attending: INTERNAL MEDICINE
Payer: MEDICARE

## 2023-05-18 ENCOUNTER — OFFICE VISIT (OUTPATIENT)
Dept: HEMATOLOGY/ONCOLOGY | Facility: CLINIC | Age: 68
End: 2023-05-18
Payer: MEDICARE

## 2023-05-18 VITALS
SYSTOLIC BLOOD PRESSURE: 139 MMHG | DIASTOLIC BLOOD PRESSURE: 79 MMHG | OXYGEN SATURATION: 98 % | HEART RATE: 82 BPM | TEMPERATURE: 97 F | WEIGHT: 182.13 LBS | BODY MASS INDEX: 33.51 KG/M2 | HEIGHT: 62 IN

## 2023-05-18 DIAGNOSIS — N18.9 ANEMIA ASSOCIATED WITH CHRONIC RENAL FAILURE: ICD-10-CM

## 2023-05-18 DIAGNOSIS — D46.Z OTHER MYELODYSPLASTIC SYNDROMES: ICD-10-CM

## 2023-05-18 DIAGNOSIS — T45.1X5A IMMUNODEFICIENCY DUE TO CHEMOTHERAPY: ICD-10-CM

## 2023-05-18 DIAGNOSIS — N18.6 ESRD (END STAGE RENAL DISEASE): ICD-10-CM

## 2023-05-18 DIAGNOSIS — C86.4: Primary | ICD-10-CM

## 2023-05-18 DIAGNOSIS — C77.9 SECONDARY MALIGNANCY OF REGIONAL LYMPH NODE: ICD-10-CM

## 2023-05-18 DIAGNOSIS — C86.4: ICD-10-CM

## 2023-05-18 DIAGNOSIS — C50.412 MALIGNANT NEOPLASM OF UPPER-OUTER QUADRANT OF LEFT BREAST IN FEMALE, ESTROGEN RECEPTOR POSITIVE: ICD-10-CM

## 2023-05-18 DIAGNOSIS — Z17.0 MALIGNANT NEOPLASM OF UPPER-OUTER QUADRANT OF LEFT BREAST IN FEMALE, ESTROGEN RECEPTOR POSITIVE: ICD-10-CM

## 2023-05-18 DIAGNOSIS — D63.1 ANEMIA ASSOCIATED WITH CHRONIC RENAL FAILURE: ICD-10-CM

## 2023-05-18 DIAGNOSIS — Z79.899 IMMUNODEFICIENCY DUE TO CHEMOTHERAPY: ICD-10-CM

## 2023-05-18 DIAGNOSIS — D84.821 IMMUNODEFICIENCY DUE TO CHEMOTHERAPY: ICD-10-CM

## 2023-05-18 LAB
ALBUMIN SERPL BCP-MCNC: 3.1 G/DL (ref 3.5–5.2)
ALP SERPL-CCNC: 77 U/L (ref 55–135)
ALT SERPL W/O P-5'-P-CCNC: 17 U/L (ref 10–44)
ANION GAP SERPL CALC-SCNC: 10 MMOL/L (ref 8–16)
AST SERPL-CCNC: 20 U/L (ref 10–40)
BASOPHILS # BLD AUTO: 0.02 K/UL (ref 0–0.2)
BASOPHILS NFR BLD: 0.4 % (ref 0–1.9)
BILIRUB SERPL-MCNC: 0.3 MG/DL (ref 0.1–1)
BUN SERPL-MCNC: 15 MG/DL (ref 8–23)
CALCIUM SERPL-MCNC: 8.3 MG/DL (ref 8.7–10.5)
CHLORIDE SERPL-SCNC: 99 MMOL/L (ref 95–110)
CO2 SERPL-SCNC: 28 MMOL/L (ref 23–29)
CREAT SERPL-MCNC: 3 MG/DL (ref 0.5–1.4)
DIFFERENTIAL METHOD: ABNORMAL
EOSINOPHIL # BLD AUTO: 0.4 K/UL (ref 0–0.5)
EOSINOPHIL NFR BLD: 7.6 % (ref 0–8)
ERYTHROCYTE [DISTWIDTH] IN BLOOD BY AUTOMATED COUNT: 15.9 % (ref 11.5–14.5)
EST. GFR  (NO RACE VARIABLE): 16 ML/MIN/1.73 M^2
GLUCOSE SERPL-MCNC: 119 MG/DL (ref 70–110)
HCT VFR BLD AUTO: 34.7 % (ref 37–48.5)
HGB BLD-MCNC: 11 G/DL (ref 12–16)
IMM GRANULOCYTES # BLD AUTO: 0.01 K/UL (ref 0–0.04)
IMM GRANULOCYTES NFR BLD AUTO: 0.2 % (ref 0–0.5)
LDH SERPL L TO P-CCNC: 211 U/L (ref 110–260)
LYMPHOCYTES # BLD AUTO: 1.8 K/UL (ref 1–4.8)
LYMPHOCYTES NFR BLD: 35.7 % (ref 18–48)
MCH RBC QN AUTO: 28.3 PG (ref 27–31)
MCHC RBC AUTO-ENTMCNC: 31.7 G/DL (ref 32–36)
MCV RBC AUTO: 89 FL (ref 82–98)
MONOCYTES # BLD AUTO: 0.5 K/UL (ref 0.3–1)
MONOCYTES NFR BLD: 9.6 % (ref 4–15)
NEUTROPHILS # BLD AUTO: 2.4 K/UL (ref 1.8–7.7)
NEUTROPHILS NFR BLD: 46.5 % (ref 38–73)
NRBC BLD-RTO: 0 /100 WBC
PLATELET # BLD AUTO: 245 K/UL (ref 150–450)
PMV BLD AUTO: 10.3 FL (ref 9.2–12.9)
POTASSIUM SERPL-SCNC: 4.3 MMOL/L (ref 3.5–5.1)
PROT SERPL-MCNC: 8.1 G/DL (ref 6–8.4)
RBC # BLD AUTO: 3.89 M/UL (ref 4–5.4)
SODIUM SERPL-SCNC: 137 MMOL/L (ref 136–145)
WBC # BLD AUTO: 5.1 K/UL (ref 3.9–12.7)

## 2023-05-18 PROCEDURE — 1126F AMNT PAIN NOTED NONE PRSNT: CPT | Mod: CPTII,,, | Performed by: INTERNAL MEDICINE

## 2023-05-18 PROCEDURE — 88184 FLOWCYTOMETRY/ TC 1 MARKER: CPT | Performed by: INTERNAL MEDICINE

## 2023-05-18 PROCEDURE — 4010F ACE/ARB THERAPY RXD/TAKEN: CPT | Mod: CPTII,,, | Performed by: INTERNAL MEDICINE

## 2023-05-18 PROCEDURE — 1157F ADVNC CARE PLAN IN RCRD: CPT | Mod: CPTII,,, | Performed by: INTERNAL MEDICINE

## 2023-05-18 PROCEDURE — 99215 OFFICE O/P EST HI 40 MIN: CPT | Performed by: INTERNAL MEDICINE

## 2023-05-18 PROCEDURE — 3008F BODY MASS INDEX DOCD: CPT | Mod: CPTII,,, | Performed by: INTERNAL MEDICINE

## 2023-05-18 PROCEDURE — 3288F PR FALLS RISK ASSESSMENT DOCUMENTED: ICD-10-PCS | Mod: CPTII,,, | Performed by: INTERNAL MEDICINE

## 2023-05-18 PROCEDURE — 80053 COMPREHEN METABOLIC PANEL: CPT | Performed by: INTERNAL MEDICINE

## 2023-05-18 PROCEDURE — 3008F PR BODY MASS INDEX (BMI) DOCUMENTED: ICD-10-PCS | Mod: CPTII,,, | Performed by: INTERNAL MEDICINE

## 2023-05-18 PROCEDURE — 3075F PR MOST RECENT SYSTOLIC BLOOD PRESS GE 130-139MM HG: ICD-10-PCS | Mod: CPTII,,, | Performed by: INTERNAL MEDICINE

## 2023-05-18 PROCEDURE — 4010F PR ACE/ARB THEARPY RXD/TAKEN: ICD-10-PCS | Mod: CPTII,,, | Performed by: INTERNAL MEDICINE

## 2023-05-18 PROCEDURE — 84165 PATHOLOGIST INTERPRETATION SPE: ICD-10-PCS | Mod: 26,,, | Performed by: PATHOLOGY

## 2023-05-18 PROCEDURE — 84165 PROTEIN E-PHORESIS SERUM: CPT | Mod: 26,,, | Performed by: PATHOLOGY

## 2023-05-18 PROCEDURE — 99215 OFFICE O/P EST HI 40 MIN: CPT | Mod: ,,, | Performed by: INTERNAL MEDICINE

## 2023-05-18 PROCEDURE — 88185 FLOWCYTOMETRY/TC ADD-ON: CPT | Performed by: INTERNAL MEDICINE

## 2023-05-18 PROCEDURE — 1159F MED LIST DOCD IN RCRD: CPT | Mod: CPTII,,, | Performed by: INTERNAL MEDICINE

## 2023-05-18 PROCEDURE — 3044F HG A1C LEVEL LT 7.0%: CPT | Mod: CPTII,,, | Performed by: INTERNAL MEDICINE

## 2023-05-18 PROCEDURE — 88274 CYTOGENETICS 25-99: CPT | Mod: 59 | Performed by: INTERNAL MEDICINE

## 2023-05-18 PROCEDURE — 82232 ASSAY OF BETA-2 PROTEIN: CPT | Performed by: INTERNAL MEDICINE

## 2023-05-18 PROCEDURE — 99999 PR PBB SHADOW E&M-EST. PATIENT-LVL V: CPT | Mod: PBBFAC,,, | Performed by: INTERNAL MEDICINE

## 2023-05-18 PROCEDURE — 3078F PR MOST RECENT DIASTOLIC BLOOD PRESSURE < 80 MM HG: ICD-10-PCS | Mod: CPTII,,, | Performed by: INTERNAL MEDICINE

## 2023-05-18 PROCEDURE — 1160F RVW MEDS BY RX/DR IN RCRD: CPT | Mod: CPTII,,, | Performed by: INTERNAL MEDICINE

## 2023-05-18 PROCEDURE — 83615 LACTATE (LD) (LDH) ENZYME: CPT | Performed by: INTERNAL MEDICINE

## 2023-05-18 PROCEDURE — 99999 PR PBB SHADOW E&M-EST. PATIENT-LVL V: ICD-10-PCS | Mod: PBBFAC,,, | Performed by: INTERNAL MEDICINE

## 2023-05-18 PROCEDURE — 3288F FALL RISK ASSESSMENT DOCD: CPT | Mod: CPTII,,, | Performed by: INTERNAL MEDICINE

## 2023-05-18 PROCEDURE — 84165 PROTEIN E-PHORESIS SERUM: CPT | Performed by: INTERNAL MEDICINE

## 2023-05-18 PROCEDURE — 3075F SYST BP GE 130 - 139MM HG: CPT | Mod: CPTII,,, | Performed by: INTERNAL MEDICINE

## 2023-05-18 PROCEDURE — 3044F PR MOST RECENT HEMOGLOBIN A1C LEVEL <7.0%: ICD-10-PCS | Mod: CPTII,,, | Performed by: INTERNAL MEDICINE

## 2023-05-18 PROCEDURE — 3078F DIAST BP <80 MM HG: CPT | Mod: CPTII,,, | Performed by: INTERNAL MEDICINE

## 2023-05-18 PROCEDURE — 85025 COMPLETE CBC W/AUTO DIFF WBC: CPT | Performed by: INTERNAL MEDICINE

## 2023-05-18 PROCEDURE — 88271 CYTOGENETICS DNA PROBE: CPT | Mod: 59 | Performed by: INTERNAL MEDICINE

## 2023-05-18 PROCEDURE — 1159F PR MEDICATION LIST DOCUMENTED IN MEDICAL RECORD: ICD-10-PCS | Mod: CPTII,,, | Performed by: INTERNAL MEDICINE

## 2023-05-18 PROCEDURE — 1101F PR PT FALLS ASSESS DOC 0-1 FALLS W/OUT INJ PAST YR: ICD-10-PCS | Mod: CPTII,,, | Performed by: INTERNAL MEDICINE

## 2023-05-18 PROCEDURE — 99215 PR OFFICE/OUTPT VISIT, EST, LEVL V, 40-54 MIN: ICD-10-PCS | Mod: ,,, | Performed by: INTERNAL MEDICINE

## 2023-05-18 PROCEDURE — 1157F PR ADVANCE CARE PLAN OR EQUIV PRESENT IN MEDICAL RECORD: ICD-10-PCS | Mod: CPTII,,, | Performed by: INTERNAL MEDICINE

## 2023-05-18 PROCEDURE — 88274 CYTOGENETICS 25-99: CPT | Performed by: INTERNAL MEDICINE

## 2023-05-18 PROCEDURE — 83521 IG LIGHT CHAINS FREE EACH: CPT | Mod: 59 | Performed by: INTERNAL MEDICINE

## 2023-05-18 PROCEDURE — 36415 COLL VENOUS BLD VENIPUNCTURE: CPT | Performed by: INTERNAL MEDICINE

## 2023-05-18 PROCEDURE — 1101F PT FALLS ASSESS-DOCD LE1/YR: CPT | Mod: CPTII,,, | Performed by: INTERNAL MEDICINE

## 2023-05-18 PROCEDURE — 88271 CYTOGENETICS DNA PROBE: CPT | Performed by: INTERNAL MEDICINE

## 2023-05-18 PROCEDURE — 1160F PR REVIEW ALL MEDS BY PRESCRIBER/CLIN PHARMACIST DOCUMENTED: ICD-10-PCS | Mod: CPTII,,, | Performed by: INTERNAL MEDICINE

## 2023-05-18 PROCEDURE — 1126F PR PAIN SEVERITY QUANTIFIED, NO PAIN PRESENT: ICD-10-PCS | Mod: CPTII,,, | Performed by: INTERNAL MEDICINE

## 2023-05-18 NOTE — PROGRESS NOTES
Subjective:       Patient ID: Malaika Paredes is a 67 y.o. female.    Chief Complaint: Results, Lymphoma, and Breast Cancer    HPI:  67-year-old female returns.  The patient has had a biopsy of her left groin wishes read a plasma cell neoplasia.  The patient has node positive breast cancer was scheduled to begin treatment with OP BREAST ADO-TRASTUZUMAB EMTANSINE Q3W but has not done so and also scheduled to begin treatment with external beam radiation therapy.  At this time the patient is here for discussion she also ask that I speak to her niece by phone during the visit      Past Medical History:   Diagnosis Date    CAD (coronary artery disease)     Cataract     Cataract     CHF (congestive heart failure)     COPD (chronic obstructive pulmonary disease)     Diabetes mellitus     Diabetic retinopathy     ESRD (end stage renal disease)     TTS    Hypertension     Ischemic ulcer of toe of left foot     Malignant neoplasm of upper-outer quadrant of left breast in female, estrogen receptor positive 06/20/2022    left    PAD (peripheral artery disease)     PAF (paroxysmal atrial fibrillation)      Family History   Problem Relation Age of Onset    Hypertension Mother     Cancer Father     Breast cancer Sister     Diabetes Sister     Cancer Brother     Amblyopia Neg Hx     Blindness Neg Hx     Cataracts Neg Hx     Glaucoma Neg Hx     Macular degeneration Neg Hx     Retinal detachment Neg Hx     Strabismus Neg Hx     Stroke Neg Hx     Thyroid disease Neg Hx      Social History     Socioeconomic History    Marital status:    Tobacco Use    Smoking status: Former     Years: 30.00     Types: Cigarettes     Quit date: 6/21/2012     Years since quitting: 10.9    Smokeless tobacco: Never   Substance and Sexual Activity    Alcohol use: No    Drug use: Never   Social History Narrative    ** Merged History Encounter **          Past Surgical History:   Procedure Laterality Date    APPLICATION OF WOUND VACUUM-ASSISTED  CLOSURE DEVICE Left 4/6/2023    Procedure: APPLICATION, WOUND VAC;  Surgeon: Jayjay Arana MD;  Location: [facility] OR;  Service: General;  Laterality: Left;  left chest    AXILLARY NODE DISSECTION Left 3/1/2023    Procedure: LYMPHADENECTOMY, AXILLARY;  Surgeon: Jayjay Arana MD;  Location: [facility] OR;  Service: General;  Laterality: Left;    BIOPSY OF INGUINAL LYMPH NODE Left 3/31/2023    Procedure: BIOPSY, LYMPH NODE, INGUINAL;  Surgeon: Jayjay Arana MD;  Location: [facility] OR;  Service: General;  Laterality: Left;    BREAST BIOPSY Right     benign    CATARACT EXTRACTION W/  INTRAOCULAR LENS IMPLANT Right 08/14/2017    Dr. Moe    CORONARY ARTERY BYPASS GRAFT  2020    FLUOROSCOPY N/A 07/25/2022    Procedure: FLUOROSCOPY/mediport placement;  Surgeon: Cole Perdomo MD;  Location: [facility] CATH LAB;  Service: General;  Laterality: N/A;    MEDIPORT REMOVAL N/A 3/1/2023    Procedure: REMOVAL, CATHETER, CENTRAL VENOUS, TUNNELED, WITH PORT;  Surgeon: Jayjay Arana MD;  Location: [facility] OR;  Service: General;  Laterality: N/A;    MODIFIED RADICAL MASTECTOMY W/ AXILLARY LYMPH NODE DISSECTION Left 3/1/2023    Procedure: MASTECTOMY, MODIFIED RADICAL;  Surgeon: Jayjay Arana MD;  Location: [facility] OR;  Service: General;  Laterality: Left;    WOUND DEBRIDEMENT Left 3/31/2023    Procedure: DEBRIDEMENT, WOUND;  Surgeon: Jayjay Arana MD;  Location: [facility] OR;  Service: General;  Laterality: Left;  left chest wall eschar debridement       Labs:  Lab Results   Component Value Date    WBC 5.80 04/19/2023    HGB 11.9 (L) 04/19/2023    HCT 37.5 04/19/2023    MCV 88 04/19/2023     04/19/2023     BMP  Lab Results   Component Value Date     04/19/2023    K 4.4 04/19/2023     04/19/2023    CO2 22 (L) 04/19/2023    BUN 37 (H) 04/19/2023    CREATININE 5.0 (H) 04/19/2023    CALCIUM 8.2 (L) 04/19/2023    ANIONGAP 13 04/19/2023    ESTGFRAFRICA 8 (A) 07/27/2022    EGFRNONAA 7 (A) 07/27/2022     Lab Results   Component Value Date    ALT 10  04/19/2023    AST 15 04/19/2023    ALKPHOS 70 04/19/2023    BILITOT 0.3 04/19/2023       Lab Results   Component Value Date    IRON 43 09/09/2018    TIBC 300 09/09/2018    FERRITIN 1,038 (H) 06/21/2022     Lab Results   Component Value Date    IPFONQEL54 459 09/09/2018     Lab Results   Component Value Date    FOLATE 6.2 09/09/2018     Lab Results   Component Value Date    TSH 2.815 10/05/2022         Review of Systems   Constitutional:  Negative for activity change, appetite change, chills, diaphoresis, fatigue, fever and unexpected weight change.   HENT:  Negative for congestion, dental problem, drooling, ear discharge, ear pain, facial swelling, hearing loss, mouth sores, nosebleeds, postnasal drip, rhinorrhea, sinus pressure, sneezing, sore throat, tinnitus, trouble swallowing and voice change.    Eyes:  Negative for photophobia, pain, discharge, redness, itching and visual disturbance.   Respiratory:  Negative for cough, choking, chest tightness, shortness of breath, wheezing and stridor.    Cardiovascular:  Negative for chest pain, palpitations and leg swelling.   Gastrointestinal:  Negative for abdominal distention, abdominal pain, anal bleeding, blood in stool, constipation, diarrhea, nausea, rectal pain and vomiting.   Endocrine: Negative for cold intolerance, heat intolerance, polydipsia, polyphagia and polyuria.   Genitourinary:  Negative for decreased urine volume, difficulty urinating, dyspareunia, dysuria, enuresis, flank pain, frequency, genital sores, hematuria, menstrual problem, pelvic pain, urgency, vaginal bleeding, vaginal discharge and vaginal pain.   Musculoskeletal:  Negative for arthralgias, back pain, gait problem, joint swelling, myalgias, neck pain and neck stiffness.   Skin:  Negative for color change, pallor and rash.   Allergic/Immunologic: Negative for environmental allergies, food allergies and immunocompromised state.   Neurological:  Negative for dizziness, tremors, seizures,  syncope, facial asymmetry, speech difficulty, weakness, light-headedness, numbness and headaches.   Hematological:  Negative for adenopathy. Does not bruise/bleed easily.   Psychiatric/Behavioral:  Positive for dysphoric mood. Negative for agitation, behavioral problems, confusion, decreased concentration, hallucinations, self-injury, sleep disturbance and suicidal ideas. The patient is nervous/anxious. The patient is not hyperactive.      Objective:      Physical Exam  Vitals reviewed.   Constitutional:       General: She is not in acute distress.     Appearance: She is well-developed. She is not diaphoretic.   HENT:      Head: Normocephalic and atraumatic.      Right Ear: External ear normal.      Left Ear: External ear normal.      Nose: Nose normal.      Right Sinus: No maxillary sinus tenderness or frontal sinus tenderness.      Left Sinus: No maxillary sinus tenderness or frontal sinus tenderness.      Mouth/Throat:      Pharynx: No oropharyngeal exudate.   Eyes:      General: Lids are normal. No scleral icterus.        Right eye: No discharge.         Left eye: No discharge.      Conjunctiva/sclera: Conjunctivae normal.      Right eye: Right conjunctiva is not injected. No hemorrhage.     Left eye: Left conjunctiva is not injected. No hemorrhage.     Pupils: Pupils are equal, round, and reactive to light.   Neck:      Thyroid: No thyromegaly.      Vascular: No JVD.      Trachea: No tracheal deviation.   Cardiovascular:      Rate and Rhythm: Normal rate and regular rhythm.      Heart sounds: Normal heart sounds.   Pulmonary:      Effort: Pulmonary effort is normal. No respiratory distress.      Breath sounds: Normal breath sounds. No stridor.   Chest:      Chest wall: No tenderness.   Abdominal:      General: Bowel sounds are normal. There is no distension.      Palpations: Abdomen is soft. There is no hepatomegaly, splenomegaly or mass.      Tenderness: There is no abdominal tenderness. There is no rebound.    Musculoskeletal:         General: No tenderness. Normal range of motion.      Cervical back: Normal range of motion and neck supple.   Lymphadenopathy:      Cervical: No cervical adenopathy.      Upper Body:      Right upper body: No supraclavicular adenopathy.      Left upper body: No supraclavicular adenopathy.      Lower Body: Left inguinal adenopathy present.   Skin:     General: Skin is dry.      Findings: No erythema or rash.   Neurological:      Mental Status: She is alert and oriented to person, place, and time.      Cranial Nerves: No cranial nerve deficit.      Coordination: Coordination normal.   Psychiatric:         Behavior: Behavior normal.         Thought Content: Thought content normal.         Judgment: Judgment normal.           Assessment:      1. 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 Other myelodysplastic syndromes    3. Malignant neoplasm of upper-outer quadrant of left breast in female, estrogen receptor positive    4. Anemia associated with chronic renal failure    5. Immunodeficiency due to chemotherapy    6. ESRD (end stage renal disease)    7. Secondary malignancy of regional lymph node           Med Onc Chart Routing      Follow up with physician . Nurse navigation to help coordinate   Follow up with [facility]    Infusion scheduling note    Injection scheduling note    Labs    Imaging    Pharmacy appointment    Other referrals            Plan:     Very perplexing case who appears to now be 2 separate malignancies.  At this time will begin workup for left groin mass.  With PET scan serum protein electrophoresis free light chain beta 2 microglobulin.  Will also ask nurse navigation team to coordinate initiation of systemic treatment withOP BREAST ADO-TRASTUZUMAB EMTANSINE Q3W along with external beam radiation therapy.  I will see patient back after results of PET scan and bone marrow have been obtained.  I have talked to her and her niece by phone explain the  different rationale treatment whether not she is local disease only where external beam radiation therapy would be appropriate or whether not more systemic therapy would be warranted discussed implications answered questions I know they are very confused that there 2 separate malignancies at this point but this appears to be the case I will see back after the patient has had bone marrow done and PET scan for review          Toney العلي Jr, MD FACP

## 2023-05-19 LAB
ALBUMIN SERPL ELPH-MCNC: 3.35 G/DL (ref 3.35–5.55)
ALPHA1 GLOB SERPL ELPH-MCNC: 0.32 G/DL (ref 0.17–0.41)
ALPHA2 GLOB SERPL ELPH-MCNC: 0.79 G/DL (ref 0.43–0.99)
B-GLOBULIN SERPL ELPH-MCNC: 0.74 G/DL (ref 0.5–1.1)
B2 MICROGLOB SERPL-MCNC: 20.6 UG/ML (ref 0–2.5)
GAMMA GLOB SERPL ELPH-MCNC: 2.9 G/DL (ref 0.67–1.58)
KAPPA LC SER QL IA: 97.18 MG/DL (ref 0.33–1.94)
KAPPA LC/LAMBDA SER IA: 2.69 (ref 0.26–1.65)
LAMBDA LC SER QL IA: 36.19 MG/DL (ref 0.57–2.63)
PROT SERPL-MCNC: 8.1 G/DL (ref 6–8.4)

## 2023-05-20 LAB
ACID FAST MOD KINY STN SPEC: NORMAL
MYCOBACTERIUM SPEC QL CULT: NORMAL

## 2023-05-22 ENCOUNTER — TELEPHONE (OUTPATIENT)
Dept: EMERGENCY MEDICINE | Facility: HOSPITAL | Age: 68
End: 2023-05-22
Payer: MEDICARE

## 2023-05-22 ENCOUNTER — HOSPITAL ENCOUNTER (EMERGENCY)
Facility: HOSPITAL | Age: 68
Discharge: HOME | End: 2023-05-22
Attending: EMERGENCY MEDICINE
Payer: MEDICARE

## 2023-05-22 ENCOUNTER — HOSPITAL ENCOUNTER (OUTPATIENT)
Dept: RADIOLOGY | Facility: HOSPITAL | Age: 68
Discharge: HOME | End: 2023-05-22
Attending: INTERNAL MEDICINE
Payer: MEDICARE

## 2023-05-22 VITALS
RESPIRATION RATE: 16 BRPM | TEMPERATURE: 98 F | DIASTOLIC BLOOD PRESSURE: 87 MMHG | HEART RATE: 69 BPM | SYSTOLIC BLOOD PRESSURE: 181 MMHG | OXYGEN SATURATION: 97 %

## 2023-05-22 DIAGNOSIS — C86.4: ICD-10-CM

## 2023-05-22 DIAGNOSIS — Z21 HIV TEST POSITIVE: ICD-10-CM

## 2023-05-22 DIAGNOSIS — T78.3XXA ANGIOEDEMA, INITIAL ENCOUNTER: Primary | ICD-10-CM

## 2023-05-22 LAB
ALBUMIN SERPL BCP-MCNC: 3.1 G/DL (ref 3.5–5.2)
ALP SERPL-CCNC: 80 U/L (ref 55–135)
ALT SERPL W/O P-5'-P-CCNC: 18 U/L (ref 10–44)
ANION GAP SERPL CALC-SCNC: 11 MMOL/L (ref 8–16)
AST SERPL-CCNC: 22 U/L (ref 10–40)
BASOPHILS # BLD AUTO: 0.02 K/UL (ref 0–0.2)
BASOPHILS NFR BLD: 0.3 % (ref 0–1.9)
BILIRUB SERPL-MCNC: 0.4 MG/DL (ref 0.1–1)
BUN SERPL-MCNC: 33 MG/DL (ref 8–23)
CALCIUM SERPL-MCNC: 9 MG/DL (ref 8.7–10.5)
CHLORIDE SERPL-SCNC: 107 MMOL/L (ref 95–110)
CO2 SERPL-SCNC: 18 MMOL/L (ref 23–29)
CREAT SERPL-MCNC: 5.1 MG/DL (ref 0.5–1.4)
DIFFERENTIAL METHOD: ABNORMAL
EOSINOPHIL # BLD AUTO: 0.4 K/UL (ref 0–0.5)
EOSINOPHIL NFR BLD: 6.1 % (ref 0–8)
ERYTHROCYTE [DISTWIDTH] IN BLOOD BY AUTOMATED COUNT: 15.8 % (ref 11.5–14.5)
EST. GFR  (NO RACE VARIABLE): 9 ML/MIN/1.73 M^2
GLUCOSE SERPL-MCNC: 99 MG/DL (ref 70–110)
HCT VFR BLD AUTO: 36.8 % (ref 37–48.5)
HCV AB SERPL QL IA: NEGATIVE
HEP C VIRUS HOLD SPECIMEN: NORMAL
HGB BLD-MCNC: 12.1 G/DL (ref 12–16)
HIV 1+2 AB+HIV1 P24 AG SERPL QL IA: POSITIVE
IMM GRANULOCYTES # BLD AUTO: 0.02 K/UL (ref 0–0.04)
IMM GRANULOCYTES NFR BLD AUTO: 0.3 % (ref 0–0.5)
LYMPHOCYTES # BLD AUTO: 1.8 K/UL (ref 1–4.8)
LYMPHOCYTES NFR BLD: 30.9 % (ref 18–48)
MCH RBC QN AUTO: 29.4 PG (ref 27–31)
MCHC RBC AUTO-ENTMCNC: 32.9 G/DL (ref 32–36)
MCV RBC AUTO: 90 FL (ref 82–98)
MONOCYTES # BLD AUTO: 0.6 K/UL (ref 0.3–1)
MONOCYTES NFR BLD: 9.8 % (ref 4–15)
NEUTROPHILS # BLD AUTO: 3 K/UL (ref 1.8–7.7)
NEUTROPHILS NFR BLD: 52.6 % (ref 38–73)
NRBC BLD-RTO: 0 /100 WBC
PATHOLOGIST INTERPRETATION SPE: NORMAL
PCPDS FINAL DIAGNOSIS: NORMAL
PCPDS PRE-ANALYSIS PRE-SORT: NORMAL
PLATELET # BLD AUTO: 233 K/UL (ref 150–450)
PMV BLD AUTO: 9.7 FL (ref 9.2–12.9)
POTASSIUM SERPL-SCNC: 4.5 MMOL/L (ref 3.5–5.1)
PROT SERPL-MCNC: 8.5 G/DL (ref 6–8.4)
RBC # BLD AUTO: 4.11 M/UL (ref 4–5.4)
SODIUM SERPL-SCNC: 136 MMOL/L (ref 136–145)
WBC # BLD AUTO: 5.72 K/UL (ref 3.9–12.7)

## 2023-05-22 PROCEDURE — 25000003 PHARM REV CODE 250: Performed by: EMERGENCY MEDICINE

## 2023-05-22 PROCEDURE — A9552 F18 FDG: HCPCS

## 2023-05-22 PROCEDURE — 87389 HIV-1 AG W/HIV-1&-2 AB AG IA: CPT | Mod: 91 | Performed by: EMERGENCY MEDICINE

## 2023-05-22 PROCEDURE — 96374 THER/PROPH/DIAG INJ IV PUSH: CPT | Mod: 59

## 2023-05-22 PROCEDURE — 78815 NM PET CT ROUTINE: ICD-10-PCS | Mod: 26,PS,, | Performed by: RADIOLOGY

## 2023-05-22 PROCEDURE — 86803 HEPATITIS C AB TEST: CPT | Performed by: EMERGENCY MEDICINE

## 2023-05-22 PROCEDURE — 63600175 PHARM REV CODE 636 W HCPCS: Performed by: EMERGENCY MEDICINE

## 2023-05-22 PROCEDURE — 80053 COMPREHEN METABOLIC PANEL: CPT | Performed by: EMERGENCY MEDICINE

## 2023-05-22 PROCEDURE — 78815 PET IMAGE W/CT SKULL-THIGH: CPT | Mod: 26,PS,, | Performed by: RADIOLOGY

## 2023-05-22 PROCEDURE — 85025 COMPLETE CBC W/AUTO DIFF WBC: CPT | Performed by: EMERGENCY MEDICINE

## 2023-05-22 PROCEDURE — 96375 TX/PRO/DX INJ NEW DRUG ADDON: CPT

## 2023-05-22 PROCEDURE — 87389 HIV-1 AG W/HIV-1&-2 AB AG IA: CPT | Performed by: EMERGENCY MEDICINE

## 2023-05-22 PROCEDURE — 99284 EMERGENCY DEPT VISIT MOD MDM: CPT | Mod: 25

## 2023-05-22 RX ORDER — METHYLPREDNISOLONE SOD SUCC 125 MG
125 VIAL (EA) INJECTION
Status: COMPLETED | OUTPATIENT
Start: 2023-05-22 | End: 2023-05-22

## 2023-05-22 RX ORDER — PREDNISONE 50 MG/1
50 TABLET ORAL DAILY
Qty: 5 TABLET | Refills: 0 | Status: SHIPPED | OUTPATIENT
Start: 2023-05-22 | End: 2023-05-27

## 2023-05-22 RX ORDER — FAMOTIDINE 10 MG/ML
20 INJECTION INTRAVENOUS
Status: COMPLETED | OUTPATIENT
Start: 2023-05-22 | End: 2023-05-22

## 2023-05-22 RX ORDER — DIPHENHYDRAMINE HCL 25 MG
25 CAPSULE ORAL EVERY 6 HOURS PRN
Qty: 20 CAPSULE | Refills: 0 | Status: SHIPPED | OUTPATIENT
Start: 2023-05-22

## 2023-05-22 RX ADMIN — METHYLPREDNISOLONE SODIUM SUCCINATE 125 MG: 125 INJECTION, POWDER, FOR SOLUTION INTRAMUSCULAR; INTRAVENOUS at 09:05

## 2023-05-22 RX ADMIN — FAMOTIDINE 20 MG: 10 INJECTION, SOLUTION INTRAVENOUS at 09:05

## 2023-05-22 NOTE — ED NOTES
Patient complains of oral swelling that began this AM.  Level of Consciousness: The patient is awake, alert, and oriented with appropriate affect and speech; oriented to person, place and time.  Appearance: Sitting up in ED stretcher with no acute distress noted. Clothing and hygiene are clean and worn appropriately.  Skin: Skin is intact; color consistent with ethnicity.    Musculoskeletal: Moves all extremities well in full range of motion. No obvious deformities or swelling noted.  Respiratory: Airway open and patent, respirations spontaneous, even and unlabored. No accessory muscles in use.   Cardiac: Regular rate, no peripheral edema noted.  Abdomen:  No distention noted.  Neurologic: PERRLA, face exhibits symmetrical expression, reports normal sensation to all extremities and face.    Patient verbalized understanding of status and plan of care.

## 2023-05-22 NOTE — ED PROVIDER NOTES
"SCRIBE #1 NOTE: I, Qing Renee, am scribing for, and in the presence of, Robb Moe MD. I have scribed the entire note.      History      Chief Complaint   Patient presents with    Oral Swelling     Woke up this morning with tongue swelling. On Ace Inhibitors. Pt denies SOB.        Review of patient's allergies indicates:  No Known Allergies     HPI   HPI    5/22/2023, 8:27 AM   History obtained from the patient and EMS      History of Present Illness: Malaika Paredes is a 67 y.o. female patient with a PMHx of CAD, CHF, COPD, DM, ESRD, HTN, PAD, PAF, and malignant neoplasm of upper-outer quadrant of left breast in female (estrogen receptor positive) who presents to the Emergency Department for an evaluation of tongue swelling which she woke up with his morning. Pt is on Ace inhibitors. Also, per EMS, when the Fire Department arrived on scene, the pt's CBG was "low", so they administered oral glucose. Pt's CBG is within normal limits at this time. Symptoms are constant and moderate in severity. No mitigating or exacerbating factors reported. No associated sxs reported. Patient denies any SOB, trouble swallowing, fever, chills, CP, and all other sxs at this time. No further complaints or concerns at this time.         Arrival mode: EMS    PCP: Travis Scales MD       Past Medical History:  Past Medical History:   Diagnosis Date    CAD (coronary artery disease)     Cataract     Cataract     CHF (congestive heart failure)     COPD (chronic obstructive pulmonary disease)     Diabetes mellitus     Diabetic retinopathy     ESRD (end stage renal disease)     TTS    Hypertension     Ischemic ulcer of toe of left foot     Malignant neoplasm of upper-outer quadrant of left breast in female, estrogen receptor positive 06/20/2022    left    PAD (peripheral artery disease)     PAF (paroxysmal atrial fibrillation)        Past Surgical History:  Past Surgical History:   Procedure Laterality Date    APPLICATION " OF WOUND VACUUM-ASSISTED CLOSURE DEVICE Left 4/6/2023    Procedure: APPLICATION, WOUND VAC;  Surgeon: Jayjay Arana MD;  Location: [facility] OR;  Service: General;  Laterality: Left;  left chest    AXILLARY NODE DISSECTION Left 3/1/2023    Procedure: LYMPHADENECTOMY, AXILLARY;  Surgeon: Jayjay Arana MD;  Location: [facility] OR;  Service: General;  Laterality: Left;    BIOPSY OF INGUINAL LYMPH NODE Left 3/31/2023    Procedure: BIOPSY, LYMPH NODE, INGUINAL;  Surgeon: Jayjay Arana MD;  Location: [facility] OR;  Service: General;  Laterality: Left;    BREAST BIOPSY Right     benign    CATARACT EXTRACTION W/  INTRAOCULAR LENS IMPLANT Right 08/14/2017    Dr. Moe    CORONARY ARTERY BYPASS GRAFT  2020    FLUOROSCOPY N/A 07/25/2022    Procedure: FLUOROSCOPY/mediport placement;  Surgeon: Cole Perdomo MD;  Location: [facility] CATH LAB;  Service: General;  Laterality: N/A;    MEDIPORT REMOVAL N/A 3/1/2023    Procedure: REMOVAL, CATHETER, CENTRAL VENOUS, TUNNELED, WITH PORT;  Surgeon: Jayjay Arana MD;  Location: [facility] OR;  Service: General;  Laterality: N/A;    MODIFIED RADICAL MASTECTOMY W/ AXILLARY LYMPH NODE DISSECTION Left 3/1/2023    Procedure: MASTECTOMY, MODIFIED RADICAL;  Surgeon: Jayjay Arana MD;  Location: [facility] OR;  Service: General;  Laterality: Left;    WOUND DEBRIDEMENT Left 3/31/2023    Procedure: DEBRIDEMENT, WOUND;  Surgeon: Jayjay Arana MD;  Location: [facility] OR;  Service: General;  Laterality: Left;  left chest wall eschar debridement         Family History:  Family History   Problem Relation Age of Onset    Hypertension Mother     Cancer Father     Breast cancer Sister     Diabetes Sister     Cancer Brother     Amblyopia Neg Hx     Blindness Neg Hx     Cataracts Neg Hx     Glaucoma Neg Hx     Macular degeneration Neg Hx     Retinal detachment Neg Hx     Strabismus Neg Hx     Stroke Neg Hx     Thyroid disease Neg Hx        Social History:  Social History     Tobacco Use    Smoking status: Former     Years: 30.00     Types:  Cigarettes     Quit date: 6/21/2012     Years since quitting: 10.9    Smokeless tobacco: Never   Substance and Sexual Activity    Alcohol use: No    Drug use: Never    Sexual activity: Not on file       ROS   Review of Systems   Constitutional:  Negative for chills and fever.   HENT:  Negative for sore throat and trouble swallowing.         (+) tongue swelling   Respiratory:  Negative for shortness of breath.    Cardiovascular:  Negative for chest pain.   Gastrointestinal:  Negative for nausea.   Genitourinary:  Negative for dysuria.   Musculoskeletal:  Negative for back pain.   Skin:  Negative for rash.   Neurological:  Negative for weakness.   Hematological:  Does not bruise/bleed easily.   All other systems reviewed and are negative.    Physical Exam      Initial Vitals [05/22/23 0830]   BP Pulse Resp Temp SpO2   (!) 162/72 72 16 98 °F (36.7 °C) 99 %      MAP       --          Physical Exam  Nursing Notes and Vital Signs Reviewed.  Constitutional: Patient is in no acute distress. Well-developed and well-nourished.  Head: Atraumatic. Normocephalic.  Eyes: PERRL. EOM intact. Conjunctivae are not pale. No scleral icterus.  ENT: Mucous membranes are moist. There is mild tongue swelling.  Neck: Supple. Full ROM. No lymphadenopathy.  Cardiovascular: Regular rate. Regular rhythm. No murmurs, rubs, or gallops. Distal pulses are 2+ and symmetric.  Pulmonary/Chest: No respiratory distress. Clear to auscultation bilaterally. No wheezing or rales.  Abdominal: Soft and non-distended.  There is no tenderness.  No rebound, guarding, or rigidity.   Musculoskeletal: Moves all extremities. No obvious deformities. No edema.  Skin: Warm and dry.  Neurological:  Alert, awake, and appropriate.  Normal speech.  No acute focal neurological deficits are appreciated.  Psychiatric: Normal affect. Good eye contact. Appropriate in content.    ED Course    Procedures  ED Vital Signs:  Vitals:    05/22/23 0830 05/22/23 0930   BP: (!) 162/72  (!) 181/87   Pulse: 72 69   Resp: 16 16   Temp: 98 °F (36.7 °C)    TempSrc: Oral    SpO2: 99% 97%       Abnormal Lab Results:  Labs Reviewed   CBC W/ AUTO DIFFERENTIAL - Abnormal; Notable for the following components:       Result Value    Hematocrit 36.8 (*)     RDW 15.8 (*)     All other components within normal limits    Narrative:     Release to patient->Immediate   COMPREHENSIVE METABOLIC PANEL - Abnormal; Notable for the following components:    CO2 18 (*)     BUN 33 (*)     Creatinine 5.1 (*)     Total Protein 8.5 (*)     Albumin 3.1 (*)     eGFR 9 (*)     All other components within normal limits    Narrative:     Release to patient->Immediate   HIV 1 / 2 ANTIBODY - Abnormal; Notable for the following components:    HIV 1/2 Ag/Ab Positive (*)     All other components within normal limits    Narrative:     Release to patient->Immediate   HEPATITIS C ANTIBODY    Narrative:     Release to patient->Immediate   HEP C VIRUS HOLD SPECIMEN    Narrative:     Release to patient->Immediate   HIV 1/2 SUPPLEMENTAL ASSAY        All Lab Results:  Results for orders placed or performed during the hospital encounter of 05/22/23   CBC Auto Differential   Result Value Ref Range    WBC 5.72 3.90 - 12.70 K/uL    RBC 4.11 4.00 - 5.40 M/uL    Hemoglobin 12.1 12.0 - 16.0 g/dL    Hematocrit 36.8 (L) 37.0 - 48.5 %    MCV 90 82 - 98 fL    MCH 29.4 27.0 - 31.0 pg    MCHC 32.9 32.0 - 36.0 g/dL    RDW 15.8 (H) 11.5 - 14.5 %    Platelets 233 150 - 450 K/uL    MPV 9.7 9.2 - 12.9 fL    Immature Granulocytes 0.3 0.0 - 0.5 %    Gran # (ANC) 3.0 1.8 - 7.7 K/uL    Immature Grans (Abs) 0.02 0.00 - 0.04 K/uL    Lymph # 1.8 1.0 - 4.8 K/uL    Mono # 0.6 0.3 - 1.0 K/uL    Eos # 0.4 0.0 - 0.5 K/uL    Baso # 0.02 0.00 - 0.20 K/uL    nRBC 0 0 /100 WBC    Gran % 52.6 38.0 - 73.0 %    Lymph % 30.9 18.0 - 48.0 %    Mono % 9.8 4.0 - 15.0 %    Eosinophil % 6.1 0.0 - 8.0 %    Basophil % 0.3 0.0 - 1.9 %    Differential Method Automated    Comprehensive  Metabolic Panel   Result Value Ref Range    Sodium 136 136 - 145 mmol/L    Potassium 4.5 3.5 - 5.1 mmol/L    Chloride 107 95 - 110 mmol/L    CO2 18 (L) 23 - 29 mmol/L    Glucose 99 70 - 110 mg/dL    BUN 33 (H) 8 - 23 mg/dL    Creatinine 5.1 (H) 0.5 - 1.4 mg/dL    Calcium 9.0 8.7 - 10.5 mg/dL    Total Protein 8.5 (H) 6.0 - 8.4 g/dL    Albumin 3.1 (L) 3.5 - 5.2 g/dL    Total Bilirubin 0.4 0.1 - 1.0 mg/dL    Alkaline Phosphatase 80 55 - 135 U/L    AST 22 10 - 40 U/L    ALT 18 10 - 44 U/L    Anion Gap 11 8 - 16 mmol/L    eGFR 9 (A) >60 mL/min/1.73 m^2   HIV 1/2 Ag/Ab (4th Gen)   Result Value Ref Range    HIV 1/2 Ag/Ab Positive (A) Negative   Hepatitis C Antibody   Result Value Ref Range    Hepatitis C Ab Negative Negative   HCV Virus Hold Specimen   Result Value Ref Range    HEP C Virus Hold Specimen Hold for HCV sendout          Imaging Results:  Imaging Results    None                 The Emergency Provider reviewed the vital signs and test results, which are outlined above.    ED Discussion     10:36 AM: Informed the pt of her HIV results.    10:39 AM: Reassessed pt at this time.  Discussed with pt all pertinent ED information and results. Discussed pt dx and plan of tx. Gave pt all f/u and return to the ED instructions. All questions and concerns were addressed at this time. Pt expresses understanding of information and instructions, and is comfortable with plan to discharge. Pt is stable for discharge.    I discussed with patient and/or family/caretaker that evaluation in the ED does not suggest any emergent or life threatening medical conditions requiring immediate intervention beyond what was provided in the ED, and I believe patient is safe for discharge.  Regardless, an unremarkable evaluation in the ED does not preclude the development or presence of a serious of life threatening condition. As such, patient was instructed to return immediately for any worsening or change in current symptoms.           ED  Medication(s):  Medications   methylPREDNISolone sodium succinate injection 125 mg (125 mg Intravenous Given 5/22/23 0911)   famotidine (PF) injection 20 mg (20 mg Intravenous Given 5/22/23 0911)     Discharge Medication List as of 5/22/2023 10:39 AM        START taking these medications    Details   diphenhydrAMINE (BENADRYL) 25 mg capsule Take 1 capsule (25 mg total) by mouth every 6 (six) hours as needed for Itching., Starting Mon 5/22/2023, Normal      predniSONE (DELTASONE) 50 MG Tab Take 1 tablet (50 mg total) by mouth once daily. for 5 days, Starting Mon 5/22/2023, Until Sat 5/27/2023, Normal              Follow-up Information       Travis Scales MD.    Specialty: Cardiology  Contact information:  [address]  [address]  [place] [address] [phone]                               Medical Decision Making    Medical Decision Making:   Initial Assessment:   Woke up this morning with tongue swelling.  ON an ARB.  Given benadryl en route  Differential Diagnosis:   Angioedema, tongue swelling  Clinical Tests:   Lab Tests: Ordered and Reviewed  ED Management:  Labs reviewed by me.  No acute findings.  Patient's tongue swelling has improved after treatment in the ED.  Will stop ARB and treat with OP steroids and benadryl         Scribe Attestation:   Scribe #1: I performed the above scribed service and the documentation accurately describes the services I performed. I attest to the accuracy of the note.    Attending:   Physician Attestation Statement for Scribe #1: I, Robb Moe MD, personally performed the services described in this documentation, as scribed by Qing Renee, in my presence, and it is both accurate and complete.          Clinical Impression       ICD-10-CM ICD-9-CM   1. Angioedema, initial encounter  T78.3XXA 995.1   2. HIV test positive  Z21 V08       Disposition:   Disposition: Discharged  Condition: Stable       Robb Moe MD  05/22/23 [id number]

## 2023-05-24 LAB
HIV SUPPLEMENTAL ASSAY INTERPRETATION: ABNORMAL
HIV-1 RESULT: POSITIVE
HIV-2 RESULT: ABNORMAL

## 2023-05-26 ENCOUNTER — TELEPHONE (OUTPATIENT)
Dept: EMERGENCY MEDICINE | Facility: HOSPITAL | Age: 68
End: 2023-05-26
Payer: MEDICARE

## 2023-05-26 ENCOUNTER — DOCUMENTATION ONLY (OUTPATIENT)
Dept: HEMATOLOGY/ONCOLOGY | Facility: CLINIC | Age: 68
End: 2023-05-26
Payer: MEDICARE

## 2023-05-26 DIAGNOSIS — C86.4: Primary | ICD-10-CM

## 2023-05-26 NOTE — PROGRESS NOTES
Outgoing call to pt to schedule add'l lab tests per Dr. العلي request. Pt states she can go to the [facility]  b/t 11am and noon. Appt scheduled, she denies any add'l needs at present. Notified MD of appt.   Oncology Navigation   Intake  Cancer Type: Breast  Initial Nurse Navigator Contact: 22  Date Worked: 23  Multiple appointments: Yes  Reason for Treatment Delay: Further work-up     Treatment  Current Status: Active  Date Presented to Tumor Board: 23       Medical Oncologist: reza  Chemotherapy: Planned  Chemotherapy Regimen: THP       Procedures: Echo; Port / PICC  Port / PICC Schedule Date: 22    General Referrals: Social work  Social Work Referral Date: 22    ER: Positive  FL: Positive  Her2: Postive       Support Systems: Family members  Barriers of Care: Comorbidities  Comorbidities: dialysis pt  Sat  Concerns: pt is dialysis pt  tiw  neice takes care of all appts and support     Acuity  Systemic Treatment - predicted or initiated: Chemotherapy regimen with multiple drugs (+1)  Treatment Tolerability: Has not started treatment yet/treatment fully completed and side effects resolved  ECO  Comorbidities in Medical History: 2  Other Medical Factors (+2 each): -- (dialysis)   Needed: 0  Support: 0  Verbalizes Financial Concerns: 1  Transportation: 0  Psychological Factors (+1 each): Emotional during conversation  Verbalizes the need for more education: 1  Navigation Acuity: 10     Follow Up  No follow-ups on file.

## 2023-05-29 ENCOUNTER — EXTERNAL HOME HEALTH (OUTPATIENT)
Dept: HOME HEALTH SERVICES | Facility: HOSPITAL | Age: 68
End: 2023-05-29
Payer: MEDICARE

## 2023-05-30 ENCOUNTER — LAB VISIT (OUTPATIENT)
Dept: LAB | Facility: HOSPITAL | Age: 68
End: 2023-05-30
Attending: INTERNAL MEDICINE
Payer: MEDICARE

## 2023-05-30 DIAGNOSIS — C86.4: ICD-10-CM

## 2023-05-30 LAB
GENETICIST REVIEW: NORMAL
PLASMA CELL PROLIF RELEASED BY: NORMAL
PLASMA CELL PROLIF RESULT SUMMARY: NORMAL
PLASMA CELL PROLIF RESULT TABLE: NORMAL
REASON FOR REFERRAL, PLASMA CELL PROLIF (PCPD), FISH: NORMAL
REF LAB TEST METHOD: NORMAL
RESULTS, PLASMA CELL PROLIF (PCPD), FISH: NORMAL
SERVICE CMNT-IMP: NORMAL
SERVICE CMNT-IMP: NORMAL
SPECIMEN SOURCE: NORMAL
SPECIMEN, PLASMA CELL PROLIF (PCPD), FISH: NORMAL

## 2023-05-30 PROCEDURE — 87538 HIV-2 PROBE&REVRSE TRNSCRIPJ: CPT | Performed by: INTERNAL MEDICINE

## 2023-05-30 PROCEDURE — 36415 COLL VENOUS BLD VENIPUNCTURE: CPT | Performed by: INTERNAL MEDICINE

## 2023-05-31 ENCOUNTER — DOCUMENT SCAN (OUTPATIENT)
Dept: HOME HEALTH SERVICES | Facility: HOSPITAL | Age: 68
End: 2023-05-31
Payer: MEDICARE

## 2023-06-02 LAB
HIV 2 RNA SERPL QL NAA+PROBE: NON REACTIVE
HIV1 RNA SERPL QL NAA+PROBE: REACTIVE

## 2023-06-06 ENCOUNTER — DOCUMENT SCAN (OUTPATIENT)
Dept: HOME HEALTH SERVICES | Facility: HOSPITAL | Age: 68
End: 2023-06-06
Payer: MEDICARE

## 2023-06-09 ENCOUNTER — TELEPHONE (OUTPATIENT)
Dept: RADIOLOGY | Facility: HOSPITAL | Age: 68
End: 2023-06-09
Payer: MEDICARE

## 2023-06-09 ENCOUNTER — DOCUMENT SCAN (OUTPATIENT)
Dept: HOME HEALTH SERVICES | Facility: HOSPITAL | Age: 68
End: 2023-06-09
Payer: MEDICARE

## 2023-06-09 DIAGNOSIS — D68.9 COAGULATION DEFECT, UNSPECIFIED: Primary | ICD-10-CM

## 2023-06-09 NOTE — TELEPHONE ENCOUNTER
Called patient, spoke with pt's niece, Jean Claude, and confirmed radiology procedure appointment for 6/12/23 at 1030. Instructed pt to be NPO after midnight Sunday night, arrive to [facility] on [address] at 0930, and have a ride home post procedure. Pt's niece verbalized understanding and had all questions answered. Pt confirmed no blood thinners are being taken

## 2023-06-12 ENCOUNTER — HOSPITAL ENCOUNTER (OUTPATIENT)
Dept: RADIOLOGY | Facility: HOSPITAL | Age: 68
Discharge: HOME | End: 2023-06-12
Attending: INTERNAL MEDICINE
Payer: MEDICARE

## 2023-06-12 ENCOUNTER — DOCUMENT SCAN (OUTPATIENT)
Dept: HOME HEALTH SERVICES | Facility: HOSPITAL | Age: 68
End: 2023-06-12
Payer: MEDICARE

## 2023-06-12 DIAGNOSIS — C86.4: ICD-10-CM

## 2023-06-14 ENCOUNTER — DOCUMENT SCAN (OUTPATIENT)
Dept: HOME HEALTH SERVICES | Facility: HOSPITAL | Age: 68
End: 2023-06-14
Payer: MEDICARE

## 2023-06-21 ENCOUNTER — TELEPHONE (OUTPATIENT)
Dept: RADIOLOGY | Facility: HOSPITAL | Age: 68
End: 2023-06-21
Payer: MEDICARE

## 2023-06-21 DIAGNOSIS — C86.4: Primary | ICD-10-CM

## 2023-06-21 NOTE — TELEPHONE ENCOUNTER
Called patient's contact # and spoke with patient's niece, Jean Claude, who confirmed bone marrow biopsy appointment for 6/22/23 at 1030. Instructions provided: pt to be NPO after midnight tonight, take important meds in morning with a few sips of water only, arrive to [facility] on [address] at 0930, and have a ride home post procedure. Jean Claude verbalized understanding and had all questions answered. Jean Claude confirmed pt is not taking any blood thinners, that she is aware of, or aspirin in the last week.

## 2023-06-22 ENCOUNTER — HOSPITAL ENCOUNTER (OUTPATIENT)
Dept: RADIOLOGY | Facility: HOSPITAL | Age: 68
Discharge: HOME | End: 2023-06-22
Attending: INTERNAL MEDICINE
Payer: MEDICARE

## 2023-06-22 VITALS
HEART RATE: 96 BPM | OXYGEN SATURATION: 96 % | HEIGHT: 62 IN | SYSTOLIC BLOOD PRESSURE: 174 MMHG | WEIGHT: 176 LBS | DIASTOLIC BLOOD PRESSURE: 76 MMHG | BODY MASS INDEX: 32.39 KG/M2 | RESPIRATION RATE: 14 BRPM

## 2023-06-22 PROCEDURE — 88365 INSITU HYBRIDIZATION (FISH): CPT | Performed by: PATHOLOGY

## 2023-06-22 PROCEDURE — 88264 CHROMOSOME ANALYSIS 20-25: CPT | Performed by: INTERNAL MEDICINE

## 2023-06-22 PROCEDURE — 88237 TISSUE CULTURE BONE MARROW: CPT | Performed by: INTERNAL MEDICINE

## 2023-06-22 PROCEDURE — 88313 PR  SPECIAL STAINS,GROUP II: ICD-10-PCS | Mod: 26,,, | Performed by: PATHOLOGY

## 2023-06-22 PROCEDURE — 85097 PR  BONE MARROW,SMEAR INTERPRETATION: ICD-10-PCS | Mod: ,,, | Performed by: PATHOLOGY

## 2023-06-22 PROCEDURE — 25000003 PHARM REV CODE 250: Performed by: RADIOLOGY

## 2023-06-22 PROCEDURE — 88341 IMHCHEM/IMCYTCHM EA ADD ANTB: CPT | Mod: 26,59,, | Performed by: PATHOLOGY

## 2023-06-22 PROCEDURE — 88305 TISSUE EXAM BY PATHOLOGIST: CPT | Performed by: PATHOLOGY

## 2023-06-22 PROCEDURE — 88313 SPECIAL STAINS GROUP 2: CPT | Mod: 59 | Performed by: PATHOLOGY

## 2023-06-22 PROCEDURE — 88342 CHG IMMUNOCYTOCHEMISTRY: ICD-10-PCS | Mod: 26,59,, | Performed by: PATHOLOGY

## 2023-06-22 PROCEDURE — 88364 INSITU HYBRIDIZATION (FISH): CPT | Mod: 26,,, | Performed by: PATHOLOGY

## 2023-06-22 PROCEDURE — 88185 FLOWCYTOMETRY/TC ADD-ON: CPT | Mod: 59 | Performed by: PATHOLOGY

## 2023-06-22 PROCEDURE — 88184 FLOWCYTOMETRY/ TC 1 MARKER: CPT | Performed by: PATHOLOGY

## 2023-06-22 PROCEDURE — 88189 PR  FLOWCYTOMETRY/READ, 16 & > MARKERS: ICD-10-PCS | Mod: ,,, | Performed by: PATHOLOGY

## 2023-06-22 PROCEDURE — 77012 CT BIOPSY BONE MARROW (XPD): ICD-10-PCS | Mod: 26,,, | Performed by: RADIOLOGY

## 2023-06-22 PROCEDURE — 88305 TISSUE EXAM BY PATHOLOGIST: CPT | Mod: 26,,, | Performed by: PATHOLOGY

## 2023-06-22 PROCEDURE — 88342 IMHCHEM/IMCYTCHM 1ST ANTB: CPT | Performed by: PATHOLOGY

## 2023-06-22 PROCEDURE — 88305 TISSUE EXAM BY PATHOLOGIST: ICD-10-PCS | Mod: 26,,, | Performed by: PATHOLOGY

## 2023-06-22 PROCEDURE — 88313 SPECIAL STAINS GROUP 2: CPT | Mod: 26,,, | Performed by: PATHOLOGY

## 2023-06-22 PROCEDURE — 88364 INSITU HYBRIDIZATION (FISH): CPT | Performed by: PATHOLOGY

## 2023-06-22 PROCEDURE — 88311 PR  DECALCIFY TISSUE: ICD-10-PCS | Mod: 26,,, | Performed by: PATHOLOGY

## 2023-06-22 PROCEDURE — 38221 CT BIOPSY BONE MARROW (XPD): ICD-10-PCS | Mod: RT,,, | Performed by: RADIOLOGY

## 2023-06-22 PROCEDURE — 77012 CT SCAN FOR NEEDLE BIOPSY: CPT | Mod: 26,,, | Performed by: RADIOLOGY

## 2023-06-22 PROCEDURE — 77012 CT SCAN FOR NEEDLE BIOPSY: CPT | Mod: TC

## 2023-06-22 PROCEDURE — 88311 DECALCIFY TISSUE: CPT | Mod: 26,,, | Performed by: PATHOLOGY

## 2023-06-22 PROCEDURE — 88189 FLOWCYTOMETRY/READ 16 & >: CPT | Mod: ,,, | Performed by: PATHOLOGY

## 2023-06-22 PROCEDURE — 88341 IMHCHEM/IMCYTCHM EA ADD ANTB: CPT | Mod: 59 | Performed by: PATHOLOGY

## 2023-06-22 PROCEDURE — 63600175 PHARM REV CODE 636 W HCPCS: Performed by: RADIOLOGY

## 2023-06-22 PROCEDURE — 88342 IMHCHEM/IMCYTCHM 1ST ANTB: CPT | Mod: 26,59,, | Performed by: PATHOLOGY

## 2023-06-22 PROCEDURE — 88311 DECALCIFY TISSUE: CPT | Performed by: PATHOLOGY

## 2023-06-22 PROCEDURE — 38221 DX BONE MARROW BIOPSIES: CPT | Mod: RT,,, | Performed by: RADIOLOGY

## 2023-06-22 PROCEDURE — 88365 PR  TISSUE HYBRIDIZATION: ICD-10-PCS | Mod: 26,,, | Performed by: PATHOLOGY

## 2023-06-22 PROCEDURE — 85097 BONE MARROW INTERPRETATION: CPT | Mod: ,,, | Performed by: PATHOLOGY

## 2023-06-22 PROCEDURE — 88341 PR IHC OR ICC EACH ADD'L SINGLE ANTIBODY  STAINPR: ICD-10-PCS | Mod: 26,59,, | Performed by: PATHOLOGY

## 2023-06-22 PROCEDURE — 88364 CHG INSITU HYBRIDIZATION (FISH: ICD-10-PCS | Mod: 26,,, | Performed by: PATHOLOGY

## 2023-06-22 PROCEDURE — 88365 INSITU HYBRIDIZATION (FISH): CPT | Mod: 26,,, | Performed by: PATHOLOGY

## 2023-06-22 RX ORDER — MIDAZOLAM HYDROCHLORIDE 1 MG/ML
INJECTION INTRAMUSCULAR; INTRAVENOUS CODE/TRAUMA/SEDATION MEDICATION
Status: COMPLETED | OUTPATIENT
Start: 2023-06-22 | End: 2023-06-22

## 2023-06-22 RX ORDER — LIDOCAINE HYDROCHLORIDE 10 MG/ML
INJECTION INFILTRATION; PERINEURAL CODE/TRAUMA/SEDATION MEDICATION
Status: COMPLETED | OUTPATIENT
Start: 2023-06-22 | End: 2023-06-22

## 2023-06-22 RX ORDER — FENTANYL CITRATE 50 UG/ML
INJECTION, SOLUTION INTRAMUSCULAR; INTRAVENOUS CODE/TRAUMA/SEDATION MEDICATION
Status: COMPLETED | OUTPATIENT
Start: 2023-06-22 | End: 2023-06-22

## 2023-06-22 RX ADMIN — LIDOCAINE HYDROCHLORIDE 5 ML: 10 INJECTION, SOLUTION INFILTRATION; PERINEURAL at 11:06

## 2023-06-22 RX ADMIN — MIDAZOLAM HYDROCHLORIDE 1 MG: 1 INJECTION, SOLUTION INTRAMUSCULAR; INTRAVENOUS at 11:06

## 2023-06-22 RX ADMIN — FENTANYL CITRATE 50 MCG: 50 INJECTION, SOLUTION INTRAMUSCULAR; INTRAVENOUS at 11:06

## 2023-06-22 NOTE — PLAN OF CARE
Received patient from procedure via stretcher. VVS. Breathing even and unlabored. No complaints of pain, nausea, or headache. Procedure to lower back, bandage CDI. Family updated with patients status. Patient resting on stretcher.  Will continue to monitor

## 2023-06-22 NOTE — DISCHARGE SUMMARY
[facility])  Discharge Note  Short Stay    CT Biopsy Bone Marrow (xpd)      OUTCOME: Patient tolerated treatment/procedure well without complication and is now ready for discharge.    DISPOSITION: Home or Self Care    FINAL DIAGNOSIS:  <principal problem not specified>    FOLLOWUP: In clinic    DISCHARGE INSTRUCTIONS:  No discharge procedures on file.      Clinical Reference Documents Added to Patient Instructions         Document    BONE MARROW ASPIRATION OR BIOPSY (ENGLISH)    PROCEDURAL SEDATION, ADULT ED (ENGLISH)            TIME SPENT ON DISCHARGE: 15 minutes    Pre Op Diagnosis: lymphoma     Post Op Diagnosis: same     Procedure:  Bone marrow biopsy     Procedure performed by: Conor CALHOUN, Jairo PARTIDA     Written Informed Consent Obtained: Yes     Specimen Removed:  yes     Estimated Blood Loss:  minimal     Findings: Local anesthesia and moderate sedation were used.     The patient tolerated the procedure well and there were no complications.      Disposition:  F/U in clinic    Discharge instructions:  Light activity for 24 hours.  Remove band aid in 24 hours.  No baths (showers are appropriate).    F/U with ordering physician    Sterile technique was performed in the right iliac, lidocaine was used as a local anesthetic.  Multiple samples taken percutaneously from the right iliac bone.  Pt tolerated the procedure well without immediate complications.  Please see radiologist report for details. F/u with PCP and/or ordering physician.

## 2023-06-22 NOTE — PLAN OF CARE
Pt ambulated to preop area with cane. Hypertensive. No complaints of pain, nausea/vomiting, or HA. Pt resting on stretcher awaiting consent and procedure.  Will continue to monitor.

## 2023-06-22 NOTE — H&P
[facility])  History & Physical    Subjective:      Chief Complaint/Reason for Admission: lymphoma    Malaika Paredes is a 67 y.o. female.    Past Medical History:   Diagnosis Date    CAD (coronary artery disease)     Cataract     Cataract     CHF (congestive heart failure)     COPD (chronic obstructive pulmonary disease)     Diabetes mellitus     Diabetic retinopathy     ESRD (end stage renal disease)     TTS    Hypertension     Ischemic ulcer of toe of left foot     Malignant neoplasm of upper-outer quadrant of left breast in female, estrogen receptor positive 06/20/2022    left    PAD (peripheral artery disease)     PAF (paroxysmal atrial fibrillation)      Past Surgical History:   Procedure Laterality Date    APPLICATION OF WOUND VACUUM-ASSISTED CLOSURE DEVICE Left 4/6/2023    Procedure: APPLICATION, WOUND VAC;  Surgeon: Jayjay Arana MD;  Location: [facility] OR;  Service: General;  Laterality: Left;  left chest    AXILLARY NODE DISSECTION Left 3/1/2023    Procedure: LYMPHADENECTOMY, AXILLARY;  Surgeon: Jayjay Arana MD;  Location: [facility] OR;  Service: General;  Laterality: Left;    BIOPSY OF INGUINAL LYMPH NODE Left 3/31/2023    Procedure: BIOPSY, LYMPH NODE, INGUINAL;  Surgeon: Jayjay Arana MD;  Location: [facility] OR;  Service: General;  Laterality: Left;    BREAST BIOPSY Right     benign    CATARACT EXTRACTION W/  INTRAOCULAR LENS IMPLANT Right 08/14/2017    Dr. Moe    CORONARY ARTERY BYPASS GRAFT  2020    FLUOROSCOPY N/A 07/25/2022    Procedure: FLUOROSCOPY/mediport placement;  Surgeon: Cole Perdomo MD;  Location: [facility] CATH LAB;  Service: General;  Laterality: N/A;    MEDIPORT REMOVAL N/A 3/1/2023    Procedure: REMOVAL, CATHETER, CENTRAL VENOUS, TUNNELED, WITH PORT;  Surgeon: Jayjay Arana MD;  Location: [facility] OR;  Service: General;  Laterality: N/A;    MODIFIED RADICAL MASTECTOMY W/ AXILLARY LYMPH NODE DISSECTION Left 3/1/2023    Procedure: MASTECTOMY, MODIFIED RADICAL;  Surgeon: Jayjay MALCOLM  MD Sumit;  Location: [facility] OR;  Service: General;  Laterality: Left;    WOUND DEBRIDEMENT Left 3/31/2023    Procedure: DEBRIDEMENT, WOUND;  Surgeon: Jayjay Arana MD;  Location: [facility] OR;  Service: General;  Laterality: Left;  left chest wall eschar debridement     Family History   Problem Relation Age of Onset    Hypertension Mother     Cancer Father     Breast cancer Sister     Diabetes Sister     Cancer Brother     Amblyopia Neg Hx     Blindness Neg Hx     Cataracts Neg Hx     Glaucoma Neg Hx     Macular degeneration Neg Hx     Retinal detachment Neg Hx     Strabismus Neg Hx     Stroke Neg Hx     Thyroid disease Neg Hx      Social History     Tobacco Use    Smoking status: Former     Years: 30.00     Types: Cigarettes     Quit date: 2012     Years since quittin.0    Smokeless tobacco: Never   Substance Use Topics    Alcohol use: No    Drug use: Never       (Not in a hospital admission)    Review of patient's allergies indicates:  No Known Allergies     Review of Systems   Constitutional: Negative.    Skin: Negative.      Objective:      Vital Signs (Most Recent)  Pulse: 77 (23 1135)  Resp: 19 (23 1135)  BP: (!) 204/86 (23 1135)  SpO2: 99 % (23 1135)    Vital Signs Range (Last 24H):  Pulse:  [77-99]   Resp:  [18-19]   BP: (184-204)/(86-89)   SpO2:  [99 %-100 %]     Physical Exam  Constitutional:       Appearance: Normal appearance.   Eyes:      Extraocular Movements: Extraocular movements intact.   Pulmonary:      Effort: Pulmonary effort is normal.       Data Review:  CBC:   Lab Results   Component Value Date    WBC 5.15 2023    RBC 4.17 2023    HGB 12.5 2023    HCT 38.2 2023     2023      ECG: no prior ECG.     Assessment:      There are no hospital problems to display for this patient.      Plan:    CT bone marrow biopsy

## 2023-06-22 NOTE — DISCHARGE INSTRUCTIONS
Please return to ER if any of these symptoms occur:  Fever over 101 degrees,  Any purulent drainage from site (pus, yellow or has foul odor), or any redness or swelling to site  Bleeding from the puncture site not controlled, If bleeding occurs at site hold pressure for 5 mins.  If bleeding continues go to ER  Pain not controlled with Aleve or Tylenol,     No driving for 24 hours after procedure due to sedation given during procedure.      Do not submerge in standing water for 2 days after biopsy but you may shower.     May change bandage if it becomes soiled and bandage may be removed in 2 days.     Rest for the next couple of days. Do not lift any thing heavier than a gallon of milk.  Increase activity as tolerated.     Resume home medications and diet     Biopsy results will be with Dr. العلي in 5-7 days, please follow up with him for results and any other questions or concerns that you may have.

## 2023-06-22 NOTE — PLAN OF CARE
Pt stable for discharge. Band aid to lower back C/D/I with no bleeding/redness/swelling noted. VSS, NADN, and pt meets criteria for discharge. Discharge instructions given to and reviewed with pt, and pt verbalized understanding of all. Pt discharged to home, taken out via wheelchair and driven home by son.

## 2023-06-28 ENCOUNTER — HOSPITAL ENCOUNTER (OUTPATIENT)
Dept: RADIATION THERAPY | Facility: HOSPITAL | Age: 68
Discharge: HOME | End: 2023-06-28
Attending: INTERNAL MEDICINE
Payer: MEDICARE

## 2023-06-28 ENCOUNTER — OFFICE VISIT (OUTPATIENT)
Dept: RADIATION ONCOLOGY | Facility: CLINIC | Age: 68
End: 2023-06-28
Payer: MEDICARE

## 2023-06-28 ENCOUNTER — HOSPITAL ENCOUNTER (OUTPATIENT)
Dept: RADIOLOGY | Facility: HOSPITAL | Age: 68
Discharge: HOME | End: 2023-06-28
Attending: INTERNAL MEDICINE
Payer: MEDICARE

## 2023-06-28 VITALS
OXYGEN SATURATION: 99 % | SYSTOLIC BLOOD PRESSURE: 166 MMHG | DIASTOLIC BLOOD PRESSURE: 88 MMHG | HEIGHT: 62 IN | WEIGHT: 183.31 LBS | TEMPERATURE: 98 F | RESPIRATION RATE: 18 BRPM | BODY MASS INDEX: 33.73 KG/M2 | HEART RATE: 73 BPM

## 2023-06-28 DIAGNOSIS — Z17.0 MALIGNANT NEOPLASM OF UPPER-OUTER QUADRANT OF LEFT BREAST IN FEMALE, ESTROGEN RECEPTOR POSITIVE: Primary | ICD-10-CM

## 2023-06-28 DIAGNOSIS — C50.412 MALIGNANT NEOPLASM OF UPPER-OUTER QUADRANT OF LEFT BREAST IN FEMALE, ESTROGEN RECEPTOR POSITIVE: Primary | ICD-10-CM

## 2023-06-28 LAB
BODY SITE - BONE MARROW: NORMAL
CLINICAL DIAGNOSIS - BONE MARROW: NORMAL
FLOW CYTOMETRY ANTIBODIES ANALYZED - BONE MARROW: NORMAL
FLOW CYTOMETRY COMMENT - BONE MARROW: NORMAL
FLOW CYTOMETRY INTERPRETATION - BONE MARROW: NORMAL

## 2023-06-28 PROCEDURE — 77263 PR  RADIATION THERAPY PLAN COMPLEX: ICD-10-PCS | Mod: ,,, | Performed by: RADIOLOGY

## 2023-06-28 PROCEDURE — 77014 HC CT GUIDANCE RADIATION THERAPY FLDS PLACEMENT: CPT | Mod: TC | Performed by: RADIOLOGY

## 2023-06-28 PROCEDURE — 77334 RADIATION TREATMENT AID(S): CPT | Mod: 26,,, | Performed by: RADIOLOGY

## 2023-06-28 PROCEDURE — 77334 RADIATION TREATMENT AID(S): CPT | Mod: TC | Performed by: RADIOLOGY

## 2023-06-28 PROCEDURE — 77290 THER RAD SIMULAJ FIELD CPLX: CPT | Mod: TC | Performed by: RADIOLOGY

## 2023-06-28 PROCEDURE — 99499 NO LOS: ICD-10-PCS | Mod: S$GLB,,, | Performed by: RADIOLOGY

## 2023-06-28 PROCEDURE — 99499 UNLISTED E&M SERVICE: CPT | Mod: S$GLB,,, | Performed by: RADIOLOGY

## 2023-06-28 PROCEDURE — 99999 PR PBB SHADOW E&M-EST. PATIENT-LVL V: ICD-10-PCS | Mod: PBBFAC,,, | Performed by: RADIOLOGY

## 2023-06-28 PROCEDURE — 99999 PR PBB SHADOW E&M-EST. PATIENT-LVL V: CPT | Mod: PBBFAC,,, | Performed by: RADIOLOGY

## 2023-06-28 PROCEDURE — 77263 THER RADIOLOGY TX PLNG CPLX: CPT | Mod: ,,, | Performed by: RADIOLOGY

## 2023-06-28 PROCEDURE — 77334 PR  RADN TREATMENT AID(S) COMPLX: ICD-10-PCS | Mod: 26,,, | Performed by: RADIOLOGY

## 2023-06-28 RX ORDER — RALTEGRAVIR 400 MG/1
400 TABLET, FILM COATED ORAL
COMMUNITY
Start: 2023-06-20

## 2023-06-28 RX ORDER — EMTRICITABINE AND TENOFOVIR ALAFENAMIDE 200; 25 MG/1; MG/1
1 TABLET ORAL
COMMUNITY
Start: 2023-06-19

## 2023-06-28 NOTE — PROGRESS NOTES
Patient ID: Malaika Paredes is a 67 y.o. female.    Chief Complaint: Follow-up    This patient presents for follow up and simulation.     Ms. Paredes has a history of Clinical stage IIB (T3, N2, M0, G3, ER+, Pr+, Her-2+) yp T2,N1 ductal cancer of the UOQ of the Lt. breast. She presented in May of 2022 with a palpable mass in the Lt. breast.  Diagnostic MMG confirmed a 4.9 cm irregular mass with spiculated margins in the UOQ of the Lt. breast. The mass measured 5.6 cm on ultrasound. There was axillary adenopathy, the largest node was 3.4 cm.  Core biopsies revealed invasive ductal carcinoma, histologic grade 3, nuclear grade 2 and mitotic index 2.  The tumor was strongly ER (95%) and KY (80%) positive.  Her-2 was 2+ and positive on FISH.  Ki-67 was 60%.  Biopsy of the node was positive for metastatic carcinoma.   PET scan revealed the Lt. breast mass with multiple highly FDG avid axillary nodes. There was no evidence of distant metastatic disease.  She received neoadjuvant chemotherapy with 5 cycles of paclitaxel with Trastuzumab with Pertuzumab, followed by Trastuzumab/Pertuzumab completed in March of 2023.  Repeat PET scan revealed decreased tumor size and PET activity in the nodes. She underwent Lt. breast mastectomy with sentinel node biopsy and subsequent Lt. axillary dissection non 3/1/23.  Pathology revealed a residual 4.3 cm breast mass with no associated DCIS or LVI.  The margins were negative.  There was tumor involvement of 2 of 21 axillary nodes.  The largest metastatic deposit was 9 mm.   The patient was healing slowly.  Today She states she feels well.  No complaints.  Recently diagnosed with HIV infection    Review of Systems   Constitutional:  Negative for activity change, appetite change, chills and fatigue.   Respiratory:  Negative for cough and shortness of breath.    Cardiovascular:  Negative for chest pain and palpitations.       Physical Exam  Constitutional:       General: She is not in  acute distress.     Appearance: Normal appearance.   Pulmonary:      Effort: Pulmonary effort is normal. No respiratory distress.   Chest:      Comments: well healed incision on the Lt. chest wall.   Neurological:      Mental Status: She is alert.     1. Malignant neoplasm of upper-outer quadrant of left breast in female, estrogen receptor positive      Will plan simulation today.  plan 50.4 Gy to chest wall and regional lymphatics.

## 2023-06-29 ENCOUNTER — TELEPHONE (OUTPATIENT)
Dept: HEMATOLOGY/ONCOLOGY | Facility: CLINIC | Age: 68
End: 2023-06-29
Payer: MEDICARE

## 2023-06-30 ENCOUNTER — DOCUMENT SCAN (OUTPATIENT)
Dept: HOME HEALTH SERVICES | Facility: HOSPITAL | Age: 68
End: 2023-06-30
Payer: MEDICARE

## 2023-07-03 ENCOUNTER — HOSPITAL ENCOUNTER (OUTPATIENT)
Dept: RADIATION THERAPY | Facility: HOSPITAL | Age: 68
Discharge: HOME | End: 2023-07-03
Attending: RADIOLOGY
Payer: MEDICARE

## 2023-07-07 ENCOUNTER — PATIENT MESSAGE (OUTPATIENT)
Dept: INFECTIOUS DISEASES | Facility: CLINIC | Age: 68
End: 2023-07-07
Payer: MEDICARE

## 2023-07-07 LAB
CHROM BANDING METHOD: NORMAL
CHROMOSOME ANALYSIS BM ADDITIONAL INFORMATION: NORMAL
CHROMOSOME ANALYSIS BM RELEASED BY: NORMAL
CHROMOSOME ANALYSIS BM RESULT SUMMARY: NORMAL
CLINICAL CYTOGENETICIST REVIEW: NORMAL
COMMENT: NORMAL
FINAL PATHOLOGIC DIAGNOSIS: NORMAL
GROSS: NORMAL
KARYOTYP MAR: NORMAL
Lab: NORMAL
MICROSCOPIC EXAM: NORMAL
REASON FOR REFERRAL (NARRATIVE): NORMAL
REF LAB TEST METHOD: NORMAL
SPECIMEN SOURCE: NORMAL
SPECIMEN: NORMAL
SUPPLEMENTAL DIAGNOSIS: NORMAL

## 2023-07-10 ENCOUNTER — OFFICE VISIT (OUTPATIENT)
Dept: HEMATOLOGY/ONCOLOGY | Facility: CLINIC | Age: 68
End: 2023-07-10
Payer: MEDICARE

## 2023-07-10 ENCOUNTER — TELEPHONE (OUTPATIENT)
Dept: HEMATOLOGY/ONCOLOGY | Facility: CLINIC | Age: 68
End: 2023-07-10
Payer: MEDICARE

## 2023-07-10 DIAGNOSIS — D84.821 IMMUNODEFICIENCY DUE TO CHEMOTHERAPY: ICD-10-CM

## 2023-07-10 DIAGNOSIS — C50.412 MALIGNANT NEOPLASM OF UPPER-OUTER QUADRANT OF LEFT BREAST IN FEMALE, ESTROGEN RECEPTOR POSITIVE: Primary | ICD-10-CM

## 2023-07-10 DIAGNOSIS — T45.1X5A IMMUNODEFICIENCY DUE TO CHEMOTHERAPY: ICD-10-CM

## 2023-07-10 DIAGNOSIS — Z79.899 IMMUNODEFICIENCY DUE TO CHEMOTHERAPY: ICD-10-CM

## 2023-07-10 DIAGNOSIS — C77.9 SECONDARY MALIGNANCY OF REGIONAL LYMPH NODE: ICD-10-CM

## 2023-07-10 DIAGNOSIS — B20 CURRENTLY ASYMPTOMATIC HIV INFECTION, WITH HISTORY OF HIV-RELATED ILLNESS: ICD-10-CM

## 2023-07-10 DIAGNOSIS — N18.6 ESRD (END STAGE RENAL DISEASE): ICD-10-CM

## 2023-07-10 DIAGNOSIS — Z17.0 MALIGNANT NEOPLASM OF UPPER-OUTER QUADRANT OF LEFT BREAST IN FEMALE, ESTROGEN RECEPTOR POSITIVE: Primary | ICD-10-CM

## 2023-07-10 PROBLEM — C86.4: Status: RESOLVED | Noted: 2023-05-18 | Resolved: 2023-07-10

## 2023-07-10 PROCEDURE — 4010F ACE/ARB THERAPY RXD/TAKEN: CPT | Mod: CPTII,95,, | Performed by: INTERNAL MEDICINE

## 2023-07-10 PROCEDURE — 1160F RVW MEDS BY RX/DR IN RCRD: CPT | Mod: CPTII,95,, | Performed by: INTERNAL MEDICINE

## 2023-07-10 PROCEDURE — 1159F PR MEDICATION LIST DOCUMENTED IN MEDICAL RECORD: ICD-10-PCS | Mod: CPTII,95,, | Performed by: INTERNAL MEDICINE

## 2023-07-10 PROCEDURE — 3044F HG A1C LEVEL LT 7.0%: CPT | Mod: CPTII,95,, | Performed by: INTERNAL MEDICINE

## 2023-07-10 PROCEDURE — 4010F PR ACE/ARB THEARPY RXD/TAKEN: ICD-10-PCS | Mod: CPTII,95,, | Performed by: INTERNAL MEDICINE

## 2023-07-10 PROCEDURE — 99214 PR OFFICE/OUTPT VISIT, EST, LEVL IV, 30-39 MIN: ICD-10-PCS | Mod: 95,,, | Performed by: INTERNAL MEDICINE

## 2023-07-10 PROCEDURE — 99214 OFFICE O/P EST MOD 30 MIN: CPT | Mod: 95,,, | Performed by: INTERNAL MEDICINE

## 2023-07-10 PROCEDURE — 1159F MED LIST DOCD IN RCRD: CPT | Mod: CPTII,95,, | Performed by: INTERNAL MEDICINE

## 2023-07-10 PROCEDURE — 1157F PR ADVANCE CARE PLAN OR EQUIV PRESENT IN MEDICAL RECORD: ICD-10-PCS | Mod: CPTII,95,, | Performed by: INTERNAL MEDICINE

## 2023-07-10 PROCEDURE — 3044F PR MOST RECENT HEMOGLOBIN A1C LEVEL <7.0%: ICD-10-PCS | Mod: CPTII,95,, | Performed by: INTERNAL MEDICINE

## 2023-07-10 PROCEDURE — 1157F ADVNC CARE PLAN IN RCRD: CPT | Mod: CPTII,95,, | Performed by: INTERNAL MEDICINE

## 2023-07-10 PROCEDURE — 1160F PR REVIEW ALL MEDS BY PRESCRIBER/CLIN PHARMACIST DOCUMENTED: ICD-10-PCS | Mod: CPTII,95,, | Performed by: INTERNAL MEDICINE

## 2023-07-10 NOTE — PROGRESS NOTES
Subjective:       Patient ID: Malaika Paredes is a 67 y.o. female.    Chief Complaint: Results, Breast Cancer, and Chemotherapy    HPI:  67-YEAR-OLD FEMALE STATUS POST INDUCTION CHEMOTHERAPY FOR HER2 POSITIVE BREAST CARCINOMA PATIENT IS TO BEGIN TREATMENT WITH OP BREAST ADO-TRASTUZUMAB EMTANSINE Q3W PATIENT RECENTLY UNDERWENT BIOPSY OF LEFT GROIN AREA.  NO DIAGNOSIS OF CONFIRM MALIGNANCY.  AT THIS POINT RESULTS OF BONE MARROW DEMONSTRATES NO EVIDENCE OF BONE MARROW INVOLVEMENT EITHER HIV POSITIVE AND CURRENTLY BEING TREATED BY INFECTIOUS DISEASE OUTSIDE [facility] SEEN IN VIDEO VISIT      Past Medical History:   Diagnosis Date    CAD (coronary artery disease)     Cataract     Cataract     CHF (congestive heart failure)     COPD (chronic obstructive pulmonary disease)     Currently asymptomatic HIV infection, with history of HIV-related illness 7/10/2023    Diabetes mellitus     Diabetic retinopathy     ESRD (end stage renal disease)     TTS    Hypertension     Ischemic ulcer of toe of left foot     Malignant neoplasm of upper-outer quadrant of left breast in female, estrogen receptor positive 2022    left    PAD (peripheral artery disease)     PAF (paroxysmal atrial fibrillation)      Family History   Problem Relation Age of Onset    Hypertension Mother     Cancer Father     Breast cancer Sister     Diabetes Sister     Cancer Brother     Amblyopia Neg Hx     Blindness Neg Hx     Cataracts Neg Hx     Glaucoma Neg Hx     Macular degeneration Neg Hx     Retinal detachment Neg Hx     Strabismus Neg Hx     Stroke Neg Hx     Thyroid disease Neg Hx      Social History     Socioeconomic History    Marital status:    Tobacco Use    Smoking status: Former     Years: 30.00     Types: Cigarettes     Quit date: 2012     Years since quittin.0    Smokeless tobacco: Never   Substance and Sexual Activity    Alcohol use: No    Drug use: Never   Social History Narrative    ** Merged History Encounter **           Past Surgical History:   Procedure Laterality Date    APPLICATION OF WOUND VACUUM-ASSISTED CLOSURE DEVICE Left 4/6/2023    Procedure: APPLICATION, WOUND VAC;  Surgeon: Jayjay Arana MD;  Location: [facility] OR;  Service: General;  Laterality: Left;  left chest    AXILLARY NODE DISSECTION Left 3/1/2023    Procedure: LYMPHADENECTOMY, AXILLARY;  Surgeon: Jayjay Arana MD;  Location: [facility] OR;  Service: General;  Laterality: Left;    BIOPSY OF INGUINAL LYMPH NODE Left 3/31/2023    Procedure: BIOPSY, LYMPH NODE, INGUINAL;  Surgeon: Jayjay Arana MD;  Location: [facility] OR;  Service: General;  Laterality: Left;    BREAST BIOPSY Right     benign    CATARACT EXTRACTION W/  INTRAOCULAR LENS IMPLANT Right 08/14/2017    Dr. Moe    CORONARY ARTERY BYPASS GRAFT  2020    FLUOROSCOPY N/A 07/25/2022    Procedure: FLUOROSCOPY/mediport placement;  Surgeon: Cole Perdomo MD;  Location: [facility] CATH LAB;  Service: General;  Laterality: N/A;    MEDIPORT REMOVAL N/A 3/1/2023    Procedure: REMOVAL, CATHETER, CENTRAL VENOUS, TUNNELED, WITH PORT;  Surgeon: Jayjay Arana MD;  Location: [facility] OR;  Service: General;  Laterality: N/A;    MODIFIED RADICAL MASTECTOMY W/ AXILLARY LYMPH NODE DISSECTION Left 3/1/2023    Procedure: MASTECTOMY, MODIFIED RADICAL;  Surgeon: Jayjay Arana MD;  Location: [facility] OR;  Service: General;  Laterality: Left;    WOUND DEBRIDEMENT Left 3/31/2023    Procedure: DEBRIDEMENT, WOUND;  Surgeon: Jayjay Arana MD;  Location: [facility] OR;  Service: General;  Laterality: Left;  left chest wall eschar debridement       Labs:  Lab Results   Component Value Date    WBC 5.15 06/22/2023    HGB 12.5 06/22/2023    HCT 38.2 06/22/2023    MCV 92 06/22/2023     06/22/2023     BMP  Lab Results   Component Value Date     06/22/2023    K 4.9 06/22/2023     06/22/2023    CO2 22 (L) 06/22/2023    BUN 47 (H) 06/22/2023    CREATININE 6.1 (H) 06/22/2023    CALCIUM 8.5 (L) 06/22/2023    ANIONGAP 11 06/22/2023    ESTGFRAFRICA 8  (A) 07/27/2022    EGFRNONAA 7 (A) 07/27/2022     Lab Results   Component Value Date    ALT 18 06/22/2023    AST 19 06/22/2023    ALKPHOS 75 06/22/2023    BILITOT 0.4 06/22/2023       Lab Results   Component Value Date    IRON 43 09/09/2018    TIBC 300 09/09/2018    FERRITIN 1,038 (H) 06/21/2022     Lab Results   Component Value Date    YOQIAIUW53 459 09/09/2018     Lab Results   Component Value Date    FOLATE 6.2 09/09/2018     Lab Results   Component Value Date    TSH 5.780 (H) 01/27/2023         Review of Systems   Psychiatric/Behavioral:  The patient is nervous/anxious.      Objective:      Physical Exam  Constitutional:       Appearance: Normal appearance. She is obese.           Assessment:      1. Malignant neoplasm of upper-outer quadrant of left breast in female, estrogen receptor positive    2. Secondary malignancy of regional lymph node    3. Immunodeficiency due to chemotherapy    4. ESRD (end stage renal disease)    5. Currently asymptomatic HIV infection, with history of HIV-related illness           Med Onc Chart Routing      Follow up with physician 6 weeks. RETURNS FOR CYCLE 3OP BREAST ADO-TRASTUZUMAB EMTANSINE Q3W   Follow up with [facility] 3 weeks. PATIENT CAN BE SEEN BY [facility] FOR CYCLE 2OP BREAST ADO-TRASTUZUMAB EMTANSINE Q3W   Infusion scheduling note    Injection scheduling note OP BREAST ADO-TRASTUZUMAB EMTANSINE Q3W   Labs CBC and CMP   Scheduling:  Preferred lab:  Lab interval: every 3 weeks  CBC CMP   Imaging    Pharmacy appointment    Other referrals             Plan:   The patient location is:  HOME  The chief complaint leading to consultation is:  BREAST CANCER    Visit type: audiovisual    Face to Face time with patient: 25 minutes of total time spent on the encounter, which includes face to face time and non-face to face time preparing to see the patient (eg, review of tests), Obtaining and/or reviewing separately obtained history, Documenting clinical information in the electronic or other  health record, Independently interpreting results (not separately reported) and communicating results to the patient/family/caregiver, or Care coordination (not separately reported).         Each patient to whom he or she provides medical services by telemedicine is:  (1) informed of the relationship between the physician and patient and the respective role of any other health care provider with respect to management of the patient; and (2) notified that he or she may decline to receive medical services by telemedicine and may withdraw from such care at any time.    Notes:   EXTENSIVE WORKUP AFTER NEEDLE BIOPSY DID NOT REVEAL ANY SECONDARY MALIGNANCY.  EXCISIONAL LYMPH NODE BIOPSY NO EVIDENCE OF MALIGNANCY IN BONE MARROW NO EVIDENCE OF MALIGNANCY AS WELL WILL CONTINUE FOLLOW-UP CAN BE SEEN BY APAP TO START CYCLE 2 ORDERS SIGNED FOR CYCLE 1 OFOP BREAST ADO-TRASTUZUMAB EMTANSINE Q3W WITH NODE POSITIVE DISEASE STATUS POST INDUCTION THERAPY DISCUSSED ABOVE WITH FAMILY SEEN IN VIDEO VISIT          Toney العلي Jr, MD FACP

## 2023-07-10 NOTE — TELEPHONE ENCOUNTER
Returned Jarvisa phone call and changed appointment to virtual per patient request. Patient verbalized understanding and had no other issues at this time.

## 2023-07-10 NOTE — TELEPHONE ENCOUNTER
----- Message from Hortensia Perez sent at 7/10/2023  2:05 PM CDT -----  Contact: steffi/ guille Nascimento niece is calling to speak with the nurse regarding appointment. Reports needing to reschedule appointment for Thursday. Please give the patients niece a call back at  [phone]  Thanks alisha

## 2023-07-12 PROCEDURE — 77301 RADIOTHERAPY DOSE PLAN IMRT: CPT | Mod: TC | Performed by: RADIOLOGY

## 2023-07-12 PROCEDURE — 77301 RADIOTHERAPY DOSE PLAN IMRT: CPT | Mod: 26,,, | Performed by: RADIOLOGY

## 2023-07-12 PROCEDURE — 77301 PR  INTEN MOD RADIOTHER PLAN W/DOSE VOL HIST: ICD-10-PCS | Mod: 26,,, | Performed by: RADIOLOGY

## 2023-07-13 ENCOUNTER — DOCUMENTATION ONLY (OUTPATIENT)
Dept: RADIATION ONCOLOGY | Facility: CLINIC | Age: 68
End: 2023-07-13
Payer: MEDICARE

## 2023-07-13 PROCEDURE — 77014 PR  CT GUIDANCE PLACEMENT RAD THERAPY FIELDS: ICD-10-PCS | Mod: 26,,, | Performed by: RADIOLOGY

## 2023-07-13 PROCEDURE — 77014 PR  CT GUIDANCE PLACEMENT RAD THERAPY FIELDS: CPT | Mod: 26,,, | Performed by: RADIOLOGY

## 2023-07-13 PROCEDURE — 77338 PR  MLC IMRT DESIGN & CONSTRUCTION PER IMRT PLAN: ICD-10-PCS | Mod: 26,,, | Performed by: RADIOLOGY

## 2023-07-13 PROCEDURE — 77300 RADIATION THERAPY DOSE PLAN: CPT | Mod: 26,,, | Performed by: RADIOLOGY

## 2023-07-13 PROCEDURE — 77338 DESIGN MLC DEVICE FOR IMRT: CPT | Mod: TC,59 | Performed by: RADIOLOGY

## 2023-07-13 PROCEDURE — 77014 HC CT GUIDANCE RADIATION THERAPY FLDS PLACEMENT: CPT | Mod: TC | Performed by: RADIOLOGY

## 2023-07-13 PROCEDURE — 77300 PR RADIATION THERAPY,DOSIMETRY PLAN: ICD-10-PCS | Mod: 26,,, | Performed by: RADIOLOGY

## 2023-07-13 PROCEDURE — 77338 DESIGN MLC DEVICE FOR IMRT: CPT | Mod: 26,,, | Performed by: RADIOLOGY

## 2023-07-13 PROCEDURE — 77300 RADIATION THERAPY DOSE PLAN: CPT | Mod: TC | Performed by: RADIOLOGY

## 2023-07-13 PROCEDURE — 77385 HC IMRT, SIMPLE: CPT | Performed by: RADIOLOGY

## 2023-07-13 NOTE — PLAN OF CARE
Day 1 of outpatient xrt to the breast. Breast handout, verbal instructions & Miaderm Cream were given to the patient. Skin care & side effects were reviewed. Contact info was provided. Patient verbalized understanding.

## 2023-07-14 ENCOUNTER — DOCUMENTATION ONLY (OUTPATIENT)
Dept: HEMATOLOGY/ONCOLOGY | Facility: CLINIC | Age: 68
End: 2023-07-14
Payer: MEDICARE

## 2023-07-14 PROCEDURE — 77014 PR  CT GUIDANCE PLACEMENT RAD THERAPY FIELDS: ICD-10-PCS | Mod: 26,,, | Performed by: RADIOLOGY

## 2023-07-14 PROCEDURE — 77014 PR  CT GUIDANCE PLACEMENT RAD THERAPY FIELDS: CPT | Mod: 26,,, | Performed by: RADIOLOGY

## 2023-07-14 PROCEDURE — 77385 HC IMRT, SIMPLE: CPT | Performed by: RADIOLOGY

## 2023-07-14 PROCEDURE — 77014 HC CT GUIDANCE RADIATION THERAPY FLDS PLACEMENT: CPT | Mod: TC | Performed by: RADIOLOGY

## 2023-07-14 NOTE — PROGRESS NOTES
Spoke with pt Ember younger about r/s pt start of new tx as labs weren't drawn today. She states M, W are available days b/c pt has HD T-Th. Offered labs  @ 1230 f/b xrt @ 115, then see oncologist @ 2pm to write orders/sign consent for tx day  @ 1pm. Told her I will cancel and r/s appts scheduled ,3 to 3 wks from . All questions answered, she voiced understanding states she will view appts in my chart and reach out if she has any questions/concerns. Notified FC, RD, SW, pharmd of change in start date.   Oncology Navigation   Intake  Date Worked: 23     Treatment  Date Presented to Tumor Board: 23       Chemotherapy: Planned  Chemotherapy Regimen: Kadcyla to start 23    Radiation Therapy: Initiated  Radiation Oncologist: Dr. Lott  Start Date: 23  End Date: 23             Radiation Oncologist: Dr. Lott        Acuity  Stage: 1  Systemic Treatment - predicted or initiated: More than one treatment modality concurrently (chemotherapy, radiation, etc.) (+2)  ECO  Comorbidities in Medical History: 2  Navigation Acuity: 7     Follow Up  Follow up in 10 days (on 2023) for lab review for c1d1.

## 2023-07-17 PROCEDURE — 77385 HC IMRT, SIMPLE: CPT | Performed by: RADIOLOGY

## 2023-07-17 PROCEDURE — 77014 HC CT GUIDANCE RADIATION THERAPY FLDS PLACEMENT: CPT | Mod: TC | Performed by: RADIOLOGY

## 2023-07-17 PROCEDURE — 77014 PR  CT GUIDANCE PLACEMENT RAD THERAPY FIELDS: CPT | Mod: 26,,, | Performed by: RADIOLOGY

## 2023-07-17 PROCEDURE — 77014 PR  CT GUIDANCE PLACEMENT RAD THERAPY FIELDS: ICD-10-PCS | Mod: 26,,, | Performed by: RADIOLOGY

## 2023-07-18 PROCEDURE — 77014 HC CT GUIDANCE RADIATION THERAPY FLDS PLACEMENT: CPT | Mod: TC | Performed by: RADIOLOGY

## 2023-07-18 PROCEDURE — 77014 PR  CT GUIDANCE PLACEMENT RAD THERAPY FIELDS: CPT | Mod: 26,,, | Performed by: RADIOLOGY

## 2023-07-18 PROCEDURE — 77014 PR  CT GUIDANCE PLACEMENT RAD THERAPY FIELDS: ICD-10-PCS | Mod: 26,,, | Performed by: RADIOLOGY

## 2023-07-18 PROCEDURE — 77385 HC IMRT, SIMPLE: CPT | Performed by: RADIOLOGY

## 2023-07-19 PROCEDURE — 77014 PR  CT GUIDANCE PLACEMENT RAD THERAPY FIELDS: ICD-10-PCS | Mod: 26,,, | Performed by: RADIOLOGY

## 2023-07-19 PROCEDURE — 77014 PR  CT GUIDANCE PLACEMENT RAD THERAPY FIELDS: CPT | Mod: 26,,, | Performed by: RADIOLOGY

## 2023-07-19 PROCEDURE — 77385 HC IMRT, SIMPLE: CPT | Performed by: RADIOLOGY

## 2023-07-19 PROCEDURE — 77336 RADIATION PHYSICS CONSULT: CPT | Performed by: RADIOLOGY

## 2023-07-19 PROCEDURE — 77014 HC CT GUIDANCE RADIATION THERAPY FLDS PLACEMENT: CPT | Mod: TC | Performed by: RADIOLOGY

## 2023-07-20 ENCOUNTER — DOCUMENTATION ONLY (OUTPATIENT)
Dept: RADIATION ONCOLOGY | Facility: CLINIC | Age: 68
End: 2023-07-20
Payer: MEDICARE

## 2023-07-20 PROCEDURE — 77014 HC CT GUIDANCE RADIATION THERAPY FLDS PLACEMENT: CPT | Mod: TC | Performed by: RADIOLOGY

## 2023-07-20 PROCEDURE — 77014 PR  CT GUIDANCE PLACEMENT RAD THERAPY FIELDS: ICD-10-PCS | Mod: 26,,, | Performed by: RADIOLOGY

## 2023-07-20 PROCEDURE — 77014 PR  CT GUIDANCE PLACEMENT RAD THERAPY FIELDS: CPT | Mod: 26,,, | Performed by: RADIOLOGY

## 2023-07-20 PROCEDURE — 77385 HC IMRT, SIMPLE: CPT | Performed by: RADIOLOGY

## 2023-07-20 NOTE — PLAN OF CARE
Day 6 outpatient xrt to left chest wall. Tolerating therapy, no skin issues yet. Will continue to monitor.

## 2023-07-20 NOTE — PLAN OF CARE
Day 6 of outpatient xrt to the breast. Doing ok, no skin issues at this time, using Miaderm Cream BID. Will continue to monitor.

## 2023-07-21 PROCEDURE — 77014 HC CT GUIDANCE RADIATION THERAPY FLDS PLACEMENT: CPT | Mod: TC | Performed by: RADIOLOGY

## 2023-07-21 PROCEDURE — 77014 PR  CT GUIDANCE PLACEMENT RAD THERAPY FIELDS: CPT | Mod: 26,,, | Performed by: RADIOLOGY

## 2023-07-21 PROCEDURE — 77014 PR  CT GUIDANCE PLACEMENT RAD THERAPY FIELDS: ICD-10-PCS | Mod: 26,,, | Performed by: RADIOLOGY

## 2023-07-21 PROCEDURE — 77385 HC IMRT, SIMPLE: CPT | Performed by: RADIOLOGY

## 2023-07-23 NOTE — PROGRESS NOTES
Subjective:       Patient ID: Malaika Paredes is a 67 y.o. female.    Chief Complaint: Chemotherapy and Breast Cancer    Primary Oncologist/Hematologist: Dr. العلي    HPI: Ms. Paredes is a 67 year old female who is following up for her  stage IIB (T3, N2, M0, G3, ER+, Pr+, Her-2+)  breast cancer. She is here for possible cycle 1 day 1 of trastuzumab emtansine (kadcyla) q 3 weeks. She is also receiving radiation.   Cancer Hx: May of 2022 with a palpable mass in the Lt. breast.  Diagnostic MMG confirmed a 4.9 cm irregular mass with spiculated margins in the UOQ of the Lt. breast. The mass measured 5.6 cm on ultrasound. There was axillary adenopathy, the largest node was 3.4 cm.  Core biopsies revealed invasive ductal carcinoma, histologic grade 3, nuclear grade 2 and mitotic index 2.  The tumor was strongly ER (95%) and NE (80%) positive.  Her-2 was 2+ and positive on FISH.  Ki-67 was 60%.  Biopsy of the node was positive for metastatic carcinoma.   PET scan revealed the Lt. breast mass with multiple highly FDG avid axillary nodes. There was no evidence of distant metastatic disease.  She received neoadjuvant chemotherapy with 5 cycles of paclitaxel with Trastuzumab with Pertuzumab, followed by Trastuzumab/Pertuzumab completed in March of 2023.  Repeat PET scan revealed decreased tumor size and PET activity in the nodes. She underwent Lt. breast mastectomy with sentinel node biopsy and subsequent Lt. axillary dissection non 3/1/23.  Pathology revealed a residual 4.3 cm breast mass with no associated DCIS or LVI.  The margins were negative.  There was tumor involvement of 2 of 21 axillary nodes.  Pmhx: HIV +, being treated by infectious disease.     Today: Patient states she feels ok. She states her appetite is good and she has been trying to stay hydrated. She denies any fevers, illnesses, new pain, cp, sob, bleeding. Patient states she took her BP medication, denies any dizziness, changes in vision, headaches, cp.  Consent was obtained. She was supposed to have echo today prior to treatment but appt was missed.     Social History     Socioeconomic History    Marital status:    Tobacco Use    Smoking status: Former     Years: 30.00     Types: Cigarettes     Quit date: 2012     Years since quittin.0    Smokeless tobacco: Never   Substance and Sexual Activity    Alcohol use: No    Drug use: Never   Social History Narrative    ** Merged History Encounter **            Past Medical History:   Diagnosis Date    CAD (coronary artery disease)     Cataract     Cataract     CHF (congestive heart failure)     COPD (chronic obstructive pulmonary disease)     Currently asymptomatic HIV infection, with history of HIV-related illness 7/10/2023    Diabetes mellitus     Diabetic retinopathy     ESRD (end stage renal disease)     TTS    Hypertension     Ischemic ulcer of toe of left foot     Malignant neoplasm of upper-outer quadrant of left breast in female, estrogen receptor positive 2022    left    PAD (peripheral artery disease)     PAF (paroxysmal atrial fibrillation)        Family History   Problem Relation Age of Onset    Hypertension Mother     Cancer Father     Breast cancer Sister     Diabetes Sister     Cancer Brother     Amblyopia Neg Hx     Blindness Neg Hx     Cataracts Neg Hx     Glaucoma Neg Hx     Macular degeneration Neg Hx     Retinal detachment Neg Hx     Strabismus Neg Hx     Stroke Neg Hx     Thyroid disease Neg Hx        Past Surgical History:   Procedure Laterality Date    APPLICATION OF WOUND VACUUM-ASSISTED CLOSURE DEVICE Left 2023    Procedure: APPLICATION, WOUND VAC;  Surgeon: Jayjay Arana MD;  Location: [facility] OR;  Service: General;  Laterality: Left;  left chest    AXILLARY NODE DISSECTION Left 3/1/2023    Procedure: LYMPHADENECTOMY, AXILLARY;  Surgeon: Jayjay Arana MD;  Location: [facility] OR;  Service: General;  Laterality: Left;    BIOPSY OF INGUINAL LYMPH NODE Left 3/31/2023    Procedure:  BIOPSY, LYMPH NODE, INGUINAL;  Surgeon: Jayjay Arana MD;  Location: [facility] OR;  Service: General;  Laterality: Left;    BREAST BIOPSY Right     benign    CATARACT EXTRACTION W/  INTRAOCULAR LENS IMPLANT Right 08/14/2017    Dr. Moe    CORONARY ARTERY BYPASS GRAFT  2020    FLUOROSCOPY N/A 07/25/2022    Procedure: FLUOROSCOPY/mediport placement;  Surgeon: Cole Perdomo MD;  Location: [facility] CATH LAB;  Service: General;  Laterality: N/A;    MEDIPORT REMOVAL N/A 3/1/2023    Procedure: REMOVAL, CATHETER, CENTRAL VENOUS, TUNNELED, WITH PORT;  Surgeon: Jayjay Arana MD;  Location: [facility] OR;  Service: General;  Laterality: N/A;    MODIFIED RADICAL MASTECTOMY W/ AXILLARY LYMPH NODE DISSECTION Left 3/1/2023    Procedure: MASTECTOMY, MODIFIED RADICAL;  Surgeon: Jayjay Arana MD;  Location: [facility] OR;  Service: General;  Laterality: Left;    WOUND DEBRIDEMENT Left 3/31/2023    Procedure: DEBRIDEMENT, WOUND;  Surgeon: Jayjay Arana MD;  Location: [facility] OR;  Service: General;  Laterality: Left;  left chest wall eschar debridement       Review of Systems   Constitutional:  Negative for activity change, appetite change, chills, diaphoresis, fatigue, fever and unexpected weight change.   HENT:  Negative for congestion, nosebleeds and rhinorrhea.    Respiratory:  Negative for cough and shortness of breath.    Cardiovascular:  Negative for chest pain and leg swelling.   Gastrointestinal:  Negative for abdominal pain, anal bleeding, blood in stool, constipation, diarrhea, nausea and vomiting.   Genitourinary:  Negative for hematuria.   Skin:  Negative for color change and pallor.   Allergic/Immunologic: Positive for immunocompromised state.   Neurological:  Negative for dizziness, weakness, light-headedness, numbness and headaches.       Medication List with Changes/Refills   Current Medications    ACETAMINOPHEN (TYLENOL) 500 MG CAP    Take 500 mg by mouth every 6 (six) hours as needed.    ALBUTEROL SULFATE (PROAIR RESPICLICK) 90  MCG/ACTUATION AEPB    Inhale 180 mcg into the lungs every 4 (four) hours. Rescue    AMLODIPINE (NORVASC) 10 MG TABLET    Take 10 mg by mouth once daily.    ASPIRIN (ECOTRIN) 81 MG EC TABLET    Take 81 mg by mouth once daily.     ATORVASTATIN (LIPITOR) 10 MG TABLET    Take 10 mg by mouth once daily.    BENZONATATE (TESSALON) 200 MG CAPSULE    benzonatate Take 1 Capsule (oral) 3 times per day PRN - Cough for 7 days 20221015 capsule 3 times per day oral 7 days active 200 mg    BUDESONIDE-GLYCOPYR-FORMOTEROL (BREZTRI AEROSPHERE) 160-9-4.8 MCG/ACTUATION HFAA    Inhale 1 puff into the lungs once daily.    CALCIUM CARBONATE (OS-CAMERON) 500 MG CALCIUM (1,250 MG) CHEWABLE TABLET    Take 1 tablet by mouth 2 (two) times daily as needed for Heartburn.    DIPHENHYDRAMINE (BENADRYL) 25 MG CAPSULE    Take 1 capsule (25 mg total) by mouth every 6 (six) hours as needed for Itching.    DOCUSATE SODIUM (COLACE) 100 MG CAPSULE    Take 1 capsule (100 mg total) by mouth 2 (two) times daily as needed for Constipation.    EMTRICITABINE-TENOFOVIR ALAFEN (DESCOVY) 200-25 MG TAB    Take 1 tablet by mouth.    INSULIN DEGLUDEC (TRESIBA FLEXTOUCH U-200) 200 UNIT/ML (3 ML) INSULIN PEN    Inject 60 Units into the skin once daily.    IRON SUCROSE IN NS (VENOFER) 100 MG/100 ML PGBK    50 mg.    ISENTRESS 400 MG TABLET    Take 400 mg by mouth.    KETOCONAZOLE (NIZORAL) 2 % CREAM    Apply topically once daily. for 14 days    LEVOFLOXACIN (LEVAQUIN) 250 MG TABLET    Take 250 mg by mouth once daily.    LEVOTHYROXINE (TIROSINT) 88 MCG CAP    Take 75 mcg by mouth before breakfast.    LOPERAMIDE (IMODIUM) 2 MG CAPSULE    Take 2 mg by mouth 4 (four) times daily as needed for Diarrhea.    METOPROLOL SUCCINATE (TOPROL-XL) 25 MG 24 HR TABLET    Take 25 mg by mouth 2 (two) times daily.    OMEPRAZOLE (PRILOSEC) 20 MG CAPSULE    Take 20 mg by mouth once daily.    ONDANSETRON (ZOFRAN-ODT) 4 MG TBDL    Dissolve 1 tablet (4 mg total) by mouth every 8 (eight) hours as  needed (nausea).    OXYCODONE (ROXICODONE) 5 MG IMMEDIATE RELEASE TABLET    Take 1 tablet (5 mg total) by mouth every 6 (six) hours as needed for Pain.    PREDNISOLONE ACETATE (PRED FORTE) 1 % DRPS    Place 1 drop into both eyes 4 (four) times daily.    SEVELAMER HCL (RENAGEL) 800 MG TAB    Take 3 tablets by mouth 3 (three) times daily.    SODIUM BICARBONATE 650 MG TABLET    Take 650 mg by mouth 2 (two) times daily.     Objective:     Vitals:    07/24/23 1358   BP: (!) 195/97   Pulse: 84   Temp:        Physical Exam  Vitals reviewed.   Constitutional:       General: She is not in acute distress.     Appearance: She is not ill-appearing, toxic-appearing or diaphoretic.   HENT:      Head: Normocephalic and atraumatic.   Eyes:      Conjunctiva/sclera: Conjunctivae normal.   Cardiovascular:      Rate and Rhythm: Normal rate.   Musculoskeletal:      Right lower leg: No edema.      Left lower leg: No edema.   Skin:     General: Skin is warm.      Coloration: Skin is not jaundiced or pale.      Findings: No bruising, erythema, lesion or rash.   Neurological:      Mental Status: She is alert.      Motor: No weakness.      Gait: Gait normal.   Psychiatric:         Mood and Affect: Mood normal.         Behavior: Behavior normal.         Thought Content: Thought content normal.          Labs/Results:  Lab Results   Component Value Date    WBC 7.18 07/24/2023    RBC 4.04 07/24/2023    HGB 12.1 07/24/2023    HCT 36.7 (L) 07/24/2023    MCV 91 07/24/2023    MCH 30.0 07/24/2023    MCHC 33.0 07/24/2023    RDW 16.5 (H) 07/24/2023     07/24/2023    MPV 10.2 07/24/2023    GRAN 5.4 07/24/2023    GRAN 74.7 (H) 07/24/2023    LYMPH 0.7 (L) 07/24/2023    LYMPH 9.7 (L) 07/24/2023    MONO 0.6 07/24/2023    MONO 8.1 07/24/2023    EOS 0.5 07/24/2023    BASO 0.03 07/24/2023    EOSINOPHIL 6.5 07/24/2023    BASOPHIL 0.4 07/24/2023         Assessment:     Problem List Items Addressed This Visit          Neuro    Peripheral neuropathy due to  chemotherapy - Primary       Renal/    ESRD (end stage renal disease)       Immunology/Multi System    Immunodeficiency due to chemotherapy       Oncology    Anemia associated with chronic renal failure    Malignant neoplasm of upper-outer quadrant of left breast in female, estrogen receptor positive    Secondary malignancy of regional lymph node     Plan:     ESRD (end stage renal disease)  --continue with dialysis    Malignant neoplasm of upper-outer quadrant of left breast in female, estrogen receptor positive, Secondary malignancy of regional lymph node  --May of 2022 with a palpable mass in the Lt. breast.  Diagnostic MMG confirmed a 4.9 cm irregular mass with spiculated margins in the UOQ of the Lt. breast.   --She received neoadjuvant chemotherapy with 5 cycles of paclitaxel with Trastuzumab with Pertuzumab, followed by Trastuzumab/Pertuzumab completed in March of 2023.   --Repeat PET scan revealed decreased tumor size and PET activity in the nodes.   --underwent Lt. breast mastectomy with sentinel node biopsy and subsequent Lt. axillary dissection non 3/1/23  --continue to follow with rad onc. Currently receiving radiation to chest wall  --continue with cycle 1 day 1 with trastuzumab emtansine (kadcyla) q 3 weeks  --ANC:5.4, plts:285, tbili: AST:14, ALT:15  --echo q 3 months. Hasn't been done. Rescheduled for earliest- next Wednesday. Will have to wait until this is done to start treatment.   --consent obtained    Follow-Up: echo Wednesday. Cbc cmp prior to treatment to monitor labs further    Jelena Pruitt PA-C  Hematology Oncology    Route Chart for Scheduling    Med Onc Chart Routing      Follow up with physician    Follow up with [facility]ER 3 weeks. from 27th with cbc cmp prior for nezxt treatment   Infusion scheduling note   chemo 27th. echo prior. then 3 weeks from 27th with labs prior   Injection scheduling note    Labs CBC and CMP   Scheduling:  Preferred lab:  Lab interval:  cbc cmp to be done 27th  prior to treatment.then again 3 weeks from then   Imaging ECHO   wednesday prior to treatment.   Pharmacy appointment    Other referrals            Treatment Plan Information   OP BREAST ADO-TRASTUZUMAB EMTANSINE Q3W   Toney العلي MD   Upcoming Treatment Dates - OP BREAST ADO-TRASTUZUMAB EMTANSINE Q3W    7/10/2023       Chemotherapy       ado-trastuzumab emtansine (KADCYLA) 300 mg in sodium chloride 0.9% SolP 265 mL chemo infusion       Antiemetics       ondansetron injection 8 mg  7/31/2023       Chemotherapy       ado-trastuzumab emtansine (KADCYLA) 300 mg in sodium chloride 0.9% SolP 265 mL chemo infusion       Antiemetics       ONDANSETRON HCL (PF) 4 MG/2 ML INJ SOLN  8/21/2023       Chemotherapy       ado-trastuzumab emtansine (KADCYLA) 300 mg in sodium chloride 0.9% SolP 265 mL chemo infusion       Antiemetics       ONDANSETRON HCL (PF) 4 MG/2 ML INJ SOLN  9/11/2023       Chemotherapy       ado-trastuzumab emtansine (KADCYLA) 300 mg in sodium chloride 0.9% SolP 265 mL chemo infusion       Antiemetics       ONDANSETRON HCL (PF) 4 MG/2 ML INJ SOLN

## 2023-07-24 ENCOUNTER — OFFICE VISIT (OUTPATIENT)
Dept: HEMATOLOGY/ONCOLOGY | Facility: CLINIC | Age: 68
End: 2023-07-24
Payer: MEDICARE

## 2023-07-24 ENCOUNTER — LAB VISIT (OUTPATIENT)
Dept: LAB | Facility: HOSPITAL | Age: 68
End: 2023-07-24
Attending: INTERNAL MEDICINE
Payer: MEDICARE

## 2023-07-24 VITALS
DIASTOLIC BLOOD PRESSURE: 97 MMHG | HEIGHT: 62 IN | TEMPERATURE: 97 F | SYSTOLIC BLOOD PRESSURE: 195 MMHG | HEART RATE: 84 BPM | BODY MASS INDEX: 33.79 KG/M2 | OXYGEN SATURATION: 99 % | WEIGHT: 183.63 LBS

## 2023-07-24 DIAGNOSIS — T45.1X5A PERIPHERAL NEUROPATHY DUE TO CHEMOTHERAPY: Primary | ICD-10-CM

## 2023-07-24 DIAGNOSIS — Z17.0 MALIGNANT NEOPLASM OF LEFT BREAST IN FEMALE, ESTROGEN RECEPTOR POSITIVE, UNSPECIFIED SITE OF BREAST: ICD-10-CM

## 2023-07-24 DIAGNOSIS — N18.9 ANEMIA ASSOCIATED WITH CHRONIC RENAL FAILURE: ICD-10-CM

## 2023-07-24 DIAGNOSIS — G62.0 PERIPHERAL NEUROPATHY DUE TO CHEMOTHERAPY: Primary | ICD-10-CM

## 2023-07-24 DIAGNOSIS — R94.6 ABNORMAL RESULTS OF THYROID FUNCTION STUDIES: ICD-10-CM

## 2023-07-24 DIAGNOSIS — D84.821 IMMUNODEFICIENCY DUE TO CHEMOTHERAPY: ICD-10-CM

## 2023-07-24 DIAGNOSIS — C50.412 MALIGNANT NEOPLASM OF UPPER-OUTER QUADRANT OF LEFT BREAST IN FEMALE, ESTROGEN RECEPTOR POSITIVE: ICD-10-CM

## 2023-07-24 DIAGNOSIS — C50.912 MALIGNANT NEOPLASM OF LEFT BREAST IN FEMALE, ESTROGEN RECEPTOR POSITIVE, UNSPECIFIED SITE OF BREAST: ICD-10-CM

## 2023-07-24 DIAGNOSIS — Z79.899 IMMUNODEFICIENCY DUE TO CHEMOTHERAPY: ICD-10-CM

## 2023-07-24 DIAGNOSIS — C77.9 SECONDARY MALIGNANCY OF REGIONAL LYMPH NODE: ICD-10-CM

## 2023-07-24 DIAGNOSIS — N18.6 ESRD (END STAGE RENAL DISEASE): ICD-10-CM

## 2023-07-24 DIAGNOSIS — T45.1X5A IMMUNODEFICIENCY DUE TO CHEMOTHERAPY: ICD-10-CM

## 2023-07-24 DIAGNOSIS — D63.1 ANEMIA ASSOCIATED WITH CHRONIC RENAL FAILURE: ICD-10-CM

## 2023-07-24 DIAGNOSIS — Z17.0 MALIGNANT NEOPLASM OF UPPER-OUTER QUADRANT OF LEFT BREAST IN FEMALE, ESTROGEN RECEPTOR POSITIVE: ICD-10-CM

## 2023-07-24 LAB
ALBUMIN SERPL BCP-MCNC: 3.3 G/DL (ref 3.5–5.2)
ALP SERPL-CCNC: 89 U/L (ref 55–135)
ALT SERPL W/O P-5'-P-CCNC: 15 U/L (ref 10–44)
ANION GAP SERPL CALC-SCNC: 10 MMOL/L (ref 8–16)
AST SERPL-CCNC: 14 U/L (ref 10–40)
BASOPHILS # BLD AUTO: 0.03 K/UL (ref 0–0.2)
BASOPHILS NFR BLD: 0.4 % (ref 0–1.9)
BILIRUB SERPL-MCNC: 0.3 MG/DL (ref 0.1–1)
BUN SERPL-MCNC: 63 MG/DL (ref 8–23)
CALCIUM SERPL-MCNC: 8.9 MG/DL (ref 8.7–10.5)
CHLORIDE SERPL-SCNC: 108 MMOL/L (ref 95–110)
CO2 SERPL-SCNC: 16 MMOL/L (ref 23–29)
CREAT SERPL-MCNC: 6.9 MG/DL (ref 0.5–1.4)
DIFFERENTIAL METHOD: ABNORMAL
EOSINOPHIL # BLD AUTO: 0.5 K/UL (ref 0–0.5)
EOSINOPHIL NFR BLD: 6.5 % (ref 0–8)
ERYTHROCYTE [DISTWIDTH] IN BLOOD BY AUTOMATED COUNT: 16.5 % (ref 11.5–14.5)
EST. GFR  (NO RACE VARIABLE): 6 ML/MIN/1.73 M^2
GLUCOSE SERPL-MCNC: 84 MG/DL (ref 70–110)
HCT VFR BLD AUTO: 36.7 % (ref 37–48.5)
HGB BLD-MCNC: 12.1 G/DL (ref 12–16)
IMM GRANULOCYTES # BLD AUTO: 0.04 K/UL (ref 0–0.04)
IMM GRANULOCYTES NFR BLD AUTO: 0.6 % (ref 0–0.5)
LYMPHOCYTES # BLD AUTO: 0.7 K/UL (ref 1–4.8)
LYMPHOCYTES NFR BLD: 9.7 % (ref 18–48)
MCH RBC QN AUTO: 30 PG (ref 27–31)
MCHC RBC AUTO-ENTMCNC: 33 G/DL (ref 32–36)
MCV RBC AUTO: 91 FL (ref 82–98)
MONOCYTES # BLD AUTO: 0.6 K/UL (ref 0.3–1)
MONOCYTES NFR BLD: 8.1 % (ref 4–15)
NEUTROPHILS # BLD AUTO: 5.4 K/UL (ref 1.8–7.7)
NEUTROPHILS NFR BLD: 74.7 % (ref 38–73)
NRBC BLD-RTO: 0 /100 WBC
PLATELET # BLD AUTO: 285 K/UL (ref 150–450)
PMV BLD AUTO: 10.2 FL (ref 9.2–12.9)
POTASSIUM SERPL-SCNC: 5.6 MMOL/L (ref 3.5–5.1)
PROT SERPL-MCNC: 9 G/DL (ref 6–8.4)
RBC # BLD AUTO: 4.04 M/UL (ref 4–5.4)
SODIUM SERPL-SCNC: 134 MMOL/L (ref 136–145)
TSH SERPL DL<=0.005 MIU/L-ACNC: 3.21 UIU/ML (ref 0.4–4)
WBC # BLD AUTO: 7.18 K/UL (ref 3.9–12.7)

## 2023-07-24 PROCEDURE — 3080F PR MOST RECENT DIASTOLIC BLOOD PRESSURE >= 90 MM HG: ICD-10-PCS | Mod: CPTII,S$GLB,,

## 2023-07-24 PROCEDURE — 1101F PT FALLS ASSESS-DOCD LE1/YR: CPT | Mod: CPTII,S$GLB,,

## 2023-07-24 PROCEDURE — 1125F AMNT PAIN NOTED PAIN PRSNT: CPT | Mod: CPTII,S$GLB,,

## 2023-07-24 PROCEDURE — 1157F ADVNC CARE PLAN IN RCRD: CPT | Mod: CPTII,S$GLB,,

## 2023-07-24 PROCEDURE — 84443 ASSAY THYROID STIM HORMONE: CPT | Performed by: INTERNAL MEDICINE

## 2023-07-24 PROCEDURE — 99215 PR OFFICE/OUTPT VISIT, EST, LEVL V, 40-54 MIN: ICD-10-PCS | Mod: S$GLB,,,

## 2023-07-24 PROCEDURE — 4010F PR ACE/ARB THEARPY RXD/TAKEN: ICD-10-PCS | Mod: CPTII,S$GLB,,

## 2023-07-24 PROCEDURE — 1125F PR PAIN SEVERITY QUANTIFIED, PAIN PRESENT: ICD-10-PCS | Mod: CPTII,S$GLB,,

## 2023-07-24 PROCEDURE — 3044F PR MOST RECENT HEMOGLOBIN A1C LEVEL <7.0%: ICD-10-PCS | Mod: CPTII,S$GLB,,

## 2023-07-24 PROCEDURE — 3008F PR BODY MASS INDEX (BMI) DOCUMENTED: ICD-10-PCS | Mod: CPTII,S$GLB,,

## 2023-07-24 PROCEDURE — 77014 PR  CT GUIDANCE PLACEMENT RAD THERAPY FIELDS: ICD-10-PCS | Mod: 26,,, | Performed by: RADIOLOGY

## 2023-07-24 PROCEDURE — 99999 PR PBB SHADOW E&M-EST. PATIENT-LVL V: ICD-10-PCS | Mod: PBBFAC,,,

## 2023-07-24 PROCEDURE — 80053 COMPREHEN METABOLIC PANEL: CPT | Performed by: INTERNAL MEDICINE

## 2023-07-24 PROCEDURE — 3077F PR MOST RECENT SYSTOLIC BLOOD PRESSURE >= 140 MM HG: ICD-10-PCS | Mod: CPTII,S$GLB,,

## 2023-07-24 PROCEDURE — 4010F ACE/ARB THERAPY RXD/TAKEN: CPT | Mod: CPTII,S$GLB,,

## 2023-07-24 PROCEDURE — 85025 COMPLETE CBC W/AUTO DIFF WBC: CPT | Performed by: INTERNAL MEDICINE

## 2023-07-24 PROCEDURE — 36415 COLL VENOUS BLD VENIPUNCTURE: CPT | Performed by: INTERNAL MEDICINE

## 2023-07-24 PROCEDURE — 3044F HG A1C LEVEL LT 7.0%: CPT | Mod: CPTII,S$GLB,,

## 2023-07-24 PROCEDURE — 3077F SYST BP >= 140 MM HG: CPT | Mod: CPTII,S$GLB,,

## 2023-07-24 PROCEDURE — 1101F PR PT FALLS ASSESS DOC 0-1 FALLS W/OUT INJ PAST YR: ICD-10-PCS | Mod: CPTII,S$GLB,,

## 2023-07-24 PROCEDURE — 1159F MED LIST DOCD IN RCRD: CPT | Mod: CPTII,S$GLB,,

## 2023-07-24 PROCEDURE — 3288F FALL RISK ASSESSMENT DOCD: CPT | Mod: CPTII,S$GLB,,

## 2023-07-24 PROCEDURE — 3008F BODY MASS INDEX DOCD: CPT | Mod: CPTII,S$GLB,,

## 2023-07-24 PROCEDURE — 99215 OFFICE O/P EST HI 40 MIN: CPT | Mod: S$GLB,,,

## 2023-07-24 PROCEDURE — 1157F PR ADVANCE CARE PLAN OR EQUIV PRESENT IN MEDICAL RECORD: ICD-10-PCS | Mod: CPTII,S$GLB,,

## 2023-07-24 PROCEDURE — 3080F DIAST BP >= 90 MM HG: CPT | Mod: CPTII,S$GLB,,

## 2023-07-24 PROCEDURE — 77385 HC IMRT, SIMPLE: CPT | Performed by: RADIOLOGY

## 2023-07-24 PROCEDURE — 3288F PR FALLS RISK ASSESSMENT DOCUMENTED: ICD-10-PCS | Mod: CPTII,S$GLB,,

## 2023-07-24 PROCEDURE — 1159F PR MEDICATION LIST DOCUMENTED IN MEDICAL RECORD: ICD-10-PCS | Mod: CPTII,S$GLB,,

## 2023-07-24 PROCEDURE — 77014 PR  CT GUIDANCE PLACEMENT RAD THERAPY FIELDS: CPT | Mod: 26,,, | Performed by: RADIOLOGY

## 2023-07-24 PROCEDURE — 99999 PR PBB SHADOW E&M-EST. PATIENT-LVL V: CPT | Mod: PBBFAC,,,

## 2023-07-25 PROCEDURE — 77014 PR  CT GUIDANCE PLACEMENT RAD THERAPY FIELDS: CPT | Mod: 26,,, | Performed by: RADIOLOGY

## 2023-07-25 PROCEDURE — 77385 HC IMRT, SIMPLE: CPT | Performed by: RADIOLOGY

## 2023-07-25 PROCEDURE — 77014 PR  CT GUIDANCE PLACEMENT RAD THERAPY FIELDS: ICD-10-PCS | Mod: 26,,, | Performed by: RADIOLOGY

## 2023-07-26 ENCOUNTER — TELEPHONE (OUTPATIENT)
Dept: INFUSION THERAPY | Facility: HOSPITAL | Age: 68
End: 2023-07-26
Payer: MEDICARE

## 2023-07-26 ENCOUNTER — HOSPITAL ENCOUNTER (OUTPATIENT)
Dept: CARDIOLOGY | Facility: HOSPITAL | Age: 68
Discharge: HOME | End: 2023-07-26
Attending: INTERNAL MEDICINE
Payer: MEDICARE

## 2023-07-26 VITALS
BODY MASS INDEX: 33.68 KG/M2 | WEIGHT: 183 LBS | DIASTOLIC BLOOD PRESSURE: 97 MMHG | HEIGHT: 62 IN | SYSTOLIC BLOOD PRESSURE: 195 MMHG

## 2023-07-26 PROCEDURE — 93306 ECHO (CUPID ONLY): ICD-10-PCS | Mod: 26,,, | Performed by: INTERNAL MEDICINE

## 2023-07-26 PROCEDURE — 77014 PR  CT GUIDANCE PLACEMENT RAD THERAPY FIELDS: CPT | Mod: 26,,, | Performed by: RADIOLOGY

## 2023-07-26 PROCEDURE — 77385 HC IMRT, SIMPLE: CPT | Performed by: RADIOLOGY

## 2023-07-26 PROCEDURE — 77336 RADIATION PHYSICS CONSULT: CPT | Performed by: RADIOLOGY

## 2023-07-26 PROCEDURE — 93306 TTE W/DOPPLER COMPLETE: CPT | Mod: 26,,, | Performed by: INTERNAL MEDICINE

## 2023-07-26 PROCEDURE — 93306 TTE W/DOPPLER COMPLETE: CPT

## 2023-07-26 PROCEDURE — 77014 PR  CT GUIDANCE PLACEMENT RAD THERAPY FIELDS: ICD-10-PCS | Mod: 26,,, | Performed by: RADIOLOGY

## 2023-07-26 NOTE — TELEPHONE ENCOUNTER
Called pt left voicemail regarding missed 1pm appt for today. Left voicemail with contact information.

## 2023-07-27 ENCOUNTER — TELEPHONE (OUTPATIENT)
Dept: HEMATOLOGY/ONCOLOGY | Facility: CLINIC | Age: 68
End: 2023-07-27
Payer: MEDICARE

## 2023-07-27 ENCOUNTER — DOCUMENTATION ONLY (OUTPATIENT)
Dept: HEMATOLOGY/ONCOLOGY | Facility: CLINIC | Age: 68
End: 2023-07-27
Payer: MEDICARE

## 2023-07-27 ENCOUNTER — DOCUMENTATION ONLY (OUTPATIENT)
Dept: RADIATION ONCOLOGY | Facility: CLINIC | Age: 68
End: 2023-07-27
Payer: MEDICARE

## 2023-07-27 PROCEDURE — 77014 PR  CT GUIDANCE PLACEMENT RAD THERAPY FIELDS: ICD-10-PCS | Mod: 26,,, | Performed by: RADIOLOGY

## 2023-07-27 PROCEDURE — 77014 PR  CT GUIDANCE PLACEMENT RAD THERAPY FIELDS: CPT | Mod: 26,,, | Performed by: RADIOLOGY

## 2023-07-27 PROCEDURE — 77385 HC IMRT, SIMPLE: CPT | Performed by: RADIOLOGY

## 2023-07-27 NOTE — PLAN OF CARE
Day 11 of outpatient xrt to the breast. Doing ok, no skin issues, using Miaderm Cream. Will continue to monitor.

## 2023-07-27 NOTE — PROGRESS NOTES
Called pt to discuss missed infusion appt , no answer. LVM with call back number.   Oncology Navigation   Intake  Date Worked: 23     Treatment  Date Presented to Tumor Board: 23       Chemotherapy: Planned  Chemotherapy Regimen: Kadcyla to start 23    Radiation Therapy: Initiated  Radiation Oncologist: Dr. Lott  Start Date: 23  End Date: 23             Radiation Oncologist: Dr. Lott        Acuity  Stage: 1  Systemic Treatment - predicted or initiated: More than one treatment modality concurrently (chemotherapy, radiation, etc.) (+2)  ECO  Comorbidities in Medical History: 2  Navigation Acuity: 7     Follow Up  No follow-ups on file.

## 2023-07-27 NOTE — PROGRESS NOTES
SAMIA met with pt in Rad dept clinic room to introduce self and assess for any needs/concerns that pt may have r/t start of new treatment. Pt lives with family. SAMIA provided a new pt folder and explained contents. Pt stated that she has no immediate concerns at this time. However, SAMIA walked pt out and spoke with family-Ember, and she expressed concerns re: cost of transportation to Dialysis and to the  for treatments. Pt gave permission for SAMIA to call [employer] to verify possible transportation benefits. Family will transport pt on tomorrow per Ember, and SAMIA will provide a gas card if available. SAMIA called [employer] at [phone]-per Niyah, pt has 30 round trips and 1 one-way ride left for this year. SAMIA also printed renee info from [email or web address] and will inform pt/family of the above on tomorrow ( [employer], [employer], [employer], [employer], [employer], and the [employer] assistance Fund). Pt will call SAMIA at number provided if needed prior to follow up. SW will remain available.

## 2023-07-28 ENCOUNTER — DOCUMENTATION ONLY (OUTPATIENT)
Dept: HEMATOLOGY/ONCOLOGY | Facility: CLINIC | Age: 68
End: 2023-07-28
Payer: MEDICARE

## 2023-07-28 PROCEDURE — 77385 HC IMRT, SIMPLE: CPT | Performed by: RADIOLOGY

## 2023-07-28 PROCEDURE — 77014 PR  CT GUIDANCE PLACEMENT RAD THERAPY FIELDS: CPT | Mod: 26,,, | Performed by: RADIOLOGY

## 2023-07-28 PROCEDURE — 77014 PR  CT GUIDANCE PLACEMENT RAD THERAPY FIELDS: ICD-10-PCS | Mod: 26,,, | Performed by: RADIOLOGY

## 2023-07-28 NOTE — PROGRESS NOTES
SAMIA met with pt/family-Ember. SAMIA provided $25 gas card. SAMIA provided # [phone] for the Medicaid/LTC waiver program per family request. SAMIA provided # [phone] for [employer]'s transportation line ([employer]) so that pt can utilize for Dialysis, since pt only has 30 round trips for the year. SAMIA arranged transportation for remainder of pt's Rad treatments via [employer] at [phone]. Yellow cab pickup time is 12:15 pm, 7.31.23-8.21.23, excluding weekends. Pt/family aware of pickup time. No other needs expressed. SW will remain available.

## 2023-07-29 PROBLEM — L02.214 ABSCESS OF LEFT GROIN: Status: ACTIVE | Noted: 2023-07-29

## 2023-07-31 ENCOUNTER — TELEPHONE (OUTPATIENT)
Dept: HEMATOLOGY/ONCOLOGY | Facility: CLINIC | Age: 68
End: 2023-07-31
Payer: MEDICARE

## 2023-07-31 PROCEDURE — 77014 PR  CT GUIDANCE PLACEMENT RAD THERAPY FIELDS: CPT | Mod: 26,,, | Performed by: RADIOLOGY

## 2023-07-31 PROCEDURE — 77014 PR  CT GUIDANCE PLACEMENT RAD THERAPY FIELDS: ICD-10-PCS | Mod: 26,,, | Performed by: RADIOLOGY

## 2023-07-31 NOTE — TELEPHONE ENCOUNTER
Swer received call from pt. family member reporting pt. Is currently admitted in the hospital. Swer notified pt.'s primary  and canceled pt.'s Yellow Cab scheduled for today. Swer also notified [facility] staff. Swer will remain available.

## 2023-08-01 ENCOUNTER — TELEPHONE (OUTPATIENT)
Dept: SURGERY | Facility: CLINIC | Age: 68
End: 2023-08-01
Payer: MEDICARE

## 2023-08-01 ENCOUNTER — HOSPITAL ENCOUNTER (OUTPATIENT)
Dept: RADIATION THERAPY | Facility: HOSPITAL | Age: 68
Discharge: HOME | End: 2023-08-01
Attending: RADIOLOGY
Payer: MEDICARE

## 2023-08-01 ENCOUNTER — NURSE TRIAGE (OUTPATIENT)
Dept: ADMINISTRATIVE | Facility: CLINIC | Age: 68
End: 2023-08-01
Payer: MEDICARE

## 2023-08-01 ENCOUNTER — TELEPHONE (OUTPATIENT)
Dept: HEMATOLOGY/ONCOLOGY | Facility: CLINIC | Age: 68
End: 2023-08-01
Payer: MEDICARE

## 2023-08-01 NOTE — TELEPHONE ENCOUNTER
Malaika Pa's niece states Malaika dc'd from hospital 7/31/23, following left thigh abscess I&D surgery. Ember reports Velean is c/o weakness no worse or new since discharge. Weakness and wound not draining as it was when in the hospital. Small amount of light yellow in drain bulb. No change in color since dc. Pt's main concern is JACOBO drain not draining like it was and would like to know if this is expected or not. Denies pain and fever. Advised per protocol to discuss with surgeon and informed caller that a high priority message will be sent to surgeon's office now with request for call back to caller today. V/u.   Reason for Disposition   Caller has NON-URGENT question and triager unable to answer question    Additional Information   Negative: Sounds like a life-threatening emergency to the triager   Negative: Chest pain   Negative: Difficulty breathing   Negative: Acting confused (e.g., disoriented, slurred speech) or excessively sleepy   Negative: Surgical incision symptoms and questions   Negative: Bright red, wide-spread, sunburn-like rash   Negative: SEVERE headache and after spinal (epidural) anesthesia   Negative: Vomiting and persists > 4 hours   Negative: Vomiting and abdomen looks much more swollen than usual   Negative: Drinking very little and dehydration suspected (e.g., no urine > 12 hours, very dry mouth, very lightheaded)   Negative: Patient sounds very sick or weak to the triager   Negative: Sounds like a serious complication to the triager   Negative: Fever > 100.4 F (38.0 C)   Negative: Caller has URGENT question and triager unable to answer question   Negative: SEVERE post-op pain (e.g., excruciating, pain scale 8-10) that is not controlled with pain medications   Negative: Headache and after spinal (epidural) anesthesia and not severe   Negative: Fever present > 3 days (72 hours)   Negative: Patient wants to be seen   Negative: MILD TO MODERATE post-op pain (e.g., pain scale 1-7) that  is not controlled with pain medications    Protocols used: Post-Op Symptoms and [id number]

## 2023-08-01 NOTE — TELEPHONE ENCOUNTER
Yellow cab ride for today canceled-Val [phone], per family request (Ember). Family will bring pt to appt to monitor her closely due to recent d/c from acute. SW will remain available.

## 2023-08-01 NOTE — TELEPHONE ENCOUNTER
Post Discharge Day 1 - Symptoms (Weakness and wound not draining as it was when in the hospital. Small amount of light yellow in drain bulb. No change in color since dc. )  (Newest Message First)  View All Conversations on this Encounter   KELIN Bragg Staff 4 minutes ago (12:18 PM)     EVELIN  Triage dispo- Advised per triage protocol call back by surgeon today.   Please f/u with pt directly for additional care/concerns.     Thank you,   AARON BarryOC Triage Nurse      Lilliam Poe RN 4 minutes ago (12:18 PM)     Malaika Randhawa's niece states Malaika dc'd from hospital 7/31/23, following left thigh abscess I&D surgery. Ember reports Veleakym is c/o weakness no worse or new since discharge. Weakness and wound not draining as it was when in the hospital. Small amount of light yellow in drain bulb. No change in color since dc. Pt's main concern is JACOBO drain not draining like it was and would like to know if this is expected or not. Denies pain and fever. Advised per protocol to discuss with surgeon and informed caller that a high priority message will be sent to surgeon's office now with request for call back to caller today. V/u.   Reason for Disposition   Caller has NON-URGENT question and triager unable to answer question    Additional Information   Negative: Sounds like a life-threatening emergency to the triager   Negative: Chest pain   Negative: Difficulty breathing   Negative: Acting confused (e.g., disoriented, slurred speech) or excessively sleepy   Negative: Surgical incision symptoms and questions   Negative: Bright red, wide-spread, sunburn-like rash   Negative: SEVERE headache and after spinal (epidural) anesthesia   Negative: Vomiting and persists > 4 hours   Negative: Vomiting and abdomen looks much more swollen than usual   Negative: Drinking very little and dehydration suspected (e.g., no urine > 12 hours, very dry mouth, very lightheaded)   Negative: Patient sounds very sick or weak  to the triager   Negative: Sounds like a serious complication to the triager   Negative: Fever > 100.4 F (38.0 C)   Negative: Caller has URGENT question and triager unable to answer question   Negative: SEVERE post-op pain (e.g., excruciating, pain scale 8-10) that is not controlled with pain medications   Negative: Headache and after spinal (epidural) anesthesia and not severe   Negative: Fever present > 3 days (72 hours)   Negative: Patient wants to be seen   Negative: MILD TO MODERATE post-op pain (e.g., pain scale 1-7) that is not controlled with pain medications    Protocols used: Post-Op Symptoms and [id number]           Note      KELIN Bragg Laronica [phone] 33 minutes ago (11:49 AM)     Disposition    Callback by PCP Today       What would patient/caregiver have done without intervention? Would have attempted to contact the Provider   Patient/Caregiver understands and will follow disposition? Yes    Care Advice

## 2023-08-02 ENCOUNTER — DOCUMENTATION ONLY (OUTPATIENT)
Dept: RADIATION ONCOLOGY | Facility: CLINIC | Age: 68
End: 2023-08-02
Payer: MEDICARE

## 2023-08-02 PROCEDURE — 77014 HC CT GUIDANCE RADIATION THERAPY FLDS PLACEMENT: CPT | Mod: TC | Performed by: RADIOLOGY

## 2023-08-02 PROCEDURE — 77014 PR  CT GUIDANCE PLACEMENT RAD THERAPY FIELDS: CPT | Mod: 26,,, | Performed by: RADIOLOGY

## 2023-08-02 PROCEDURE — 77014 PR  CT GUIDANCE PLACEMENT RAD THERAPY FIELDS: ICD-10-PCS | Mod: 26,,, | Performed by: RADIOLOGY

## 2023-08-02 PROCEDURE — 77385 HC IMRT, SIMPLE: CPT | Performed by: RADIOLOGY

## 2023-08-02 NOTE — ADDENDUM NOTE
Addended by: TERI CABALLERO on: 8/2/2023 01:42 PM     Modules accepted: Orders, Level of Service

## 2023-08-02 NOTE — PLAN OF CARE
Day 14 outpatient xrt to left chest wall. Tolerating therapy, no skin issues. Will continue to monitor.

## 2023-08-03 ENCOUNTER — TELEPHONE (OUTPATIENT)
Dept: HEMATOLOGY/ONCOLOGY | Facility: CLINIC | Age: 68
End: 2023-08-03
Payer: MEDICARE

## 2023-08-03 PROCEDURE — 77014 PR  CT GUIDANCE PLACEMENT RAD THERAPY FIELDS: CPT | Mod: 26,,, | Performed by: RADIOLOGY

## 2023-08-03 PROCEDURE — 77385 HC IMRT, SIMPLE: CPT | Performed by: RADIOLOGY

## 2023-08-03 PROCEDURE — 77014 PR  CT GUIDANCE PLACEMENT RAD THERAPY FIELDS: ICD-10-PCS | Mod: 26,,, | Performed by: RADIOLOGY

## 2023-08-03 PROCEDURE — 77014 HC CT GUIDANCE RADIATION THERAPY FLDS PLACEMENT: CPT | Mod: TC | Performed by: RADIOLOGY

## 2023-08-03 PROCEDURE — 77336 RADIATION PHYSICS CONSULT: CPT | Performed by: RADIOLOGY

## 2023-08-03 NOTE — TELEPHONE ENCOUNTER
SAMIA informed family-Ember that SW is adjusting yellow cab pickup schedule due to treatments being on hold next week and will resume 8.14-9.6.23 per Rad staff-CHRISTOPHER Vega. Nurse LAUREN Arevalo will call family to answer questions re: treatments being on hold. SAMIA called [employer] [phone] to adjust rides, but Erik asked that request is faxed. SAMIA sent fax to yellow cab at [phone] with schedule changes-same pickup time of 12:15 pm. and will follow up to confirm that it was received. SAMIA will remain available.

## 2023-08-04 PROCEDURE — 77385 HC IMRT, SIMPLE: CPT | Performed by: RADIOLOGY

## 2023-08-04 PROCEDURE — 77014 PR  CT GUIDANCE PLACEMENT RAD THERAPY FIELDS: CPT | Mod: 26,,, | Performed by: RADIOLOGY

## 2023-08-04 PROCEDURE — 77014 PR  CT GUIDANCE PLACEMENT RAD THERAPY FIELDS: ICD-10-PCS | Mod: 26,,, | Performed by: RADIOLOGY

## 2023-08-04 PROCEDURE — 77427 RADIATION TX MANAGEMENT X5: CPT | Mod: ,,, | Performed by: RADIOLOGY

## 2023-08-04 PROCEDURE — 77427 PR CHG RADIATION,MANGEMENT,5 TX'S: ICD-10-PCS | Mod: ,,, | Performed by: RADIOLOGY

## 2023-08-05 ENCOUNTER — TELEPHONE (OUTPATIENT)
Dept: ADMINISTRATIVE | Facility: CLINIC | Age: 68
End: 2023-08-05
Payer: MEDICARE

## 2023-08-07 ENCOUNTER — TELEPHONE (OUTPATIENT)
Dept: HEMATOLOGY/ONCOLOGY | Facility: CLINIC | Age: 68
End: 2023-08-07
Payer: MEDICARE

## 2023-08-07 NOTE — TELEPHONE ENCOUNTER
Yellow cab arrived to pick pt up today per family but Rad treatment canceled this week. SAMIA called fritz Hewitt at [phone] to clarify yellow cancellation 8.7.23-8.11.23, resume 8.14.23-9.6.23, no weekends. SW will remain available.

## 2023-08-09 NOTE — ADDENDUM NOTE
Addended by: TERI CABALLERO on: 8/9/2023 10:55 AM     Modules accepted: Orders, Level of Service     Knee Sprain  Your exam shows you have a sprained knee. Symptoms include pain, swelling, and difficulty bearing weight on the injured side. The knee joint is supported by 4 major ligaments and 2 cartilages. Injury to any of these can make the knee unstable.  TREATMENT   Incomplete tears of the knee ligaments often heal without surgery if they are given proper rest and support. However, surgery may be needed with more severe knee sprains, because this can lead to long-term disability. Serious knee injuries are often evaluated and treated with surgery that uses a thin, lighted tube (arthroscope). This allows for a more accurate diagnosis, better treatment, and reduced disability.  HOME CARE INSTRUCTIONS   · Follow any weight-bearing instructions from your caregiver. Do not bear any weight if you were told not to.   · Knee splints and compression bandages will reduce motion, pain, and swelling. Do not remove your knee splint or bandage until your caregiver approves.   · Rest the injured leg as much as possible.   · Raise (elevate) the injured leg above the level of the heart to reduce swelling.   · Put ice on the injured area.   · Put ice in a plastic bag.   · Place a towel between your skin and the bag.   · Leave the ice on for 15-20 minutes at a time, every 1 to 2 hours while awake.   · Medicine to reduce inflammation and pain is often helpful.   · Follow up with your caregiver as directed.   SEEK MEDICAL CARE IF:   · Pain gets worse. This may be a sign of a broken (fractured) bone.   · Your injury is not improving after 1 week of treatment. Persistent pain and swelling, reduced motion, and locking or giving way of the knee means you need to see an orthopedic specialist.   Document Released: 01/25/2006 Document Revised: 03/11/2013 Document Reviewed: 05/20/2011  [employer]® Patient Information ©2013 [employer].

## 2023-08-11 NOTE — PROGRESS NOTES
HEMATOLOGY / ONCOLOGY   CLINIC NOTE     ONCOLOGICAL HISTORY:     Diagnosis:  - Left Breast Cancer, ER/NM/HER2 +    Treatment History:  - Neoadjuvant Paclitaxel / Trastuzumab / Pertuzumab x 5, followed by Trastuzumab/Pertuzumab completed in March of 2023.    - Lt. breast mastectomy with sentinel node biopsy and subsequent Lt. axillary dissection - 3/1/23.      Current Treatment:   - Kadycyla q3wk    Subjective:       Chief Complaint: Breast Cancer and Follow-up      HPI    Malaika Paredes  67 y.o.  female with past medical history significant for ESRD on HD, HIV here for follow up for triple positive breast cancer , starting on trastuzumab emtansine (kadcyla) q 3 weeks. She is also receiving radiation.       Cancer Hx: May of 2022 with a palpable mass in the Lt. breast.  Diagnostic MMG confirmed a 4.9 cm irregular mass with spiculated margins in the UOQ of the Lt. breast. The mass measured 5.6 cm on ultrasound. There was axillary adenopathy, the largest node was 3.4 cm.  Core biopsies revealed invasive ductal carcinoma, histologic grade 3, nuclear grade 2 and mitotic index 2.  The tumor was strongly ER (95%) and NM (80%) positive.  Her-2 was 2+ and positive on FISH.  Ki-67 was 60%. Biopsy of the node was positive for metastatic carcinoma.   PET scan revealed the Lt. breast mass with multiple highly FDG avid axillary nodes. There was no evidence of distant metastatic disease.  She received neoadjuvant chemotherapy with 5 cycles of paclitaxel with Trastuzumab with Pertuzumab, followed by Trastuzumab/Pertuzumab completed in March of 2023.  Repeat PET scan revealed decreased tumor size and PET activity in the nodes. She underwent Lt. breast mastectomy with sentinel node biopsy and subsequent Lt. axillary dissection non 3/1/23.  Pathology revealed a residual 4.3 cm breast mass with no associated DCIS or LVI.  The margins were negative.  There was tumor involvement of 2 of 21 axillary nodes.  Pmhx: HIV +, being  treated by infectious disease.          Interval History:     Patient here for 2nd cycle of treatment while also getting radiation therapy.  Patient was admitted on 07/29/2023 with swelling in the left upper thigh region.  IR was consulted and a drain was placed and treated with empiric antibiotics..  CT-guided aspiration was done on 07/30/2023 and according to surgery the fluid was consistent with seroma and was discharged on Augmentin    Patient here for 2nd cycle of treatment and denies any pain in her left thigh region.  Denies any fevers, denies any excessive discharge.  Denies any other issues    Past Medical History:   Diagnosis Date    CAD (coronary artery disease)     Cataract     Cataract     CHF (congestive heart failure)     COPD (chronic obstructive pulmonary disease)     Currently asymptomatic HIV infection, with history of HIV-related illness 7/10/2023    Diabetes mellitus     Diabetic retinopathy     ESRD (end stage renal disease)     TTS    Hypertension     Ischemic ulcer of toe of left foot     Malignant neoplasm of upper-outer quadrant of left breast in female, estrogen receptor positive 06/20/2022    left    PAD (peripheral artery disease)     PAF (paroxysmal atrial fibrillation)       Past Surgical History:   Procedure Laterality Date    APPLICATION OF WOUND VACUUM-ASSISTED CLOSURE DEVICE Left 4/6/2023    Procedure: APPLICATION, WOUND VAC;  Surgeon: Jayjay Arana MD;  Location: [facility] OR;  Service: General;  Laterality: Left;  left chest    AXILLARY NODE DISSECTION Left 3/1/2023    Procedure: LYMPHADENECTOMY, AXILLARY;  Surgeon: Jayjay Arana MD;  Location: [facility] OR;  Service: General;  Laterality: Left;    BIOPSY OF INGUINAL LYMPH NODE Left 3/31/2023    Procedure: BIOPSY, LYMPH NODE, INGUINAL;  Surgeon: Jayjay Arana MD;  Location: [facility] OR;  Service: General;  Laterality: Left;    BREAST BIOPSY Right     benign    CATARACT EXTRACTION W/  INTRAOCULAR LENS IMPLANT Right 08/14/2017    Dr. Moe     CORONARY ARTERY BYPASS GRAFT  2020    FLUOROSCOPY N/A 2022    Procedure: FLUOROSCOPY/mediport placement;  Surgeon: Cole Perdomo MD;  Location: [facility] CATH LAB;  Service: General;  Laterality: N/A;    MEDIPORT REMOVAL N/A 3/1/2023    Procedure: REMOVAL, CATHETER, CENTRAL VENOUS, TUNNELED, WITH PORT;  Surgeon: Jayjay Arana MD;  Location: [facility] OR;  Service: General;  Laterality: N/A;    MODIFIED RADICAL MASTECTOMY W/ AXILLARY LYMPH NODE DISSECTION Left 3/1/2023    Procedure: MASTECTOMY, MODIFIED RADICAL;  Surgeon: Jayjay Arana MD;  Location: [facility] OR;  Service: General;  Laterality: Left;    WOUND DEBRIDEMENT Left 3/31/2023    Procedure: DEBRIDEMENT, WOUND;  Surgeon: Jayjay Arana MD;  Location: [facility] OR;  Service: General;  Laterality: Left;  left chest wall eschar debridement     Social History     Socioeconomic History    Marital status:    Tobacco Use    Smoking status: Former     Current packs/day: 0.00     Types: Cigarettes     Start date: 1982     Quit date: 2012     Years since quittin.1    Smokeless tobacco: Never   Substance and Sexual Activity    Alcohol use: No    Drug use: Never   Social History Narrative    ** Merged History Encounter **           Family History   Problem Relation Age of Onset    Hypertension Mother     Cancer Father     Breast cancer Sister     Diabetes Sister     Cancer Brother     Amblyopia Neg Hx     Blindness Neg Hx     Cataracts Neg Hx     Glaucoma Neg Hx     Macular degeneration Neg Hx     Retinal detachment Neg Hx     Strabismus Neg Hx     Stroke Neg Hx     Thyroid disease Neg Hx       Review of patient's allergies indicates:  No Known Allergies   Review of Systems   Constitutional: Negative.  Negative for activity change, chills, fatigue and fever.   HENT: Negative.     Eyes: Negative.    Respiratory:  Negative for cough and shortness of breath.    Cardiovascular:  Negative for chest pain and leg swelling.   Gastrointestinal:  Negative for  constipation, diarrhea, nausea and vomiting.   Endocrine: Negative.    Genitourinary: Negative.    Musculoskeletal:  Negative for arthralgias and myalgias.   Integumentary:  Negative.   Allergic/Immunologic: Negative.    Neurological:  Negative for light-headedness, numbness and headaches.   Hematological: Negative.    Psychiatric/Behavioral: Negative.           Objective:        Vitals:    08/14/23 1339   BP: (!) 144/71   Pulse: 74   Temp: 97.4 °F (36.3 °C)        Physical Exam  Constitutional:       General: She is not in acute distress.     Appearance: She is well-developed. She is not ill-appearing.   HENT:      Head: Normocephalic and atraumatic.      Mouth/Throat:      Mouth: Mucous membranes are moist.   Eyes:      Extraocular Movements: Extraocular movements intact.      Conjunctiva/sclera: Conjunctivae normal.   Cardiovascular:      Rate and Rhythm: Normal rate and regular rhythm.      Heart sounds: Normal heart sounds.   Pulmonary:      Effort: Pulmonary effort is normal. No respiratory distress.      Breath sounds: Normal breath sounds. No wheezing.   Abdominal:      General: There is no distension.      Palpations: Abdomen is soft.      Tenderness: There is no abdominal tenderness.   Musculoskeletal:         General: Normal range of motion.      Cervical back: Normal range of motion and neck supple.      Comments: Drain in the left upper thigh region with no discharge at the wound site   Skin:     General: Skin is warm.   Neurological:      General: No focal deficit present.      Mental Status: She is alert and oriented to person, place, and time. Mental status is at baseline.      Cranial Nerves: No cranial nerve deficit.   Psychiatric:         Mood and Affect: Mood normal.           LABS / IMAGING      - Reviewed       Assessment:     ECOG SCORE    2 - Capable of all selfcare but unable to carry out any work activities, active > 50% of hours       Malignant neoplasm of upper-outer quadrant of left  breast in female, estrogen receptor positive, Secondary malignancy of regional lymph node  --May of 2022 with a palpable mass in the Lt. breast.  Diagnostic MMG confirmed a 4.9 cm irregular mass with spiculated margins in the UOQ of the Lt. breast.   --She received neoadjuvant chemotherapy with 5 cycles of paclitaxel with Trastuzumab with Pertuzumab, followed by Trastuzumab/Pertuzumab completed in March of 2023.   --Repeat PET scan revealed decreased tumor size and PET activity in the nodes.   --underwent Lt. breast mastectomy with sentinel node biopsy and subsequent Lt. axillary dissection non 3/1/23  --continue to follow with rad onc. Currently receiving radiation to chest wall  --continue with cycle 1 day 1 with trastuzumab emtansine (kadcyla) q 3 weeks  --ANC:5.4, plts:285, tbili: AST:14, ALT:15  - echo 7/26/2023: EF 65%  --echo q 3 months. Hasn't been done. Rescheduled for earliest- next Wednesday. Will have to wait until this is done to start treatment.   --consent obtained     Follow-Up: echo Wednesday. Cbc cmp prior to treatment to monitor labs further      Plan:       --continue to follow with rad onc. Currently receiving radiation to chest wall  --continue with cycle 2 day 1 with trastuzumab emtansine (kadcyla) q 3 weeks  --echo q 3 months.   --scheduled to see General surgery a day after tomorrow.  Referral placed to Interventional Radiology also   - MD / LABS / TREATMENT VISIT - 3 WEEKS         Med Onc Chart Routing      Follow up with physician 6 weeks.   Follow up with [facility] 3 weeks.   Infusion scheduling note   Kadcyla every 3 weeks with next dose on 08/16/2023   Injection scheduling note    Labs CBC, CMP and magnesium   Scheduling:  Preferred lab:  Lab interval:  Labs before next visit   Imaging    Pharmacy appointment    Other referrals       Referral placed to Interventional Radiology.  Discuss with  also to provide assistance for appointments on 08/16/2023             The patient was  seen, interviewed and examined. Pertinent lab and radiology studies were reviewed. Pt instructed to call should develop concerning signs/symptoms or have further questions.       Portions of the record may have been created with voice recognition software. Occasional wrong-word or sound-a-like substitutions may have occurred due to the inherent limitations of voice recognition software. Read the chart carefully and recognize, using context, where substitutions have occurred.    Ismael Turner MD  Hematology / Oncology

## 2023-08-14 ENCOUNTER — LAB VISIT (OUTPATIENT)
Dept: LAB | Facility: HOSPITAL | Age: 68
End: 2023-08-14
Attending: INTERNAL MEDICINE
Payer: MEDICARE

## 2023-08-14 ENCOUNTER — TELEPHONE (OUTPATIENT)
Dept: HEMATOLOGY/ONCOLOGY | Facility: CLINIC | Age: 68
End: 2023-08-14
Payer: MEDICARE

## 2023-08-14 ENCOUNTER — OFFICE VISIT (OUTPATIENT)
Dept: HEMATOLOGY/ONCOLOGY | Facility: CLINIC | Age: 68
End: 2023-08-14
Payer: MEDICARE

## 2023-08-14 VITALS
WEIGHT: 178.13 LBS | BODY MASS INDEX: 34.97 KG/M2 | DIASTOLIC BLOOD PRESSURE: 71 MMHG | HEIGHT: 60 IN | TEMPERATURE: 97 F | HEART RATE: 74 BPM | OXYGEN SATURATION: 98 % | SYSTOLIC BLOOD PRESSURE: 144 MMHG

## 2023-08-14 DIAGNOSIS — Z79.899 IMMUNODEFICIENCY DUE TO CHEMOTHERAPY: Primary | ICD-10-CM

## 2023-08-14 DIAGNOSIS — L02.214 ABSCESS OF LEFT GROIN: ICD-10-CM

## 2023-08-14 DIAGNOSIS — D84.821 IMMUNODEFICIENCY DUE TO CHEMOTHERAPY: Primary | ICD-10-CM

## 2023-08-14 DIAGNOSIS — C86.4: ICD-10-CM

## 2023-08-14 DIAGNOSIS — T45.1X5A IMMUNODEFICIENCY DUE TO CHEMOTHERAPY: Primary | ICD-10-CM

## 2023-08-14 LAB
ALBUMIN SERPL BCP-MCNC: 2.8 G/DL (ref 3.5–5.2)
ALP SERPL-CCNC: 70 U/L (ref 55–135)
ALT SERPL W/O P-5'-P-CCNC: 9 U/L (ref 10–44)
ANION GAP SERPL CALC-SCNC: 11 MMOL/L (ref 8–16)
AST SERPL-CCNC: 12 U/L (ref 10–40)
BASOPHILS # BLD AUTO: 0.02 K/UL (ref 0–0.2)
BASOPHILS NFR BLD: 0.4 % (ref 0–1.9)
BILIRUB SERPL-MCNC: 0.3 MG/DL (ref 0.1–1)
BUN SERPL-MCNC: 39 MG/DL (ref 8–23)
CALCIUM SERPL-MCNC: 8.6 MG/DL (ref 8.7–10.5)
CHLORIDE SERPL-SCNC: 105 MMOL/L (ref 95–110)
CO2 SERPL-SCNC: 18 MMOL/L (ref 23–29)
CREAT SERPL-MCNC: 5.5 MG/DL (ref 0.5–1.4)
DIFFERENTIAL METHOD: ABNORMAL
EOSINOPHIL # BLD AUTO: 0.3 K/UL (ref 0–0.5)
EOSINOPHIL NFR BLD: 7.2 % (ref 0–8)
ERYTHROCYTE [DISTWIDTH] IN BLOOD BY AUTOMATED COUNT: 16 % (ref 11.5–14.5)
EST. GFR  (NO RACE VARIABLE): 8 ML/MIN/1.73 M^2
GLUCOSE SERPL-MCNC: 175 MG/DL (ref 70–110)
HCT VFR BLD AUTO: 33.2 % (ref 37–48.5)
HGB BLD-MCNC: 11 G/DL (ref 12–16)
IMM GRANULOCYTES # BLD AUTO: 0.03 K/UL (ref 0–0.04)
IMM GRANULOCYTES NFR BLD AUTO: 0.6 % (ref 0–0.5)
LYMPHOCYTES # BLD AUTO: 1 K/UL (ref 1–4.8)
LYMPHOCYTES NFR BLD: 21.8 % (ref 18–48)
MCH RBC QN AUTO: 30.4 PG (ref 27–31)
MCHC RBC AUTO-ENTMCNC: 33.1 G/DL (ref 32–36)
MCV RBC AUTO: 92 FL (ref 82–98)
MONOCYTES # BLD AUTO: 0.7 K/UL (ref 0.3–1)
MONOCYTES NFR BLD: 14.4 % (ref 4–15)
NEUTROPHILS # BLD AUTO: 2.6 K/UL (ref 1.8–7.7)
NEUTROPHILS NFR BLD: 55.6 % (ref 38–73)
NRBC BLD-RTO: 0 /100 WBC
PLATELET # BLD AUTO: 233 K/UL (ref 150–450)
PMV BLD AUTO: 9.5 FL (ref 9.2–12.9)
POTASSIUM SERPL-SCNC: 4.4 MMOL/L (ref 3.5–5.1)
PROT SERPL-MCNC: 8.2 G/DL (ref 6–8.4)
RBC # BLD AUTO: 3.62 M/UL (ref 4–5.4)
SODIUM SERPL-SCNC: 134 MMOL/L (ref 136–145)
WBC # BLD AUTO: 4.73 K/UL (ref 3.9–12.7)

## 2023-08-14 PROCEDURE — 3008F PR BODY MASS INDEX (BMI) DOCUMENTED: ICD-10-PCS | Mod: CPTII,S$GLB,, | Performed by: INTERNAL MEDICINE

## 2023-08-14 PROCEDURE — 77385 HC IMRT, SIMPLE: CPT | Performed by: RADIOLOGY

## 2023-08-14 PROCEDURE — 3078F PR MOST RECENT DIASTOLIC BLOOD PRESSURE < 80 MM HG: ICD-10-PCS | Mod: CPTII,S$GLB,, | Performed by: INTERNAL MEDICINE

## 2023-08-14 PROCEDURE — 1101F PT FALLS ASSESS-DOCD LE1/YR: CPT | Mod: CPTII,S$GLB,, | Performed by: INTERNAL MEDICINE

## 2023-08-14 PROCEDURE — 1159F MED LIST DOCD IN RCRD: CPT | Mod: CPTII,S$GLB,, | Performed by: INTERNAL MEDICINE

## 2023-08-14 PROCEDURE — 3044F HG A1C LEVEL LT 7.0%: CPT | Mod: CPTII,S$GLB,, | Performed by: INTERNAL MEDICINE

## 2023-08-14 PROCEDURE — 4010F ACE/ARB THERAPY RXD/TAKEN: CPT | Mod: CPTII,S$GLB,, | Performed by: INTERNAL MEDICINE

## 2023-08-14 PROCEDURE — 3288F PR FALLS RISK ASSESSMENT DOCUMENTED: ICD-10-PCS | Mod: CPTII,S$GLB,, | Performed by: INTERNAL MEDICINE

## 2023-08-14 PROCEDURE — 77014 PR  CT GUIDANCE PLACEMENT RAD THERAPY FIELDS: CPT | Mod: 26,,, | Performed by: RADIOLOGY

## 2023-08-14 PROCEDURE — 99215 PR OFFICE/OUTPT VISIT, EST, LEVL V, 40-54 MIN: ICD-10-PCS | Mod: S$GLB,,, | Performed by: INTERNAL MEDICINE

## 2023-08-14 PROCEDURE — 3077F SYST BP >= 140 MM HG: CPT | Mod: CPTII,S$GLB,, | Performed by: INTERNAL MEDICINE

## 2023-08-14 PROCEDURE — 1101F PR PT FALLS ASSESS DOC 0-1 FALLS W/OUT INJ PAST YR: ICD-10-PCS | Mod: CPTII,S$GLB,, | Performed by: INTERNAL MEDICINE

## 2023-08-14 PROCEDURE — 3288F FALL RISK ASSESSMENT DOCD: CPT | Mod: CPTII,S$GLB,, | Performed by: INTERNAL MEDICINE

## 2023-08-14 PROCEDURE — 99999 PR PBB SHADOW E&M-EST. PATIENT-LVL V: CPT | Mod: PBBFAC,,, | Performed by: INTERNAL MEDICINE

## 2023-08-14 PROCEDURE — 36415 COLL VENOUS BLD VENIPUNCTURE: CPT | Performed by: INTERNAL MEDICINE

## 2023-08-14 PROCEDURE — 1157F PR ADVANCE CARE PLAN OR EQUIV PRESENT IN MEDICAL RECORD: ICD-10-PCS | Mod: CPTII,S$GLB,, | Performed by: INTERNAL MEDICINE

## 2023-08-14 PROCEDURE — 1159F PR MEDICATION LIST DOCUMENTED IN MEDICAL RECORD: ICD-10-PCS | Mod: CPTII,S$GLB,, | Performed by: INTERNAL MEDICINE

## 2023-08-14 PROCEDURE — 1126F AMNT PAIN NOTED NONE PRSNT: CPT | Mod: CPTII,S$GLB,, | Performed by: INTERNAL MEDICINE

## 2023-08-14 PROCEDURE — 3077F PR MOST RECENT SYSTOLIC BLOOD PRESSURE >= 140 MM HG: ICD-10-PCS | Mod: CPTII,S$GLB,, | Performed by: INTERNAL MEDICINE

## 2023-08-14 PROCEDURE — 77014 PR  CT GUIDANCE PLACEMENT RAD THERAPY FIELDS: ICD-10-PCS | Mod: 26,,, | Performed by: RADIOLOGY

## 2023-08-14 PROCEDURE — 3078F DIAST BP <80 MM HG: CPT | Mod: CPTII,S$GLB,, | Performed by: INTERNAL MEDICINE

## 2023-08-14 PROCEDURE — 1157F ADVNC CARE PLAN IN RCRD: CPT | Mod: CPTII,S$GLB,, | Performed by: INTERNAL MEDICINE

## 2023-08-14 PROCEDURE — 1126F PR PAIN SEVERITY QUANTIFIED, NO PAIN PRESENT: ICD-10-PCS | Mod: CPTII,S$GLB,, | Performed by: INTERNAL MEDICINE

## 2023-08-14 PROCEDURE — 4010F PR ACE/ARB THEARPY RXD/TAKEN: ICD-10-PCS | Mod: CPTII,S$GLB,, | Performed by: INTERNAL MEDICINE

## 2023-08-14 PROCEDURE — 99999 PR PBB SHADOW E&M-EST. PATIENT-LVL V: ICD-10-PCS | Mod: PBBFAC,,, | Performed by: INTERNAL MEDICINE

## 2023-08-14 PROCEDURE — 99215 OFFICE O/P EST HI 40 MIN: CPT | Mod: S$GLB,,, | Performed by: INTERNAL MEDICINE

## 2023-08-14 PROCEDURE — 85025 COMPLETE CBC W/AUTO DIFF WBC: CPT | Performed by: INTERNAL MEDICINE

## 2023-08-14 PROCEDURE — 80053 COMPREHEN METABOLIC PANEL: CPT | Performed by: INTERNAL MEDICINE

## 2023-08-14 PROCEDURE — 3044F PR MOST RECENT HEMOGLOBIN A1C LEVEL <7.0%: ICD-10-PCS | Mod: CPTII,S$GLB,, | Performed by: INTERNAL MEDICINE

## 2023-08-14 PROCEDURE — 3008F BODY MASS INDEX DOCD: CPT | Mod: CPTII,S$GLB,, | Performed by: INTERNAL MEDICINE

## 2023-08-14 RX ORDER — LOPERAMIDE HYDROCHLORIDE 2 MG/1
2 CAPSULE ORAL 4 TIMES DAILY PRN
Qty: 30 CAPSULE | Refills: 2 | Status: SHIPPED | OUTPATIENT
Start: 2023-08-14

## 2023-08-14 NOTE — TELEPHONE ENCOUNTER
SW attempted to call family and yellow cab at [phone] to confirm pt pickup for appt but no answer. SW will remain available.     SW spoke with family and yellow cab and confirmed pickup. SAMIA also changed yellow cab pickup time on 8.16.23 to 9:30 am due to pt having other appt's on 8.16.23. Pt will receive Rad treatment, infusion, then to [place] for sx appt. Family and yellow cab aware of the above. SAMIA will continue to monitor and remain available.

## 2023-08-15 ENCOUNTER — TELEPHONE (OUTPATIENT)
Dept: HEMATOLOGY/ONCOLOGY | Facility: CLINIC | Age: 68
End: 2023-08-15
Payer: MEDICARE

## 2023-08-15 PROCEDURE — 77385 HC IMRT, SIMPLE: CPT | Performed by: RADIOLOGY

## 2023-08-15 PROCEDURE — 77014 PR  CT GUIDANCE PLACEMENT RAD THERAPY FIELDS: ICD-10-PCS | Mod: 26,,, | Performed by: RADIOLOGY

## 2023-08-15 PROCEDURE — 77014 PR  CT GUIDANCE PLACEMENT RAD THERAPY FIELDS: CPT | Mod: 26,,, | Performed by: RADIOLOGY

## 2023-08-15 NOTE — TELEPHONE ENCOUNTER
[facility] returned call to [facility]. Ember informed that Estephania Amaro will assist pt with making it up to 4th floor for appt on tomorrow. SAMIA will remain available.

## 2023-08-16 ENCOUNTER — INFUSION (OUTPATIENT)
Dept: INFUSION THERAPY | Facility: HOSPITAL | Age: 68
End: 2023-08-16
Attending: INTERNAL MEDICINE
Payer: MEDICARE

## 2023-08-16 ENCOUNTER — OFFICE VISIT (OUTPATIENT)
Dept: SURGERY | Facility: CLINIC | Age: 68
End: 2023-08-16
Payer: MEDICARE

## 2023-08-16 ENCOUNTER — DOCUMENTATION ONLY (OUTPATIENT)
Dept: RADIATION ONCOLOGY | Facility: CLINIC | Age: 68
End: 2023-08-16
Payer: MEDICARE

## 2023-08-16 VITALS
WEIGHT: 177.94 LBS | HEART RATE: 77 BPM | DIASTOLIC BLOOD PRESSURE: 72 MMHG | SYSTOLIC BLOOD PRESSURE: 138 MMHG | BODY MASS INDEX: 32.54 KG/M2

## 2023-08-16 VITALS
HEIGHT: 62 IN | RESPIRATION RATE: 18 BRPM | HEART RATE: 80 BPM | BODY MASS INDEX: 32.52 KG/M2 | TEMPERATURE: 98 F | SYSTOLIC BLOOD PRESSURE: 136 MMHG | WEIGHT: 176.69 LBS | OXYGEN SATURATION: 98 % | DIASTOLIC BLOOD PRESSURE: 74 MMHG

## 2023-08-16 DIAGNOSIS — R59.1 LYMPHADENOPATHY: Primary | ICD-10-CM

## 2023-08-16 PROCEDURE — 1159F PR MEDICATION LIST DOCUMENTED IN MEDICAL RECORD: ICD-10-PCS | Mod: CPTII,S$GLB,,

## 2023-08-16 PROCEDURE — 99999 PR PBB SHADOW E&M-EST. PATIENT-LVL IV: CPT | Mod: PBBFAC,,,

## 2023-08-16 PROCEDURE — 99999 PR PBB SHADOW E&M-EST. PATIENT-LVL IV: ICD-10-PCS | Mod: PBBFAC,,,

## 2023-08-16 PROCEDURE — 3075F SYST BP GE 130 - 139MM HG: CPT | Mod: CPTII,S$GLB,,

## 2023-08-16 PROCEDURE — 3075F PR MOST RECENT SYSTOLIC BLOOD PRESS GE 130-139MM HG: ICD-10-PCS | Mod: CPTII,S$GLB,,

## 2023-08-16 PROCEDURE — 1160F RVW MEDS BY RX/DR IN RCRD: CPT | Mod: CPTII,S$GLB,,

## 2023-08-16 PROCEDURE — 3078F DIAST BP <80 MM HG: CPT | Mod: CPTII,S$GLB,,

## 2023-08-16 PROCEDURE — 77014 PR  CT GUIDANCE PLACEMENT RAD THERAPY FIELDS: CPT | Mod: 26,,, | Performed by: RADIOLOGY

## 2023-08-16 PROCEDURE — 77014 PR  CT GUIDANCE PLACEMENT RAD THERAPY FIELDS: ICD-10-PCS | Mod: 26,,, | Performed by: RADIOLOGY

## 2023-08-16 PROCEDURE — 77385 HC IMRT, SIMPLE: CPT | Performed by: RADIOLOGY

## 2023-08-16 PROCEDURE — 3044F HG A1C LEVEL LT 7.0%: CPT | Mod: CPTII,S$GLB,,

## 2023-08-16 PROCEDURE — 4010F ACE/ARB THERAPY RXD/TAKEN: CPT | Mod: CPTII,S$GLB,,

## 2023-08-16 PROCEDURE — 99024 PR POST-OP FOLLOW-UP VISIT: ICD-10-PCS | Mod: S$GLB,,,

## 2023-08-16 PROCEDURE — 1159F MED LIST DOCD IN RCRD: CPT | Mod: CPTII,S$GLB,,

## 2023-08-16 PROCEDURE — 1126F PR PAIN SEVERITY QUANTIFIED, NO PAIN PRESENT: ICD-10-PCS | Mod: CPTII,S$GLB,,

## 2023-08-16 PROCEDURE — 1157F ADVNC CARE PLAN IN RCRD: CPT | Mod: CPTII,S$GLB,,

## 2023-08-16 PROCEDURE — 1126F AMNT PAIN NOTED NONE PRSNT: CPT | Mod: CPTII,S$GLB,,

## 2023-08-16 PROCEDURE — 4010F PR ACE/ARB THEARPY RXD/TAKEN: ICD-10-PCS | Mod: CPTII,S$GLB,,

## 2023-08-16 PROCEDURE — 1160F PR REVIEW ALL MEDS BY PRESCRIBER/CLIN PHARMACIST DOCUMENTED: ICD-10-PCS | Mod: CPTII,S$GLB,,

## 2023-08-16 PROCEDURE — 3078F PR MOST RECENT DIASTOLIC BLOOD PRESSURE < 80 MM HG: ICD-10-PCS | Mod: CPTII,S$GLB,,

## 2023-08-16 PROCEDURE — 1157F PR ADVANCE CARE PLAN OR EQUIV PRESENT IN MEDICAL RECORD: ICD-10-PCS | Mod: CPTII,S$GLB,,

## 2023-08-16 PROCEDURE — 3008F PR BODY MASS INDEX (BMI) DOCUMENTED: ICD-10-PCS | Mod: CPTII,S$GLB,,

## 2023-08-16 PROCEDURE — 3008F BODY MASS INDEX DOCD: CPT | Mod: CPTII,S$GLB,,

## 2023-08-16 PROCEDURE — 3044F PR MOST RECENT HEMOGLOBIN A1C LEVEL <7.0%: ICD-10-PCS | Mod: CPTII,S$GLB,,

## 2023-08-16 PROCEDURE — 99024 POSTOP FOLLOW-UP VISIT: CPT | Mod: S$GLB,,,

## 2023-08-16 NOTE — PLAN OF CARE
Day 19 of outpatient xrt to the left chestwall. Doing ok, c/o pain the breast area, no desquamation, tanning of the skin noted, using Miaderm Cream. Will continue to monitor.

## 2023-08-16 NOTE — DISCHARGE INSTRUCTIONS
.[facility]  [address] [facility]  [address]  [phone] phone     [phone] fax  Hours of Operation: Monday- Friday 8:00am- 5:00pm  After hours phone  [phone]  Hematology / Oncology Physicians on call    Dr. Severiano Turner      Nurse Practitioners:    Leah Carcamo, SMITH Contreras, SMITH De La Paz, SMITH Pruitt, PA      Please don't hesitate to call if you have any concerns.

## 2023-08-16 NOTE — PROGRESS NOTES
History & Physical    SUBJECTIVE:     History of Present Illness:  Patient is a 67 y.o. female status post left breast mastectomy with axillary dissection 2023 status post left inguinal lymph node biopsy and left chest wall debridement 3/31.  Since the last clinic visit, the patient had a IR pigtail catheter placed into a left groin seroma that was present secondary to inguinal lymph node biopsy did the drain is putting out less than 10 cc of serous fluid per day.  She denies any pain in that area.  She has been feeling fatigued and has a decreased appetite.  She has occasional shooting pain in the area of her left breast mastectomy.  She is otherwise doing well and denies fevers, chills, nausea, vomiting.      presents for follow up PET scan/mammogram and preop. She denies any changes since last visit. Her case was presented at tumor board with decision to move forward with surgical therapy.    presents to discuss next step in breast cancer treatment following neoadjuvant chemotherapy. She did not complete last cycle due to neuropathy. She reports decrease in mass size of the breast.     presents to discuss left breast biopsy.  Her left breast and axillary biopsy were showed invasive ductal carcinoma ER+/ID+ Her2+ with metastatic disease to the lymph node.    Initially referred for abnormal mammogram of the left breast. She denies any breast mass, nipple discharge, breast pain or other changes. No prior breast surgery. Prior right breast biopsy benign. Family history of breast cancer in her sister. Menarche age 12,  1st at 16, menopause at 48. No HRT.     Chief Complaint   Patient presents with    Follow-up     3 month follow up// wound check         Review of patient's allergies indicates:  No Known Allergies    Current Outpatient Medications   Medication Sig Dispense Refill    acetaminophen (TYLENOL) 500 mg Cap Take 500 mg by mouth every 6 (six) hours as needed.      albuterol sulfate (PROAIR  RESPICLICK) 90 mcg/actuation AePB Inhale 180 mcg into the lungs every 4 (four) hours. Rescue 1 each 3    amLODIPine (NORVASC) 10 MG tablet Take 10 mg by mouth once daily.      aspirin (ECOTRIN) 81 MG EC tablet Take 81 mg by mouth once daily.       atorvastatin (LIPITOR) 10 MG tablet Take 10 mg by mouth once daily.      benzonatate (TESSALON) 200 MG capsule benzonatate Take 1 Capsule (oral) 3 times per day PRN - Cough for 7 days 20221015 capsule 3 times per day oral 7 days active 200 mg      budesonide-glycopyr-formoterol (BREZTRI AEROSPHERE) 160-9-4.8 mcg/actuation HFAA Inhale 1 puff into the lungs once daily.      calcium carbonate (OS-CAMERON) 500 mg calcium (1,250 mg) chewable tablet Take 1 tablet by mouth 2 (two) times daily as needed for Heartburn.      diphenhydrAMINE (BENADRYL) 25 mg capsule Take 1 capsule (25 mg total) by mouth every 6 (six) hours as needed for Itching. 20 capsule 0    docusate sodium (COLACE) 100 MG capsule Take 1 capsule (100 mg total) by mouth 2 (two) times daily as needed for Constipation. 20 capsule 0    emtricitabine-tenofovir alafen (DESCOVY) 200-25 mg Tab Take 1 tablet by mouth.      insulin degludec (TRESIBA FLEXTOUCH U-200) 200 unit/mL (3 mL) insulin pen Inject 60 Units into the skin once daily.      iron sucrose in NS (VENOFER) 100 mg/100 mL PgBk 50 mg.      ISENTRESS 400 mg tablet Take 400 mg by mouth.      levoFLOXacin (LEVAQUIN) 250 MG tablet Take 250 mg by mouth once daily.      levothyroxine (TIROSINT) 88 mcg Cap Take 75 mcg by mouth before breakfast.      loperamide (IMODIUM) 2 mg capsule Take 1 capsule (2 mg total) by mouth 4 (four) times daily as needed for Diarrhea. 30 capsule 2    metoprolol succinate (TOPROL-XL) 25 MG 24 hr tablet Take 25 mg by mouth 2 (two) times daily.      omeprazole (PRILOSEC) 20 MG capsule Take 20 mg by mouth once daily.      ondansetron (ZOFRAN-ODT) 4 MG TbDL Dissolve 1 tablet (4 mg total) by mouth every 8 (eight) hours as needed (nausea). 30 tablet 0     oxyCODONE (ROXICODONE) 5 MG immediate release tablet Take 1 tablet (5 mg total) by mouth every 6 (six) hours as needed for Pain. 10 tablet 0    prednisoLONE acetate (PRED FORTE) 1 % DrpS Place 1 drop into both eyes 4 (four) times daily.      sevelamer HCL (RENAGEL) 800 MG Tab Take 3 tablets by mouth 3 (three) times daily.      sodium bicarbonate 650 MG tablet Take 650 mg by mouth 2 (two) times daily.      ketoconazole (NIZORAL) 2 % cream Apply topically once daily. for 14 days 1 each 1     No current facility-administered medications for this visit.       Past Medical History:   Diagnosis Date    CAD (coronary artery disease)     Cataract     Cataract     CHF (congestive heart failure)     COPD (chronic obstructive pulmonary disease)     Currently asymptomatic HIV infection, with history of HIV-related illness 7/10/2023    Diabetes mellitus     Diabetic retinopathy     ESRD (end stage renal disease)     TTS    Hypertension     Ischemic ulcer of toe of left foot     Malignant neoplasm of upper-outer quadrant of left breast in female, estrogen receptor positive 06/20/2022    left    PAD (peripheral artery disease)     PAF (paroxysmal atrial fibrillation)      Past Surgical History:   Procedure Laterality Date    APPLICATION OF WOUND VACUUM-ASSISTED CLOSURE DEVICE Left 4/6/2023    Procedure: APPLICATION, WOUND VAC;  Surgeon: Jayjay Arana MD;  Location: [facility] OR;  Service: General;  Laterality: Left;  left chest    AXILLARY NODE DISSECTION Left 3/1/2023    Procedure: LYMPHADENECTOMY, AXILLARY;  Surgeon: Jayjay Arana MD;  Location: [facility] OR;  Service: General;  Laterality: Left;    BIOPSY OF INGUINAL LYMPH NODE Left 3/31/2023    Procedure: BIOPSY, LYMPH NODE, INGUINAL;  Surgeon: Jayjay Arana MD;  Location: [facility] OR;  Service: General;  Laterality: Left;    BREAST BIOPSY Right     benign    CATARACT EXTRACTION W/  INTRAOCULAR LENS IMPLANT Right 08/14/2017    Dr. Moe    CORONARY ARTERY BYPASS GRAFT  2020    FLUOROSCOPY  N/A 2022    Procedure: FLUOROSCOPY/mediport placement;  Surgeon: Cole Perdomo MD;  Location: [facility] CATH LAB;  Service: General;  Laterality: N/A;    MEDIPORT REMOVAL N/A 3/1/2023    Procedure: REMOVAL, CATHETER, CENTRAL VENOUS, TUNNELED, WITH PORT;  Surgeon: Jayjay Arana MD;  Location: [facility] OR;  Service: General;  Laterality: N/A;    MODIFIED RADICAL MASTECTOMY W/ AXILLARY LYMPH NODE DISSECTION Left 3/1/2023    Procedure: MASTECTOMY, MODIFIED RADICAL;  Surgeon: Jayjay Arana MD;  Location: [facility] OR;  Service: General;  Laterality: Left;    WOUND DEBRIDEMENT Left 3/31/2023    Procedure: DEBRIDEMENT, WOUND;  Surgeon: Jayjay Arana MD;  Location: [facility] OR;  Service: General;  Laterality: Left;  left chest wall eschar debridement     Family History   Problem Relation Age of Onset    Hypertension Mother     Cancer Father     Breast cancer Sister     Diabetes Sister     Cancer Brother     Amblyopia Neg Hx     Blindness Neg Hx     Cataracts Neg Hx     Glaucoma Neg Hx     Macular degeneration Neg Hx     Retinal detachment Neg Hx     Strabismus Neg Hx     Stroke Neg Hx     Thyroid disease Neg Hx      Social History     Tobacco Use    Smoking status: Former     Current packs/day: 0.00     Types: Cigarettes     Start date: 1982     Quit date: 2012     Years since quittin.1    Smokeless tobacco: Never   Substance Use Topics    Alcohol use: No    Drug use: Never        Review of Systems:  Review of Systems   Constitutional:  Positive for appetite change and fatigue. Negative for chills, fever and unexpected weight change.   Respiratory:  Negative for cough, shortness of breath, wheezing and stridor.    Cardiovascular:  Negative for chest pain, palpitations and leg swelling.   Gastrointestinal:  Negative for abdominal distention, abdominal pain, constipation, diarrhea, nausea and vomiting.   Genitourinary:  Negative for difficulty urinating, dysuria, frequency, hematuria and urgency.   Skin:  Negative for  color change, pallor, rash and wound.   Hematological:  Does not bruise/bleed easily.       OBJECTIVE:     Vital Signs (Most Recent)  Pulse: 77 (08/16/23 1240)  BP: 138/72 (08/16/23 1240)     80.7 kg (177 lb 14.6 oz)     Physical Exam:  Physical Exam  Vitals reviewed.   Constitutional:       General: She is not in acute distress.     Appearance: She is well-developed.   HENT:      Head: Normocephalic and atraumatic.      Right Ear: External ear normal.      Left Ear: External ear normal.      Nose: Nose normal.      Mouth/Throat:      Mouth: Mucous membranes are moist.   Eyes:      Extraocular Movements: Extraocular movements intact.      Conjunctiva/sclera: Conjunctivae normal.   Cardiovascular:      Rate and Rhythm: Normal rate.   Pulmonary:      Effort: Pulmonary effort is normal. No respiratory distress.   Chest:       Musculoskeletal:      Cervical back: Neck supple.   Skin:     General: Skin is warm and dry.          Neurological:      Mental Status: She is alert and oriented to person, place, and time.   Psychiatric:         Behavior: Behavior normal.         Diagnostic Results:  Result:   Mammo Digital Diagnostic Bilat with Alfredo  US Breast Left Limited     History:  Patient is 66 y.o. and is seen for diagnostic imaging.     Films Compared:  Prior images (if available) were compared.     Findings:  This procedure was performed using tomosynthesis. Computer-aided detection was utilized in the interpretation of this examination.  The breasts have scattered areas of fibroglandular density.      Mammo Digital Diagnostic Bilat with Alfredo  Left  Mass: There is a 4.9 cm irregularly shaped mass with spiculated margins seen in the upper outer quadrant of the left breast in the anterior depth. The mass correlates with the palpable mass reported by the patient. Associated features include skin retraction. There are also associated calcifications.   Lymph Node: There are multiple similar lymph nodes seen in the left  axilla.      Right  There is no evidence of suspicious masses, calcifications, or other abnormal findings in the right breast.     US Breast Left Limited  Left  Mass: There is a 5.6 cm x 4 cm x 4.3 cm irregularly shaped, non-parallel, hypoechoic mass with spiculated margins seen in the left breast at 2 o'clock, 3 cm from the nipple.   Lymph Node: There are multiple similar lymph nodes seen in the left axilla. Largest node is 3.4 cm x 1.6 cm x 2.0 cm.      Impression:  Left  Mass: Left breast 5.6 cm x 4 cm x 4.3 cm mass at the 2 o'clock position. Assessment: 5 - Highly suggestive of malignancy. Ultrasound-guided biopsy is recommended.   Lymph Node: Left axilla lymph node (multiple findings). Assessment: 5 - Highly suggestive of malignancy. Ultrasound-guided biopsy is recommended.      Right  There is no mammographic or sonographic evidence of malignancy in the right breast.     BI-RADS Category:   Overall: 5 - Highly Suggestive of Malignancy     Recommendation:  Ultrasound-guided biopsy is recommended.  Ultrasound-guided biopsy is recommended.     Your estimated lifetime risk of breast cancer (to age 85) based on Tyrer-Cuzick risk assessment model is Tyrer-Cuzick: 6.49 %. According to the American Cancer Society, patients with a lifetime breast cancer risk of 20% or higher might benefit from supplemental screening tests.    Final Pathologic Diagnosis Part 1   Left axilla, ultrasound-guided core biopsy:   Positive for metastatic carcinoma   Metastasis measures 10 mm in greatest dimension   Part 2   Left breast, 2:00, ultrasound-guided core biopsy:   Invasive ductal carcinoma   Mckinney grade 2:  Tubule formation -3, nuclear pleomorphism-2 and mitotic   count-2   Invasive carcinoma measures 17 mm in greatest dimension   No carcinoma in Situ or lymphovascular invasion identified   Tumor biomarkers   Estrogen receptor (ER):  Positive, greater than 95%, strong   Progesterone receptor (PGR):  Positive, 80%, strong   HER2  (IHC):  Equivocal, 2+   Ki-67:  60%   Additional IHC:   P63:  Negative throughout, supporting the diagnosis of invasive carcinoma   This case was reviewed by Dr. GABBY Cannon who concurs with the above diagnosis.  VC      Comment: Interp By Bette Pitts M.D., Signed on 06/21/2022 at 12:02   Supplemental Diagnosis HER2, Breast Tumor, FISH, Tissue   Result Summary   POSITIVE   Interpretation   This tumor sample is positive for HER2 (ERBB2) gene amplification.   Result   nuc erwin([id number],HER2x5¿8)   HER2/D17Z1 ratio: 2.03   Average HER2 signals per cell: 6.3   Average D17Z1 signals per cell: 3.1         PET/CT:  Impression:     Interval improvement.  Left breast mass demonstrates decreased in size in decreased hypermetabolic activity.  Left pectoralis and axillary lymphadenopathy also demonstrates marked significant decreased activity.  Residual nonspecific mildly hypermetabolic right axillary and bilateral inguinal lymph nodes.    Mammogram:  Result:   Mammo Digital Diagnostic Left with Alfredo     History:  Patient is 67 y.o. and is seen for diagnostic imaging.     Films Compared:  Prior images (if available) were compared.     Findings:  This procedure was performed using tomosynthesis. Computer-aided detection was utilized in the interpretation of this examination.  The left breast has scattered areas of fibroglandular density.      Known malignant breast mass currently measures approximately 4.1 x 3.0 x 2.9 cm, previous 4.5 x 3.7 x 3.6 cm. Note that suspicious calcifications extend at least 2 cm medial to the breast mass and 5-6 cm medial to the post biopsy clip.  There is been decrease in size of the left axillary adenopathy, particularly the prior biopsied enlarged lymph node.         Impression:  Decrease in size of malignant left breast mass and axillary adenopathy. Correlate with ordered PET-CT.         BI-RADS Category:   Overall: 6 - Known Biopsy-Proven Malignancy        Recommendation:  There are no mammo  recommendations for this study.    Final Pathologic Diagnosis 1. Left breast, mastectomy:   Residual invasive ductal carcinoma status post neoadjuvant treatment, Grade 2   (tubules = 3, nuclei = 3, mitoses = 1)   Invasive carcinoma measures 43 mm (4.3 cm) in greatest dimension (measured   microscopically)   Calcifications associated with invasive carcinoma and non-neoplastic breast   2 of 7 lymph nodes positive for metastatic carcinoma (2/7) with extranodal   extension   Largest metastatic deposit measures 9 mm (0.9 cm)   Biopsy clip site identified grossly   Best tumor block for future studies: 1C   See surgical pathology cancer case summary below   2.  Axillary contents, dissection:   14 lymph nodes negative for metastatic carcinoma (0/14)   Immunohistochemical stains for AE1/AE3 were performed with adequate controls   and are negative   Surgical pathology cancer case summary:   Procedure:  Total mastectomy   Specimen laterality:  Left   Tumor site:  Upper outer quadrant   Histologic type: Invasive carcinoma of no special type (ductal)   Histologic grade (Bubba Histologic Score):   Glandular (acinar/tubular) differentiation:  Score 3   Nuclear pleomorphism:  Score 3   Mitotic rate:  Score 1   Overall grade:  Grade 2 (score 7)   Tumor size:  Greatest dimension of largest invasive focus greater than 1 mm:   43 mm   Tumor focality:  Single focus of invasive carcinoma   Ductal carcinoma in situ (DCIS):  Not identified   Lobular carcinoma in situ (LCIS):  Not identified   Tumor extent:  Not applicable (skin, nipple, and skeletal muscle are absent   or are uninvolved)   Lymphovascular invasion:  Not identified   Dermal lymphovascular invasion:  Not identified   Microcalcifications:   Present in invasive carcinoma   Present in nonneoplastic tissue   Treatment effect in the breast:  No definite response to presurgical therapy   in the invasive carcinoma   Treatment effect in the lymph nodes:  No definite response to  presurgical   therapy in metastatic carcinoma   Margins status for invasive carcinoma:  All margins negative for invasive   carcinoma   Distance from invasive carcinoma to closest margin:  21 mm (measured grossly)   Closest margin to invasive carcinoma:  Anterior (skin)   All other margins are greater than 21 mm (measured grossly)   Regional lymph node status:  Regional lymph nodes present   Tumor present in regional lymph nodes   Number of lymph nodes with macrometastases (greater than 2 mm):  2   Size of largest moni metastatic deposit:  9 mm   Extranodal extension:  Present, 2 mm or less   Total number of lymph nodes examined:  21   Distant metastases:  Not applicable   Pathologic stage classification (pTNM):   TNM descriptors: y (posttreatment)   pT category: ypT2:  Posttreatment tumor greater than 20 mm but less than or   equal to 50 mm in greatest dimension   Regional lymph nodes modifier:  Not applicable   pN: pN1a:  Metastases in 1-3 axillary lymph nodes, at least 1 metastases   larger than 2.0 mm   pM:  Not applicable   Breast biomarker testing performed on previous biopsy case IXN- and   reported as below:   Estrogen receptor (ER):  Positive, greater than 95%, strong   Progesterone receptor (PGR): Positive, 80%, strong   HER2 (IHC):  Equivocal, 2+   HER2 FISH: Positive   Ki-67:  60%      Final Pathologic Diagnosis 1. Lymph node, needle core biopsy:   - A kappa light chain restricted plasma cell population detected.  Recommend   additional tissue biopsy for further classifying this process.  See comment.   - No evidence of metastatic carcinoma     Final Pathologic Diagnosis LYMPH NODE, LEFT GROIN, EXCISION:   -- REACTIVE LYMPHOID FOLLICLE WITH ATYPICAL PLASMACYTOSIS (KAPPA SKEWED).   -- CALCIFICATION IN FOCAL AREAS.   -- NO EVIDENCE OF METASTATIC CARCINOMA.   -- SEE COMMENT.      ASSESSMENT/PLAN:     65 y/o female with left breast invasive ductal carcinoma ER+/UT+ Her2+ with metastatic disease to  the lymph node now status post left breast total mastectomy with axillary dissection; status post left inguinal lymph node excisional biopsy, left chest wall wound debridement    -Drain removed from left groin seroma today.  Local wound care to drain site.  Keep pressure dressing in place for 48 hours and remove after.  - CBC and CMP today given patient's new complaint of fatigue and decreased appetite to ensure no elevation in white count or other significant lab abnormality.  - Return to clinic in 2-3 weeks to ensure drain site has healed and seroma has not recurred.    Akilah Estrada PA-C  [facility]

## 2023-08-16 NOTE — NURSING
Notified Dr. Turner patient has JACOBO drain in place to left groin area. Provider was under the impression patient would have JACOBO drain removed first, then get first dose of Kadcyla.     Dr. Turner instructed for patient to see general surgery first and have drain removed before starting treatment. Patient rescheduled for Monday ( 08/21/2023) at 1 pm.     Patient was notified of no treatment today and given calendar with future appts. Patient verbalized understanding and has no further questions at this time.     SAMIA Edward contacted for transportation for patient.

## 2023-08-17 PROCEDURE — 77014 PR  CT GUIDANCE PLACEMENT RAD THERAPY FIELDS: ICD-10-PCS | Mod: 26,,, | Performed by: RADIOLOGY

## 2023-08-17 PROCEDURE — 77336 RADIATION PHYSICS CONSULT: CPT | Performed by: RADIOLOGY

## 2023-08-17 PROCEDURE — 77385 HC IMRT, SIMPLE: CPT | Performed by: RADIOLOGY

## 2023-08-17 PROCEDURE — 77014 PR  CT GUIDANCE PLACEMENT RAD THERAPY FIELDS: CPT | Mod: 26,,, | Performed by: RADIOLOGY

## 2023-08-18 ENCOUNTER — TELEPHONE (OUTPATIENT)
Dept: HEMATOLOGY/ONCOLOGY | Facility: CLINIC | Age: 68
End: 2023-08-18
Payer: MEDICARE

## 2023-08-18 PROCEDURE — 77427 PR CHG RADIATION,MANGEMENT,5 TX'S: ICD-10-PCS | Mod: ,,, | Performed by: RADIOLOGY

## 2023-08-18 PROCEDURE — 77385 HC IMRT, SIMPLE: CPT | Performed by: RADIOLOGY

## 2023-08-18 PROCEDURE — 77427 RADIATION TX MANAGEMENT X5: CPT | Mod: ,,, | Performed by: RADIOLOGY

## 2023-08-18 PROCEDURE — 77014 PR  CT GUIDANCE PLACEMENT RAD THERAPY FIELDS: CPT | Mod: 26,,, | Performed by: RADIOLOGY

## 2023-08-18 PROCEDURE — 77014 PR  CT GUIDANCE PLACEMENT RAD THERAPY FIELDS: ICD-10-PCS | Mod: 26,,, | Performed by: RADIOLOGY

## 2023-08-18 NOTE — TELEPHONE ENCOUNTER
Family informed that yellow cab pickup time was changed to 12pm on 8.21.23 so that pt can be on time for 1 pm infusion. No other needs expressed. SW will remain available.

## 2023-08-21 ENCOUNTER — TELEPHONE (OUTPATIENT)
Dept: HEMATOLOGY/ONCOLOGY | Facility: CLINIC | Age: 68
End: 2023-08-21
Payer: MEDICARE

## 2023-08-21 ENCOUNTER — INFUSION (OUTPATIENT)
Dept: INFUSION THERAPY | Facility: HOSPITAL | Age: 68
End: 2023-08-21
Attending: INTERNAL MEDICINE
Payer: MEDICARE

## 2023-08-21 VITALS
OXYGEN SATURATION: 99 % | TEMPERATURE: 98 F | HEART RATE: 90 BPM | WEIGHT: 181.19 LBS | RESPIRATION RATE: 18 BRPM | DIASTOLIC BLOOD PRESSURE: 77 MMHG | SYSTOLIC BLOOD PRESSURE: 168 MMHG | BODY MASS INDEX: 33.15 KG/M2

## 2023-08-21 DIAGNOSIS — Z17.0 MALIGNANT NEOPLASM OF UPPER-OUTER QUADRANT OF LEFT BREAST IN FEMALE, ESTROGEN RECEPTOR POSITIVE: Primary | ICD-10-CM

## 2023-08-21 DIAGNOSIS — C50.412 MALIGNANT NEOPLASM OF UPPER-OUTER QUADRANT OF LEFT BREAST IN FEMALE, ESTROGEN RECEPTOR POSITIVE: Primary | ICD-10-CM

## 2023-08-21 PROCEDURE — 96413 CHEMO IV INFUSION 1 HR: CPT

## 2023-08-21 PROCEDURE — 96375 TX/PRO/DX INJ NEW DRUG ADDON: CPT

## 2023-08-21 PROCEDURE — 63600175 PHARM REV CODE 636 W HCPCS: Mod: JZ,JG | Performed by: INTERNAL MEDICINE

## 2023-08-21 PROCEDURE — 96415 CHEMO IV INFUSION ADDL HR: CPT

## 2023-08-21 PROCEDURE — 77014 PR  CT GUIDANCE PLACEMENT RAD THERAPY FIELDS: ICD-10-PCS | Mod: 26,,, | Performed by: RADIOLOGY

## 2023-08-21 PROCEDURE — 25000003 PHARM REV CODE 250: Performed by: INTERNAL MEDICINE

## 2023-08-21 PROCEDURE — 77385 HC IMRT, SIMPLE: CPT | Performed by: RADIOLOGY

## 2023-08-21 PROCEDURE — 77014 PR  CT GUIDANCE PLACEMENT RAD THERAPY FIELDS: CPT | Mod: 26,,, | Performed by: RADIOLOGY

## 2023-08-21 RX ORDER — SODIUM CHLORIDE 0.9 % (FLUSH) 0.9 %
10 SYRINGE (ML) INJECTION
Status: DISCONTINUED | OUTPATIENT
Start: 2023-08-21 | End: 2023-08-21 | Stop reason: HOSPADM

## 2023-08-21 RX ORDER — ONDANSETRON 2 MG/ML
8 INJECTION INTRAMUSCULAR; INTRAVENOUS
Status: COMPLETED | OUTPATIENT
Start: 2023-08-21 | End: 2023-08-21

## 2023-08-21 RX ADMIN — ADO-TRASTUZUMAB EMTANSINE 300 MG: 20 INJECTION, POWDER, LYOPHILIZED, FOR SOLUTION INTRAVENOUS at 01:08

## 2023-08-21 RX ADMIN — ONDANSETRON 8 MG: 2 INJECTION, SOLUTION INTRAMUSCULAR; INTRAVENOUS at 01:08

## 2023-08-21 RX ADMIN — SODIUM CHLORIDE: 9 INJECTION, SOLUTION INTRAVENOUS at 01:08

## 2023-08-21 NOTE — TELEPHONE ENCOUNTER
Pt's family member (Ember) called and needs clarification re: current infusion treatment that pt is receiving. SW sent staff message to provider/staff and nurse tabby to call family. SW will remain available

## 2023-08-21 NOTE — PLAN OF CARE
Problem: Adult Inpatient Plan of Care  Goal: Plan of Care Review  Outcome: Ongoing, Progressing  Flowsheets (Taken 8/21/2023 1322)  Plan of Care Reviewed With: patient     Problem: Fall Injury Risk  Goal: Absence of Fall and Fall-Related Injury  Outcome: Ongoing, Progressing  Intervention: Identify and Manage Contributors  Flowsheets (Taken 8/21/2023 1322)  Self-Care Promotion:   independence encouraged   safe use of adaptive equipment encouraged   BADL personal objects within reach   BADL personal routines maintained  Medication Review/Management: medications reviewed  Intervention: Promote Injury-Free Environment  Flowsheets (Taken 8/21/2023 1322)  Safety Promotion/Fall Prevention:   assistive device/personal item within reach   Fall Risk reviewed with patient/family   in recliner, wheels locked   medications reviewed   instructed to call staff for mobility     Problem: Anemia (Chemotherapy Effects)  Goal: Anemia Symptom Improvement  Outcome: Ongoing, Progressing  Intervention: Monitor and Manage Anemia  Flowsheets (Taken 8/21/2023 1322)  Safety Promotion/Fall Prevention:   assistive device/personal item within reach   Fall Risk reviewed with patient/family   in recliner, wheels locked   medications reviewed   instructed to call staff for mobility  Fatigue Management: frequent rest breaks encouraged     Problem: Nausea and Vomiting (Chemotherapy Effects)  Goal: Fluid and Electrolyte Balance  Outcome: Ongoing, Progressing  Intervention: Prevent and Manage Nausea and Vomiting  Flowsheets (Taken 8/21/2023 1322)  Nausea/Vomiting Interventions: (premedicated) other (see comments)  Environmental Support: calm environment promoted     Problem: Neurotoxicity (Chemotherapy Effects)  Goal: Neurotoxicity Symptom Control  Outcome: Ongoing, Progressing  Intervention: Prevent or Manage Neurotoxicity Effects  Flowsheets (Taken 8/21/2023 1322)  Sensation Impairment Protection: insensate areas closely monitored     Problem:  Neutropenia (Chemotherapy Effects)  Goal: Absence of Infection  Outcome: Ongoing, Progressing  Intervention: Prevent Infection and Maximize Resistance  Flowsheets (Taken 8/21/2023 1322)  Infection Prevention:   environmental surveillance performed   equipment surfaces disinfected   hand hygiene promoted   personal protective equipment utilized     Problem: Thrombocytopenia Bleeding Risk (Chemotherapy Effects)  Goal: Absence of Bleeding  Outcome: Ongoing, Progressing  Intervention: Minimize Bleeding Risk  Flowsheets (Taken 8/21/2023 1322)  Bleeding Precautions:   blood pressure closely monitored   monitored for signs of bleeding

## 2023-08-21 NOTE — TELEPHONE ENCOUNTER
Canceled yellow cab pickup per family request but pt will still need return ride home. SAMIA notified Erik at [employer] [phone]. SW will remain available.

## 2023-08-22 ENCOUNTER — TELEPHONE (OUTPATIENT)
Dept: HEMATOLOGY/ONCOLOGY | Facility: CLINIC | Age: 68
End: 2023-08-22
Payer: MEDICARE

## 2023-08-22 PROCEDURE — 77014 PR  CT GUIDANCE PLACEMENT RAD THERAPY FIELDS: CPT | Mod: 26,,, | Performed by: RADIOLOGY

## 2023-08-22 PROCEDURE — 77014 PR  CT GUIDANCE PLACEMENT RAD THERAPY FIELDS: ICD-10-PCS | Mod: 26,,, | Performed by: RADIOLOGY

## 2023-08-22 PROCEDURE — 77385 HC IMRT, SIMPLE: CPT | Performed by: RADIOLOGY

## 2023-08-22 NOTE — TELEPHONE ENCOUNTER
Returned call to pt Ember younger @ [phone]. Call went straight to to/katie ADRIAN with call back number.     ----- Message from Yumiko Slater LPN sent at 8/21/2023  4:32 PM CDT -----  Regarding: FW: Treatment questions  Could you assist?  ----- Message -----  From: Toney العلي MD  Sent: 8/21/2023   3:34 PM CDT  To: Ladi Lewis, RN; Cheyanne Barney LMSW; #  Subject: RE: Treatment questions                          OP BREAST ADO-TRASTUZUMAB EMTANSINE Q3W   ----- Message -----  From: Cheyanne Barney LMSW  Sent: 8/21/2023   3:30 PM CDT  To: Toney العلي MD; Ladi Lewis, RN; #  Subject: Treatment questions                              Hello,    Pt's family member (Ember) called and needs clarification re: current infusion treatment that pt is receiving. Please give her a call at [phone].    Thanks,    Cheyanne

## 2023-08-23 ENCOUNTER — TELEPHONE (OUTPATIENT)
Dept: HEMATOLOGY/ONCOLOGY | Facility: CLINIC | Age: 68
End: 2023-08-23
Payer: MEDICARE

## 2023-08-23 NOTE — TELEPHONE ENCOUNTER
Swer was notified pt. missed today's rad tx. Swer call Ember and left a vm. Ember returned swer call to report pt. has been sick today. Pt. has had vomiting and diarrhea and is unable to keep anything down. Swer sent inbasket message to Dr. العلي staff for pt. call back. Swer will remain available.

## 2023-08-24 ENCOUNTER — TELEPHONE (OUTPATIENT)
Dept: HEMATOLOGY/ONCOLOGY | Facility: CLINIC | Age: 68
End: 2023-08-24
Payer: MEDICARE

## 2023-08-24 ENCOUNTER — DOCUMENTATION ONLY (OUTPATIENT)
Dept: RADIATION ONCOLOGY | Facility: CLINIC | Age: 68
End: 2023-08-24
Payer: MEDICARE

## 2023-08-24 PROCEDURE — 77385 HC IMRT, SIMPLE: CPT | Performed by: RADIOLOGY

## 2023-08-24 PROCEDURE — 77014 PR  CT GUIDANCE PLACEMENT RAD THERAPY FIELDS: ICD-10-PCS | Mod: 26,,, | Performed by: RADIOLOGY

## 2023-08-24 PROCEDURE — 77014 PR  CT GUIDANCE PLACEMENT RAD THERAPY FIELDS: CPT | Mod: 26,,, | Performed by: RADIOLOGY

## 2023-08-24 NOTE — TELEPHONE ENCOUNTER
F/u call to confirm if pt is feeling well enough for treatment today. Pt stated that she will be coming to Rad treatment today. SW will need to clarify last treatment with Rad Onc dept so that yellow cab rides can be adjusted. SW will remain available.

## 2023-08-24 NOTE — PLAN OF CARE
Day 25 of outpatient xrt to the chestwall. Doing ok, denies any pain but is c/o nausea, still using Miaderm Cream.

## 2023-08-25 PROCEDURE — 77014 PR  CT GUIDANCE PLACEMENT RAD THERAPY FIELDS: ICD-10-PCS | Mod: 26,,, | Performed by: RADIOLOGY

## 2023-08-25 PROCEDURE — 77385 HC IMRT, SIMPLE: CPT | Performed by: RADIOLOGY

## 2023-08-25 PROCEDURE — 77014 PR  CT GUIDANCE PLACEMENT RAD THERAPY FIELDS: CPT | Mod: 26,,, | Performed by: RADIOLOGY

## 2023-08-25 PROCEDURE — 77336 RADIATION PHYSICS CONSULT: CPT | Performed by: RADIOLOGY

## 2023-08-28 ENCOUNTER — TELEPHONE (OUTPATIENT)
Dept: HEMATOLOGY/ONCOLOGY | Facility: CLINIC | Age: 68
End: 2023-08-28
Payer: MEDICARE

## 2023-08-28 PROCEDURE — 77427 PR CHG RADIATION,MANGEMENT,5 TX'S: ICD-10-PCS | Mod: ,,, | Performed by: RADIOLOGY

## 2023-08-28 PROCEDURE — 77014 PR  CT GUIDANCE PLACEMENT RAD THERAPY FIELDS: ICD-10-PCS | Mod: 26,,, | Performed by: RADIOLOGY

## 2023-08-28 PROCEDURE — 77014 PR  CT GUIDANCE PLACEMENT RAD THERAPY FIELDS: CPT | Mod: 26,,, | Performed by: RADIOLOGY

## 2023-08-28 PROCEDURE — 77385 HC IMRT, SIMPLE: CPT | Performed by: RADIOLOGY

## 2023-08-28 PROCEDURE — 77427 RADIATION TX MANAGEMENT X5: CPT | Mod: ,,, | Performed by: RADIOLOGY

## 2023-08-28 NOTE — TELEPHONE ENCOUNTER
SAMIA called yellow cab at [phone]-harry and changed  time to 11 am for 8.30.23 per request of Rad therapy team. Pt also [facility] appt [facility] 8.30.[facility], so pt will need to be transported to the [facility] after treatment. SAMIA called family-Ember and informed of the above and added note for family to be contacted during appt. SAMIA will call yellow cab back to confirm trip to the [facility]. Pt will need yellow cab to infusion appt's as well, per Ember. SAMIA will continue to assist with transportation needs. SW will remain available.

## 2023-08-29 ENCOUNTER — TELEPHONE (OUTPATIENT)
Dept: HEMATOLOGY/ONCOLOGY | Facility: CLINIC | Age: 68
End: 2023-08-29
Payer: MEDICARE

## 2023-08-29 ENCOUNTER — HOSPITAL ENCOUNTER (EMERGENCY)
Facility: HOSPITAL | Age: 68
Discharge: HOME | End: 2023-08-30
Attending: EMERGENCY MEDICINE
Payer: MEDICARE

## 2023-08-29 DIAGNOSIS — J44.1 COPD WITH ACUTE EXACERBATION: Primary | ICD-10-CM

## 2023-08-29 PROCEDURE — 99285 EMERGENCY DEPT VISIT HI MDM: CPT | Mod: 25

## 2023-08-29 PROCEDURE — 77014 PR  CT GUIDANCE PLACEMENT RAD THERAPY FIELDS: CPT | Mod: 26,,, | Performed by: RADIOLOGY

## 2023-08-29 PROCEDURE — U0002 COVID-19 LAB TEST NON-CDC: HCPCS | Performed by: EMERGENCY MEDICINE

## 2023-08-29 PROCEDURE — 77014 PR  CT GUIDANCE PLACEMENT RAD THERAPY FIELDS: ICD-10-PCS | Mod: 26,,, | Performed by: RADIOLOGY

## 2023-08-29 PROCEDURE — 77385 HC IMRT, SIMPLE: CPT | Performed by: RADIOLOGY

## 2023-08-29 RX ORDER — IPRATROPIUM BROMIDE AND ALBUTEROL SULFATE 2.5; .5 MG/3ML; MG/3ML
3 SOLUTION RESPIRATORY (INHALATION)
Status: COMPLETED | OUTPATIENT
Start: 2023-08-30 | End: 2023-08-30

## 2023-08-29 RX ORDER — BENZONATATE 100 MG/1
200 CAPSULE ORAL
Status: COMPLETED | OUTPATIENT
Start: 2023-08-30 | End: 2023-08-29

## 2023-08-29 RX ADMIN — BENZONATATE 200 MG: 100 CAPSULE ORAL at 11:08

## 2023-08-29 NOTE — TELEPHONE ENCOUNTER
Family requests call from provider's nurse. SAMIA sent staff message to nurse PAPO Slater LPN. SW will remain available.

## 2023-08-29 NOTE — TELEPHONE ENCOUNTER
SAMIA called Erik at [facility] [phone] and added stop for [facility] appt after [facility] appt on tomorrow, then home. SAIMA will remain available.

## 2023-08-29 NOTE — TELEPHONE ENCOUNTER
Swer received call from pt.'s family member, Ember requesting assistance with pt. referral to [facility]. Ember reports having talked to Rukhsana Robles with [facility] for boost assistance and financial assistance. Pt. to receive financial assistance with agency, however referral is needed. Swer to prep pt. referral and submit for MD review/signature. Swer to fax to agency pending provider signature and will return pt. call regarding status of pt. referral. Swer to provide update to pt. primary swer. Swer will remain available.

## 2023-08-30 ENCOUNTER — OFFICE VISIT (OUTPATIENT)
Dept: SURGERY | Facility: CLINIC | Age: 68
End: 2023-08-30
Payer: MEDICARE

## 2023-08-30 ENCOUNTER — DOCUMENTATION ONLY (OUTPATIENT)
Dept: RADIATION ONCOLOGY | Facility: CLINIC | Age: 68
End: 2023-08-30
Payer: MEDICARE

## 2023-08-30 ENCOUNTER — TELEPHONE (OUTPATIENT)
Dept: HEMATOLOGY/ONCOLOGY | Facility: CLINIC | Age: 68
End: 2023-08-30
Payer: MEDICARE

## 2023-08-30 VITALS
HEART RATE: 85 BPM | WEIGHT: 180.5 LBS | TEMPERATURE: 99 F | SYSTOLIC BLOOD PRESSURE: 145 MMHG | DIASTOLIC BLOOD PRESSURE: 77 MMHG | HEIGHT: 62 IN | BODY MASS INDEX: 33.21 KG/M2 | OXYGEN SATURATION: 99 % | RESPIRATION RATE: 18 BRPM

## 2023-08-30 DIAGNOSIS — R59.1 LYMPHADENOPATHY: Primary | ICD-10-CM

## 2023-08-30 DIAGNOSIS — S21.102D OPEN WOUND OF LEFT CHEST WALL, SUBSEQUENT ENCOUNTER: ICD-10-CM

## 2023-08-30 LAB — SARS-COV-2 RDRP RESP QL NAA+PROBE: NEGATIVE

## 2023-08-30 PROCEDURE — 4010F ACE/ARB THERAPY RXD/TAKEN: CPT | Mod: CPTII,S$GLB,,

## 2023-08-30 PROCEDURE — 3044F HG A1C LEVEL LT 7.0%: CPT | Mod: CPTII,S$GLB,,

## 2023-08-30 PROCEDURE — 1160F RVW MEDS BY RX/DR IN RCRD: CPT | Mod: CPTII,S$GLB,,

## 2023-08-30 PROCEDURE — 94640 AIRWAY INHALATION TREATMENT: CPT

## 2023-08-30 PROCEDURE — 63600175 PHARM REV CODE 636 W HCPCS: Performed by: EMERGENCY MEDICINE

## 2023-08-30 PROCEDURE — 1157F PR ADVANCE CARE PLAN OR EQUIV PRESENT IN MEDICAL RECORD: ICD-10-PCS | Mod: CPTII,S$GLB,,

## 2023-08-30 PROCEDURE — 1159F MED LIST DOCD IN RCRD: CPT | Mod: CPTII,S$GLB,,

## 2023-08-30 PROCEDURE — 3044F PR MOST RECENT HEMOGLOBIN A1C LEVEL <7.0%: ICD-10-PCS | Mod: CPTII,S$GLB,,

## 2023-08-30 PROCEDURE — 1160F PR REVIEW ALL MEDS BY PRESCRIBER/CLIN PHARMACIST DOCUMENTED: ICD-10-PCS | Mod: CPTII,S$GLB,,

## 2023-08-30 PROCEDURE — 94761 N-INVAS EAR/PLS OXIMETRY MLT: CPT

## 2023-08-30 PROCEDURE — 99999 PR PBB SHADOW E&M-EST. PATIENT-LVL III: ICD-10-PCS | Mod: PBBFAC,,,

## 2023-08-30 PROCEDURE — 99999 PR PBB SHADOW E&M-EST. PATIENT-LVL III: CPT | Mod: PBBFAC,,,

## 2023-08-30 PROCEDURE — 25000242 PHARM REV CODE 250 ALT 637 W/ HCPCS: Performed by: EMERGENCY MEDICINE

## 2023-08-30 PROCEDURE — 77014 PR  CT GUIDANCE PLACEMENT RAD THERAPY FIELDS: CPT | Mod: 26,,, | Performed by: RADIOLOGY

## 2023-08-30 PROCEDURE — 77385 HC IMRT, SIMPLE: CPT | Performed by: RADIOLOGY

## 2023-08-30 PROCEDURE — 99024 PR POST-OP FOLLOW-UP VISIT: ICD-10-PCS | Mod: S$GLB,,,

## 2023-08-30 PROCEDURE — 4010F PR ACE/ARB THEARPY RXD/TAKEN: ICD-10-PCS | Mod: CPTII,S$GLB,,

## 2023-08-30 PROCEDURE — 77014 PR  CT GUIDANCE PLACEMENT RAD THERAPY FIELDS: ICD-10-PCS | Mod: 26,,, | Performed by: RADIOLOGY

## 2023-08-30 PROCEDURE — 1157F ADVNC CARE PLAN IN RCRD: CPT | Mod: CPTII,S$GLB,,

## 2023-08-30 PROCEDURE — 1159F PR MEDICATION LIST DOCUMENTED IN MEDICAL RECORD: ICD-10-PCS | Mod: CPTII,S$GLB,,

## 2023-08-30 PROCEDURE — 25000003 PHARM REV CODE 250: Performed by: EMERGENCY MEDICINE

## 2023-08-30 PROCEDURE — 99024 POSTOP FOLLOW-UP VISIT: CPT | Mod: S$GLB,,,

## 2023-08-30 RX ORDER — BENZONATATE 100 MG/1
200 CAPSULE ORAL 3 TIMES DAILY PRN
Qty: 20 CAPSULE | Refills: 0 | Status: SHIPPED | OUTPATIENT
Start: 2023-08-30 | End: 2023-09-11

## 2023-08-30 RX ORDER — DOXYCYCLINE 100 MG/1
100 CAPSULE ORAL 2 TIMES DAILY
Qty: 20 CAPSULE | Refills: 0 | Status: SHIPPED | OUTPATIENT
Start: 2023-08-30 | End: 2023-09-11

## 2023-08-30 RX ORDER — PREDNISONE 20 MG/1
60 TABLET ORAL DAILY
Qty: 12 TABLET | Refills: 0 | Status: SHIPPED | OUTPATIENT
Start: 2023-08-30 | End: 2023-09-11

## 2023-08-30 RX ORDER — ALBUTEROL SULFATE 90 UG/1
2 AEROSOL, METERED RESPIRATORY (INHALATION) EVERY 4 HOURS PRN
Qty: 3.5 G | Refills: 0 | Status: SHIPPED | OUTPATIENT
Start: 2023-08-30 | End: 2024-08-29

## 2023-08-30 RX ORDER — PREDNISONE 20 MG/1
60 TABLET ORAL
Status: COMPLETED | OUTPATIENT
Start: 2023-08-30 | End: 2023-08-30

## 2023-08-30 RX ADMIN — IPRATROPIUM BROMIDE AND ALBUTEROL SULFATE 3 ML: 2.5; .5 SOLUTION RESPIRATORY (INHALATION) at 12:08

## 2023-08-30 RX ADMIN — PREDNISONE 60 MG: 20 TABLET ORAL at 12:08

## 2023-08-30 NOTE — ED PROVIDER NOTES
SCRIBE #1 NOTE: I, Edelmira Cortes, am scribing for, and in the presence of, Roberto Pearson Jr., MD. I have scribed the entire note.       History     Chief Complaint   Patient presents with    Cough     Non productive cough x 2 days, reports she was exposed to covid. +N/V.      Review of patient's allergies indicates:  No Known Allergies      History of Present Illness     HPI    8/29/2023, 11:45 PM  History obtained from the patient      History of Present Illness: Malaika Paredes is a 67 y.o. female patient with a PMHx of DM, COPD, CHF who presents to the Emergency Department for evaluation of cough which onset 2 days PTA. Pt reports being exposed to covid and now has developed a severe cough.  Symptoms are constant and moderate in severity. No mitigating or exacerbating factors reported. Associated sxs include sob, wheezing, congestion. Patient denies any fever, chest pain, weakness, numbness, dizziness, and all other sxs at this time. No prior Tx reported. No further complaints or concerns at this time.  Patient has a history of COPD.  She notes some faint wheezing.  Denies any chest pain.  Leg swelling.      Arrival mode: Ambulance Service    PCP: Travis Scales MD        Past Medical History:  Past Medical History:   Diagnosis Date    CAD (coronary artery disease)     Cataract     Cataract     CHF (congestive heart failure)     COPD (chronic obstructive pulmonary disease)     Currently asymptomatic HIV infection, with history of HIV-related illness 7/10/2023    Diabetes mellitus     Diabetic retinopathy     ESRD (end stage renal disease)     TTS    Hypertension     Ischemic ulcer of toe of left foot     Malignant neoplasm of upper-outer quadrant of left breast in female, estrogen receptor positive 06/20/2022    left    PAD (peripheral artery disease)     PAF (paroxysmal atrial fibrillation)        Past Surgical History:  Past Surgical History:   Procedure Laterality Date    APPLICATION OF WOUND  VACUUM-ASSISTED CLOSURE DEVICE Left 4/6/2023    Procedure: APPLICATION, WOUND VAC;  Surgeon: Jayjay Arana MD;  Location: [facility] OR;  Service: General;  Laterality: Left;  left chest    AXILLARY NODE DISSECTION Left 3/1/2023    Procedure: LYMPHADENECTOMY, AXILLARY;  Surgeon: Jayjay Arana MD;  Location: [facility] OR;  Service: General;  Laterality: Left;    BIOPSY OF INGUINAL LYMPH NODE Left 3/31/2023    Procedure: BIOPSY, LYMPH NODE, INGUINAL;  Surgeon: Jayjay Arana MD;  Location: [facility] OR;  Service: General;  Laterality: Left;    BREAST BIOPSY Right     benign    CATARACT EXTRACTION W/  INTRAOCULAR LENS IMPLANT Right 08/14/2017    Dr. Moe    CORONARY ARTERY BYPASS GRAFT  2020    FLUOROSCOPY N/A 07/25/2022    Procedure: FLUOROSCOPY/mediport placement;  Surgeon: Cole Perdomo MD;  Location: [facility] CATH LAB;  Service: General;  Laterality: N/A;    MEDIPORT REMOVAL N/A 3/1/2023    Procedure: REMOVAL, CATHETER, CENTRAL VENOUS, TUNNELED, WITH PORT;  Surgeon: Jayjay Arana MD;  Location: [facility] OR;  Service: General;  Laterality: N/A;    MODIFIED RADICAL MASTECTOMY W/ AXILLARY LYMPH NODE DISSECTION Left 3/1/2023    Procedure: MASTECTOMY, MODIFIED RADICAL;  Surgeon: Jayjay Arana MD;  Location: [facility] OR;  Service: General;  Laterality: Left;    WOUND DEBRIDEMENT Left 3/31/2023    Procedure: DEBRIDEMENT, WOUND;  Surgeon: Jayjay Arana MD;  Location: [facility] OR;  Service: General;  Laterality: Left;  left chest wall eschar debridement         Family History:  Family History   Problem Relation Age of Onset    Hypertension Mother     Cancer Father     Breast cancer Sister     Diabetes Sister     Cancer Brother     Amblyopia Neg Hx     Blindness Neg Hx     Cataracts Neg Hx     Glaucoma Neg Hx     Macular degeneration Neg Hx     Retinal detachment Neg Hx     Strabismus Neg Hx     Stroke Neg Hx     Thyroid disease Neg Hx        Social History:  Social History     Tobacco Use    Smoking status: Former     Current packs/day: 0.00     Types:  Cigarettes     Start date: 1982     Quit date: 2012     Years since quittin.1    Smokeless tobacco: Never   Substance and Sexual Activity    Alcohol use: No    Drug use: Never    Sexual activity: Not on file        Review of Systems     Review of Systems   Constitutional:  Negative for fever.   HENT:  Positive for congestion. Negative for sore throat.    Respiratory:  Positive for cough, shortness of breath and wheezing.    Cardiovascular:  Negative for chest pain.   Gastrointestinal:  Negative for nausea.   Genitourinary:  Negative for dysuria.   Musculoskeletal:  Negative for back pain.   Skin:  Negative for rash.   Neurological:  Negative for dizziness, weakness and numbness.   Hematological:  Does not bruise/bleed easily.   All other systems reviewed and are negative.       Physical Exam     Initial Vitals [23 2332]   BP Pulse Resp Temp SpO2   (!) 145/77 85 18 98.7 °F (37.1 °C) 95 %      MAP       --          Physical Exam  Nursing Notes and Vital Signs Reviewed.  Constitutional: Patient is in no acute distress. Well-developed and well-nourished.  Patient with frequent dry hacking cough.  Head: Atraumatic. Normocephalic.  Eyes:  EOM intact.  No scleral icterus.  ENT: Mucous membranes are moist.  Nares clear .  0 P clear  Neck:  Full ROM. No JVD.  Cardiovascular: Regular rate. Regular rhythm No murmurs, rubs, or gallops. Distal pulses are 2+ and symmetric  Pulmonary/Chest: No respiratory distress. Clear to auscultation bilaterally. No wheezing or rales.  Equal chest wall rise bilaterally  Abdominal: Soft and non-distended.  There is no tenderness.  No rebound, guarding, or rigidity. Good bowel sounds.  Genitourinary: No CVA tenderness.  No suprapubic tenderness  Musculoskeletal: Moves all extremities. No obvious deformities.  5 x 5 strength in all extremities .  No pedal edema.  Skin: Warm and dry.  Neurological:  Alert, awake, and appropriate.  Normal speech.  No acute focal neurological  "deficits are appreciated.  Two through 12 intact bilaterally.  Psychiatric: Normal affect. Good eye contact. Appropriate in content.      ED Course   Procedures  ED Vital Signs:  Vitals:    08/29/23 2332 08/29/23 2356 08/29/23 2357 08/30/23 0034   BP: (!) 145/77      Pulse: 85  85 85   Resp: 18   20   Temp: 98.7 °F (37.1 °C)      TempSrc: Oral      SpO2: 95%  100% 99%   Weight:  81.9 kg (180 lb 8 oz)     Height: 5' 2" (1.575 m)       08/30/23 0038 08/30/23 0042   BP:     Pulse: 86 85   Resp: 19 18   Temp:     TempSrc:     SpO2: 99% 99%   Weight:     Height:         Abnormal Lab Results:  Labs Reviewed   SARS-COV-2 RNA AMPLIFICATION, QUAL        All Lab Results:  Results for orders placed or performed during the hospital encounter of 08/29/23   COVID-19 Rapid Screening   Result Value Ref Range    SARS-CoV-2 RNA, Amplification, Qual Negative Negative        Imaging Results:  Imaging Results              X-Ray Chest AP Portable (Preliminary result)  Result time 08/30/23 00:25:21      Wet Read by Roberto Pearson Jr., MD (08/30/23 00:25:21, [facility] - Emergency Dept., Emergency Medicine)    Do infiltrate.  No acute findings                                          ED Discussion       12:36 AM: Reassessed pt at this time. Discussed with pt all pertinent ED information and results. Discussed pt dx and plan of tx. Gave pt all f/u and return to the ED instructions. All questions and concerns were addressed at this time. Pt expresses understanding of information and instructions, and is comfortable with plan to discharge. Pt is stable for discharge.    I discussed with patient and/or family/caretaker that evaluation in the ED does not suggest any emergent or life threatening medical conditions requiring immediate intervention beyond what was provided in the ED, and I believe patient is safe for discharge.  Regardless, an unremarkable evaluation in the ED does not preclude the development or presence of a serious of life " threatening condition. As such, patient was instructed to return immediately for any worsening or change in current symptoms.        Medical Decision Making  Differential diagnosis:  COPD, pneumonia, COVID, cough, viral syndrome    Amount and/or Complexity of Data Reviewed  Labs: ordered. Decision-making details documented in ED Course.     Details: Negative COVID  Radiology: ordered and independent interpretation performed. Decision-making details documented in ED Course.     Details: Negative chest x-ray per my independent read    Risk  Prescription drug management.  Risk Details: Patient is stable and nontoxic.  Patient has appears to be a COPD exacerbation.  She is feeling better with breathing treatments.  I will prescribe prednisone doxycycline standard care with Tessalon Perles and albuterol.  Patient's vital signs are stable.  Her coughing has resolved with Tessalon breathing treatments.  She is stable safe for discharge my opinion.  Discussed with her all findings well as plan of care.  She verbalized agreement with all instructions seems reliable.  She is safe discharge my opinion.           Medical Decision Making:   Clinical Tests:   Lab Tests: Ordered and Reviewed  Radiological Study: Ordered and Reviewed      ED Medication(s):  Medications   albuterol-ipratropium 2.5 mg-0.5 mg/3 mL nebulizer solution 3 mL (3 mLs Nebulization Given 8/30/23 0042)   benzonatate capsule 200 mg (200 mg Oral Given 8/29/23 2354)   predniSONE tablet 60 mg (60 mg Oral Given 8/30/23 0036)       New Prescriptions    ALBUTEROL (PROVENTIL/VENTOLIN HFA) 90 MCG/ACTUATION INHALER    Inhale 2 puffs into the lungs every 4 (four) hours as needed. Take 2 puffs of the lungs every 4 hr as needed for wheezing or shortness of breath    BENZONATATE (TESSALON) 100 MG CAPSULE    Take 2 capsules (200 mg total) by mouth 3 (three) times daily as needed for Cough.    DOXYCYCLINE (VIBRAMYCIN) 100 MG CAP    Take 1 capsule (100 mg total) by mouth 2  (two) times daily. for 10 days    PREDNISONE (DELTASONE) 20 MG TABLET    Take 3 tablets (60 mg total) by mouth once daily. for 4 days        Follow-up Information       Travis Scales MD.    Specialty: Cardiology  Contact information:  [address]  [address]  [place] [id number] [phone]                                 Scribe Attestation:   Scribe #1: I performed the above scribed service and the documentation accurately describes the services I performed. I attest to the accuracy of the note.     Attending:   Physician Attestation Statement for Scribe #1: I, Roberto Pearson Jr., MD, personally performed the services described in this documentation, as scribed by Edelmira Cortes, in my presence, and it is both accurate and complete.           Clinical Impression       ICD-10-CM ICD-9-CM   1. COPD with acute exacerbation  J44.1 491.21       Disposition:   Disposition: Discharged  Condition: Stable        Roberto Pearson Jr., MD  08/30/23 0121

## 2023-08-30 NOTE — TELEPHONE ENCOUNTER
SAMIA spoke with pt while waiting for yellow cab ride home. Pt expressed concerns re: being asked for copay today. SAMIA reviewed pt record and found that [facility] FA  23. SAMIA sent e-mail to  HOMERO Contreras, asking if pt can be screened again. SAMIA also called family-Ember and asked if pt has ever applied for Medicaid. Ember was not sure and reports pt income of $1,200 per month. SAMIA provided #[phone] for Medicaid ver[place] and website [place] to [place] as well. Pt will need to continue yellow cab rides for infusion treatments as well per Ember. SW will remain available.

## 2023-08-30 NOTE — DISCHARGE INSTRUCTIONS
Prednisone and doxycycline as prescribed.  Tessalon Perles for cough.  Use albuterol every 4 hours for next 2 days and every 2 hours in between as needed.  Use as needed every 4 hours.  Your doctor in 1-2 days for re-evaluation return as needed for any worsening symptoms, problems questions concerns.

## 2023-08-30 NOTE — PROGRESS NOTES
History & Physical    SUBJECTIVE:     History of Present Illness:  Patient is a 67 y.o. female status post left breast mastectomy with axillary dissection 2023 status post left inguinal lymph node biopsy and left chest wall debridement 3/31.  Since the last clinic visit, the patient has done well.  She denies any further pain or swelling in the left groin.  She completed all of her chest wall radiation today.  She is feeling well.      presents for follow up PET scan/mammogram and preop. She denies any changes since last visit. Her case was presented at tumor board with decision to move forward with surgical therapy.    presents to discuss next step in breast cancer treatment following neoadjuvant chemotherapy. She did not complete last cycle due to neuropathy. She reports decrease in mass size of the breast.     presents to discuss left breast biopsy.  Her left breast and axillary biopsy were showed invasive ductal carcinoma ER+/WV+ Her2+ with metastatic disease to the lymph node.    Initially referred for abnormal mammogram of the left breast. She denies any breast mass, nipple discharge, breast pain or other changes. No prior breast surgery. Prior right breast biopsy benign. Family history of breast cancer in her sister. Menarche age 12,  1st at 16, menopause at 48. No HRT.     Chief Complaint   Patient presents with    Follow-up         Review of patient's allergies indicates:  No Known Allergies    Current Outpatient Medications   Medication Sig Dispense Refill    acetaminophen (TYLENOL) 500 mg Cap Take 500 mg by mouth every 6 (six) hours as needed.      albuterol (PROVENTIL/VENTOLIN HFA) 90 mcg/actuation inhaler Inhale 2 puffs into the lungs every 4 (four) hours as needed. Take 2 puffs of the lungs every 4 hr as needed for wheezing or shortness of breath 3.5 g 0    albuterol sulfate (PROAIR RESPICLICK) 90 mcg/actuation AePB Inhale 180 mcg into the lungs every 4 (four) hours. Rescue 1 each 3     amLODIPine (NORVASC) 10 MG tablet Take 10 mg by mouth once daily.      aspirin (ECOTRIN) 81 MG EC tablet Take 81 mg by mouth once daily.       atorvastatin (LIPITOR) 10 MG tablet Take 10 mg by mouth once daily.      benzonatate (TESSALON) 100 MG capsule Take 2 capsules (200 mg total) by mouth 3 (three) times daily as needed for Cough. 20 capsule 0    benzonatate (TESSALON) 200 MG capsule benzonatate Take 1 Capsule (oral) 3 times per day PRN - Cough for 7 days 20221015 capsule 3 times per day oral 7 days active 200 mg      budesonide-glycopyr-formoterol (BREZTRI AEROSPHERE) 160-9-4.8 mcg/actuation HFAA Inhale 1 puff into the lungs once daily.      calcium carbonate (OS-CAMERON) 500 mg calcium (1,250 mg) chewable tablet Take 1 tablet by mouth 2 (two) times daily as needed for Heartburn.      diphenhydrAMINE (BENADRYL) 25 mg capsule Take 1 capsule (25 mg total) by mouth every 6 (six) hours as needed for Itching. 20 capsule 0    docusate sodium (COLACE) 100 MG capsule Take 1 capsule (100 mg total) by mouth 2 (two) times daily as needed for Constipation. 20 capsule 0    doxycycline (VIBRAMYCIN) 100 MG Cap Take 1 capsule (100 mg total) by mouth 2 (two) times daily. for 10 days 20 capsule 0    emtricitabine-tenofovir alafen (DESCOVY) 200-25 mg Tab Take 1 tablet by mouth.      insulin degludec (TRESIBA FLEXTOUCH U-200) 200 unit/mL (3 mL) insulin pen Inject 60 Units into the skin once daily.      iron sucrose in NS (VENOFER) 100 mg/100 mL PgBk 50 mg.      ISENTRESS 400 mg tablet Take 400 mg by mouth.      levoFLOXacin (LEVAQUIN) 250 MG tablet Take 250 mg by mouth once daily.      levothyroxine (TIROSINT) 88 mcg Cap Take 75 mcg by mouth before breakfast.      loperamide (IMODIUM) 2 mg capsule Take 1 capsule (2 mg total) by mouth 4 (four) times daily as needed for Diarrhea. 30 capsule 2    metoprolol succinate (TOPROL-XL) 25 MG 24 hr tablet Take 25 mg by mouth 2 (two) times daily.      omeprazole (PRILOSEC) 20 MG capsule Take 20  mg by mouth once daily.      ondansetron (ZOFRAN-ODT) 4 MG TbDL Dissolve 1 tablet (4 mg total) by mouth every 8 (eight) hours as needed (nausea). 30 tablet 0    oxyCODONE (ROXICODONE) 5 MG immediate release tablet Take 1 tablet (5 mg total) by mouth every 6 (six) hours as needed for Pain. 10 tablet 0    prednisoLONE acetate (PRED FORTE) 1 % DrpS Place 1 drop into both eyes 4 (four) times daily.      predniSONE (DELTASONE) 20 MG tablet Take 3 tablets (60 mg total) by mouth once daily. for 4 days 12 tablet 0    sevelamer HCL (RENAGEL) 800 MG Tab Take 3 tablets by mouth 3 (three) times daily.      sodium bicarbonate 650 MG tablet Take 650 mg by mouth 2 (two) times daily.      ketoconazole (NIZORAL) 2 % cream Apply topically once daily. for 14 days 1 each 1     No current facility-administered medications for this visit.       Past Medical History:   Diagnosis Date    CAD (coronary artery disease)     Cataract     Cataract     CHF (congestive heart failure)     COPD (chronic obstructive pulmonary disease)     Currently asymptomatic HIV infection, with history of HIV-related illness 7/10/2023    Diabetes mellitus     Diabetic retinopathy     ESRD (end stage renal disease)     TTS    Hypertension     Ischemic ulcer of toe of left foot     Malignant neoplasm of upper-outer quadrant of left breast in female, estrogen receptor positive 06/20/2022    left    PAD (peripheral artery disease)     PAF (paroxysmal atrial fibrillation)      Past Surgical History:   Procedure Laterality Date    APPLICATION OF WOUND VACUUM-ASSISTED CLOSURE DEVICE Left 4/6/2023    Procedure: APPLICATION, WOUND VAC;  Surgeon: Jayjay Arana MD;  Location: [facility] OR;  Service: General;  Laterality: Left;  left chest    AXILLARY NODE DISSECTION Left 3/1/2023    Procedure: LYMPHADENECTOMY, AXILLARY;  Surgeon: Jayjay Arana MD;  Location: [facility] OR;  Service: General;  Laterality: Left;    BIOPSY OF INGUINAL LYMPH NODE Left 3/31/2023    Procedure: BIOPSY, LYMPH  NODE, INGUINAL;  Surgeon: Jayjay Arana MD;  Location: [facility] OR;  Service: General;  Laterality: Left;    BREAST BIOPSY Right     benign    CATARACT EXTRACTION W/  INTRAOCULAR LENS IMPLANT Right 2017    Dr. Moe    CORONARY ARTERY BYPASS GRAFT  2020    FLUOROSCOPY N/A 2022    Procedure: FLUOROSCOPY/mediport placement;  Surgeon: Cole Perdomo MD;  Location: [facility] CATH LAB;  Service: General;  Laterality: N/A;    MEDIPORT REMOVAL N/A 3/1/2023    Procedure: REMOVAL, CATHETER, CENTRAL VENOUS, TUNNELED, WITH PORT;  Surgeon: Jayjay Arana MD;  Location: [facility] OR;  Service: General;  Laterality: N/A;    MODIFIED RADICAL MASTECTOMY W/ AXILLARY LYMPH NODE DISSECTION Left 3/1/2023    Procedure: MASTECTOMY, MODIFIED RADICAL;  Surgeon: Jayjay Arana MD;  Location: [facility] OR;  Service: General;  Laterality: Left;    WOUND DEBRIDEMENT Left 3/31/2023    Procedure: DEBRIDEMENT, WOUND;  Surgeon: Jayjay Arana MD;  Location: [facility] OR;  Service: General;  Laterality: Left;  left chest wall eschar debridement     Family History   Problem Relation Age of Onset    Hypertension Mother     Cancer Father     Breast cancer Sister     Diabetes Sister     Cancer Brother     Amblyopia Neg Hx     Blindness Neg Hx     Cataracts Neg Hx     Glaucoma Neg Hx     Macular degeneration Neg Hx     Retinal detachment Neg Hx     Strabismus Neg Hx     Stroke Neg Hx     Thyroid disease Neg Hx      Social History     Tobacco Use    Smoking status: Former     Current packs/day: 0.00     Types: Cigarettes     Start date: 1982     Quit date: 2012     Years since quittin.1    Smokeless tobacco: Never   Substance Use Topics    Alcohol use: No    Drug use: Never        Review of Systems:  Review of Systems   Constitutional:  Negative for appetite change, chills, fatigue, fever and unexpected weight change.   Respiratory:  Negative for cough, shortness of breath, wheezing and stridor.    Cardiovascular:  Negative for chest pain, palpitations  and leg swelling.   Gastrointestinal:  Negative for abdominal distention, abdominal pain, constipation, diarrhea, nausea and vomiting.   Genitourinary:  Negative for difficulty urinating, dysuria, frequency, hematuria and urgency.   Skin:  Negative for color change, pallor, rash and wound.   Hematological:  Does not bruise/bleed easily.       OBJECTIVE:       Physical Exam:  Physical Exam  Vitals reviewed.   Constitutional:       General: She is not in acute distress.     Appearance: She is well-developed.   HENT:      Head: Normocephalic and atraumatic.      Right Ear: External ear normal.      Left Ear: External ear normal.      Nose: Nose normal.      Mouth/Throat:      Mouth: Mucous membranes are moist.   Eyes:      Extraocular Movements: Extraocular movements intact.      Conjunctiva/sclera: Conjunctivae normal.   Cardiovascular:      Rate and Rhythm: Normal rate.   Pulmonary:      Effort: Pulmonary effort is normal. No respiratory distress.   Chest:       Musculoskeletal:      Cervical back: Neck supple.   Skin:     General: Skin is warm and dry.          Neurological:      Mental Status: She is alert and oriented to person, place, and time.   Psychiatric:         Behavior: Behavior normal.         Diagnostic Results:  Result:   Mammo Digital Diagnostic Bilat with Alfredo  US Breast Left Limited     History:  Patient is 66 y.o. and is seen for diagnostic imaging.     Films Compared:  Prior images (if available) were compared.     Findings:  This procedure was performed using tomosynthesis. Computer-aided detection was utilized in the interpretation of this examination.  The breasts have scattered areas of fibroglandular density.      Mammo Digital Diagnostic Bilat with Alfredo  Left  Mass: There is a 4.9 cm irregularly shaped mass with spiculated margins seen in the upper outer quadrant of the left breast in the anterior depth. The mass correlates with the palpable mass reported by the patient. Associated features  include skin retraction. There are also associated calcifications.   Lymph Node: There are multiple similar lymph nodes seen in the left axilla.      Right  There is no evidence of suspicious masses, calcifications, or other abnormal findings in the right breast.     US Breast Left Limited  Left  Mass: There is a 5.6 cm x 4 cm x 4.3 cm irregularly shaped, non-parallel, hypoechoic mass with spiculated margins seen in the left breast at 2 o'clock, 3 cm from the nipple.   Lymph Node: There are multiple similar lymph nodes seen in the left axilla. Largest node is 3.4 cm x 1.6 cm x 2.0 cm.      Impression:  Left  Mass: Left breast 5.6 cm x 4 cm x 4.3 cm mass at the 2 o'clock position. Assessment: 5 - Highly suggestive of malignancy. Ultrasound-guided biopsy is recommended.   Lymph Node: Left axilla lymph node (multiple findings). Assessment: 5 - Highly suggestive of malignancy. Ultrasound-guided biopsy is recommended.      Right  There is no mammographic or sonographic evidence of malignancy in the right breast.     BI-RADS Category:   Overall: 5 - Highly Suggestive of Malignancy     Recommendation:  Ultrasound-guided biopsy is recommended.  Ultrasound-guided biopsy is recommended.     Your estimated lifetime risk of breast cancer (to age 85) based on Tyrer-Cuzick risk assessment model is Tyrer-Cuzick: 6.49 %. According to the [employer] Society, patients with a lifetime breast cancer risk of 20% or higher might benefit from supplemental screening tests.    Final Pathologic Diagnosis Part 1   Left axilla, ultrasound-guided core biopsy:   Positive for metastatic carcinoma   Metastasis measures 10 mm in greatest dimension   Part 2   Left breast, 2:00, ultrasound-guided core biopsy:   Invasive ductal carcinoma   Bubba grade 2:  Tubule formation -3, nuclear pleomorphism-2 and mitotic   count-2   Invasive carcinoma measures 17 mm in greatest dimension   No carcinoma in Situ or lymphovascular invasion identified    Tumor biomarkers   Estrogen receptor (ER):  Positive, greater than 95%, strong   Progesterone receptor (PGR):  Positive, 80%, strong   HER2 (IHC):  Equivocal, 2+   Ki-67:  60%   Additional IHC:   P63:  Negative throughout, supporting the diagnosis of invasive carcinoma   This case was reviewed by Dr. GABBY Cannon who concurs with the above diagnosis.  VC      Comment: Interp By Bette Pitts M.D., Signed on 06/21/2022 at 12:02   Supplemental Diagnosis HER2, Breast Tumor, FISH, Tissue   Result Summary   POSITIVE   Interpretation   This tumor sample is positive for HER2 (ERBB2) gene amplification.   Result   nuc erwin([id number]¿4,HER2x5¿8)   HER2/D17Z1 ratio: 2.03   Average HER2 signals per cell: 6.3   Average D17Z1 signals per cell: 3.1         PET/CT:  Impression:     Interval improvement.  Left breast mass demonstrates decreased in size in decreased hypermetabolic activity.  Left pectoralis and axillary lymphadenopathy also demonstrates marked significant decreased activity.  Residual nonspecific mildly hypermetabolic right axillary and bilateral inguinal lymph nodes.    Mammogram:  Result:   Mammo Digital Diagnostic Left with Alfredo     History:  Patient is 67 y.o. and is seen for diagnostic imaging.     Films Compared:  Prior images (if available) were compared.     Findings:  This procedure was performed using tomosynthesis. Computer-aided detection was utilized in the interpretation of this examination.  The left breast has scattered areas of fibroglandular density.      Known malignant breast mass currently measures approximately 4.1 x 3.0 x 2.9 cm, previous 4.5 x 3.7 x 3.6 cm. Note that suspicious calcifications extend at least 2 cm medial to the breast mass and 5-6 cm medial to the post biopsy clip.  There is been decrease in size of the left axillary adenopathy, particularly the prior biopsied enlarged lymph node.         Impression:  Decrease in size of malignant left breast mass and axillary adenopathy.  Correlate with ordered PET-CT.         BI-RADS Category:   Overall: 6 - Known Biopsy-Proven Malignancy        Recommendation:  There are no mammo recommendations for this study.    Final Pathologic Diagnosis 1. Left breast, mastectomy:   Residual invasive ductal carcinoma status post neoadjuvant treatment, Grade 2   (tubules = 3, nuclei = 3, mitoses = 1)   Invasive carcinoma measures 43 mm (4.3 cm) in greatest dimension (measured   microscopically)   Calcifications associated with invasive carcinoma and non-neoplastic breast   2 of 7 lymph nodes positive for metastatic carcinoma (2/7) with extranodal   extension   Largest metastatic deposit measures 9 mm (0.9 cm)   Biopsy clip site identified grossly   Best tumor block for future studies: 1C   See surgical pathology cancer case summary below   2.  Axillary contents, dissection:   14 lymph nodes negative for metastatic carcinoma (0/14)   Immunohistochemical stains for AE1/AE3 were performed with adequate controls   and are negative   Surgical pathology cancer case summary:   Procedure:  Total mastectomy   Specimen laterality:  Left   Tumor site:  Upper outer quadrant   Histologic type: Invasive carcinoma of no special type (ductal)   Histologic grade (McClelland Histologic Score):   Glandular (acinar/tubular) differentiation:  Score 3   Nuclear pleomorphism:  Score 3   Mitotic rate:  Score 1   Overall grade:  Grade 2 (score 7)   Tumor size:  Greatest dimension of largest invasive focus greater than 1 mm:   43 mm   Tumor focality:  Single focus of invasive carcinoma   Ductal carcinoma in situ (DCIS):  Not identified   Lobular carcinoma in situ (LCIS):  Not identified   Tumor extent:  Not applicable (skin, nipple, and skeletal muscle are absent   or are uninvolved)   Lymphovascular invasion:  Not identified   Dermal lymphovascular invasion:  Not identified   Microcalcifications:   Present in invasive carcinoma   Present in nonneoplastic tissue   Treatment effect in the  breast:  No definite response to presurgical therapy   in the invasive carcinoma   Treatment effect in the lymph nodes:  No definite response to presurgical   therapy in metastatic carcinoma   Margins status for invasive carcinoma:  All margins negative for invasive   carcinoma   Distance from invasive carcinoma to closest margin:  21 mm (measured grossly)   Closest margin to invasive carcinoma:  Anterior (skin)   All other margins are greater than 21 mm (measured grossly)   Regional lymph node status:  Regional lymph nodes present   Tumor present in regional lymph nodes   Number of lymph nodes with macrometastases (greater than 2 mm):  2   Size of largest moni metastatic deposit:  9 mm   Extranodal extension:  Present, 2 mm or less   Total number of lymph nodes examined:  21   Distant metastases:  Not applicable   Pathologic stage classification (pTNM):   TNM descriptors: y (posttreatment)   pT category: ypT2:  Posttreatment tumor greater than 20 mm but less than or   equal to 50 mm in greatest dimension   Regional lymph nodes modifier:  Not applicable   pN: pN1a:  Metastases in 1-3 axillary lymph nodes, at least 1 metastases   larger than 2.0 mm   pM:  Not applicable   Breast biomarker testing performed on previous biopsy case [id number]- and   reported as below:   Estrogen receptor (ER):  Positive, greater than 95%, strong   Progesterone receptor (PGR): Positive, 80%, strong   HER2 (IHC):  Equivocal, 2+   HER2 FISH: Positive   Ki-67:  60%      Final Pathologic Diagnosis 1. Lymph node, needle core biopsy:   - A kappa light chain restricted plasma cell population detected.  Recommend   additional tissue biopsy for further classifying this process.  See comment.   - No evidence of metastatic carcinoma     Final Pathologic Diagnosis LYMPH NODE, LEFT GROIN, EXCISION:   -- REACTIVE LYMPHOID FOLLICLE WITH ATYPICAL PLASMACYTOSIS (KAPPA SKEWED).   -- CALCIFICATION IN FOCAL AREAS.   -- NO EVIDENCE OF METASTATIC CARCINOMA.    -- SEE COMMENT.      ASSESSMENT/PLAN:     67 y/o female with left breast invasive ductal carcinoma ER+/VT+ Her2+ with metastatic disease to the lymph node now status post left breast total mastectomy with axillary dissection; status post left inguinal lymph node excisional biopsy, left chest wall wound debridement    - Drain site well-healed and seroma resolved.  - Keep continued follow-up with oncology  - RTC as needed or if any issues arise    Akilah Estrada PA-C  [facility]

## 2023-08-31 PROCEDURE — 77336 RADIATION PHYSICS CONSULT: CPT | Performed by: RADIOLOGY

## 2023-09-05 ENCOUNTER — HOSPITAL ENCOUNTER (INPATIENT)
Facility: HOSPITAL | Age: 68
LOS: 2 days | Discharge: HOME | DRG: 280 | End: 2023-09-07
Attending: EMERGENCY MEDICINE | Admitting: INTERNAL MEDICINE
Payer: MEDICARE

## 2023-09-05 DIAGNOSIS — I20.9 ANGINA PECTORIS: ICD-10-CM

## 2023-09-05 DIAGNOSIS — I25.10 CORONARY ARTERY DISEASE, UNSPECIFIED VESSEL OR LESION TYPE, UNSPECIFIED WHETHER ANGINA PRESENT, UNSPECIFIED WHETHER NATIVE OR TRANSPLANTED HEART: Primary | ICD-10-CM

## 2023-09-05 DIAGNOSIS — I21.3 STEMI (ST ELEVATION MYOCARDIAL INFARCTION): ICD-10-CM

## 2023-09-05 DIAGNOSIS — R07.9 CHEST PAIN: ICD-10-CM

## 2023-09-05 DIAGNOSIS — I21.29 ACUTE ST ELEVATION MYOCARDIAL INFARCTION (STEMI) OF LATERAL WALL: ICD-10-CM

## 2023-09-05 DIAGNOSIS — B20 CURRENTLY ASYMPTOMATIC HIV INFECTION, WITH HISTORY OF HIV-RELATED ILLNESS: ICD-10-CM

## 2023-09-05 DIAGNOSIS — I21.3 ST ELEVATION MYOCARDIAL INFARCTION (STEMI), UNSPECIFIED ARTERY: ICD-10-CM

## 2023-09-05 PROBLEM — R94.31 ABNORMAL ECG: Status: ACTIVE | Noted: 2023-09-05

## 2023-09-05 LAB
ALBUMIN SERPL BCP-MCNC: 2.8 G/DL (ref 3.5–5.2)
ALP SERPL-CCNC: 69 U/L (ref 55–135)
ALT SERPL W/O P-5'-P-CCNC: 16 U/L (ref 10–44)
ANION GAP SERPL CALC-SCNC: 12 MMOL/L (ref 8–16)
APTT PPP: <21 SEC (ref 21–32)
AST SERPL-CCNC: 17 U/L (ref 10–40)
BASOPHILS # BLD AUTO: 0.01 K/UL (ref 0–0.2)
BASOPHILS NFR BLD: 0.1 % (ref 0–1.9)
BILIRUB SERPL-MCNC: 0.3 MG/DL (ref 0.1–1)
BNP SERPL-MCNC: 218 PG/ML (ref 0–99)
BUN SERPL-MCNC: 95 MG/DL (ref 8–23)
CALCIUM SERPL-MCNC: 8.9 MG/DL (ref 8.7–10.5)
CATH EF QUANTITATIVE: 65 %
CHLORIDE SERPL-SCNC: 105 MMOL/L (ref 95–110)
CO2 SERPL-SCNC: 15 MMOL/L (ref 23–29)
CREAT SERPL-MCNC: 6.9 MG/DL (ref 0.5–1.4)
DIFFERENTIAL METHOD: ABNORMAL
EOSINOPHIL # BLD AUTO: 0.4 K/UL (ref 0–0.5)
EOSINOPHIL NFR BLD: 4.8 % (ref 0–8)
ERYTHROCYTE [DISTWIDTH] IN BLOOD BY AUTOMATED COUNT: 18.2 % (ref 11.5–14.5)
EST. GFR  (NO RACE VARIABLE): 6 ML/MIN/1.73 M^2
GLUCOSE SERPL-MCNC: 258 MG/DL (ref 70–110)
HCT VFR BLD AUTO: 36.4 % (ref 37–48.5)
HGB BLD-MCNC: 12.5 G/DL (ref 12–16)
IMM GRANULOCYTES # BLD AUTO: 0.14 K/UL (ref 0–0.04)
IMM GRANULOCYTES NFR BLD AUTO: 1.9 % (ref 0–0.5)
INR PPP: 1 (ref 0.8–1.2)
LYMPHOCYTES # BLD AUTO: 0.5 K/UL (ref 1–4.8)
LYMPHOCYTES NFR BLD: 6.6 % (ref 18–48)
MCH RBC QN AUTO: 32.1 PG (ref 27–31)
MCHC RBC AUTO-ENTMCNC: 34.3 G/DL (ref 32–36)
MCV RBC AUTO: 93 FL (ref 82–98)
MONOCYTES # BLD AUTO: 0.7 K/UL (ref 0.3–1)
MONOCYTES NFR BLD: 10.2 % (ref 4–15)
NEUTROPHILS # BLD AUTO: 5.5 K/UL (ref 1.8–7.7)
NEUTROPHILS NFR BLD: 76.4 % (ref 38–73)
NRBC BLD-RTO: 0 /100 WBC
PLATELET # BLD AUTO: 212 K/UL (ref 150–450)
PMV BLD AUTO: 10.6 FL (ref 9.2–12.9)
POC ACTIVATED CLOTTING TIME K: 137 SEC (ref 74–137)
POC ACTIVATED CLOTTING TIME K: 185 SEC (ref 74–137)
POC ACTIVATED CLOTTING TIME K: 191 SEC (ref 74–137)
POCT GLUCOSE: 199 MG/DL (ref 70–110)
POTASSIUM SERPL-SCNC: 5 MMOL/L (ref 3.5–5.1)
PROT SERPL-MCNC: 8.3 G/DL (ref 6–8.4)
PROTHROMBIN TIME: 10.3 SEC (ref 9–12.5)
RBC # BLD AUTO: 3.9 M/UL (ref 4–5.4)
SAMPLE: ABNORMAL
SAMPLE: ABNORMAL
SAMPLE: NORMAL
SARS-COV-2 RDRP RESP QL NAA+PROBE: NEGATIVE
SODIUM SERPL-SCNC: 132 MMOL/L (ref 136–145)
TROPONIN I SERPL DL<=0.01 NG/ML-MCNC: 0.01 NG/ML (ref 0–0.03)
WBC # BLD AUTO: 7.24 K/UL (ref 3.9–12.7)

## 2023-09-05 PROCEDURE — 99152 MOD SED SAME PHYS/QHP 5/>YRS: CPT | Performed by: INTERNAL MEDICINE

## 2023-09-05 PROCEDURE — 99152 PR MOD CONSCIOUS SEDATION, SAME PHYS, 5+ YRS, FIRST 15 MIN: ICD-10-PCS | Mod: ,,, | Performed by: INTERNAL MEDICINE

## 2023-09-05 PROCEDURE — 80053 COMPREHEN METABOLIC PANEL: CPT | Performed by: NURSE PRACTITIONER

## 2023-09-05 PROCEDURE — 25000003 PHARM REV CODE 250: Performed by: EMERGENCY MEDICINE

## 2023-09-05 PROCEDURE — C1769 GUIDE WIRE: HCPCS | Performed by: INTERNAL MEDICINE

## 2023-09-05 PROCEDURE — 85025 COMPLETE CBC W/AUTO DIFF WBC: CPT | Performed by: NURSE PRACTITIONER

## 2023-09-05 PROCEDURE — 93005 ELECTROCARDIOGRAM TRACING: CPT

## 2023-09-05 PROCEDURE — 27000221 HC OXYGEN, UP TO 24 HOURS

## 2023-09-05 PROCEDURE — 82962 GLUCOSE BLOOD TEST: CPT

## 2023-09-05 PROCEDURE — 96374 THER/PROPH/DIAG INJ IV PUSH: CPT

## 2023-09-05 PROCEDURE — 83880 ASSAY OF NATRIURETIC PEPTIDE: CPT | Performed by: NURSE PRACTITIONER

## 2023-09-05 PROCEDURE — 99223 PR INITIAL HOSPITAL CARE,LEVL III: ICD-10-PCS | Mod: AI,25,, | Performed by: INTERNAL MEDICINE

## 2023-09-05 PROCEDURE — 93010 ELECTROCARDIOGRAM REPORT: CPT | Mod: 76,,, | Performed by: INTERNAL MEDICINE

## 2023-09-05 PROCEDURE — 20000000 HC ICU ROOM

## 2023-09-05 PROCEDURE — 63600175 PHARM REV CODE 636 W HCPCS: Performed by: INTERNAL MEDICINE

## 2023-09-05 PROCEDURE — 93010 PR ELECTROCARDIOGRAM REPORT: ICD-10-PCS | Mod: 76,,, | Performed by: INTERNAL MEDICINE

## 2023-09-05 PROCEDURE — 99153 MOD SED SAME PHYS/QHP EA: CPT | Performed by: INTERNAL MEDICINE

## 2023-09-05 PROCEDURE — 99152 MOD SED SAME PHYS/QHP 5/>YRS: CPT | Mod: ,,, | Performed by: INTERNAL MEDICINE

## 2023-09-05 PROCEDURE — 93010 ELECTROCARDIOGRAM REPORT: CPT | Mod: ,,, | Performed by: INTERNAL MEDICINE

## 2023-09-05 PROCEDURE — 85610 PROTHROMBIN TIME: CPT | Performed by: EMERGENCY MEDICINE

## 2023-09-05 PROCEDURE — 27201423 OPTIME MED/SURG SUP & DEVICES STERILE SUPPLY: Performed by: INTERNAL MEDICINE

## 2023-09-05 PROCEDURE — 85730 THROMBOPLASTIN TIME PARTIAL: CPT | Performed by: EMERGENCY MEDICINE

## 2023-09-05 PROCEDURE — U0002 COVID-19 LAB TEST NON-CDC: HCPCS | Performed by: EMERGENCY MEDICINE

## 2023-09-05 PROCEDURE — 93459 L HRT ART/GRFT ANGIO: CPT | Performed by: INTERNAL MEDICINE

## 2023-09-05 PROCEDURE — 63600175 PHARM REV CODE 636 W HCPCS: Performed by: EMERGENCY MEDICINE

## 2023-09-05 PROCEDURE — 25000003 PHARM REV CODE 250: Performed by: NURSE PRACTITIONER

## 2023-09-05 PROCEDURE — 84484 ASSAY OF TROPONIN QUANT: CPT | Performed by: NURSE PRACTITIONER

## 2023-09-05 PROCEDURE — 99900035 HC TECH TIME PER 15 MIN (STAT)

## 2023-09-05 PROCEDURE — 93459: ICD-10-PCS | Mod: 26,,, | Performed by: INTERNAL MEDICINE

## 2023-09-05 PROCEDURE — 99223 1ST HOSP IP/OBS HIGH 75: CPT | Mod: AI,25,, | Performed by: INTERNAL MEDICINE

## 2023-09-05 PROCEDURE — 25500020 PHARM REV CODE 255: Performed by: INTERNAL MEDICINE

## 2023-09-05 PROCEDURE — 99291 CRITICAL CARE FIRST HOUR: CPT

## 2023-09-05 PROCEDURE — 63600175 PHARM REV CODE 636 W HCPCS: Performed by: NURSE PRACTITIONER

## 2023-09-05 PROCEDURE — C1887 CATHETER, GUIDING: HCPCS | Performed by: INTERNAL MEDICINE

## 2023-09-05 PROCEDURE — 25000242 PHARM REV CODE 250 ALT 637 W/ HCPCS: Performed by: EMERGENCY MEDICINE

## 2023-09-05 PROCEDURE — C1894 INTRO/SHEATH, NON-LASER: HCPCS | Performed by: INTERNAL MEDICINE

## 2023-09-05 PROCEDURE — 25000003 PHARM REV CODE 250: Performed by: INTERNAL MEDICINE

## 2023-09-05 PROCEDURE — 93459 L HRT ART/GRFT ANGIO: CPT | Mod: 26,,, | Performed by: INTERNAL MEDICINE

## 2023-09-05 RX ORDER — ASPIRIN 325 MG
325 TABLET ORAL
Status: COMPLETED | OUTPATIENT
Start: 2023-09-05 | End: 2023-09-05

## 2023-09-05 RX ORDER — FENTANYL CITRATE 50 UG/ML
INJECTION, SOLUTION INTRAMUSCULAR; INTRAVENOUS
Status: DISCONTINUED | OUTPATIENT
Start: 2023-09-05 | End: 2023-09-05 | Stop reason: HOSPADM

## 2023-09-05 RX ORDER — MIDAZOLAM HYDROCHLORIDE 1 MG/ML
INJECTION, SOLUTION INTRAMUSCULAR; INTRAVENOUS
Status: DISCONTINUED | OUTPATIENT
Start: 2023-09-05 | End: 2023-09-05 | Stop reason: HOSPADM

## 2023-09-05 RX ORDER — INSULIN ASPART 100 [IU]/ML
0-5 INJECTION, SOLUTION INTRAVENOUS; SUBCUTANEOUS
Status: DISCONTINUED | OUTPATIENT
Start: 2023-09-05 | End: 2023-09-06

## 2023-09-05 RX ORDER — HEPARIN SODIUM 5000 [USP'U]/ML
4000 INJECTION, SOLUTION INTRAVENOUS; SUBCUTANEOUS
Status: COMPLETED | OUTPATIENT
Start: 2023-09-05 | End: 2023-09-05

## 2023-09-05 RX ORDER — HYDROCODONE BITARTRATE AND ACETAMINOPHEN 5; 325 MG/1; MG/1
1 TABLET ORAL EVERY 4 HOURS PRN
Status: DISCONTINUED | OUTPATIENT
Start: 2023-09-05 | End: 2023-09-07 | Stop reason: HOSPADM

## 2023-09-05 RX ORDER — ATORVASTATIN CALCIUM 10 MG/1
10 TABLET, FILM COATED ORAL DAILY
Status: DISCONTINUED | OUTPATIENT
Start: 2023-09-06 | End: 2023-09-07 | Stop reason: HOSPADM

## 2023-09-05 RX ORDER — HEPARIN SODIUM 5000 [USP'U]/ML
4000 INJECTION, SOLUTION INTRAVENOUS; SUBCUTANEOUS
Status: DISCONTINUED | OUTPATIENT
Start: 2023-09-05 | End: 2023-09-05

## 2023-09-05 RX ORDER — NITROGLYCERIN 0.4 MG/1
0.4 TABLET SUBLINGUAL
Status: COMPLETED | OUTPATIENT
Start: 2023-09-05 | End: 2023-09-05

## 2023-09-05 RX ORDER — GLUCAGON 1 MG
1 KIT INJECTION
Status: DISCONTINUED | OUTPATIENT
Start: 2023-09-05 | End: 2023-09-07 | Stop reason: HOSPADM

## 2023-09-05 RX ORDER — DIPHENHYDRAMINE HYDROCHLORIDE 50 MG/ML
INJECTION INTRAMUSCULAR; INTRAVENOUS
Status: DISCONTINUED | OUTPATIENT
Start: 2023-09-05 | End: 2023-09-05 | Stop reason: HOSPADM

## 2023-09-05 RX ORDER — ASPIRIN 81 MG/1
81 TABLET ORAL DAILY
Status: DISCONTINUED | OUTPATIENT
Start: 2023-09-06 | End: 2023-09-07 | Stop reason: HOSPADM

## 2023-09-05 RX ORDER — HYDRALAZINE HYDROCHLORIDE 20 MG/ML
10 INJECTION INTRAMUSCULAR; INTRAVENOUS EVERY 4 HOURS PRN
Status: DISCONTINUED | OUTPATIENT
Start: 2023-09-05 | End: 2023-09-05 | Stop reason: SDUPTHER

## 2023-09-05 RX ORDER — AMLODIPINE BESYLATE 10 MG/1
10 TABLET ORAL DAILY
Status: DISCONTINUED | OUTPATIENT
Start: 2023-09-05 | End: 2023-09-07 | Stop reason: HOSPADM

## 2023-09-05 RX ORDER — SODIUM CHLORIDE 9 MG/ML
INJECTION, SOLUTION INTRAVENOUS
Status: DISCONTINUED | OUTPATIENT
Start: 2023-09-05 | End: 2023-09-05

## 2023-09-05 RX ORDER — HYDRALAZINE HYDROCHLORIDE 20 MG/ML
10 INJECTION INTRAMUSCULAR; INTRAVENOUS EVERY 8 HOURS PRN
Status: DISCONTINUED | OUTPATIENT
Start: 2023-09-05 | End: 2023-09-07 | Stop reason: HOSPADM

## 2023-09-05 RX ORDER — ONDANSETRON 4 MG/1
4 TABLET, ORALLY DISINTEGRATING ORAL
Status: COMPLETED | OUTPATIENT
Start: 2023-09-05 | End: 2023-09-05

## 2023-09-05 RX ORDER — IBUPROFEN 200 MG
24 TABLET ORAL
Status: DISCONTINUED | OUTPATIENT
Start: 2023-09-05 | End: 2023-09-07 | Stop reason: HOSPADM

## 2023-09-05 RX ORDER — AMLODIPINE BESYLATE 10 MG/1
10 TABLET ORAL DAILY
Status: DISCONTINUED | OUTPATIENT
Start: 2023-09-06 | End: 2023-09-05 | Stop reason: SDUPTHER

## 2023-09-05 RX ORDER — IBUPROFEN 200 MG
16 TABLET ORAL
Status: DISCONTINUED | OUTPATIENT
Start: 2023-09-05 | End: 2023-09-07 | Stop reason: HOSPADM

## 2023-09-05 RX ORDER — LIDOCAINE HYDROCHLORIDE 20 MG/ML
INJECTION, SOLUTION EPIDURAL; INFILTRATION; INTRACAUDAL; PERINEURAL
Status: DISCONTINUED | OUTPATIENT
Start: 2023-09-05 | End: 2023-09-05 | Stop reason: HOSPADM

## 2023-09-05 RX ORDER — HEPARIN SODIUM,PORCINE/D5W 25000/250
0-40 INTRAVENOUS SOLUTION INTRAVENOUS CONTINUOUS
Status: DISCONTINUED | OUTPATIENT
Start: 2023-09-05 | End: 2023-09-05

## 2023-09-05 RX ORDER — MORPHINE SULFATE 2 MG/ML
2 INJECTION, SOLUTION INTRAMUSCULAR; INTRAVENOUS EVERY 4 HOURS PRN
Status: DISCONTINUED | OUTPATIENT
Start: 2023-09-05 | End: 2023-09-07 | Stop reason: HOSPADM

## 2023-09-05 RX ADMIN — NITROGLYCERIN 0.4 MG: 0.4 TABLET SUBLINGUAL at 06:09

## 2023-09-05 RX ADMIN — ONDANSETRON 4 MG: 4 TABLET, ORALLY DISINTEGRATING ORAL at 06:09

## 2023-09-05 RX ADMIN — HEPARIN SODIUM 4000 UNITS: 5000 INJECTION, SOLUTION INTRAVENOUS; SUBCUTANEOUS at 07:09

## 2023-09-05 RX ADMIN — ASPIRIN 325 MG ORAL TABLET 325 MG: 325 PILL ORAL at 06:09

## 2023-09-05 RX ADMIN — AMLODIPINE BESYLATE 10 MG: 10 TABLET ORAL at 10:09

## 2023-09-05 RX ADMIN — HYDRALAZINE HYDROCHLORIDE 10 MG: 20 INJECTION, SOLUTION INTRAMUSCULAR; INTRAVENOUS at 09:09

## 2023-09-05 RX ADMIN — HEPARIN SODIUM 12 UNITS/KG/HR: 10000 INJECTION, SOLUTION INTRAVENOUS at 07:09

## 2023-09-05 NOTE — FIRST PROVIDER EVALUATION
Medical screening examination initiated.  I have conducted a focused provider triage encounter, findings are as follows:    Brief history of present illness:  pt presents with chest pain      Vitals:    09/05/23 1541   BP: (!) 140/67   BP Location: Right arm   Patient Position: Sitting   Pulse: 69   Resp: 20   Temp: 98.5 °F (36.9 °C)   TempSrc: Oral   SpO2: 98%   Weight: 78.5 kg (173 lb 2.7 oz)       Pertinent physical exam:  nad    Brief workup plan:  labs, ekg, imaging, further eval    Preliminary workup initiated; this workup will be continued and followed by the physician or advanced practice provider that is assigned to the patient when roomed.

## 2023-09-05 NOTE — Clinical Note
The catheter was inserted into the ostium   left main. The catheter was unable to access the area.. Catheter removed

## 2023-09-05 NOTE — Clinical Note
200 ml of contrast were injected throughout the case. 0 mL of contrast was the total wasted during the case. 200 mL was the total amount used during the case. 36.3

## 2023-09-05 NOTE — Clinical Note
The catheter was inserted into the left subclavian artery. An angiography was performed of the Left Subclavian Artery and LIMA to LAD. Multiple views were taken.

## 2023-09-05 NOTE — Clinical Note
The catheter was repositioned into the SVG to OM1. An angiography was performed of the SVG to OM1. Multiple views were taken.

## 2023-09-05 NOTE — Clinical Note
The catheter was repositioned into the SVG to RCA. An angiography was performed of the SVG to RCA. Multiple views were taken.

## 2023-09-05 NOTE — ED PROVIDER NOTES
"SCRIBE #1 NOTE: I, Yovany Rowena, am scribing for, and in the presence of, Cole Marquez MD. I have scribed the entire note.       History     Chief Complaint   Patient presents with    Chest Pain     Pt reports CP to center of chest and RUE pain. Pt also reports some nausea. Pt denies SOB     Review of patient's allergies indicates:  No Known Allergies      History of Present Illness     HPI    9/5/2023, 6:56 PM  History obtained from the patient      History of Present Illness: Malaika Paredes is a 67 y.o. female patient with a PMHx of CAD, CHF, COPD, DM, ESRD, HTN, paroxysmal a-fib who presents to the Emergency Department for evaluation of chest pain which onset gradually a few days ago. Pt states "I feel bad" and describes the chest pain as a heaviness. Pt normally dialyzes on T/Th/Sat but skipped today because "she felt bad". Last dialyzed 3 days ago. Symptoms are constant and moderate in severity. No mitigating or exacerbating factors reported. Associated sxs include nausea, intermittent abdominal pain, R arm pain, cough, and a sore throat. Patient denies any vomiting, fever, headache, palpitations, leg swelling, and all other sxs at this time. No prior Tx reported. No further complaints or concerns at this time.       Arrival mode: Personal vehicle     PCP: Travis Scales MD        Past Medical History:  Past Medical History:   Diagnosis Date    CAD (coronary artery disease)     Cataract     Cataract     CHF (congestive heart failure)     COPD (chronic obstructive pulmonary disease)     Currently asymptomatic HIV infection, with history of HIV-related illness 7/10/2023    Diabetes mellitus     Diabetic retinopathy     ESRD (end stage renal disease)     TTS    Hypertension     Ischemic ulcer of toe of left foot     Malignant neoplasm of upper-outer quadrant of left breast in female, estrogen receptor positive 06/20/2022    left    PAD (peripheral artery disease)     PAF (paroxysmal atrial " fibrillation)        Past Surgical History:  Past Surgical History:   Procedure Laterality Date    APPLICATION OF WOUND VACUUM-ASSISTED CLOSURE DEVICE Left 4/6/2023    Procedure: APPLICATION, WOUND VAC;  Surgeon: Jayjay Arana MD;  Location: [facility] OR;  Service: General;  Laterality: Left;  left chest    AXILLARY NODE DISSECTION Left 3/1/2023    Procedure: LYMPHADENECTOMY, AXILLARY;  Surgeon: Jayjay Arana MD;  Location: [facility] OR;  Service: General;  Laterality: Left;    BIOPSY OF INGUINAL LYMPH NODE Left 3/31/2023    Procedure: BIOPSY, LYMPH NODE, INGUINAL;  Surgeon: Jayjay Arana MD;  Location: [facility] OR;  Service: General;  Laterality: Left;    BREAST BIOPSY Right     benign    CATARACT EXTRACTION W/  INTRAOCULAR LENS IMPLANT Right 08/14/2017    Dr. Moe    CORONARY ARTERY BYPASS GRAFT  2020    FLUOROSCOPY N/A 07/25/2022    Procedure: FLUOROSCOPY/mediport placement;  Surgeon: Cole Perdomo MD;  Location: [facility] CATH LAB;  Service: General;  Laterality: N/A;    MEDIPORT REMOVAL N/A 3/1/2023    Procedure: REMOVAL, CATHETER, CENTRAL VENOUS, TUNNELED, WITH PORT;  Surgeon: Jayjay Arana MD;  Location: [facility] OR;  Service: General;  Laterality: N/A;    MODIFIED RADICAL MASTECTOMY W/ AXILLARY LYMPH NODE DISSECTION Left 3/1/2023    Procedure: MASTECTOMY, MODIFIED RADICAL;  Surgeon: Jayjay Arana MD;  Location: [facility] OR;  Service: General;  Laterality: Left;    WOUND DEBRIDEMENT Left 3/31/2023    Procedure: DEBRIDEMENT, WOUND;  Surgeon: Jayjay Arana MD;  Location: [facility] OR;  Service: General;  Laterality: Left;  left chest wall eschar debridement         Family History:  Family History   Problem Relation Age of Onset    Hypertension Mother     Cancer Father     Breast cancer Sister     Diabetes Sister     Cancer Brother     Amblyopia Neg Hx     Blindness Neg Hx     Cataracts Neg Hx     Glaucoma Neg Hx     Macular degeneration Neg Hx     Retinal detachment Neg Hx     Strabismus Neg Hx     Stroke Neg Hx     Thyroid disease Neg Hx         Social History:  Social History     Tobacco Use    Smoking status: Former     Current packs/day: 0.00     Types: Cigarettes     Start date: 1982     Quit date: 2012     Years since quittin.2    Smokeless tobacco: Never   Substance and Sexual Activity    Alcohol use: No    Drug use: Never    Sexual activity: Not on file        Review of Systems     Review of Systems   Constitutional:  Negative for fever.   HENT:  Positive for sore throat.    Respiratory:  Positive for cough. Negative for shortness of breath.    Cardiovascular:  Positive for chest pain. Negative for palpitations and leg swelling.   Gastrointestinal:  Positive for abdominal pain (intermittent) and nausea. Negative for vomiting.   Genitourinary:  Negative for dysuria.   Musculoskeletal:  Positive for myalgias (R arm). Negative for back pain.   Skin:  Negative for rash.   Neurological:  Negative for weakness and headaches.   Hematological:  Does not bruise/bleed easily.   All other systems reviewed and are negative.     Physical Exam     Initial Vitals [23 1541]   BP Pulse Resp Temp SpO2   (!) 140/67 69 20 98.5 °F (36.9 °C) 98 %      MAP       --          Physical Exam  Nursing Notes and Vital Signs Reviewed.  Constitutional: Patient is in mild distress. Well-developed and well-nourished.  Head: Atraumatic. Normocephalic.  Eyes: PERRL. EOM intact. Conjunctivae are not pale. No scleral icterus.  ENT: Mucous membranes are moist. Oropharynx is clear and symmetric.    Neck: Supple. Full ROM. No lymphadenopathy.  Cardiovascular: Regular rate. Regular rhythm. No murmurs, rubs, or gallops. Distal pulses are 2+ and symmetric.  Pulmonary/Chest: No chest wall tenderness. No respiratory distress. Clear to auscultation bilaterally. No wheezing or rales.  Abdominal: Soft and non-distended.  There is no tenderness.  No rebound, guarding, or rigidity. Good bowel sounds.  Genitourinary: No CVA tenderness  Musculoskeletal: Moves all  extremities. No obvious deformities. No edema. No calf tenderness.  Skin: Warm and dry.  Neurological:  Alert, awake, and appropriate.  Normal speech.  No acute focal neurological deficits are appreciated.  Psychiatric: Normal affect. Good eye contact. Appropriate in content.     ED Course   Critical Care    Date/Time: 9/5/2023 3:28 PM    Performed by: Cole Marquez MD  Authorized by: Cole Marquez MD  Direct patient critical care time: 15 minutes  Additional history critical care time: 5 minutes  Ordering / reviewing critical care time: 5 minutes  Documentation critical care time: 5 minutes  Consulting other physicians critical care time: 5 minutes  Total critical care time (exclusive of procedural time) : 35 minutes  Critical care time was exclusive of separately billable procedures and treating other patients and teaching time.  Critical care was necessary to treat or prevent imminent or life-threatening deterioration of the following conditions: STEMI.  Critical care was time spent personally by me on the following activities: blood draw for specimens, development of treatment plan with patient or surrogate, discussions with consultants, interpretation of cardiac output measurements, evaluation of patient's response to treatment, examination of patient, obtaining history from patient or surrogate, ordering and performing treatments and interventions, ordering and review of laboratory studies, ordering and review of radiographic studies, pulse oximetry, re-evaluation of patient's condition and review of old charts.        ED Vital Signs:  Vitals:    09/05/23 1541 09/05/23 1806 09/05/23 1809 09/05/23 1841   BP: (!) 140/67  (!) 198/86 (!) 188/84   Pulse: 69 62  62   Resp: 20 16  16   Temp: 98.5 °F (36.9 °C)      TempSrc: Oral      SpO2: 98% 99%     Weight: 78.5 kg (173 lb 2.7 oz)       09/05/23 1845 09/05/23 1900 09/05/23 1916 09/05/23 1921   BP: (!) 163/78 (!) 170/94  (!) 178/87   Pulse: 61 70  61   Resp:  20   17   Temp:       TempSrc:       SpO2:  95%  97%   Weight:   84.6 kg (186 lb 8.2 oz)        Abnormal Lab Results:  Labs Reviewed   CBC W/ AUTO DIFFERENTIAL - Abnormal; Notable for the following components:       Result Value    RBC 3.90 (*)     Hematocrit 36.4 (*)     MCH 32.1 (*)     RDW 18.2 (*)     Immature Granulocytes 1.9 (*)     Immature Grans (Abs) 0.14 (*)     Lymph # 0.5 (*)     Gran % 76.4 (*)     Lymph % 6.6 (*)     All other components within normal limits   COMPREHENSIVE METABOLIC PANEL - Abnormal; Notable for the following components:    Sodium 132 (*)     CO2 15 (*)     Glucose 258 (*)     BUN 95 (*)     Creatinine 6.9 (*)     Albumin 2.8 (*)     eGFR 6 (*)     All other components within normal limits   B-TYPE NATRIURETIC PEPTIDE - Abnormal; Notable for the following components:     (*)     All other components within normal limits   TROPONIN I   SARS-COV-2 RNA AMPLIFICATION, QUAL   APTT   PROTIME-INR        All Lab Results:  Results for orders placed or performed during the hospital encounter of 09/05/23   CBC auto differential   Result Value Ref Range    WBC 7.24 3.90 - 12.70 K/uL    RBC 3.90 (L) 4.00 - 5.40 M/uL    Hemoglobin 12.5 12.0 - 16.0 g/dL    Hematocrit 36.4 (L) 37.0 - 48.5 %    MCV 93 82 - 98 fL    MCH 32.1 (H) 27.0 - 31.0 pg    MCHC 34.3 32.0 - 36.0 g/dL    RDW 18.2 (H) 11.5 - 14.5 %    Platelets 212 150 - 450 K/uL    MPV 10.6 9.2 - 12.9 fL    Immature Granulocytes 1.9 (H) 0.0 - 0.5 %    Gran # (ANC) 5.5 1.8 - 7.7 K/uL    Immature Grans (Abs) 0.14 (H) 0.00 - 0.04 K/uL    Lymph # 0.5 (L) 1.0 - 4.8 K/uL    Mono # 0.7 0.3 - 1.0 K/uL    Eos # 0.4 0.0 - 0.5 K/uL    Baso # 0.01 0.00 - 0.20 K/uL    nRBC 0 0 /100 WBC    Gran % 76.4 (H) 38.0 - 73.0 %    Lymph % 6.6 (L) 18.0 - 48.0 %    Mono % 10.2 4.0 - 15.0 %    Eosinophil % 4.8 0.0 - 8.0 %    Basophil % 0.1 0.0 - 1.9 %    Differential Method Automated    Comprehensive metabolic panel   Result Value Ref Range    Sodium 132 (L) 136 - 145  mmol/L    Potassium 5.0 3.5 - 5.1 mmol/L    Chloride 105 95 - 110 mmol/L    CO2 15 (L) 23 - 29 mmol/L    Glucose 258 (H) 70 - 110 mg/dL    BUN 95 (H) 8 - 23 mg/dL    Creatinine 6.9 (H) 0.5 - 1.4 mg/dL    Calcium 8.9 8.7 - 10.5 mg/dL    Total Protein 8.3 6.0 - 8.4 g/dL    Albumin 2.8 (L) 3.5 - 5.2 g/dL    Total Bilirubin 0.3 0.1 - 1.0 mg/dL    Alkaline Phosphatase 69 55 - 135 U/L    AST 17 10 - 40 U/L    ALT 16 10 - 44 U/L    eGFR 6 (A) >60 mL/min/1.73 m^2    Anion Gap 12 8 - 16 mmol/L   Troponin I #1   Result Value Ref Range    Troponin I 0.015 0.000 - 0.026 ng/mL   BNP   Result Value Ref Range     (H) 0 - 99 pg/mL   COVID-19 Rapid Screening   Result Value Ref Range    SARS-CoV-2 RNA, Amplification, Qual Negative Negative     Imaging Results:  Imaging Results              X-Ray Chest AP Portable (Final result)  Result time 09/05/23 16:32:44      Final result by Cornell Garcia (Markel), MD (09/05/23 16:32:44)                   Impression:      No infiltrates.      Electronically signed by: Cornell Garcia MD  Date:    09/05/2023  Time:    16:32               Narrative:    EXAMINATION:  XR CHEST AP PORTABLE    CLINICAL HISTORY:  <Diagnosis>, Chest Pain;    COMPARISON:  Chest, 08/30/2023.    FINDINGS:  Heart size is normal.  Prior median sternotomy.  Mild discoid atelectasis at the left base.  No infiltrates                                       The EKG was ordered, reviewed, and independently interpreted by the ED provider.  Interpretation time: 15:43  Rate: 68 BPM  Rhythm: normal sinus rhythm  Interpretation: Left anterior fascicular block LVH (R in aVL, [place] product, [place]). ST and T wave abnormality, consider inferolateral ischemia. No STEMI.  When compared to EKG performed 08-FEB-2023, T wave inversion more evident in Anterior-lateral leads.    The EKG was ordered, reviewed, and independently interpreted by the ED provider.  Interpretation time: 18:58  Rate: 68 BPM  Rhythm: normal sinus  "rhythm  Interpretation: Septal infarct, age undetermined. Lateral injury pattern. ACUTE MI / STEMI.         The Emergency Provider reviewed the vital signs and test results, which are outlined above.     ED Discussion     6:55 PM:   Pt had a syncopal episode and is still complaining of chest pain.    7:14 PM: Discussed pt's case with Dr. Bartholomew (Cardiology) who will take pt to the cath lab.     7:20 PM: Discussed case with Dr Bartholomew (Cardiology). Dr. Bartholomew agrees with current care and management of pt and accepts admission.   Admitting Service: Cardiology  Admitting Physician: Dr. Bartholomew  Admit to: ICU     7:21 PM: Re-evaluated pt. I have discussed test results, shared treatment plan, and the need for admission with patient and family at bedside. Pt and family express understanding at this time and agree with all information. All questions answered. Pt and family have no further questions or concerns at this time. Pt is ready for admit.      Medical Decision Making  67-year-old female with a history of end-stage renal disease presenting with complaints of "I feel bad" and chest pain.  During the time of nurse administering nitroglycerin, patient did have a syncopal episode.  Nurse stated that patient did not have any hypotensive readings, and states that she was checking the blood pressure after each nitroglycerin administration.  Patient was brought back into a critical room after the syncopal event.  When she awoke, she was awake alert oriented, but complaining still of severe chest pain.  Repeat EKG was not concerning for STEMI.  Code STEMI activated.  Discussed case with cardiologist Dr. Bartholomew.  Heparin given.  Patient transported to cath lab    Amount and/or Complexity of Data Reviewed  External Data Reviewed: notes.     Details: History reviewed.  End-stage renal disease.  Labs: ordered. Decision-making details documented in ED Course.  Radiology: ordered. Decision-making details documented in ED " Course.  ECG/medicine tests: ordered and independent interpretation performed. Decision-making details documented in ED Course.    Risk  OTC drugs.  Prescription drug management.  Decision regarding hospitalization.                ED Medication(s):  Medications   heparin 25,000 units in dextrose 5% 250 mL (100 units/mL) infusion LOW INTENSITY nomogram - [facility] (12 Units/kg/hr × 61.5 kg (Adjusted) Intravenous New Bag 9/5/23 1919)   heparin 25,000 units in dextrose 5% (100 units/ml) IV bolus from bag - ADDITIONAL PRN BOLUS - 60 units/kg (max bolus 4000 units) (has no administration in time range)   heparin 25,000 units in dextrose 5% (100 units/ml) IV bolus from bag - ADDITIONAL PRN BOLUS - 30 units/kg (max bolus 4000 units) (has no administration in time range)   aspirin tablet 325 mg (325 mg Oral Given 9/5/23 1839)   ondansetron disintegrating tablet 4 mg (4 mg Oral Given 9/5/23 1839)   nitroGLYCERIN SL tablet 0.4 mg (0.4 mg Sublingual Given 9/5/23 1846)   heparin (porcine) injection 4,000 Units (4,000 Units Intravenous Given 9/5/23 1917)       New Prescriptions    No medications on file               Scribe Attestation:   Scribe #1: I performed the above scribed service and the documentation accurately describes the services I performed. I attest to the accuracy of the note.     Attending:   Physician Attestation Statement for Scribe #1: I, Cole Marquez MD, personally performed the services described in this documentation, as scribed by Yovany Guaman, in my presence, and it is both accurate and complete.           Clinical Impression       ICD-10-CM ICD-9-CM   1. ST elevation myocardial infarction (STEMI), unspecified artery  I21.3 410.90   2. Chest pain  R07.9 786.50       Disposition:   Disposition: Admitted  Condition: Critical         Cole Marquez MD  09/10/23 [id number]

## 2023-09-06 ENCOUNTER — TELEPHONE (OUTPATIENT)
Dept: RADIATION ONCOLOGY | Facility: CLINIC | Age: 68
End: 2023-09-06
Payer: MEDICARE

## 2023-09-06 LAB
ALBUMIN SERPL BCP-MCNC: 2.8 G/DL (ref 3.5–5.2)
ALP SERPL-CCNC: 70 U/L (ref 55–135)
ALT SERPL W/O P-5'-P-CCNC: 11 U/L (ref 10–44)
ANION GAP SERPL CALC-SCNC: 13 MMOL/L (ref 8–16)
AORTIC ROOT ANNULUS: 2.88 CM
AST SERPL-CCNC: 14 U/L (ref 10–40)
AV INDEX (PROSTH): 0.83
AV MEAN GRADIENT: 6 MMHG
AV PEAK GRADIENT: 10 MMHG
AV VELOCITY RATIO: 0.77
BASOPHILS # BLD AUTO: 0.02 K/UL (ref 0–0.2)
BASOPHILS NFR BLD: 0.4 % (ref 0–1.9)
BILIRUB SERPL-MCNC: 0.4 MG/DL (ref 0.1–1)
BSA FOR ECHO PROCEDURE: 1.87 M2
BUN SERPL-MCNC: 100 MG/DL (ref 8–23)
CALCIUM SERPL-MCNC: 8.4 MG/DL (ref 8.7–10.5)
CHLORIDE SERPL-SCNC: 105 MMOL/L (ref 95–110)
CO2 SERPL-SCNC: 12 MMOL/L (ref 23–29)
CREAT SERPL-MCNC: 6.9 MG/DL (ref 0.5–1.4)
CV ECHO LV RWT: 0.62 CM
DIFFERENTIAL METHOD: ABNORMAL
DOP CALC AO PEAK VEL: 1.61 M/S
DOP CALC AO VTI: 35.7 CM
DOP CALC LVOT PEAK VEL: 1.24 M/S
DOP CALC RVOT PEAK VEL: 0.87 M/S
DOP CALC RVOT VTI: 20.2 CM
DOP CALCLVOT PEAK VEL VTI: 29.7 CM
E WAVE DECELERATION TIME: 270.91 MSEC
E/A RATIO: 1.05
E/E' RATIO: 12.12 M/S
ECHO LV POSTERIOR WALL: 1.24 CM (ref 0.6–1.1)
EOSINOPHIL # BLD AUTO: 0.3 K/UL (ref 0–0.5)
EOSINOPHIL NFR BLD: 6.4 % (ref 0–8)
ERYTHROCYTE [DISTWIDTH] IN BLOOD BY AUTOMATED COUNT: 18.7 % (ref 11.5–14.5)
EST. GFR  (NO RACE VARIABLE): 6 ML/MIN/1.73 M^2
FRACTIONAL SHORTENING: 38 % (ref 28–44)
GLUCOSE SERPL-MCNC: 166 MG/DL (ref 70–110)
HCT VFR BLD AUTO: 36.6 % (ref 37–48.5)
HGB BLD-MCNC: 12.5 G/DL (ref 12–16)
HIV1 RNA # SERPL NAA+PROBE: NOT DETECTED COPIES/ML
HIV1 RNA SERPL QL NAA+PROBE: NOT DETECTED
IMM GRANULOCYTES # BLD AUTO: 0.1 K/UL (ref 0–0.04)
IMM GRANULOCYTES NFR BLD AUTO: 1.9 % (ref 0–0.5)
INTERVENTRICULAR SEPTUM: 1.46 CM (ref 0.6–1.1)
IVC DIAMETER: 1.48 CM
IVRT: 79.92 MSEC
LA MAJOR: 4.21 CM
LA MINOR: 4.43 CM
LA WIDTH: 3.3 CM
LEFT ATRIUM SIZE: 3.53 CM
LEFT ATRIUM VOLUME INDEX: 23.6 ML/M2
LEFT ATRIUM VOLUME: 42.75 CM3
LEFT INTERNAL DIMENSION IN SYSTOLE: 2.48 CM (ref 2.1–4)
LEFT VENTRICLE DIASTOLIC VOLUME INDEX: 39.28 ML/M2
LEFT VENTRICLE DIASTOLIC VOLUME: 71.09 ML
LEFT VENTRICLE MASS INDEX: 110 G/M2
LEFT VENTRICLE SYSTOLIC VOLUME INDEX: 12.1 ML/M2
LEFT VENTRICLE SYSTOLIC VOLUME: 21.86 ML
LEFT VENTRICULAR INTERNAL DIMENSION IN DIASTOLE: 4.03 CM (ref 3.5–6)
LEFT VENTRICULAR MASS: 199.76 G
LV LATERAL E/E' RATIO: 9.36 M/S
LV SEPTAL E/E' RATIO: 17.17 M/S
LVOT MG: 3.76 MMHG
LVOT MV: 0.95 CM/S
LYMPHOCYTES # BLD AUTO: 0.3 K/UL (ref 1–4.8)
LYMPHOCYTES NFR BLD: 6.2 % (ref 18–48)
MCH RBC QN AUTO: 33.8 PG (ref 27–31)
MCHC RBC AUTO-ENTMCNC: 34.2 G/DL (ref 32–36)
MCV RBC AUTO: 99 FL (ref 82–98)
MONOCYTES # BLD AUTO: 0.6 K/UL (ref 0.3–1)
MONOCYTES NFR BLD: 10.6 % (ref 4–15)
MV PEAK A VEL: 0.98 M/S
MV PEAK E VEL: 1.03 M/S
MV STENOSIS PRESSURE HALF TIME: 78.56 MS
MV VALVE AREA P 1/2 METHOD: 2.8 CM2
NEUTROPHILS # BLD AUTO: 3.9 K/UL (ref 1.8–7.7)
NEUTROPHILS NFR BLD: 74.5 % (ref 38–73)
NRBC BLD-RTO: 0 /100 WBC
PISA TR MAX VEL: 2.41 M/S
PLATELET # BLD AUTO: 168 K/UL (ref 150–450)
PMV BLD AUTO: 10.7 FL (ref 9.2–12.9)
POCT GLUCOSE: 136 MG/DL (ref 70–110)
POCT GLUCOSE: 158 MG/DL (ref 70–110)
POCT GLUCOSE: 167 MG/DL (ref 70–110)
POCT GLUCOSE: 232 MG/DL (ref 70–110)
POCT GLUCOSE: 260 MG/DL (ref 70–110)
POTASSIUM SERPL-SCNC: 6.2 MMOL/L (ref 3.5–5.1)
PROT SERPL-MCNC: 8.2 G/DL (ref 6–8.4)
PV MEAN GRADIENT: 2 MMHG
RA MAJOR: 4 CM
RA WIDTH: 2.5 CM
RBC # BLD AUTO: 3.7 M/UL (ref 4–5.4)
SODIUM SERPL-SCNC: 130 MMOL/L (ref 136–145)
TDI LATERAL: 0.11 M/S
TDI SEPTAL: 0.06 M/S
TDI: 0.09 M/S
TR MAX PG: 23 MMHG
TR MEAN GRADIENT: 21 MMHG
TRICUSPID ANNULAR PLANE SYSTOLIC EXCURSION: 1.7 CM
TROPONIN I SERPL DL<=0.01 NG/ML-MCNC: 0.02 NG/ML (ref 0–0.03)
WBC # BLD AUTO: 5.17 K/UL (ref 3.9–12.7)
Z-SCORE OF LEFT VENTRICULAR DIMENSION IN END DIASTOLE: -2.17
Z-SCORE OF LEFT VENTRICULAR DIMENSION IN END SYSTOLE: -1.75

## 2023-09-06 PROCEDURE — 80074 ACUTE HEPATITIS PANEL: CPT | Performed by: INTERNAL MEDICINE

## 2023-09-06 PROCEDURE — 25000003 PHARM REV CODE 250: Performed by: INTERNAL MEDICINE

## 2023-09-06 PROCEDURE — 80100014 HC HEMODIALYSIS 1:1

## 2023-09-06 PROCEDURE — 86706 HEP B SURFACE ANTIBODY: CPT | Performed by: INTERNAL MEDICINE

## 2023-09-06 PROCEDURE — 85025 COMPLETE CBC W/AUTO DIFF WBC: CPT | Performed by: INTERNAL MEDICINE

## 2023-09-06 PROCEDURE — 99223 1ST HOSP IP/OBS HIGH 75: CPT | Mod: ,,, | Performed by: INTERNAL MEDICINE

## 2023-09-06 PROCEDURE — 99233 SBSQ HOSP IP/OBS HIGH 50: CPT | Mod: ,,, | Performed by: INTERNAL MEDICINE

## 2023-09-06 PROCEDURE — 87536 HIV-1 QUANT&REVRSE TRNSCRPJ: CPT | Performed by: NURSE PRACTITIONER

## 2023-09-06 PROCEDURE — 63600175 PHARM REV CODE 636 W HCPCS: Performed by: NURSE PRACTITIONER

## 2023-09-06 PROCEDURE — 86359 T CELLS TOTAL COUNT: CPT | Performed by: NURSE PRACTITIONER

## 2023-09-06 PROCEDURE — 25000003 PHARM REV CODE 250: Performed by: NURSE PRACTITIONER

## 2023-09-06 PROCEDURE — 99900035 HC TECH TIME PER 15 MIN (STAT)

## 2023-09-06 PROCEDURE — 11000001 HC ACUTE MED/SURG PRIVATE ROOM

## 2023-09-06 PROCEDURE — 94640 AIRWAY INHALATION TREATMENT: CPT

## 2023-09-06 PROCEDURE — 36415 COLL VENOUS BLD VENIPUNCTURE: CPT | Performed by: INTERNAL MEDICINE

## 2023-09-06 PROCEDURE — 84484 ASSAY OF TROPONIN QUANT: CPT | Mod: 91 | Performed by: INTERNAL MEDICINE

## 2023-09-06 PROCEDURE — 99223 PR INITIAL HOSPITAL CARE,LEVL III: ICD-10-PCS | Mod: ,,, | Performed by: INTERNAL MEDICINE

## 2023-09-06 PROCEDURE — 99233 PR SUBSEQUENT HOSPITAL CARE,LEVL III: ICD-10-PCS | Mod: ,,, | Performed by: INTERNAL MEDICINE

## 2023-09-06 PROCEDURE — 25000242 PHARM REV CODE 250 ALT 637 W/ HCPCS: Performed by: NURSE PRACTITIONER

## 2023-09-06 PROCEDURE — 86360 T CELL ABSOLUTE COUNT/RATIO: CPT

## 2023-09-06 PROCEDURE — 80053 COMPREHEN METABOLIC PANEL: CPT | Performed by: INTERNAL MEDICINE

## 2023-09-06 PROCEDURE — 84484 ASSAY OF TROPONIN QUANT: CPT | Performed by: INTERNAL MEDICINE

## 2023-09-06 RX ORDER — HEPARIN SODIUM 5000 [USP'U]/ML
5000 INJECTION, SOLUTION INTRAVENOUS; SUBCUTANEOUS EVERY 8 HOURS
Status: DISCONTINUED | OUTPATIENT
Start: 2023-09-06 | End: 2023-09-07 | Stop reason: HOSPADM

## 2023-09-06 RX ORDER — HYDRALAZINE HYDROCHLORIDE 25 MG/1
25 TABLET, FILM COATED ORAL 3 TIMES DAILY
Status: DISCONTINUED | OUTPATIENT
Start: 2023-09-06 | End: 2023-09-07 | Stop reason: HOSPADM

## 2023-09-06 RX ORDER — INSULIN ASPART 100 [IU]/ML
0-10 INJECTION, SOLUTION INTRAVENOUS; SUBCUTANEOUS
Status: DISCONTINUED | OUTPATIENT
Start: 2023-09-06 | End: 2023-09-07 | Stop reason: HOSPADM

## 2023-09-06 RX ORDER — EMTRICITABINE 200 MG/1
200 CAPSULE ORAL DAILY
Status: DISCONTINUED | OUTPATIENT
Start: 2023-09-06 | End: 2023-09-06

## 2023-09-06 RX ORDER — ARFORMOTEROL TARTRATE 15 UG/2ML
15 SOLUTION RESPIRATORY (INHALATION) 2 TIMES DAILY
Status: DISCONTINUED | OUTPATIENT
Start: 2023-09-06 | End: 2023-09-07 | Stop reason: HOSPADM

## 2023-09-06 RX ORDER — IPRATROPIUM BROMIDE AND ALBUTEROL SULFATE 2.5; .5 MG/3ML; MG/3ML
3 SOLUTION RESPIRATORY (INHALATION) EVERY 4 HOURS PRN
Status: DISCONTINUED | OUTPATIENT
Start: 2023-09-06 | End: 2023-09-07 | Stop reason: HOSPADM

## 2023-09-06 RX ORDER — SODIUM CHLORIDE 9 MG/ML
INJECTION, SOLUTION INTRAVENOUS ONCE
Status: DISCONTINUED | OUTPATIENT
Start: 2023-09-06 | End: 2023-09-06

## 2023-09-06 RX ORDER — ISOSORBIDE MONONITRATE 30 MG/1
30 TABLET, EXTENDED RELEASE ORAL DAILY
Status: DISCONTINUED | OUTPATIENT
Start: 2023-09-06 | End: 2023-09-07 | Stop reason: HOSPADM

## 2023-09-06 RX ORDER — ATENOLOL 25 MG/1
25 TABLET ORAL DAILY
Status: DISCONTINUED | OUTPATIENT
Start: 2023-09-06 | End: 2023-09-07 | Stop reason: HOSPADM

## 2023-09-06 RX ORDER — EMTRICITABINE 200 MG/1
200 CAPSULE ORAL DAILY
Status: DISCONTINUED | OUTPATIENT
Start: 2023-09-07 | End: 2023-09-07 | Stop reason: HOSPADM

## 2023-09-06 RX ORDER — BUDESONIDE 0.5 MG/2ML
0.5 INHALANT ORAL EVERY 12 HOURS
Status: DISCONTINUED | OUTPATIENT
Start: 2023-09-06 | End: 2023-09-07 | Stop reason: HOSPADM

## 2023-09-06 RX ORDER — MUPIROCIN 20 MG/G
OINTMENT TOPICAL 2 TIMES DAILY
Status: DISCONTINUED | OUTPATIENT
Start: 2023-09-06 | End: 2023-09-07 | Stop reason: HOSPADM

## 2023-09-06 RX ADMIN — BUDESONIDE 0.5 MG: 0.5 INHALANT ORAL at 08:09

## 2023-09-06 RX ADMIN — ATORVASTATIN CALCIUM 10 MG: 10 TABLET, FILM COATED ORAL at 08:09

## 2023-09-06 RX ADMIN — ARFORMOTEROL TARTRATE 15 MCG: 15 SOLUTION RESPIRATORY (INHALATION) at 08:09

## 2023-09-06 RX ADMIN — EMTRICITABINE 200 MG: 200 CAPSULE ORAL at 08:09

## 2023-09-06 RX ADMIN — INSULIN ASPART 4 UNITS: 100 INJECTION, SOLUTION INTRAVENOUS; SUBCUTANEOUS at 11:09

## 2023-09-06 RX ADMIN — RALTEGRAVIR 400 MG: 400 TABLET, FILM COATED ORAL at 09:09

## 2023-09-06 RX ADMIN — HYDRALAZINE HYDROCHLORIDE 25 MG: 25 TABLET, FILM COATED ORAL at 09:09

## 2023-09-06 RX ADMIN — HEPARIN SODIUM 5000 UNITS: 5000 INJECTION INTRAVENOUS; SUBCUTANEOUS at 09:09

## 2023-09-06 RX ADMIN — ASPIRIN 81 MG: 81 TABLET, COATED ORAL at 08:09

## 2023-09-06 RX ADMIN — TENOFOVIR ALAFENAMIDE 25 MG: 25 TABLET ORAL at 08:09

## 2023-09-06 RX ADMIN — RALTEGRAVIR 400 MG: 400 TABLET, FILM COATED ORAL at 11:09

## 2023-09-06 RX ADMIN — MUPIROCIN: 20 OINTMENT TOPICAL at 09:09

## 2023-09-06 RX ADMIN — ISOSORBIDE MONONITRATE 30 MG: 30 TABLET, EXTENDED RELEASE ORAL at 06:09

## 2023-09-06 NOTE — TELEPHONE ENCOUNTER
Made a f/u call to check on the patient since she completed xrt to the breast last week. Spoke with her niece Ember & she said she is currently in the hospital, said she had a heart attack last night. But said from the radiation the skin is peeling, she occasionally says the area is burning & is using Aquaphor. Instructed her this is normal, she may use peroxide/water cloths to the area & it may help soothe it & help remove any peeling skin. Also instructed her to continue using Aquaphor Ointment, she verbalized understanding.  ----- Message from Maria Seth RN sent at 9/6/2023  7:46 AM CDT -----  Regarding: RE: Make 1 week f/u call    ----- Message -----  From: Maria Seth RN  Sent: 8/30/2023  12:37 PM CDT  To: Rosana Arevalo RN; Maria Seth RN  Subject: Make 1 week f/u appt

## 2023-09-06 NOTE — PROGRESS NOTES
09/06/23 1800   Required for all Hemodialysis Patients   Hepatitis Status other (see comments)   Handoff Report   Received From Harvinder Greene RN   Given To Brooke Pedroza RN   Treatment Type   Treatment Type Acute   Vital Signs   Temp 97.9 °F (36.6 °C)   Temp Source Oral   Pulse 72   Heart Rate Source Monitor   Resp 20   SpO2 100 %   Pulse Oximetry Type Continuous   Oximetry Probe Site Intact   Device (Oxygen Therapy) room air   /62   MAP (mmHg) 87   BP Location Right arm   BP Method Automatic   Patient Position Lying   Post-Hemodialysis Assessment   Rinseback Volume (mL) 250 mL   Blood Volume Processed (Liters) 53.7 L   Dialyzer Clearance Moderately streaked   Duration of Treatment 150 minutes   Additional Fluid Intake (mL) 500 mL   Total UF (mL) 1906 mL   Net Fluid Removal 1406   Patient Response to Treatment Tolerated   Post-Hemodialysis Comments 2.5 hrs I-HDTx completed as planned, NET UF goal reached, Blood reperfused and pt de accessed according to P&P. RTF with primary ICU RN.

## 2023-09-06 NOTE — PLAN OF CARE
Pt admitted overnight w/STEMI, taken to CL, no interventions per Cards needed, no CP overnight, sheath pulled from R groin w/out issue, site remains WDL, no hematoma noted, bedrest complete. POC reviewed w/pt and pt's family, all questions answered.

## 2023-09-06 NOTE — TREATMENT PLAN
Pt has done well over the course of the day with no new isues or c/o identified. She has transfer orders out of the ICU to the telemetry floor. Critical Care will s/o as pt is w/o any critical care issues at this time. Please call if we can be of assistance in the future.

## 2023-09-06 NOTE — CONSULTS
[facility] - Intensive Care (Hospital)  Nephrology  Consult Note        Patient Name: Malaika Paredes  MRN: [id number]  Admission Date: 9/5/2023  Hospital Length of Stay: 1 days  Attending Provider: Travis Bartholomew MD   Primary Care Physician: Travis Scales MD  Principal Problem:Acute ST elevation myocardial infarction (STEMI) of lateral wall    Consults  Subjective:     HPI: I was asked to see this patient in consultation to provide evaluation and management of end-stage renal disease.    Malaika Paredes is a 67 year old female with  ESRD, HD on TTS HD at [facility]. She also has HIV, breast cancer, DM, HTN, CAD post CABG, COPD, PAF, and PAD. She did not go to dialysis yesterday due to feeling poorly and she presented to ED with substernal pressure and ecg with inferolateral st-t abnl but no STEMI. Hr labs showed a ; troponin 0.015     Later in ED had more CP and her ecg was done and showed high lateral ST elevation concerning for STEMI so code STEMI activated.She was taken to the cath lab for C which revealed patent coronary artery bypass grafts; no intervention indicated. Normal LVEF.    Is now managed in the ICU and appears clinically stable.  She will receive a short dialysis today since she missed dialysis yesterday.  She will receive her full dialysis treatment tomorrow per her usual schedule.      Past Medical History:   Diagnosis Date    CAD (coronary artery disease)     Cataract     Cataract     CHF (congestive heart failure)     COPD (chronic obstructive pulmonary disease)     Currently asymptomatic HIV infection, with history of HIV-related illness 7/10/2023    Diabetes mellitus     Diabetic retinopathy     ESRD (end stage renal disease)     TTS    Hypertension     Ischemic ulcer of toe of left foot     Malignant neoplasm of upper-outer quadrant of left breast in female, estrogen receptor positive 06/20/2022    left    PAD (peripheral artery disease)     PAF  (paroxysmal atrial fibrillation)        Past Surgical History:   Procedure Laterality Date    APPLICATION OF WOUND VACUUM-ASSISTED CLOSURE DEVICE Left 4/6/2023    Procedure: APPLICATION, WOUND VAC;  Surgeon: Jayjay Arana MD;  Location: [facility] OR;  Service: General;  Laterality: Left;  left chest    AXILLARY NODE DISSECTION Left 3/1/2023    Procedure: LYMPHADENECTOMY, AXILLARY;  Surgeon: Jayjay Arana MD;  Location: [facility] OR;  Service: General;  Laterality: Left;    BIOPSY OF INGUINAL LYMPH NODE Left 3/31/2023    Procedure: BIOPSY, LYMPH NODE, INGUINAL;  Surgeon: Jayjay Arana MD;  Location: [facility] OR;  Service: General;  Laterality: Left;    BREAST BIOPSY Right     benign    CATARACT EXTRACTION W/  INTRAOCULAR LENS IMPLANT Right 08/14/2017    Dr. Moe    CORONARY ARTERY BYPASS GRAFT  2020    FLUOROSCOPY N/A 07/25/2022    Procedure: FLUOROSCOPY/mediport placement;  Surgeon: Cole Perdomo MD;  Location: [facility] CATH LAB;  Service: General;  Laterality: N/A;    MEDIPORT REMOVAL N/A 3/1/2023    Procedure: REMOVAL, CATHETER, CENTRAL VENOUS, TUNNELED, WITH PORT;  Surgeon: Jayjay Arana MD;  Location: [facility] OR;  Service: General;  Laterality: N/A;    MODIFIED RADICAL MASTECTOMY W/ AXILLARY LYMPH NODE DISSECTION Left 3/1/2023    Procedure: MASTECTOMY, MODIFIED RADICAL;  Surgeon: Jayjay Arana MD;  Location: [facility] OR;  Service: General;  Laterality: Left;    WOUND DEBRIDEMENT Left 3/31/2023    Procedure: DEBRIDEMENT, WOUND;  Surgeon: Jayjay Arana MD;  Location: [facility] OR;  Service: General;  Laterality: Left;  left chest wall eschar debridement       Review of patient's allergies indicates:  No Known Allergies  Current Facility-Administered Medications   Medication Frequency    albuterol-ipratropium 2.5 mg-0.5 mg/3 mL nebulizer solution 3 mL Q4H PRN    amLODIPine tablet 10 mg Daily    arformoteroL nebulizer solution 15 mcg BID    aspirin EC tablet 81 mg Daily    atenoloL tablet 25 mg Daily    atorvastatin tablet 10  mg Daily    budesonide nebulizer solution 0.5 mg Q12H    dextrose 10% bolus 125 mL 125 mL PRN    dextrose 10% bolus 250 mL 250 mL PRN    emtricitabine capsule 200 mg Daily    glucagon (human recombinant) injection 1 mg PRN    glucose chewable tablet 16 g PRN    glucose chewable tablet 24 g PRN    hydrALAZINE injection 10 mg Q8H PRN    hydrALAZINE tablet 25 mg TID    HYDROcodone-acetaminophen 5-325 mg per tablet 1 tablet Q4H PRN    insulin aspart U-100 pen 0-10 Units QID (AC + HS) PRN    morphine injection 2 mg Q4H PRN    mupirocin 2 % ointment BID    tenofovir alafenamide tablet Tab 25 mg Daily     Family History       Problem Relation (Age of Onset)    Breast cancer Sister    Cancer Father, Brother    Diabetes Sister    Hypertension Mother          Tobacco Use    Smoking status: Former     Current packs/day: 0.00     Types: Cigarettes     Start date: 1982     Quit date: 2012     Years since quittin.2    Smokeless tobacco: Never   Substance and Sexual Activity    Alcohol use: No    Drug use: Never    Sexual activity: Not on file     Review of Systems   Constitutional: Negative.    HENT: Negative.     Eyes: Negative.    Respiratory: Negative.     Cardiovascular:  Positive for chest pain. Negative for palpitations.   Gastrointestinal: Negative.    Genitourinary: Negative.    Musculoskeletal: Negative.      Objective:     Vital Signs (Most Recent):  Temp: 98 °F (36.7 °C) (23 0345)  Pulse: 64 (23 0806)  Resp: 14 (23 08)  BP: (!) 173/79 (23 0530)  SpO2: 100 % (23 0806) Vital Signs (24h Range):  Temp:  [98 °F (36.7 °C)-98.5 °F (36.9 °C)] 98 °F (36.7 °C)  Pulse:  [58-70] 64  Resp:  [12-30] 14  SpO2:  [95 %-100 %] 100 %  BP: ()/(45-94) 173/79     Weight: 80.2 kg (176 lb 12.9 oz) (23 2110)  Body mass index is 32.34 kg/m².  Body surface area is 1.87 meters squared.    No intake/output data recorded.     Physical Exam  Vitals reviewed.    Constitutional:       Appearance: Normal appearance.   HENT:      Head: Normocephalic and atraumatic.      Nose: Nose normal.   Eyes:      Extraocular Movements: Extraocular movements intact.      Conjunctiva/sclera: Conjunctivae normal.   Cardiovascular:      Rate and Rhythm: Normal rate and regular rhythm.   Pulmonary:      Effort: Pulmonary effort is normal.      Breath sounds: Normal breath sounds.   Abdominal:      Palpations: Abdomen is soft.   Musculoskeletal:         General: No swelling.      Cervical back: Normal range of motion and neck supple.      Comments: Functional left lower arm AV fistula with good thrill and bruit.   Skin:     General: Skin is warm and dry.   Neurological:      Mental Status: She is alert.          Significant Labs:  CBC:   Recent Labs   Lab 09/06/23  0413   WBC 5.17   RBC 3.70*   HGB 12.5   HCT 36.6*      MCV 99*   MCH 33.8*   MCHC 34.2     CMP:   Recent Labs   Lab 09/06/23  0732   *   CALCIUM 8.4*   ALBUMIN 2.8*   PROT 8.2   *   K 6.2*   CO2 12*      *   CREATININE 6.9*   ALKPHOS 70   ALT 11   AST 14   BILITOT 0.4     All labs within the past 24 hours have been reviewed.        Assessment/Plan:     ESRD:   Patient dialyzes Q TTS at [facility].  She skipped dialysis yesterday.  Plan for short dialysis today and resume usual scheduled dialysis tomorrow.    Hyperkalemia:   Consistent with skipping dialysis yesterday.    Follow with dialysis today.    Ischemic heart disease:   Patient presents with an ST elevated MI.    Taken to heart catheterization with no lesion requiring intervention.    Patient has previous history of bypass graft.  All grafts were reported open.    Anemia of chronic kidney disease:   Currently stable.   Follow hemoglobin and treat with iron supplementation and/or EPO if indicated.      Metabolic bone disorder of end-stage renal disease:   Follow labs and Continue patient's usual phosphate binder and  vitamin-D replacement.      Diabetes mellitus:   Managed per ICU team.      HIV disease:     Breast cancer:   History of chemotherapy as outlined by primary team.      Thank you for your consult.     Gavin Adan MD   Nephrology  [facility] - Intensive Care ([facility])

## 2023-09-06 NOTE — HPI
67 year old female with medical issues including ESRD on TTS HD (last on 9/2), HIV on discovy, breast cancer (Neoadjuvant Paclitaxel / Trastuzumab / Pertuzumab x 5, followed by Trastuzumab/Pertuzumab completed in March of 2023, and currently on Kadycyla every 3w), DM, HTN, CAD post CABG, COPD, PAF, and PAD    Presented to ED on 9/5 with CP and ecg showed sinus rhythm, inferolateral st-t abnl, LAFB but no STEMI.  Eval revealed sodium 132; potassium 5.0; BUN/creatinine 95/6.9; glucose 258; ; troponin 0.015    Later in ED had more CP, and reported syncope later in evening and repeat ecg was done and showed high lateral ST elevation concerning for STEMI so code STEMI activated.  LHC revealed patent coronary artery bypass grafts; no intervention indicated. Normal LVEF.    Critical care team consulted to monitor and assist with management while in ICU post STEMI

## 2023-09-06 NOTE — ASSESSMENT & PLAN NOTE
- Left Breast Cancer, ER/VA/HER2 +   Treatment History:  - Neoadjuvant Paclitaxel / Trastuzumab / Pertuzumab x 5, followed by Trastuzumab/Pertuzumab completed in March of 2023.    - Lt. breast mastectomy with sentinel node biopsy and subsequent Lt. axillary dissection - 3/1/23.     Current Treatment:   - Kadycyla q3wk

## 2023-09-06 NOTE — HPI
Malaika Paredes is a 67 year old female with  ESRD, HD on TTS HD at [facility]. She also has HIV, breast cancer, DM, HTN, CAD post CABG, COPD, PAF, and PAD. She did not go to dialysis yesterday due to feeling poorly and she presented to ED with substernal pressure and ecg with inferolateral st-t abnl but no STEMI. Hr labs showed a ; troponin 0.015     Later in ED had more CP and her ecg was done and showed high lateral ST elevation concerning for STEMI so code STEMI activated.She was taken to the cath lab for [facility] which revealed patent coronary artery bypass grafts; no intervention indicated. Normal LVEF.    Pt transferred from ICU remains clinically stable.

## 2023-09-06 NOTE — ED NOTES
Pt moved from TL after having syncopal episode. Per reporting nurse, pt c/o 9/10 cp just prior to admin of 3rd NTG. Immediately before administer NTG, pt became diaphoretic and had a syncopal episode. Pt moved to ED 4, EKG repeated, and Dr Marquez notified. Pt presents diaphoretic and oriented x4 with c/o 9/10 cp described as heavy. Dr Marquez at bedside at this time. To consult cardiology for concerning EKG.

## 2023-09-06 NOTE — SUBJECTIVE & OBJECTIVE
Past Medical History:   Diagnosis Date    CAD (coronary artery disease)     Cataract     Cataract     CHF (congestive heart failure)     COPD (chronic obstructive pulmonary disease)     Currently asymptomatic HIV infection, with history of HIV-related illness 7/10/2023    Diabetes mellitus     Diabetic retinopathy     ESRD (end stage renal disease)     TTS    Hypertension     Ischemic ulcer of toe of left foot     Malignant neoplasm of upper-outer quadrant of left breast in female, estrogen receptor positive 06/20/2022    left    PAD (peripheral artery disease)     PAF (paroxysmal atrial fibrillation)        Past Surgical History:   Procedure Laterality Date    APPLICATION OF WOUND VACUUM-ASSISTED CLOSURE DEVICE Left 4/6/2023    Procedure: APPLICATION, WOUND VAC;  Surgeon: Jayjay Arana MD;  Location: [facility] OR;  Service: General;  Laterality: Left;  left chest    AXILLARY NODE DISSECTION Left 3/1/2023    Procedure: LYMPHADENECTOMY, AXILLARY;  Surgeon: Jayjay Arana MD;  Location: [facility] OR;  Service: General;  Laterality: Left;    BIOPSY OF INGUINAL LYMPH NODE Left 3/31/2023    Procedure: BIOPSY, LYMPH NODE, INGUINAL;  Surgeon: Jayjay Arana MD;  Location: [facility] OR;  Service: General;  Laterality: Left;    BREAST BIOPSY Right     benign    CATARACT EXTRACTION W/  INTRAOCULAR LENS IMPLANT Right 08/14/2017    Dr. Moe    CORONARY ARTERY BYPASS GRAFT  2020    FLUOROSCOPY N/A 07/25/2022    Procedure: FLUOROSCOPY/mediport placement;  Surgeon: Cole Perdomo MD;  Location: [facility] CATH LAB;  Service: General;  Laterality: N/A;    MEDIPORT REMOVAL N/A 3/1/2023    Procedure: REMOVAL, CATHETER, CENTRAL VENOUS, TUNNELED, WITH PORT;  Surgeon: Jayjay Arana MD;  Location: [facility] OR;  Service: General;  Laterality: N/A;    MODIFIED RADICAL MASTECTOMY W/ AXILLARY LYMPH NODE DISSECTION Left 3/1/2023    Procedure: MASTECTOMY, MODIFIED RADICAL;  Surgeon: Jayjay Arana MD;  Location: [facility] OR;  Service: General;  Laterality: Left;    WOUND  DEBRIDEMENT Left 3/31/2023    Procedure: DEBRIDEMENT, WOUND;  Surgeon: Jayjay Arana MD;  Location: [facility];  Service: General;  Laterality: Left;  left chest wall eschar debridement       Review of patient's allergies indicates:  No Known Allergies  Current Facility-Administered Medications   Medication Frequency    albuterol-ipratropium 2.5 mg-0.5 mg/3 mL nebulizer solution 3 mL Q4H PRN    amLODIPine tablet 10 mg Daily    arformoteroL nebulizer solution 15 mcg BID    aspirin EC tablet 81 mg Daily    atenoloL tablet 25 mg Daily    atorvastatin tablet 10 mg Daily    budesonide nebulizer solution 0.5 mg Q12H    dextrose 10% bolus 125 mL 125 mL PRN    dextrose 10% bolus 250 mL 250 mL PRN    emtricitabine capsule 200 mg Daily    glucagon (human recombinant) injection 1 mg PRN    glucose chewable tablet 16 g PRN    glucose chewable tablet 24 g PRN    hydrALAZINE injection 10 mg Q8H PRN    hydrALAZINE tablet 25 mg TID    HYDROcodone-acetaminophen 5-325 mg per tablet 1 tablet Q4H PRN    insulin aspart U-100 pen 0-10 Units QID (AC + HS) PRN    morphine injection 2 mg Q4H PRN    mupirocin 2 % ointment BID    tenofovir alafenamide tablet Tab 25 mg Daily     Family History       Problem Relation (Age of Onset)    Breast cancer Sister    Cancer Father, Brother    Diabetes Sister    Hypertension Mother          Tobacco Use    Smoking status: Former     Current packs/day: 0.00     Types: Cigarettes     Start date: 1982     Quit date: 2012     Years since quittin.2    Smokeless tobacco: Never   Substance and Sexual Activity    Alcohol use: No    Drug use: Never    Sexual activity: Not on file     Review of Systems   Constitutional: Negative.    HENT: Negative.     Eyes: Negative.    Respiratory: Negative.     Cardiovascular:  Positive for chest pain. Negative for palpitations.   Gastrointestinal: Negative.    Genitourinary: Negative.    Musculoskeletal: Negative.      Objective:     Vital Signs (Most  Recent):  Temp: 98 °F (36.7 °C) (09/06/23 0345)  Pulse: 64 (09/06/23 0806)  Resp: 14 (09/06/23 0806)  BP: (!) 173/79 (09/06/23 0530)  SpO2: 100 % (09/06/23 0806) Vital Signs (24h Range):  Temp:  [98 °F (36.7 °C)-98.5 °F (36.9 °C)] 98 °F (36.7 °C)  Pulse:  [58-70] 64  Resp:  [12-30] 14  SpO2:  [95 %-100 %] 100 %  BP: ()/(45-94) 173/79     Weight: 80.2 kg (176 lb 12.9 oz) (09/05/23 2110)  Body mass index is 32.34 kg/m².  Body surface area is 1.87 meters squared.    No intake/output data recorded.     Physical Exam  Vitals reviewed.   Constitutional:       Appearance: Normal appearance.   HENT:      Head: Normocephalic and atraumatic.      Nose: Nose normal.   Eyes:      Extraocular Movements: Extraocular movements intact.      Conjunctiva/sclera: Conjunctivae normal.   Cardiovascular:      Rate and Rhythm: Normal rate and regular rhythm.   Pulmonary:      Effort: Pulmonary effort is normal.      Breath sounds: Normal breath sounds.   Abdominal:      Palpations: Abdomen is soft.   Musculoskeletal:         General: No swelling.      Cervical back: Normal range of motion and neck supple.      Comments: Functional left lower arm AV fistula with good thrill and bruit.   Skin:     General: Skin is warm and dry.   Neurological:      Mental Status: She is alert.          Significant Labs:  CBC:   Recent Labs   Lab 09/06/23  0413   WBC 5.17   RBC 3.70*   HGB 12.5   HCT 36.6*      MCV 99*   MCH 33.8*   MCHC 34.2     CMP:   Recent Labs   Lab 09/06/23  0732   *   CALCIUM 8.4*   ALBUMIN 2.8*   PROT 8.2   *   K 6.2*   CO2 12*      *   CREATININE 6.9*   ALKPHOS 70   ALT 11   AST 14   BILITOT 0.4     All labs within the past 24 hours have been reviewed.

## 2023-09-06 NOTE — SUBJECTIVE & OBJECTIVE
Past Medical History:   Diagnosis Date    CAD (coronary artery disease)     Cataract     Cataract     CHF (congestive heart failure)     COPD (chronic obstructive pulmonary disease)     Currently asymptomatic HIV infection, with history of HIV-related illness 7/10/2023    Diabetes mellitus     Diabetic retinopathy     ESRD (end stage renal disease)     TTS    Hypertension     Ischemic ulcer of toe of left foot     Malignant neoplasm of upper-outer quadrant of left breast in female, estrogen receptor positive 06/20/2022    left    PAD (peripheral artery disease)     PAF (paroxysmal atrial fibrillation)        Past Surgical History:   Procedure Laterality Date    APPLICATION OF WOUND VACUUM-ASSISTED CLOSURE DEVICE Left 4/6/2023    Procedure: APPLICATION, WOUND VAC;  Surgeon: Jayjay Arana MD;  Location: [facility] OR;  Service: General;  Laterality: Left;  left chest    AXILLARY NODE DISSECTION Left 3/1/2023    Procedure: LYMPHADENECTOMY, AXILLARY;  Surgeon: Jayjay Arana MD;  Location: [facility] OR;  Service: General;  Laterality: Left;    BIOPSY OF INGUINAL LYMPH NODE Left 3/31/2023    Procedure: BIOPSY, LYMPH NODE, INGUINAL;  Surgeon: Jayjay Arana MD;  Location: [facility] OR;  Service: General;  Laterality: Left;    BREAST BIOPSY Right     benign    CATARACT EXTRACTION W/  INTRAOCULAR LENS IMPLANT Right 08/14/2017    Dr. Moe    CORONARY ARTERY BYPASS GRAFT  2020    FLUOROSCOPY N/A 07/25/2022    Procedure: FLUOROSCOPY/mediport placement;  Surgeon: Cole Perdomo MD;  Location: [facility] CATH LAB;  Service: General;  Laterality: N/A;    MEDIPORT REMOVAL N/A 3/1/2023    Procedure: REMOVAL, CATHETER, CENTRAL VENOUS, TUNNELED, WITH PORT;  Surgeon: Jayjay Arana MD;  Location: [facility] OR;  Service: General;  Laterality: N/A;    MODIFIED RADICAL MASTECTOMY W/ AXILLARY LYMPH NODE DISSECTION Left 3/1/2023    Procedure: MASTECTOMY, MODIFIED RADICAL;  Surgeon: Jayjay Arana MD;  Location: [facility] OR;  Service: General;  Laterality: Left;    WOUND  DEBRIDEMENT Left 3/31/2023    Procedure: DEBRIDEMENT, WOUND;  Surgeon: Jayjay Arana MD;  Location: [facility];  Service: General;  Laterality: Left;  left chest wall eschar debridement       Review of patient's allergies indicates:  No Known Allergies    Family History       Problem Relation (Age of Onset)    Breast cancer Sister    Cancer Father, Brother    Diabetes Sister    Hypertension Mother          Tobacco Use    Smoking status: Former     Current packs/day: 0.00     Types: Cigarettes     Start date: 1982     Quit date: 2012     Years since quittin.2    Smokeless tobacco: Never   Substance and Sexual Activity    Alcohol use: No    Drug use: Never    Sexual activity: Not on file         Review of Systems   Constitutional:  Positive for fatigue.   HENT:  Negative for trouble swallowing.    Respiratory:  Positive for shortness of breath (at baseline).    Cardiovascular:  Positive for chest pain (resolved now).   Gastrointestinal:  Positive for nausea. Negative for abdominal pain and vomiting.   Neurological:  Positive for light-headedness. Negative for syncope.   Psychiatric/Behavioral:  The patient is not nervous/anxious.      Objective:     Vital Signs (Most Recent):  Temp: 98 °F (36.7 °C) (23)  Pulse: 64 (23)  Resp: 18 (23)  BP: (!) 145/73 (23)  SpO2: 100 % (23) Vital Signs (24h Range):  Temp:  [98 °F (36.7 °C)-98.5 °F (36.9 °C)] 98 °F (36.7 °C)  Pulse:  [61-70] 64  Resp:  [12-26] 18  SpO2:  [95 %-100 %] 100 %  BP: (140-198)/(67-94) 145/73     Weight: 80.2 kg (176 lb 12.9 oz)  Body mass index is 32.34 kg/m².    No intake or output data in the 24 hours ending 23     Physical Exam  Vitals and nursing note reviewed.   Constitutional:       General: She is sleeping. She is not in acute distress.     Appearance: She is obese. She is not ill-appearing.      Comments: Arouses easily then falls back to sleep   HENT:      Head: Atraumatic.    Eyes:      Conjunctiva/sclera: Conjunctivae normal.   Neck:      Vascular: No JVD.   Cardiovascular:      Rate and Rhythm: Normal rate and regular rhythm.      Pulses:           Radial pulses are 2+ on the right side and 2+ on the left side.        Dorsalis pedis pulses are 1+ on the right side and 1+ on the left side.      Arteriovenous access: Left arteriovenous access is present.  Pulmonary:      Effort: Pulmonary effort is normal.      Breath sounds: Rhonchi present. No wheezing or rales.   Abdominal:      General: Bowel sounds are normal. There is no distension.      Palpations: Abdomen is soft.      Tenderness: There is no abdominal tenderness.   Musculoskeletal:      Right lower leg: No edema.      Left lower leg: No edema.   Skin:     General: Skin is warm and dry.      Capillary Refill: Capillary refill takes less than 2 seconds.          Neurological:      Mental Status: She is easily aroused.      GCS: GCS eye subscore is 3. GCS verbal subscore is 5. GCS motor subscore is 6.   Psychiatric:         Attention and Perception: She is inattentive.         Mood and Affect: Mood normal.         Speech: Speech normal.         Behavior: Behavior normal. Behavior is cooperative.          Vents:       Lines/Drains/Airways       Peripheral Intravenous Line  Duration                  Hemodialysis AV Fistula Left forearm -- days         Peripheral IV - Single Lumen 09/05/23 1623 20 G Right Antecubital <1 day         Peripheral IV - Single Lumen 09/05/23 1912 22 G Right Wrist <1 day                    Significant Labs:    CBC/Anemia Profile:  Recent Labs   Lab 09/05/23  1622   WBC 7.24   HGB 12.5   HCT 36.4*      MCV 93   RDW 18.2*        Chemistries:  Recent Labs   Lab 09/05/23  1622   *   K 5.0      CO2 15*   BUN 95*   CREATININE 6.9*   CALCIUM 8.9   ALBUMIN 2.8*   PROT 8.3   BILITOT 0.3   ALKPHOS 69   ALT 16   AST 17       All pertinent labs within the past 24 hours have been  reviewed.    Significant Imaging:   I have reviewed all pertinent imaging results/findings within the past 24 hours.

## 2023-09-06 NOTE — EICU
New Patient Evaluation    HPI:  67 F obese (BMI 34), history of ESRD on HD, CAD s/p CABG, carotid aratery stenosis s/p CEA, HFpEF, DM (A1C 5.7), hypothyroidism (TSH 3.2), hypertension, paroxysmal afib, HIV on ARV, invasive ductal CA (ER/AK/Her+) on concurrent radiation therapy and Kadcyla infusion.    She presented with chest pain and had a syncopal episode while in the ED. EKG with lateral STEMI. Underwent LHC via right femoral with note of patent grafts and normal LVEF.    Camera Assessment:  /70  HR 67  O2 100%  Seen awake flat on bed, on the phone, not in distress    Data:  WBC 7.24, H/H 12.5/36.4, platelets 212  Na 132, K 5, glucose 258      Assessment and Plans:   Post cath care  ESRD with no urgent indication for HD at this time

## 2023-09-06 NOTE — PLAN OF CARE
Patient currently sitting up in bed with safety and fall prevention measures in place. Vital signs are stable. Patient has completed HD tx. Plan of care discussed with the patient. Verbalized understanding. Encouraged to call should needs arise. Will monitor for needs/changes.

## 2023-09-06 NOTE — OP NOTE
[facility] - Cath Lab ([facility])  Cardiac Catheterization  Procedure Note    SUMMARY     Malaika Paredes  [id number]  Travis Scales MD    Date of Procedure: 9/5/2023    Procedure: 1. LHC 2. LEFT VENTRICULOGRAM 3. CORONARY ANGIOGRAM.  4. GRAFT ANGIOGRAM 5. LEFT SUBCLAVIAN ANGIOGRAM    Provider: Travis Bartholomew MD    Indications: She was referred for cardiac catheterization to further evaluate acute lateral STEMI.    Pre-Procedure Diagnosis: acute lateral STEMI    Post-Procedure Diagnosis:  CAD as noted below    Anesthesia: RN IV Sedation    Description of the Findings of the Procedure:     The risks, benefits, complications, treatment options, and expected outcomes were discussed with the patient. The patient and/or family concurred with the proposed plan, giving informed consent.     Findings:  Patent LIMA-LAD  Patent SVG-OM  Patent SVG-RCA  Severe native multivessel CAD.  Normal LVEF.  Right femoral manual sheath removal.  See report.    Complications: None; patient tolerated the procedure well.    Estimated Blood Loss (EBL): Minimal           Implants: none    Specimens: none    Condition: stable    Disposition: PACU - hemodynamically stable.    Attestation: I was present and scrubbed for the entire procedure.      Recommendations:    Usual post cath care.  OMT advised for multivessel CAD.  Admit to ICU overnight.  Echocardiogram.

## 2023-09-06 NOTE — PROGRESS NOTES
09/06/23 1430   Required for all Hemodialysis Patients   Hepatitis Status other (see comments)   Handoff Report   Received From Brooke Pedroza RN   Given To Harvinder Greene RN   Treatment Type   Treatment Type Acute   Vital Signs   Temp 97.5 °F (36.4 °C)   Temp Source Axillary   Pulse 64   Heart Rate Source Monitor   Resp 18   SpO2 100 %   Pulse Oximetry Type Continuous   Oximetry Probe Site Intact   Device (Oxygen Therapy) room air   BP (!) 162/75   MAP (mmHg) 108   BP Location Right arm   BP Method Automatic   Patient Position Lying     Will prepare for urgent HDtx , pt consented for tx.

## 2023-09-06 NOTE — PROGRESS NOTES
09/06/23 1452   Required for all Hemodialysis Patients   Hepatitis Status other (see comments)   Treatment Type   Treatment Type Acute   Vital Signs   Temp 97.5 °F (36.4 °C)   Temp Source Axillary   Pulse 65   Heart Rate Source Monitor   Resp 18   SpO2 100 %   Pulse Oximetry Type Continuous   Oximetry Probe Site Intact   Device (Oxygen Therapy) room air   BP (!) 167/75   MAP (mmHg) 108   BP Location Right arm   BP Method Automatic   Patient Position Lying        Hemodialysis AV Fistula Left forearm   No placement date or time found.   Present Prior to Hospital Arrival?: Yes  Location: Left forearm   Needle Size 15ga   Site Assessment Clean;Dry;Intact;No redness;No swelling;No warmth;No drainage   Patency Bruit;Thrill;Present   Status Accessed   Flows Good   Dressing Intervention Integrity maintained   Dressing Status Clean;Dry;Intact   Site Condition No complications   Dressing Gauze   Drainage Description Other (Comment)  (NO DRAINAGE NOTED)     2.5 Hour I-HDTx initiated and in progress

## 2023-09-06 NOTE — HOSPITAL COURSE
Direct transfer to ICU post emergent LHC  Seen and examined after arrival  Sleepy post sedation with only complaint of minor pain at cath site and baseline shortness of breath

## 2023-09-06 NOTE — PLAN OF CARE
[facility] - Intensive Care (Hospital)  Initial Discharge Assessment       Primary Care Provider: Travis Scales MD    Admission Diagnosis: Chest pain [R07.9]  ST elevation myocardial infarction (STEMI), unspecified artery [I21.3]    Admission Date: 9/5/2023  Expected Discharge Date:     Transition of Care Barriers: None    Payor: HU[employer] MANAGED MEDICARE / Plan: HU[employer] MEDICARE HMO / Product Type: Capitation /     Extended Emergency Contact Information  Primary Emergency Contact: Prosper Paredes  Mobile Phone: [phone]  Relation: Healthcare Power of   Secondary Emergency Contact: ReggieJarvisdanielle  Address: [address]           [place] [address] [facility]  Home Phone: [phone]  Mobile Phone: [phone]  Relation: Other    Discharge Plan A: Home with family         [facility] [place] - [address]  [address]  [place] [address]  Phone: [phone] Fax: [phone]    [id number] - [place], [place] - [address] AT [facility] [address]  [address]  [place] [address]  Phone: [phone] Fax: [phone]    [facility] [address]  [facility] [place] [place] [address]  Phone: [phone] Fax: [phone]      Initial Assessment (most recent)       Adult Discharge Assessment - 09/06/23 0958          Discharge Assessment    Assessment Type Discharge Planning Assessment     Confirmed/corrected address, phone number and insurance Yes     Confirmed Demographics Correct on Facesheet     Source of Information patient     Communicated NU with patient/caregiver Date not available/Unable to determine     Reason For Admission STEMI     People in Home other relative(s)     Facility Arrived From: [facility]     Do you expect to return to your current living situation? Yes     Do you have help at home or someone to help you manage your care at home? Yes     Who are your caregiver(s) and their phone  number(s)? Son, Prosper and E[facility] Batista     Prior to hospitilization cognitive status: Alert/Oriented     Current cognitive status: Alert/Oriented     Walking or Climbing Stairs ambulation difficulty, requires equipment     Mobility Management rolling walker     Dressing/Bathing bathing difficulty, requires equipment     Dressing/Bathing Management shower chair     Home Accessibility wheelchair accessible     Home Layout Able to live on 1st floor     Equipment Currently Used at Home cane, straight;walker, rolling;shower chair     Readmission within 30 days? No     Patient currently being followed by outpatient case management? No     Do you currently have service(s) that help you manage your care at home? No     Do you take prescription medications? Yes     Do you have prescription coverage? Yes     Coverage MCR     Do you have any problems affording any of your prescribed medications? No     Is the patient taking medications as prescribed? yes     Who is going to help you get home at discharge? family     How do you get to doctors appointments? health plan transportation;public transportation     Are you on dialysis? Yes     Dialysis Name and Scheduled days TTS [facility] [place] [facility] 1st shift     Do you take coumadin? No     DME Needed Upon Discharge  none     Discharge Plan discussed with: Patient     Transition of Care Barriers None     Discharge Plan A Home with family                   Anticipated DC dispo: home with family   Prior Level of Function: independent with ADLs (uses equipment)   People in home: Ember Davalos     Comments:  CM met with patient at bedside to introduce role and discuss discharge planning. Family will be help at home and can provide transport at time of discharge. Patient uses cabs and medical transport for dialysis. Confirmed demographics, insurance, and emergency contacts. CM discharge needs depends on hospital progress. CM will continue following to assist with other needs.

## 2023-09-06 NOTE — ASSESSMENT & PLAN NOTE
LHC showed patent coronary artery bypass grafts and nl LVEF  Plan echo in am  Trend troponin  Cardiology managing

## 2023-09-06 NOTE — HPI
Malaika Paredes is a 67 year old female with  ESRD, HD on TTS HD at [facility]. She also has HIV, breast cancer, DM, HTN, CAD post CABG, COPD, PAF, and PAD. She did not go to dialysis yesterday due to feeling poorly and she presented to ED with substernal pressure and ecg with inferolateral st-t abnl but no STEMI. Hr labs showed a ; troponin 0.015     Later in ED had more CP and her ecg was done and showed high lateral ST elevation concerning for STEMI so code STEMI activated.She was taken to the cath lab for [facility] which revealed patent coronary artery bypass grafts; no intervention indicated. Normal LVEF.     Is now managed in the ICU and appears clinically stable.  She will receive a short dialysis today since she missed dialysis yesterday.  She will receive her full dialysis treatment tomorrow per her usual schedule.

## 2023-09-06 NOTE — CONSULTS
[facility] - Intensive Care (Hospital)  Critical Care Medicine  Consult Note    Patient Name: Malaika Paredes  MRN: [id number]  Admission Date: 9/5/2023  Hospital Length of Stay: 1 days  Code Status: Prior  Attending Physician: Travis Bartholomew MD   Primary Care Provider: Travis Scales MD   Principal Problem: Acute ST elevation myocardial infarction (STEMI) of lateral wall      Subjective:     HPI:  67 year old female with medical issues including ESRD on TTS HD (last on 9/2), HIV on discovy, breast cancer (Neoadjuvant Paclitaxel / Trastuzumab / Pertuzumab x 5, followed by Trastuzumab/Pertuzumab completed in March of 2023, and currently on Kadycyla every 3w), DM, HTN, CAD post CABG, COPD, PAF, and PAD    Presented to ED on 9/5 with CP and ecg showed sinus rhythm, inferolateral st-t abnl, LAFB but no STEMI.  Eval revealed sodium 132; potassium 5.0; BUN/creatinine 95/6.9; glucose 258; ; troponin 0.015    Later in ED had more CP, and reported syncope later in evening and repeat ecg was done and showed high lateral ST elevation concerning for STEMI so code STEMI activated.  LHC revealed patent coronary artery bypass grafts; no intervention indicated. Normal LVEF.    Critical care team consulted to monitor and assist with management while in ICU post STEMI      Hospital/ICU Course:  Direct transfer to ICU post emergent LHC  Seen and examined after arrival  Sleepy post sedation with only complaint of minor pain at cath site and baseline shortness of breath      Past Medical History:   Diagnosis Date    CAD (coronary artery disease)     Cataract     Cataract     CHF (congestive heart failure)     COPD (chronic obstructive pulmonary disease)     Currently asymptomatic HIV infection, with history of HIV-related illness 7/10/2023    Diabetes mellitus     Diabetic retinopathy     ESRD (end stage renal disease)     TTS    Hypertension     Ischemic ulcer of toe of left foot     Malignant neoplasm of  upper-outer quadrant of left breast in female, estrogen receptor positive 06/20/2022    left    PAD (peripheral artery disease)     PAF (paroxysmal atrial fibrillation)        Past Surgical History:   Procedure Laterality Date    APPLICATION OF WOUND VACUUM-ASSISTED CLOSURE DEVICE Left 4/6/2023    Procedure: APPLICATION, WOUND VAC;  Surgeon: Jayjay Arana MD;  Location: [facility] OR;  Service: General;  Laterality: Left;  left chest    AXILLARY NODE DISSECTION Left 3/1/2023    Procedure: LYMPHADENECTOMY, AXILLARY;  Surgeon: Jayjay Arana MD;  Location: [facility] OR;  Service: General;  Laterality: Left;    BIOPSY OF INGUINAL LYMPH NODE Left 3/31/2023    Procedure: BIOPSY, LYMPH NODE, INGUINAL;  Surgeon: Jayjay Arana MD;  Location: [facility] OR;  Service: General;  Laterality: Left;    BREAST BIOPSY Right     benign    CATARACT EXTRACTION W/  INTRAOCULAR LENS IMPLANT Right 08/14/2017    Dr. Moe    CORONARY ARTERY BYPASS GRAFT  2020    FLUOROSCOPY N/A 07/25/2022    Procedure: FLUOROSCOPY/mediport placement;  Surgeon: Cole Perdomo MD;  Location: [facility] CATH LAB;  Service: General;  Laterality: N/A;    MEDIPORT REMOVAL N/A 3/1/2023    Procedure: REMOVAL, CATHETER, CENTRAL VENOUS, TUNNELED, WITH PORT;  Surgeon: Jayjay Arana MD;  Location: [facility] OR;  Service: General;  Laterality: N/A;    MODIFIED RADICAL MASTECTOMY W/ AXILLARY LYMPH NODE DISSECTION Left 3/1/2023    Procedure: MASTECTOMY, MODIFIED RADICAL;  Surgeon: Jayjay Arana MD;  Location: [facility] OR;  Service: General;  Laterality: Left;    WOUND DEBRIDEMENT Left 3/31/2023    Procedure: DEBRIDEMENT, WOUND;  Surgeon: Jayjay Arana MD;  Location: [facility] OR;  Service: General;  Laterality: Left;  left chest wall eschar debridement       Review of patient's allergies indicates:  No Known Allergies    Family History       Problem Relation (Age of Onset)    Breast cancer Sister    Cancer Father, Brother    Diabetes Sister    Hypertension Mother          Tobacco Use     Smoking status: Former     Current packs/day: 0.00     Types: Cigarettes     Start date: 1982     Quit date: 2012     Years since quittin.2    Smokeless tobacco: Never   Substance and Sexual Activity    Alcohol use: No    Drug use: Never    Sexual activity: Not on file         Review of Systems   Constitutional:  Positive for fatigue.   HENT:  Negative for trouble swallowing.    Respiratory:  Positive for shortness of breath (at baseline).    Cardiovascular:  Positive for chest pain (resolved now).   Gastrointestinal:  Positive for nausea. Negative for abdominal pain and vomiting.   Neurological:  Positive for light-headedness. Negative for syncope.   Psychiatric/Behavioral:  The patient is not nervous/anxious.      Objective:     Vital Signs (Most Recent):  Temp: 98 °F (36.7 °C) (23)  Pulse: 64 (23)  Resp: 18 (23)  BP: (!) 145/73 (23)  SpO2: 100 % (23) Vital Signs (24h Range):  Temp:  [98 °F (36.7 °C)-98.5 °F (36.9 °C)] 98 °F (36.7 °C)  Pulse:  [61-70] 64  Resp:  [12-26] 18  SpO2:  [95 %-100 %] 100 %  BP: (140-198)/(67-94) 145/73     Weight: 80.2 kg (176 lb 12.9 oz)  Body mass index is 32.34 kg/m².    No intake or output data in the 24 hours ending 23     Physical Exam  Vitals and nursing note reviewed.   Constitutional:       General: She is sleeping. She is not in acute distress.     Appearance: She is obese. She is not ill-appearing.      Comments: Arouses easily then falls back to sleep   HENT:      Head: Atraumatic.   Eyes:      Conjunctiva/sclera: Conjunctivae normal.   Neck:      Vascular: No JVD.   Cardiovascular:      Rate and Rhythm: Normal rate and regular rhythm.      Pulses:           Radial pulses are 2+ on the right side and 2+ on the left side.        Dorsalis pedis pulses are 1+ on the right side and 1+ on the left side.      Arteriovenous access: Left arteriovenous access is present.  Pulmonary:      Effort:  "Pulmonary effort is normal.      Breath sounds: Rhonchi present. No wheezing or rales.   Abdominal:      General: Bowel sounds are normal. There is no distension.      Palpations: Abdomen is soft.      Tenderness: There is no abdominal tenderness.   Musculoskeletal:      Right lower leg: No edema.      Left lower leg: No edema.   Skin:     General: Skin is warm and dry.      Capillary Refill: Capillary refill takes less than 2 seconds.          Neurological:      Mental Status: She is easily aroused.      GCS: GCS eye subscore is 3. GCS verbal subscore is 5. GCS motor subscore is 6.   Psychiatric:         Attention and Perception: She is inattentive.         Mood and Affect: Mood normal.         Speech: Speech normal.         Behavior: Behavior normal. Behavior is cooperative.          Vents:       Lines/Drains/Airways       Peripheral Intravenous Line  Duration                  Hemodialysis AV Fistula Left forearm -- days         Peripheral IV - Single Lumen 09/05/23 1623 20 G Right Antecubital <1 day         Peripheral IV - Single Lumen 09/05/23 1912 22 G Right Wrist <1 day                    Significant Labs:    CBC/Anemia Profile:  Recent Labs   Lab 09/05/23  1622   WBC 7.24   HGB 12.5   HCT 36.4*      MCV 93   RDW 18.2*        Chemistries:  Recent Labs   Lab 09/05/23  1622   *   K 5.0      CO2 15*   BUN 95*   CREATININE 6.9*   CALCIUM 8.9   ALBUMIN 2.8*   PROT 8.3   BILITOT 0.3   ALKPHOS 69   ALT 16   AST 17       All pertinent labs within the past 24 hours have been reviewed.    Significant Imaging:   I have reviewed all pertinent imaging results/findings within the past 24 hours.      ABG  No results for input(s): "PH", "PO2", "PCO2", "HCO3", "BE" in the last 168 hours.  Assessment/Plan:     Pulmonary  COPD (chronic obstructive pulmonary disease)  No evidence of exacerbation  Continue maintenance LABA and ICS  Prn YULIA    Cardiac/Vascular  * Acute ST elevation myocardial infarction (STEMI) " of lateral wall  LHC showed patent coronary artery bypass grafts and nl LVEF  Plan echo in am  Trend troponin  Cardiology managing    Primary hypertension  ICU hemodynamic monitoring  Resume norvasc and BB  Prn hydralazine IV    Renal/  ESRD (end stage renal disease)  Consult nephrology to manage RRT  No indication for urgent HD overnight    ID  Currently asymptomatic HIV infection, with history of HIV-related illness  Continue descovy therapy  Check CD4 and viral load    Oncology  Malignant neoplasm of upper-outer quadrant of left breast in female, estrogen receptor positive  - Left Breast Cancer, ER/AK/HER2 +   Treatment History:  - Neoadjuvant Paclitaxel / Trastuzumab / Pertuzumab x 5, followed by Trastuzumab/Pertuzumab completed in March of 2023.    - Lt. breast mastectomy with sentinel node biopsy and subsequent Lt. axillary dissection - 3/1/23.     Current Treatment:   - Kadycyla q3wk      Endocrine  Type 2 diabetes mellitus with diabetic peripheral angiopathy without gangrene, with long-term current use of insulin  SSI prn with monitoring for glucose control and prevention of insulin toxicity        Critical Care Daily Checklist:    A: Awake: RASS Goal/Actual Goal:    Actual:     B: Spontaneous Breathing Trial Performed?     C: SAT & SBT Coordinated?  n/a                      D: Delirium: CAM-ICU     E: Early Mobility Performed? Yes   F: Feeding Goal:    Status:     Current Diet Order   Procedures    Diet diabetic [facility]; 2000 Calorie     Order Specific Question:   Indicate patient location for additional diet options:     Answer:   [facility]     Order Specific Question:   Total calories:     Answer:   2000 Calorie      AS: Analgesia/Sedation prn   T: Thromboembolic Prophylaxis heparin   H: HOB > 300 Yes   U: Stress Ulcer Prophylaxis (if needed) pepcid   G: Glucose Control As above   B: Bowel Function     I: Indwelling Catheter (Lines & Meade) Necessity reviewed   D: De-escalation of  Antimicrobials/Pharmacotherapies reviewed    Plan for the day/ETD As above    Code Status:  Family/Goals of Care: Prior  Home on discharge     Critical Care Time: 40 minutes  Critical secondary to STEMI   Critical care was time spent personally by me on the following activities: development of treatment plan with patient or surrogate and bedside caregivers, discussions with consultants, evaluation of patient's response to treatment, examination of patient, ordering and performing treatments and interventions, ordering and review of laboratory studies, ordering and review of radiographic studies, pulse oximetry, re-evaluation of patient's condition. This critical care time did not overlap with that of any other provider or involve time for any procedures.    Thank you for your consult. Critical Care team will follow while requiring ICU level care.     ARMOND Roberts-BC  Critical Care Medicine  [facility] - Intensive Care ([facility])

## 2023-09-06 NOTE — H&P
[facility] ([facility])  Cardiology  History and Physical     Patient Name: Malaika Paredes  MRN: [id number]  Admission Date: 9/5/2023  Code Status: Prior   Attending Provider: Travis Bartholomew MD   Primary Care Physician: Travis Scales MD  Principal Problem:Acute ST elevation myocardial infarction (STEMI) of lateral wall    Patient information was obtained from patient, EMS personnel, past medical records, and ER records.     Subjective:     Chief Complaint:  chest pain     HPI:  Pt presented with chest pain.  Seen in ER earlier today with CP and ecg showed sinus rhythm, inferolateral st-t abnl, LAFB but no STEMI.  Pt had more CP, and reported syncope later in evening and repeat ecg was done and showed high lateral ST elevation concerning for STEMI so code STEMI activated.  Pt reportedly had 3 v CABG OLOL few years ago and is dialysis pt.  H/o carotid surgery, DM.      Past Medical History:   Diagnosis Date    CAD (coronary artery disease)     Cataract     Cataract     CHF (congestive heart failure)     COPD (chronic obstructive pulmonary disease)     Currently asymptomatic HIV infection, with history of HIV-related illness 7/10/2023    Diabetes mellitus     Diabetic retinopathy     ESRD (end stage renal disease)     TTS    Hypertension     Ischemic ulcer of toe of left foot     Malignant neoplasm of upper-outer quadrant of left breast in female, estrogen receptor positive 06/20/2022    left    PAD (peripheral artery disease)     PAF (paroxysmal atrial fibrillation)        Past Surgical History:   Procedure Laterality Date    APPLICATION OF WOUND VACUUM-ASSISTED CLOSURE DEVICE Left 4/6/2023    Procedure: APPLICATION, WOUND VAC;  Surgeon: Jayjay Arana MD;  Location: [facility] OR;  Service: General;  Laterality: Left;  left chest    AXILLARY NODE DISSECTION Left 3/1/2023    Procedure: LYMPHADENECTOMY, AXILLARY;  Surgeon: Jayjay Arana MD;  Location: [facility] OR;  Service: General;  Laterality: Left;    BIOPSY  OF INGUINAL LYMPH NODE Left 3/31/2023    Procedure: BIOPSY, LYMPH NODE, INGUINAL;  Surgeon: Jayjay Arana MD;  Location: [facility] OR;  Service: General;  Laterality: Left;    BREAST BIOPSY Right     benign    CATARACT EXTRACTION W/  INTRAOCULAR LENS IMPLANT Right 08/14/2017    Dr. Moe    CORONARY ARTERY BYPASS GRAFT  2020    FLUOROSCOPY N/A 07/25/2022    Procedure: FLUOROSCOPY/mediport placement;  Surgeon: Cole Perdomo MD;  Location: [facility] CATH LAB;  Service: General;  Laterality: N/A;    MEDIPORT REMOVAL N/A 3/1/2023    Procedure: REMOVAL, CATHETER, CENTRAL VENOUS, TUNNELED, WITH PORT;  Surgeon: Jayjay Arana MD;  Location: [facility] OR;  Service: General;  Laterality: N/A;    MODIFIED RADICAL MASTECTOMY W/ AXILLARY LYMPH NODE DISSECTION Left 3/1/2023    Procedure: MASTECTOMY, MODIFIED RADICAL;  Surgeon: Jayjay Arana MD;  Location: [facility] OR;  Service: General;  Laterality: Left;    WOUND DEBRIDEMENT Left 3/31/2023    Procedure: DEBRIDEMENT, WOUND;  Surgeon: Jayjay Arana MD;  Location: [facility] OR;  Service: General;  Laterality: Left;  left chest wall eschar debridement       Review of patient's allergies indicates:  No Known Allergies    No current facility-administered medications on file prior to encounter.     Current Outpatient Medications on File Prior to Encounter   Medication Sig    amLODIPine (NORVASC) 10 MG tablet Take 10 mg by mouth once daily.    aspirin (ECOTRIN) 81 MG EC tablet Take 81 mg by mouth once daily.     epoetin alejandro (EPOGEN INJ) Epoetin Alejandro (Epogen)    insulin degludec (TRESIBA FLEXTOUCH U-200) 200 unit/mL (3 mL) insulin pen Inject 60 Units into the skin once daily.    levothyroxine (TIROSINT) 88 mcg Cap Take 75 mcg by mouth before breakfast.    metoprolol succinate (TOPROL-XL) 25 MG 24 hr tablet Take 25 mg by mouth 2 (two) times daily.    omeprazole (PRILOSEC) 20 MG capsule Take 20 mg by mouth once daily.    sevelamer HCL (RENAGEL) 800 MG Tab Take 3 tablets by mouth 3 (three) times daily.     sodium bicarbonate 650 MG tablet Take 650 mg by mouth 2 (two) times daily.    acetaminophen (TYLENOL) 500 mg Cap Take 500 mg by mouth every 6 (six) hours as needed.    albuterol (PROVENTIL/VENTOLIN HFA) 90 mcg/actuation inhaler Inhale 2 puffs into the lungs every 4 (four) hours as needed. Take 2 puffs of the lungs every 4 hr as needed for wheezing or shortness of breath    albuterol sulfate (PROAIR RESPICLICK) 90 mcg/actuation AePB Inhale 180 mcg into the lungs every 4 (four) hours. Rescue    atorvastatin (LIPITOR) 10 MG tablet Take 10 mg by mouth once daily.    benzonatate (TESSALON) 100 MG capsule Take 2 capsules (200 mg total) by mouth 3 (three) times daily as needed for Cough.    benzonatate (TESSALON) 200 MG capsule benzonatate Take 1 Capsule (oral) 3 times per day PRN - Cough for 7 days [id number] capsule 3 times per day oral 7 days active 200 mg    budesonide-glycopyr-formoterol (BREZTRI AEROSPHERE) 160-9-4.8 mcg/actuation HFAA Inhale 1 puff into the lungs once daily.    calcium carbonate (OS-CAMERON) 500 mg calcium (1,250 mg) chewable tablet Take 1 tablet by mouth 2 (two) times daily as needed for Heartburn.    diphenhydrAMINE (BENADRYL) 25 mg capsule Take 1 capsule (25 mg total) by mouth every 6 (six) hours as needed for Itching.    docusate sodium (COLACE) 100 MG capsule Take 1 capsule (100 mg total) by mouth 2 (two) times daily as needed for Constipation.    doxycycline (VIBRAMYCIN) 100 MG Cap Take 1 capsule (100 mg total) by mouth 2 (two) times daily. for 10 days    emtricitabine-tenofovir alafen (DESCOVY) 200-25 mg Tab Take 1 tablet by mouth.    iron sucrose in NS (VENOFER) 100 mg/100 mL PgBk 50 mg.    ISENTRESS 400 mg tablet Take 400 mg by mouth.    ketoconazole (NIZORAL) 2 % cream Apply topically once daily. for 14 days    levoFLOXacin (LEVAQUIN) 250 MG tablet Take 250 mg by mouth once daily.    loperamide (IMODIUM) 2 mg capsule Take 1 capsule (2 mg total) by mouth 4 (four) times daily as needed for  Diarrhea.    ondansetron (ZOFRAN-ODT) 4 MG TbDL Dissolve 1 tablet (4 mg total) by mouth every 8 (eight) hours as needed (nausea).    oxyCODONE (ROXICODONE) 5 MG immediate release tablet Take 1 tablet (5 mg total) by mouth every 6 (six) hours as needed for Pain.    prednisoLONE acetate (PRED FORTE) 1 % DrpS Place 1 drop into both eyes 4 (four) times daily.     Family History       Problem Relation (Age of Onset)    Breast cancer Sister    Cancer Father, Brother    Diabetes Sister    Hypertension Mother          Tobacco Use    Smoking status: Former     Current packs/day: 0.00     Types: Cigarettes     Start date: 1982     Quit date: 2012     Years since quittin.2    Smokeless tobacco: Never   Substance and Sexual Activity    Alcohol use: No    Drug use: Never    Sexual activity: Not on file     Review of Systems   Constitutional: Positive for malaise/fatigue.   HENT: Negative.     Eyes: Negative.    Cardiovascular:  Positive for chest pain.   Respiratory: Negative.     Endocrine: Negative.    Hematologic/Lymphatic: Negative.    Skin: Negative.    Musculoskeletal: Negative.    Gastrointestinal: Negative.    Genitourinary: Negative.    Neurological: Negative.    Psychiatric/Behavioral: Negative.     Allergic/Immunologic: Negative.      Objective:     Vital Signs (Most Recent):  Temp: 98.5 °F (36.9 °C) (23)  Pulse: 61 (23)  Resp: 17 (23)  BP: (!) 178/87 (23)  SpO2: 97 % (23) Vital Signs (24h Range):  Temp:  [98.5 °F (36.9 °C)] 98.5 °F (36.9 °C)  Pulse:  [61-70] 61  Resp:  [16-20] 17  SpO2:  [95 %-99 %] 97 %  BP: (140-198)/(67-94) 178/87     Weight: 84.6 kg (186 lb 8.2 oz)  Body mass index is 34.11 kg/m².    SpO2: 97 %       No intake or output data in the 24 hours ending 23    Lines/Drains/Airways       Peripheral Intravenous Line  Duration                  Hemodialysis AV Fistula Left forearm -- days         Peripheral IV - Single Lumen  "09/05/23 1623 20 G Right Antecubital <1 day         Peripheral IV - Single Lumen 09/05/23 1912 22 G Right Wrist <1 day                    Physical Exam  Vitals and nursing note reviewed.   Constitutional:       General: She is not in acute distress.     Appearance: Normal appearance. She is well-developed. She is ill-appearing. She is not diaphoretic.   HENT:      Head: Normocephalic.   Neck:      Thyroid: No thyromegaly.      Vascular: No carotid bruit, hepatojugular reflux or JVD.   Cardiovascular:      Rate and Rhythm: Normal rate and regular rhythm.      Pulses: Normal pulses.      Heart sounds: Normal heart sounds, S1 normal and S2 normal.   Pulmonary:      Effort: Pulmonary effort is normal.      Breath sounds: Normal breath sounds. No wheezing or rales.   Abdominal:      General: Bowel sounds are decreased. There is no abdominal bruit.      Palpations: Abdomen is soft.      Tenderness: There is no abdominal tenderness.   Skin:     General: Skin is warm.   Neurological:      Mental Status: She is alert.   Psychiatric:         Behavior: Behavior normal. Behavior is cooperative.         Significant Labs: ABG: No results for input(s): "PH", "PCO2", "HCO3", "POCSATURATED", "BE" in the last 48 hours., Blood Culture: No results for input(s): "LABBLOO" in the last 48 hours., BMP:   Recent Labs   Lab 09/05/23  1622   *   *   K 5.0      CO2 15*   BUN 95*   CREATININE 6.9*   CALCIUM 8.9   , CMP   Recent Labs   Lab 09/05/23  1622   *   K 5.0      CO2 15*   *   BUN 95*   CREATININE 6.9*   CALCIUM 8.9   PROT 8.3   ALBUMIN 2.8*   BILITOT 0.3   ALKPHOS 69   AST 17   ALT 16   ANIONGAP 12   , CBC   Recent Labs   Lab 09/05/23  1622   WBC 7.24   HGB 12.5   HCT 36.4*      , INR   Recent Labs   Lab 09/05/23 1916   INR 1.0   , Lipid Panel No results for input(s): "CHOL", "HDL", "LDLCALC", "TRIG", "CHOLHDL" in the last 48 hours., and Troponin   Recent Labs   Lab 09/05/23 1622 "   TROPONINI 0.015       Significant Imaging: Echocardiogram: Transthoracic echo (TTE) complete (Cupid Only):   Results for orders placed or performed during the hospital encounter of 05/08/23   Echo   Result Value Ref Range    BSA 1.91 m2    TDI SEPTAL 0.05 m/s    LV LATERAL E/E' RATIO 11.75 m/s    LV SEPTAL E/E' RATIO 18.80 m/s    LA WIDTH 4.00 cm    IVC diameter 1.05 cm    Left Ventricular Outflow Tract Mean Velocity 0.91 cm/s    Left Ventricular Outflow Tract Mean Gradient 3.63 mmHg    TDI LATERAL 0.08 m/s    PV PEAK VELOCITY 1.20 cm/s    LVIDd 4.06 3.5 - 6.0 cm    IVS 1.53 (A) 0.6 - 1.1 cm    Posterior Wall 1.20 (A) 0.6 - 1.1 cm    Ao root annulus 2.84 cm    LVIDs 2.67 2.1 - 4.0 cm    FS 34 28 - 44 %    LA volume 65.40 cm3    Sinus 2.74 cm    STJ 2.03 cm    Ascending aorta 2.87 cm    LV mass 205.37 g    LA size 3.71 cm    RVDD 3.80 cm    TAPSE 1.56 cm    Left Ventricle Relative Wall Thickness 0.59 cm    AV mean gradient 5 mmHg    AV valve area 2.34 cm2    AV Velocity Ratio 0.77     AV index (prosthetic) 0.76     MV mean gradient 3 mmHg    MV valve area p 1/2 method 2.73 cm2    MV valve area by continuity eq 2.55 cm2    E/A ratio 0.78     Mean e' 0.07 m/s    E wave deceleration time 216.62 msec    IVRT 82.78 msec    Pulm vein S/D ratio 0.92     LVOT diameter 1.98 cm    LVOT area 3.1 cm2    LVOT peak bin 1.21 m/s    LVOT peak VTI 27.40 cm    Ao peak bin 1.58 m/s    Ao VTI 36.0 cm    RVOT peak bin 0.98 m/s    RVOT peak VTI 16.7 cm    LVOT stroke volume 84.32 cm3    AV peak gradient 10 mmHg    MV peak gradient 6 mmHg    PV mean gradient 1.76 mmHg    E/E' ratio 14.46 m/s    MV Peak E Bin 0.94 m/s    TR Max Bin 2.53 m/s    MV VTI 33.1 cm    MV stenosis pressure 1/2 time 80.57 ms    MV Peak A Bin 1.21 m/s    PV Peak S Bin 0.45 m/s    PV Peak D Bin 0.49 m/s    LV Systolic Volume 24.30 mL    LV Systolic Volume Index 13.2 mL/m2    LV Diastolic Volume 54.30 mL    LV Diastolic Volume Index 29.51 mL/m2    LA Volume Index  35.5 mL/m2    LV Mass Index 112 g/m2    RA Major Axis 5.21 cm    Left Atrium Minor Axis 4.92 cm    Left Atrium Major Axis 5.48 cm    Triscuspid Valve Regurgitation Peak Gradient 26 mmHg    LA Volume Index (Mod) 34.5 mL/m2    LA volume (mod) 63.48 cm3    RA Width 3.62 cm    Carlson's Biplane MOD Ejection Fraction 6 %    Right Atrial Pressure (from IVC) 3 mmHg    EF 65 %    TV resting pulmonary artery pressure 29 mmHg    Narrative    Table formatting from the original result was not included.    · The left ventricle is normal in size with concentric hypertrophy and   normal systolic function.  · The estimated ejection fraction is 65%.  · Grade I left ventricular diastolic dysfunction.  · Normal right ventricular size with normal right ventricular systolic   function.  · Mild left atrial enlargement.  · Normal central venous pressure (3 mmHg).  · Mild tricuspid regurgitation.  · The estimated PA systolic pressure is 29 mmHg.     Protocol   OP BREAST PACLITAXEL WEEKLY WITH TRASTUZUMAB PERTUZUMAB Q3W (THP)      Echo 7/18/22:  Biplane: 64%  4DLVQ: 59%  Avg. GPLS: -21.1%     Echo 10/7/22:  Biplane: 68%  4DLVQ: 63%  Avg. GPLS: -17.85%     Echo 1/4/23:  Biplane: 60%  4DLVQ: 56%  Avg. GPLS: -16.8%    Echo 7/26/23:  Biplane: 55%  4D LVQ: 63%  Avg. GPLS: -15.83%         Assessment and Plan:     Active Diagnoses:    Diagnosis Date Noted POA    PRINCIPAL PROBLEM:  Acute ST elevation myocardial infarction (STEMI) of lateral wall [I21.29] 09/05/2023 Yes    AP (angina pectoris) [I20.9] 09/05/2023 Yes    Abnormal ECG [R94.31] 09/05/2023 Yes    Type 2 diabetes mellitus with diabetic peripheral angiopathy without gangrene, with long-term current use of insulin [E11.51, Z79.4] 01/09/2023 Not Applicable    Primary hypertension [I10] 09/14/2022 Yes    ESRD (end stage renal disease) [N18.6] 09/09/2018 Yes      Problems Resolved During this Admission:       VTE Risk Mitigation (From admission, onward)           Ordered     heparin 25,000  units in dextrose 5% (100 units/ml) IV bolus from bag - ADDITIONAL PRN BOLUS - 60 units/kg (max bolus 4000 units)  As needed (PRN)        Question:  Heparin Infusion Adjustment (DO NOT MODIFY ANSWER)  Answer:  [email or web address]\epic\Images\Pharmacy\HeparinInfusions\heparin LOW INTENSITY nomogram for [facility] [id number]    09/05/23 1914     heparin 25,000 units in dextrose 5% (100 units/ml) IV bolus from bag - ADDITIONAL PRN BOLUS - 30 units/kg (max bolus 4000 units)  As needed (PRN)        Question:  Heparin Infusion Adjustment (DO NOT MODIFY ANSWER)  Answer:  \\[email or web address]\epic\Images\Pharmacy\HeparinInfusions\heparin LOW INTENSITY nomogram for [facility] [id number].pdf    09/05/23 1914     heparin 25,000 units in dextrose 5% 250 mL (100 units/mL) infusion LOW INTENSITY nomogram - [facility]  Continuous        Question Answer Comment   Heparin Infusion Adjustment (DO NOT MODIFY ANSWER) \\[email or web address]\epic\Images\Pharmacy\HeparinInfusions\heparin LOW INTENSITY nomogram for [facility] [id number]    Begin at (in units/kg/hr) 12        09/05/23 1914                    LEFT HEART CATH.  RISKS/BENEFITS OF LHC AND POSSIBLE PCI EXPLAINED TO PT.  AGREEABLE TO PROCEEDING.  FURTHER RECS TO FOLLOW CATH.  ADMIT TO ICU POST CATH.        Travis Bartholomew MD  Cardiology   [facility] - Cath Lab ([facility])

## 2023-09-06 NOTE — PLAN OF CARE
2.5 hour I-HDTx initiated and in progress as ordered for Dr Adan; Planned UF = up to 1.5L as tolerated, short tx today for hyperkalemia and will dialyze again tomorrow to keep her on her regular HD schedule of TTS. Will continue to monitor for changes and trends.

## 2023-09-06 NOTE — PROGRESS NOTES
[facility])  Cardiology  Progress Note    Patient Name: Malaika Paredes  MRN: [id number]  Admission Date: 9/5/2023  Hospital Length of Stay: 1 days  Code Status: Prior   Attending Physician: Travis Bartholomew MD   Primary Care Physician: Travis Scales MD  Expected Discharge Date:   Principal Problem:Acute ST elevation myocardial infarction (STEMI) of lateral wall    Subjective:     Hospital Course:   9/6/23-Patient seen and examined. Stable, pain free, plan adjust for HTN.      No new subjective & objective note has been filed under this hospital service since the last note was generated.    Assessment and Plan:     Brief HPI: no chest pain, adjust for hypertension    * Acute ST elevation myocardial infarction (STEMI) of lateral wall  [facility] shows grafts patent    AP (angina pectoris)  Add nitrates    Type 2 diabetes mellitus with diabetic peripheral angiopathy without gangrene, with long-term current use of insulin  Will follow    Primary hypertension  Plan adjust medications  Add hydrylazine    ESRD (end stage renal disease)  Per nephrology        VTE Risk Mitigation (From admission, onward)         Ordered     heparin (porcine) injection 5,000 Units  Every 8 hours         09/06/23 0933     Place sequential compression device  Until discontinued         09/06/23 0933                Santiago Jean MD  Cardiology  [facility] ([facility])

## 2023-09-07 ENCOUNTER — TELEPHONE (OUTPATIENT)
Dept: HEMATOLOGY/ONCOLOGY | Facility: CLINIC | Age: 68
End: 2023-09-07
Payer: MEDICARE

## 2023-09-07 VITALS
TEMPERATURE: 99 F | BODY MASS INDEX: 30.59 KG/M2 | RESPIRATION RATE: 14 BRPM | SYSTOLIC BLOOD PRESSURE: 145 MMHG | DIASTOLIC BLOOD PRESSURE: 64 MMHG | HEART RATE: 74 BPM | OXYGEN SATURATION: 93 % | HEIGHT: 62 IN | WEIGHT: 166.25 LBS

## 2023-09-07 LAB
ALBUMIN SERPL BCP-MCNC: 2.3 G/DL (ref 3.5–5.2)
ALBUMIN SERPL BCP-MCNC: 2.4 G/DL (ref 3.5–5.2)
ALP SERPL-CCNC: 65 U/L (ref 55–135)
ALT SERPL W/O P-5'-P-CCNC: 11 U/L (ref 10–44)
ANION GAP SERPL CALC-SCNC: 13 MMOL/L (ref 8–16)
ANION GAP SERPL CALC-SCNC: 14 MMOL/L (ref 8–16)
AST SERPL-CCNC: 16 U/L (ref 10–40)
BASOPHILS # BLD AUTO: 0.02 K/UL (ref 0–0.2)
BASOPHILS NFR BLD: 0.4 % (ref 0–1.9)
BILIRUB SERPL-MCNC: 0.3 MG/DL (ref 0.1–1)
BUN SERPL-MCNC: 69 MG/DL (ref 8–23)
BUN SERPL-MCNC: 69 MG/DL (ref 8–23)
CALCIUM SERPL-MCNC: 7.3 MG/DL (ref 8.7–10.5)
CALCIUM SERPL-MCNC: 7.6 MG/DL (ref 8.7–10.5)
CHLORIDE SERPL-SCNC: 98 MMOL/L (ref 95–110)
CHLORIDE SERPL-SCNC: 98 MMOL/L (ref 95–110)
CO2 SERPL-SCNC: 23 MMOL/L (ref 23–29)
CO2 SERPL-SCNC: 24 MMOL/L (ref 23–29)
CREAT SERPL-MCNC: 5.8 MG/DL (ref 0.5–1.4)
CREAT SERPL-MCNC: 5.8 MG/DL (ref 0.5–1.4)
DIFFERENTIAL METHOD: ABNORMAL
EOSINOPHIL # BLD AUTO: 0.3 K/UL (ref 0–0.5)
EOSINOPHIL NFR BLD: 6.1 % (ref 0–8)
ERYTHROCYTE [DISTWIDTH] IN BLOOD BY AUTOMATED COUNT: 18.4 % (ref 11.5–14.5)
EST. GFR  (NO RACE VARIABLE): 7 ML/MIN/1.73 M^2
EST. GFR  (NO RACE VARIABLE): 7 ML/MIN/1.73 M^2
GLUCOSE SERPL-MCNC: 170 MG/DL (ref 70–110)
GLUCOSE SERPL-MCNC: 172 MG/DL (ref 70–110)
HAV IGM SERPL QL IA: NORMAL
HBV CORE IGM SERPL QL IA: NORMAL
HBV SURFACE AG SERPL QL IA: NORMAL
HCT VFR BLD AUTO: 32.4 % (ref 37–48.5)
HCV AB SERPL QL IA: NORMAL
HGB BLD-MCNC: 11.1 G/DL (ref 12–16)
IMM GRANULOCYTES # BLD AUTO: 0.12 K/UL (ref 0–0.04)
IMM GRANULOCYTES NFR BLD AUTO: 2.2 % (ref 0–0.5)
LYMPHOCYTES # BLD AUTO: 0.4 K/UL (ref 1–4.8)
LYMPHOCYTES NFR BLD: 8.2 % (ref 18–48)
MCH RBC QN AUTO: 31.4 PG (ref 27–31)
MCHC RBC AUTO-ENTMCNC: 34.3 G/DL (ref 32–36)
MCV RBC AUTO: 92 FL (ref 82–98)
MONOCYTES # BLD AUTO: 0.7 K/UL (ref 0.3–1)
MONOCYTES NFR BLD: 13.6 % (ref 4–15)
NEUTROPHILS # BLD AUTO: 3.7 K/UL (ref 1.8–7.7)
NEUTROPHILS NFR BLD: 69.5 % (ref 38–73)
NRBC BLD-RTO: 0 /100 WBC
PHOSPHATE SERPL-MCNC: 5 MG/DL (ref 2.7–4.5)
PLATELET # BLD AUTO: 167 K/UL (ref 150–450)
PMV BLD AUTO: 9.8 FL (ref 9.2–12.9)
POCT GLUCOSE: 178 MG/DL (ref 70–110)
POTASSIUM SERPL-SCNC: 4.3 MMOL/L (ref 3.5–5.1)
POTASSIUM SERPL-SCNC: 4.3 MMOL/L (ref 3.5–5.1)
PROT SERPL-MCNC: 6.5 G/DL (ref 6–8.4)
RBC # BLD AUTO: 3.54 M/UL (ref 4–5.4)
SODIUM SERPL-SCNC: 135 MMOL/L (ref 136–145)
SODIUM SERPL-SCNC: 135 MMOL/L (ref 136–145)
WBC # BLD AUTO: 5.37 K/UL (ref 3.9–12.7)

## 2023-09-07 PROCEDURE — 99232 PR SUBSEQUENT HOSPITAL CARE,LEVL II: ICD-10-PCS | Mod: ,,, | Performed by: INTERNAL MEDICINE

## 2023-09-07 PROCEDURE — 99232 SBSQ HOSP IP/OBS MODERATE 35: CPT | Mod: ,,, | Performed by: INTERNAL MEDICINE

## 2023-09-07 PROCEDURE — 25000003 PHARM REV CODE 250: Performed by: INTERNAL MEDICINE

## 2023-09-07 PROCEDURE — 1111F DSCHRG MED/CURRENT MED MERGE: CPT | Mod: CPTII,,,

## 2023-09-07 PROCEDURE — 99499 NO LOS: ICD-10-PCS | Mod: ,,, | Performed by: INTERNAL MEDICINE

## 2023-09-07 PROCEDURE — 94761 N-INVAS EAR/PLS OXIMETRY MLT: CPT

## 2023-09-07 PROCEDURE — 94640 AIRWAY INHALATION TREATMENT: CPT

## 2023-09-07 PROCEDURE — 99238 PR HOSPITAL DISCHARGE DAY,<30 MIN: ICD-10-PCS | Mod: ,,,

## 2023-09-07 PROCEDURE — 63600175 PHARM REV CODE 636 W HCPCS: Performed by: NURSE PRACTITIONER

## 2023-09-07 PROCEDURE — 99499 UNLISTED E&M SERVICE: CPT | Mod: ,,, | Performed by: INTERNAL MEDICINE

## 2023-09-07 PROCEDURE — 99238 HOSP IP/OBS DSCHRG MGMT 30/<: CPT | Mod: ,,,

## 2023-09-07 PROCEDURE — 25000003 PHARM REV CODE 250: Performed by: NURSE PRACTITIONER

## 2023-09-07 PROCEDURE — 25000242 PHARM REV CODE 250 ALT 637 W/ HCPCS: Performed by: NURSE PRACTITIONER

## 2023-09-07 PROCEDURE — 80100016 HC MAINTENANCE HEMODIALYSIS

## 2023-09-07 PROCEDURE — 85025 COMPLETE CBC W/AUTO DIFF WBC: CPT | Performed by: INTERNAL MEDICINE

## 2023-09-07 PROCEDURE — 80053 COMPREHEN METABOLIC PANEL: CPT | Performed by: INTERNAL MEDICINE

## 2023-09-07 PROCEDURE — 1111F PR DISCHARGE MEDS RECONCILED W/ CURRENT OUTPATIENT MED LIST: ICD-10-PCS | Mod: CPTII,,,

## 2023-09-07 PROCEDURE — 80069 RENAL FUNCTION PANEL: CPT | Performed by: INTERNAL MEDICINE

## 2023-09-07 RX ORDER — ISOSORBIDE MONONITRATE 30 MG/1
30 TABLET, EXTENDED RELEASE ORAL DAILY
Qty: 30 TABLET | Refills: 11 | Status: SHIPPED | OUTPATIENT
Start: 2023-09-08 | End: 2024-09-07

## 2023-09-07 RX ORDER — HYDRALAZINE HYDROCHLORIDE 25 MG/1
25 TABLET, FILM COATED ORAL 3 TIMES DAILY
Qty: 90 TABLET | Refills: 11 | Status: SHIPPED | OUTPATIENT
Start: 2023-09-07 | End: 2024-09-06

## 2023-09-07 RX ORDER — GUAIFENESIN/DEXTROMETHORPHAN 100-10MG/5
5 SYRUP ORAL EVERY 4 HOURS PRN
Status: DISCONTINUED | OUTPATIENT
Start: 2023-09-07 | End: 2023-09-07 | Stop reason: HOSPADM

## 2023-09-07 RX ADMIN — IPRATROPIUM BROMIDE AND ALBUTEROL SULFATE 3 ML: .5; 3 SOLUTION RESPIRATORY (INHALATION) at 07:09

## 2023-09-07 RX ADMIN — INSULIN ASPART 2 UNITS: 100 INJECTION, SOLUTION INTRAVENOUS; SUBCUTANEOUS at 06:09

## 2023-09-07 RX ADMIN — ARFORMOTEROL TARTRATE 15 MCG: 15 SOLUTION RESPIRATORY (INHALATION) at 07:09

## 2023-09-07 RX ADMIN — ATENOLOL 25 MG: 25 TABLET ORAL at 08:09

## 2023-09-07 RX ADMIN — AMLODIPINE BESYLATE 10 MG: 10 TABLET ORAL at 08:09

## 2023-09-07 RX ADMIN — ATORVASTATIN CALCIUM 10 MG: 10 TABLET, FILM COATED ORAL at 08:09

## 2023-09-07 RX ADMIN — HEPARIN SODIUM 5000 UNITS: 5000 INJECTION INTRAVENOUS; SUBCUTANEOUS at 05:09

## 2023-09-07 RX ADMIN — BUDESONIDE 0.5 MG: 0.5 INHALANT ORAL at 07:09

## 2023-09-07 RX ADMIN — HYDRALAZINE HYDROCHLORIDE 25 MG: 25 TABLET, FILM COATED ORAL at 08:09

## 2023-09-07 RX ADMIN — RALTEGRAVIR 400 MG: 400 TABLET, FILM COATED ORAL at 08:09

## 2023-09-07 RX ADMIN — MUPIROCIN: 20 OINTMENT TOPICAL at 08:09

## 2023-09-07 RX ADMIN — ASPIRIN 81 MG: 81 TABLET, COATED ORAL at 08:09

## 2023-09-07 RX ADMIN — ISOSORBIDE MONONITRATE 30 MG: 30 TABLET, EXTENDED RELEASE ORAL at 08:09

## 2023-09-07 NOTE — PLAN OF CARE
Problem: Diabetes Comorbidity  Goal: Blood Glucose Level Within Targeted Range  Outcome: Ongoing, Progressing     Problem: Adult Inpatient Plan of Care  Goal: Plan of Care Review  Outcome: Ongoing, Progressing  Goal: Patient-Specific Goal (Individualized)  Outcome: Ongoing, Progressing  Goal: Absence of Hospital-Acquired Illness or Injury  Outcome: Ongoing, Progressing  Goal: Optimal Comfort and Wellbeing  Outcome: Ongoing, Progressing  Goal: Readiness for Transition of Care  Outcome: Ongoing, Progressing     Problem: Pain Acute  Goal: Acceptable Pain Control and Functional Ability  Outcome: Ongoing, Progressing     Problem: Chest Pain  Goal: Resolution of Chest Pain Symptoms  Outcome: Ongoing, Progressing     Problem: Device-Related Complication Risk (Hemodialysis)  Goal: Safe, Effective Therapy Delivery  Outcome: Ongoing, Progressing     Problem: Hemodynamic Instability (Hemodialysis)  Goal: Effective Tissue Perfusion  Outcome: Ongoing, Progressing     Problem: Infection (Hemodialysis)  Goal: Absence of Infection Signs and Symptoms  Outcome: Ongoing, Progressing

## 2023-09-07 NOTE — SUBJECTIVE & OBJECTIVE
Review of Systems   Constitutional: Negative.   HENT: Negative.     Eyes: Negative.    Cardiovascular: Negative.    Respiratory: Negative.     Skin: Negative.    Musculoskeletal: Negative.    Gastrointestinal: Negative.    Genitourinary: Negative.    Neurological: Negative.    Psychiatric/Behavioral: Negative.       Objective:     Vital Signs (Most Recent):  Temp: 98.8 °F (37.1 °C) (09/07/23 0829)  Pulse: 74 (09/07/23 0829)  Resp: 14 (09/07/23 0829)  BP: (!) 145/64 (09/07/23 0829)  SpO2: (!) 93 % (09/07/23 0829) Vital Signs (24h Range):  Temp:  [97.5 °F (36.4 °C)-99.1 °F (37.3 °C)] 98.8 °F (37.1 °C)  Pulse:  [63-87] 74  Resp:  [13-34] 14  SpO2:  [93 %-100 %] 93 %  BP: ()/(46-81) 145/64     Weight: 75.4 kg (166 lb 3.6 oz)  Body mass index is 30.4 kg/m².     SpO2: (!) 93 %         Intake/Output Summary (Last 24 hours) at 9/7/2023 1243  Last data filed at 9/6/2023 1800  Gross per 24 hour   Intake 740 ml   Output 1906 ml   Net -1166 ml       Lines/Drains/Airways       Peripheral Intravenous Line  Duration                  Hemodialysis AV Fistula Left forearm -- days         Peripheral IV - Single Lumen 09/05/23 1623 20 G Right Antecubital 1 day         Peripheral IV - Single Lumen 09/05/23 1912 22 G Right Wrist 1 day                       Physical Exam  Vitals and nursing note reviewed.   Constitutional:       Appearance: Normal appearance.   HENT:      Head: Normocephalic.   Eyes:      Pupils: Pupils are equal, round, and reactive to light.   Cardiovascular:      Rate and Rhythm: Normal rate and regular rhythm.      Heart sounds: Normal heart sounds, S1 normal and S2 normal. No murmur heard.     No S3 or S4 sounds.   Pulmonary:      Effort: Pulmonary effort is normal.      Breath sounds: Normal breath sounds.   Abdominal:      General: Bowel sounds are normal.      Palpations: Abdomen is soft.   Musculoskeletal:         General: Normal range of motion.      Cervical back: Normal range of motion.   Skin:      "Capillary Refill: Capillary refill takes less than 2 seconds.   Neurological:      General: No focal deficit present.      Mental Status: She is alert and oriented to person, place, and time.   Psychiatric:         Mood and Affect: Mood normal.         Behavior: Behavior normal.         Thought Content: Thought content normal.            Significant Labs: BMP:   Recent Labs   Lab 09/05/23 1622 09/06/23  0732 09/07/23  0551   * 166* 170*  172*   * 130* 135*  135*   K 5.0 6.2* 4.3  4.3    105 98  98   CO2 15* 12* 23  24   BUN 95* 100* 69*  69*   CREATININE 6.9* 6.9* 5.8*  5.8*   CALCIUM 8.9 8.4* 7.3*  7.6*   , CMP   Recent Labs   Lab 09/05/23 1622 09/06/23  0732 09/07/23  0551   * 130* 135*  135*   K 5.0 6.2* 4.3  4.3    105 98  98   CO2 15* 12* 23  24   * 166* 170*  172*   BUN 95* 100* 69*  69*   CREATININE 6.9* 6.9* 5.8*  5.8*   CALCIUM 8.9 8.4* 7.3*  7.6*   PROT 8.3 8.2 6.5   ALBUMIN 2.8* 2.8* 2.4*  2.3*   BILITOT 0.3 0.4 0.3   ALKPHOS 69 70 65   AST 17 14 16   ALT 16 11 11   ANIONGAP 12 13 14  13   , CBC   Recent Labs   Lab 09/05/23  1622 09/06/23  0413 09/07/23  0551   WBC 7.24 5.17 5.37   HGB 12.5 12.5 11.1*   HCT 36.4* 36.6* 32.4*    168 167   , INR   Recent Labs   Lab 09/05/23  1916   INR 1.0   , Lipid Panel No results for input(s): "CHOL", "HDL", "LDLCALC", "TRIG", "CHOLHDL" in the last 48 hours., Troponin   Recent Labs   Lab 09/06/23  0008 09/06/23  0539 09/06/23  1156   TROPONINI 0.024 0.021 0.024   , and All pertinent lab results from the last 24 hours have been reviewed.    Significant Imaging: Cardiac Cath: reviewed, Echocardiogram: Transthoracic echo (TTE) complete (Cupid Only):   Results for orders placed or performed during the hospital encounter of 09/05/23   Echo   Result Value Ref Range    BSA 1.87 m2    LA WIDTH 3.3 cm    TAPSE 1.70 cm    RA Width 2.5 cm    LVIDd 4.03 3.5 - 6.0 cm    LV Systolic Volume 21.86 mL    LV Systolic Volume " Index 12.1 mL/m2    LVIDs 2.48 2.1 - 4.0 cm    LV Diastolic Volume 71.09 mL    LV Diastolic Volume Index 39.28 mL/m2    IVS 1.46 (A) 0.6 - 1.1 cm    FS 38 28 - 44 %    Left Ventricle Relative Wall Thickness 0.62 cm    Posterior Wall 1.24 (A) 0.6 - 1.1 cm    LV mass 199.76 g    LV Mass Index 110 g/m2    MV Peak E Bin 1.03 m/s    TDI LATERAL 0.11 m/s    TDI SEPTAL 0.06 m/s    E/E' ratio 12.12 m/s    MV Peak A Bin 0.98 m/s    TR Max Bin 2.41 m/s    E/A ratio 1.05     IVRT 79.92 msec    E wave deceleration time 270.91 msec    LV SEPTAL E/E' RATIO 17.17 m/s    LA Volume Index 23.6 mL/m2    LV LATERAL E/E' RATIO 9.36 m/s    LA volume 42.75 cm3    LVOT peak bin 1.24 m/s    Left Ventricular Outflow Tract Mean Velocity 0.95 cm/s    Left Ventricular Outflow Tract Mean Gradient 3.76 mmHg    LA size 3.53 cm    Left Atrium Major Axis 4.21 cm    Left Atrium Minor Axis 4.43 cm    RVOT peak VTI 20.2 cm    RA Major Axis 4.00 cm    AV mean gradient 6 mmHg    AV peak gradient 10 mmHg    Ao peak bin 1.61 m/s    Ao VTI 35.70 cm    LVOT peak VTI 29.70 cm    AV Velocity Ratio 0.77     AV index (prosthetic) 0.83     MV stenosis pressure 1/2 time 78.56 ms    MV valve area p 1/2 method 2.80 cm2    TV mean gradient 21 mmHg    Triscuspid Valve Regurgitation Peak Gradient 23 mmHg    PV mean gradient 2 mmHg    RVOT peak bin 0.87 m/s    Ao root annulus 2.88 cm    IVC diameter 1.48 cm    Mean e' 0.09 m/s    ZLVIDS -1.75     ZLVIDD -2.17     Narrative      Left Ventricle: The left ventricle is normal in size. Mildly increased   wall thickness. Normal wall motion. There is normal systolic function with   a visually estimated ejection fraction of 55 - 70%.    Right Ventricle: Normal right ventricular cavity size. Wall thickness   is normal. Right ventricle wall motion  is normal. Systolic function is   normal.    Aortic Valve: The aortic valve is a trileaflet valve. There is mild   aortic valve sclerosis.    Mitral Valve: There is no stenosis.     Tricuspid Valve: There is mild regurgitation with a centrally directed   jet.     , EKG: reviewed, Stress Test: reviewed, and X-Ray: CXR: X-Ray Chest 1 View (CXR): No results found for this visit on 09/05/23.

## 2023-09-07 NOTE — SUBJECTIVE & OBJECTIVE
Interval History:   Course from yesterday reviewed.  Vital signs reviewed.  Patient complains of cough.    Transferred from ICU to the floor and she remains clinically stable.    Scheduled for dialysis today per her usual schedule.    She had an extra treatment yesterday because she skipped the day previously and her potassium was elevated.    Review of patient's allergies indicates:  No Known Allergies  Current Facility-Administered Medications   Medication Frequency    albuterol-ipratropium 2.5 mg-0.5 mg/3 mL nebulizer solution 3 mL Q4H PRN    amLODIPine tablet 10 mg Daily    arformoteroL nebulizer solution 15 mcg BID    aspirin EC tablet 81 mg Daily    atenoloL tablet 25 mg Daily    atorvastatin tablet 10 mg Daily    budesonide nebulizer solution 0.5 mg Q12H    dextromethorphan-guaiFENesin  mg/5 ml liquid 5 mL Q4H PRN    dextrose 10% bolus 125 mL 125 mL PRN    dextrose 10% bolus 250 mL 250 mL PRN    emtricitabine capsule 200 mg Daily    glucagon (human recombinant) injection 1 mg PRN    glucose chewable tablet 16 g PRN    glucose chewable tablet 24 g PRN    heparin (porcine) injection 5,000 Units Q8H    hydrALAZINE injection 10 mg Q8H PRN    hydrALAZINE tablet 25 mg TID    HYDROcodone-acetaminophen 5-325 mg per tablet 1 tablet Q4H PRN    insulin aspart U-100 pen 0-10 Units QID (AC + HS) PRN    isosorbide mononitrate 24 hr tablet 30 mg Daily    morphine injection 2 mg Q4H PRN    mupirocin 2 % ointment BID    pneumoc 20-kasey conj-dip cr(PF) (PREVNAR-20 (PF)) injection Syrg 0.5 mL vaccine x 1 dose    raltegravir tablet 400 mg BID    sodium chloride 0.9% bolus 250 mL 250 mL PRN    sodium chloride 0.9% bolus 250 mL 250 mL PRN    tenofovir alafenamide tablet Tab 25 mg Daily       Objective:     Vital Signs (Most Recent):  Temp: 98.8 °F (37.1 °C) (09/07/23 0829)  Pulse: 74 (09/07/23 0829)  Resp: 14 (09/07/23 0829)  BP: (!) 145/64 (09/07/23 0829)  SpO2: (!) 93 % (09/07/23 0829) Vital Signs (24h Range):  Temp:  [97.5  °F (36.4 °C)-99.1 °F (37.3 °C)] 98.8 °F (37.1 °C)  Pulse:  [63-87] 74  Resp:  [13-34] 14  SpO2:  [93 %-100 %] 93 %  BP: ()/(46-97) 145/64     Weight: 75.4 kg (166 lb 3.6 oz) (09/06/23 1956)  Body mass index is 30.4 kg/m².  Body surface area is 1.82 meters squared.    I/O last 3 completed shifts:  In: 860 [P.O.:360; Other:500]  Out: 1906 [Other:1906]     Physical Exam  Vitals reviewed.   Constitutional:       Appearance: Normal appearance.   HENT:      Head: Normocephalic and atraumatic.      Nose: Nose normal.   Eyes:      Extraocular Movements: Extraocular movements intact.   Cardiovascular:      Rate and Rhythm: Normal rate and regular rhythm.   Pulmonary:      Effort: Pulmonary effort is normal.      Comments: Patient with a cough, nonproductive  Abdominal:      Palpations: Abdomen is soft.   Musculoskeletal:         General: No swelling.      Cervical back: Normal range of motion and neck supple.      Comments: Functional left lower arm AV fistula.   Neurological:      Mental Status: She is alert.          Significant Labs:  CBC:   Recent Labs   Lab 09/07/23  0551   WBC 5.37   RBC 3.54*   HGB 11.1*   HCT 32.4*      MCV 92   MCH 31.4*   MCHC 34.3     CMP:   Recent Labs   Lab 09/07/23  0551   *  172*   CALCIUM 7.3*  7.6*   ALBUMIN 2.4*  2.3*   PROT 6.5   *  135*   K 4.3  4.3   CO2 23  24   CL 98  98   BUN 69*  69*   CREATININE 5.8*  5.8*   ALKPHOS 65   ALT 11   AST 16   BILITOT 0.3     All labs within the past 24 hours have been reviewed.

## 2023-09-07 NOTE — DISCHARGE SUMMARY
[facility]  Cardiology  Discharge Summary      Patient Name: Malaika Paredes  MRN: [id number]  Admission Date: 9/5/2023  Hospital Length of Stay: 2 days  Discharge Date and Time:  09/07/2023 1:03 PM  Attending Physician: Travis Bartholomew MD    Discharging Provider: Lazara Alicea NP  Primary Care Physician: Travis Sacles MD    HPI:   Malaika Paredes is a 67 year old female with  ESRD, HD on TTS HD at [facility]. She also has HIV, breast cancer, DM, HTN, CAD post CABG, COPD, PAF, and PAD. She did not go to dialysis yesterday due to feeling poorly and she presented to ED with substernal pressure and ecg with inferolateral st-t abnl but no STEMI. Hr labs showed a ; troponin 0.015     Later in ED had more CP and her ecg was done and showed high lateral ST elevation concerning for STEMI so code STEMI activated.She was taken to the cath lab for [facility] which revealed patent coronary artery bypass grafts; no intervention indicated. Normal LVEF.     Is now managed in the ICU and appears clinically stable.  She will receive a short dialysis today since she missed dialysis yesterday.  She will receive her full dialysis treatment tomorrow per her usual schedule.      Procedure(s) (LRB):  ANGIOGRAM, CORONARY ARTERY (N/A)  Bypass graft study  ARTERIOGRAM, SUBCLAVIAN  Angiogram, Coronary, with Left Heart Cath     Indwelling Lines/Drains at time of discharge:  Lines/Drains/Airways     Drain  Duration                Hemodialysis AV Fistula Left forearm -- days                Hospital Course:  9/6/23-Patient seen and examined. Stable, pain free, plan adjust for HTN.    9/7/23 Pt seen and and examined today in dialysis suite. Denies any CP, CHF sxs at this time. Labs reviewed, chart reviewed. [facility] site C/D/I. Pt deemed suitable for DC, will need follow up with primary cardiologist and nephrology      Goals of Care Treatment Preferences:  Code Status: Full Code    Health care agent: didier Paige  [phone], 2: carisa Paige [phone], 3: carisa Vazquez [phone]  Health care agent number: No value filed.                   Consults:   Consults (From admission, onward)        Status Ordering Provider     Inpatient consult to Critical Care Medicine  Once        Provider:  Margret Ordonez MD    Completed MEHNAZ ANGELES     Inpatient consult to Nephrology  Once        Provider:  Gavin Adan MD    Acknowledged MEHNAZ ANGELES          Significant Diagnostic Studies: Labs and LHC    Pending Diagnostic Studies:     Procedure Component Value Units Date/Time    CD4 T-Prospect Cells [[id number]] Collected: 09/06/23 0413    Order Status: Sent Lab Status: In process Updated: 09/07/23 0641    Specimen: Blood     Hepatitis B Surface Antibody, Qual/Quant [[id number]] Collected: 09/06/23 1555    Order Status: Sent Lab Status: In process Updated: 09/07/23 0201    Specimen: Blood           Final Active Diagnoses:    Diagnosis Date Noted POA    PRINCIPAL PROBLEM:  Acute ST elevation myocardial infarction (STEMI) of lateral wall [I21.29] 09/05/2023 Yes    AP (angina pectoris) [I20.9] 09/05/2023 Yes    Abnormal ECG [R94.31] 09/05/2023 Yes    Currently asymptomatic HIV infection, with history of HIV-related illness [B20] 07/10/2023 Yes    Type 2 diabetes mellitus with diabetic peripheral angiopathy without gangrene, with long-term current use of insulin [E11.51, Z79.4] 01/09/2023 Not Applicable    Primary hypertension [I10] 09/14/2022 Yes    Malignant neoplasm of upper-outer quadrant of left breast in female, estrogen receptor positive [C50.412, Z17.0] 06/20/2022 Not Applicable    ESRD (end stage renal disease) [N18.6] 09/09/2018 Yes    COPD (chronic obstructive pulmonary disease) [J44.9] 09/09/2018 Yes      Problems Resolved During this Admission:     No new Assessment & Plan notes have been filed under this hospital service since the last note was generated.  Service: Cardiology      Discharged  Condition: good    Disposition: Home or Self Care    Follow Up:    Patient Instructions:      Diet diabetic     Other restrictions (specify):   Order Comments: No heavy bending or lifting, no strenuous activity for 1 week. Ok to shower with your back to the beam, no bathing/soaking 1 week     Notify your health care provider if you experience any of the following:  temperature >100.4     Notify your health care provider if you experience any of the following:  persistent nausea and vomiting or diarrhea     Notify your health care provider if you experience any of the following:  severe uncontrolled pain     Notify your health care provider if you experience any of the following:  redness, tenderness, or signs of infection (pain, swelling, redness, odor or green/yellow discharge around incision site)     Notify your health care provider if you experience any of the following:  difficulty breathing or increased cough     Notify your health care provider if you experience any of the following:  severe persistent headache     Notify your health care provider if you experience any of the following:  worsening rash     Notify your health care provider if you experience any of the following:  persistent dizziness, light-headedness, or visual disturbances     Notify your health care provider if you experience any of the following:  increased confusion or weakness     Notify your health care provider if you experience any of the following:     Remove dressing in 24 hours     Medications:  Reconciled Home Medications:      Medication List      START taking these medications    hydrALAZINE 25 MG tablet  Commonly known as: APRESOLINE  Take 1 tablet (25 mg total) by mouth 3 (three) times daily.     isosorbide mononitrate 30 MG 24 hr tablet  Commonly known as: IMDUR  Take 1 tablet (30 mg total) by mouth once daily.  Start taking on: September 8, 2023        CONTINUE taking these medications    acetaminophen 500 mg Cap  Commonly  known as: TYLENOL  Take 500 mg by mouth every 6 (six) hours as needed.     * albuterol sulfate 90 mcg/actuation inhaler  Commonly known as: PROAIR RESPICLICK  Inhale 180 mcg into the lungs every 4 (four) hours. Rescue     * albuterol 90 mcg/actuation inhaler  Commonly known as: PROVENTIL/VENTOLIN HFA  Inhale 2 puffs into the lungs every 4 (four) hours as needed. Take 2 puffs of the lungs every 4 hr as needed for wheezing or shortness of breath     amLODIPine 10 MG tablet  Commonly known as: NORVASC  Take 10 mg by mouth once daily.     aspirin 81 MG EC tablet  Commonly known as: ECOTRIN  Take 81 mg by mouth once daily.     atorvastatin 10 MG tablet  Commonly known as: LIPITOR  Take 10 mg by mouth once daily.     * benzonatate 200 MG capsule  Commonly known as: TESSALON  benzonatate Take 1 Capsule (oral) 3 times per day PRN - Cough for 7 days [id number] capsule 3 times per day oral 7 days active 200 mg     * benzonatate 100 MG capsule  Commonly known as: TESSALON  Take 2 capsules (200 mg total) by mouth 3 (three) times daily as needed for Cough.     BREZTRI AEROSPHERE 160-9-4.8 mcg/actuation Hfaa  Generic drug: budesonide-glycopyr-formoterol  Inhale 1 puff into the lungs once daily.     calcium carbonate 500 mg calcium (1,250 mg) chewable tablet  Commonly known as: OS-CAMERON  Take 1 tablet by mouth 2 (two) times daily as needed for Heartburn.     DESCOVY 200-25 mg Tab  Generic drug: emtricitabine-tenofovir alafen  Take 1 tablet by mouth.     diphenhydrAMINE 25 mg capsule  Commonly known as: BENADRYL  Take 1 capsule (25 mg total) by mouth every 6 (six) hours as needed for Itching.     docusate sodium 100 MG capsule  Commonly known as: COLACE  Take 1 capsule (100 mg total) by mouth 2 (two) times daily as needed for Constipation.     doxycycline 100 MG Cap  Commonly known as: VIBRAMYCIN  Take 1 capsule (100 mg total) by mouth 2 (two) times daily. for 10 days     EPOGEN INJ  Epoetin Alejandro (Epogen)     insulin degludec 200  unit/mL (3 mL) insulin pen  Commonly known as: TRESIBA FLEXTOUCH U-200  Inject 60 Units into the skin once daily.     iron sucrose in  mg/100 mL Pgbk  Commonly known as: VENOFER  50 mg.     ISENTRESS 400 mg tablet  Generic drug: raltegravir  Take 400 mg by mouth.     ketoconazole 2 % cream  Commonly known as: NIZORAL  Apply topically once daily. for 14 days     levoFLOXacin 250 MG tablet  Commonly known as: LEVAQUIN  Take 250 mg by mouth once daily.     levothyroxine 88 mcg Cap  Commonly known as: TIROSINT  Take 75 mcg by mouth before breakfast.     loperamide 2 mg capsule  Commonly known as: IMODIUM  Take 1 capsule (2 mg total) by mouth 4 (four) times daily as needed for Diarrhea.     metoprolol succinate 25 MG 24 hr tablet  Commonly known as: TOPROL-XL  Take 25 mg by mouth 2 (two) times daily.     omeprazole 20 MG capsule  Commonly known as: PRILOSEC  Take 20 mg by mouth once daily.     ondansetron 4 MG Tbdl  Commonly known as: ZOFRAN-ODT  Dissolve 1 tablet (4 mg total) by mouth every 8 (eight) hours as needed (nausea).     oxyCODONE 5 MG immediate release tablet  Commonly known as: ROXICODONE  Take 1 tablet (5 mg total) by mouth every 6 (six) hours as needed for Pain.     prednisoLONE acetate 1 % Drps  Commonly known as: PRED FORTE  Place 1 drop into both eyes 4 (four) times daily.     sevelamer  MG Tab  Commonly known as: RENAGEL  Take 3 tablets by mouth 3 (three) times daily.     sodium bicarbonate 650 MG tablet  Take 650 mg by mouth 2 (two) times daily.         * This list has 4 medication(s) that are the same as other medications prescribed for you. Read the directions carefully, and ask your doctor or other care provider to review them with you.                Time spent on the discharge of patient: 25 minutes    Lazara Alicea NP  Cardiology  [facility]

## 2023-09-07 NOTE — NURSING
Pt is resting in bed. No signs of distress noted. No adverse events overnight. Pt remains free from falls. Cardiac monitor in place. Chart check complete.

## 2023-09-07 NOTE — PROGRESS NOTES
[facility]  Cardiology  Progress Note    Patient Name: Malaika Paredes  MRN: [id number]  Admission Date: 9/5/2023  Hospital Length of Stay: 2 days  Code Status: Prior   Attending Physician: Travis Bartholomew MD   Primary Care Physician: Travis Scales MD  Expected Discharge Date:   Principal Problem:Acute ST elevation myocardial infarction (STEMI) of lateral wall    Subjective:     Hospital Course:   9/6/23-Patient seen and examined. Stable, pain free, plan adjust for HTN.    9/7/23 Pt seen and and examined today in dialysis suite. Denies any CP, CHF sxs at this time. Labs reviewed, chart reviewed. [facility] site C/D/I          Review of Systems   Constitutional: Negative.   HENT: Negative.     Eyes: Negative.    Cardiovascular: Negative.    Respiratory: Negative.     Skin: Negative.    Musculoskeletal: Negative.    Gastrointestinal: Negative.    Genitourinary: Negative.    Neurological: Negative.    Psychiatric/Behavioral: Negative.       Objective:     Vital Signs (Most Recent):  Temp: 98.8 °F (37.1 °C) (09/07/23 0829)  Pulse: 74 (09/07/23 0829)  Resp: 14 (09/07/23 0829)  BP: (!) 145/64 (09/07/23 0829)  SpO2: (!) 93 % (09/07/23 0829) Vital Signs (24h Range):  Temp:  [97.5 °F (36.4 °C)-99.1 °F (37.3 °C)] 98.8 °F (37.1 °C)  Pulse:  [63-87] 74  Resp:  [13-34] 14  SpO2:  [93 %-100 %] 93 %  BP: ()/(46-81) 145/64     Weight: 75.4 kg (166 lb 3.6 oz)  Body mass index is 30.4 kg/m².     SpO2: (!) 93 %         Intake/Output Summary (Last 24 hours) at 9/7/2023 1243  Last data filed at 9/6/2023 1800  Gross per 24 hour   Intake 740 ml   Output 1906 ml   Net -1166 ml       Lines/Drains/Airways       Peripheral Intravenous Line  Duration                  Hemodialysis AV Fistula Left forearm -- days         Peripheral IV - Single Lumen 09/05/23 1623 20 G Right Antecubital 1 day         Peripheral IV - Single Lumen 09/05/23 1912 22 G Right Wrist 1 day                       Physical Exam  Vitals and nursing note  reviewed.   Constitutional:       Appearance: Normal appearance.   HENT:      Head: Normocephalic.   Eyes:      Pupils: Pupils are equal, round, and reactive to light.   Cardiovascular:      Rate and Rhythm: Normal rate and regular rhythm.      Heart sounds: Normal heart sounds, S1 normal and S2 normal. No murmur heard.     No S3 or S4 sounds.   Pulmonary:      Effort: Pulmonary effort is normal.      Breath sounds: Normal breath sounds.   Abdominal:      General: Bowel sounds are normal.      Palpations: Abdomen is soft.   Musculoskeletal:         General: Normal range of motion.      Cervical back: Normal range of motion.   Skin:     Capillary Refill: Capillary refill takes less than 2 seconds.   Neurological:      General: No focal deficit present.      Mental Status: She is alert and oriented to person, place, and time.   Psychiatric:         Mood and Affect: Mood normal.         Behavior: Behavior normal.         Thought Content: Thought content normal.            Significant Labs: BMP:   Recent Labs   Lab 09/05/23 1622 09/06/23  0732 09/07/23  0551   * 166* 170*  172*   * 130* 135*  135*   K 5.0 6.2* 4.3  4.3    105 98  98   CO2 15* 12* 23  24   BUN 95* 100* 69*  69*   CREATININE 6.9* 6.9* 5.8*  5.8*   CALCIUM 8.9 8.4* 7.3*  7.6*   , CMP   Recent Labs   Lab 09/05/23 1622 09/06/23  0732 09/07/23  0551   * 130* 135*  135*   K 5.0 6.2* 4.3  4.3    105 98  98   CO2 15* 12* 23  24   * 166* 170*  172*   BUN 95* 100* 69*  69*   CREATININE 6.9* 6.9* 5.8*  5.8*   CALCIUM 8.9 8.4* 7.3*  7.6*   PROT 8.3 8.2 6.5   ALBUMIN 2.8* 2.8* 2.4*  2.3*   BILITOT 0.3 0.4 0.3   ALKPHOS 69 70 65   AST 17 14 16   ALT 16 11 11   ANIONGAP 12 13 14  13   , CBC   Recent Labs   Lab 09/05/23  1622 09/06/23  0413 09/07/23  0551   WBC 7.24 5.17 5.37   HGB 12.5 12.5 11.1*   HCT 36.4* 36.6* 32.4*    168 167   , INR   Recent Labs   Lab 09/05/23  1916   INR 1.0   , Lipid Panel No  "results for input(s): "CHOL", "HDL", "LDLCALC", "TRIG", "CHOLHDL" in the last 48 hours., Troponin   Recent Labs   Lab 09/06/23  0008 09/06/23  0539 09/06/23  1156   TROPONINI 0.024 0.021 0.024   , and All pertinent lab results from the last 24 hours have been reviewed.    Significant Imaging: Cardiac Cath: reviewed, Echocardiogram: Transthoracic echo (TTE) complete (Cupid Only):   Results for orders placed or performed during the hospital encounter of 09/05/23   Echo   Result Value Ref Range    BSA 1.87 m2    LA WIDTH 3.3 cm    TAPSE 1.70 cm    RA Width 2.5 cm    LVIDd 4.03 3.5 - 6.0 cm    LV Systolic Volume 21.86 mL    LV Systolic Volume Index 12.1 mL/m2    LVIDs 2.48 2.1 - 4.0 cm    LV Diastolic Volume 71.09 mL    LV Diastolic Volume Index 39.28 mL/m2    IVS 1.46 (A) 0.6 - 1.1 cm    FS 38 28 - 44 %    Left Ventricle Relative Wall Thickness 0.62 cm    Posterior Wall 1.24 (A) 0.6 - 1.1 cm    LV mass 199.76 g    LV Mass Index 110 g/m2    MV Peak E Bin 1.03 m/s    TDI LATERAL 0.11 m/s    TDI SEPTAL 0.06 m/s    E/E' ratio 12.12 m/s    MV Peak A Bin 0.98 m/s    TR Max Bin 2.41 m/s    E/A ratio 1.05     IVRT 79.92 msec    E wave deceleration time 270.91 msec    LV SEPTAL E/E' RATIO 17.17 m/s    LA Volume Index 23.6 mL/m2    LV LATERAL E/E' RATIO 9.36 m/s    LA volume 42.75 cm3    LVOT peak bin 1.24 m/s    Left Ventricular Outflow Tract Mean Velocity 0.95 cm/s    Left Ventricular Outflow Tract Mean Gradient 3.76 mmHg    LA size 3.53 cm    Left Atrium Major Axis 4.21 cm    Left Atrium Minor Axis 4.43 cm    RVOT peak VTI 20.2 cm    RA Major Axis 4.00 cm    AV mean gradient 6 mmHg    AV peak gradient 10 mmHg    Ao peak bin 1.61 m/s    Ao VTI 35.70 cm    LVOT peak VTI 29.70 cm    AV Velocity Ratio 0.77     AV index (prosthetic) 0.83     MV stenosis pressure 1/2 time 78.56 ms    MV valve area p 1/2 method 2.80 cm2    TV mean gradient 21 mmHg    Triscuspid Valve Regurgitation Peak Gradient 23 mmHg    PV mean gradient 2 mmHg    " RVOT peak jose 0.87 m/s    Ao root annulus 2.88 cm    IVC diameter 1.48 cm    Mean e' 0.09 m/s    ZLVIDS -1.75     ZLVIDD -2.17     Narrative      Left Ventricle: The left ventricle is normal in size. Mildly increased   wall thickness. Normal wall motion. There is normal systolic function with   a visually estimated ejection fraction of 55 - 70%.    Right Ventricle: Normal right ventricular cavity size. Wall thickness   is normal. Right ventricle wall motion  is normal. Systolic function is   normal.    Aortic Valve: The aortic valve is a trileaflet valve. There is mild   aortic valve sclerosis.    Mitral Valve: There is no stenosis.    Tricuspid Valve: There is mild regurgitation with a centrally directed   jet.     , EKG: reviewed, Stress Test: reviewed, and X-Ray: CXR: X-Ray Chest 1 View (CXR): No results found for this visit on 09/05/23.    Assessment and Plan:       * Acute ST elevation myocardial infarction (STEMI) of lateral wall  C shows grafts patent    AP (angina pectoris)  Add nitrates    9/7/23  NO CP on exam    Type 2 diabetes mellitus with diabetic peripheral angiopathy without gangrene, with long-term current use of insulin  Will follow    Primary hypertension  Plan adjust medications  Add hydrylazine    ESRD (end stage renal disease)  Per nephrology        VTE Risk Mitigation (From admission, onward)         Ordered     heparin (porcine) injection 5,000 Units  Every 8 hours         09/06/23 0933     Place sequential compression device  Until discontinued         09/06/23 0933                Lazara Alicea NP  Cardiology  [facility]

## 2023-09-07 NOTE — PLAN OF CARE
[facility]  Discharge Final Note    Primary Care Provider: Travis Scales MD    Expected Discharge Date: 9/7/2023    Final Discharge Note (most recent)       Final Note - 09/07/23 1533          Final Note    Assessment Type Final Discharge Note     Anticipated Discharge Disposition Home or Self Care                     Important Message from Medicare             DC Dispo: home  PCP f/u: patient follows with non[facility] and will need to schedule  Heme/Onc f/u Sept 11

## 2023-09-07 NOTE — NURSING
09/07/23 1300   Post-Hemodialysis Assessment   Rinseback Volume (mL) 250 mL   Blood Volume Processed (Liters) 63.4 L   Dialyzer Clearance Lightly streaked   Duration of Treatment 180 minutes   Total UF (mL) 2000 mL   Net Fluid Removal 1500   Post-Hemodialysis Comments 3hr H.D. tx completed. Pt tolerated w/o issues, visited by Dr. Adan and Cardiology during tx. Pt de-accessed per P & P, report given to primary nurse.

## 2023-09-07 NOTE — NURSING TRANSFER
Nursing Transfer Note      9/6/2023   7:52 PM      Reason patient is being transferred: no longer needing higher level of care    Transfer To:     Transfer via wheelchair    Transfer with cardiac monitoring    Transported by nurse  Order for Tele Monitor? Yes      4eyes on Skin: yes    Medicines sent: given to nurse    Any special needs or follow-up needed: none    Patient belongings transferred with patient: Yes    Chart send with patient: Yes    Notified: pt to notify family    Patient reassessed at: upon arrival

## 2023-09-07 NOTE — TELEPHONE ENCOUNTER
SAMIA spoke with family member-Ember re: transportation to upcoming appt .Pt should d/c from acute today per Ember following heart attack. SAMIA faxed ride request to [employer]. P. [phone], F. [phone]. SAMIA will call [employer] to confirm. SAMIA will remain available.

## 2023-09-07 NOTE — PROGRESS NOTES
[facility] - Med [facility]urg  Nephrology  Progress Note    Patient Name: Malaika Paredes  MRN: [id number]  Admission Date: 9/5/2023  Hospital Length of Stay: 2 days  Attending Provider: Travis Bartholomew MD   Primary Care Physician: Travis Scales MD  Principal Problem:Acute ST elevation myocardial infarction (STEMI) of lateral wall    Subjective:     HPI:   Malaika Paredes is a 67 year old female with  ESRD, HD on TTS HD at [facility]. She also has HIV, breast cancer, DM, HTN, CAD post CABG, COPD, PAF, and PAD. She did not go to dialysis yesterday due to feeling poorly and she presented to ED with substernal pressure and ecg with inferolateral st-t abnl but no STEMI. Hr labs showed a ; troponin 0.015     Later in ED had more CP and her ecg was done and showed high lateral ST elevation concerning for STEMI so code STEMI activated.She was taken to the cath lab for [facility] which revealed patent coronary artery bypass grafts; no intervention indicated. Normal LVEF.    Pt transferred from ICU remains clinically stable.        Interval History:   Course from yesterday reviewed.  Vital signs reviewed.  Patient complains of cough.    Transferred from ICU to the floor and she remains clinically stable.    Scheduled for dialysis today per her usual schedule.    She had an extra treatment yesterday because she skipped the day previously and her potassium was elevated.    Review of patient's allergies indicates:  No Known Allergies  Current Facility-Administered Medications   Medication Frequency    albuterol-ipratropium 2.5 mg-0.5 mg/3 mL nebulizer solution 3 mL Q4H PRN    amLODIPine tablet 10 mg Daily    arformoteroL nebulizer solution 15 mcg BID    aspirin EC tablet 81 mg Daily    atenoloL tablet 25 mg Daily    atorvastatin tablet 10 mg Daily    budesonide nebulizer solution 0.5 mg Q12H    dextromethorphan-guaiFENesin  mg/5 ml liquid 5 mL Q4H PRN    dextrose 10% bolus 125 mL 125 mL PRN    dextrose  10% bolus 250 mL 250 mL PRN    emtricitabine capsule 200 mg Daily    glucagon (human recombinant) injection 1 mg PRN    glucose chewable tablet 16 g PRN    glucose chewable tablet 24 g PRN    heparin (porcine) injection 5,000 Units Q8H    hydrALAZINE injection 10 mg Q8H PRN    hydrALAZINE tablet 25 mg TID    HYDROcodone-acetaminophen 5-325 mg per tablet 1 tablet Q4H PRN    insulin aspart U-100 pen 0-10 Units QID (AC + HS) PRN    isosorbide mononitrate 24 hr tablet 30 mg Daily    morphine injection 2 mg Q4H PRN    mupirocin 2 % ointment BID    pneumoc 20-kasey conj-dip cr(PF) (PREVNAR-20 (PF)) injection Syrg 0.5 mL vaccine x 1 dose    raltegravir tablet 400 mg BID    sodium chloride 0.9% bolus 250 mL 250 mL PRN    sodium chloride 0.9% bolus 250 mL 250 mL PRN    tenofovir alafenamide tablet Tab 25 mg Daily       Objective:     Vital Signs (Most Recent):  Temp: 98.8 °F (37.1 °C) (09/07/23 0829)  Pulse: 74 (09/07/23 0829)  Resp: 14 (09/07/23 0829)  BP: (!) 145/64 (09/07/23 0829)  SpO2: (!) 93 % (09/07/23 0829) Vital Signs (24h Range):  Temp:  [97.5 °F (36.4 °C)-99.1 °F (37.3 °C)] 98.8 °F (37.1 °C)  Pulse:  [63-87] 74  Resp:  [13-34] 14  SpO2:  [93 %-100 %] 93 %  BP: ()/(46-97) 145/64     Weight: 75.4 kg (166 lb 3.6 oz) (09/06/23 1956)  Body mass index is 30.4 kg/m².  Body surface area is 1.82 meters squared.    I/O last 3 completed shifts:  In: 860 [P.O.:360; Other:500]  Out: 1906 [Other:1906]     Physical Exam  Vitals reviewed.   Constitutional:       Appearance: Normal appearance.   HENT:      Head: Normocephalic and atraumatic.      Nose: Nose normal.   Eyes:      Extraocular Movements: Extraocular movements intact.   Cardiovascular:      Rate and Rhythm: Normal rate and regular rhythm.   Pulmonary:      Effort: Pulmonary effort is normal.      Comments: Patient with a cough, nonproductive  Abdominal:      Palpations: Abdomen is soft.   Musculoskeletal:         General: No swelling.      Cervical  back: Normal range of motion and neck supple.      Comments: Functional left lower arm AV fistula.   Neurological:      Mental Status: She is alert.          Significant Labs:  CBC:   Recent Labs   Lab 09/07/23  0551   WBC 5.37   RBC 3.54*   HGB 11.1*   HCT 32.4*      MCV 92   MCH 31.4*   MCHC 34.3     CMP:   Recent Labs   Lab 09/07/23  0551   *  172*   CALCIUM 7.3*  7.6*   ALBUMIN 2.4*  2.3*   PROT 6.5   *  135*   K 4.3  4.3   CO2 23  24   CL 98  98   BUN 69*  69*   CREATININE 5.8*  5.8*   ALKPHOS 65   ALT 11   AST 16   BILITOT 0.3     All labs within the past 24 hours have been reviewed.         Assessment/Plan:     ESRD:   Patient dialyzes Q TTS at [facility].   For dialysis today per her usual schedule.  Potassium improved following short dialysis yesterday.      Hyperkalemia:   Resolved post hemodialysis.     Ischemic heart disease:   Patient presented with an ST elevated MI.    Taken to heart catheterization with no lesion requiring intervention.    Patient has previous history of bypass graft.  All grafts were reported open.  Follow further recommendations per Cardiology     Anemia of chronic kidney disease:   Currently stable.   Follow hemoglobin and treat with iron supplementation and/or EPO if indicated.       Metabolic bone disorder of end-stage renal disease:   Follow labs and Continue patient's usual phosphate binder and vitamin-D replacement.       Diabetes mellitus:   Managed per primary team.       Breast cancer:   History of chemotherapy as outlined by primary team.          Gavin Adan MD  Nephrology  [facility]

## 2023-09-08 ENCOUNTER — TELEPHONE (OUTPATIENT)
Dept: PULMONOLOGY | Facility: HOSPITAL | Age: 68
End: 2023-09-08
Payer: MEDICARE

## 2023-09-08 ENCOUNTER — PATIENT OUTREACH (OUTPATIENT)
Dept: ADMINISTRATIVE | Facility: CLINIC | Age: 68
End: 2023-09-08
Payer: MEDICARE

## 2023-09-08 DIAGNOSIS — I21.29 ACUTE ST ELEVATION MYOCARDIAL INFARCTION (STEMI) OF LATERAL WALL: Primary | ICD-10-CM

## 2023-09-08 LAB — MAYO MISCELLANEOUS RESULT (REF): NORMAL

## 2023-09-08 NOTE — PROGRESS NOTES
C3 nurse spoke with Verlean Cage Veals(LARONICA-NIECE) for a TCC post hospital discharge follow up call. The patient reports does not have a scheduled HOSFU appointment. C3 nurse was unable to schedule HOSFU appointment for [facility]. Patient advised to contact their PCP to schedule a HOSPFU within 5-7 days.

## 2023-09-09 LAB
HBV SURFACE AB SER QL IA: NEGATIVE
HBV SURFACE AB SERPL IA-ACNC: <3 MIU/ML

## 2023-09-09 NOTE — TELEPHONE ENCOUNTER
Called Mrs Paredes at home as she was discharged from hospital earlier today before CD4 count resulted  I spoke with both she and her niece (at Mrs Paredes request).  I updated them on CD4 level resulting very low and discussed need to review with HIV Doctor who they have appointment to see this coming Wednesday (9/13).  Her niece expressed understanding and will notify Infectious Disease MD at appointment.    Evelyn Kaplan, ARMOND-BC  Critical Care Medicine  [facility]

## 2023-09-11 ENCOUNTER — INFUSION (OUTPATIENT)
Dept: INFUSION THERAPY | Facility: HOSPITAL | Age: 68
End: 2023-09-11
Attending: INTERNAL MEDICINE
Payer: MEDICARE

## 2023-09-11 ENCOUNTER — OFFICE VISIT (OUTPATIENT)
Dept: HEMATOLOGY/ONCOLOGY | Facility: CLINIC | Age: 68
End: 2023-09-11
Payer: MEDICARE

## 2023-09-11 ENCOUNTER — TELEPHONE (OUTPATIENT)
Dept: CARDIOLOGY | Facility: CLINIC | Age: 68
End: 2023-09-11
Payer: MEDICARE

## 2023-09-11 VITALS
RESPIRATION RATE: 18 BRPM | WEIGHT: 175.69 LBS | SYSTOLIC BLOOD PRESSURE: 103 MMHG | BODY MASS INDEX: 32.33 KG/M2 | HEART RATE: 65 BPM | TEMPERATURE: 98 F | HEIGHT: 62 IN | DIASTOLIC BLOOD PRESSURE: 51 MMHG | OXYGEN SATURATION: 99 %

## 2023-09-11 VITALS
BODY MASS INDEX: 32.33 KG/M2 | OXYGEN SATURATION: 96 % | HEIGHT: 62 IN | TEMPERATURE: 100 F | WEIGHT: 175.69 LBS | SYSTOLIC BLOOD PRESSURE: 118 MMHG | HEART RATE: 71 BPM | DIASTOLIC BLOOD PRESSURE: 56 MMHG

## 2023-09-11 DIAGNOSIS — N18.6 ESRD (END STAGE RENAL DISEASE): ICD-10-CM

## 2023-09-11 DIAGNOSIS — J43.1 PANLOBULAR EMPHYSEMA: ICD-10-CM

## 2023-09-11 DIAGNOSIS — C50.412 MALIGNANT NEOPLASM OF UPPER-OUTER QUADRANT OF LEFT BREAST IN FEMALE, ESTROGEN RECEPTOR POSITIVE: Primary | ICD-10-CM

## 2023-09-11 DIAGNOSIS — Z17.0 MALIGNANT NEOPLASM OF UPPER-OUTER QUADRANT OF LEFT BREAST IN FEMALE, ESTROGEN RECEPTOR POSITIVE: ICD-10-CM

## 2023-09-11 DIAGNOSIS — C50.412 MALIGNANT NEOPLASM OF UPPER-OUTER QUADRANT OF LEFT BREAST IN FEMALE, ESTROGEN RECEPTOR POSITIVE: ICD-10-CM

## 2023-09-11 DIAGNOSIS — Z17.0 MALIGNANT NEOPLASM OF UPPER-OUTER QUADRANT OF LEFT BREAST IN FEMALE, ESTROGEN RECEPTOR POSITIVE: Primary | ICD-10-CM

## 2023-09-11 DIAGNOSIS — C77.9 SECONDARY MALIGNANCY OF REGIONAL LYMPH NODE: ICD-10-CM

## 2023-09-11 PROCEDURE — 99999 PR PBB SHADOW E&M-EST. PATIENT-LVL III: CPT | Mod: PBBFAC,,, | Performed by: NURSE PRACTITIONER

## 2023-09-11 PROCEDURE — 1157F ADVNC CARE PLAN IN RCRD: CPT | Mod: CPTII,S$GLB,, | Performed by: NURSE PRACTITIONER

## 2023-09-11 PROCEDURE — 96413 CHEMO IV INFUSION 1 HR: CPT

## 2023-09-11 PROCEDURE — 3008F PR BODY MASS INDEX (BMI) DOCUMENTED: ICD-10-PCS | Mod: CPTII,S$GLB,, | Performed by: NURSE PRACTITIONER

## 2023-09-11 PROCEDURE — 25000003 PHARM REV CODE 250: Performed by: NURSE PRACTITIONER

## 2023-09-11 PROCEDURE — 4010F PR ACE/ARB THEARPY RXD/TAKEN: ICD-10-PCS | Mod: CPTII,S$GLB,, | Performed by: NURSE PRACTITIONER

## 2023-09-11 PROCEDURE — 99215 OFFICE O/P EST HI 40 MIN: CPT | Mod: 25,S$GLB,, | Performed by: NURSE PRACTITIONER

## 2023-09-11 PROCEDURE — 3074F PR MOST RECENT SYSTOLIC BLOOD PRESSURE < 130 MM HG: ICD-10-PCS | Mod: CPTII,S$GLB,, | Performed by: NURSE PRACTITIONER

## 2023-09-11 PROCEDURE — 1111F PR DISCHARGE MEDS RECONCILED W/ CURRENT OUTPATIENT MED LIST: ICD-10-PCS | Mod: CPTII,S$GLB,, | Performed by: NURSE PRACTITIONER

## 2023-09-11 PROCEDURE — 1101F PR PT FALLS ASSESS DOC 0-1 FALLS W/OUT INJ PAST YR: ICD-10-PCS | Mod: CPTII,S$GLB,, | Performed by: NURSE PRACTITIONER

## 2023-09-11 PROCEDURE — 1159F MED LIST DOCD IN RCRD: CPT | Mod: CPTII,S$GLB,, | Performed by: NURSE PRACTITIONER

## 2023-09-11 PROCEDURE — 99999 PR PBB SHADOW E&M-EST. PATIENT-LVL III: ICD-10-PCS | Mod: PBBFAC,,, | Performed by: NURSE PRACTITIONER

## 2023-09-11 PROCEDURE — 99215 PR OFFICE/OUTPT VISIT, EST, LEVL V, 40-54 MIN: ICD-10-PCS | Mod: 25,S$GLB,, | Performed by: NURSE PRACTITIONER

## 2023-09-11 PROCEDURE — 1159F PR MEDICATION LIST DOCUMENTED IN MEDICAL RECORD: ICD-10-PCS | Mod: CPTII,S$GLB,, | Performed by: NURSE PRACTITIONER

## 2023-09-11 PROCEDURE — 1111F DSCHRG MED/CURRENT MED MERGE: CPT | Mod: CPTII,S$GLB,, | Performed by: NURSE PRACTITIONER

## 2023-09-11 PROCEDURE — 1160F RVW MEDS BY RX/DR IN RCRD: CPT | Mod: CPTII,S$GLB,, | Performed by: NURSE PRACTITIONER

## 2023-09-11 PROCEDURE — 96375 TX/PRO/DX INJ NEW DRUG ADDON: CPT

## 2023-09-11 PROCEDURE — 3288F PR FALLS RISK ASSESSMENT DOCUMENTED: ICD-10-PCS | Mod: CPTII,S$GLB,, | Performed by: NURSE PRACTITIONER

## 2023-09-11 PROCEDURE — 3078F PR MOST RECENT DIASTOLIC BLOOD PRESSURE < 80 MM HG: ICD-10-PCS | Mod: CPTII,S$GLB,, | Performed by: NURSE PRACTITIONER

## 2023-09-11 PROCEDURE — 3074F SYST BP LT 130 MM HG: CPT | Mod: CPTII,S$GLB,, | Performed by: NURSE PRACTITIONER

## 2023-09-11 PROCEDURE — 1101F PT FALLS ASSESS-DOCD LE1/YR: CPT | Mod: CPTII,S$GLB,, | Performed by: NURSE PRACTITIONER

## 2023-09-11 PROCEDURE — 1157F PR ADVANCE CARE PLAN OR EQUIV PRESENT IN MEDICAL RECORD: ICD-10-PCS | Mod: CPTII,S$GLB,, | Performed by: NURSE PRACTITIONER

## 2023-09-11 PROCEDURE — 3078F DIAST BP <80 MM HG: CPT | Mod: CPTII,S$GLB,, | Performed by: NURSE PRACTITIONER

## 2023-09-11 PROCEDURE — 3044F PR MOST RECENT HEMOGLOBIN A1C LEVEL <7.0%: ICD-10-PCS | Mod: CPTII,S$GLB,, | Performed by: NURSE PRACTITIONER

## 2023-09-11 PROCEDURE — 1160F PR REVIEW ALL MEDS BY PRESCRIBER/CLIN PHARMACIST DOCUMENTED: ICD-10-PCS | Mod: CPTII,S$GLB,, | Performed by: NURSE PRACTITIONER

## 2023-09-11 PROCEDURE — 3044F HG A1C LEVEL LT 7.0%: CPT | Mod: CPTII,S$GLB,, | Performed by: NURSE PRACTITIONER

## 2023-09-11 PROCEDURE — 3288F FALL RISK ASSESSMENT DOCD: CPT | Mod: CPTII,S$GLB,, | Performed by: NURSE PRACTITIONER

## 2023-09-11 PROCEDURE — 63600175 PHARM REV CODE 636 W HCPCS: Performed by: NURSE PRACTITIONER

## 2023-09-11 PROCEDURE — 4010F ACE/ARB THERAPY RXD/TAKEN: CPT | Mod: CPTII,S$GLB,, | Performed by: NURSE PRACTITIONER

## 2023-09-11 PROCEDURE — 1126F AMNT PAIN NOTED NONE PRSNT: CPT | Mod: CPTII,S$GLB,, | Performed by: NURSE PRACTITIONER

## 2023-09-11 PROCEDURE — 3008F BODY MASS INDEX DOCD: CPT | Mod: CPTII,S$GLB,, | Performed by: NURSE PRACTITIONER

## 2023-09-11 PROCEDURE — 1126F PR PAIN SEVERITY QUANTIFIED, NO PAIN PRESENT: ICD-10-PCS | Mod: CPTII,S$GLB,, | Performed by: NURSE PRACTITIONER

## 2023-09-11 RX ORDER — HEPARIN 100 UNIT/ML
500 SYRINGE INTRAVENOUS
Status: CANCELLED | OUTPATIENT
Start: 2023-09-11

## 2023-09-11 RX ORDER — SODIUM CHLORIDE 0.9 % (FLUSH) 0.9 %
10 SYRINGE (ML) INJECTION
Status: CANCELLED | OUTPATIENT
Start: 2023-09-11

## 2023-09-11 RX ORDER — ONDANSETRON 2 MG/ML
8 INJECTION INTRAMUSCULAR; INTRAVENOUS
Status: CANCELLED | OUTPATIENT
Start: 2023-09-11

## 2023-09-11 RX ORDER — SODIUM CHLORIDE 0.9 % (FLUSH) 0.9 %
10 SYRINGE (ML) INJECTION
Status: DISCONTINUED | OUTPATIENT
Start: 2023-09-11 | End: 2023-09-11 | Stop reason: HOSPADM

## 2023-09-11 RX ORDER — ONDANSETRON 2 MG/ML
8 INJECTION INTRAMUSCULAR; INTRAVENOUS
Status: COMPLETED | OUTPATIENT
Start: 2023-09-11 | End: 2023-09-11

## 2023-09-11 RX ADMIN — ADO-TRASTUZUMAB EMTANSINE 300 MG: 20 INJECTION, POWDER, LYOPHILIZED, FOR SOLUTION INTRAVENOUS at 11:09

## 2023-09-11 RX ADMIN — ONDANSETRON 8 MG: 2 INJECTION, SOLUTION INTRAMUSCULAR; INTRAVENOUS at 11:09

## 2023-09-11 RX ADMIN — SODIUM CHLORIDE: 9 INJECTION, SOLUTION INTRAVENOUS at 10:09

## 2023-09-11 NOTE — ASSESSMENT & PLAN NOTE
Cancer Staging   Malignant neoplasm of upper-outer quadrant of left breast in female, estrogen receptor positive  Staging form: Breast, AJCC 8th Edition  - Clinical stage from 6/20/2022: Stage IIB (cT3, cN2a(f), cM0, G3, ER+, MI+, HER2+) - Signed by Toney العلي MD on 7/11/2022    S/p neoadjuvant chemotherapy with 5 cycles of paclitaxel with Trastuzumab with Pertuzumab, followed by Trastuzumab/Pertuzumab completed in March of 2023.  Repeat PET scan revealed decreased tumor size and PET activity in the nodes. She underwent Lt. breast mastectomy with sentinel node biopsy and subsequent Lt. axillary dissection non 3/1/23.  Pathology revealed a residual 4.3 cm breast mass with no associated DCIS or LVI.     Completed adjuvant chest wall radiation 8/30/2023    Now on Kadcyla Q 3 weeks    Labs reviewed stable. Okay to proceed with cycle 2 Q 3 weekly Kadcyla. F/u 3 weeks with cbc, cmp    -was asked to contact family for visit. Per patient family's phone currently not working

## 2023-09-11 NOTE — TELEPHONE ENCOUNTER
Attempted to contact pt regarding HFU appt. No answer, no VM      --- Lazara Alicea NP 09/11/23 12:49 PM ---  Please call and schedule HFU next week with Stephanie Tian PA-C

## 2023-09-11 NOTE — PLAN OF CARE
Discussed plan of care with pt. Addressed any and ongoing concerns. Pt denies   Problem: Adult Inpatient Plan of Care  Goal: Plan of Care Review  Outcome: Ongoing, Progressing  Goal: Patient-Specific Goal (Individualized)  Outcome: Ongoing, Progressing  Flowsheets (Taken 9/11/2023 1323)  Anxieties, Fears or Concerns: Denies  Individualized Care Needs: Reclined position with pillow , snacks provided  Goal: Absence of Hospital-Acquired Illness or Injury  Outcome: Ongoing, Progressing  Intervention: Identify and Manage Fall Risk  Flowsheets (Taken 9/11/2023 1323)  Safety Promotion/Fall Prevention: in recliner, wheels locked  Intervention: Prevent Infection  Flowsheets (Taken 9/11/2023 1323)  Infection Prevention:   equipment surfaces disinfected   hand hygiene promoted   personal protective equipment utilized  Goal: Optimal Comfort and Wellbeing  Outcome: Ongoing, Progressing  Intervention: Provide Person-Centered Care  Flowsheets (Taken 9/11/2023 1323)  Trust Relationship/Rapport:   questions answered   choices provided   care explained   questions encouraged   thoughts/feelings acknowledged

## 2023-09-11 NOTE — PROGRESS NOTES
Subjective:       Patient ID: Malaika Paredes is a 67 y.o. female.    Chief Complaint: review labs. Chemo      Cancer Staging   Malignant neoplasm of upper-outer quadrant of left breast in female, estrogen receptor positive  Staging form: Breast, AJCC 8th Edition  - Clinical stage from 2022: Stage IIB (cT3, cN2a(f), cM0, G3, ER+, NJ+, HER2+) - Signed by Toney العلي MD on 2022        HPI: 67 y.o female with ESRD on HD T. Thurs. Sat. Presenting today for follow up of her breast cancer. Patient S/p neoadjuvant chemotherapy with 5 cycles of paclitaxel with Trastuzumab with Pertuzumab, followed by Trastuzumab/Pertuzumab completed in 2023.      Repeat PET scan revealed decreased tumor size and PET activity in the nodes.     She underwent Lt. breast mastectomy with sentinel node biopsy and subsequent Lt. axillary dissection non 3/1/23.  Pathology revealed a residual 4.3 cm breast mass with no associated DCIS or LVI.     S/p adjuvant chest wall radiation completed 2023    Completed adjuvant chest wall radiation 2023    Started on Kadcyla Q 3 weeks s/p cycle 1 2023    Today she presents for lab review and consideration of ongoing treatment. She feels well and is without complaints.   Social History     Socioeconomic History    Marital status:    Tobacco Use    Smoking status: Former     Current packs/day: 0.00     Types: Cigarettes     Start date: 1982     Quit date: 2012     Years since quittin.2    Smokeless tobacco: Never   Substance and Sexual Activity    Alcohol use: No    Drug use: Never   Social History Narrative    ** Merged History Encounter **            Past Medical History:   Diagnosis Date    CAD (coronary artery disease)     Cataract     Cataract     CHF (congestive heart failure)     COPD (chronic obstructive pulmonary disease)     Currently asymptomatic HIV infection, with history of HIV-related illness 7/10/2023    Diabetes mellitus      Diabetic retinopathy     ESRD (end stage renal disease)     TTS    Hypertension     Ischemic ulcer of toe of left foot     Malignant neoplasm of upper-outer quadrant of left breast in female, estrogen receptor positive 06/20/2022    left    PAD (peripheral artery disease)     PAF (paroxysmal atrial fibrillation)        Family History   Problem Relation Age of Onset    Hypertension Mother     Cancer Father     Breast cancer Sister     Diabetes Sister     Cancer Brother     Amblyopia Neg Hx     Blindness Neg Hx     Cataracts Neg Hx     Glaucoma Neg Hx     Macular degeneration Neg Hx     Retinal detachment Neg Hx     Strabismus Neg Hx     Stroke Neg Hx     Thyroid disease Neg Hx        Past Surgical History:   Procedure Laterality Date    ANGIOGRAM, CORONARY, WITH LEFT HEART CATHETERIZATION  9/5/2023    Procedure: Angiogram, Coronary, with Left Heart Cath;  Surgeon: Travis Bartholomew MD;  Location: [facility] CATH LAB;  Service: Cardiology;;    APPLICATION OF WOUND VACUUM-ASSISTED CLOSURE DEVICE Left 4/6/2023    Procedure: APPLICATION, WOUND VAC;  Surgeon: Jayjay Arana MD;  Location: [facility] OR;  Service: General;  Laterality: Left;  left chest    ARTERIOGRAPHY OF SUBCLAVIAN ARTERY  9/5/2023    Procedure: ARTERIOGRAM, SUBCLAVIAN;  Surgeon: Travis Bartholomew MD;  Location: [facility] CATH LAB;  Service: Cardiology;;    AXILLARY NODE DISSECTION Left 3/1/2023    Procedure: LYMPHADENECTOMY, AXILLARY;  Surgeon: Jayjay Arana MD;  Location: [facility] OR;  Service: General;  Laterality: Left;    BIOPSY OF INGUINAL LYMPH NODE Left 3/31/2023    Procedure: BIOPSY, LYMPH NODE, INGUINAL;  Surgeon: Jayjay Arana MD;  Location: [facility] OR;  Service: General;  Laterality: Left;    BREAST BIOPSY Right     benign    CATARACT EXTRACTION W/  INTRAOCULAR LENS IMPLANT Right 08/14/2017    Dr. Moe    CORONARY ANGIOGRAPHY N/A 9/5/2023    Procedure: ANGIOGRAM, CORONARY ARTERY;  Surgeon: Travis Bartholomew MD;  Location: [facility] CATH LAB;  Service: Cardiology;   Laterality: N/A;    CORONARY ARTERY BYPASS GRAFT  2020    CORONARY BYPASS GRAFT ANGIOGRAPHY  9/5/2023    Procedure: Bypass graft study;  Surgeon: Travis Bartholomew MD;  Location: [facility] CATH LAB;  Service: Cardiology;;    FLUOROSCOPY N/A 07/25/2022    Procedure: FLUOROSCOPY/mediport placement;  Surgeon: Cole Perdomo MD;  Location: [facility] CATH LAB;  Service: General;  Laterality: N/A;    MEDIPORT REMOVAL N/A 3/1/2023    Procedure: REMOVAL, CATHETER, CENTRAL VENOUS, TUNNELED, WITH PORT;  Surgeon: Jayjay Arana MD;  Location: [facility] OR;  Service: General;  Laterality: N/A;    MODIFIED RADICAL MASTECTOMY W/ AXILLARY LYMPH NODE DISSECTION Left 3/1/2023    Procedure: MASTECTOMY, MODIFIED RADICAL;  Surgeon: Jayjay Arana MD;  Location: [facility] OR;  Service: General;  Laterality: Left;    WOUND DEBRIDEMENT Left 3/31/2023    Procedure: DEBRIDEMENT, WOUND;  Surgeon: Jayjay Arana MD;  Location: [facility] OR;  Service: General;  Laterality: Left;  left chest wall eschar debridement       Review of Systems   Constitutional:  Negative for activity change, appetite change, chills, fatigue, fever and unexpected weight change.   HENT:  Negative for congestion, mouth sores, nosebleeds, sore throat, trouble swallowing and voice change.    Respiratory:  Negative for cough, chest tightness, shortness of breath and wheezing.    Cardiovascular:  Negative for chest pain and leg swelling.   Gastrointestinal:  Negative for abdominal distention, abdominal pain, blood in stool, constipation, diarrhea, nausea and vomiting.   Genitourinary:  Negative for difficulty urinating, dysuria and hematuria.   Musculoskeletal:  Negative for arthralgias, back pain and myalgias.   Skin:  Negative for pallor, rash and wound.   Neurological:  Negative for dizziness, syncope, weakness and headaches.   Hematological:  Negative for adenopathy. Does not bruise/bleed easily.   Psychiatric/Behavioral:  The patient is nervous/anxious.          Medication List with  Changes/Refills   Current Medications    ACETAMINOPHEN (TYLENOL) 500 MG CAP    Take 500 mg by mouth every 6 (six) hours as needed.    ALBUTEROL (PROVENTIL/VENTOLIN HFA) 90 MCG/ACTUATION INHALER    Inhale 2 puffs into the lungs every 4 (four) hours as needed. Take 2 puffs of the lungs every 4 hr as needed for wheezing or shortness of breath    ALBUTEROL SULFATE (PROAIR RESPICLICK) 90 MCG/ACTUATION AEPB    Inhale 180 mcg into the lungs every 4 (four) hours. Rescue    AMLODIPINE (NORVASC) 10 MG TABLET    Take 10 mg by mouth once daily.    ASPIRIN (ECOTRIN) 81 MG EC TABLET    Take 81 mg by mouth once daily.     ATORVASTATIN (LIPITOR) 10 MG TABLET    Take 10 mg by mouth once daily.    BENZONATATE (TESSALON) 200 MG CAPSULE    benzonatate Take 1 Capsule (oral) 3 times per day PRN - Cough for 7 days 20221015 capsule 3 times per day oral 7 days active 200 mg    BUDESONIDE-GLYCOPYR-FORMOTEROL (BREZTRI AEROSPHERE) 160-9-4.8 MCG/ACTUATION HFAA    Inhale 1 puff into the lungs once daily.    CALCIUM CARBONATE (OS-CAMERON) 500 MG CALCIUM (1,250 MG) CHEWABLE TABLET    Take 1 tablet by mouth 2 (two) times daily as needed for Heartburn.    DIPHENHYDRAMINE (BENADRYL) 25 MG CAPSULE    Take 1 capsule (25 mg total) by mouth every 6 (six) hours as needed for Itching.    DOCUSATE SODIUM (COLACE) 100 MG CAPSULE    Take 1 capsule (100 mg total) by mouth 2 (two) times daily as needed for Constipation.    EMTRICITABINE-TENOFOVIR ALAFEN (DESCOVY) 200-25 MG TAB    Take 1 tablet by mouth.    EPOETIN ALEJANDRO (EPOGEN INJ)    Epoetin Alejandro (Epogen)    HYDRALAZINE (APRESOLINE) 25 MG TABLET    Take 1 tablet (25 mg total) by mouth 3 (three) times daily.    INSULIN DEGLUDEC (TRESIBA FLEXTOUCH U-200) 200 UNIT/ML (3 ML) INSULIN PEN    Inject 60 Units into the skin once daily.    IRON SUCROSE IN NS (VENOFER) 100 MG/100 ML PGBK    50 mg.    ISENTRESS 400 MG TABLET    Take 400 mg by mouth.    ISOSORBIDE MONONITRATE (IMDUR) 30 MG 24 HR TABLET    Take 1 tablet (30 mg  total) by mouth once daily.    KETOCONAZOLE (NIZORAL) 2 % CREAM    Apply topically once daily. for 14 days    LEVOFLOXACIN (LEVAQUIN) 250 MG TABLET    Take 250 mg by mouth once daily.    LEVOTHYROXINE (TIROSINT) 88 MCG CAP    Take 75 mcg by mouth before breakfast.    LOPERAMIDE (IMODIUM) 2 MG CAPSULE    Take 1 capsule (2 mg total) by mouth 4 (four) times daily as needed for Diarrhea.    METOPROLOL SUCCINATE (TOPROL-XL) 25 MG 24 HR TABLET    Take 25 mg by mouth 2 (two) times daily.    OMEPRAZOLE (PRILOSEC) 20 MG CAPSULE    Take 20 mg by mouth once daily.    ONDANSETRON (ZOFRAN-ODT) 4 MG TBDL    Dissolve 1 tablet (4 mg total) by mouth every 8 (eight) hours as needed (nausea).    OXYCODONE (ROXICODONE) 5 MG IMMEDIATE RELEASE TABLET    Take 1 tablet (5 mg total) by mouth every 6 (six) hours as needed for Pain.    PREDNISOLONE ACETATE (PRED FORTE) 1 % DRPS    Place 1 drop into both eyes 4 (four) times daily.    SEVELAMER HCL (RENAGEL) 800 MG TAB    Take 3 tablets by mouth 3 (three) times daily.    SODIUM BICARBONATE 650 MG TABLET    Take 650 mg by mouth 2 (two) times daily.     Objective:     Vitals:    09/11/23 0920   BP: (!) 118/56   Pulse: 71   Temp: 99.7 °F (37.6 °C)     Lab Results   Component Value Date    WBC 5.99 09/11/2023    HGB 10.8 (L) 09/11/2023    HCT 33.6 (L) 09/11/2023    MCV 98 09/11/2023     09/11/2023   BMP  Lab Results   Component Value Date     09/11/2023    K 4.3 09/11/2023     09/11/2023    CO2 23 09/11/2023    BUN 39 (H) 09/11/2023    CREATININE 6.7 (H) 09/11/2023    CALCIUM 8.2 (L) 09/11/2023    ANIONGAP 12 09/11/2023    EGFRNORACEVR 6 (A) 09/11/2023     Lab Results   Component Value Date    ALT 11 09/11/2023    AST 16 09/11/2023    ALKPHOS 51 (L) 09/11/2023    BILITOT 0.4 09/11/2023         Physical Exam  Vitals reviewed.   Constitutional:       Appearance: She is well-developed.   HENT:      Head: Normocephalic.      Right Ear: External ear normal.      Left Ear: External ear  normal.      Nose: Nose normal.   Eyes:      General: Lids are normal. No scleral icterus.        Right eye: No discharge.         Left eye: No discharge.      Conjunctiva/sclera: Conjunctivae normal.   Neck:      Thyroid: No thyroid mass.   Cardiovascular:      Rate and Rhythm: Normal rate and regular rhythm.      Heart sounds: Normal heart sounds.   Pulmonary:      Effort: Pulmonary effort is normal. No respiratory distress.      Breath sounds: Normal breath sounds. No wheezing or rales.   Abdominal:      General: There is no distension.      Palpations: Abdomen is soft.   Genitourinary:     Comments: deferred  Musculoskeletal:         General: Normal range of motion.      Cervical back: Normal range of motion.   Lymphadenopathy:      Head:      Right side of head: No submandibular, preauricular or posterior auricular adenopathy.      Left side of head: No submandibular, preauricular or posterior auricular adenopathy.   Skin:     General: Skin is warm and dry.   Neurological:      Mental Status: She is alert and oriented to person, place, and time.   Psychiatric:         Speech: Speech normal.         Behavior: Behavior normal. Behavior is cooperative.         Thought Content: Thought content normal.          Assessment:     Problem List Items Addressed This Visit          Pulmonary    COPD (chronic obstructive pulmonary disease)     Continue Pulmonology follow up            Renal/    ESRD (end stage renal disease)     On HD T.Thurs.Sat            Oncology    Malignant neoplasm of upper-outer quadrant of left breast in female, estrogen receptor positive      Cancer Staging   Malignant neoplasm of upper-outer quadrant of left breast in female, estrogen receptor positive  Staging form: Breast, AJCC 8th Edition  - Clinical stage from 6/20/2022: Stage IIB (cT3, cN2a(f), cM0, G3, ER+, RI+, HER2+) - Signed by Toney العلي MD on 7/11/2022    S/p neoadjuvant chemotherapy with 5 cycles of paclitaxel with Trastuzumab  with Pertuzumab, followed by Trastuzumab/Pertuzumab completed in March of 2023.  Repeat PET scan revealed decreased tumor size and PET activity in the nodes. She underwent Lt. breast mastectomy with sentinel node biopsy and subsequent Lt. axillary dissection non 3/1/23.  Pathology revealed a residual 4.3 cm breast mass with no associated DCIS or LVI.     Completed adjuvant chest wall radiation 8/30/2023    Now on Kadcyla Q 3 weeks    Labs reviewed stable. Okay to proceed with cycle 2 Q 3 weekly Kadcyla. F/u 3 weeks with cbc, cmp    -was asked to contact family for visit. Per patient family's phone currently not working         Secondary malignancy of regional lymph node     Continue current management             Endocrine    BMI 32.0-32.9,adult     Encourage healthy nutrition along with portion control              Plan:     Panlobular emphysema    ESRD (end stage renal disease)    Malignant neoplasm of upper-outer quadrant of left breast in female, estrogen receptor positive  -     Cancel: ondansetron injection 8 mg  -     Cancel: ado-trastuzumab emtansine (KADCYLA) 300 mg in sodium chloride 0.9% SolP 265 mL chemo infusion  -     Cancel: sodium chloride 0.9% 100 mL flush bag  -     Cancel: sodium chloride 0.9% flush 10 mL  -     Cancel: alteplase injection 2 mg    Secondary malignancy of regional lymph node    BMI 32.0-32.9,adult    Other orders  -     heparin, porcine (PF) 100 unit/mL injection flush 500 Units            Med Onc Chart Routing      Follow up with physician 3 weeks.   Follow up with [facility]    Infusion scheduling note    Injection scheduling note kadcyla   Labs CBC and CMP   Scheduling:  Preferred lab:  Lab interval:     Imaging None      Pharmacy appointment No pharmacy appointment needed      Other referrals     No additional referrals needed             PHOEBE Hussein

## 2023-09-16 ENCOUNTER — PATIENT OUTREACH (OUTPATIENT)
Dept: ADMINISTRATIVE | Facility: OTHER | Age: 68
End: 2023-09-16
Payer: MEDICARE

## 2023-09-16 ENCOUNTER — TELEPHONE (OUTPATIENT)
Dept: ADMINISTRATIVE | Facility: CLINIC | Age: 68
End: 2023-09-16
Payer: MEDICARE

## 2023-09-16 NOTE — PROGRESS NOTES
CHW - Initial Contact    This Community Health Worker completed the Social Determinant of Health questionnaire with caregiver via telephone today.    Pt identified barriers of most importance are: food insecurities and utility bill assistance.   Referrals to community agencies completed with patient/caregiver consent outside of [facility] include: no: none at this time.  Referrals were put through [facility] - yes: [facility].  Support and Services: Has support with niece/caregiver.  Other information discussed the patient needs / wants help with: Completed SDOH with pt's niece/caregiver via telephone today, patient has food insecurities and needs help with utility bill payments, will follow up in one week to check status of referrals and pt's future needs.   Follow up required: yes  Follow-up Outreach - Due: 9/22/2023

## 2023-09-16 NOTE — PROGRESS NOTES
CHW - Initial Contact    This Community Health Worker completed the Social Determinant of Health questionnaire with caregiver via telephone today.    Pt identified barriers of most importance are: food insecurities and utility bill assistance.   Referrals to community agencies completed with patient/caregiver consent outside of [facility] include: no: none at this time.  Referrals were put through [facility] - yes: [facility] and [facility].  Support and Services: No support & services have been documented.  Other information discussed the patient needs / wants help with: Completed SDOH with pt's niece/caregiver via telephone today, patient has food insecurities and needs help with utility bill payments, will follow up in one week to check status of referrals and pt's future needs on [facility] platform.   Follow up required: yes.  No future outreach task assigned

## 2023-09-20 ENCOUNTER — TELEPHONE (OUTPATIENT)
Dept: CARDIOLOGY | Facility: CLINIC | Age: 68
End: 2023-09-20
Payer: MEDICARE

## 2023-09-20 ENCOUNTER — TELEPHONE (OUTPATIENT)
Dept: HEMATOLOGY/ONCOLOGY | Facility: CLINIC | Age: 68
End: 2023-09-20
Payer: MEDICARE

## 2023-09-20 NOTE — TELEPHONE ENCOUNTER
SAMIA called family member-Ember to confirm that family will transport pt to cardiology appt. Per Ember, pt will still be in Dialysis at 11:30 am on tomorrow. SAMIA completed a conference call to scheduling line at [phone]. Message sent to provider's office re: the above and provider's office will f/u with Ember per Tia. SW will remain available.

## 2023-09-20 NOTE — TELEPHONE ENCOUNTER
Changed to Friday as requested      ----- Message from Diya Velasquez sent at 9/20/2023  9:55 AM CDT -----  Who Called: Pt    What is the request in detail: Requesting call back to discuss pt having dialysis tomorrow at the same time of her appt, pt needs a different time or date. Pt will be done at 12.  Please advise    Can the clinic reply by [facility]? No    Best Call Back Number: [phone]      Additional Information:

## 2023-09-21 ENCOUNTER — TELEPHONE (OUTPATIENT)
Dept: HEMATOLOGY/ONCOLOGY | Facility: CLINIC | Age: 68
End: 2023-09-21
Payer: MEDICARE

## 2023-09-21 NOTE — TELEPHONE ENCOUNTER
Family-Ember confirmed that they will transport pt to her cardiology appt on tomorrow. SAMIA also advised Ember that Dr. Sebastian Mora is the infectious disease MD with [facility]. Pt will keep current infectious disease MD per Ember, but will call [facility] at [phone] if appt is needed. No other needs expressed. SAMIA will remain available.

## 2023-09-22 ENCOUNTER — OFFICE VISIT (OUTPATIENT)
Dept: CARDIOLOGY | Facility: CLINIC | Age: 68
End: 2023-09-22
Payer: MEDICARE

## 2023-09-22 ENCOUNTER — HOSPITAL ENCOUNTER (OUTPATIENT)
Dept: RADIOLOGY | Facility: HOSPITAL | Age: 68
Discharge: HOME | End: 2023-09-22
Attending: PHYSICIAN ASSISTANT
Payer: MEDICARE

## 2023-09-22 ENCOUNTER — TELEPHONE (OUTPATIENT)
Dept: CARDIOLOGY | Facility: CLINIC | Age: 68
End: 2023-09-22

## 2023-09-22 VITALS
WEIGHT: 170.19 LBS | DIASTOLIC BLOOD PRESSURE: 60 MMHG | OXYGEN SATURATION: 97 % | HEIGHT: 62 IN | SYSTOLIC BLOOD PRESSURE: 110 MMHG | BODY MASS INDEX: 31.32 KG/M2 | HEART RATE: 94 BPM

## 2023-09-22 DIAGNOSIS — I25.118 CORONARY ARTERY DISEASE OF NATIVE HEART WITH STABLE ANGINA PECTORIS, UNSPECIFIED VESSEL OR LESION TYPE: ICD-10-CM

## 2023-09-22 DIAGNOSIS — R11.2 NAUSEA AND VOMITING, UNSPECIFIED VOMITING TYPE: Primary | ICD-10-CM

## 2023-09-22 DIAGNOSIS — I21.29 ACUTE ST ELEVATION MYOCARDIAL INFARCTION (STEMI) OF LATERAL WALL: ICD-10-CM

## 2023-09-22 DIAGNOSIS — R94.31 ABNORMAL ECG: ICD-10-CM

## 2023-09-22 DIAGNOSIS — B20 CURRENTLY ASYMPTOMATIC HIV INFECTION, WITH HISTORY OF HIV-RELATED ILLNESS: ICD-10-CM

## 2023-09-22 DIAGNOSIS — N18.6 ESRD (END STAGE RENAL DISEASE): ICD-10-CM

## 2023-09-22 DIAGNOSIS — R10.9 ABDOMINAL PAIN, UNSPECIFIED ABDOMINAL LOCATION: ICD-10-CM

## 2023-09-22 DIAGNOSIS — I10 PRIMARY HYPERTENSION: ICD-10-CM

## 2023-09-22 DIAGNOSIS — J43.1 PANLOBULAR EMPHYSEMA: ICD-10-CM

## 2023-09-22 DIAGNOSIS — R11.2 NAUSEA AND VOMITING, UNSPECIFIED VOMITING TYPE: ICD-10-CM

## 2023-09-22 DIAGNOSIS — I51.89 DIASTOLIC DYSFUNCTION: ICD-10-CM

## 2023-09-22 PROCEDURE — 1111F PR DISCHARGE MEDS RECONCILED W/ CURRENT OUTPATIENT MED LIST: ICD-10-PCS | Mod: CPTII,S$GLB,, | Performed by: PHYSICIAN ASSISTANT

## 2023-09-22 PROCEDURE — 4010F PR ACE/ARB THEARPY RXD/TAKEN: ICD-10-PCS | Mod: CPTII,S$GLB,, | Performed by: PHYSICIAN ASSISTANT

## 2023-09-22 PROCEDURE — 1157F PR ADVANCE CARE PLAN OR EQUIV PRESENT IN MEDICAL RECORD: ICD-10-PCS | Mod: CPTII,S$GLB,, | Performed by: PHYSICIAN ASSISTANT

## 2023-09-22 PROCEDURE — 3044F PR MOST RECENT HEMOGLOBIN A1C LEVEL <7.0%: ICD-10-PCS | Mod: CPTII,S$GLB,, | Performed by: PHYSICIAN ASSISTANT

## 2023-09-22 PROCEDURE — 1101F PT FALLS ASSESS-DOCD LE1/YR: CPT | Mod: CPTII,S$GLB,, | Performed by: PHYSICIAN ASSISTANT

## 2023-09-22 PROCEDURE — 3288F FALL RISK ASSESSMENT DOCD: CPT | Mod: CPTII,S$GLB,, | Performed by: PHYSICIAN ASSISTANT

## 2023-09-22 PROCEDURE — 1125F PR PAIN SEVERITY QUANTIFIED, PAIN PRESENT: ICD-10-PCS | Mod: CPTII,S$GLB,, | Performed by: PHYSICIAN ASSISTANT

## 2023-09-22 PROCEDURE — 99214 PR OFFICE/OUTPT VISIT, EST, LEVL IV, 30-39 MIN: ICD-10-PCS | Mod: S$GLB,,, | Performed by: PHYSICIAN ASSISTANT

## 2023-09-22 PROCEDURE — 3008F BODY MASS INDEX DOCD: CPT | Mod: CPTII,S$GLB,, | Performed by: PHYSICIAN ASSISTANT

## 2023-09-22 PROCEDURE — 1101F PR PT FALLS ASSESS DOC 0-1 FALLS W/OUT INJ PAST YR: ICD-10-PCS | Mod: CPTII,S$GLB,, | Performed by: PHYSICIAN ASSISTANT

## 2023-09-22 PROCEDURE — 3288F PR FALLS RISK ASSESSMENT DOCUMENTED: ICD-10-PCS | Mod: CPTII,S$GLB,, | Performed by: PHYSICIAN ASSISTANT

## 2023-09-22 PROCEDURE — 1159F MED LIST DOCD IN RCRD: CPT | Mod: CPTII,S$GLB,, | Performed by: PHYSICIAN ASSISTANT

## 2023-09-22 PROCEDURE — 74019 RADEX ABDOMEN 2 VIEWS: CPT | Mod: TC

## 2023-09-22 PROCEDURE — 93005 ELECTROCARDIOGRAM TRACING: CPT

## 2023-09-22 PROCEDURE — 1157F ADVNC CARE PLAN IN RCRD: CPT | Mod: CPTII,S$GLB,, | Performed by: PHYSICIAN ASSISTANT

## 2023-09-22 PROCEDURE — 99999 PR PBB SHADOW E&M-EST. PATIENT-LVL III: ICD-10-PCS | Mod: PBBFAC,,, | Performed by: PHYSICIAN ASSISTANT

## 2023-09-22 PROCEDURE — 99999 PR PBB SHADOW E&M-EST. PATIENT-LVL III: CPT | Mod: PBBFAC,,, | Performed by: PHYSICIAN ASSISTANT

## 2023-09-22 PROCEDURE — 74019 XR ABDOMEN FLAT AND ERECT: ICD-10-PCS | Mod: 26,,, | Performed by: RADIOLOGY

## 2023-09-22 PROCEDURE — 93010 EKG 12-LEAD: ICD-10-PCS | Mod: S$GLB,,, | Performed by: INTERNAL MEDICINE

## 2023-09-22 PROCEDURE — 1125F AMNT PAIN NOTED PAIN PRSNT: CPT | Mod: CPTII,S$GLB,, | Performed by: PHYSICIAN ASSISTANT

## 2023-09-22 PROCEDURE — 3074F PR MOST RECENT SYSTOLIC BLOOD PRESSURE < 130 MM HG: ICD-10-PCS | Mod: CPTII,S$GLB,, | Performed by: PHYSICIAN ASSISTANT

## 2023-09-22 PROCEDURE — 3078F PR MOST RECENT DIASTOLIC BLOOD PRESSURE < 80 MM HG: ICD-10-PCS | Mod: CPTII,S$GLB,, | Performed by: PHYSICIAN ASSISTANT

## 2023-09-22 PROCEDURE — 3074F SYST BP LT 130 MM HG: CPT | Mod: CPTII,S$GLB,, | Performed by: PHYSICIAN ASSISTANT

## 2023-09-22 PROCEDURE — 74019 RADEX ABDOMEN 2 VIEWS: CPT | Mod: 26,,, | Performed by: RADIOLOGY

## 2023-09-22 PROCEDURE — 4010F ACE/ARB THERAPY RXD/TAKEN: CPT | Mod: CPTII,S$GLB,, | Performed by: PHYSICIAN ASSISTANT

## 2023-09-22 PROCEDURE — 1159F PR MEDICATION LIST DOCUMENTED IN MEDICAL RECORD: ICD-10-PCS | Mod: CPTII,S$GLB,, | Performed by: PHYSICIAN ASSISTANT

## 2023-09-22 PROCEDURE — 99214 OFFICE O/P EST MOD 30 MIN: CPT | Mod: S$GLB,,, | Performed by: PHYSICIAN ASSISTANT

## 2023-09-22 PROCEDURE — 3078F DIAST BP <80 MM HG: CPT | Mod: CPTII,S$GLB,, | Performed by: PHYSICIAN ASSISTANT

## 2023-09-22 PROCEDURE — 93010 ELECTROCARDIOGRAM REPORT: CPT | Mod: S$GLB,,, | Performed by: INTERNAL MEDICINE

## 2023-09-22 PROCEDURE — 3008F PR BODY MASS INDEX (BMI) DOCUMENTED: ICD-10-PCS | Mod: CPTII,S$GLB,, | Performed by: PHYSICIAN ASSISTANT

## 2023-09-22 PROCEDURE — 3044F HG A1C LEVEL LT 7.0%: CPT | Mod: CPTII,S$GLB,, | Performed by: PHYSICIAN ASSISTANT

## 2023-09-22 PROCEDURE — 1111F DSCHRG MED/CURRENT MED MERGE: CPT | Mod: CPTII,S$GLB,, | Performed by: PHYSICIAN ASSISTANT

## 2023-09-22 NOTE — TELEPHONE ENCOUNTER
Please phone patient. Labs reviewed and are stable.    Abdominal Xray showed non-obstructive pattern, no acute findings.    Again I advise she go to ED if symptoms persist. She also should f/u with her PCP or GI.    Thanks

## 2023-09-22 NOTE — PROGRESS NOTES
Subjective:   Patient ID:  Malaika Paredes is a 68 y.o. female who presents for follow-up of No chief complaint on file.      HPI  Ms. Paredes is a 68 year old female patient whsoe current medical conditions include CAD s/p CABG x 3, DM type II, ESRD on HD, anemia, and history of breast CA who presents today for hospital follow-up. Patient recently hospitalized at [facility] due to chest pain symptoms and abnormal EKG concerning for lateral STEMI. She underwent LHC which showed severe native CAD, patent grafts. Hydralazine and Imdur were added to maximize her CAD/BP regimen and she was d/c'd home. She returns today and states she is feeling ok. Main complaint is nausea/vomiting over the past week. Associated with mild, generalized abdominal pain and occasional diarrhea. She denies any associated fever or chills. States vomit is dark at times but does not appear to contain blood.  Denies any prior history of PUD. Denies any recurrent chest pain, heaviness, or tightness. No SOB. No LH, dizziness, palpitations, near syncope, or syncope. BP ok today in office. Patient is compliant with her medications. Dialyzes 3x weekly. No groin complaints.     Review of Systems   Constitutional: Positive for malaise/fatigue. Negative for chills, decreased appetite and fever.   HENT:  Negative for congestion, hoarse voice and sore throat.    Eyes:  Negative for blurred vision and discharge.   Cardiovascular:  Negative for chest pain, claudication, cyanosis, dyspnea on exertion, irregular heartbeat, leg swelling, near-syncope, orthopnea, palpitations and paroxysmal nocturnal dyspnea.   Respiratory:  Negative for cough, hemoptysis, shortness of breath, snoring, sputum production and wheezing.    Endocrine: Negative for cold intolerance and heat intolerance.   Hematologic/Lymphatic: Negative for bleeding problem. Bruises/bleeds easily.   Skin:  Negative for rash.   Musculoskeletal:  Positive for arthritis. Negative for back pain, joint  "pain, joint swelling, muscle cramps, muscle weakness and myalgias.   Gastrointestinal:  Positive for abdominal pain, diarrhea, nausea and vomiting. Negative for constipation, heartburn and melena.   Genitourinary:  Negative for hematuria.   Neurological:  Negative for dizziness, focal weakness, headaches, light-headedness, loss of balance, numbness, paresthesias, seizures and weakness.   Psychiatric/Behavioral:  Negative for memory loss. The patient does not have insomnia.    Allergic/Immunologic: Negative for hives.     /60 (BP Location: Right arm, Patient Position: Sitting, BP Method: Medium (Manual))   Pulse 94   Ht 5' 2" (1.575 m)   Wt 77.2 kg (170 lb 3.1 oz)   LMP  (LMP Unknown)   SpO2 97%   BMI 31.13 kg/m²     Objective:   Physical Exam  Vitals and nursing note reviewed.   Constitutional:       General: She is not in acute distress.     Appearance: Normal appearance. She is well-developed. She is not diaphoretic.   HENT:      Head: Normocephalic and atraumatic.   Eyes:      General:         Right eye: No discharge.         Left eye: No discharge.      Pupils: Pupils are equal, round, and reactive to light.   Cardiovascular:      Rate and Rhythm: Normal rate and regular rhythm.      Chest Wall: PMI is not displaced.      Pulses: Intact distal pulses.      Heart sounds: Normal heart sounds. No murmur heard.     No friction rub. No gallop. No S3 or S4 sounds.   Pulmonary:      Effort: Pulmonary effort is normal. No respiratory distress.      Breath sounds: Normal breath sounds. No wheezing or rales.   Abdominal:      General: There is no distension.      Tenderness: There is no rebound.   Musculoskeletal:      Right lower leg: No edema.      Left lower leg: No edema.   Skin:     General: Skin is warm and dry.      Findings: No erythema.      Comments: R groin access site C/D/I; no bleeding erythema or drainage intact femoral pulse   Neurological:      General: No focal deficit present.      Mental " Status: She is alert and oriented to person, place, and time.   Psychiatric:         Mood and Affect: Mood normal.         Behavior: Behavior normal.       [facility] Results 9/5/23  Coronary Findings    Diagnostic  Dominance: Right  Left Main   There is mild diffuse disease throughout the vessel.      Left Anterior Descending   LAD has 70% mid stenosis and the apical LAD after LIMA anastomosis has subtotal stenosis but is small vessel. Proximal LAD has nonobstructive disease.      First Diagonal Branch   Small diffusely diseased vessel.      Second Diagonal Branch   Small diffusely diseased vessel.      Left Circumflex   LCX is diffusely diseased vessel with 70% proximal and 80% mid stenosis and distal vessel is small and diffusely diseased.      First Obtuse Marginal Branch   Diffusely diseased vessel.      Right Coronary Artery   RCA has severe diffuse disease and is subtotally occluded proximal to mid segment.      Saphenous Graft To Dist RCA   Conduit type: SVG.The graft was visualized by angiography.      Saphenous Graft To 1st Mrg   Conduit type: SVG.The graft was visualized by angiography.      Native BALLESTEROS Graft To Dist LAD   Conduit type: Native LIMA.The graft was visualized by angiography.        Echo Results 9/6/23  Summary         Left Ventricle: The left ventricle is normal in size. Mildly increased wall thickness. Normal wall motion. There is normal systolic function with a visually estimated ejection fraction of 55 - 70%.    Right Ventricle: Normal right ventricular cavity size. Wall thickness is normal. Right ventricle wall motion  is normal. Systolic function is normal.    Aortic Valve: The aortic valve is a trileaflet valve. There is mild aortic valve sclerosis.    Mitral Valve: There is no stenosis.    Tricuspid Valve: There is mild regurgitation with a centrally directed jet.      Chemistry        Component Value Date/Time     09/11/2023 0809    K 4.3 09/11/2023 0809     09/11/2023 0809     CO2 23 09/11/2023 0809    BUN 39 (H) 09/11/2023 0809    CREATININE 6.7 (H) 09/11/2023 0809     (H) 09/11/2023 0809        Component Value Date/Time    CALCIUM 8.2 (L) 09/11/2023 0809    ALKPHOS 51 (L) 09/11/2023 0809    AST 16 09/11/2023 0809    ALT 11 09/11/2023 0809    BILITOT 0.4 09/11/2023 0809    ESTGFRAFRICA 8 (A) 07/27/2022 0757    EGFRNONAA 7 (A) 07/27/2022 0757        Lab Results   Component Value Date    WBC 5.99 09/11/2023    HGB 10.8 (L) 09/11/2023    HCT 33.6 (L) 09/11/2023    MCV 98 09/11/2023     09/11/2023         Assessment:      1. Coronary artery disease of native heart with stable angina pectoris, unspecified vessel or lesion type    2. Panlobular emphysema    3. Abnormal ECG    4. Acute ST elevation myocardial infarction (STEMI) of lateral wall    5. Primary hypertension    6. Diastolic dysfunction    7. ESRD (end stage renal disease)    8. Currently asymptomatic HIV infection, with history of HIV-related illness      Presents for f/u. Stable CV wise but having nausea/vomiting/abdominal pain. ? Gastroparesis vs gastroenteritis. Offered to try to arrange GI appt today, she declined. Check CBC, BMP, and abdominal Xray today. Strongly urged her to go to ED if symptoms persist, she verbalized understanding.  Plan:   -CBC, BMP abdominal Xray today with phone review  -Continue same CV meds/mgmt  -Dialysis as per routine  -Declined GI appt today, strongly urged her to go to ED if symtpoms persist  -RTC 6 weeks with any MD

## 2023-09-22 NOTE — TELEPHONE ENCOUNTER
Contacted pt and spoke w/ pt and her niece. Informed her Labs reviewed and are stable. Abdominal Xray showed non-obstructive pattern, no acute findings. She also should f/u with her PCP or GI. Informed her to go to ED if symptoms persist. They vu w/o q/c      --- RAMAN Martinez 09/22/23 01:48 PM ---  Please phone patient. Labs reviewed and are stable.    Abdominal Xray showed non-obstructive pattern, no acute findings.    Again I advise she go to ED if symptoms persist. She also should f/u with her PCP or GI.    Thanks

## 2023-10-02 ENCOUNTER — OFFICE VISIT (OUTPATIENT)
Dept: HEMATOLOGY/ONCOLOGY | Facility: CLINIC | Age: 68
End: 2023-10-02
Payer: MEDICARE

## 2023-10-02 ENCOUNTER — INFUSION (OUTPATIENT)
Dept: INFUSION THERAPY | Facility: HOSPITAL | Age: 68
End: 2023-10-02
Attending: INTERNAL MEDICINE
Payer: MEDICARE

## 2023-10-02 ENCOUNTER — TELEPHONE (OUTPATIENT)
Dept: HEMATOLOGY/ONCOLOGY | Facility: CLINIC | Age: 68
End: 2023-10-02
Payer: MEDICARE

## 2023-10-02 VITALS
WEIGHT: 172.5 LBS | HEART RATE: 85 BPM | BODY MASS INDEX: 31.74 KG/M2 | OXYGEN SATURATION: 98 % | SYSTOLIC BLOOD PRESSURE: 139 MMHG | HEIGHT: 62 IN | TEMPERATURE: 99 F | DIASTOLIC BLOOD PRESSURE: 80 MMHG

## 2023-10-02 VITALS
BODY MASS INDEX: 31.74 KG/M2 | OXYGEN SATURATION: 99 % | RESPIRATION RATE: 18 BRPM | DIASTOLIC BLOOD PRESSURE: 61 MMHG | HEIGHT: 62 IN | HEART RATE: 80 BPM | SYSTOLIC BLOOD PRESSURE: 123 MMHG | TEMPERATURE: 98 F | WEIGHT: 172.5 LBS

## 2023-10-02 DIAGNOSIS — D84.821 IMMUNODEFICIENCY DUE TO CHEMOTHERAPY: ICD-10-CM

## 2023-10-02 DIAGNOSIS — M89.9 CHRONIC KIDNEY DISEASE-MINERAL AND BONE DISORDER: ICD-10-CM

## 2023-10-02 DIAGNOSIS — N18.9 ANEMIA ASSOCIATED WITH CHRONIC RENAL FAILURE: ICD-10-CM

## 2023-10-02 DIAGNOSIS — Z17.0 MALIGNANT NEOPLASM OF UPPER-OUTER QUADRANT OF LEFT BREAST IN FEMALE, ESTROGEN RECEPTOR POSITIVE: Primary | ICD-10-CM

## 2023-10-02 DIAGNOSIS — T45.1X5A IMMUNODEFICIENCY DUE TO CHEMOTHERAPY: ICD-10-CM

## 2023-10-02 DIAGNOSIS — C77.9 SECONDARY MALIGNANCY OF REGIONAL LYMPH NODE: ICD-10-CM

## 2023-10-02 DIAGNOSIS — C50.412 MALIGNANT NEOPLASM OF UPPER-OUTER QUADRANT OF LEFT BREAST IN FEMALE, ESTROGEN RECEPTOR POSITIVE: Primary | ICD-10-CM

## 2023-10-02 DIAGNOSIS — E83.9 CHRONIC KIDNEY DISEASE-MINERAL AND BONE DISORDER: ICD-10-CM

## 2023-10-02 DIAGNOSIS — T45.1X5A PERIPHERAL NEUROPATHY DUE TO CHEMOTHERAPY: ICD-10-CM

## 2023-10-02 DIAGNOSIS — D63.1 ANEMIA ASSOCIATED WITH CHRONIC RENAL FAILURE: ICD-10-CM

## 2023-10-02 DIAGNOSIS — I10 PRIMARY HYPERTENSION: ICD-10-CM

## 2023-10-02 DIAGNOSIS — G62.0 PERIPHERAL NEUROPATHY DUE TO CHEMOTHERAPY: ICD-10-CM

## 2023-10-02 DIAGNOSIS — Z79.899 IMMUNODEFICIENCY DUE TO CHEMOTHERAPY: ICD-10-CM

## 2023-10-02 DIAGNOSIS — B20 CURRENTLY ASYMPTOMATIC HIV INFECTION, WITH HISTORY OF HIV-RELATED ILLNESS: ICD-10-CM

## 2023-10-02 DIAGNOSIS — N18.9 CHRONIC KIDNEY DISEASE-MINERAL AND BONE DISORDER: ICD-10-CM

## 2023-10-02 PROCEDURE — 1101F PR PT FALLS ASSESS DOC 0-1 FALLS W/OUT INJ PAST YR: ICD-10-PCS | Mod: CPTII,S$GLB,, | Performed by: INTERNAL MEDICINE

## 2023-10-02 PROCEDURE — 96413 CHEMO IV INFUSION 1 HR: CPT

## 2023-10-02 PROCEDURE — 1101F PT FALLS ASSESS-DOCD LE1/YR: CPT | Mod: CPTII,S$GLB,, | Performed by: INTERNAL MEDICINE

## 2023-10-02 PROCEDURE — 3008F PR BODY MASS INDEX (BMI) DOCUMENTED: ICD-10-PCS | Mod: CPTII,S$GLB,, | Performed by: INTERNAL MEDICINE

## 2023-10-02 PROCEDURE — 1111F PR DISCHARGE MEDS RECONCILED W/ CURRENT OUTPATIENT MED LIST: ICD-10-PCS | Mod: CPTII,S$GLB,, | Performed by: INTERNAL MEDICINE

## 2023-10-02 PROCEDURE — 1159F PR MEDICATION LIST DOCUMENTED IN MEDICAL RECORD: ICD-10-PCS | Mod: CPTII,S$GLB,, | Performed by: INTERNAL MEDICINE

## 2023-10-02 PROCEDURE — 25000003 PHARM REV CODE 250: Performed by: INTERNAL MEDICINE

## 2023-10-02 PROCEDURE — 1126F PR PAIN SEVERITY QUANTIFIED, NO PAIN PRESENT: ICD-10-PCS | Mod: CPTII,S$GLB,, | Performed by: INTERNAL MEDICINE

## 2023-10-02 PROCEDURE — 1157F ADVNC CARE PLAN IN RCRD: CPT | Mod: CPTII,S$GLB,, | Performed by: INTERNAL MEDICINE

## 2023-10-02 PROCEDURE — 4010F PR ACE/ARB THEARPY RXD/TAKEN: ICD-10-PCS | Mod: CPTII,S$GLB,, | Performed by: INTERNAL MEDICINE

## 2023-10-02 PROCEDURE — 3288F PR FALLS RISK ASSESSMENT DOCUMENTED: ICD-10-PCS | Mod: CPTII,S$GLB,, | Performed by: INTERNAL MEDICINE

## 2023-10-02 PROCEDURE — 1126F AMNT PAIN NOTED NONE PRSNT: CPT | Mod: CPTII,S$GLB,, | Performed by: INTERNAL MEDICINE

## 2023-10-02 PROCEDURE — 99215 PR OFFICE/OUTPT VISIT, EST, LEVL V, 40-54 MIN: ICD-10-PCS | Mod: 25,S$GLB,, | Performed by: INTERNAL MEDICINE

## 2023-10-02 PROCEDURE — 3075F SYST BP GE 130 - 139MM HG: CPT | Mod: CPTII,S$GLB,, | Performed by: INTERNAL MEDICINE

## 2023-10-02 PROCEDURE — 1111F DSCHRG MED/CURRENT MED MERGE: CPT | Mod: CPTII,S$GLB,, | Performed by: INTERNAL MEDICINE

## 2023-10-02 PROCEDURE — 4010F ACE/ARB THERAPY RXD/TAKEN: CPT | Mod: CPTII,S$GLB,, | Performed by: INTERNAL MEDICINE

## 2023-10-02 PROCEDURE — 63600175 PHARM REV CODE 636 W HCPCS: Mod: JZ,JG | Performed by: INTERNAL MEDICINE

## 2023-10-02 PROCEDURE — 3075F PR MOST RECENT SYSTOLIC BLOOD PRESS GE 130-139MM HG: ICD-10-PCS | Mod: CPTII,S$GLB,, | Performed by: INTERNAL MEDICINE

## 2023-10-02 PROCEDURE — 96375 TX/PRO/DX INJ NEW DRUG ADDON: CPT

## 2023-10-02 PROCEDURE — 1159F MED LIST DOCD IN RCRD: CPT | Mod: CPTII,S$GLB,, | Performed by: INTERNAL MEDICINE

## 2023-10-02 PROCEDURE — 99999 PR PBB SHADOW E&M-EST. PATIENT-LVL III: CPT | Mod: PBBFAC,,, | Performed by: INTERNAL MEDICINE

## 2023-10-02 PROCEDURE — 99999 PR PBB SHADOW E&M-EST. PATIENT-LVL III: ICD-10-PCS | Mod: PBBFAC,,, | Performed by: INTERNAL MEDICINE

## 2023-10-02 PROCEDURE — 99215 OFFICE O/P EST HI 40 MIN: CPT | Mod: 25,S$GLB,, | Performed by: INTERNAL MEDICINE

## 2023-10-02 PROCEDURE — 3044F PR MOST RECENT HEMOGLOBIN A1C LEVEL <7.0%: ICD-10-PCS | Mod: CPTII,S$GLB,, | Performed by: INTERNAL MEDICINE

## 2023-10-02 PROCEDURE — 3044F HG A1C LEVEL LT 7.0%: CPT | Mod: CPTII,S$GLB,, | Performed by: INTERNAL MEDICINE

## 2023-10-02 PROCEDURE — 3079F PR MOST RECENT DIASTOLIC BLOOD PRESSURE 80-89 MM HG: ICD-10-PCS | Mod: CPTII,S$GLB,, | Performed by: INTERNAL MEDICINE

## 2023-10-02 PROCEDURE — 3288F FALL RISK ASSESSMENT DOCD: CPT | Mod: CPTII,S$GLB,, | Performed by: INTERNAL MEDICINE

## 2023-10-02 PROCEDURE — 3079F DIAST BP 80-89 MM HG: CPT | Mod: CPTII,S$GLB,, | Performed by: INTERNAL MEDICINE

## 2023-10-02 PROCEDURE — 1157F PR ADVANCE CARE PLAN OR EQUIV PRESENT IN MEDICAL RECORD: ICD-10-PCS | Mod: CPTII,S$GLB,, | Performed by: INTERNAL MEDICINE

## 2023-10-02 PROCEDURE — 3008F BODY MASS INDEX DOCD: CPT | Mod: CPTII,S$GLB,, | Performed by: INTERNAL MEDICINE

## 2023-10-02 RX ORDER — SODIUM CHLORIDE 0.9 % (FLUSH) 0.9 %
10 SYRINGE (ML) INJECTION
Status: CANCELLED | OUTPATIENT
Start: 2023-10-02

## 2023-10-02 RX ORDER — ONDANSETRON 2 MG/ML
8 INJECTION INTRAMUSCULAR; INTRAVENOUS
Status: CANCELLED | OUTPATIENT
Start: 2023-10-02

## 2023-10-02 RX ORDER — SODIUM CHLORIDE 0.9 % (FLUSH) 0.9 %
10 SYRINGE (ML) INJECTION
Status: DISCONTINUED | OUTPATIENT
Start: 2023-10-02 | End: 2023-10-02 | Stop reason: HOSPADM

## 2023-10-02 RX ORDER — ANASTROZOLE 1 MG/1
1 TABLET ORAL DAILY
Qty: 90 TABLET | Refills: 2 | Status: SHIPPED | OUTPATIENT
Start: 2023-10-02 | End: 2024-10-01

## 2023-10-02 RX ORDER — ONDANSETRON 2 MG/ML
8 INJECTION INTRAMUSCULAR; INTRAVENOUS
Status: COMPLETED | OUTPATIENT
Start: 2023-10-02 | End: 2023-10-02

## 2023-10-02 RX ORDER — HEPARIN 100 UNIT/ML
500 SYRINGE INTRAVENOUS
Status: CANCELLED | OUTPATIENT
Start: 2023-10-02

## 2023-10-02 RX ADMIN — ADO-TRASTUZUMAB EMTANSINE 300 MG: 20 INJECTION, POWDER, LYOPHILIZED, FOR SOLUTION INTRAVENOUS at 12:10

## 2023-10-02 RX ADMIN — ONDANSETRON 8 MG: 2 INJECTION INTRAMUSCULAR; INTRAVENOUS at 12:10

## 2023-10-02 NOTE — DISCHARGE INSTRUCTIONS
..[facility]  [address] [facility]  [address]  [phone] phone     [phone] fax  Hours of Operation: Monday- Friday 8:00am- 5:00pm  After hours phone  [phone]  Hematology / Oncology Physicians on call    Dr. Severiano Mas      Nurse Practitioners:    Alexa Ospina, SMITH Carcamo, SMITH Contreras, SMITH Lizarraga, SMITH Bello, SMITH Pruitt, PA      Please don't hesitate to call if you have any concerns.    .FALL PREVENTION   Falls often occur due to slipping, tripping or losing your balance. Here are ways to reduce your risk of falling again.   Was there anything that caused your fall that can be fixed, removed or replaced?   Make your home safe by keeping walkways clear of objects you may trip over.   Use non-slip pads under rugs.   Do not walk in poorly lit areas.   Do not stand on chairs or wobbly ladders.   Use caution when reaching overhead or looking upward. This position can cause a loss of balance.   Be sure your shoes fit properly, have non-slip bottoms and are in good condition.   Be cautious when going up and down stairs, curbs, and when walking on uneven sidewalks.   If your balance is poor, consider using a cane or walker.   If your fall was related to alcohol use, stop or limit alcohol intake.   If your fall was related to use of sleeping medicines, talk to your doctor about this. You may need to reduce your dosage at bedtime if you awaken during the night to go to the bathroom.   To reduce the need for nighttime bathroom trips:   Avoid drinking fluids for several hours before going to bed   Empty your bladder before going to bed   Men can keep a urinal at the bedside   © 5571-2872 [employer], [address] [place] [address], [place] [address]. All rights reserved. This information is not intended as a substitute for professional medical care. Always follow your healthcare  professional's instructions.  .WAYS TO HELP PREVENT INFECTION        WASH YOUR HANDS OFTEN DURING THE DAY, ESPECIALLY BEFORE YOU EAT, AFTER USING THE BATHROOM, AND AFTER TOUCHING ANIMALS    STAY AWAY FROM PEOPLE WHO HAVE ILLNESSES YOU CAN CATCH; SUCH AS COLDS, FLU, CHICKEN POX    TRY TO AVOID CROWDS    STAY AWAY FROM CHILDREN WHO RECENTLY HAVE RECEIVED LIVE VIRUS VACCINES    MAINTAIN GOOD MOUTH CARE    DO NOT SQUEEZE OR SCRATCH PIMPLES    CLEAN CUTS & SCRAPES RIGHT AWAY AND DAILY UNTIL HEALED WITH WARM WATER, SOAP & AN ANTISEPTIC    AVOID CONTACT WITH LITTER BOXES, BIRD CAGES, & FISH TANKS    AVOID STANDING WATER, IE., BIRD BATHS, FLOWER POTS/VASES, OR HUMIDIFIERS    WEAR GLOVES WHEN GARDENING OR CLEANING UP AFTER OTHERS, ESPECIALLY BABIES & SMALL CHILDREN    DO NOT EAT RAW FISH, SEAFOOD, MEAT, OR EGGS

## 2023-10-02 NOTE — PROGRESS NOTES
Subjective:       Patient ID: Malaika Paredes is a 68 y.o. female.    Chief Complaint: Results, Breast Cancer, and Chemotherapy    HPI:  68-year-old female history of HIV with stage IIB Stage IIB (cT3, cN2a(f), cM0, G3, ER+, GA+, HER2+) patient continuing on OP BREAST ADO-TRASTUZUMAB EMTANSINE Q3W cycle 3/14 patient has not been started on Arimidex continues to be followed by outside Infectious Disease specialist for HIV        Past Medical History:   Diagnosis Date    CAD (coronary artery disease)     Cataract     Cataract     CHF (congestive heart failure)     COPD (chronic obstructive pulmonary disease)     Currently asymptomatic HIV infection, with history of HIV-related illness 7/10/2023    Diabetes mellitus     Diabetic retinopathy     ESRD (end stage renal disease)     TTS    Hypertension     Ischemic ulcer of toe of left foot     Malignant neoplasm of upper-outer quadrant of left breast in female, estrogen receptor positive 2022    left    PAD (peripheral artery disease)     PAF (paroxysmal atrial fibrillation)      Family History   Problem Relation Age of Onset    Hypertension Mother     Cancer Father     Breast cancer Sister     Diabetes Sister     Cancer Brother     Amblyopia Neg Hx     Blindness Neg Hx     Cataracts Neg Hx     Glaucoma Neg Hx     Macular degeneration Neg Hx     Retinal detachment Neg Hx     Strabismus Neg Hx     Stroke Neg Hx     Thyroid disease Neg Hx      Social History     Socioeconomic History    Marital status:    Tobacco Use    Smoking status: Former     Current packs/day: 0.00     Types: Cigarettes     Start date: 1982     Quit date: 2012     Years since quittin.2    Smokeless tobacco: Never   Substance and Sexual Activity    Alcohol use: No    Drug use: Never   Social History Narrative    ** Merged History Encounter **          Social Determinants of Health     Financial Resource Strain: Medium Risk (2023)    Overall Financial Resource Strain  (CARDIA)     Difficulty of Paying Living Expenses: Somewhat hard   Food Insecurity: Food Insecurity Present (9/16/2023)    Hunger Vital Sign     Worried About Running Out of Food in the Last Year: Sometimes true     Ran Out of Food in the Last Year: Sometimes true   Transportation Needs: No Transportation Needs (9/16/2023)    PRAPARE - Transportation     Lack of Transportation (Medical): No     Lack of Transportation (Non-Medical): No   Physical Activity: Sufficiently Active (9/16/2023)    Exercise Vital Sign     Days of Exercise per Week: 5 days     Minutes of Exercise per Session: 30 min   Stress: No Stress Concern Present (9/16/2023)    [employer] - Occupational Stress Questionnaire     Feeling of Stress : Not at all   Social Connections: Moderately Isolated (9/16/2023)    Social Connection and Isolation Panel [NHANES]     Frequency of Communication with Friends and Family: More than three times a week     Frequency of Social Gatherings with Friends and Family: More than three times a week     Attends Rastafari Services: More than 4 times per year     Active Member of Clubs or Organizations: No     Attends Club or Organization Meetings: Never     Marital Status:    Housing Stability: Low Risk  (9/16/2023)    Housing Stability Vital Sign     Unable to Pay for Housing in the Last Year: No     Number of Places Lived in the Last Year: 1     Unstable Housing in the Last Year: No     Past Surgical History:   Procedure Laterality Date    ANGIOGRAM, CORONARY, WITH LEFT HEART CATHETERIZATION  9/5/2023    Procedure: Angiogram, Coronary, with Left Heart Cath;  Surgeon: Travis Bartholomew MD;  Location: [facility] CATH LAB;  Service: Cardiology;;    APPLICATION OF WOUND VACUUM-ASSISTED CLOSURE DEVICE Left 4/6/2023    Procedure: APPLICATION, WOUND VAC;  Surgeon: Jayjay Arana MD;  Location: [facility] OR;  Service: General;  Laterality: Left;  left chest    ARTERIOGRAPHY OF SUBCLAVIAN ARTERY  9/5/2023     Procedure: ARTERIOGRAM, SUBCLAVIAN;  Surgeon: Travis Bartholomew MD;  Location: [facility] CATH LAB;  Service: Cardiology;;    AXILLARY NODE DISSECTION Left 3/1/2023    Procedure: LYMPHADENECTOMY, AXILLARY;  Surgeon: Jayjay Arana MD;  Location: [facility] OR;  Service: General;  Laterality: Left;    BIOPSY OF INGUINAL LYMPH NODE Left 3/31/2023    Procedure: BIOPSY, LYMPH NODE, INGUINAL;  Surgeon: Jayjay Arana MD;  Location: [facility] OR;  Service: General;  Laterality: Left;    BREAST BIOPSY Right     benign    CATARACT EXTRACTION W/  INTRAOCULAR LENS IMPLANT Right 08/14/2017    Dr. Moe    CORONARY ANGIOGRAPHY N/A 9/5/2023    Procedure: ANGIOGRAM, CORONARY ARTERY;  Surgeon: Travis Bartholomew MD;  Location: [facility] CATH LAB;  Service: Cardiology;  Laterality: N/A;    CORONARY ARTERY BYPASS GRAFT  2020    CORONARY BYPASS GRAFT ANGIOGRAPHY  9/5/2023    Procedure: Bypass graft study;  Surgeon: Travis Bartholomew MD;  Location: [facility] CATH LAB;  Service: Cardiology;;    FLUOROSCOPY N/A 07/25/2022    Procedure: FLUOROSCOPY/mediport placement;  Surgeon: Cole Perdomo MD;  Location: [facility] CATH LAB;  Service: General;  Laterality: N/A;    MEDIPORT REMOVAL N/A 3/1/2023    Procedure: REMOVAL, CATHETER, CENTRAL VENOUS, TUNNELED, WITH PORT;  Surgeon: Jayjay Arana MD;  Location: [facility];  Service: General;  Laterality: N/A;    MODIFIED RADICAL MASTECTOMY W/ AXILLARY LYMPH NODE DISSECTION Left 3/1/2023    Procedure: MASTECTOMY, MODIFIED RADICAL;  Surgeon: Jayjay Arana MD;  Location: [facility] OR;  Service: General;  Laterality: Left;    WOUND DEBRIDEMENT Left 3/31/2023    Procedure: DEBRIDEMENT, WOUND;  Surgeon: Jayjay Arana MD;  Location: [facility] OR;  Service: General;  Laterality: Left;  left chest wall eschar debridement       Labs:  Lab Results   Component Value Date    WBC 6.84 10/02/2023    HGB 10.5 (L) 10/02/2023    HCT 32.3 (L) 10/02/2023     (H) 10/02/2023     10/02/2023     BMP  Lab Results   Component Value Date      10/02/2023    K 5.5 (H) 10/02/2023     10/02/2023    CO2 22 (L) 10/02/2023    BUN 27 (H) 10/02/2023    CREATININE 6.0 (H) 10/02/2023    CALCIUM 8.7 10/02/2023    ANIONGAP 8 10/02/2023    ESTGFRAFRICA 8 (A) 07/27/2022    EGFRNONAA 7 (A) 07/27/2022     Lab Results   Component Value Date    ALT 11 10/02/2023    AST 17 10/02/2023    ALKPHOS 68 10/02/2023    BILITOT 0.3 10/02/2023       Lab Results   Component Value Date    IRON 43 09/09/2018    TIBC 300 09/09/2018    FERRITIN 1,038 (H) 06/21/2022     Lab Results   Component Value Date    IWUWIDUV30 459 09/09/2018     Lab Results   Component Value Date    FOLATE 6.2 09/09/2018     Lab Results   Component Value Date    TSH 3.209 07/24/2023         Review of Systems   Constitutional:  Positive for fatigue. Negative for activity change, appetite change, chills, diaphoresis, fever and unexpected weight change.   HENT:  Negative for congestion, dental problem, drooling, ear discharge, ear pain, facial swelling, hearing loss, mouth sores, nosebleeds, postnasal drip, rhinorrhea, sinus pressure, sneezing, sore throat, tinnitus, trouble swallowing and voice change.    Eyes:  Negative for photophobia, pain, discharge, redness, itching and visual disturbance.   Respiratory:  Negative for cough, choking, chest tightness, shortness of breath, wheezing and stridor.    Cardiovascular:  Negative for chest pain, palpitations and leg swelling.   Gastrointestinal:  Negative for abdominal distention, abdominal pain, anal bleeding, blood in stool, constipation, diarrhea, nausea, rectal pain and vomiting.   Endocrine: Negative for cold intolerance, heat intolerance, polydipsia, polyphagia and polyuria.   Genitourinary:  Negative for decreased urine volume, difficulty urinating, dyspareunia, dysuria, enuresis, flank pain, frequency, genital sores, hematuria, menstrual problem, pelvic pain, urgency, vaginal bleeding, vaginal discharge and vaginal pain.   Musculoskeletal:  Negative for  arthralgias, back pain, gait problem, joint swelling, myalgias, neck pain and neck stiffness.   Skin:  Negative for color change, pallor and rash.   Allergic/Immunologic: Negative for environmental allergies, food allergies and immunocompromised state.   Neurological:  Positive for weakness. Negative for dizziness, tremors, seizures, syncope, facial asymmetry, speech difficulty, light-headedness, numbness and headaches.   Hematological:  Negative for adenopathy. Does not bruise/bleed easily.   Psychiatric/Behavioral:  Negative for agitation, behavioral problems, confusion, decreased concentration, dysphoric mood, hallucinations, self-injury, sleep disturbance and suicidal ideas. The patient is not nervous/anxious and is not hyperactive.      Objective:      Physical Exam  Vitals reviewed.   Constitutional:       General: She is not in acute distress.     Appearance: She is well-developed. She is not diaphoretic.   HENT:      Head: Normocephalic and atraumatic.      Right Ear: External ear normal.      Left Ear: External ear normal.      Nose: Nose normal.      Right Sinus: No maxillary sinus tenderness or frontal sinus tenderness.      Left Sinus: No maxillary sinus tenderness or frontal sinus tenderness.      Mouth/Throat:      Pharynx: No oropharyngeal exudate.   Eyes:      General: Lids are normal. No scleral icterus.        Right eye: No discharge.         Left eye: No discharge.      Conjunctiva/sclera: Conjunctivae normal.      Right eye: Right conjunctiva is not injected. No hemorrhage.     Left eye: Left conjunctiva is not injected. No hemorrhage.     Pupils: Pupils are equal, round, and reactive to light.   Neck:      Thyroid: No thyromegaly.      Vascular: No JVD.      Trachea: No tracheal deviation.   Cardiovascular:      Rate and Rhythm: Normal rate.   Pulmonary:      Effort: Pulmonary effort is normal. No respiratory distress.      Breath sounds: No stridor.   Chest:      Chest wall: No tenderness.    Abdominal:      General: Bowel sounds are normal. There is no distension.      Palpations: Abdomen is soft. There is no hepatomegaly, splenomegaly or mass.      Tenderness: There is no abdominal tenderness. There is no rebound.   Musculoskeletal:         General: No tenderness. Normal range of motion.      Cervical back: Normal range of motion and neck supple.   Lymphadenopathy:      Cervical: No cervical adenopathy.      Upper Body:      Right upper body: No supraclavicular adenopathy.      Left upper body: No supraclavicular adenopathy.   Skin:     General: Skin is dry.      Findings: No erythema or rash.   Neurological:      Mental Status: She is alert and oriented to person, place, and time.      Cranial Nerves: No cranial nerve deficit.      Coordination: Coordination normal.   Psychiatric:         Behavior: Behavior normal.         Thought Content: Thought content normal.         Judgment: Judgment normal.           Assessment:      1. Malignant neoplasm of upper-outer quadrant of left breast in female, estrogen receptor positive    2. Secondary malignancy of regional lymph node    3. Immunodeficiency due to chemotherapy    4. Anemia associated with chronic renal failure    5. Chronic kidney disease-mineral and bone disorder    6. Primary hypertension    7. Peripheral neuropathy due to chemotherapy    8. Currently asymptomatic HIV infection, with history of HIV-related illness           Med Onc Chart Routing      Follow up with physician 3 months. Return to clinic to see me 3 months   Follow up with [facility] . Patient can be seen by [facility] next 3 dosing   Infusion scheduling note    Injection scheduling note OP BREAST ADO-TRASTUZUMAB EMTANSINE Q3W   Labs CBC and CMP   Scheduling:  Preferred lab:  Lab interval:     Imaging ECHO   Q 12 weeks   Pharmacy appointment    Other referrals                 Plan:     Patient continues to improve no evidence of recurrent abscess involving.  Continue with current plan course of  action be treated with current medications q. 3 weeks to be followed by [facility] next 3 dosing.  Follow-up with me in 3 months.  Continue follow-up through Infectious Disease specialist.  Stable findings orders written reviewed; prescription for Arimidex 1 mg daily sent to local pharmacy        Toney العلي Jr, MD FACP

## 2023-10-02 NOTE — PLAN OF CARE
Problem: Adult Inpatient Plan of Care  Goal: Plan of Care Review  Outcome: Ongoing, Progressing  Flowsheets (Taken 10/2/2023 1230)  Plan of Care Reviewed With: patient  Goal: Patient-Specific Goal (Individualized)  Outcome: Ongoing, Progressing  Flowsheets (Taken 10/2/2023 1230)  Anxieties, Fears or Concerns: Pt voices no concerns at this time  Individualized Care Needs: Pt in recliner, pillow and blanket and snacks provided  Goal: Absence of Hospital-Acquired Illness or Injury  Outcome: Ongoing, Progressing  Goal: Optimal Comfort and Wellbeing  Outcome: Ongoing, Progressing     Problem: Fall Injury Risk  Goal: Absence of Fall and Fall-Related Injury  Outcome: Ongoing, Progressing     Problem: Anemia (Chemotherapy Effects)  Goal: Anemia Symptom Improvement  Outcome: Ongoing, Progressing     Problem: Nausea and Vomiting (Chemotherapy Effects)  Goal: Fluid and Electrolyte Balance  Outcome: Ongoing, Progressing     Problem: Neurotoxicity (Chemotherapy Effects)  Goal: Neurotoxicity Symptom Control  Outcome: Ongoing, Progressing     Problem: Neutropenia (Chemotherapy Effects)  Goal: Absence of Infection  Outcome: Ongoing, Progressing     Problem: Thrombocytopenia Bleeding Risk (Chemotherapy Effects)  Goal: Absence of Bleeding  Outcome: Ongoing, Progressing

## 2023-10-02 NOTE — TELEPHONE ENCOUNTER
SW arranged [employer] transportation. Clinic staff-PAPO Slater LPN notified that pt will be running late for appt. SW will remain available.

## 2023-10-06 ENCOUNTER — HOSPITAL ENCOUNTER (OUTPATIENT)
Dept: CARDIOLOGY | Facility: HOSPITAL | Age: 68
Discharge: HOME | End: 2023-10-06
Attending: INTERNAL MEDICINE
Payer: MEDICARE

## 2023-10-06 VITALS
WEIGHT: 172 LBS | HEIGHT: 62 IN | BODY MASS INDEX: 31.65 KG/M2 | SYSTOLIC BLOOD PRESSURE: 123 MMHG | DIASTOLIC BLOOD PRESSURE: 61 MMHG

## 2023-10-06 DIAGNOSIS — D84.821 IMMUNODEFICIENCY DUE TO CHEMOTHERAPY: ICD-10-CM

## 2023-10-06 DIAGNOSIS — Z79.899 IMMUNODEFICIENCY DUE TO CHEMOTHERAPY: ICD-10-CM

## 2023-10-06 DIAGNOSIS — D63.1 ANEMIA ASSOCIATED WITH CHRONIC RENAL FAILURE: ICD-10-CM

## 2023-10-06 DIAGNOSIS — B20 CURRENTLY ASYMPTOMATIC HIV INFECTION, WITH HISTORY OF HIV-RELATED ILLNESS: ICD-10-CM

## 2023-10-06 DIAGNOSIS — T45.1X5A PERIPHERAL NEUROPATHY DUE TO CHEMOTHERAPY: ICD-10-CM

## 2023-10-06 DIAGNOSIS — M89.9 CHRONIC KIDNEY DISEASE-MINERAL AND BONE DISORDER: ICD-10-CM

## 2023-10-06 DIAGNOSIS — C77.9 SECONDARY MALIGNANCY OF REGIONAL LYMPH NODE: ICD-10-CM

## 2023-10-06 DIAGNOSIS — C50.412 MALIGNANT NEOPLASM OF UPPER-OUTER QUADRANT OF LEFT BREAST IN FEMALE, ESTROGEN RECEPTOR POSITIVE: ICD-10-CM

## 2023-10-06 DIAGNOSIS — I10 PRIMARY HYPERTENSION: ICD-10-CM

## 2023-10-06 DIAGNOSIS — Z17.0 MALIGNANT NEOPLASM OF UPPER-OUTER QUADRANT OF LEFT BREAST IN FEMALE, ESTROGEN RECEPTOR POSITIVE: ICD-10-CM

## 2023-10-06 DIAGNOSIS — E83.9 CHRONIC KIDNEY DISEASE-MINERAL AND BONE DISORDER: ICD-10-CM

## 2023-10-06 DIAGNOSIS — T45.1X5A IMMUNODEFICIENCY DUE TO CHEMOTHERAPY: ICD-10-CM

## 2023-10-06 DIAGNOSIS — N18.9 ANEMIA ASSOCIATED WITH CHRONIC RENAL FAILURE: ICD-10-CM

## 2023-10-06 DIAGNOSIS — G62.0 PERIPHERAL NEUROPATHY DUE TO CHEMOTHERAPY: ICD-10-CM

## 2023-10-06 DIAGNOSIS — N18.9 CHRONIC KIDNEY DISEASE-MINERAL AND BONE DISORDER: ICD-10-CM

## 2023-10-06 LAB
AORTIC ROOT ANNULUS: 2.69 CM
ASCENDING AORTA: 3.21 CM
AV INDEX (PROSTH): 0.71
AV MEAN GRADIENT: 5 MMHG
AV PEAK GRADIENT: 10 MMHG
AV VALVE AREA BY VELOCITY RATIO: 2.22 CM²
AV VALVE AREA: 2.01 CM²
AV VELOCITY RATIO: 0.78
BSA FOR ECHO PROCEDURE: 1.85 M2
CV ECHO LV RWT: 0.7 CM
DOP CALC AO PEAK VEL: 1.57 M/S
DOP CALC AO VTI: 33.4 CM
DOP CALC LVOT AREA: 2.8 CM2
DOP CALC LVOT DIAMETER: 1.9 CM
DOP CALC LVOT PEAK VEL: 1.23 M/S
DOP CALC LVOT STROKE VOLUME: 67.16 CM3
DOP CALC RVOT PEAK VEL: 0.88 M/S
DOP CALC RVOT VTI: 15.2 CM
DOP CALCLVOT PEAK VEL VTI: 23.7 CM
E WAVE DECELERATION TIME: 208.42 MSEC
E/A RATIO: 0.75
E/E' RATIO: 14.2 M/S
ECHO LV POSTERIOR WALL: 1.25 CM (ref 0.6–1.1)
FRACTIONAL SHORTENING: 34 % (ref 28–44)
GLOBAL LONGITUIDAL STRAIN: 12.5 %
INTERVENTRICULAR SEPTUM: 1.27 CM (ref 0.6–1.1)
IVC DIAMETER: 0.96 CM
IVRT: 85.63 MSEC
LA MAJOR: 4.91 CM
LA MINOR: 4.72 CM
LA WIDTH: 4 CM
LEFT ATRIUM SIZE: 4.25 CM
LEFT ATRIUM VOLUME INDEX MOD: 28.2 ML/M2
LEFT ATRIUM VOLUME INDEX: 38.9 ML/M2
LEFT ATRIUM VOLUME MOD: 50.54 CM3
LEFT ATRIUM VOLUME: 69.55 CM3
LEFT INTERNAL DIMENSION IN SYSTOLE: 2.36 CM (ref 2.1–4)
LEFT VENTRICLE DIASTOLIC VOLUME INDEX: 30.09 ML/M2
LEFT VENTRICLE DIASTOLIC VOLUME: 53.86 ML
LEFT VENTRICLE MASS INDEX: 85 G/M2
LEFT VENTRICLE SYSTOLIC VOLUME INDEX: 10.9 ML/M2
LEFT VENTRICLE SYSTOLIC VOLUME: 19.43 ML
LEFT VENTRICULAR INTERNAL DIMENSION IN DIASTOLE: 3.58 CM (ref 3.5–6)
LEFT VENTRICULAR MASS: 151.27 G
LV LATERAL E/E' RATIO: 11.83 M/S
LV SEPTAL E/E' RATIO: 17.75 M/S
LVOT MG: 3.72 MMHG
LVOT MV: 0.92 CM/S
MV PEAK A VEL: 0.95 M/S
MV PEAK E VEL: 0.71 M/S
MV STENOSIS PRESSURE HALF TIME: 60.44 MS
MV VALVE AREA P 1/2 METHOD: 3.64 CM2
OHS LV EJECTION FRACTION SIMPSONS BIPLANE MOD: 66 %
PISA TR MAX VEL: 2.6 M/S
PV MEAN GRADIENT: 1 MMHG
PV MV: 0.72 M/S
PV PEAK GRADIENT: 4 MMHG
PV PEAK VELOCITY: 1.04 M/S
QEF: 56 %
RA MAJOR: 4.34 CM
RA PRESSURE ESTIMATED: 3 MMHG
RA WIDTH: 3.48 CM
RIGHT VENTRICULAR END-DIASTOLIC DIMENSION: 3.33 CM
RV TB RVSP: 6 MMHG
SINUS: 2.55 CM
STJ: 1.9 CM
TDI LATERAL: 0.06 M/S
TDI SEPTAL: 0.04 M/S
TDI: 0.05 M/S
TR MAX PG: 27 MMHG
TV REST PULMONARY ARTERY PRESSURE: 30 MMHG
Z-SCORE OF LEFT VENTRICULAR DIMENSION IN END DIASTOLE: -3.24
Z-SCORE OF LEFT VENTRICULAR DIMENSION IN END SYSTOLE: -2.05

## 2023-10-06 PROCEDURE — 93306 ECHO (CUPID ONLY): ICD-10-PCS | Mod: 26,,, | Performed by: INTERNAL MEDICINE

## 2023-10-06 PROCEDURE — 93306 TTE W/DOPPLER COMPLETE: CPT

## 2023-10-06 PROCEDURE — 93306 TTE W/DOPPLER COMPLETE: CPT | Mod: 26,,, | Performed by: INTERNAL MEDICINE

## 2023-10-09 ENCOUNTER — PATIENT OUTREACH (OUTPATIENT)
Dept: ADMINISTRATIVE | Facility: OTHER | Age: 68
End: 2023-10-09
Payer: MEDICARE

## 2023-10-09 NOTE — PROGRESS NOTES
CHW - Case Closure    This Community Health Worker spoke to caregiver via telephone today.   Pt/Caregiver reported: Spoke to pt's niece/caregiver, she states pt has no needs or request any assistance at this time.  Pt/Caregiver denied any additional needs at this time and agrees with episode closure at this time.  Provided caregiver with Community Health Worker's contact information and encouraged her to contact this Community Health Worker if additional needs arise.

## 2023-10-13 ENCOUNTER — TELEPHONE (OUTPATIENT)
Dept: HEMATOLOGY/ONCOLOGY | Facility: CLINIC | Age: 68
End: 2023-10-13
Payer: MEDICARE

## 2023-10-13 NOTE — TELEPHONE ENCOUNTER
[employer] faxed ride request to [employer] at f. [phone], p. [phone]-pickup 6:30 am on 10.23.23. SAMIA informed family-Ember of the above. Per Embre, pt has not been feeling too well, and is considering NH placement. Ember will call SAMIA if pt will not need ride to 10.23.23 appt. SW will remain available.

## 2023-10-20 ENCOUNTER — TELEPHONE (OUTPATIENT)
Dept: HEMATOLOGY/ONCOLOGY | Facility: CLINIC | Age: 68
End: 2023-10-20
Payer: MEDICARE

## 2023-10-20 NOTE — TELEPHONE ENCOUNTER
SAMIA called pt's family-Ember. Pt's appt's rescheduled for next week. [employer] cancelled [employer] [phone] for 10.23.23. SAMIA schedule ride for rescheduled appt's. SW will remain available.

## 2023-10-24 ENCOUNTER — DOCUMENTATION ONLY (OUTPATIENT)
Dept: HEMATOLOGY/ONCOLOGY | Facility: CLINIC | Age: 68
End: 2023-10-24
Payer: MEDICARE

## 2023-10-24 NOTE — PROGRESS NOTES
9:11 am-SAMIA faxed new ride requests for pt's upcoming appt's on 10.25.23 and 10.26.23 to [employer] p. [phone], f. [phone]. SAMIA attempted to reach Mary at [phone] to inform of the above. No answer, SAMIA left vm. SAMIA will remain available.    3:20 pm-SAMIA spoke with Mary and informed of the above. No other needs expressed. SAMIA will remain available.

## 2023-10-25 ENCOUNTER — LAB VISIT (OUTPATIENT)
Dept: LAB | Facility: HOSPITAL | Age: 68
End: 2023-10-25
Attending: INTERNAL MEDICINE
Payer: MEDICARE

## 2023-10-25 ENCOUNTER — OFFICE VISIT (OUTPATIENT)
Dept: HEMATOLOGY/ONCOLOGY | Facility: CLINIC | Age: 68
End: 2023-10-25
Payer: MEDICARE

## 2023-10-25 VITALS
HEIGHT: 62 IN | SYSTOLIC BLOOD PRESSURE: 127 MMHG | TEMPERATURE: 97 F | OXYGEN SATURATION: 95 % | DIASTOLIC BLOOD PRESSURE: 69 MMHG | WEIGHT: 171.5 LBS | HEART RATE: 86 BPM | BODY MASS INDEX: 31.56 KG/M2

## 2023-10-25 DIAGNOSIS — D84.821 IMMUNODEFICIENCY DUE TO CHEMOTHERAPY: ICD-10-CM

## 2023-10-25 DIAGNOSIS — C50.412 MALIGNANT NEOPLASM OF UPPER-OUTER QUADRANT OF LEFT BREAST IN FEMALE, ESTROGEN RECEPTOR POSITIVE: Primary | ICD-10-CM

## 2023-10-25 DIAGNOSIS — N18.9 CHRONIC KIDNEY DISEASE-MINERAL AND BONE DISORDER: ICD-10-CM

## 2023-10-25 DIAGNOSIS — T45.1X5A IMMUNODEFICIENCY DUE TO CHEMOTHERAPY: ICD-10-CM

## 2023-10-25 DIAGNOSIS — Z17.0 MALIGNANT NEOPLASM OF UPPER-OUTER QUADRANT OF LEFT BREAST IN FEMALE, ESTROGEN RECEPTOR POSITIVE: ICD-10-CM

## 2023-10-25 DIAGNOSIS — T45.1X5A PERIPHERAL NEUROPATHY DUE TO CHEMOTHERAPY: ICD-10-CM

## 2023-10-25 DIAGNOSIS — C77.9 SECONDARY MALIGNANCY OF REGIONAL LYMPH NODE: ICD-10-CM

## 2023-10-25 DIAGNOSIS — D63.1 ANEMIA ASSOCIATED WITH CHRONIC RENAL FAILURE: ICD-10-CM

## 2023-10-25 DIAGNOSIS — N18.9 ANEMIA ASSOCIATED WITH CHRONIC RENAL FAILURE: ICD-10-CM

## 2023-10-25 DIAGNOSIS — G62.0 PERIPHERAL NEUROPATHY DUE TO CHEMOTHERAPY: ICD-10-CM

## 2023-10-25 DIAGNOSIS — I10 PRIMARY HYPERTENSION: ICD-10-CM

## 2023-10-25 DIAGNOSIS — M89.9 CHRONIC KIDNEY DISEASE-MINERAL AND BONE DISORDER: ICD-10-CM

## 2023-10-25 DIAGNOSIS — C50.412 MALIGNANT NEOPLASM OF UPPER-OUTER QUADRANT OF LEFT BREAST IN FEMALE, ESTROGEN RECEPTOR POSITIVE: ICD-10-CM

## 2023-10-25 DIAGNOSIS — B20 CURRENTLY ASYMPTOMATIC HIV INFECTION, WITH HISTORY OF HIV-RELATED ILLNESS: ICD-10-CM

## 2023-10-25 DIAGNOSIS — Z79.899 IMMUNODEFICIENCY DUE TO CHEMOTHERAPY: ICD-10-CM

## 2023-10-25 DIAGNOSIS — N18.6 ESRD (END STAGE RENAL DISEASE): ICD-10-CM

## 2023-10-25 DIAGNOSIS — Z17.0 MALIGNANT NEOPLASM OF UPPER-OUTER QUADRANT OF LEFT BREAST IN FEMALE, ESTROGEN RECEPTOR POSITIVE: Primary | ICD-10-CM

## 2023-10-25 DIAGNOSIS — E83.9 CHRONIC KIDNEY DISEASE-MINERAL AND BONE DISORDER: ICD-10-CM

## 2023-10-25 LAB
ALBUMIN SERPL BCP-MCNC: 2.6 G/DL (ref 3.5–5.2)
ALP SERPL-CCNC: 68 U/L (ref 55–135)
ALT SERPL W/O P-5'-P-CCNC: 17 U/L (ref 10–44)
ANION GAP SERPL CALC-SCNC: 11 MMOL/L (ref 8–16)
AST SERPL-CCNC: 21 U/L (ref 10–40)
BASOPHILS # BLD AUTO: 0.05 K/UL (ref 0–0.2)
BASOPHILS NFR BLD: 0.7 % (ref 0–1.9)
BILIRUB SERPL-MCNC: 0.3 MG/DL (ref 0.1–1)
BUN SERPL-MCNC: 22 MG/DL (ref 8–23)
CALCIUM SERPL-MCNC: 9 MG/DL (ref 8.7–10.5)
CHLORIDE SERPL-SCNC: 104 MMOL/L (ref 95–110)
CO2 SERPL-SCNC: 23 MMOL/L (ref 23–29)
CREAT SERPL-MCNC: 4.8 MG/DL (ref 0.5–1.4)
DIFFERENTIAL METHOD: ABNORMAL
EOSINOPHIL # BLD AUTO: 0.5 K/UL (ref 0–0.5)
EOSINOPHIL NFR BLD: 6.6 % (ref 0–8)
ERYTHROCYTE [DISTWIDTH] IN BLOOD BY AUTOMATED COUNT: 17.9 % (ref 11.5–14.5)
EST. GFR  (NO RACE VARIABLE): 9 ML/MIN/1.73 M^2
GLUCOSE SERPL-MCNC: 166 MG/DL (ref 70–110)
HCT VFR BLD AUTO: 36 % (ref 37–48.5)
HGB BLD-MCNC: 11.4 G/DL (ref 12–16)
IMM GRANULOCYTES # BLD AUTO: 0.11 K/UL (ref 0–0.04)
IMM GRANULOCYTES NFR BLD AUTO: 1.5 % (ref 0–0.5)
LYMPHOCYTES # BLD AUTO: 1.2 K/UL (ref 1–4.8)
LYMPHOCYTES NFR BLD: 16.5 % (ref 18–48)
MCH RBC QN AUTO: 33.2 PG (ref 27–31)
MCHC RBC AUTO-ENTMCNC: 31.7 G/DL (ref 32–36)
MCV RBC AUTO: 105 FL (ref 82–98)
MONOCYTES # BLD AUTO: 0.9 K/UL (ref 0.3–1)
MONOCYTES NFR BLD: 12.4 % (ref 4–15)
NEUTROPHILS # BLD AUTO: 4.6 K/UL (ref 1.8–7.7)
NEUTROPHILS NFR BLD: 62.3 % (ref 38–73)
NRBC BLD-RTO: 0 /100 WBC
PLATELET # BLD AUTO: 232 K/UL (ref 150–450)
PMV BLD AUTO: 10.7 FL (ref 9.2–12.9)
POTASSIUM SERPL-SCNC: 4.8 MMOL/L (ref 3.5–5.1)
PROT SERPL-MCNC: 7.7 G/DL (ref 6–8.4)
RBC # BLD AUTO: 3.43 M/UL (ref 4–5.4)
SODIUM SERPL-SCNC: 138 MMOL/L (ref 136–145)
WBC # BLD AUTO: 7.41 K/UL (ref 3.9–12.7)

## 2023-10-25 PROCEDURE — 1157F PR ADVANCE CARE PLAN OR EQUIV PRESENT IN MEDICAL RECORD: ICD-10-PCS | Mod: CPTII,S$GLB,, | Performed by: NURSE PRACTITIONER

## 2023-10-25 PROCEDURE — 4010F PR ACE/ARB THEARPY RXD/TAKEN: ICD-10-PCS | Mod: CPTII,S$GLB,, | Performed by: NURSE PRACTITIONER

## 2023-10-25 PROCEDURE — 3288F FALL RISK ASSESSMENT DOCD: CPT | Mod: CPTII,S$GLB,, | Performed by: NURSE PRACTITIONER

## 2023-10-25 PROCEDURE — 1160F PR REVIEW ALL MEDS BY PRESCRIBER/CLIN PHARMACIST DOCUMENTED: ICD-10-PCS | Mod: CPTII,S$GLB,, | Performed by: NURSE PRACTITIONER

## 2023-10-25 PROCEDURE — 3008F PR BODY MASS INDEX (BMI) DOCUMENTED: ICD-10-PCS | Mod: CPTII,S$GLB,, | Performed by: NURSE PRACTITIONER

## 2023-10-25 PROCEDURE — 85025 COMPLETE CBC W/AUTO DIFF WBC: CPT | Performed by: INTERNAL MEDICINE

## 2023-10-25 PROCEDURE — 1160F RVW MEDS BY RX/DR IN RCRD: CPT | Mod: CPTII,S$GLB,, | Performed by: NURSE PRACTITIONER

## 2023-10-25 PROCEDURE — 3044F PR MOST RECENT HEMOGLOBIN A1C LEVEL <7.0%: ICD-10-PCS | Mod: CPTII,S$GLB,, | Performed by: NURSE PRACTITIONER

## 2023-10-25 PROCEDURE — 3288F PR FALLS RISK ASSESSMENT DOCUMENTED: ICD-10-PCS | Mod: CPTII,S$GLB,, | Performed by: NURSE PRACTITIONER

## 2023-10-25 PROCEDURE — 3078F DIAST BP <80 MM HG: CPT | Mod: CPTII,S$GLB,, | Performed by: NURSE PRACTITIONER

## 2023-10-25 PROCEDURE — 3008F BODY MASS INDEX DOCD: CPT | Mod: CPTII,S$GLB,, | Performed by: NURSE PRACTITIONER

## 2023-10-25 PROCEDURE — 3044F HG A1C LEVEL LT 7.0%: CPT | Mod: CPTII,S$GLB,, | Performed by: NURSE PRACTITIONER

## 2023-10-25 PROCEDURE — 99999 PR PBB SHADOW E&M-EST. PATIENT-LVL III: CPT | Mod: PBBFAC,,, | Performed by: NURSE PRACTITIONER

## 2023-10-25 PROCEDURE — 3074F PR MOST RECENT SYSTOLIC BLOOD PRESSURE < 130 MM HG: ICD-10-PCS | Mod: CPTII,S$GLB,, | Performed by: NURSE PRACTITIONER

## 2023-10-25 PROCEDURE — 80053 COMPREHEN METABOLIC PANEL: CPT | Performed by: INTERNAL MEDICINE

## 2023-10-25 PROCEDURE — 1159F MED LIST DOCD IN RCRD: CPT | Mod: CPTII,S$GLB,, | Performed by: NURSE PRACTITIONER

## 2023-10-25 PROCEDURE — 1157F ADVNC CARE PLAN IN RCRD: CPT | Mod: CPTII,S$GLB,, | Performed by: NURSE PRACTITIONER

## 2023-10-25 PROCEDURE — 1101F PR PT FALLS ASSESS DOC 0-1 FALLS W/OUT INJ PAST YR: ICD-10-PCS | Mod: CPTII,S$GLB,, | Performed by: NURSE PRACTITIONER

## 2023-10-25 PROCEDURE — 99999 PR PBB SHADOW E&M-EST. PATIENT-LVL III: ICD-10-PCS | Mod: PBBFAC,,, | Performed by: NURSE PRACTITIONER

## 2023-10-25 PROCEDURE — 1126F AMNT PAIN NOTED NONE PRSNT: CPT | Mod: CPTII,S$GLB,, | Performed by: NURSE PRACTITIONER

## 2023-10-25 PROCEDURE — 3078F PR MOST RECENT DIASTOLIC BLOOD PRESSURE < 80 MM HG: ICD-10-PCS | Mod: CPTII,S$GLB,, | Performed by: NURSE PRACTITIONER

## 2023-10-25 PROCEDURE — 1126F PR PAIN SEVERITY QUANTIFIED, NO PAIN PRESENT: ICD-10-PCS | Mod: CPTII,S$GLB,, | Performed by: NURSE PRACTITIONER

## 2023-10-25 PROCEDURE — 4010F ACE/ARB THERAPY RXD/TAKEN: CPT | Mod: CPTII,S$GLB,, | Performed by: NURSE PRACTITIONER

## 2023-10-25 PROCEDURE — 99215 PR OFFICE/OUTPT VISIT, EST, LEVL V, 40-54 MIN: ICD-10-PCS | Mod: 25,S$GLB,, | Performed by: NURSE PRACTITIONER

## 2023-10-25 PROCEDURE — 1159F PR MEDICATION LIST DOCUMENTED IN MEDICAL RECORD: ICD-10-PCS | Mod: CPTII,S$GLB,, | Performed by: NURSE PRACTITIONER

## 2023-10-25 PROCEDURE — 3074F SYST BP LT 130 MM HG: CPT | Mod: CPTII,S$GLB,, | Performed by: NURSE PRACTITIONER

## 2023-10-25 PROCEDURE — 99215 OFFICE O/P EST HI 40 MIN: CPT | Mod: 25,S$GLB,, | Performed by: NURSE PRACTITIONER

## 2023-10-25 PROCEDURE — 1101F PT FALLS ASSESS-DOCD LE1/YR: CPT | Mod: CPTII,S$GLB,, | Performed by: NURSE PRACTITIONER

## 2023-10-25 RX ORDER — HEPARIN 100 UNIT/ML
500 SYRINGE INTRAVENOUS
Status: CANCELLED | OUTPATIENT
Start: 2023-10-25

## 2023-10-25 RX ORDER — CEFDINIR 300 MG/1
CAPSULE ORAL
COMMUNITY
Start: 2023-10-23 | End: 2023-11-16

## 2023-10-25 RX ORDER — CEFDINIR 300 MG/1
300 CAPSULE ORAL
COMMUNITY
Start: 2023-10-23 | End: 2023-11-02

## 2023-10-25 RX ORDER — SODIUM CHLORIDE 0.9 % (FLUSH) 0.9 %
10 SYRINGE (ML) INJECTION
Status: CANCELLED | OUTPATIENT
Start: 2023-10-25

## 2023-10-25 RX ORDER — ONDANSETRON 2 MG/ML
8 INJECTION INTRAMUSCULAR; INTRAVENOUS
Status: CANCELLED | OUTPATIENT
Start: 2023-10-25

## 2023-10-25 RX ORDER — PROMETHAZINE HYDROCHLORIDE AND DEXTROMETHORPHAN HYDROBROMIDE 6.25; 15 MG/5ML; MG/5ML
5 SYRUP ORAL 4 TIMES DAILY PRN
COMMUNITY
Start: 2023-10-23 | End: 2023-10-30

## 2023-10-25 NOTE — ASSESSMENT & PLAN NOTE
Stage IIB (cT3, cN2a(f), cM0, G3, ER+, MO+, HER2+). Treated with  Taxol/Herceptin/Perjeta. S/p left mastectomy with sentinel node biopsy and subsequent left axillary dissection on 3/1/2023. Currently on Kadcyla every 3 weeks x 14 cycles.

## 2023-10-25 NOTE — PROGRESS NOTES
Subjective:       Patient ID: Malaika Paredes is a 68 y.o. female.    Chief Complaint:   1. Malignant neoplasm of upper-outer quadrant of left breast in female, estrogen receptor positive  Stage IIB (cT3, cN2a(f), cM0, G3, ER+, CO+, HER2+)   2. Anemia associated with chronic renal failure     3. ESRD (end stage renal disease)       Current Treatment:   Arimidex 1mg po daily        OP BREAST ADO-TRASTUZUMAB EMTANSINE Q3W     Treatment History:  Taxol/Herceptin/Perjeta every 3 weeks x 6        S/p left mastectomy with SLN biopsy and left axillary dissection on 3/1/2023    HPI: This is a 68 year old woman with cataracts, CHF, COPD, HTN, diabetic retinopathy, ESRD on dialysis, and type 2 DM who was referred to Hem/Onc in 6/2022 for locally advanced HER2 positive breast cancer. She presented to GYN in 5/2022 with a palpable breast lump present for one month. Diagnostic MMG revealed a left breast 5.6 cm x 4 cm x 4.3 cm mass at the 2 o'clock position. Biopsied proved invasive ductal carcinoma ER >95%, CO 80%, [id number] 2+, Ki-67 60%. Left axillary LN biopsy was positive. PET revealed no evidence of distance mets.     Baseline Echo revealed LVEF 59%. Repeat Echo in 10/2022 revealed EF 63%.    In light of her dialysis, she was started on Taxol/Herceptin/Perjeta every 3 weeks for 6 cycles prior to reimaging for surgical evaluation. PET revealed decreased tumor size and PET activity in the nodes. She underwent left breast mastectomy with sentinel node biopsy and subsequent left axillary dissection on 3/1/2023. Pathology revealed a residual 4.3 cm breast mass with no associated DCIS or LVI.  The margins were negative.  There was tumor involvement of 2 of 21 axillary nodes.    She is currently on Kadcyla every 3 weeks for 14 cycles.     LVEF in 10/2023 is 60-65%.     Her primary Hematologist/Oncologist is Dr. العلي.    Interval History: Patient presents for follow up on Kadcyla; She is scheduled to receive C4D1 tomorrow. She  presents alone and reports a good appetite and mostly normal Bms; she has diarrhea sometimes. Her main complaint today is a cold; she has cefdinir and promethazine syrup for it. She is afebrile. Anemia stable. WBC, ANC, plts WNL. CMP with stable ESRD. Per primary oncologist, will proceed with treatment tomorrow. Discussed treatment plan with her to include 14 doses of Kadcyla. She reports adherence to Arimidex and calcium + vitamin D daily.     Reviewed labs with patient:   CBC:   Recent Labs   Lab 10/25/23  1352   WBC 7.41   RBC 3.43 L   Hemoglobin 11.4 L   Hematocrit 36.0 L   Platelets 232    H   MCH 33.2 H   MCHC 31.7 L     CMP:  Recent Labs   Lab 10/25/23  1352   Glucose 166 H   Calcium 9.0   Albumin 2.6 L   Total Protein 7.7   Sodium 138   Potassium 4.8   CO2 23   Chloride 104   BUN 22   Creatinine 4.8 H   Alkaline Phosphatase 68   ALT 17   AST 21   Total Bilirubin 0.3     Social History     Socioeconomic History    Marital status:    Tobacco Use    Smoking status: Former     Current packs/day: 0.00     Types: Cigarettes     Start date: 1982     Quit date: 2012     Years since quittin.3    Smokeless tobacco: Never   Substance and Sexual Activity    Alcohol use: No    Drug use: Never   Social History Narrative    ** Merged History Encounter **          Social Determinants of Health     Financial Resource Strain: Medium Risk (2023)    Overall Financial Resource Strain (CARDIA)     Difficulty of Paying Living Expenses: Somewhat hard   Food Insecurity: Food Insecurity Present (2023)    Hunger Vital Sign     Worried About Running Out of Food in the Last Year: Sometimes true     Ran Out of Food in the Last Year: Sometimes true   Transportation Needs: No Transportation Needs (2023)    PRAPARE - Transportation     Lack of Transportation (Medical): No     Lack of Transportation (Non-Medical): No   Physical Activity: Sufficiently Active (2023)    Exercise Vital Sign      Days of Exercise per Week: 5 days     Minutes of Exercise per Session: 30 min   Stress: No Stress Concern Present (9/16/2023)    [employer] - Occupational Stress Questionnaire     Feeling of Stress : Not at all   Social Connections: Moderately Isolated (9/16/2023)    Social Connection and Isolation Panel [NHANES]     Frequency of Communication with Friends and Family: More than three times a week     Frequency of Social Gatherings with Friends and Family: More than three times a week     Attends Jehov[employer]'s witness Services: More than 4 times per year     Active Member of Clubs or Organizations: No     Attends Club or Organization Meetings: Never     Marital Status:    Housing Stability: Low Risk  (9/16/2023)    Housing Stability Vital Sign     Unable to Pay for Housing in the Last Year: No     Number of Places Lived in the Last Year: 1     Unstable Housing in the Last Year: No     Past Medical History:   Diagnosis Date    CAD (coronary artery disease)     Cataract     Cataract     CHF (congestive heart failure)     COPD (chronic obstructive pulmonary disease)     Currently asymptomatic HIV infection, with history of HIV-related illness 7/10/2023    Diabetes mellitus     Diabetic retinopathy     ESRD (end stage renal disease)     TTS    Hypertension     Ischemic ulcer of toe of left foot     Malignant neoplasm of upper-outer quadrant of left breast in female, estrogen receptor positive 06/20/2022    left    PAD (peripheral artery disease)     PAF (paroxysmal atrial fibrillation)      Family History   Problem Relation Age of Onset    Hypertension Mother     Cancer Father     Breast cancer Sister     Diabetes Sister     Cancer Brother     Amblyopia Neg Hx     Blindness Neg Hx     Cataracts Neg Hx     Glaucoma Neg Hx     Macular degeneration Neg Hx     Retinal detachment Neg Hx     Strabismus Neg Hx     Stroke Neg Hx     Thyroid disease Neg Hx      Past Surgical History:   Procedure  Laterality Date    ANGIOGRAM, CORONARY, WITH LEFT HEART CATHETERIZATION  9/5/2023    Procedure: Angiogram, Coronary, with Left Heart Cath;  Surgeon: Travis Bartholomew MD;  Location: [facility] CATH LAB;  Service: Cardiology;;    APPLICATION OF WOUND VACUUM-ASSISTED CLOSURE DEVICE Left 4/6/2023    Procedure: APPLICATION, WOUND VAC;  Surgeon: Jayjay Arana MD;  Location: [facility] OR;  Service: General;  Laterality: Left;  left chest    ARTERIOGRAPHY OF SUBCLAVIAN ARTERY  9/5/2023    Procedure: ARTERIOGRAM, SUBCLAVIAN;  Surgeon: Travis Bartholomew MD;  Location: [facility] CATH LAB;  Service: Cardiology;;    AXILLARY NODE DISSECTION Left 3/1/2023    Procedure: LYMPHADENECTOMY, AXILLARY;  Surgeon: Jayjay Arana MD;  Location: [facility] OR;  Service: General;  Laterality: Left;    BIOPSY OF INGUINAL LYMPH NODE Left 3/31/2023    Procedure: BIOPSY, LYMPH NODE, INGUINAL;  Surgeon: Jayjay Arana MD;  Location: [facility];  Service: General;  Laterality: Left;    BREAST BIOPSY Right     benign    CATARACT EXTRACTION W/  INTRAOCULAR LENS IMPLANT Right 08/14/2017    Dr. Moe    CORONARY ANGIOGRAPHY N/A 9/5/2023    Procedure: ANGIOGRAM, CORONARY ARTERY;  Surgeon: Travis Bartholomew MD;  Location: [facility] CATH LAB;  Service: Cardiology;  Laterality: N/A;    CORONARY ARTERY BYPASS GRAFT  2020    CORONARY BYPASS GRAFT ANGIOGRAPHY  9/5/2023    Procedure: Bypass graft study;  Surgeon: Travis Bartholomew MD;  Location: [facility] CATH LAB;  Service: Cardiology;;    FLUOROSCOPY N/A 07/25/2022    Procedure: FLUOROSCOPY/mediport placement;  Surgeon: Cole Perdomo MD;  Location: [facility] CATH LAB;  Service: General;  Laterality: N/A;    MEDIPORT REMOVAL N/A 3/1/2023    Procedure: REMOVAL, CATHETER, CENTRAL VENOUS, TUNNELED, WITH PORT;  Surgeon: Jayjay Arana MD;  Location: [facility] OR;  Service: General;  Laterality: N/A;    MODIFIED RADICAL MASTECTOMY W/ AXILLARY LYMPH NODE DISSECTION Left 3/1/2023    Procedure: MASTECTOMY, MODIFIED RADICAL;  Surgeon: Jayjay Arana MD;  Location:  [facility] OR;  Service: General;  Laterality: Left;    WOUND DEBRIDEMENT Left 3/31/2023    Procedure: DEBRIDEMENT, WOUND;  Surgeon: Jayjay Arana MD;  Location: [facility] OR;  Service: General;  Laterality: Left;  left chest wall eschar debridement     Review of Systems   Constitutional:  Positive for fatigue. Negative for appetite change.   HENT:  Negative for mouth sores, rhinorrhea and sore throat.    Eyes: Negative.    Respiratory:  Positive for cough.    Cardiovascular: Negative.    Gastrointestinal:  Positive for diarrhea. Negative for constipation (relieved by stool softener), nausea (relieved by antiemetic) and vomiting.   Endocrine: Negative.    Genitourinary: Negative.    Musculoskeletal: Negative.    Integumentary:         Hyperpigmentation of nails   Allergic/Immunologic: Negative.    Neurological:  Positive for numbness (bilateral hands and feet; unchanged). Negative for weakness.   Hematological: Negative.    Psychiatric/Behavioral: Negative.         Medication List with Changes/Refills   Current Medications    ACETAMINOPHEN (TYLENOL) 500 MG CAP    Take 500 mg by mouth every 6 (six) hours as needed.    ALBUTEROL (PROVENTIL/VENTOLIN HFA) 90 MCG/ACTUATION INHALER    Inhale 2 puffs into the lungs every 4 (four) hours as needed. Take 2 puffs of the lungs every 4 hr as needed for wheezing or shortness of breath    ALBUTEROL SULFATE (PROAIR RESPICLICK) 90 MCG/ACTUATION AEPB    Inhale 180 mcg into the lungs every 4 (four) hours. Rescue    AMLODIPINE (NORVASC) 10 MG TABLET    Take 10 mg by mouth once daily.    ANASTROZOLE (ARIMIDEX) 1 MG TAB    Take 1 tablet (1 mg total) by mouth once daily.    ASPIRIN (ECOTRIN) 81 MG EC TABLET    Take 81 mg by mouth once daily.     ATORVASTATIN (LIPITOR) 10 MG TABLET    Take 10 mg by mouth once daily.    BENZONATATE (TESSALON) 200 MG CAPSULE    benzonatate Take 1 Capsule (oral) 3 times per day PRN - Cough for 7 days [id number] capsule 3 times per day oral 7 days active 200 mg     BUDESONIDE-GLYCOPYR-FORMOTEROL (BREZTRI AEROSPHERE) 160-9-4.8 MCG/ACTUATION HFAA    Inhale 1 puff into the lungs once daily.    CALCIUM CARBONATE (OS-CAMERON) 500 MG CALCIUM (1,250 MG) CHEWABLE TABLET    Take 1 tablet by mouth 2 (two) times daily as needed for Heartburn.    CEFDINIR (OMNICEF) 300 MG CAPSULE    Take 300 mg by mouth.    CEFDINIR (OMNICEF) 300 MG CAPSULE    TAKE ONE CAPSULE BY MOUTH IN THE MORNING AND 1 CAPSULE BEFORE BEDTIME FOR 10 DAYS    DIPHENHYDRAMINE (BENADRYL) 25 MG CAPSULE    Take 1 capsule (25 mg total) by mouth every 6 (six) hours as needed for Itching.    DOCUSATE SODIUM (COLACE) 100 MG CAPSULE    Take 1 capsule (100 mg total) by mouth 2 (two) times daily as needed for Constipation.    EMTRICITABINE-TENOFOVIR ALAFEN (DESCOVY) 200-25 MG TAB    Take 1 tablet by mouth.    EPOETIN ALEJANDRO (EPOGEN INJ)    Epoetin Alejandro (Epogen)    HYDRALAZINE (APRESOLINE) 25 MG TABLET    Take 1 tablet (25 mg total) by mouth 3 (three) times daily.    INSULIN DEGLUDEC (TRESIBA FLEXTOUCH U-200) 200 UNIT/ML (3 ML) INSULIN PEN    Inject 60 Units into the skin once daily.    IRON SUCROSE IN NS (VENOFER) 100 MG/100 ML PGBK    50 mg.    ISENTRESS 400 MG TABLET    Take 400 mg by mouth.    ISOSORBIDE MONONITRATE (IMDUR) 30 MG 24 HR TABLET    Take 1 tablet (30 mg total) by mouth once daily.    KETOCONAZOLE (NIZORAL) 2 % CREAM    Apply topically once daily. for 14 days    LEVOFLOXACIN (LEVAQUIN) 250 MG TABLET    Take 250 mg by mouth once daily.    LEVOTHYROXINE (TIROSINT) 88 MCG CAP    Take 75 mcg by mouth before breakfast.    LOPERAMIDE (IMODIUM) 2 MG CAPSULE    Take 1 capsule (2 mg total) by mouth 4 (four) times daily as needed for Diarrhea.    METOPROLOL SUCCINATE (TOPROL-XL) 25 MG 24 HR TABLET    Take 25 mg by mouth 2 (two) times daily.    OMEPRAZOLE (PRILOSEC) 20 MG CAPSULE    Take 20 mg by mouth once daily.    ONDANSETRON (ZOFRAN-ODT) 4 MG TBDL    Dissolve 1 tablet (4 mg total) by mouth every 8 (eight) hours as needed  (nausea).    OXYCODONE (ROXICODONE) 5 MG IMMEDIATE RELEASE TABLET    Take 1 tablet (5 mg total) by mouth every 6 (six) hours as needed for Pain.    PREDNISOLONE ACETATE (PRED FORTE) 1 % DRPS    Place 1 drop into both eyes 4 (four) times daily.    PROMETHAZINE-DEXTROMETHORPHAN (PROMETHAZINE-DM) 6.25-15 MG/5 ML SYRP    Take 5 mLs by mouth 4 (four) times daily as needed.    SEVELAMER HCL (RENAGEL) 800 MG TAB    Take 3 tablets by mouth 3 (three) times daily.    SODIUM BICARBONATE 650 MG TABLET    Take 650 mg by mouth 2 (two) times daily.     Objective:     Vitals:    10/25/23 1432   BP: 127/69   Pulse: 86   Temp: 97.4 °F (36.3 °C)     Physical Exam  Vitals reviewed.   Constitutional:       Appearance: Normal appearance.   HENT:      Head: Normocephalic.      Mouth/Throat:      Comments: Wearing mask      Eyes:      Extraocular Movements: Extraocular movements intact.      Pupils: Pupils are equal, round, and reactive to light.      Comments: Divergent strabismus   Cardiovascular:      Rate and Rhythm: Normal rate and regular rhythm.      Heart sounds: Normal heart sounds.   Pulmonary:      Effort: Pulmonary effort is normal.      Breath sounds: Normal breath sounds.      Comments: Strong, moist cough observed  Chest:       Abdominal:      General: Bowel sounds are normal.      Palpations: Abdomen is soft.      Comments: rounded     Genitourinary:     Comments: deferred    Musculoskeletal:      Cervical back: Normal range of motion and neck supple.      Comments: Ambulates with cane   Skin:     General: Skin is warm and dry.   Neurological:      Mental Status: She is alert and oriented to person, place, and time.   Psychiatric:         Behavior: Behavior normal.         Thought Content: Thought content normal.          (1) Restricted in physically strenuous activity, ambulatory and able to do work of light nature  Assessment:     Problem List Items Addressed This Visit          Renal/    ESRD (end stage renal disease)      On dialysis TThSat.             Oncology    Anemia associated with chronic renal failure     ESRD on dialysis.         Malignant neoplasm of upper-outer quadrant of left breast in female, estrogen receptor positive - Primary     Stage IIB (cT3, cN2a(f), cM0, G3, ER+, WV+, HER2+). Treated with  Taxol/Herceptin/Perjeta. S/p left mastectomy with sentinel node biopsy and subsequent left axillary dissection on 3/1/2023. Currently on Kadcyla every 3 weeks x 14 cycles.           Plan:     Malignant neoplasm of upper-outer quadrant of left breast in female, estrogen receptor positive    Anemia associated with chronic renal failure    ESRD (end stage renal disease)    Labs reviewed.   Ok to proceed with C4D1 of Kadcyla tomorrow.  Continue Arimidex with calcium + vitamin D daily.   Patient receives dialysis TThSat.   Follow up in 3 weeks with CBC and Comprehensive Metabolic Panel prior to C5D1.  Echo due 1/2024.    Route Chart for Scheduling    Med Onc Chart Routing      Follow up with physician    Follow up with [facility] 3 weeks.   Infusion scheduling note    Injection scheduling note in 3 weeks for C5D1 Kadcyla   Labs CBC and CMP   Scheduling:  Preferred lab:  Lab interval:  in 3 weeks   Imaging None      Pharmacy appointment No pharmacy appointment needed      Other referrals       No additional referrals needed             I will review assessment/plan with collaborating physician.      VILMA Brody

## 2023-10-26 ENCOUNTER — INFUSION (OUTPATIENT)
Dept: INFUSION THERAPY | Facility: HOSPITAL | Age: 68
End: 2023-10-26
Attending: INTERNAL MEDICINE
Payer: MEDICARE

## 2023-10-26 VITALS
OXYGEN SATURATION: 97 % | HEIGHT: 62 IN | WEIGHT: 171.5 LBS | BODY MASS INDEX: 31.56 KG/M2 | TEMPERATURE: 98 F | SYSTOLIC BLOOD PRESSURE: 172 MMHG | DIASTOLIC BLOOD PRESSURE: 79 MMHG | RESPIRATION RATE: 16 BRPM | HEART RATE: 87 BPM

## 2023-10-26 DIAGNOSIS — Z17.0 MALIGNANT NEOPLASM OF UPPER-OUTER QUADRANT OF LEFT BREAST IN FEMALE, ESTROGEN RECEPTOR POSITIVE: Primary | ICD-10-CM

## 2023-10-26 DIAGNOSIS — C50.412 MALIGNANT NEOPLASM OF UPPER-OUTER QUADRANT OF LEFT BREAST IN FEMALE, ESTROGEN RECEPTOR POSITIVE: Primary | ICD-10-CM

## 2023-10-26 PROCEDURE — 96375 TX/PRO/DX INJ NEW DRUG ADDON: CPT

## 2023-10-26 PROCEDURE — 25000003 PHARM REV CODE 250: Performed by: NURSE PRACTITIONER

## 2023-10-26 PROCEDURE — 63600175 PHARM REV CODE 636 W HCPCS: Performed by: NURSE PRACTITIONER

## 2023-10-26 PROCEDURE — 96413 CHEMO IV INFUSION 1 HR: CPT

## 2023-10-26 RX ORDER — ONDANSETRON 2 MG/ML
8 INJECTION INTRAMUSCULAR; INTRAVENOUS
Status: COMPLETED | OUTPATIENT
Start: 2023-10-26 | End: 2023-10-26

## 2023-10-26 RX ADMIN — ADO-TRASTUZUMAB EMTANSINE 300 MG: 20 INJECTION, POWDER, LYOPHILIZED, FOR SOLUTION INTRAVENOUS at 02:10

## 2023-10-26 RX ADMIN — ONDANSETRON 8 MG: 2 INJECTION, SOLUTION INTRAMUSCULAR; INTRAVENOUS at 02:10

## 2023-10-26 NOTE — DISCHARGE INSTRUCTIONS
.[facility]  [id number] [facility]  [address]  [phone] phone     [phone] fax  Hours of Operation: Monday- Friday 8:00am- 5:00pm  After hours phone  [phone]  Hematology / Oncology Physicians on call    Dr. Severiano Turner      Nurse Practitioners:    Leah Carcamo, SMITH Contreras, SMITH Lizarraga, SMITH Bello, SMITH Pruitt, PA      Please don't hesitate to call if you have any concerns.    .WAYS TO HELP PREVENT INFECTION        WASH YOUR HANDS OFTEN DURING THE DAY, ESPECIALLY BEFORE YOU EAT, AFTER USING THE BATHROOM, AND AFTER TOUCHING ANIMALS    STAY AWAY FROM PEOPLE WHO HAVE ILLNESSES YOU CAN CATCH; SUCH AS COLDS, FLU, CHICKEN POX    TRY TO AVOID CROWDS    STAY AWAY FROM CHILDREN WHO RECENTLY HAVE RECEIVED LIVE VIRUS VACCINES    MAINTAIN GOOD MOUTH CARE    DO NOT SQUEEZE OR SCRATCH PIMPLES    CLEAN CUTS & SCRAPES RIGHT AWAY AND DAILY UNTIL HEALED WITH WARM WATER, SOAP & AN ANTISEPTIC    AVOID CONTACT WITH LITTER BOXES, BIRD CAGES, & FISH TANKS    AVOID STANDING WATER, IE., BIRD BATHS, FLOWER POTS/VASES, OR HUMIDIFIERS    WEAR GLOVES WHEN GARDENING OR CLEANING UP AFTER OTHERS, ESPECIALLY BABIES & SMALL CHILDREN    DO NOT EAT RAW FISH, SEAFOOD, MEAT, OR EGGS     .FALL PREVENTION   Falls often occur due to slipping, tripping or losing your balance. Here are ways to reduce your risk of falling again.   Was there anything that caused your fall that can be fixed, removed or replaced?   Make your home safe by keeping walkways clear of objects you may trip over.   Use non-slip pads under rugs.   Do not walk in poorly lit areas.   Do not stand on chairs or wobbly ladders.   Use caution when reaching overhead or looking upward. This position can cause a loss of balance.   Be sure your shoes fit properly, have non-slip bottoms and are in good condition.   Be cautious when going up and down stairs, curbs,  and when walking on uneven sidewalks.   If your balance is poor, consider using a cane or walker.   If your fall was related to alcohol use, stop or limit alcohol intake.   If your fall was related to use of sleeping medicines, talk to your doctor about this. You may need to reduce your dosage at bedtime if you awaken during the night to go to the bathroom.   To reduce the need for nighttime bathroom trips:   Avoid drinking fluids for several hours before going to bed   Empty your bladder before going to bed   Men can keep a urinal at the bedside   © 6217-2859 [facility], [address] [place] [id number]. All rights reserved. This information is not intended as a substitute for professional medical care. Always follow your healthcare professional's instructions.

## 2023-10-26 NOTE — PLAN OF CARE
Patient reports no complaints or concerns at this time. Tolerated infusion well with no adverse reactions. Patient to return in 3 weeks for next treatment.

## 2023-11-03 ENCOUNTER — TELEPHONE (OUTPATIENT)
Dept: RADIATION ONCOLOGY | Facility: CLINIC | Age: 68
End: 2023-11-03
Payer: MEDICARE

## 2023-11-03 NOTE — TELEPHONE ENCOUNTER
Attempted to call patient to schedule Rad Onc f/u appt but there was no answer. Left a detailed message including our direct number to call back to schedule appt.

## 2023-11-08 ENCOUNTER — TELEPHONE (OUTPATIENT)
Dept: HEMATOLOGY/ONCOLOGY | Facility: CLINIC | Age: 68
End: 2023-11-08
Payer: MEDICARE

## 2023-11-08 NOTE — TELEPHONE ENCOUNTER
3:03 om-Left vm for family to call. SW will remain available.    3:12-pm- SAMIA [facility] will [facility] pt to cardiology appt on 11.15.23. [facility] that SW will have to use [employer] transportation for appt 11.16.23. SAMIA called [employer] at [phone]. Ride scheduled with [employer] #[id number]-with 8:25 am pickup. SW will remain available.

## 2023-11-15 ENCOUNTER — TELEPHONE (OUTPATIENT)
Dept: HEMATOLOGY/ONCOLOGY | Facility: CLINIC | Age: 68
End: 2023-11-15
Payer: MEDICARE

## 2023-11-15 NOTE — TELEPHONE ENCOUNTER
SAMIA returned call to family member- Ember. Ember stated that she was able to reschedule pt's cardiology appt and will transport pt to the rescheduled appt. SAMIA reminded Ember that [facility]'s [employer]  transportation will pick pt up for tomorrow's appt. No other needs expressed. SAMIA will remain available.

## 2023-11-17 ENCOUNTER — NURSE TRIAGE (OUTPATIENT)
Dept: ADMINISTRATIVE | Facility: CLINIC | Age: 68
End: 2023-11-17
Payer: MEDICARE

## 2023-11-17 NOTE — TELEPHONE ENCOUNTER
Pt calling with c/o eye redness and that she thinks she has some bleeding to the right eye. Pt didn't remember doing anything to it and she had dialysis last night and the didn't have any trauma. Pt triaged and care advice to be see in office today or tomorrow and she will reach out to provider eye MD to make appt. Pt will call back if any increase bleeding, pain etc                   Reason for Disposition   Bleeding on white of the eye    Additional Information   Negative: SEVERE eye pain   Negative: Recent eye surgery and has increasing eye pain   Negative: Patient sounds very sick or weak to the triager   Negative: MODERATE eye pain or discomfort (e.g., interferes with normal activities or awakens from sleep; more than mild)   Negative: Looking at light causes MODERATE to SEVERE eye pain (i.e., photophobia)   Negative: New or worsening blurred vision   Negative: Cloudy spot or sore seen on the cornea (clear part of the eye)   Negative: Eyelids are very swollen (shut or almost)   Negative: Eyelid (outer) is very red   Negative: Vomiting   Negative: Foreign body sensation ('feels like something is in there')   Negative: Patient wants to be seen   Negative: Eye pain present > 24 hours   Negative: Bleeding on white of the eye and is taking Coumadin or known bleeding disorder (e.g., thrombocytopenia)   Negative: Only 1 eye is red, and persists > 48 hours   Negative: Red eyes present > 7 days    Protocols used: Eye - Red Without Pus-A-[place]

## 2023-11-21 ENCOUNTER — TELEPHONE (OUTPATIENT)
Dept: HEMATOLOGY/ONCOLOGY | Facility: CLINIC | Age: 68
End: 2023-11-21
Payer: MEDICARE

## 2023-11-21 NOTE — TELEPHONE ENCOUNTER
SAMIA spoke with family member-Ember re: transportation for upcoming appt.'s. Ember stated that [employer] will transport pt to her cardiology appt on tomorrow. Ember stated pt has an eye appt next week to address blood shot eyes. SAMIA called [employer] at [phone] (Jim) arranged ride for 11.27.23-pickup 10:45 am. Ride reservation # [id number]. Ember is aware of 10:45 am pickup. No other needs expressed. SAMIA will remain available.

## 2023-11-22 ENCOUNTER — OFFICE VISIT (OUTPATIENT)
Dept: CARDIOLOGY | Facility: CLINIC | Age: 68
End: 2023-11-22
Payer: MEDICARE

## 2023-11-22 VITALS
HEART RATE: 87 BPM | HEIGHT: 62 IN | OXYGEN SATURATION: 95 % | BODY MASS INDEX: 32.49 KG/M2 | DIASTOLIC BLOOD PRESSURE: 72 MMHG | SYSTOLIC BLOOD PRESSURE: 132 MMHG | WEIGHT: 176.56 LBS

## 2023-11-22 DIAGNOSIS — B20 CURRENTLY ASYMPTOMATIC HIV INFECTION, WITH HISTORY OF HIV-RELATED ILLNESS: ICD-10-CM

## 2023-11-22 DIAGNOSIS — I21.29 ACUTE ST ELEVATION MYOCARDIAL INFARCTION (STEMI) OF LATERAL WALL: ICD-10-CM

## 2023-11-22 DIAGNOSIS — Z79.899 ENCOUNTER FOR MONITORING CARDIOTOXIC DRUG THERAPY: ICD-10-CM

## 2023-11-22 DIAGNOSIS — C50.412 MALIGNANT NEOPLASM OF UPPER-OUTER QUADRANT OF LEFT BREAST IN FEMALE, ESTROGEN RECEPTOR POSITIVE: Primary | ICD-10-CM

## 2023-11-22 DIAGNOSIS — Z17.0 MALIGNANT NEOPLASM OF UPPER-OUTER QUADRANT OF LEFT BREAST IN FEMALE, ESTROGEN RECEPTOR POSITIVE: Primary | ICD-10-CM

## 2023-11-22 DIAGNOSIS — N18.9 ANEMIA ASSOCIATED WITH CHRONIC RENAL FAILURE: ICD-10-CM

## 2023-11-22 DIAGNOSIS — I10 PRIMARY HYPERTENSION: ICD-10-CM

## 2023-11-22 DIAGNOSIS — L02.416 ABSCESS OF LEFT THIGH: ICD-10-CM

## 2023-11-22 DIAGNOSIS — Z51.81 ENCOUNTER FOR MONITORING CARDIOTOXIC DRUG THERAPY: ICD-10-CM

## 2023-11-22 DIAGNOSIS — L02.214 ABSCESS OF LEFT GROIN: ICD-10-CM

## 2023-11-22 DIAGNOSIS — D63.1 ANEMIA ASSOCIATED WITH CHRONIC RENAL FAILURE: ICD-10-CM

## 2023-11-22 DIAGNOSIS — L03.116 CELLULITIS OF LEFT FOOT: ICD-10-CM

## 2023-11-22 DIAGNOSIS — C77.9 SECONDARY MALIGNANCY OF REGIONAL LYMPH NODE: ICD-10-CM

## 2023-11-22 DIAGNOSIS — I51.89 DIASTOLIC DYSFUNCTION: ICD-10-CM

## 2023-11-22 DIAGNOSIS — I25.118 CORONARY ARTERY DISEASE OF NATIVE HEART WITH STABLE ANGINA PECTORIS, UNSPECIFIED VESSEL OR LESION TYPE: ICD-10-CM

## 2023-11-22 PROCEDURE — 3008F BODY MASS INDEX DOCD: CPT | Mod: CPTII,S$GLB,, | Performed by: INTERNAL MEDICINE

## 2023-11-22 PROCEDURE — 99214 PR OFFICE/OUTPT VISIT, EST, LEVL IV, 30-39 MIN: ICD-10-PCS | Mod: S$GLB,,, | Performed by: INTERNAL MEDICINE

## 2023-11-22 PROCEDURE — 99999 PR PBB SHADOW E&M-EST. PATIENT-LVL V: ICD-10-PCS | Mod: PBBFAC,,, | Performed by: INTERNAL MEDICINE

## 2023-11-22 PROCEDURE — 3288F PR FALLS RISK ASSESSMENT DOCUMENTED: ICD-10-PCS | Mod: CPTII,S$GLB,, | Performed by: INTERNAL MEDICINE

## 2023-11-22 PROCEDURE — 1160F RVW MEDS BY RX/DR IN RCRD: CPT | Mod: CPTII,S$GLB,, | Performed by: INTERNAL MEDICINE

## 2023-11-22 PROCEDURE — 1160F PR REVIEW ALL MEDS BY PRESCRIBER/CLIN PHARMACIST DOCUMENTED: ICD-10-PCS | Mod: CPTII,S$GLB,, | Performed by: INTERNAL MEDICINE

## 2023-11-22 PROCEDURE — 1125F PR PAIN SEVERITY QUANTIFIED, PAIN PRESENT: ICD-10-PCS | Mod: CPTII,S$GLB,, | Performed by: INTERNAL MEDICINE

## 2023-11-22 PROCEDURE — 1125F AMNT PAIN NOTED PAIN PRSNT: CPT | Mod: CPTII,S$GLB,, | Performed by: INTERNAL MEDICINE

## 2023-11-22 PROCEDURE — 3044F HG A1C LEVEL LT 7.0%: CPT | Mod: CPTII,S$GLB,, | Performed by: INTERNAL MEDICINE

## 2023-11-22 PROCEDURE — 4010F ACE/ARB THERAPY RXD/TAKEN: CPT | Mod: CPTII,S$GLB,, | Performed by: INTERNAL MEDICINE

## 2023-11-22 PROCEDURE — 1159F PR MEDICATION LIST DOCUMENTED IN MEDICAL RECORD: ICD-10-PCS | Mod: CPTII,S$GLB,, | Performed by: INTERNAL MEDICINE

## 2023-11-22 PROCEDURE — 1157F PR ADVANCE CARE PLAN OR EQUIV PRESENT IN MEDICAL RECORD: ICD-10-PCS | Mod: CPTII,S$GLB,, | Performed by: INTERNAL MEDICINE

## 2023-11-22 PROCEDURE — 1101F PT FALLS ASSESS-DOCD LE1/YR: CPT | Mod: CPTII,S$GLB,, | Performed by: INTERNAL MEDICINE

## 2023-11-22 PROCEDURE — 99214 OFFICE O/P EST MOD 30 MIN: CPT | Mod: S$GLB,,, | Performed by: INTERNAL MEDICINE

## 2023-11-22 PROCEDURE — 1159F MED LIST DOCD IN RCRD: CPT | Mod: CPTII,S$GLB,, | Performed by: INTERNAL MEDICINE

## 2023-11-22 PROCEDURE — 99999 PR PBB SHADOW E&M-EST. PATIENT-LVL V: CPT | Mod: PBBFAC,,, | Performed by: INTERNAL MEDICINE

## 2023-11-22 PROCEDURE — 3044F PR MOST RECENT HEMOGLOBIN A1C LEVEL <7.0%: ICD-10-PCS | Mod: CPTII,S$GLB,, | Performed by: INTERNAL MEDICINE

## 2023-11-22 PROCEDURE — 3075F SYST BP GE 130 - 139MM HG: CPT | Mod: CPTII,S$GLB,, | Performed by: INTERNAL MEDICINE

## 2023-11-22 PROCEDURE — 4010F PR ACE/ARB THEARPY RXD/TAKEN: ICD-10-PCS | Mod: CPTII,S$GLB,, | Performed by: INTERNAL MEDICINE

## 2023-11-22 PROCEDURE — 1101F PR PT FALLS ASSESS DOC 0-1 FALLS W/OUT INJ PAST YR: ICD-10-PCS | Mod: CPTII,S$GLB,, | Performed by: INTERNAL MEDICINE

## 2023-11-22 PROCEDURE — 3288F FALL RISK ASSESSMENT DOCD: CPT | Mod: CPTII,S$GLB,, | Performed by: INTERNAL MEDICINE

## 2023-11-22 PROCEDURE — 1157F ADVNC CARE PLAN IN RCRD: CPT | Mod: CPTII,S$GLB,, | Performed by: INTERNAL MEDICINE

## 2023-11-22 PROCEDURE — 3008F PR BODY MASS INDEX (BMI) DOCUMENTED: ICD-10-PCS | Mod: CPTII,S$GLB,, | Performed by: INTERNAL MEDICINE

## 2023-11-22 PROCEDURE — 3075F PR MOST RECENT SYSTOLIC BLOOD PRESS GE 130-139MM HG: ICD-10-PCS | Mod: CPTII,S$GLB,, | Performed by: INTERNAL MEDICINE

## 2023-11-22 PROCEDURE — 3066F NEPHROPATHY DOC TX: CPT | Mod: CPTII,S$GLB,, | Performed by: INTERNAL MEDICINE

## 2023-11-22 PROCEDURE — 3078F PR MOST RECENT DIASTOLIC BLOOD PRESSURE < 80 MM HG: ICD-10-PCS | Mod: CPTII,S$GLB,, | Performed by: INTERNAL MEDICINE

## 2023-11-22 PROCEDURE — 3066F PR DOCUMENTATION OF TREATMENT FOR NEPHROPATHY: ICD-10-PCS | Mod: CPTII,S$GLB,, | Performed by: INTERNAL MEDICINE

## 2023-11-22 PROCEDURE — 3078F DIAST BP <80 MM HG: CPT | Mod: CPTII,S$GLB,, | Performed by: INTERNAL MEDICINE

## 2023-11-22 NOTE — PROGRESS NOTES
Subjective:   Patient ID:  Malaika Paredes is a 68 y.o. female who presents for follow-up of No chief complaint on file.    Ms. Paredes is a 68 year old female patient whe current medical conditions include CAD s/p CABG x 3, DM type II, ESRD on HD, anemia, and history of breast CA who presents today for hospital follow-up. Patient recently hospitalized at [facility] due to chest pain symptoms and abnormal EKG concerning for lateral STEMI. She underwent LHC which showed severe native CAD, patent grafts. Hydralazine and Imdur were added to maximize her CAD/BP regimen and she was d/c'd home. She returns today and states she is feeling ok. Main complaint is nausea/vomiting over the past week. Associated with mild, generalized abdominal pain and occasional diarrhea. She denies any associated fever or chills. States vomit is dark at times but does not appear to contain blood.  Denies any prior history of PUD. Denies any recurrent chest pain, heaviness, or tightness. No SOB. No LH, dizziness, palpitations, near syncope, or syncope. BP ok today in office. Patient is compliant with her medications. Dialyzes 3x weekly. No groin complaints.   11/22/2023 overall stable doing well no recurrence symptoms.  Heart rate blood pressure stable current medications doing dialysis 3 times weekly clinically otherwise stable all medications reviewed renewed patient taking good compliance.        Review of Systems   Constitutional: Negative for chills, diaphoresis, night sweats, weight gain and weight loss.   HENT:  Negative for congestion, hoarse voice, sore throat and stridor.    Eyes:  Negative for double vision and pain.   Cardiovascular:  Negative for chest pain, claudication, cyanosis, dyspnea on exertion, irregular heartbeat, leg swelling, near-syncope, orthopnea, palpitations, paroxysmal nocturnal dyspnea and syncope.   Respiratory:  Negative for cough, hemoptysis, shortness of breath, sleep disturbances due to breathing, snoring,  sputum production and wheezing.    Endocrine: Negative for cold intolerance, heat intolerance and polydipsia.   Hematologic/Lymphatic: Negative for bleeding problem. Does not bruise/bleed easily.   Skin:  Negative for color change, dry skin and rash.   Musculoskeletal:  Negative for joint swelling and muscle cramps.   Gastrointestinal:  Negative for bloating, abdominal pain, constipation, diarrhea, dysphagia, melena, nausea and vomiting.   Genitourinary:  Negative for flank pain and urgency.   Neurological:  Negative for dizziness, focal weakness, headaches, light-headedness, loss of balance, seizures and weakness.   Psychiatric/Behavioral:  Negative for altered mental status and memory loss. The patient is not nervous/anxious.    Family History   Problem Relation Age of Onset    Hypertension Mother     Cancer Father     Breast cancer Sister     Diabetes Sister     Cancer Brother     Amblyopia Neg Hx     Blindness Neg Hx     Cataracts Neg Hx     Glaucoma Neg Hx     Macular degeneration Neg Hx     Retinal detachment Neg Hx     Strabismus Neg Hx     Stroke Neg Hx     Thyroid disease Neg Hx      Past Medical History:   Diagnosis Date    CAD (coronary artery disease)     Cataract     Cataract     CHF (congestive heart failure)     COPD (chronic obstructive pulmonary disease)     Currently asymptomatic HIV infection, with history of HIV-related illness 7/10/2023    Diabetes mellitus     Diabetic retinopathy     ESRD (end stage renal disease)     TTS    Hypertension     Ischemic ulcer of toe of left foot     Malignant neoplasm of upper-outer quadrant of left breast in female, estrogen receptor positive 06/20/2022    left    PAD (peripheral artery disease)     PAF (paroxysmal atrial fibrillation)      Social History     Socioeconomic History    Marital status:    Tobacco Use    Smoking status: Former     Current packs/day: 0.00     Types: Cigarettes     Start date: 6/21/1982     Quit date: 6/21/2012     Years since  quittin.4    Smokeless tobacco: Never   Substance and Sexual Activity    Alcohol use: No    Drug use: Never   Social History Narrative    ** Merged History Encounter **          Social Determinants of Health     Financial Resource Strain: Medium Risk (2023)    Overall Financial Resource Strain (CARDIA)     Difficulty of Paying Living Expenses: Somewhat hard   Food Insecurity: Food Insecurity Present (2023)    Hunger Vital Sign     Worried About Running Out of Food in the Last Year: Sometimes true     Ran Out of Food in the Last Year: Sometimes true   Transportation Needs: No Transportation Needs (2023)    PRAPARE - Transportation     Lack of Transportation (Medical): No     Lack of Transportation (Non-Medical): No   Physical Activity: Sufficiently Active (2023)    Exercise Vital Sign     Days of Exercise per Week: 5 days     Minutes of Exercise per Session: 30 min   Stress: No Stress Concern Present (2023)    [place] [place] [employer] - Occupational Stress Questionnaire     Feeling of Stress : Not at all   Social Connections: Moderately Isolated (2023)    Social Connection and Isolation Panel [NHANES]     Frequency of Communication with Friends and Family: More than three times a week     Frequency of Social Gatherings with Friends and Family: More than three times a week     Attends Tenriism Services: More than 4 times per year     Active Member of Clubs or Organizations: No     Attends Club or Organization Meetings: Never     Marital Status:    Housing Stability: Low Risk  (2023)    Housing Stability Vital Sign     Unable to Pay for Housing in the Last Year: No     Number of Places Lived in the Last Year: 1     Unstable Housing in the Last Year: No     Current Outpatient Medications on File Prior to Visit   Medication Sig Dispense Refill    acetaminophen (TYLENOL) 500 mg Cap Take 500 mg by mouth every 6 (six) hours as needed.      albuterol  (PROVENTIL/VENTOLIN HFA) 90 mcg/actuation inhaler Inhale 2 puffs into the lungs every 4 (four) hours as needed. Take 2 puffs of the lungs every 4 hr as needed for wheezing or shortness of breath 3.5 g 0    albuterol sulfate (PROAIR RESPICLICK) 90 mcg/actuation AePB Inhale 180 mcg into the lungs every 4 (four) hours. Rescue 1 each 3    amLODIPine (NORVASC) 10 MG tablet Take 10 mg by mouth once daily.      anastrozole (ARIMIDEX) 1 mg Tab Take 1 tablet (1 mg total) by mouth once daily. 90 tablet 2    atorvastatin (LIPITOR) 10 MG tablet Take 10 mg by mouth once daily.      budesonide-glycopyr-formoterol (BREZTRI AEROSPHERE) 160-9-4.8 mcg/actuation HFAA Inhale 1 puff into the lungs once daily.      calcium carbonate (OS-CAMERON) 500 mg calcium (1,250 mg) chewable tablet Take 1 tablet by mouth 2 (two) times daily as needed for Heartburn.      diphenhydrAMINE (BENADRYL) 25 mg capsule Take 1 capsule (25 mg total) by mouth every 6 (six) hours as needed for Itching. 20 capsule 0    docusate sodium (COLACE) 100 MG capsule Take 1 capsule (100 mg total) by mouth 2 (two) times daily as needed for Constipation. 20 capsule 0    emtricitabine-tenofovir alafen (DESCOVY) 200-25 mg Tab Take 1 tablet by mouth.      epoetin angelo (EPOGEN INJ) Epoetin Angelo (Epogen)      hydrALAZINE (APRESOLINE) 25 MG tablet Take 1 tablet (25 mg total) by mouth 3 (three) times daily. 90 tablet 11    insulin degludec (TRESIBA FLEXTOUCH U-200) 200 unit/mL (3 mL) insulin pen Inject 60 Units into the skin once daily. Patient's niece didn't know how many units patient inject. She stated it's not 60 units. I tried to call the pharmacy where the patient fills and the number was not a working phone number      iron sucrose in NS (VENOFER) 100 mg/100 mL PgBk 50 mg.      ISENTRESS 400 mg tablet Take 400 mg by mouth.      isosorbide mononitrate (IMDUR) 30 MG 24 hr tablet Take 1 tablet (30 mg total) by mouth once daily. 30 tablet 11    ketoconazole (NIZORAL) 2 % cream  Apply topically once daily. for 14 days 1 each 1    levothyroxine (TIROSINT) 88 mcg Cap Take 75 mcg by mouth before breakfast.      loperamide (IMODIUM) 2 mg capsule Take 1 capsule (2 mg total) by mouth 4 (four) times daily as needed for Diarrhea. 30 capsule 2    metoprolol succinate (TOPROL-XL) 25 MG 24 hr tablet Take 25 mg by mouth 2 (two) times daily.      omeprazole (PRILOSEC) 20 MG capsule Take 20 mg by mouth once daily.      ondansetron (ZOFRAN-ODT) 4 MG TbDL Dissolve 1 tablet (4 mg total) by mouth every 8 (eight) hours as needed (nausea). 30 tablet 0    oxyCODONE (ROXICODONE) 5 MG immediate release tablet Take 1 tablet (5 mg total) by mouth every 6 (six) hours as needed for Pain. (Patient not taking: Reported on 11/16/2023) 10 tablet 0    sevelamer carbonate (RENVELA) 800 mg Tab Take 800 mg by mouth 3 (three) times daily with meals.      sodium bicarbonate 650 MG tablet Take 650 mg by mouth 2 (two) times daily.       No current facility-administered medications on file prior to visit.     Review of patient's allergies indicates:  No Known Allergies    Objective:     Physical Exam  Eyes:      Pupils: Pupils are equal, round, and reactive to light.   Neck:      Trachea: No tracheal deviation.   Cardiovascular:      Rate and Rhythm: Normal rate and regular rhythm.      Pulses: Intact distal pulses.           Carotid pulses are 2+ on the right side and 2+ on the left side.       Radial pulses are 2+ on the right side and 2+ on the left side.        Femoral pulses are 2+ on the right side and 2+ on the left side.       Popliteal pulses are 2+ on the right side and 2+ on the left side.        Dorsalis pedis pulses are 2+ on the right side and 2+ on the left side.        Posterior tibial pulses are 2+ on the right side and 2+ on the left side.      Heart sounds: Normal heart sounds. No murmur heard.     No friction rub. No gallop.   Pulmonary:      Effort: Pulmonary effort is normal. No respiratory distress.       Breath sounds: Normal breath sounds. No stridor. No wheezing or rales.   Chest:      Chest wall: No tenderness.   Abdominal:      General: There is no distension.      Tenderness: There is no abdominal tenderness. There is no rebound.   Musculoskeletal:         General: No tenderness.      Cervical back: Normal range of motion.   Skin:     General: Skin is warm and dry.   Neurological:      Mental Status: She is alert and oriented to person, place, and time.     Assessment:     1. Malignant neoplasm of upper-outer quadrant of left breast in female, estrogen receptor positive    2. Primary hypertension    3. Diastolic dysfunction    4. Abscess of left thigh    5. Cellulitis of left foot - Left Foot    6. Secondary malignancy of regional lymph node    7. Abscess of left groin    8. Anemia associated with chronic renal failure    9. Acute ST elevation myocardial infarction (STEMI) of lateral wall    10. Encounter for monitoring cardiotoxic drug therapy    11. Currently asymptomatic HIV infection, with history of HIV-related illness    12. Coronary artery disease of native heart with stable angina pectoris, unspecified vessel or lesion type        Plan:     Malignant neoplasm of upper-outer quadrant of left breast in female, estrogen receptor positive    Primary hypertension    Diastolic dysfunction    Abscess of left thigh    Cellulitis of left foot - Left Foot    Secondary malignancy of regional lymph node    Abscess of left groin    Anemia associated with chronic renal failure    Acute ST elevation myocardial infarction (STEMI) of lateral wall    Encounter for monitoring cardiotoxic drug therapy    Currently asymptomatic HIV infection, with history of HIV-related illness    Coronary artery disease of native heart with stable angina pectoris, unspecified vessel or lesion type      Impression CAD stable no recurrent chest pain shortness breath the patient continues on metoprolol  2. Dialysis doing well no recurrence    3.  Hypertension stable current medications heart rate blood pressure stable continues all medications hydralazine he is taking 3 times today.

## 2023-11-27 ENCOUNTER — TELEPHONE (OUTPATIENT)
Dept: HEMATOLOGY/ONCOLOGY | Facility: CLINIC | Age: 68
End: 2023-11-27

## 2023-11-27 ENCOUNTER — OFFICE VISIT (OUTPATIENT)
Dept: HEMATOLOGY/ONCOLOGY | Facility: CLINIC | Age: 68
End: 2023-11-27
Payer: MEDICARE

## 2023-11-27 ENCOUNTER — INFUSION (OUTPATIENT)
Dept: INFUSION THERAPY | Facility: HOSPITAL | Age: 68
End: 2023-11-27
Attending: INTERNAL MEDICINE
Payer: MEDICARE

## 2023-11-27 VITALS
DIASTOLIC BLOOD PRESSURE: 67 MMHG | TEMPERATURE: 98 F | WEIGHT: 180.88 LBS | BODY MASS INDEX: 33.29 KG/M2 | HEIGHT: 62 IN | HEART RATE: 89 BPM | SYSTOLIC BLOOD PRESSURE: 156 MMHG | OXYGEN SATURATION: 97 %

## 2023-11-27 VITALS
HEART RATE: 85 BPM | SYSTOLIC BLOOD PRESSURE: 137 MMHG | DIASTOLIC BLOOD PRESSURE: 70 MMHG | RESPIRATION RATE: 16 BRPM | TEMPERATURE: 98 F | OXYGEN SATURATION: 98 %
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
## 2023-11-27 DIAGNOSIS — B20 CURRENTLY ASYMPTOMATIC HIV INFECTION, WITH HISTORY OF HIV-RELATED ILLNESS: ICD-10-CM

## 2023-11-27 DIAGNOSIS — D84.821 IMMUNODEFICIENCY DUE TO CHEMOTHERAPY: ICD-10-CM

## 2023-11-27 DIAGNOSIS — Z79.899 ENCOUNTER FOR MONITORING CARDIOTOXIC DRUG THERAPY: ICD-10-CM

## 2023-11-27 DIAGNOSIS — H57.89 RED EYE: ICD-10-CM

## 2023-11-27 DIAGNOSIS — Z51.81 ENCOUNTER FOR MONITORING CARDIOTOXIC DRUG THERAPY: ICD-10-CM

## 2023-11-27 DIAGNOSIS — D63.1 ANEMIA ASSOCIATED WITH CHRONIC RENAL FAILURE: ICD-10-CM

## 2023-11-27 DIAGNOSIS — T45.1X5A IMMUNODEFICIENCY DUE TO CHEMOTHERAPY: ICD-10-CM

## 2023-11-27 DIAGNOSIS — D84.822 IMMUNODEFICIENCY SECONDARY TO RADIATION THERAPY: ICD-10-CM

## 2023-11-27 DIAGNOSIS — C50.412 MALIGNANT NEOPLASM OF UPPER-OUTER QUADRANT OF LEFT BREAST IN FEMALE, ESTROGEN RECEPTOR POSITIVE: Primary | ICD-10-CM

## 2023-11-27 DIAGNOSIS — C77.9 SECONDARY MALIGNANCY OF REGIONAL LYMPH NODE: ICD-10-CM

## 2023-11-27 DIAGNOSIS — Z17.0 MALIGNANT NEOPLASM OF UPPER-OUTER QUADRANT OF LEFT BREAST IN FEMALE, ESTROGEN RECEPTOR POSITIVE: Primary | ICD-10-CM

## 2023-11-27 DIAGNOSIS — Z79.899 IMMUNODEFICIENCY DUE TO CHEMOTHERAPY: ICD-10-CM

## 2023-11-27 DIAGNOSIS — N18.9 ANEMIA ASSOCIATED WITH CHRONIC RENAL FAILURE: ICD-10-CM

## 2023-11-27 PROCEDURE — 3008F BODY MASS INDEX DOCD: CPT | Mod: CPTII,S$GLB,, | Performed by: INTERNAL MEDICINE

## 2023-11-27 PROCEDURE — 25000003 PHARM REV CODE 250: Performed by: INTERNAL MEDICINE

## 2023-11-27 PROCEDURE — 96413 CHEMO IV INFUSION 1 HR: CPT

## 2023-11-27 PROCEDURE — 3066F PR DOCUMENTATION OF TREATMENT FOR NEPHROPATHY: ICD-10-PCS | Mod: CPTII,S$GLB,, | Performed by: INTERNAL MEDICINE

## 2023-11-27 PROCEDURE — 1126F AMNT PAIN NOTED NONE PRSNT: CPT | Mod: CPTII,S$GLB,, | Performed by: INTERNAL MEDICINE

## 2023-11-27 PROCEDURE — 1101F PR PT FALLS ASSESS DOC 0-1 FALLS W/OUT INJ PAST YR: ICD-10-PCS | Mod: CPTII,S$GLB,, | Performed by: INTERNAL MEDICINE

## 2023-11-27 PROCEDURE — 99999 PR PBB SHADOW E&M-EST. PATIENT-LVL V: CPT | Mod: PBBFAC,,, | Performed by: INTERNAL MEDICINE

## 2023-11-27 PROCEDURE — 3044F HG A1C LEVEL LT 7.0%: CPT | Mod: CPTII,S$GLB,, | Performed by: INTERNAL MEDICINE

## 2023-11-27 PROCEDURE — 1157F ADVNC CARE PLAN IN RCRD: CPT | Mod: CPTII,S$GLB,, | Performed by: INTERNAL MEDICINE

## 2023-11-27 PROCEDURE — 3077F SYST BP >= 140 MM HG: CPT | Mod: CPTII,S$GLB,, | Performed by: INTERNAL MEDICINE

## 2023-11-27 PROCEDURE — 1159F PR MEDICATION LIST DOCUMENTED IN MEDICAL RECORD: ICD-10-PCS | Mod: CPTII,S$GLB,, | Performed by: INTERNAL MEDICINE

## 2023-11-27 PROCEDURE — 1160F RVW MEDS BY RX/DR IN RCRD: CPT | Mod: CPTII,S$GLB,, | Performed by: INTERNAL MEDICINE

## 2023-11-27 PROCEDURE — 3078F PR MOST RECENT DIASTOLIC BLOOD PRESSURE < 80 MM HG: ICD-10-PCS | Mod: CPTII,S$GLB,, | Performed by: INTERNAL MEDICINE

## 2023-11-27 PROCEDURE — 3008F PR BODY MASS INDEX (BMI) DOCUMENTED: ICD-10-PCS | Mod: CPTII,S$GLB,, | Performed by: INTERNAL MEDICINE

## 2023-11-27 PROCEDURE — 3066F NEPHROPATHY DOC TX: CPT | Mod: CPTII,S$GLB,, | Performed by: INTERNAL MEDICINE

## 2023-11-27 PROCEDURE — 3078F DIAST BP <80 MM HG: CPT | Mod: CPTII,S$GLB,, | Performed by: INTERNAL MEDICINE

## 2023-11-27 PROCEDURE — 63600175 PHARM REV CODE 636 W HCPCS: Performed by: INTERNAL MEDICINE

## 2023-11-27 PROCEDURE — 99215 OFFICE O/P EST HI 40 MIN: CPT | Mod: S$GLB,,, | Performed by: INTERNAL MEDICINE

## 2023-11-27 PROCEDURE — 96375 TX/PRO/DX INJ NEW DRUG ADDON: CPT

## 2023-11-27 PROCEDURE — 1157F PR ADVANCE CARE PLAN OR EQUIV PRESENT IN MEDICAL RECORD: ICD-10-PCS | Mod: CPTII,S$GLB,, | Performed by: INTERNAL MEDICINE

## 2023-11-27 PROCEDURE — 1126F PR PAIN SEVERITY QUANTIFIED, NO PAIN PRESENT: ICD-10-PCS | Mod: CPTII,S$GLB,, | Performed by: INTERNAL MEDICINE

## 2023-11-27 PROCEDURE — 1101F PT FALLS ASSESS-DOCD LE1/YR: CPT | Mod: CPTII,S$GLB,, | Performed by: INTERNAL MEDICINE

## 2023-11-27 PROCEDURE — 3077F PR MOST RECENT SYSTOLIC BLOOD PRESSURE >= 140 MM HG: ICD-10-PCS | Mod: CPTII,S$GLB,, | Performed by: INTERNAL MEDICINE

## 2023-11-27 PROCEDURE — 4010F ACE/ARB THERAPY RXD/TAKEN: CPT | Mod: CPTII,S$GLB,, | Performed by: INTERNAL MEDICINE

## 2023-11-27 PROCEDURE — 99215 PR OFFICE/OUTPT VISIT, EST, LEVL V, 40-54 MIN: ICD-10-PCS | Mod: S$GLB,,, | Performed by: INTERNAL MEDICINE

## 2023-11-27 PROCEDURE — 1160F PR REVIEW ALL MEDS BY PRESCRIBER/CLIN PHARMACIST DOCUMENTED: ICD-10-PCS | Mod: CPTII,S$GLB,, | Performed by: INTERNAL MEDICINE

## 2023-11-27 PROCEDURE — 4010F PR ACE/ARB THEARPY RXD/TAKEN: ICD-10-PCS | Mod: CPTII,S$GLB,, | Performed by: INTERNAL MEDICINE

## 2023-11-27 PROCEDURE — 3288F PR FALLS RISK ASSESSMENT DOCUMENTED: ICD-10-PCS | Mod: CPTII,S$GLB,, | Performed by: INTERNAL MEDICINE

## 2023-11-27 PROCEDURE — 99999 PR PBB SHADOW E&M-EST. PATIENT-LVL V: ICD-10-PCS | Mod: PBBFAC,,, | Performed by: INTERNAL MEDICINE

## 2023-11-27 PROCEDURE — 3044F PR MOST RECENT HEMOGLOBIN A1C LEVEL <7.0%: ICD-10-PCS | Mod: CPTII,S$GLB,, | Performed by: INTERNAL MEDICINE

## 2023-11-27 PROCEDURE — 3288F FALL RISK ASSESSMENT DOCD: CPT | Mod: CPTII,S$GLB,, | Performed by: INTERNAL MEDICINE

## 2023-11-27 PROCEDURE — 1159F MED LIST DOCD IN RCRD: CPT | Mod: CPTII,S$GLB,, | Performed by: INTERNAL MEDICINE

## 2023-11-27 RX ORDER — HEPARIN 100 UNIT/ML
500 SYRINGE INTRAVENOUS
Status: CANCELLED | OUTPATIENT
Start: 2023-11-27

## 2023-11-27 RX ORDER — SODIUM CHLORIDE 0.9 % (FLUSH) 0.9 %
10 SYRINGE (ML) INJECTION
Status: CANCELLED | OUTPATIENT
Start: 2023-11-27

## 2023-11-27 RX ORDER — ONDANSETRON 2 MG/ML
8 INJECTION INTRAMUSCULAR; INTRAVENOUS
Status: COMPLETED | OUTPATIENT
Start: 2023-11-27 | End: 2023-11-27

## 2023-11-27 RX ORDER — ONDANSETRON 2 MG/ML
8 INJECTION INTRAMUSCULAR; INTRAVENOUS
Status: CANCELLED | OUTPATIENT
Start: 2023-11-27

## 2023-11-27 RX ADMIN — ONDANSETRON 8 MG: 2 INJECTION, SOLUTION INTRAMUSCULAR; INTRAVENOUS at 02:11

## 2023-11-27 RX ADMIN — ADO-TRASTUZUMAB EMTANSINE 300 MG: 20 INJECTION, POWDER, LYOPHILIZED, FOR SOLUTION INTRAVENOUS at 02:11

## 2023-11-27 NOTE — PROGRESS NOTES
Subjective:       Patient ID: Malaika Paredes is a 68 y.o. female.    Chief Complaint: Results, Chemotherapy, and Breast Cancer    HPI:  68-year-old female history of stage II B triple positive breast cancer patient is currently receiving cycle5/14   OP BREAST ADO-TRASTUZUMAB EMTANSINE Q3W patient has a read sclera in right eye.  Continuing with current treatment recommendations last PET scan performed in May of 2023.  Returns for clinical follow-up  Past Medical History:   Diagnosis Date    CAD (coronary artery disease)     Cataract     Cataract     CHF (congestive heart failure)     COPD (chronic obstructive pulmonary disease)     Currently asymptomatic HIV infection, with history of HIV-related illness 7/10/2023    Diabetes mellitus     Diabetic retinopathy     ESRD (end stage renal disease)     TTS    Hypertension     Ischemic ulcer of toe of left foot     Malignant neoplasm of upper-outer quadrant of left breast in female, estrogen receptor positive 2022    left    PAD (peripheral artery disease)     PAF (paroxysmal atrial fibrillation)      Family History   Problem Relation Age of Onset    Hypertension Mother     Cancer Father     Breast cancer Sister     Diabetes Sister     Cancer Brother     Amblyopia Neg Hx     Blindness Neg Hx     Cataracts Neg Hx     Glaucoma Neg Hx     Macular degeneration Neg Hx     Retinal detachment Neg Hx     Strabismus Neg Hx     Stroke Neg Hx     Thyroid disease Neg Hx      Social History     Socioeconomic History    Marital status:    Tobacco Use    Smoking status: Former     Current packs/day: 0.00     Types: Cigarettes     Start date: 1982     Quit date: 2012     Years since quittin.4    Smokeless tobacco: Never   Substance and Sexual Activity    Alcohol use: No    Drug use: Never   Social History Narrative    ** Merged History Encounter **          Social Determinants of Health     Financial Resource Strain: Medium Risk (2023)    Overall  Financial Resource Strain (CARDIA)     Difficulty of Paying Living Expenses: Somewhat hard   Food Insecurity: Food Insecurity Present (9/16/2023)    Hunger Vital Sign     Worried About Running Out of Food in the Last Year: Sometimes true     Ran Out of Food in the Last Year: Sometimes true   Transportation Needs: No Transportation Needs (9/16/2023)    PRAPARE - Transportation     Lack of Transportation (Medical): No     Lack of Transportation (Non-Medical): No   Physical Activity: Sufficiently Active (9/16/2023)    Exercise Vital Sign     Days of Exercise per Week: 5 days     Minutes of Exercise per Session: 30 min   Stress: No Stress Concern Present (9/16/2023)    [employer] [employer] - Occupational Stress Questionnaire     Feeling of Stress : Not at all   Social Connections: Moderately Isolated (9/16/2023)    Social Connection and Isolation Panel [NHANES]     Frequency of Communication with Friends and Family: More than three times a week     Frequency of Social Gatherings with Friends and Family: More than three times a week     Attends Faith Services: More than 4 times per year     Active Member of Clubs or Organizations: No     Attends Club or Organization Meetings: Never     Marital Status:    Housing Stability: Low Risk  (9/16/2023)    Housing Stability Vital Sign     Unable to Pay for Housing in the Last Year: No     Number of Places Lived in the Last Year: 1     Unstable Housing in the Last Year: No     Past Surgical History:   Procedure Laterality Date    ANGIOGRAM, CORONARY, WITH LEFT HEART CATHETERIZATION  9/5/2023    Procedure: Angiogram, Coronary, with Left Heart Cath;  Surgeon: Travis Bartholomew MD;  Location: [facility] CATH LAB;  Service: Cardiology;;    APPLICATION OF WOUND VACUUM-ASSISTED CLOSURE DEVICE Left 4/6/2023    Procedure: APPLICATION, WOUND VAC;  Surgeon: Jayjay Arana MD;  Location: [facility] OR;  Service: General;  Laterality: Left;  left chest    ARTERIOGRAPHY OF  SUBCLAVIAN ARTERY  9/5/2023    Procedure: ARTERIOGRAM, SUBCLAVIAN;  Surgeon: Travis Bartholomew MD;  Location: [facility] CATH LAB;  Service: Cardiology;;    AXILLARY NODE DISSECTION Left 3/1/2023    Procedure: LYMPHADENECTOMY, AXILLARY;  Surgeon: Jayjay Arana MD;  Location: [facility] OR;  Service: General;  Laterality: Left;    BIOPSY OF INGUINAL LYMPH NODE Left 3/31/2023    Procedure: BIOPSY, LYMPH NODE, INGUINAL;  Surgeon: Jayjay Arana MD;  Location: [facility] OR;  Service: General;  Laterality: Left;    BREAST BIOPSY Right     benign    CATARACT EXTRACTION W/  INTRAOCULAR LENS IMPLANT Right 08/14/2017    Dr. Moe    CORONARY ANGIOGRAPHY N/A 9/5/2023    Procedure: ANGIOGRAM, CORONARY ARTERY;  Surgeon: Travis Bartholomew MD;  Location: [facility] CATH LAB;  Service: Cardiology;  Laterality: N/A;    CORONARY ARTERY BYPASS GRAFT  2020    CORONARY BYPASS GRAFT ANGIOGRAPHY  9/5/2023    Procedure: Bypass graft study;  Surgeon: Travis Bartholomew MD;  Location: [facility] CATH LAB;  Service: Cardiology;;    FLUOROSCOPY N/A 07/25/2022    Procedure: FLUOROSCOPY/mediport placement;  Surgeon: Cole Perdomo MD;  Location: [facility] CATH LAB;  Service: General;  Laterality: N/A;    MEDIPORT REMOVAL N/A 3/1/2023    Procedure: REMOVAL, CATHETER, CENTRAL VENOUS, TUNNELED, WITH PORT;  Surgeon: Jayjay Arana MD;  Location: [facility];  Service: General;  Laterality: N/A;    MODIFIED RADICAL MASTECTOMY W/ AXILLARY LYMPH NODE DISSECTION Left 3/1/2023    Procedure: MASTECTOMY, MODIFIED RADICAL;  Surgeon: Jayjay Arana MD;  Location: [facility] OR;  Service: General;  Laterality: Left;    WOUND DEBRIDEMENT Left 3/31/2023    Procedure: DEBRIDEMENT, WOUND;  Surgeon: Jayjay Arana MD;  Location: [facility] OR;  Service: General;  Laterality: Left;  left chest wall eschar debridement       Labs:  Lab Results   Component Value Date    WBC 6.33 11/27/2023    HGB 12.5 11/27/2023    HCT 36.7 (L) 11/27/2023     (H) 11/27/2023     11/27/2023     BMP  Lab Results   Component  Value Date     11/27/2023    K 4.8 11/27/2023     11/27/2023    CO2 17 (L) 11/27/2023    BUN 45 (H) 11/27/2023    CREATININE 6.7 (H) 11/27/2023    CALCIUM 9.3 11/27/2023    ANIONGAP 14 11/27/2023    ESTGFRAFRICA 8 (A) 07/27/2022    EGFRNONAA 7 (A) 07/27/2022     Lab Results   Component Value Date    ALT 28 11/27/2023    AST 29 11/27/2023    ALKPHOS 86 11/27/2023    BILITOT 0.4 11/27/2023       Lab Results   Component Value Date    IRON 43 09/09/2018    TIBC 300 09/09/2018    FERRITIN 1,038 (H) 06/21/2022     Lab Results   Component Value Date    ITOFVFHU35 459 09/09/2018     Lab Results   Component Value Date    FOLATE 6.2 09/09/2018     Lab Results   Component Value Date    TSH 3.209 07/24/2023         Review of Systems   Constitutional:  Negative for activity change, appetite change, chills, diaphoresis, fatigue, fever and unexpected weight change.   HENT:  Negative for congestion, dental problem, drooling, ear discharge, ear pain, facial swelling, hearing loss, mouth sores, nosebleeds, postnasal drip, rhinorrhea, sinus pressure, sneezing, sore throat, tinnitus, trouble swallowing and voice change.    Eyes:  Positive for redness. Negative for photophobia, pain, discharge, itching and visual disturbance.   Respiratory:  Negative for cough, choking, chest tightness, shortness of breath, wheezing and stridor.    Cardiovascular:  Negative for chest pain, palpitations and leg swelling.   Gastrointestinal:  Negative for abdominal distention, abdominal pain, anal bleeding, blood in stool, constipation, diarrhea, nausea, rectal pain and vomiting.   Endocrine: Negative for cold intolerance, heat intolerance, polydipsia, polyphagia and polyuria.   Genitourinary:  Negative for decreased urine volume, difficulty urinating, dyspareunia, dysuria, enuresis, flank pain, frequency, genital sores, hematuria, menstrual problem, pelvic pain, urgency, vaginal bleeding, vaginal discharge and vaginal pain.   Musculoskeletal:   Negative for arthralgias, back pain, gait problem, joint swelling, myalgias, neck pain and neck stiffness.   Skin:  Negative for color change, pallor and rash.   Allergic/Immunologic: Negative for environmental allergies, food allergies and immunocompromised state.   Neurological:  Negative for dizziness, tremors, seizures, syncope, facial asymmetry, speech difficulty, weakness, light-headedness, numbness and headaches.   Hematological:  Negative for adenopathy. Does not bruise/bleed easily.   Psychiatric/Behavioral:  Negative for agitation, behavioral problems, confusion, decreased concentration, dysphoric mood, hallucinations, self-injury, sleep disturbance and suicidal ideas. The patient is not nervous/anxious and is not hyperactive.        Objective:      Physical Exam  Vitals reviewed.   Constitutional:       General: She is not in acute distress.     Appearance: She is well-developed. She is not diaphoretic.   HENT:      Head: Normocephalic and atraumatic.      Right Ear: External ear normal.      Left Ear: External ear normal.      Nose: Nose normal.      Right Sinus: No maxillary sinus tenderness or frontal sinus tenderness.      Left Sinus: No maxillary sinus tenderness or frontal sinus tenderness.      Mouth/Throat:      Pharynx: No oropharyngeal exudate.   Eyes:      General: Lids are normal. No scleral icterus.        Right eye: No discharge.         Left eye: No discharge.      Conjunctiva/sclera: Conjunctivae normal.      Right eye: Right conjunctiva is not injected. No hemorrhage.     Left eye: Left conjunctiva is not injected. No hemorrhage.     Pupils: Pupils are equal, round, and reactive to light.      Comments: Right scleral blood   Neck:      Thyroid: No thyromegaly.      Vascular: No JVD.      Trachea: No tracheal deviation.   Cardiovascular:      Rate and Rhythm: Normal rate.   Pulmonary:      Effort: Pulmonary effort is normal. No respiratory distress.      Breath sounds: No stridor.    Chest:      Chest wall: No tenderness.   Abdominal:      General: Bowel sounds are normal. There is no distension.      Palpations: Abdomen is soft. There is no hepatomegaly, splenomegaly or mass.      Tenderness: There is no abdominal tenderness. There is no rebound.   Musculoskeletal:         General: No tenderness. Normal range of motion.      Cervical back: Normal range of motion and neck supple.   Lymphadenopathy:      Cervical: No cervical adenopathy.      Upper Body:      Right upper body: No supraclavicular adenopathy.      Left upper body: No supraclavicular adenopathy.   Skin:     General: Skin is dry.      Findings: No erythema or rash.   Neurological:      Mental Status: She is alert and oriented to person, place, and time.      Cranial Nerves: No cranial nerve deficit.      Coordination: Coordination normal.   Psychiatric:         Behavior: Behavior normal.         Thought Content: Thought content normal.         Judgment: Judgment normal.             Assessment:      1. Malignant neoplasm of upper-outer quadrant of left breast in female, estrogen receptor positive    2. Red eye    3. Encounter for monitoring cardiotoxic drug therapy    4. Anemia associated with chronic renal failure    5. Immunodeficiency due to chemotherapy    6. Currently asymptomatic HIV infection, with history of HIV-related illness    7. Secondary malignancy of regional lymph node    8. Immunodeficiency secondary to radiation therapy           Med Onc Chart Routing      Follow up with physician . Return in 6 weeks with CBC CMP PET scan   Follow up with [facility] . Can be seen by [facility] three-week CBC CMP   Infusion scheduling note    Injection scheduling note    Labs    Imaging ECHO   Every 12 weeks   Pharmacy appointment    Other referrals         Needs to see optometry in the next 1-2 days for redness right sclera              Plan:      continue with current plan course of action will repeat PET scan in approximately 6 weeks.  Orders  written for continuation of current therapy can be seen by [facility] in 3 weeks.  Need to see optometry for scleral bleeding.  Patient is very concerned try to reassure.  2D echo every 12 weeks.        Toney العلي Jr, MD FACP

## 2023-11-27 NOTE — TELEPHONE ENCOUNTER
Return ride home scheduled for 4pm pickup today via [employer] [employer] at [phone]-Ericka. Reservation # [id number]. Nurse PAPO Carrizales notified of  time. SW will remain available.

## 2023-11-27 NOTE — PLAN OF CARE
Pt tolerated all well.  Urgent care appt scheduled for tomorrow, but pt states she likely will not go to that appt as her niece will take her to her own eye doctor on weds.  Has appts.    How Severe Are Your Spot(S)?: mild Have Your Spot(S) Been Treated In The Past?: has not been treated Hpi Title: Evaluation of Skin Lesions Location: R post shoulder Year Removed: 2012

## 2023-11-27 NOTE — TELEPHONE ENCOUNTER
SAMIA called family member Ember- and she confirmed that she received text from [employer] re: vehicle info for return ride home. Chemo canceled today due to pt's need for urgent eye appt tomorrow. However, pt's niece stated that pt already has an eye appt with her eye own MD on Wednesday and can't go to eye appt or infusion at [facility] tomorrow because she has Dialysis. SW informed clinic staff and pt placed back on for infusion today. SW attempted to cancel ride but already otw per [employer] but niece able to notify . Ok to call [employer] back at [phone] when pt is ready to return home. SW will remain available.

## 2023-12-04 ENCOUNTER — TELEPHONE (OUTPATIENT)
Dept: HEMATOLOGY/ONCOLOGY | Facility: CLINIC | Age: 68
End: 2023-12-04
Payer: MEDICARE

## 2023-12-04 NOTE — TELEPHONE ENCOUNTER
SAMIA confirmed with Val at [employer] [phone] that ride request was received via fax for 8:45 am pick on 12.6.23. SAMIA notified family/niece Ember and she notified the pt. Ember stated that pt went to her eye appt but was not seen due to a $45 that they were not aware of, and no one called family to pay on her behalf, so eye appt was rescheduled to the end of the month. SAMIA asked if urgent eyes appt is needed. Ember stated that pt's eye has now cleared up, and she thinks that pt's high BP on the day she went ER caused blood shot eye, but pt is ok and does not need an urgent appt. No other needs expressed. SAMIA will remain available.

## 2023-12-06 ENCOUNTER — OFFICE VISIT (OUTPATIENT)
Dept: RADIATION ONCOLOGY | Facility: CLINIC | Age: 68
End: 2023-12-06
Payer: MEDICARE

## 2023-12-06 VITALS
HEART RATE: 79 BPM | HEIGHT: 62 IN | WEIGHT: 177.5 LBS | TEMPERATURE: 98 F | RESPIRATION RATE: 18 BRPM | DIASTOLIC BLOOD PRESSURE: 77 MMHG | SYSTOLIC BLOOD PRESSURE: 152 MMHG | BODY MASS INDEX: 32.66 KG/M2 | OXYGEN SATURATION: 98 %

## 2023-12-06 DIAGNOSIS — C50.412 MALIGNANT NEOPLASM OF UPPER-OUTER QUADRANT OF LEFT BREAST IN FEMALE, ESTROGEN RECEPTOR POSITIVE: Primary | ICD-10-CM

## 2023-12-06 DIAGNOSIS — Z17.0 MALIGNANT NEOPLASM OF UPPER-OUTER QUADRANT OF LEFT BREAST IN FEMALE, ESTROGEN RECEPTOR POSITIVE: Primary | ICD-10-CM

## 2023-12-06 PROCEDURE — 4010F ACE/ARB THERAPY RXD/TAKEN: CPT | Mod: CPTII,S$GLB,, | Performed by: SPECIALIST

## 2023-12-06 PROCEDURE — 3078F PR MOST RECENT DIASTOLIC BLOOD PRESSURE < 80 MM HG: ICD-10-PCS | Mod: CPTII,S$GLB,, | Performed by: SPECIALIST

## 2023-12-06 PROCEDURE — 3008F PR BODY MASS INDEX (BMI) DOCUMENTED: ICD-10-PCS | Mod: CPTII,S$GLB,, | Performed by: SPECIALIST

## 2023-12-06 PROCEDURE — 99999 PR PBB SHADOW E&M-EST. PATIENT-LVL V: ICD-10-PCS | Mod: PBBFAC,,, | Performed by: SPECIALIST

## 2023-12-06 PROCEDURE — 1159F MED LIST DOCD IN RCRD: CPT | Mod: CPTII,S$GLB,, | Performed by: SPECIALIST

## 2023-12-06 PROCEDURE — 1101F PR PT FALLS ASSESS DOC 0-1 FALLS W/OUT INJ PAST YR: ICD-10-PCS | Mod: CPTII,S$GLB,, | Performed by: SPECIALIST

## 2023-12-06 PROCEDURE — 3288F PR FALLS RISK ASSESSMENT DOCUMENTED: ICD-10-PCS | Mod: CPTII,S$GLB,, | Performed by: SPECIALIST

## 2023-12-06 PROCEDURE — 1157F PR ADVANCE CARE PLAN OR EQUIV PRESENT IN MEDICAL RECORD: ICD-10-PCS | Mod: CPTII,S$GLB,, | Performed by: SPECIALIST

## 2023-12-06 PROCEDURE — 3008F BODY MASS INDEX DOCD: CPT | Mod: CPTII,S$GLB,, | Performed by: SPECIALIST

## 2023-12-06 PROCEDURE — 1126F AMNT PAIN NOTED NONE PRSNT: CPT | Mod: CPTII,S$GLB,, | Performed by: SPECIALIST

## 2023-12-06 PROCEDURE — 1159F PR MEDICATION LIST DOCUMENTED IN MEDICAL RECORD: ICD-10-PCS | Mod: CPTII,S$GLB,, | Performed by: SPECIALIST

## 2023-12-06 PROCEDURE — 99999 PR PBB SHADOW E&M-EST. PATIENT-LVL V: CPT | Mod: PBBFAC,,, | Performed by: SPECIALIST

## 2023-12-06 PROCEDURE — 3066F NEPHROPATHY DOC TX: CPT | Mod: CPTII,S$GLB,, | Performed by: SPECIALIST

## 2023-12-06 PROCEDURE — 3044F HG A1C LEVEL LT 7.0%: CPT | Mod: CPTII,S$GLB,, | Performed by: SPECIALIST

## 2023-12-06 PROCEDURE — 3044F PR MOST RECENT HEMOGLOBIN A1C LEVEL <7.0%: ICD-10-PCS | Mod: CPTII,S$GLB,, | Performed by: SPECIALIST

## 2023-12-06 PROCEDURE — 99212 PR OFFICE/OUTPT VISIT, EST, LEVL II, 10-19 MIN: ICD-10-PCS | Mod: S$GLB,,, | Performed by: SPECIALIST

## 2023-12-06 PROCEDURE — 1101F PT FALLS ASSESS-DOCD LE1/YR: CPT | Mod: CPTII,S$GLB,, | Performed by: SPECIALIST

## 2023-12-06 PROCEDURE — 4010F PR ACE/ARB THEARPY RXD/TAKEN: ICD-10-PCS | Mod: CPTII,S$GLB,, | Performed by: SPECIALIST

## 2023-12-06 PROCEDURE — 3077F PR MOST RECENT SYSTOLIC BLOOD PRESSURE >= 140 MM HG: ICD-10-PCS | Mod: CPTII,S$GLB,, | Performed by: SPECIALIST

## 2023-12-06 PROCEDURE — 3078F DIAST BP <80 MM HG: CPT | Mod: CPTII,S$GLB,, | Performed by: SPECIALIST

## 2023-12-06 PROCEDURE — 3288F FALL RISK ASSESSMENT DOCD: CPT | Mod: CPTII,S$GLB,, | Performed by: SPECIALIST

## 2023-12-06 PROCEDURE — 99212 OFFICE O/P EST SF 10 MIN: CPT | Mod: S$GLB,,, | Performed by: SPECIALIST

## 2023-12-06 PROCEDURE — 1126F PR PAIN SEVERITY QUANTIFIED, NO PAIN PRESENT: ICD-10-PCS | Mod: CPTII,S$GLB,, | Performed by: SPECIALIST

## 2023-12-06 PROCEDURE — 3066F PR DOCUMENTATION OF TREATMENT FOR NEPHROPATHY: ICD-10-PCS | Mod: CPTII,S$GLB,, | Performed by: SPECIALIST

## 2023-12-06 PROCEDURE — 1157F ADVNC CARE PLAN IN RCRD: CPT | Mod: CPTII,S$GLB,, | Performed by: SPECIALIST

## 2023-12-06 PROCEDURE — 3077F SYST BP >= 140 MM HG: CPT | Mod: CPTII,S$GLB,, | Performed by: SPECIALIST

## 2023-12-06 NOTE — PROGRESS NOTES
Mrs. Paredes returns for follow-up after receiving neoadjuvant paclitaxel trastuzumab and pertuzumab with mastectomy and axillary node dissection followed by chest wall and regional moni radiotherapy.  She remains on KADCYLA and Arimidex and is tolerating this well.  She has no complaints referable to her breast shoulder or arm.  Physical examination:  Radiotherapy skin changes to her left chest with hyperpigmentation and resolved desquamation.  The medial portion of her mastectomy scar is a little dull we and I can feel what seems to be a couple of staples.  I do not feel any masses or see any evidence of recurrent disease.  She has good range of motion of her left shoulder and no lymphedema of her left arm  Impression:  She is doing well at the current time  Plan: I will see her back in 8 months' time.  I certainly appreciate participating in this delightful woman's care.

## 2023-12-11 PROBLEM — I21.29 ACUTE ST ELEVATION MYOCARDIAL INFARCTION (STEMI) OF LATERAL WALL: Status: RESOLVED | Noted: 2023-09-05 | Resolved: 2023-12-11

## 2023-12-12 ENCOUNTER — TELEPHONE (OUTPATIENT)
Dept: HEMATOLOGY/ONCOLOGY | Facility: CLINIC | Age: 68
End: 2023-12-12
Payer: MEDICARE

## 2023-12-12 NOTE — TELEPHONE ENCOUNTER
SAMIA called [employer] transportation at [phone] (AJ). SAMIA arranged ride for pt's appt on 12.18.23-7:30 am pickup. Will need to call [phone] for return ride home. Reservation#[id number]. Text will be sent top pt/family with trip info. SAMIA attempted to call [employer] to notify but no answer or vm option. SAMIA will remain available.

## 2023-12-18 ENCOUNTER — INFUSION (OUTPATIENT)
Dept: INFUSION THERAPY | Facility: HOSPITAL | Age: 68
End: 2023-12-18
Attending: INTERNAL MEDICINE
Payer: MEDICARE

## 2023-12-18 ENCOUNTER — OFFICE VISIT (OUTPATIENT)
Dept: HEMATOLOGY/ONCOLOGY | Facility: CLINIC | Age: 68
End: 2023-12-18
Payer: MEDICARE

## 2023-12-18 VITALS
TEMPERATURE: 99 F | OXYGEN SATURATION: 96 % | DIASTOLIC BLOOD PRESSURE: 51 MMHG | RESPIRATION RATE: 16 BRPM | HEART RATE: 88 BPM | SYSTOLIC BLOOD PRESSURE: 118 MMHG

## 2023-12-18 VITALS
DIASTOLIC BLOOD PRESSURE: 61 MMHG | HEART RATE: 94 BPM | BODY MASS INDEX: 32.7 KG/M2 | OXYGEN SATURATION: 96 % | SYSTOLIC BLOOD PRESSURE: 128 MMHG | WEIGHT: 177.69 LBS | TEMPERATURE: 98 F | HEIGHT: 62 IN

## 2023-12-18 DIAGNOSIS — T45.1X5A IMMUNODEFICIENCY DUE TO CHEMOTHERAPY: ICD-10-CM

## 2023-12-18 DIAGNOSIS — Z17.0 MALIGNANT NEOPLASM OF UPPER-OUTER QUADRANT OF LEFT BREAST IN FEMALE, ESTROGEN RECEPTOR POSITIVE: Primary | ICD-10-CM

## 2023-12-18 DIAGNOSIS — C50.412 MALIGNANT NEOPLASM OF UPPER-OUTER QUADRANT OF LEFT BREAST IN FEMALE, ESTROGEN RECEPTOR POSITIVE: ICD-10-CM

## 2023-12-18 DIAGNOSIS — C50.412 MALIGNANT NEOPLASM OF UPPER-OUTER QUADRANT OF LEFT BREAST IN FEMALE, ESTROGEN RECEPTOR POSITIVE: Primary | ICD-10-CM

## 2023-12-18 DIAGNOSIS — C77.9 SECONDARY MALIGNANCY OF REGIONAL LYMPH NODE: ICD-10-CM

## 2023-12-18 DIAGNOSIS — Z17.0 MALIGNANT NEOPLASM OF UPPER-OUTER QUADRANT OF LEFT BREAST IN FEMALE, ESTROGEN RECEPTOR POSITIVE: ICD-10-CM

## 2023-12-18 DIAGNOSIS — D84.821 IMMUNODEFICIENCY DUE TO CHEMOTHERAPY: ICD-10-CM

## 2023-12-18 DIAGNOSIS — Z79.899 IMMUNODEFICIENCY DUE TO CHEMOTHERAPY: ICD-10-CM

## 2023-12-18 DIAGNOSIS — N18.6 ESRD (END STAGE RENAL DISEASE): Primary | ICD-10-CM

## 2023-12-18 PROCEDURE — 3066F NEPHROPATHY DOC TX: CPT | Mod: CPTII,S$GLB,, | Performed by: NURSE PRACTITIONER

## 2023-12-18 PROCEDURE — 63600175 PHARM REV CODE 636 W HCPCS: Performed by: NURSE PRACTITIONER

## 2023-12-18 PROCEDURE — 96375 TX/PRO/DX INJ NEW DRUG ADDON: CPT

## 2023-12-18 PROCEDURE — 3008F PR BODY MASS INDEX (BMI) DOCUMENTED: ICD-10-PCS | Mod: CPTII,S$GLB,, | Performed by: NURSE PRACTITIONER

## 2023-12-18 PROCEDURE — 1101F PR PT FALLS ASSESS DOC 0-1 FALLS W/OUT INJ PAST YR: ICD-10-PCS | Mod: CPTII,S$GLB,, | Performed by: NURSE PRACTITIONER

## 2023-12-18 PROCEDURE — 99999 PR PBB SHADOW E&M-EST. PATIENT-LVL III: ICD-10-PCS | Mod: PBBFAC,,, | Performed by: NURSE PRACTITIONER

## 2023-12-18 PROCEDURE — 4010F ACE/ARB THERAPY RXD/TAKEN: CPT | Mod: CPTII,S$GLB,, | Performed by: NURSE PRACTITIONER

## 2023-12-18 PROCEDURE — 3008F BODY MASS INDEX DOCD: CPT | Mod: CPTII,S$GLB,, | Performed by: NURSE PRACTITIONER

## 2023-12-18 PROCEDURE — 1101F PT FALLS ASSESS-DOCD LE1/YR: CPT | Mod: CPTII,S$GLB,, | Performed by: NURSE PRACTITIONER

## 2023-12-18 PROCEDURE — 3288F FALL RISK ASSESSMENT DOCD: CPT | Mod: CPTII,S$GLB,, | Performed by: NURSE PRACTITIONER

## 2023-12-18 PROCEDURE — 1157F ADVNC CARE PLAN IN RCRD: CPT | Mod: CPTII,S$GLB,, | Performed by: NURSE PRACTITIONER

## 2023-12-18 PROCEDURE — 99999 PR PBB SHADOW E&M-EST. PATIENT-LVL III: CPT | Mod: PBBFAC,,, | Performed by: NURSE PRACTITIONER

## 2023-12-18 PROCEDURE — 96413 CHEMO IV INFUSION 1 HR: CPT

## 2023-12-18 PROCEDURE — 25000003 PHARM REV CODE 250: Performed by: NURSE PRACTITIONER

## 2023-12-18 PROCEDURE — 3074F PR MOST RECENT SYSTOLIC BLOOD PRESSURE < 130 MM HG: ICD-10-PCS | Mod: CPTII,S$GLB,, | Performed by: NURSE PRACTITIONER

## 2023-12-18 PROCEDURE — 1159F MED LIST DOCD IN RCRD: CPT | Mod: CPTII,S$GLB,, | Performed by: NURSE PRACTITIONER

## 2023-12-18 PROCEDURE — 1160F RVW MEDS BY RX/DR IN RCRD: CPT | Mod: CPTII,S$GLB,, | Performed by: NURSE PRACTITIONER

## 2023-12-18 PROCEDURE — 3044F HG A1C LEVEL LT 7.0%: CPT | Mod: CPTII,S$GLB,, | Performed by: NURSE PRACTITIONER

## 2023-12-18 PROCEDURE — 1125F AMNT PAIN NOTED PAIN PRSNT: CPT | Mod: CPTII,S$GLB,, | Performed by: NURSE PRACTITIONER

## 2023-12-18 PROCEDURE — 3074F SYST BP LT 130 MM HG: CPT | Mod: CPTII,S$GLB,, | Performed by: NURSE PRACTITIONER

## 2023-12-18 PROCEDURE — 1157F PR ADVANCE CARE PLAN OR EQUIV PRESENT IN MEDICAL RECORD: ICD-10-PCS | Mod: CPTII,S$GLB,, | Performed by: NURSE PRACTITIONER

## 2023-12-18 PROCEDURE — 3066F PR DOCUMENTATION OF TREATMENT FOR NEPHROPATHY: ICD-10-PCS | Mod: CPTII,S$GLB,, | Performed by: NURSE PRACTITIONER

## 2023-12-18 PROCEDURE — 1160F PR REVIEW ALL MEDS BY PRESCRIBER/CLIN PHARMACIST DOCUMENTED: ICD-10-PCS | Mod: CPTII,S$GLB,, | Performed by: NURSE PRACTITIONER

## 2023-12-18 PROCEDURE — 3288F PR FALLS RISK ASSESSMENT DOCUMENTED: ICD-10-PCS | Mod: CPTII,S$GLB,, | Performed by: NURSE PRACTITIONER

## 2023-12-18 PROCEDURE — 3044F PR MOST RECENT HEMOGLOBIN A1C LEVEL <7.0%: ICD-10-PCS | Mod: CPTII,S$GLB,, | Performed by: NURSE PRACTITIONER

## 2023-12-18 PROCEDURE — 99214 OFFICE O/P EST MOD 30 MIN: CPT | Mod: 25,S$GLB,, | Performed by: NURSE PRACTITIONER

## 2023-12-18 PROCEDURE — 3078F PR MOST RECENT DIASTOLIC BLOOD PRESSURE < 80 MM HG: ICD-10-PCS | Mod: CPTII,S$GLB,, | Performed by: NURSE PRACTITIONER

## 2023-12-18 PROCEDURE — 1125F PR PAIN SEVERITY QUANTIFIED, PAIN PRESENT: ICD-10-PCS | Mod: CPTII,S$GLB,, | Performed by: NURSE PRACTITIONER

## 2023-12-18 PROCEDURE — 3078F DIAST BP <80 MM HG: CPT | Mod: CPTII,S$GLB,, | Performed by: NURSE PRACTITIONER

## 2023-12-18 PROCEDURE — 4010F PR ACE/ARB THEARPY RXD/TAKEN: ICD-10-PCS | Mod: CPTII,S$GLB,, | Performed by: NURSE PRACTITIONER

## 2023-12-18 PROCEDURE — 99214 PR OFFICE/OUTPT VISIT, EST, LEVL IV, 30-39 MIN: ICD-10-PCS | Mod: 25,S$GLB,, | Performed by: NURSE PRACTITIONER

## 2023-12-18 PROCEDURE — 1159F PR MEDICATION LIST DOCUMENTED IN MEDICAL RECORD: ICD-10-PCS | Mod: CPTII,S$GLB,, | Performed by: NURSE PRACTITIONER

## 2023-12-18 RX ORDER — ONDANSETRON 2 MG/ML
8 INJECTION INTRAMUSCULAR; INTRAVENOUS
Status: CANCELLED | OUTPATIENT
Start: 2023-12-18

## 2023-12-18 RX ORDER — HEPARIN 100 UNIT/ML
500 SYRINGE INTRAVENOUS
Status: CANCELLED | OUTPATIENT
Start: 2023-12-18

## 2023-12-18 RX ORDER — SODIUM CHLORIDE 0.9 % (FLUSH) 0.9 %
10 SYRINGE (ML) INJECTION
Status: CANCELLED | OUTPATIENT
Start: 2023-12-18

## 2023-12-18 RX ORDER — ONDANSETRON 2 MG/ML
8 INJECTION INTRAMUSCULAR; INTRAVENOUS
Status: COMPLETED | OUTPATIENT
Start: 2023-12-18 | End: 2023-12-18

## 2023-12-18 RX ADMIN — ONDANSETRON 8 MG: 2 INJECTION INTRAMUSCULAR; INTRAVENOUS at 09:12

## 2023-12-18 RX ADMIN — SODIUM CHLORIDE: 9 INJECTION, SOLUTION INTRAVENOUS at 09:12

## 2023-12-18 RX ADMIN — ADO-TRASTUZUMAB EMTANSINE 300 MG: 20 INJECTION, POWDER, LYOPHILIZED, FOR SOLUTION INTRAVENOUS at 10:12

## 2023-12-18 NOTE — PROGRESS NOTES
Subjective:       Patient ID: Malaika Paredes is a 68 y.o. female.    Chief Complaint: review labs. Chemo      Cancer Staging   Malignant neoplasm of upper-outer quadrant of left breast in female, estrogen receptor positive  Staging form: Breast, AJCC 8th Edition  - Clinical stage from 2022: Stage IIB (cT3, cN2a(f), cM0, G3, ER+, PA+, HER2+) - Signed by Toney العلي MD on 2022        HPI: 68 y.o female with ESRD on HD T. Thurs. Sat. Presenting today for follow up of her breast cancer. Patient S/p neoadjuvant chemotherapy with 5 cycles of paclitaxel with Trastuzumab with Pertuzumab, followed by Trastuzumab/Pertuzumab completed in 2023.      Repeat PET scan revealed decreased tumor size and PET activity in the nodes.     She underwent Lt. breast mastectomy with sentinel node biopsy and subsequent Lt. axillary dissection non 3/1/23.  Pathology revealed a residual 4.3 cm breast mass with no associated DCIS or LVI.    S/p adjuvant chest wall radiation completed 2023    Completed adjuvant chest wall radiation 2023    Started on Kadcyla Q 3 weeks s/p cycle 1 2023    Today she presents for lab review and consideration of ongoing treatment. She feels well and is without complaints.   Social History     Socioeconomic History    Marital status:    Tobacco Use    Smoking status: Former     Current packs/day: 0.00     Types: Cigarettes     Start date: 1982     Quit date: 2012     Years since quittin.4    Smokeless tobacco: Never   Substance and Sexual Activity    Alcohol use: No    Drug use: Never   Social History Narrative    ** Merged History Encounter **          Social Determinants of Health     Financial Resource Strain: Medium Risk (2023)    Overall Financial Resource Strain (CARDIA)     Difficulty of Paying Living Expenses: Somewhat hard   Food Insecurity: Food Insecurity Present (2023)    Hunger Vital Sign     Worried About Running Out of  Food in the Last Year: Sometimes true     Ran Out of Food in the Last Year: Sometimes true   Transportation Needs: No Transportation Needs (9/16/2023)    PRAPARE - Transportation     Lack of Transportation (Medical): No     Lack of Transportation (Non-Medical): No   Physical Activity: Sufficiently Active (9/16/2023)    Exercise Vital Sign     Days of Exercise per Week: 5 days     Minutes of Exercise per Session: 30 min   Stress: No Stress Concern Present (9/16/2023)    [facility] - Occupational Stress Questionnaire     Feeling of Stress : Not at all   Social Connections: Moderately Isolated (9/16/2023)    Social Connection and Isolation Panel [NHANES]     Frequency of Communication with Friends and Family: More than three times a week     Frequency of Social Gatherings with Friends and Family: More than three times a week     Attends [employer] Services: More than 4 times per year     Active Member of Clubs or Organizations: No     Attends Club or Organization Meetings: Never     Marital Status:    Housing Stability: Low Risk  (9/16/2023)    Housing Stability Vital Sign     Unable to Pay for Housing in the Last Year: No     Number of Places Lived in the Last Year: 1     Unstable Housing in the Last Year: No       Past Medical History:   Diagnosis Date    CAD (coronary artery disease)     Cataract     Cataract     CHF (congestive heart failure)     COPD (chronic obstructive pulmonary disease)     Currently asymptomatic HIV infection, with history of HIV-related illness 7/10/2023    Diabetes mellitus     Diabetic retinopathy     ESRD (end stage renal disease)     TTS    Hypertension     Ischemic ulcer of toe of left foot     Malignant neoplasm of upper-outer quadrant of left breast in female, estrogen receptor positive 06/20/2022    left    PAD (peripheral artery disease)     PAF (paroxysmal atrial fibrillation)        Family History   Problem Relation  Age of Onset    Hypertension Mother     Cancer Father     Breast cancer Sister     Diabetes Sister     Cancer Brother     Amblyopia Neg Hx     Blindness Neg Hx     Cataracts Neg Hx     Glaucoma Neg Hx     Macular degeneration Neg Hx     Retinal detachment Neg Hx     Strabismus Neg Hx     Stroke Neg Hx     Thyroid disease Neg Hx        Past Surgical History:   Procedure Laterality Date    ANGIOGRAM, CORONARY, WITH LEFT HEART CATHETERIZATION  9/5/2023    Procedure: Angiogram, Coronary, with Left Heart Cath;  Surgeon: Travis Bartholomew MD;  Location: [facility] CATH LAB;  Service: Cardiology;;    APPLICATION OF WOUND VACUUM-ASSISTED CLOSURE DEVICE Left 4/6/2023    Procedure: APPLICATION, WOUND VAC;  Surgeon: Jayjay Arana MD;  Location: [facility] OR;  Service: General;  Laterality: Left;  left chest    ARTERIOGRAPHY OF SUBCLAVIAN ARTERY  9/5/2023    Procedure: ARTERIOGRAM, SUBCLAVIAN;  Surgeon: Travis Bartholomew MD;  Location: [facility] CATH LAB;  Service: Cardiology;;    AXILLARY NODE DISSECTION Left 3/1/2023    Procedure: LYMPHADENECTOMY, AXILLARY;  Surgeon: Jayjay Arana MD;  Location: [facility] OR;  Service: General;  Laterality: Left;    BIOPSY OF INGUINAL LYMPH NODE Left 3/31/2023    Procedure: BIOPSY, LYMPH NODE, INGUINAL;  Surgeon: Jayjya Arana MD;  Location: [facility] OR;  Service: General;  Laterality: Left;    BREAST BIOPSY Right     benign    CATARACT EXTRACTION W/  INTRAOCULAR LENS IMPLANT Right 08/14/2017    Dr. Moe    CORONARY ANGIOGRAPHY N/A 9/5/2023    Procedure: ANGIOGRAM, CORONARY ARTERY;  Surgeon: Travis Bartholomew MD;  Location: [facility] CATH LAB;  Service: Cardiology;  Laterality: N/A;    CORONARY ARTERY BYPASS GRAFT  2020    CORONARY BYPASS GRAFT ANGIOGRAPHY  9/5/2023    Procedure: Bypass graft study;  Surgeon: Travis Bartholomew MD;  Location: [facility] CATH LAB;  Service: Cardiology;;    FLUOROSCOPY N/A 07/25/2022    Procedure: FLUOROSCOPY/mediport placement;  Surgeon: Cole Perdomo MD;  Location: [facility]  CATH LAB;  Service: General;  Laterality: N/A;    MEDIPORT REMOVAL N/A 3/1/2023    Procedure: REMOVAL, CATHETER, CENTRAL VENOUS, TUNNELED, WITH PORT;  Surgeon: Jayjay Arana MD;  Location: [facility] OR;  Service: General;  Laterality: N/A;    MODIFIED RADICAL MASTECTOMY W/ AXILLARY LYMPH NODE DISSECTION Left 3/1/2023    Procedure: MASTECTOMY, MODIFIED RADICAL;  Surgeon: Jayjay Arana MD;  Location: [facility] OR;  Service: General;  Laterality: Left;    WOUND DEBRIDEMENT Left 3/31/2023    Procedure: DEBRIDEMENT, WOUND;  Surgeon: Jayjay Arana MD;  Location: [facility] OR;  Service: General;  Laterality: Left;  left chest wall eschar debridement       Review of Systems   Constitutional:  Negative for activity change, appetite change, chills, fatigue, fever and unexpected weight change.   HENT:  Negative for congestion, mouth sores, nosebleeds, sore throat, trouble swallowing and voice change.    Respiratory:  Negative for cough, chest tightness, shortness of breath and wheezing.    Cardiovascular:  Negative for chest pain and leg swelling.   Gastrointestinal:  Negative for abdominal distention, abdominal pain, blood in stool, constipation, diarrhea, nausea and vomiting.   Genitourinary:  Negative for difficulty urinating, dysuria and hematuria.   Musculoskeletal:  Negative for arthralgias, back pain and myalgias.   Skin:  Negative for pallor, rash and wound.   Neurological:  Negative for dizziness, syncope, weakness and headaches.   Hematological:  Negative for adenopathy. Does not bruise/bleed easily.   Psychiatric/Behavioral:  The patient is nervous/anxious.          Medication List with Changes/Refills   Current Medications    ACETAMINOPHEN (TYLENOL) 500 MG CAP    Take 500 mg by mouth every 6 (six) hours as needed.    ALBUTEROL (PROVENTIL/VENTOLIN HFA) 90 MCG/ACTUATION INHALER    Inhale 2 puffs into the lungs every 4 (four) hours as needed. Take 2 puffs of the lungs every 4 hr as needed for wheezing or shortness of breath     ALBUTEROL SULFATE (PROAIR RESPICLICK) 90 MCG/ACTUATION AEPB    Inhale 180 mcg into the lungs every 4 (four) hours. Rescue    AMLODIPINE (NORVASC) 10 MG TABLET    Take 10 mg by mouth once daily.    ANASTROZOLE (ARIMIDEX) 1 MG TAB    Take 1 tablet (1 mg total) by mouth once daily.    ATORVASTATIN (LIPITOR) 10 MG TABLET    Take 10 mg by mouth once daily.    BUDESONIDE-GLYCOPYR-FORMOTEROL (BREZTRI AEROSPHERE) 160-9-4.8 MCG/ACTUATION HFAA    Inhale 1 puff into the lungs once daily.    CALCIUM CARBONATE (OS-CAMERON) 500 MG CALCIUM (1,250 MG) CHEWABLE TABLET    Take 1 tablet by mouth 2 (two) times daily as needed for Heartburn.    DIPHENHYDRAMINE (BENADRYL) 25 MG CAPSULE    Take 1 capsule (25 mg total) by mouth every 6 (six) hours as needed for Itching.    DOCUSATE SODIUM (COLACE) 100 MG CAPSULE    Take 1 capsule (100 mg total) by mouth 2 (two) times daily as needed for Constipation.    EMTRICITABINE-TENOFOVIR ALAFEN (DESCOVY) 200-25 MG TAB    Take 1 tablet by mouth.    EPOETIN ALEJANDRO (EPOGEN INJ)    Epoetin Alejandro (Epogen)    HYDRALAZINE (APRESOLINE) 25 MG TABLET    Take 1 tablet (25 mg total) by mouth 3 (three) times daily.    INSULIN DEGLUDEC (TRESIBA FLEXTOUCH U-200) 200 UNIT/ML (3 ML) INSULIN PEN    Inject 60 Units into the skin once daily. Patient's niece didn't know how many units patient inject. She stated it's not 60 units. I tried to call the pharmacy where the patient fills and the number was not a working phone number    IRON SUCROSE IN NS (VENOFER) 100 MG/100 ML PGBK    50 mg.    ISENTRESS 400 MG TABLET    Take 400 mg by mouth.    ISOSORBIDE MONONITRATE (IMDUR) 30 MG 24 HR TABLET    Take 1 tablet (30 mg total) by mouth once daily.    KETOCONAZOLE (NIZORAL) 2 % CREAM    Apply topically once daily. for 14 days    LEVOTHYROXINE (TIROSINT) 88 MCG CAP    Take 75 mcg by mouth before breakfast.    LOPERAMIDE (IMODIUM) 2 MG CAPSULE    Take 1 capsule (2 mg total) by mouth 4 (four) times daily as needed for Diarrhea.     METOPROLOL SUCCINATE (TOPROL-XL) 25 MG 24 HR TABLET    Take 25 mg by mouth 2 (two) times daily.    OMEPRAZOLE (PRILOSEC) 20 MG CAPSULE    Take 20 mg by mouth once daily.    ONDANSETRON (ZOFRAN-ODT) 4 MG TBDL    Dissolve 1 tablet (4 mg total) by mouth every 8 (eight) hours as needed (nausea).    OXYCODONE (ROXICODONE) 5 MG IMMEDIATE RELEASE TABLET    Take 1 tablet (5 mg total) by mouth every 6 (six) hours as needed for Pain.    SEVELAMER CARBONATE (RENVELA) 800 MG TAB    Take 800 mg by mouth 3 (three) times daily with meals.    SODIUM BICARBONATE 650 MG TABLET    Take 650 mg by mouth 2 (two) times daily.     Objective:     Vitals:    12/18/23 0903   BP: 128/61   Pulse: 94   Temp: 98.3 °F (36.8 °C)     Lab Results   Component Value Date    WBC 5.48 12/18/2023    HGB 12.3 12/18/2023    HCT 35.8 (L) 12/18/2023     (H) 12/18/2023     12/18/2023   BMP  Lab Results   Component Value Date     12/18/2023    K 4.5 12/18/2023     12/18/2023    CO2 24 12/18/2023    BUN 43 (H) 12/18/2023    CREATININE 7.1 (H) 12/18/2023    CALCIUM 9.2 12/18/2023    ANIONGAP 11 12/18/2023    EGFRNORACEVR 6 (A) 12/18/2023     Lab Results   Component Value Date    ALT 21 12/18/2023    AST 29 12/18/2023    ALKPHOS 90 12/18/2023    BILITOT 0.5 12/18/2023         Physical Exam  Vitals reviewed.   Constitutional:       Appearance: She is well-developed.   HENT:      Head: Normocephalic.      Right Ear: External ear normal.      Left Ear: External ear normal.      Nose: Nose normal.   Eyes:      General: Lids are normal. No scleral icterus.        Right eye: No discharge.         Left eye: No discharge.      Conjunctiva/sclera: Conjunctivae normal.   Neck:      Thyroid: No thyroid mass.   Cardiovascular:      Rate and Rhythm: Normal rate and regular rhythm.      Heart sounds: Normal heart sounds.   Pulmonary:      Effort: Pulmonary effort is normal. No respiratory distress.      Breath sounds: Normal breath sounds. No wheezing  or rales.   Abdominal:      General: There is no distension.      Palpations: Abdomen is soft.   Genitourinary:     Comments: deferred  Musculoskeletal:         General: Normal range of motion.      Cervical back: Normal range of motion.   Lymphadenopathy:      Head:      Right side of head: No submandibular, preauricular or posterior auricular adenopathy.      Left side of head: No submandibular, preauricular or posterior auricular adenopathy.   Skin:     General: Skin is warm and dry.   Neurological:      Mental Status: She is alert and oriented to person, place, and time.   Psychiatric:         Speech: Speech normal.         Behavior: Behavior normal. Behavior is cooperative.         Thought Content: Thought content normal.        Assessment:     Problem List Items Addressed This Visit          Renal/    ESRD (end stage renal disease) - Primary     On HD T.Thurs.Sat            Immunology/Multi System    Immunodeficiency due to chemotherapy     Infection precautions            Oncology    Malignant neoplasm of upper-outer quadrant of left breast in female, estrogen receptor positive      Cancer Staging   Malignant neoplasm of upper-outer quadrant of left breast in female, estrogen receptor positive  Staging form: Breast, AJCC 8th Edition  - Clinical stage from 6/20/2022: Stage IIB (cT3, cN2a(f), cM0, G3, ER+, [place]+, HER2+) - Signed by Toney العلي MD on 7/11/2022    S/p neoadjuvant chemotherapy with 5 cycles of paclitaxel with Trastuzumab with Pertuzumab, followed by Trastuzumab/Pertuzumab completed in March of 2023.  Repeat PET scan revealed decreased tumor size and PET activity in the nodes. She underwent Lt. breast mastectomy with sentinel node biopsy and subsequent Lt. axillary dissection non 3/1/23.  Pathology revealed a residual 4.3 cm breast mass with no associated DCIS or LVI.     Completed adjuvant chest wall radiation 8/30/2023    Now on Kadcyla Q 3 weeks    Labs reviewed stable. ECHO up to date.  Okay to proceed with cycle 6 Q 3 weekly Kadcyla. F/u 3 weeks with cbc, cmp next Kadcyla planned. ECHO and PET prior             Secondary malignancy of regional lymph node     Continue current management             Endocrine    BMI 32.0-32.9,adult     Encourage healthy nutrition along with portion control              Plan:     ESRD (end stage renal disease)    Immunodeficiency due to chemotherapy    Malignant neoplasm of upper-outer quadrant of left breast in female, estrogen receptor positive    Secondary malignancy of regional lymph node    BMI 32.0-32.9,adult    Other orders  -     Cancel: ondansetron injection 8 mg  -     Cancel: ado-trastuzumab emtansine (KADCYLA) 300 mg in sodium chloride 0.9% SolP 265 mL chemo infusion  -     Cancel: sodium chloride 0.9% 100 mL flush bag  -     sodium chloride 0.9% flush 10 mL  -     heparin, porcine (PF) 100 unit/mL injection flush 500 Units  -     alteplase injection 2 mg            Med Onc Chart Routing      Follow up with physician 3 weeks. Dr. العلي PET Prior   Follow up with [facility]    Infusion scheduling note    Injection scheduling note kadcyla   Labs CBC and CMP   Scheduling:  Preferred lab:  Lab interval:     Imaging PET scan   please schedule PET few days prior to 3 weeks f/u appointment   Pharmacy appointment No pharmacy appointment needed      Other referrals       No additional referrals needed           PHOEBE Hussein

## 2023-12-18 NOTE — ASSESSMENT & PLAN NOTE
Cancer Staging   Malignant neoplasm of upper-outer quadrant of left breast in female, estrogen receptor positive  Staging form: Breast, AJCC 8th Edition  - Clinical stage from 6/20/2022: Stage IIB (cT3, cN2a(f), cM0, G3, ER+, HI+, HER2+) - Signed by Toney العلي MD on 7/11/2022    S/p neoadjuvant chemotherapy with 5 cycles of paclitaxel with Trastuzumab with Pertuzumab, followed by Trastuzumab/Pertuzumab completed in March of 2023.  Repeat PET scan revealed decreased tumor size and PET activity in the nodes. She underwent Lt. breast mastectomy with sentinel node biopsy and subsequent Lt. axillary dissection non 3/1/23.  Pathology revealed a residual 4.3 cm breast mass with no associated DCIS or LVI.     Completed adjuvant chest wall radiation 8/30/2023    Now on Kadcyla Q 3 weeks    Labs reviewed stable. ECHO up to date. Okay to proceed with cycle 6 Q 3 weekly Kadcyla. F/u 3 weeks with cbc, cmp next Kadcyla planned. ECHO and PET prior      
Continue current management   
Encourage healthy nutrition along with portion control  
Infection precautions  
On  Anupama.Sat  
14-Oct-2023

## 2023-12-28 ENCOUNTER — TELEPHONE (OUTPATIENT)
Dept: CARDIOLOGY | Facility: HOSPITAL | Age: 68
End: 2023-12-28
Payer: MEDICARE

## 2023-12-28 ENCOUNTER — TELEPHONE (OUTPATIENT)
Dept: HEMATOLOGY/ONCOLOGY | Facility: CLINIC | Age: 68
End: 2023-12-28
Payer: MEDICARE

## 2023-12-28 NOTE — TELEPHONE ENCOUNTER
SAMIA received call from Wanda stating that pt missed appt today that she was not aware of, and it was rescheduled for tomorrow, and but pt does not have transportation. Ember stated that her son uses her car to go to work. SAMIA called [employer] transportation line at [phone] but not able to schedule rides for 12.29.23 and 1.2.23 appt's because s 3 day notice is required. SAMIA faxed ride requests to [employer] for 12.29.23-12:45 pm pickup, and 1.2.24- 7:15 am . SAMIA was able to reserve [employer] ride for 1.8.24-6:45 am pickup-reservation # [id number]. Anoop will receive alert to phone with [employer] ride info on day of appt. SAMIA will remain available.

## 2023-12-29 ENCOUNTER — HOSPITAL ENCOUNTER (OUTPATIENT)
Dept: CARDIOLOGY | Facility: HOSPITAL | Age: 68
Discharge: HOME | End: 2023-12-29
Attending: INTERNAL MEDICINE
Payer: MEDICARE

## 2023-12-29 VITALS
SYSTOLIC BLOOD PRESSURE: 128 MMHG | BODY MASS INDEX: 32.57 KG/M2 | DIASTOLIC BLOOD PRESSURE: 61 MMHG | HEIGHT: 62 IN | WEIGHT: 177 LBS

## 2023-12-29 DIAGNOSIS — Z51.81 ENCOUNTER FOR MONITORING CARDIOTOXIC DRUG THERAPY: ICD-10-CM

## 2023-12-29 DIAGNOSIS — Z79.899 ENCOUNTER FOR MONITORING CARDIOTOXIC DRUG THERAPY: ICD-10-CM

## 2023-12-29 PROCEDURE — 93306 TTE W/DOPPLER COMPLETE: CPT | Mod: 26,,, | Performed by: INTERNAL MEDICINE

## 2023-12-29 PROCEDURE — 93356 MYOCRD STRAIN IMG SPCKL TRCK: CPT | Mod: ,,, | Performed by: INTERNAL MEDICINE

## 2023-12-29 PROCEDURE — 93306 ECHO (CUPID ONLY): ICD-10-PCS | Mod: 26,,, | Performed by: INTERNAL MEDICINE

## 2023-12-29 PROCEDURE — 93356 MYOCRD STRAIN IMG SPCKL TRCK: CPT

## 2023-12-29 PROCEDURE — 93356 ECHO (CUPID ONLY): ICD-10-PCS | Mod: ,,, | Performed by: INTERNAL MEDICINE

## 2023-12-30 LAB
AORTIC ROOT ANNULUS: 2.62 CM
ASCENDING AORTA: 2.79 CM
AV INDEX (PROSTH): 0.88
AV MEAN GRADIENT: 6 MMHG
AV PEAK GRADIENT: 12 MMHG
AV VALVE AREA BY VELOCITY RATIO: 2.51 CM²
AV VALVE AREA: 2.62 CM²
AV VELOCITY RATIO: 0.84
BSA FOR ECHO PROCEDURE: 1.87 M2
CV ECHO LV RWT: 1.11 CM
DOP CALC AO PEAK VEL: 1.71 M/S
DOP CALC AO VTI: 38.1 CM
DOP CALC LVOT AREA: 3 CM2
DOP CALC LVOT DIAMETER: 1.95 CM
DOP CALC LVOT PEAK VEL: 1.44 M/S
DOP CALC LVOT STROKE VOLUME: 100 CM3
DOP CALC RVOT PEAK VEL: 0.94 M/S
DOP CALC RVOT VTI: 21.2 CM
DOP CALCLVOT PEAK VEL VTI: 33.5 CM
E WAVE DECELERATION TIME: 287.84 MSEC
E/A RATIO: 0.75
E/E' RATIO: 9.67 M/S
ECHO LV POSTERIOR WALL: 1.96 CM (ref 0.6–1.1)
FRACTIONAL SHORTENING: 28 % (ref 28–44)
GLOBAL LONGITUIDAL STRAIN: 16.4 %
INTERVENTRICULAR SEPTUM: 1.31 CM (ref 0.6–1.1)
IVC DIAMETER: 1.43 CM
IVRT: 88.49 MSEC
LA MAJOR: 6.21 CM
LA MINOR: 6.27 CM
LA WIDTH: 3.3 CM
LEFT ATRIUM SIZE: 4.39 CM
LEFT ATRIUM VOLUME INDEX MOD: 37.2 ML/M2
LEFT ATRIUM VOLUME INDEX: 42.5 ML/M2
LEFT ATRIUM VOLUME MOD: 67.35 CM3
LEFT ATRIUM VOLUME: 76.84 CM3
LEFT INTERNAL DIMENSION IN SYSTOLE: 2.54 CM (ref 2.1–4)
LEFT VENTRICLE DIASTOLIC VOLUME INDEX: 28.52 ML/M2
LEFT VENTRICLE DIASTOLIC VOLUME: 51.62 ML
LEFT VENTRICLE MASS INDEX: 124 G/M2
LEFT VENTRICLE SYSTOLIC VOLUME INDEX: 12.8 ML/M2
LEFT VENTRICLE SYSTOLIC VOLUME: 23.25 ML
LEFT VENTRICULAR INTERNAL DIMENSION IN DIASTOLE: 3.52 CM (ref 3.5–6)
LEFT VENTRICULAR MASS: 224.77 G
LV LATERAL E/E' RATIO: 8.7 M/S
LV SEPTAL E/E' RATIO: 10.88 M/S
LVOT MG: 5.01 MMHG
LVOT MV: 1.08 CM/S
MV PEAK A VEL: 1.16 M/S
MV PEAK E VEL: 0.87 M/S
MV STENOSIS PRESSURE HALF TIME: 83.47 MS
MV VALVE AREA P 1/2 METHOD: 2.64 CM2
OHS LV EJECTION FRACTION SIMPSONS BIPLANE MOD: 57 %
PISA TR MAX VEL: 2.62 M/S
PULM VEIN S/D RATIO: 1.43
PV MEAN GRADIENT: 2 MMHG
PV MV: 0.93 M/S
PV PEAK D VEL: 0.51 M/S
PV PEAK GRADIENT: 8 MMHG
PV PEAK S VEL: 0.73 M/S
PV PEAK VELOCITY: 1.44 M/S
QEF: 56 %
RA MAJOR: 5.27 CM
RA PRESSURE ESTIMATED: 3 MMHG
RA WIDTH: 4.1 CM
RIGHT VENTRICULAR END-DIASTOLIC DIMENSION: 4.47 CM
RV TB RVSP: 6 MMHG
SINUS: 2.98 CM
STJ: 2.97 CM
TDI LATERAL: 0.1 M/S
TDI SEPTAL: 0.08 M/S
TDI: 0.09 M/S
TR MAX PG: 27 MMHG
TRICUSPID ANNULAR PLANE SYSTOLIC EXCURSION: 1.68 CM
TV REST PULMONARY ARTERY PRESSURE: 30 MMHG
Z-SCORE OF LEFT VENTRICULAR DIMENSION IN END DIASTOLE: -3.52
Z-SCORE OF LEFT VENTRICULAR DIMENSION IN END SYSTOLE: -1.56

## 2024-01-08 PROBLEM — D46.Z OTHER MYELODYSPLASTIC SYNDROMES: Status: ACTIVE | Noted: 2024-01-08

## (undated) DEVICE — NDL SAFETY 25G X 1.5 ECLIPSE

## (undated) DEVICE — GLOVE BIOGEL SKINSENSE PI 7.5

## (undated) DEVICE — SUT VICRYL 0 SH

## (undated) DEVICE — MANIFOLD 4 PORT

## (undated) DEVICE — ELECTRODE REM PLYHSV RETURN 9

## (undated) DEVICE — GLOVE SURGICAL LATEX SZ 7

## (undated) DEVICE — DRESSING N ADH OIL EMUL 3X8

## (undated) DEVICE — GOWN POLY REINF BRTH SLV XL

## (undated) DEVICE — SOL 9P NACL IRR PIC IL

## (undated) DEVICE — DECANTER 6 VIAL

## (undated) DEVICE — GLOVE SURG BIOGEL LATEX SZ 7.5

## (undated) DEVICE — SYR 10CC LUER LOCK

## (undated) DEVICE — CONTAINER SPECIMEN OR STER 4OZ

## (undated) DEVICE — COVER LIGHT HANDLE 80/CA

## (undated) DEVICE — SPONGE LAP 18X18 PREWASHED

## (undated) DEVICE — NDL HYPODERMIC BLUNT 18G 1.5IN

## (undated) DEVICE — PAD ABD 8X10 STERILE

## (undated) DEVICE — CATH JL3.5 5FR

## (undated) DEVICE — SPONGE GAUZE 16PLY 4X4

## (undated) DEVICE — SUT VICRYL 3-0 27 SH

## (undated) DEVICE — BRA MAMMARY SUPPORT XLARGE

## (undated) DEVICE — UNDERGLOVES BIOGEL PI SZ 7 LF

## (undated) DEVICE — NDL SAFETY 22G X 1.5 ECLIPSE

## (undated) DEVICE — Device

## (undated) DEVICE — PACK BASIC SETUP SC BR

## (undated) DEVICE — WIRE GUIDE TEFLON 3CM .035 145

## (undated) DEVICE — GAUZE SPONGE 4X4 12PLY

## (undated) DEVICE — TOWEL OR DISP STRL BLUE 4/PK

## (undated) DEVICE — SHEATH INTRODUCER 6FR 11CM

## (undated) DEVICE — CLOSURE SKIN STERI STRIP 1/2X4

## (undated) DEVICE — INFLATOR ENCORE 26 BLLN INFL

## (undated) DEVICE — DRESSING GAUZE XEROFORM 5X9

## (undated) DEVICE — DRAPE T TRNSVRS LAP 102X78X121

## (undated) DEVICE — SUPPORT ULNA NERVE PROTECTOR

## (undated) DEVICE — EVACUATOR PENCIL SMOKE NEPTUNE

## (undated) DEVICE — SUT CTD VICRYL 2-0 UND SUTU

## (undated) DEVICE — SUT MONOCYRL 4-0 PS2 UND

## (undated) DEVICE — SUT VICRYL CTD 2-0 GI 27 SH

## (undated) DEVICE — DRAIN PENRS STERILE LTX 18X1/2

## (undated) DEVICE — APPLIER CLIP LIAGCLIP 9.375IN

## (undated) DEVICE — HEADREST ROUND DISP FOAM 9IN

## (undated) DEVICE — SUT SILK 2-0 STRANDS 30IN

## (undated) DEVICE — PACK HEART CATH BR

## (undated) DEVICE — PACK CATH LAB CUSTOM BR

## (undated) DEVICE — COVER PROBE NL STRL 3.6X96IN

## (undated) DEVICE — CATH JR4 5FR

## (undated) DEVICE — SUT CTD VICRYL 3-0 UND SUTU

## (undated) DEVICE — BANDAGE MATRIX HK LOOP 4IN 5YD

## (undated) DEVICE — APPLICATOR CHLORAPREP ORN 26ML

## (undated) DEVICE — ADHESIVE DERMABOND ADVANCED

## (undated) DEVICE — SOL BETADINE 5%

## (undated) DEVICE — SHEARS HARMONIC CRVD 9 CM

## (undated) DEVICE — SUT MCRYL PLUS 4-0 PS2 27IN

## (undated) DEVICE — GUIDE LAUNCHER 6FR EBU 3.5

## (undated) DEVICE — BNDG COFLEX FOAM LF2 ST 6X5YD

## (undated) DEVICE — POWDER ARISTA AH 3G

## (undated) DEVICE — SPONGE DERMACEA GAUZE 4X4

## (undated) DEVICE — SUT 3-0 VICRYL / SH (J416)

## (undated) DEVICE — SYS CLSR DERMABOND PRINEO 22CM

## (undated) DEVICE — SEE MEDLINE ITEM 157216

## (undated) DEVICE — SUT VICRYL PLUS 3-0 SH 18IN

## (undated) DEVICE — SOL WATER STRL IRR 1000ML

## (undated) DEVICE — ADHESIVE MASTISOL VIAL 48/BX

## (undated) DEVICE — COVER PROXIMA MAYO STAND

## (undated) DEVICE — GLOVE BIOGEL SKINSENSE PI 6.5

## (undated) DEVICE — KIT INTRODUCER STIFFEN MICRO

## (undated) DEVICE — DRAPE CHEST FEN 15X10IN

## (undated) DEVICE — OMNIPAQUE 300MG 150ML VIAL

## (undated) DEVICE — SYR ONLY LUER LOCK 20CC

## (undated) DEVICE — DRESSING AQUACEL AG ADV 3.5X6

## (undated) DEVICE — CASSETTE INFINITI

## (undated) DEVICE — SUT CTD VICRYL 2-0 UND BR

## (undated) DEVICE — PACK ANGIOPLASTY ACCESS PLUS

## (undated) DEVICE — STAPLER SKIN PROXIMATE REG

## (undated) DEVICE — DRAPE U SPLIT SHEET 54X76IN

## (undated) DEVICE — CATH PIG145 INFINITI 5X110CM

## (undated) DEVICE — CATH DIAG IMPULSE 6FR IM